# Patient Record
Sex: MALE | Race: WHITE | NOT HISPANIC OR LATINO | Employment: OTHER | URBAN - METROPOLITAN AREA
[De-identification: names, ages, dates, MRNs, and addresses within clinical notes are randomized per-mention and may not be internally consistent; named-entity substitution may affect disease eponyms.]

---

## 2020-07-29 ENCOUNTER — CONSULT (OUTPATIENT)
Dept: CARDIOLOGY | Facility: CLINIC | Age: 75
End: 2020-07-29

## 2020-07-29 VITALS
HEART RATE: 111 BPM | BODY MASS INDEX: 23.87 KG/M2 | WEIGHT: 192 LBS | HEIGHT: 75 IN | SYSTOLIC BLOOD PRESSURE: 118 MMHG | OXYGEN SATURATION: 98 % | DIASTOLIC BLOOD PRESSURE: 70 MMHG | TEMPERATURE: 97.3 F

## 2020-07-29 DIAGNOSIS — I48.21 PERMANENT ATRIAL FIBRILLATION (HCC): Primary | ICD-10-CM

## 2020-07-29 DIAGNOSIS — I49.5 SSS (SICK SINUS SYNDROME) (HCC): ICD-10-CM

## 2020-07-29 DIAGNOSIS — K25.4 GASTROINTESTINAL HEMORRHAGE ASSOCIATED WITH GASTRIC ULCER: ICD-10-CM

## 2020-07-29 PROCEDURE — 99204 OFFICE O/P NEW MOD 45 MIN: CPT | Performed by: INTERNAL MEDICINE

## 2020-07-29 PROCEDURE — 93280 PM DEVICE PROGR EVAL DUAL: CPT | Performed by: INTERNAL MEDICINE

## 2020-07-29 RX ORDER — RAMIPRIL 5 MG/1
5 CAPSULE ORAL DAILY
COMMUNITY
End: 2021-06-17 | Stop reason: HOSPADM

## 2020-07-29 RX ORDER — BISOPROLOL FUMARATE AND HYDROCHLOROTHIAZIDE 2.5; 6.25 MG/1; MG/1
1 TABLET ORAL DAILY
COMMUNITY
End: 2020-09-26 | Stop reason: HOSPADM

## 2020-08-06 ENCOUNTER — TELEPHONE (OUTPATIENT)
Dept: CARDIOLOGY | Facility: CLINIC | Age: 75
End: 2020-08-06

## 2020-08-06 NOTE — TELEPHONE ENCOUNTER
7/31 attempted to call pt to discuss Watchman, SDM etc no answer.  8/3 left message for pt to call.  8/6 attempted to call pt no answer.    Марина Dennis RN

## 2020-08-07 ENCOUNTER — TELEPHONE (OUTPATIENT)
Dept: CARDIOLOGY | Facility: CLINIC | Age: 75
End: 2020-08-07

## 2020-08-08 NOTE — TELEPHONE ENCOUNTER
Spoke with pt related to Watchman device. Pt states he will schedule SDM visit with PCP Dr Soliman.  Pt request possible sept date and understands he will need to start Eliquis 2 weeks prior.  Answered all patients questions.  Will continue to monitor and contact once Sept date known.    Марина Dennis RN

## 2020-08-11 ENCOUNTER — TELEPHONE (OUTPATIENT)
Dept: CARDIOLOGY | Facility: CLINIC | Age: 75
End: 2020-08-11

## 2020-08-12 NOTE — TELEPHONE ENCOUNTER
Spoke with pt-f/u on PRASHANTH closure.  Pt states he wishes to wait until Sept.  Has SDM visit with Dr Soliman 8/14.  SDM letter and tool faxed to his office.  Pt encouraged to call with any questions in the interim    Марина Dennis RN

## 2020-09-03 DIAGNOSIS — K25.4 GASTROINTESTINAL HEMORRHAGE ASSOCIATED WITH GASTRIC ULCER: ICD-10-CM

## 2020-09-03 DIAGNOSIS — I48.21 PERMANENT ATRIAL FIBRILLATION (HCC): Primary | ICD-10-CM

## 2020-09-18 ENCOUNTER — PREP FOR SURGERY (OUTPATIENT)
Dept: OTHER | Facility: HOSPITAL | Age: 75
End: 2020-09-18

## 2020-09-18 DIAGNOSIS — I48.21 PERMANENT ATRIAL FIBRILLATION (HCC): Primary | ICD-10-CM

## 2020-09-18 RX ORDER — NITROGLYCERIN 0.4 MG/1
0.4 TABLET SUBLINGUAL
Status: CANCELLED | OUTPATIENT
Start: 2020-09-18

## 2020-09-18 RX ORDER — SODIUM CHLORIDE 9 MG/ML
1 INJECTION, SOLUTION INTRAVENOUS CONTINUOUS
Status: CANCELLED | OUTPATIENT
Start: 2020-09-18 | End: 2020-09-18

## 2020-09-18 RX ORDER — ACETAMINOPHEN 325 MG/1
650 TABLET ORAL EVERY 4 HOURS PRN
Status: CANCELLED | OUTPATIENT
Start: 2020-09-18

## 2020-09-18 RX ORDER — CEFAZOLIN SODIUM 2 G/100ML
2 INJECTION, SOLUTION INTRAVENOUS ONCE
Status: CANCELLED | OUTPATIENT
Start: 2020-09-18 | End: 2020-09-18

## 2020-09-18 RX ORDER — SODIUM CHLORIDE 0.9 % (FLUSH) 0.9 %
3 SYRINGE (ML) INJECTION EVERY 12 HOURS SCHEDULED
Status: CANCELLED | OUTPATIENT
Start: 2020-09-18

## 2020-09-18 RX ORDER — SODIUM CHLORIDE 0.9 % (FLUSH) 0.9 %
10 SYRINGE (ML) INJECTION AS NEEDED
Status: CANCELLED | OUTPATIENT
Start: 2020-09-18

## 2020-09-22 ENCOUNTER — APPOINTMENT (OUTPATIENT)
Dept: PREADMISSION TESTING | Facility: HOSPITAL | Age: 75
End: 2020-09-22

## 2020-09-22 DIAGNOSIS — I48.21 PERMANENT ATRIAL FIBRILLATION (HCC): ICD-10-CM

## 2020-09-22 LAB
ANION GAP SERPL CALCULATED.3IONS-SCNC: 12 MMOL/L (ref 5–15)
BUN SERPL-MCNC: 22 MG/DL (ref 8–23)
BUN/CREAT SERPL: 18.3 (ref 7–25)
CALCIUM SPEC-SCNC: 9.7 MG/DL (ref 8.6–10.5)
CHLORIDE SERPL-SCNC: 103 MMOL/L (ref 98–107)
CO2 SERPL-SCNC: 21 MMOL/L (ref 22–29)
CREAT SERPL-MCNC: 1.2 MG/DL (ref 0.76–1.27)
DEPRECATED RDW RBC AUTO: 42.2 FL (ref 37–54)
ERYTHROCYTE [DISTWIDTH] IN BLOOD BY AUTOMATED COUNT: 12 % (ref 12.3–15.4)
GFR SERPL CREATININE-BSD FRML MDRD: 59 ML/MIN/1.73
GLUCOSE SERPL-MCNC: 100 MG/DL (ref 65–99)
HCT VFR BLD AUTO: 42.3 % (ref 37.5–51)
HGB BLD-MCNC: 14.3 G/DL (ref 13–17.7)
INR PPP: 1.24 (ref 0.85–1.16)
MCH RBC QN AUTO: 32.1 PG (ref 26.6–33)
MCHC RBC AUTO-ENTMCNC: 33.8 G/DL (ref 31.5–35.7)
MCV RBC AUTO: 95.1 FL (ref 79–97)
PLATELET # BLD AUTO: 240 10*3/MM3 (ref 140–450)
PMV BLD AUTO: 10.5 FL (ref 6–12)
POTASSIUM SERPL-SCNC: 4.6 MMOL/L (ref 3.5–5.2)
PROTHROMBIN TIME: 15.3 SECONDS (ref 11.5–14)
RBC # BLD AUTO: 4.45 10*6/MM3 (ref 4.14–5.8)
SODIUM SERPL-SCNC: 136 MMOL/L (ref 136–145)
WBC # BLD AUTO: 7.06 10*3/MM3 (ref 3.4–10.8)

## 2020-09-22 PROCEDURE — 80048 BASIC METABOLIC PNL TOTAL CA: CPT | Performed by: PHYSICIAN ASSISTANT

## 2020-09-22 PROCEDURE — 36415 COLL VENOUS BLD VENIPUNCTURE: CPT

## 2020-09-22 PROCEDURE — U0004 COV-19 TEST NON-CDC HGH THRU: HCPCS

## 2020-09-22 PROCEDURE — 85027 COMPLETE CBC AUTOMATED: CPT | Performed by: PHYSICIAN ASSISTANT

## 2020-09-22 PROCEDURE — 85610 PROTHROMBIN TIME: CPT | Performed by: PHYSICIAN ASSISTANT

## 2020-09-22 PROCEDURE — C9803 HOPD COVID-19 SPEC COLLECT: HCPCS

## 2020-09-23 LAB — SARS-COV-2 RNA NOSE QL NAA+PROBE: NOT DETECTED

## 2020-09-25 ENCOUNTER — ANESTHESIA EVENT (OUTPATIENT)
Dept: CARDIOLOGY | Facility: HOSPITAL | Age: 75
End: 2020-09-25

## 2020-09-25 ENCOUNTER — HOSPITAL ENCOUNTER (INPATIENT)
Facility: HOSPITAL | Age: 75
LOS: 1 days | Discharge: HOME | End: 2020-09-26
Attending: INTERNAL MEDICINE | Admitting: INTERNAL MEDICINE

## 2020-09-25 ENCOUNTER — ANESTHESIA (OUTPATIENT)
Dept: CARDIOLOGY | Facility: HOSPITAL | Age: 75
End: 2020-09-25

## 2020-09-25 ENCOUNTER — HOSPITAL ENCOUNTER (OUTPATIENT)
Dept: CARDIOLOGY | Facility: HOSPITAL | Age: 75
Discharge: HOME | End: 2020-09-25

## 2020-09-25 DIAGNOSIS — K25.4 GASTROINTESTINAL HEMORRHAGE ASSOCIATED WITH GASTRIC ULCER: ICD-10-CM

## 2020-09-25 DIAGNOSIS — I48.21 PERMANENT ATRIAL FIBRILLATION (HCC): ICD-10-CM

## 2020-09-25 LAB
ACT BLD: 137 SECONDS (ref 82–152)
ACT BLD: 351 SECONDS (ref 82–152)
ACT BLD: 401 SECONDS (ref 82–152)
HBA1C MFR BLD: 5.8 % (ref 4.8–5.6)
NT-PROBNP SERPL-MCNC: 3434 PG/ML (ref 0–900)

## 2020-09-25 PROCEDURE — 93799 UNLISTED CV SVC/PROCEDURE: CPT | Performed by: INTERNAL MEDICINE

## 2020-09-25 PROCEDURE — 25010000002 PROPOFOL 10 MG/ML EMULSION: Performed by: NURSE ANESTHETIST, CERTIFIED REGISTERED

## 2020-09-25 PROCEDURE — C1894 INTRO/SHEATH, NON-LASER: HCPCS | Performed by: INTERNAL MEDICINE

## 2020-09-25 PROCEDURE — C1769 GUIDE WIRE: HCPCS | Performed by: INTERNAL MEDICINE

## 2020-09-25 PROCEDURE — C1893 INTRO/SHEATH, FIXED,NON-PEEL: HCPCS | Performed by: INTERNAL MEDICINE

## 2020-09-25 PROCEDURE — 33340 PERQ CLSR TCAT L ATR APNDGE: CPT | Performed by: INTERNAL MEDICINE

## 2020-09-25 PROCEDURE — 25010000002 NEOSTIGMINE 10 MG/10ML SOLUTION: Performed by: NURSE ANESTHETIST, CERTIFIED REGISTERED

## 2020-09-25 PROCEDURE — 25010000002 PHENYLEPHRINE PER 1 ML: Performed by: NURSE ANESTHETIST, CERTIFIED REGISTERED

## 2020-09-25 PROCEDURE — 83880 ASSAY OF NATRIURETIC PEPTIDE: CPT | Performed by: PHYSICIAN ASSISTANT

## 2020-09-25 PROCEDURE — 93355 ECHO TRANSESOPHAGEAL (TEE): CPT

## 2020-09-25 PROCEDURE — 93355 ECHO TRANSESOPHAGEAL (TEE): CPT | Performed by: INTERNAL MEDICINE

## 2020-09-25 PROCEDURE — 85347 COAGULATION TIME ACTIVATED: CPT

## 2020-09-25 PROCEDURE — 25010000002 DEXAMETHASONE PER 1 MG: Performed by: NURSE ANESTHETIST, CERTIFIED REGISTERED

## 2020-09-25 PROCEDURE — C1759 CATH, INTRA ECHOCARDIOGRAPHY: HCPCS | Performed by: INTERNAL MEDICINE

## 2020-09-25 PROCEDURE — 02L73DK OCCLUSION OF LEFT ATRIAL APPENDAGE WITH INTRALUMINAL DEVICE, PERCUTANEOUS APPROACH: ICD-10-PCS | Performed by: INTERNAL MEDICINE

## 2020-09-25 PROCEDURE — 25010000002 ONDANSETRON PER 1 MG: Performed by: NURSE ANESTHETIST, CERTIFIED REGISTERED

## 2020-09-25 PROCEDURE — B24BZZ4 ULTRASONOGRAPHY OF HEART WITH AORTA, TRANSESOPHAGEAL: ICD-10-PCS | Performed by: INTERNAL MEDICINE

## 2020-09-25 PROCEDURE — 25010000003 LIDOCAINE 1 % SOLUTION: Performed by: INTERNAL MEDICINE

## 2020-09-25 PROCEDURE — 0 IOPAMIDOL PER 1 ML: Performed by: INTERNAL MEDICINE

## 2020-09-25 PROCEDURE — 25010000003 CEFAZOLIN IN DEXTROSE 2-4 GM/100ML-% SOLUTION: Performed by: PHYSICIAN ASSISTANT

## 2020-09-25 PROCEDURE — B246ZZ3 ULTRASONOGRAPHY OF RIGHT AND LEFT HEART, INTRAVASCULAR: ICD-10-PCS | Performed by: INTERNAL MEDICINE

## 2020-09-25 PROCEDURE — 25010000002 PROTAMINE SULFATE PER 10 MG: Performed by: INTERNAL MEDICINE

## 2020-09-25 PROCEDURE — 25010000002 FUROSEMIDE PER 20 MG: Performed by: INTERNAL MEDICINE

## 2020-09-25 PROCEDURE — 25010000002 HEPARIN (PORCINE) PER 1000 UNITS: Performed by: INTERNAL MEDICINE

## 2020-09-25 PROCEDURE — 83036 HEMOGLOBIN GLYCOSYLATED A1C: CPT | Performed by: PHYSICIAN ASSISTANT

## 2020-09-25 PROCEDURE — C1766 INTRO/SHEATH,STRBLE,NON-PEEL: HCPCS | Performed by: INTERNAL MEDICINE

## 2020-09-25 DEVICE — LEFT ATRIAL APPENDAGE CLOSURE DEVICE WITH DELIVERY SYSTEM
Type: IMPLANTABLE DEVICE | Status: FUNCTIONAL
Brand: WATCHMAN FLX™

## 2020-09-25 RX ORDER — SODIUM CHLORIDE 9 MG/ML
INJECTION, SOLUTION INTRAVENOUS CONTINUOUS PRN
Status: COMPLETED | OUTPATIENT
Start: 2020-09-25 | End: 2020-09-25

## 2020-09-25 RX ORDER — FENTANYL CITRATE 50 UG/ML
50 INJECTION, SOLUTION INTRAMUSCULAR; INTRAVENOUS
Status: DISCONTINUED | OUTPATIENT
Start: 2020-09-25 | End: 2020-09-25 | Stop reason: HOSPADM

## 2020-09-25 RX ORDER — FUROSEMIDE 10 MG/ML
40 INJECTION INTRAMUSCULAR; INTRAVENOUS ONCE
Status: COMPLETED | OUTPATIENT
Start: 2020-09-25 | End: 2020-09-25

## 2020-09-25 RX ORDER — CARVEDILOL 6.25 MG/1
6.25 TABLET ORAL 2 TIMES DAILY WITH MEALS
Status: DISCONTINUED | OUTPATIENT
Start: 2020-09-25 | End: 2020-09-26 | Stop reason: HOSPADM

## 2020-09-25 RX ORDER — IPRATROPIUM BROMIDE AND ALBUTEROL SULFATE 2.5; .5 MG/3ML; MG/3ML
3 SOLUTION RESPIRATORY (INHALATION) ONCE AS NEEDED
Status: DISCONTINUED | OUTPATIENT
Start: 2020-09-25 | End: 2020-09-25 | Stop reason: HOSPADM

## 2020-09-25 RX ORDER — SODIUM CHLORIDE 9 MG/ML
1 INJECTION, SOLUTION INTRAVENOUS CONTINUOUS
Status: ACTIVE | OUTPATIENT
Start: 2020-09-25 | End: 2020-09-25

## 2020-09-25 RX ORDER — ACETAMINOPHEN 325 MG/1
650 TABLET ORAL EVERY 4 HOURS PRN
Status: DISCONTINUED | OUTPATIENT
Start: 2020-09-25 | End: 2020-09-26 | Stop reason: HOSPADM

## 2020-09-25 RX ORDER — PROPOFOL 10 MG/ML
VIAL (ML) INTRAVENOUS AS NEEDED
Status: DISCONTINUED | OUTPATIENT
Start: 2020-09-25 | End: 2020-09-25 | Stop reason: SURG

## 2020-09-25 RX ORDER — SODIUM CHLORIDE 0.9 % (FLUSH) 0.9 %
3 SYRINGE (ML) INJECTION EVERY 12 HOURS SCHEDULED
Status: DISCONTINUED | OUTPATIENT
Start: 2020-09-25 | End: 2020-09-25 | Stop reason: HOSPADM

## 2020-09-25 RX ORDER — CEFAZOLIN SODIUM 2 G/100ML
2 INJECTION, SOLUTION INTRAVENOUS ONCE
Status: COMPLETED | OUTPATIENT
Start: 2020-09-25 | End: 2020-09-25

## 2020-09-25 RX ORDER — ONDANSETRON 2 MG/ML
4 INJECTION INTRAMUSCULAR; INTRAVENOUS EVERY 6 HOURS PRN
Status: DISCONTINUED | OUTPATIENT
Start: 2020-09-25 | End: 2020-09-26 | Stop reason: HOSPADM

## 2020-09-25 RX ORDER — GLYCOPYRROLATE 0.2 MG/ML
INJECTION INTRAMUSCULAR; INTRAVENOUS AS NEEDED
Status: DISCONTINUED | OUTPATIENT
Start: 2020-09-25 | End: 2020-09-25 | Stop reason: SURG

## 2020-09-25 RX ORDER — NALOXONE HCL 0.4 MG/ML
0.4 VIAL (ML) INJECTION AS NEEDED
Status: DISCONTINUED | OUTPATIENT
Start: 2020-09-25 | End: 2020-09-25 | Stop reason: HOSPADM

## 2020-09-25 RX ORDER — LABETALOL HYDROCHLORIDE 5 MG/ML
5 INJECTION, SOLUTION INTRAVENOUS
Status: DISCONTINUED | OUTPATIENT
Start: 2020-09-25 | End: 2020-09-25 | Stop reason: HOSPADM

## 2020-09-25 RX ORDER — FUROSEMIDE 10 MG/ML
20 INJECTION INTRAMUSCULAR; INTRAVENOUS ONCE
Status: DISCONTINUED | OUTPATIENT
Start: 2020-09-25 | End: 2020-09-25

## 2020-09-25 RX ORDER — SODIUM CHLORIDE 0.9 % (FLUSH) 0.9 %
10 SYRINGE (ML) INJECTION AS NEEDED
Status: DISCONTINUED | OUTPATIENT
Start: 2020-09-25 | End: 2020-09-26 | Stop reason: HOSPADM

## 2020-09-25 RX ORDER — ONDANSETRON 2 MG/ML
INJECTION INTRAMUSCULAR; INTRAVENOUS AS NEEDED
Status: DISCONTINUED | OUTPATIENT
Start: 2020-09-25 | End: 2020-09-25 | Stop reason: SURG

## 2020-09-25 RX ORDER — NEOSTIGMINE METHYLSULFATE 1 MG/ML
INJECTION, SOLUTION INTRAVENOUS AS NEEDED
Status: DISCONTINUED | OUTPATIENT
Start: 2020-09-25 | End: 2020-09-25 | Stop reason: SURG

## 2020-09-25 RX ORDER — HYDROMORPHONE HYDROCHLORIDE 1 MG/ML
0.5 INJECTION, SOLUTION INTRAMUSCULAR; INTRAVENOUS; SUBCUTANEOUS
Status: DISCONTINUED | OUTPATIENT
Start: 2020-09-25 | End: 2020-09-25 | Stop reason: HOSPADM

## 2020-09-25 RX ORDER — ONDANSETRON 2 MG/ML
4 INJECTION INTRAMUSCULAR; INTRAVENOUS ONCE AS NEEDED
Status: DISCONTINUED | OUTPATIENT
Start: 2020-09-25 | End: 2020-09-25 | Stop reason: HOSPADM

## 2020-09-25 RX ORDER — NITROGLYCERIN 0.4 MG/1
0.4 TABLET SUBLINGUAL
Status: DISCONTINUED | OUTPATIENT
Start: 2020-09-25 | End: 2020-09-26 | Stop reason: HOSPADM

## 2020-09-25 RX ORDER — DEXAMETHASONE SODIUM PHOSPHATE 4 MG/ML
INJECTION, SOLUTION INTRA-ARTICULAR; INTRALESIONAL; INTRAMUSCULAR; INTRAVENOUS; SOFT TISSUE AS NEEDED
Status: DISCONTINUED | OUTPATIENT
Start: 2020-09-25 | End: 2020-09-25 | Stop reason: SURG

## 2020-09-25 RX ORDER — HEPARIN SODIUM 1000 [USP'U]/ML
INJECTION, SOLUTION INTRAVENOUS; SUBCUTANEOUS AS NEEDED
Status: DISCONTINUED | OUTPATIENT
Start: 2020-09-25 | End: 2020-09-25 | Stop reason: HOSPADM

## 2020-09-25 RX ORDER — FUROSEMIDE 20 MG/1
20 TABLET ORAL DAILY
Status: DISCONTINUED | OUTPATIENT
Start: 2020-09-26 | End: 2020-09-26

## 2020-09-25 RX ORDER — HYDROCODONE BITARTRATE AND ACETAMINOPHEN 5; 325 MG/1; MG/1
1 TABLET ORAL ONCE AS NEEDED
Status: DISCONTINUED | OUTPATIENT
Start: 2020-09-25 | End: 2020-09-25 | Stop reason: HOSPADM

## 2020-09-25 RX ORDER — FUROSEMIDE 20 MG/1
20 TABLET ORAL DAILY
Qty: 30 TABLET | Refills: 6 | Status: CANCELLED | OUTPATIENT
Start: 2020-09-26

## 2020-09-25 RX ORDER — HYDROCODONE BITARTRATE AND ACETAMINOPHEN 5; 325 MG/1; MG/1
1 TABLET ORAL EVERY 4 HOURS PRN
Status: DISCONTINUED | OUTPATIENT
Start: 2020-09-25 | End: 2020-09-26 | Stop reason: HOSPADM

## 2020-09-25 RX ORDER — HYDRALAZINE HYDROCHLORIDE 20 MG/ML
5 INJECTION INTRAMUSCULAR; INTRAVENOUS
Status: DISCONTINUED | OUTPATIENT
Start: 2020-09-25 | End: 2020-09-25 | Stop reason: HOSPADM

## 2020-09-25 RX ORDER — PROTAMINE SULFATE 10 MG/ML
INJECTION, SOLUTION INTRAVENOUS AS NEEDED
Status: DISCONTINUED | OUTPATIENT
Start: 2020-09-25 | End: 2020-09-25 | Stop reason: HOSPADM

## 2020-09-25 RX ORDER — SODIUM CHLORIDE 0.9 % (FLUSH) 0.9 %
3-10 SYRINGE (ML) INJECTION AS NEEDED
Status: DISCONTINUED | OUTPATIENT
Start: 2020-09-25 | End: 2020-09-25 | Stop reason: HOSPADM

## 2020-09-25 RX ORDER — CARVEDILOL 6.25 MG/1
6.25 TABLET ORAL 2 TIMES DAILY WITH MEALS
Qty: 60 TABLET | Refills: 6 | Status: CANCELLED | OUTPATIENT
Start: 2020-09-25

## 2020-09-25 RX ORDER — LIDOCAINE HYDROCHLORIDE 10 MG/ML
INJECTION, SOLUTION INFILTRATION; PERINEURAL AS NEEDED
Status: DISCONTINUED | OUTPATIENT
Start: 2020-09-25 | End: 2020-09-25 | Stop reason: HOSPADM

## 2020-09-25 RX ORDER — SODIUM CHLORIDE 0.9 % (FLUSH) 0.9 %
3 SYRINGE (ML) INJECTION EVERY 12 HOURS SCHEDULED
Status: DISCONTINUED | OUTPATIENT
Start: 2020-09-25 | End: 2020-09-26 | Stop reason: HOSPADM

## 2020-09-25 RX ORDER — SODIUM CHLORIDE, SODIUM LACTATE, POTASSIUM CHLORIDE, CALCIUM CHLORIDE 600; 310; 30; 20 MG/100ML; MG/100ML; MG/100ML; MG/100ML
INJECTION, SOLUTION INTRAVENOUS CONTINUOUS PRN
Status: DISCONTINUED | OUTPATIENT
Start: 2020-09-25 | End: 2020-09-25 | Stop reason: SURG

## 2020-09-25 RX ORDER — ROCURONIUM BROMIDE 10 MG/ML
INJECTION, SOLUTION INTRAVENOUS AS NEEDED
Status: DISCONTINUED | OUTPATIENT
Start: 2020-09-25 | End: 2020-09-25 | Stop reason: SURG

## 2020-09-25 RX ORDER — LIDOCAINE HYDROCHLORIDE 10 MG/ML
INJECTION, SOLUTION EPIDURAL; INFILTRATION; INTRACAUDAL; PERINEURAL AS NEEDED
Status: DISCONTINUED | OUTPATIENT
Start: 2020-09-25 | End: 2020-09-25 | Stop reason: SURG

## 2020-09-25 RX ORDER — ACETAMINOPHEN 650 MG/1
650 SUPPOSITORY RECTAL EVERY 4 HOURS PRN
Status: DISCONTINUED | OUTPATIENT
Start: 2020-09-25 | End: 2020-09-26 | Stop reason: HOSPADM

## 2020-09-25 RX ADMIN — PHENYLEPHRINE HYDROCHLORIDE 80 MCG: 10 INJECTION, SOLUTION INTRAMUSCULAR; INTRAVENOUS; SUBCUTANEOUS at 12:57

## 2020-09-25 RX ADMIN — EPHEDRINE SULFATE 5 MG: 50 INJECTION INTRAMUSCULAR; INTRAVENOUS; SUBCUTANEOUS at 13:53

## 2020-09-25 RX ADMIN — CEFAZOLIN SODIUM 2 G: 2 INJECTION, SOLUTION INTRAVENOUS at 12:58

## 2020-09-25 RX ADMIN — PHENYLEPHRINE HYDROCHLORIDE 80 MCG: 10 INJECTION, SOLUTION INTRAMUSCULAR; INTRAVENOUS; SUBCUTANEOUS at 14:09

## 2020-09-25 RX ADMIN — ONDANSETRON 4 MG: 2 INJECTION INTRAMUSCULAR; INTRAVENOUS at 14:34

## 2020-09-25 RX ADMIN — PROPOFOL 200 MG: 10 INJECTION, EMULSION INTRAVENOUS at 12:52

## 2020-09-25 RX ADMIN — GLYCOPYRROLATE 0.4 MG: 0.2 INJECTION INTRAMUSCULAR; INTRAVENOUS at 14:34

## 2020-09-25 RX ADMIN — SODIUM CHLORIDE, POTASSIUM CHLORIDE, SODIUM LACTATE AND CALCIUM CHLORIDE: 600; 310; 30; 20 INJECTION, SOLUTION INTRAVENOUS at 12:36

## 2020-09-25 RX ADMIN — PHENYLEPHRINE HYDROCHLORIDE 120 MCG: 10 INJECTION, SOLUTION INTRAMUSCULAR; INTRAVENOUS; SUBCUTANEOUS at 12:59

## 2020-09-25 RX ADMIN — PHENYLEPHRINE HYDROCHLORIDE 80 MCG: 10 INJECTION, SOLUTION INTRAMUSCULAR; INTRAVENOUS; SUBCUTANEOUS at 13:53

## 2020-09-25 RX ADMIN — LIDOCAINE HYDROCHLORIDE 50 MG: 10 INJECTION, SOLUTION EPIDURAL; INFILTRATION; INTRACAUDAL; PERINEURAL at 12:52

## 2020-09-25 RX ADMIN — NEOSTIGMINE METHYLSULFATE 3 MG: 1 INJECTION, SOLUTION INTRAVENOUS at 14:34

## 2020-09-25 RX ADMIN — ROCURONIUM BROMIDE 40 MG: 10 SOLUTION INTRAVENOUS at 12:52

## 2020-09-25 RX ADMIN — CARVEDILOL 6.25 MG: 6.25 TABLET, FILM COATED ORAL at 18:00

## 2020-09-25 RX ADMIN — FUROSEMIDE 40 MG: 10 INJECTION, SOLUTION INTRAMUSCULAR; INTRAVENOUS at 18:00

## 2020-09-25 RX ADMIN — DEXAMETHASONE SODIUM PHOSPHATE 8 MG: 4 INJECTION, SOLUTION INTRAMUSCULAR; INTRAVENOUS at 12:56

## 2020-09-25 RX ADMIN — PHENYLEPHRINE HYDROCHLORIDE 80 MCG: 10 INJECTION, SOLUTION INTRAMUSCULAR; INTRAVENOUS; SUBCUTANEOUS at 13:11

## 2020-09-25 NOTE — ANESTHESIA POSTPROCEDURE EVALUATION
Patient: Colin Albrecht    Procedure Summary     Date: 09/25/20 Room / Location: [facility] / [facility]IVE [facility]    Anesthesia Start: 1236 Anesthesia Stop:     Procedure: Atrial Appendage Occlusion (Watchman), start Eliquis 2 weeks prior and hold 1 day, GA (N/A ) Diagnosis:       Permanent atrial fibrillation (CMS/HCC)      Gastrointestinal hemorrhage associated with gastric ulcer      (afib)    Provider: Yonny Prado MD Provider: Lico Cheung MD    Anesthesia Type: general ASA Status: 3          Anesthesia Type: general    Vitals  Vitals Value Taken Time   BP     Temp     Pulse 120 09/25/20 1452   Resp     SpO2 94 % 09/25/20 1452   Vitals shown include unvalidated device data.      /73  T 97  SPO2 96%    Post Anesthesia Care and Evaluation    Patient location during evaluation: PACU  Patient participation: complete - patient participated  Level of consciousness: awake and alert  Pain score: 0  Pain management: adequate  Airway patency: patent  Anesthetic complications: No anesthetic complications  PONV Status: none  Cardiovascular status: hemodynamically stable and acceptable  Respiratory status: nonlabored ventilation, acceptable and nasal cannula  Hydration status: acceptable

## 2020-09-25 NOTE — ANESTHESIA PROCEDURE NOTES
Airway  Urgency: elective    Date/Time: 9/25/2020 12:53 PM  Airway not difficult    General Information and Staff    Patient location during procedure: OR  CRNA: Miguel Ángel Shukla CRNA    Indications and Patient Condition  Indications for airway management: airway protection    Preoxygenated: yes  MILS not maintained throughout  Mask difficulty assessment: 1 - vent by mask    Final Airway Details  Final airway type: endotracheal airway      Successful airway: ETT  Cuffed: yes   Successful intubation technique: direct laryngoscopy  Endotracheal tube insertion site: oral  Blade: Elizalde  Blade size: 2  ETT size (mm): 7.5  Cormack-Lehane Classification: grade I - full view of glottis  Placement verified by: chest auscultation and capnometry   Measured from: lips  ETT/EBT  to lips (cm): 20  Number of attempts at approach: 1  Assessment: lips, teeth, and gum same as pre-op and atraumatic intubation    Additional Comments  Negative epigastric sounds, Breath sound equal bilaterally with symmetric chest rise and fall

## 2020-09-25 NOTE — ANESTHESIA PREPROCEDURE EVALUATION
Anesthesia Evaluation     Patient summary reviewed and Nursing notes reviewed   NPO Solid Status: > 8 hours  NPO Liquid Status: > 8 hours           Airway   Mallampati: I  TM distance: >3 FB  Neck ROM: full  No difficulty expected  Dental      Pulmonary    (+) a smoker (2010 ) Former,   (-) COPD, asthma, shortness of breath, recent URI, sleep apnea, no home oxygen  Cardiovascular     ECG reviewed  Patient on routine beta blocker    (+) hypertension, dysrhythmias Atrial Fib, CHF ,   (-) hyperlipidemia    ROS comment: ECG atrial fibrillation   unifocal PVCs        Neuro/Psych  (+) psychiatric history Depression,     (-) seizures, CVA  GI/Hepatic/Renal/Endo    (+)  PUD, GI bleeding ,   (-) liver disease, no renal disease, diabetes, no thyroid disorder    Musculoskeletal     Abdominal    Substance History      OB/GYN          Other        ROS/Med Hx Other: eliquis                 Anesthesia Plan    ASA 3     general     intravenous induction     Anesthetic plan, all risks, benefits, and alternatives have been provided, discussed and informed consent has been obtained with: patient.    Plan discussed with CRNA.

## 2020-09-26 VITALS
RESPIRATION RATE: 18 BRPM | SYSTOLIC BLOOD PRESSURE: 113 MMHG | HEIGHT: 75 IN | HEART RATE: 103 BPM | BODY MASS INDEX: 23.38 KG/M2 | DIASTOLIC BLOOD PRESSURE: 86 MMHG | TEMPERATURE: 98.2 F | OXYGEN SATURATION: 95 % | WEIGHT: 188 LBS

## 2020-09-26 LAB
ANION GAP SERPL CALCULATED.3IONS-SCNC: 8 MMOL/L (ref 5–15)
BUN SERPL-MCNC: 29 MG/DL (ref 8–23)
BUN/CREAT SERPL: 25.9 (ref 7–25)
CALCIUM SPEC-SCNC: 9.2 MG/DL (ref 8.6–10.5)
CHLORIDE SERPL-SCNC: 103 MMOL/L (ref 98–107)
CO2 SERPL-SCNC: 28 MMOL/L (ref 22–29)
CREAT SERPL-MCNC: 1.12 MG/DL (ref 0.76–1.27)
GFR SERPL CREATININE-BSD FRML MDRD: 64 ML/MIN/1.73
GLUCOSE SERPL-MCNC: 118 MG/DL (ref 65–99)
POTASSIUM SERPL-SCNC: 4.5 MMOL/L (ref 3.5–5.2)
SODIUM SERPL-SCNC: 139 MMOL/L (ref 136–145)

## 2020-09-26 PROCEDURE — 80048 BASIC METABOLIC PNL TOTAL CA: CPT | Performed by: PHYSICIAN ASSISTANT

## 2020-09-26 PROCEDURE — 99238 HOSP IP/OBS DSCHRG MGMT 30/<: CPT | Performed by: NURSE PRACTITIONER

## 2020-09-26 RX ORDER — FUROSEMIDE 20 MG/1
20 TABLET ORAL DAILY
Status: DISCONTINUED | OUTPATIENT
Start: 2020-09-26 | End: 2020-09-26 | Stop reason: HOSPADM

## 2020-09-26 RX ORDER — FUROSEMIDE 20 MG/1
20 TABLET ORAL DAILY
Qty: 30 TABLET | Refills: 3 | Status: ON HOLD | OUTPATIENT
Start: 2020-09-26 | End: 2021-03-26

## 2020-09-26 RX ORDER — CARVEDILOL 6.25 MG/1
6.25 TABLET ORAL 2 TIMES DAILY WITH MEALS
Qty: 60 TABLET | Refills: 11 | Status: SHIPPED | OUTPATIENT
Start: 2020-09-26 | End: 2021-03-05 | Stop reason: ALTCHOICE

## 2020-09-26 RX ADMIN — CARVEDILOL 6.25 MG: 6.25 TABLET, FILM COATED ORAL at 08:53

## 2020-09-26 RX ADMIN — FUROSEMIDE 20 MG: 20 TABLET ORAL at 10:40

## 2020-09-26 RX ADMIN — SODIUM CHLORIDE, PRESERVATIVE FREE 3 ML: 5 INJECTION INTRAVENOUS at 08:54

## 2020-09-28 LAB — LV EF 2D ECHO EST: 30 %

## 2020-10-08 ENCOUNTER — DOCUMENTATION (OUTPATIENT)
Dept: CARDIOLOGY | Facility: CLINIC | Age: 75
End: 2020-10-08

## 2020-10-08 NOTE — PROGRESS NOTES
Received BMP today from follow up after hospital with medication changes. Cr is 1.27 (scale 0.76-1.27). Will hold off on Entresto for now. Continue Ramipril for now, may consider Entresto at later date.     Electronically signed by ETIENNE Maxwell, 10/08/20, 12:13 PM EDT.

## 2020-11-10 ENCOUNTER — APPOINTMENT (OUTPATIENT)
Dept: PREADMISSION TESTING | Facility: HOSPITAL | Age: 75
End: 2020-11-10

## 2020-11-10 PROCEDURE — U0004 COV-19 TEST NON-CDC HGH THRU: HCPCS

## 2020-11-10 PROCEDURE — C9803 HOPD COVID-19 SPEC COLLECT: HCPCS

## 2020-11-11 LAB — SARS-COV-2 RNA RESP QL NAA+PROBE: NOT DETECTED

## 2020-11-13 ENCOUNTER — HOSPITAL ENCOUNTER (OUTPATIENT)
Dept: CARDIOLOGY | Facility: HOSPITAL | Age: 75
Discharge: HOME | End: 2020-11-13
Admitting: PHYSICIAN ASSISTANT

## 2020-11-13 VITALS — OXYGEN SATURATION: 96 % | DIASTOLIC BLOOD PRESSURE: 93 MMHG | HEART RATE: 89 BPM | SYSTOLIC BLOOD PRESSURE: 133 MMHG

## 2020-11-13 DIAGNOSIS — I48.21 PERMANENT ATRIAL FIBRILLATION (HCC): ICD-10-CM

## 2020-11-13 LAB
BH CV ECHO MEAS - BSA(HAYCOCK): 2.1 M^2
BH CV ECHO MEAS - BSA: 2.1 M^2
BH CV ECHO MEAS - BZI_BMI: 23.5 KILOGRAMS/M^2
BH CV ECHO MEAS - BZI_METRIC_HEIGHT: 190.5 CM
BH CV ECHO MEAS - BZI_METRIC_WEIGHT: 85.3 KG
BH CV VAS BP RIGHT ARM: NORMAL MMHG
LV EF 2D ECHO EST: 35 %

## 2020-11-13 PROCEDURE — 25010000002 FENTANYL CITRATE (PF) 100 MCG/2ML SOLUTION: Performed by: INTERNAL MEDICINE

## 2020-11-13 PROCEDURE — 93325 DOPPLER ECHO COLOR FLOW MAPG: CPT

## 2020-11-13 PROCEDURE — 25010000002 MIDAZOLAM PER 1 MG: Performed by: INTERNAL MEDICINE

## 2020-11-13 PROCEDURE — 93321 DOPPLER ECHO F-UP/LMTD STD: CPT | Performed by: INTERNAL MEDICINE

## 2020-11-13 PROCEDURE — 93321 DOPPLER ECHO F-UP/LMTD STD: CPT

## 2020-11-13 PROCEDURE — 93312 ECHO TRANSESOPHAGEAL: CPT

## 2020-11-13 PROCEDURE — 93325 DOPPLER ECHO COLOR FLOW MAPG: CPT | Performed by: INTERNAL MEDICINE

## 2020-11-13 PROCEDURE — 93312 ECHO TRANSESOPHAGEAL: CPT | Performed by: INTERNAL MEDICINE

## 2020-11-13 RX ORDER — CLOPIDOGREL BISULFATE 75 MG/1
75 TABLET ORAL DAILY
Qty: 30 TABLET | Refills: 6 | Status: SHIPPED | OUTPATIENT
Start: 2020-11-13 | End: 2021-03-05 | Stop reason: ALTCHOICE

## 2020-11-13 RX ORDER — MIDAZOLAM HYDROCHLORIDE 1 MG/ML
INJECTION INTRAMUSCULAR; INTRAVENOUS
Status: COMPLETED | OUTPATIENT
Start: 2020-11-13 | End: 2020-11-13

## 2020-11-13 RX ORDER — FENTANYL CITRATE 50 UG/ML
INJECTION, SOLUTION INTRAMUSCULAR; INTRAVENOUS
Status: COMPLETED | OUTPATIENT
Start: 2020-11-13 | End: 2020-11-13

## 2020-11-13 RX ORDER — ASPIRIN 81 MG/1
81 TABLET ORAL DAILY
Qty: 30 TABLET | Refills: 11 | Status: SHIPPED | OUTPATIENT
Start: 2020-11-13 | End: 2021-06-10 | Stop reason: HOSPADM

## 2020-11-13 RX ADMIN — METHOHEXITAL SODIUM 30 MG: 500 INJECTION, POWDER, LYOPHILIZED, FOR SOLUTION INTRAMUSCULAR; INTRAVENOUS; RECTAL at 14:08

## 2020-11-13 RX ADMIN — MIDAZOLAM HYDROCHLORIDE 1 MG: 1 INJECTION, SOLUTION INTRAMUSCULAR; INTRAVENOUS at 14:11

## 2020-11-13 RX ADMIN — MIDAZOLAM HYDROCHLORIDE 1 MG: 1 INJECTION, SOLUTION INTRAMUSCULAR; INTRAVENOUS at 14:07

## 2020-11-13 RX ADMIN — METHOHEXITAL SODIUM 10 MG: 500 INJECTION, POWDER, LYOPHILIZED, FOR SOLUTION INTRAMUSCULAR; INTRAVENOUS; RECTAL at 14:12

## 2020-11-13 RX ADMIN — FENTANYL CITRATE 50 MCG: 50 INJECTION, SOLUTION INTRAMUSCULAR; INTRAVENOUS at 14:10

## 2020-11-13 NOTE — H&P
[facility] Consult Note      Referring Provider: Macrina Ovalles PA  Primary Provider:  Arun Soliman MD  Reason for Consultation: Persistent atrial fibrillation    Problem list:    1. Permanent Atrial Fibrillation   a. CHADsvasc = 4   b. History of GIB  c. Echocardiogram 1/27/2016: EF 45 to 55%, unchanged from previous echo 2012  d. Echocardiogram 8/27/2019: EF 35 to 40%, mild to moderate MR, mild to moderate TR, RVSP 40 mmHg  e. Nuclear stress test 8/26/2019: Normal LV perfusion EF 33%  f. S/p insertion of a left atrial appendage occlusive device (24 mm Watchman flex), 9/25/2020.   2. Dilated cardiomyopathy  a. Patient reports normal LHC 10-12 years ago  b. Echocardiogram 1/27/2016: EF 45 to 55%, unchanged from previous echo 2012  c. Echocardiogram 8/27/2019: EF 35 to 40%, mild to moderate MR, mild to moderate TR, RVSP 40 mmHg  d. Nuclear stress test 8/26/2019: Normal LV perfusion EF 33%  3. Sick sinus syndrome  a. Dual-chamber permanent pacemaker -Medtronic device  4. Hypertension  5. Gastritis/GIB/Peptic Ulcer Disease  a. 2013: GIB requiring PRBCs, EGD showed bleeding ulcer in the antrum 9/2013, repeat EGD 12/2013 showed healed ulcer  b. Upper GI Bleeding 3/2019, EGD showed gastric antral ulcer with stigmata or recent bleeding - treated with IV/PO Protonix - Xarelto discontinued  6. Epistaxis - occurred on Xarelto  7. Surgical History:  a. none    Allergies:  Patient has no known allergies.    Home/Current Medications:      Current Outpatient Medications:   •  apixaban (ELIQUIS) 5 MG tablet tablet, Take 1 tablet by mouth 2 (Two) Times a Day., Disp: 60 tablet, Rfl: 1  •  carvedilol (COREG) 6.25 MG tablet, Take 1 tablet by mouth 2 (Two) Times a Day With Meals., Disp: 60 tablet, Rfl: 11  •  furosemide (LASIX) 20 MG tablet, Take 1 tablet by mouth Daily., Disp: 30 tablet, Rfl: 3  •  ramipril (ALTACE) 5 MG capsule, Take 5 mg by mouth Daily., Disp: , Rfl:       History of present illness:  Mr. Ambrocio singh   74 y/o male with the above noted pmhx who presents today for 45 day PORSHA, s/p watchman closure device. He has chronic atrial fibrillation. He has had GI bleeding in 2013 and recurrence due to gastric ulcer in 2019. He also had Epistaxis on Xarelto. He was therefore deemed a poor candidate for long term anticoagulation. He underwent insertion of a PRASHANTH occlusion device/24 mm Watchman Flex on 9/25/2020.       Social History:  Social History     Socioeconomic History   • Marital status:      Spouse name: Not on file   • Number of children: Not on file   • Years of education: Not on file   • Highest education level: Not on file   Tobacco Use   • Smoking status: Former Smoker     Packs/day: 1.00     Types: Cigarettes     Quit date: 7/29/2010     Years since quitting: 10.3   • Smokeless tobacco: Never Used   Substance and Sexual Activity   • Alcohol use: Never     Frequency: Never   • Drug use: Never   • Sexual activity: Defer       Family History:  Family History   Problem Relation Age of Onset   • Stroke Father    • Lung cancer Sister    • Alcohol abuse Brother        Review of Systems  Pertinent positives are listed in the HPI.  All other systems reviewed are negative.     Objective     Vital Sign Min/Max for last 24 hours  No data recorded   No data recorded   No data recorded   No data recorded   No data recorded   No data recorded   No data recorded         Physical Exam:  GENERAL: This is a well-developed, well-nourished, male who is in no acute distress. Alert and oriented x3. Normal mood and affect.   SKIN: Pink and warm without rash or abnormality noted.   HEENT: Head is normocephalic and atraumatic. Pupils are equal and reactive to light bilaterally. Mucous membranes are pink and moist.   NECK: Supple without lymphadenopathy or thyromegaly. There is no jugular venous distention at 30°.  LUNGS: Clear to auscultation bilaterally without wheezing, rhonchi, or rales noted.   CARDIOVASCULAR: The heart has a  regular rate with a normal S1 and S2. There is no murmur, gallop, rub, or click appreciated. The PMI is nondisplaced. Carotid upstrokes are 2+ and symmetrical without bruits.   ABDOMEN: Soft and nondistended with positive bowel sounds x4. The patient denies tenderness of palpitation.   MUSCULOSKELETAL: There are no obvious bony abnormalities. Normal range of tenderness to palpation.   NEUROLOGICAL: Nonfocal.   PERIPHERAL VASCULAR: Femoral pulses are 2+ and symmetrical without bruits. Posterior tibial and dorsalis pedis pulses are 2+ and symmetrical. There is no peripheral edema.      EKG:    Tele:     Labs:      Lab Results   Component Value Date    WBC 7.06 09/22/2020    HGB 14.3 09/22/2020    HCT 42.3 09/22/2020    MCV 95.1 09/22/2020     09/22/2020     Lab Results   Component Value Date    GLUCOSE 118 (H) 09/26/2020    BUN 29 (H) 09/26/2020    CREATININE 1.12 09/26/2020    EGFRIFNONA 64 09/26/2020    BCR 25.9 (H) 09/26/2020    K 4.5 09/26/2020    CO2 28.0 09/26/2020    CALCIUM 9.2 09/26/2020     No results found for: CKTOTAL, CKMB, CKMBINDEX, TROPONINI, TROPONINT  Lab Results   Component Value Date    INR 1.24 (H) 09/22/2020    PROTIME 15.3 (H) 09/22/2020     No results found for: CHOL, CHLPL, TRIG, HDL, LDL, LDLDIRECT     Ejection Fraction:      Results Review:  I reviewed the patients new clinical results.      Assessment:    1) Chronic atrial fibrillation  - history of GIB 2013, recurrent GIB due to gastric ulcer, 2019 and epistaxis on NOAC>   - s/p PRASHANTH closure device/ 24 mm Watchman Flex, 9/25/2020.   - patient presents today for 45 day post procedure PORSHA. The risks, benefits, and alternatives of the procedure have been reviewed and the patient wishes to proceed.     Plan:  - Proceed as planned.         Electronically signed by JOHN Asencio, 11/13/20, 1:17 PM EST.    PORSHA completed today without complication.  Will discontinue Eliquis.  The patient will continue on aspirin and Plavix for  the next 6 months.  We will contact him for follow-up.

## 2021-03-04 PROBLEM — I42.0 CARDIOMYOPATHY, DILATED: Status: ACTIVE | Noted: 2021-03-04

## 2021-03-04 PROBLEM — Z95.818 PRESENCE OF WATCHMAN LEFT ATRIAL APPENDAGE CLOSURE DEVICE: Status: ACTIVE | Noted: 2021-03-04

## 2021-03-04 NOTE — PROGRESS NOTES
Colin Albrecht  1945  [phone]    03/05/2021    [facility] CARDIOLOGY     Cardiologist: Arun Muniz  [address] [place] [address]    Chief Complaint   Patient presents with   • Atrial Fibrillation       Problem List:   1. Permanent Atrial Fibrillation   a. CHADsvasc = 4 off Xarelto x 1 year due to bleeding, never been on Eliquis  b. Echocardiogram 1/27/2016: EF 45 to 55%, unchanged from previous echo 2012  c. Echocardiogram 8/27/2019: EF 35 to 40%, mild to moderate MR, mild to moderate TR, RVSP 40 mmHg  d. Nuclear stress test 8/26/2019: Normal LV perfusion EF 33%  e. Implantation of a left atrial appendage occlusive device (Watchman device) 09/25/20 by Dr. Yonny Prado  f. PORSHA on 11/13/20 with well seated device, EF 35%, mod MR, mild to mod TR, post-procedural ASD with left-to-right flow  2. Dilated cardiomyopathy/CHF  a. Patient reports normal LHC 10-12 years ago  b. Echocardiogram 1/27/2016: EF 45 to 55%, unchanged from previous echo 2012  c. Echocardiogram 8/27/2019: EF 35%, mild to moderate MR, mild to moderate TR, RVSP 40 mmHg  d. Nuclear stress test 8/26/2019: Normal LV perfusion EF 33%  3. Sick sinus syndrome  a. Dual-chamber permanent pacemaker -Medtronic device  4. Hypertension  5. Gastritis/GIB/Peptic Ulcer Disease  a. 2013: GIB requiring PRBCs, EGD showed bleeding ulcer in the antrum 9/2013, repeat EGD 12/2013 showed healed ulcer  b. Upper GI Bleeding 3/2019, EGD showed gastric antral ulcer with stigmata or recent bleeding - treated with IV/PO Protonix - Xarelto discontinued  6. Epistaxis - occurred on Xarelto  7. Surgical History:  a. none  Allergies  No Known Allergies    Current Medications    Current Outpatient Medications:   •  aspirin 81 MG EC tablet, Take 1 tablet by mouth Daily., Disp: 30 tablet, Rfl: 11  •  bisoprolol-hydrochlorothiazide (ZIAC) 5-6.25 MG per tablet, Take 1 tablet by mouth Daily., Disp: , Rfl:   •  ramipril (ALTACE) 5 MG  "capsule, Take 5 mg by mouth Daily., Disp: , Rfl:   •  furosemide (LASIX) 20 MG tablet, Take 1 tablet by mouth Daily. (Patient taking differently: Take 20 mg by mouth As Needed.), Disp: 30 tablet, Rfl: 3    History of Present Illness     Pt presents for follow up of permanent atrial fibrillation s/p left atrial appendage occlusion on 9/25/20. Since we last saw the pt, pt denies any CP, LH, and dizziness.  He does continue to feel frequent palpitations associated with rapid sensations in his chest as well as dyspnea on exertion with minimal activity.  Denies any hospitalizations, ER visits, bleeding, or TIA/CVA symptoms.  When he checks his blood pressure at home it is usually stable.  Heart rates generally running 90 to 100 bpm.    ROS:  General: + fatigue, No weight gain or loss  Cardiovascular:  Denies CP, PND, syncope, near syncope, edema + palpitations.  Pulmonary:  + TREVIZO, No cough, or wheezing      Vitals:    03/05/21 1040   BP: 122/76   BP Location: Left arm   Patient Position: Sitting   Pulse: 85   Weight: 87 kg (191 lb 12.8 oz)   Height: 190.5 cm (75\")     Body mass index is 23.97 kg/m².  PE:  General: NAD  Neck: no JVD, no carotid bruits, no TM  Heart irregular irregular rate and rhythm NL S1, S2, S3  no rubs, murmurs, PMI diplaced  Lungs: CTA, no wheezes, rhonchi, or rales  Abd: soft, non-tender, NL BS  Ext: No musculoskeletal deformities, no edema, cyanosis, or clubbing  Psych: normal mood and affect    Diagnostic Data:      Procedures   MDT PM Manual Interrogation: VVIR 70 bpm. Normal function. Permanent atrial fibrillation. RV paced 37%.   Battery normal.  Patient with multiple episodes of RVR as fast as 100 5160 bpm.    1. Permanent atrial fibrillation (CMS/HCC)    2. Presence of Watchman left atrial appendage closure device    3. Chronic systolic congestive heart failure (CMS/HCC)    4. Cardiomyopathy, dilated (CMS/HCC)    5. Gastrointestinal hemorrhage associated with gastric ulcer          Plan:  1. " Permanent atrial fibrillation:  Frequent periods of atrial fibrillation with RVR noted on device interrogation.  Would plan AV node ablation and upgrade to biventricular pacemaker versus biventricular pacemaker ICD.    2. Dilated Cardiomyopathy/CHF class III on beta-blockers and ACE inhibitor.  History of renal insufficiency.  - Will plan to upgrade to Biventricular pacemaker/ICD and AVN ablation due to atrial fibrillation with RVR     3. GIB:  - history of GIB x 2 with Xarelto. S/p Watchman device, on  lifelong ASA.   - CHADSvasc 4 - now s/p Watchman placement on ASA      F/up in 6 months

## 2021-03-05 ENCOUNTER — OFFICE VISIT (OUTPATIENT)
Dept: CARDIOLOGY | Facility: CLINIC | Age: 76
End: 2021-03-05

## 2021-03-05 VITALS
HEART RATE: 85 BPM | WEIGHT: 191.8 LBS | BODY MASS INDEX: 23.85 KG/M2 | HEIGHT: 75 IN | DIASTOLIC BLOOD PRESSURE: 76 MMHG | SYSTOLIC BLOOD PRESSURE: 122 MMHG

## 2021-03-05 DIAGNOSIS — I50.22 CHRONIC SYSTOLIC CONGESTIVE HEART FAILURE (HCC): ICD-10-CM

## 2021-03-05 DIAGNOSIS — Z95.818 PRESENCE OF WATCHMAN LEFT ATRIAL APPENDAGE CLOSURE DEVICE: ICD-10-CM

## 2021-03-05 DIAGNOSIS — I42.0 CARDIOMYOPATHY, DILATED (HCC): ICD-10-CM

## 2021-03-05 DIAGNOSIS — K25.4 GASTROINTESTINAL HEMORRHAGE ASSOCIATED WITH GASTRIC ULCER: ICD-10-CM

## 2021-03-05 DIAGNOSIS — I48.21 PERMANENT ATRIAL FIBRILLATION (HCC): Primary | ICD-10-CM

## 2021-03-05 PROCEDURE — 93280 PM DEVICE PROGR EVAL DUAL: CPT | Performed by: INTERNAL MEDICINE

## 2021-03-05 PROCEDURE — 99214 OFFICE O/P EST MOD 30 MIN: CPT | Performed by: INTERNAL MEDICINE

## 2021-03-05 RX ORDER — BISOPROLOL FUMARATE 5 MG/1
5 TABLET, FILM COATED ORAL DAILY
COMMUNITY
End: 2021-03-05

## 2021-03-05 RX ORDER — BISOPROLOL FUMARATE AND HYDROCHLOROTHIAZIDE 5; 6.25 MG/1; MG/1
1 TABLET ORAL DAILY
COMMUNITY
End: 2021-06-10 | Stop reason: HOSPADM

## 2021-03-09 PROBLEM — I50.22 CHRONIC SYSTOLIC CONGESTIVE HEART FAILURE: Status: ACTIVE | Noted: 2021-03-09

## 2021-03-18 ENCOUNTER — PREP FOR SURGERY (OUTPATIENT)
Dept: OTHER | Facility: HOSPITAL | Age: 76
End: 2021-03-18

## 2021-03-18 DIAGNOSIS — I50.22 CHRONIC SYSTOLIC CONGESTIVE HEART FAILURE (HCC): ICD-10-CM

## 2021-03-18 DIAGNOSIS — I42.0 CARDIOMYOPATHY, DILATED (HCC): ICD-10-CM

## 2021-03-18 DIAGNOSIS — I48.21 PERMANENT ATRIAL FIBRILLATION (HCC): Primary | ICD-10-CM

## 2021-03-18 RX ORDER — SODIUM CHLORIDE 0.9 % (FLUSH) 0.9 %
3 SYRINGE (ML) INJECTION EVERY 12 HOURS SCHEDULED
Status: CANCELLED | OUTPATIENT
Start: 2021-03-18

## 2021-03-18 RX ORDER — CEFAZOLIN SODIUM 2 G/100ML
2 INJECTION, SOLUTION INTRAVENOUS ONCE
Status: CANCELLED | OUTPATIENT
Start: 2021-03-18 | End: 2021-03-18

## 2021-03-18 RX ORDER — NITROGLYCERIN 0.4 MG/1
0.4 TABLET SUBLINGUAL
Status: CANCELLED | OUTPATIENT
Start: 2021-03-18

## 2021-03-18 RX ORDER — ACETAMINOPHEN 325 MG/1
650 TABLET ORAL EVERY 4 HOURS PRN
Status: CANCELLED | OUTPATIENT
Start: 2021-03-18

## 2021-03-18 RX ORDER — SODIUM CHLORIDE 9 MG/ML
1 INJECTION, SOLUTION INTRAVENOUS CONTINUOUS
Status: CANCELLED | OUTPATIENT
Start: 2021-03-18 | End: 2021-03-18

## 2021-03-18 RX ORDER — SODIUM CHLORIDE 0.9 % (FLUSH) 0.9 %
10 SYRINGE (ML) INJECTION AS NEEDED
Status: CANCELLED | OUTPATIENT
Start: 2021-03-18

## 2021-03-23 ENCOUNTER — APPOINTMENT (OUTPATIENT)
Dept: PREADMISSION TESTING | Facility: HOSPITAL | Age: 76
End: 2021-03-23

## 2021-03-23 DIAGNOSIS — I42.0 CARDIOMYOPATHY, DILATED (HCC): ICD-10-CM

## 2021-03-23 DIAGNOSIS — I48.21 PERMANENT ATRIAL FIBRILLATION (HCC): ICD-10-CM

## 2021-03-23 DIAGNOSIS — I50.22 CHRONIC SYSTOLIC CONGESTIVE HEART FAILURE (HCC): ICD-10-CM

## 2021-03-23 LAB
ANION GAP SERPL CALCULATED.3IONS-SCNC: 11 MMOL/L (ref 5–15)
BUN SERPL-MCNC: 33 MG/DL (ref 8–23)
BUN/CREAT SERPL: 22.8 (ref 7–25)
CALCIUM SPEC-SCNC: 9.5 MG/DL (ref 8.6–10.5)
CHLORIDE SERPL-SCNC: 105 MMOL/L (ref 98–107)
CO2 SERPL-SCNC: 24 MMOL/L (ref 22–29)
CREAT SERPL-MCNC: 1.45 MG/DL (ref 0.76–1.27)
DEPRECATED RDW RBC AUTO: 44.1 FL (ref 37–54)
ERYTHROCYTE [DISTWIDTH] IN BLOOD BY AUTOMATED COUNT: 12.6 % (ref 12.3–15.4)
GFR SERPL CREATININE-BSD FRML MDRD: 48 ML/MIN/1.73
GLUCOSE SERPL-MCNC: 101 MG/DL (ref 65–99)
HBA1C MFR BLD: 5.9 % (ref 4.8–5.6)
HCT VFR BLD AUTO: 44.4 % (ref 37.5–51)
HGB BLD-MCNC: 14.9 G/DL (ref 13–17.7)
INR PPP: 1.06 (ref 0.85–1.16)
MCH RBC QN AUTO: 32 PG (ref 26.6–33)
MCHC RBC AUTO-ENTMCNC: 33.6 G/DL (ref 31.5–35.7)
MCV RBC AUTO: 95.5 FL (ref 79–97)
PLATELET # BLD AUTO: 188 10*3/MM3 (ref 140–450)
PMV BLD AUTO: 10.5 FL (ref 6–12)
POTASSIUM SERPL-SCNC: 4.5 MMOL/L (ref 3.5–5.2)
PROTHROMBIN TIME: 13.5 SECONDS (ref 11.5–14)
RBC # BLD AUTO: 4.65 10*6/MM3 (ref 4.14–5.8)
SODIUM SERPL-SCNC: 140 MMOL/L (ref 136–145)
WBC # BLD AUTO: 6.64 10*3/MM3 (ref 3.4–10.8)

## 2021-03-23 PROCEDURE — U0004 COV-19 TEST NON-CDC HGH THRU: HCPCS

## 2021-03-23 PROCEDURE — 85027 COMPLETE CBC AUTOMATED: CPT

## 2021-03-23 PROCEDURE — 83036 HEMOGLOBIN GLYCOSYLATED A1C: CPT | Performed by: NURSE PRACTITIONER

## 2021-03-23 PROCEDURE — 80048 BASIC METABOLIC PNL TOTAL CA: CPT

## 2021-03-23 PROCEDURE — 36415 COLL VENOUS BLD VENIPUNCTURE: CPT

## 2021-03-23 PROCEDURE — 85610 PROTHROMBIN TIME: CPT

## 2021-03-23 PROCEDURE — C9803 HOPD COVID-19 SPEC COLLECT: HCPCS

## 2021-03-23 RX ORDER — VITAMIN B COMPLEX
1 TABLET ORAL DAILY
COMMUNITY

## 2021-03-23 RX ORDER — MULTIPLE VITAMINS W/ MINERALS TAB 9MG-400MCG
1 TAB ORAL DAILY
COMMUNITY

## 2021-03-23 RX ORDER — CHLORAL HYDRATE 500 MG
1000 CAPSULE ORAL EVERY OTHER DAY
COMMUNITY
End: 2021-06-30

## 2021-03-23 NOTE — DISCHARGE INSTRUCTIONS

## 2021-03-24 LAB — SARS-COV-2 RNA NOSE QL NAA+PROBE: NOT DETECTED

## 2021-03-25 DIAGNOSIS — I48.21 PERMANENT ATRIAL FIBRILLATION (HCC): Primary | ICD-10-CM

## 2021-03-26 ENCOUNTER — HOSPITAL ENCOUNTER (OUTPATIENT)
Facility: HOSPITAL | Age: 76
Discharge: HOME | End: 2021-03-27
Attending: INTERNAL MEDICINE | Admitting: INTERNAL MEDICINE

## 2021-03-26 DIAGNOSIS — I42.0 CARDIOMYOPATHY, DILATED (HCC): ICD-10-CM

## 2021-03-26 DIAGNOSIS — I50.22 CHRONIC SYSTOLIC CONGESTIVE HEART FAILURE (HCC): ICD-10-CM

## 2021-03-26 DIAGNOSIS — I48.21 PERMANENT ATRIAL FIBRILLATION (HCC): ICD-10-CM

## 2021-03-26 PROCEDURE — 25010000003 CEFAZOLIN IN DEXTROSE 2-4 GM/100ML-% SOLUTION: Performed by: NURSE PRACTITIONER

## 2021-03-26 PROCEDURE — C1894 INTRO/SHEATH, NON-LASER: HCPCS | Performed by: INTERNAL MEDICINE

## 2021-03-26 PROCEDURE — 25010000003 LIDOCAINE 1 % SOLUTION: Performed by: INTERNAL MEDICINE

## 2021-03-26 PROCEDURE — A9270 NON-COVERED ITEM OR SERVICE: HCPCS | Performed by: PHYSICIAN ASSISTANT

## 2021-03-26 PROCEDURE — C1769 GUIDE WIRE: HCPCS | Performed by: INTERNAL MEDICINE

## 2021-03-26 PROCEDURE — 63710000001 RAMIPRIL 5 MG CAPSULE: Performed by: PHYSICIAN ASSISTANT

## 2021-03-26 PROCEDURE — 0 IOPAMIDOL PER 1 ML: Performed by: INTERNAL MEDICINE

## 2021-03-26 PROCEDURE — 63710000001 ACETAMINOPHEN 325 MG TABLET: Performed by: INTERNAL MEDICINE

## 2021-03-26 PROCEDURE — A9270 NON-COVERED ITEM OR SERVICE: HCPCS | Performed by: INTERNAL MEDICINE

## 2021-03-26 PROCEDURE — 99153 MOD SED SAME PHYS/QHP EA: CPT | Performed by: INTERNAL MEDICINE

## 2021-03-26 PROCEDURE — C1733 CATH, EP, OTHR THAN COOL-TIP: HCPCS | Performed by: INTERNAL MEDICINE

## 2021-03-26 PROCEDURE — 63710000001 ASPIRIN 81 MG TABLET DELAYED-RELEASE: Performed by: PHYSICIAN ASSISTANT

## 2021-03-26 PROCEDURE — 93650 ICAR CATH ABLTJ AV NODE FUNC: CPT | Performed by: INTERNAL MEDICINE

## 2021-03-26 PROCEDURE — 33233 REMOVAL OF PM GENERATOR: CPT | Performed by: INTERNAL MEDICINE

## 2021-03-26 PROCEDURE — 99152 MOD SED SAME PHYS/QHP 5/>YRS: CPT | Performed by: INTERNAL MEDICINE

## 2021-03-26 PROCEDURE — 33225 L VENTRIC PACING LEAD ADD-ON: CPT | Performed by: INTERNAL MEDICINE

## 2021-03-26 PROCEDURE — 25010000002 MIDAZOLAM PER 1 MG: Performed by: INTERNAL MEDICINE

## 2021-03-26 PROCEDURE — 63710000001 BISOPROLOL 5 MG TABLET 100 EACH BOTTLE: Performed by: PHYSICIAN ASSISTANT

## 2021-03-26 PROCEDURE — C1882 AICD, OTHER THAN SING/DUAL: HCPCS | Performed by: INTERNAL MEDICINE

## 2021-03-26 PROCEDURE — 33249 INSJ/RPLCMT DEFIB W/LEAD(S): CPT | Performed by: INTERNAL MEDICINE

## 2021-03-26 PROCEDURE — C1777 LEAD, AICD, ENDO SINGLE COIL: HCPCS | Performed by: INTERNAL MEDICINE

## 2021-03-26 PROCEDURE — 25010000002 FENTANYL CITRATE (PF) 100 MCG/2ML SOLUTION: Performed by: INTERNAL MEDICINE

## 2021-03-26 PROCEDURE — 63710000001 HYDROCHLOROTHIAZIDE 25 MG TABLET 100 EACH BOTTLE: Performed by: PHYSICIAN ASSISTANT

## 2021-03-26 PROCEDURE — C1730 CATH, EP, 19 OR FEW ELECT: HCPCS | Performed by: INTERNAL MEDICINE

## 2021-03-26 PROCEDURE — C1892 INTRO/SHEATH,FIXED,PEEL-AWAY: HCPCS | Performed by: INTERNAL MEDICINE

## 2021-03-26 PROCEDURE — 63710000001 AMIODARONE 200 MG TABLET: Performed by: INTERNAL MEDICINE

## 2021-03-26 PROCEDURE — C1900 LEAD, CORONARY VENOUS: HCPCS | Performed by: INTERNAL MEDICINE

## 2021-03-26 PROCEDURE — 25010000002 ONDANSETRON PER 1 MG: Performed by: INTERNAL MEDICINE

## 2021-03-26 DEVICE — LD DEFIB SPRINT QUATTRO SECUR S DF4 62: Type: IMPLANTABLE DEVICE | Status: FUNCTIONAL

## 2021-03-26 DEVICE — GEN DEFIB CLARIA MRI QUAD IS4/DF1 CRTD DTMA1QQ: Type: IMPLANTABLE DEVICE | Status: FUNCTIONAL

## 2021-03-26 DEVICE — LD ATTAIN PERFORMA STR LV 4398 88CM: Type: IMPLANTABLE DEVICE | Status: FUNCTIONAL

## 2021-03-26 RX ORDER — AMIODARONE HYDROCHLORIDE 200 MG/1
200 TABLET ORAL 3 TIMES DAILY
Status: DISCONTINUED | OUTPATIENT
Start: 2021-03-26 | End: 2021-03-27 | Stop reason: HOSPADM

## 2021-03-26 RX ORDER — SODIUM CHLORIDE 0.9 % (FLUSH) 0.9 %
3 SYRINGE (ML) INJECTION EVERY 12 HOURS SCHEDULED
Status: DISCONTINUED | OUTPATIENT
Start: 2021-03-26 | End: 2021-03-27 | Stop reason: HOSPADM

## 2021-03-26 RX ORDER — FENTANYL CITRATE 50 UG/ML
INJECTION, SOLUTION INTRAMUSCULAR; INTRAVENOUS AS NEEDED
Status: DISCONTINUED | OUTPATIENT
Start: 2021-03-26 | End: 2021-03-26 | Stop reason: HOSPADM

## 2021-03-26 RX ORDER — SODIUM CHLORIDE 9 MG/ML
INJECTION, SOLUTION INTRAVENOUS CONTINUOUS PRN
Status: COMPLETED | OUTPATIENT
Start: 2021-03-26 | End: 2021-03-26

## 2021-03-26 RX ORDER — MIDAZOLAM HYDROCHLORIDE 1 MG/ML
INJECTION INTRAMUSCULAR; INTRAVENOUS AS NEEDED
Status: DISCONTINUED | OUTPATIENT
Start: 2021-03-26 | End: 2021-03-26 | Stop reason: HOSPADM

## 2021-03-26 RX ORDER — BUPIVACAINE HYDROCHLORIDE 5 MG/ML
INJECTION, SOLUTION PERINEURAL AS NEEDED
Status: DISCONTINUED | OUTPATIENT
Start: 2021-03-26 | End: 2021-03-26 | Stop reason: HOSPADM

## 2021-03-26 RX ORDER — CEFAZOLIN SODIUM 2 G/100ML
2 INJECTION, SOLUTION INTRAVENOUS ONCE
Status: COMPLETED | OUTPATIENT
Start: 2021-03-27 | End: 2021-03-27

## 2021-03-26 RX ORDER — CEFAZOLIN SODIUM 2 G/100ML
2 INJECTION, SOLUTION INTRAVENOUS ONCE
Status: COMPLETED | OUTPATIENT
Start: 2021-03-26 | End: 2021-03-26

## 2021-03-26 RX ORDER — SODIUM CHLORIDE 0.9 % (FLUSH) 0.9 %
10 SYRINGE (ML) INJECTION AS NEEDED
Status: DISCONTINUED | OUTPATIENT
Start: 2021-03-26 | End: 2021-03-27 | Stop reason: HOSPADM

## 2021-03-26 RX ORDER — ONDANSETRON 2 MG/ML
4 INJECTION INTRAMUSCULAR; INTRAVENOUS EVERY 6 HOURS PRN
Status: DISCONTINUED | OUTPATIENT
Start: 2021-03-26 | End: 2021-03-27 | Stop reason: HOSPADM

## 2021-03-26 RX ORDER — SODIUM CHLORIDE 0.9 % (FLUSH) 0.9 %
3 SYRINGE (ML) INJECTION EVERY 12 HOURS SCHEDULED
Status: DISCONTINUED | OUTPATIENT
Start: 2021-03-26 | End: 2021-03-26 | Stop reason: HOSPADM

## 2021-03-26 RX ORDER — SODIUM CHLORIDE 9 MG/ML
1 INJECTION, SOLUTION INTRAVENOUS CONTINUOUS
Status: ACTIVE | OUTPATIENT
Start: 2021-03-26 | End: 2021-03-26

## 2021-03-26 RX ORDER — SODIUM CHLORIDE 0.9 % (FLUSH) 0.9 %
10 SYRINGE (ML) INJECTION AS NEEDED
Status: DISCONTINUED | OUTPATIENT
Start: 2021-03-26 | End: 2021-03-26 | Stop reason: HOSPADM

## 2021-03-26 RX ORDER — ACETAMINOPHEN 325 MG/1
650 TABLET ORAL EVERY 4 HOURS PRN
Status: DISCONTINUED | OUTPATIENT
Start: 2021-03-26 | End: 2021-03-27 | Stop reason: HOSPADM

## 2021-03-26 RX ORDER — LIDOCAINE HYDROCHLORIDE 10 MG/ML
INJECTION, SOLUTION INFILTRATION; PERINEURAL AS NEEDED
Status: DISCONTINUED | OUTPATIENT
Start: 2021-03-26 | End: 2021-03-26 | Stop reason: HOSPADM

## 2021-03-26 RX ORDER — RAMIPRIL 5 MG/1
5 CAPSULE ORAL DAILY
Status: DISCONTINUED | OUTPATIENT
Start: 2021-03-26 | End: 2021-03-27 | Stop reason: HOSPADM

## 2021-03-26 RX ORDER — ASPIRIN 81 MG/1
81 TABLET ORAL DAILY
Status: DISCONTINUED | OUTPATIENT
Start: 2021-03-26 | End: 2021-03-27 | Stop reason: HOSPADM

## 2021-03-26 RX ORDER — ACETAMINOPHEN 650 MG/1
650 SUPPOSITORY RECTAL EVERY 4 HOURS PRN
Status: DISCONTINUED | OUTPATIENT
Start: 2021-03-26 | End: 2021-03-27 | Stop reason: HOSPADM

## 2021-03-26 RX ORDER — ACETAMINOPHEN 325 MG/1
650 TABLET ORAL EVERY 4 HOURS PRN
Status: DISCONTINUED | OUTPATIENT
Start: 2021-03-26 | End: 2021-03-26 | Stop reason: HOSPADM

## 2021-03-26 RX ORDER — HYDROCODONE BITARTRATE AND ACETAMINOPHEN 5; 325 MG/1; MG/1
1 TABLET ORAL EVERY 4 HOURS PRN
Status: DISCONTINUED | OUTPATIENT
Start: 2021-03-26 | End: 2021-03-27 | Stop reason: HOSPADM

## 2021-03-26 RX ORDER — ONDANSETRON 2 MG/ML
INJECTION INTRAMUSCULAR; INTRAVENOUS AS NEEDED
Status: DISCONTINUED | OUTPATIENT
Start: 2021-03-26 | End: 2021-03-26 | Stop reason: HOSPADM

## 2021-03-26 RX ORDER — NITROGLYCERIN 0.4 MG/1
0.4 TABLET SUBLINGUAL
Status: DISCONTINUED | OUTPATIENT
Start: 2021-03-26 | End: 2021-03-26 | Stop reason: HOSPADM

## 2021-03-26 RX ORDER — PHENYLEPHRINE HCL IN 0.9% NACL 0.5 MG/5ML
SYRINGE (ML) INTRAVENOUS AS NEEDED
Status: DISCONTINUED | OUTPATIENT
Start: 2021-03-26 | End: 2021-03-26 | Stop reason: HOSPADM

## 2021-03-26 RX ADMIN — SODIUM CHLORIDE 1 ML/KG/HR: 9 INJECTION, SOLUTION INTRAVENOUS at 06:36

## 2021-03-26 RX ADMIN — SODIUM CHLORIDE, PRESERVATIVE FREE 3 ML: 5 INJECTION INTRAVENOUS at 20:36

## 2021-03-26 RX ADMIN — ACETAMINOPHEN 650 MG: 325 TABLET ORAL at 23:14

## 2021-03-26 RX ADMIN — BISOPROLOL FUMARATE: 5 TABLET, FILM COATED ORAL at 13:11

## 2021-03-26 RX ADMIN — AMIODARONE HYDROCHLORIDE 200 MG: 200 TABLET ORAL at 13:10

## 2021-03-26 RX ADMIN — CEFAZOLIN SODIUM 2 G: 2 INJECTION, SOLUTION INTRAVENOUS at 08:54

## 2021-03-26 RX ADMIN — RAMIPRIL 5 MG: 5 CAPSULE ORAL at 13:10

## 2021-03-26 RX ADMIN — ASPIRIN 81 MG: 81 TABLET, COATED ORAL at 13:11

## 2021-03-26 RX ADMIN — ACETAMINOPHEN 650 MG: 325 TABLET ORAL at 17:46

## 2021-03-26 RX ADMIN — SODIUM CHLORIDE, PRESERVATIVE FREE 3 ML: 5 INJECTION INTRAVENOUS at 06:36

## 2021-03-26 RX ADMIN — ACETAMINOPHEN 650 MG: 325 TABLET ORAL at 13:27

## 2021-03-26 RX ADMIN — AMIODARONE HYDROCHLORIDE 200 MG: 200 TABLET ORAL at 20:47

## 2021-03-27 ENCOUNTER — APPOINTMENT (OUTPATIENT)
Dept: GENERAL RADIOLOGY | Facility: HOSPITAL | Age: 76
End: 2021-03-27

## 2021-03-27 VITALS
TEMPERATURE: 97.9 F | OXYGEN SATURATION: 92 % | BODY MASS INDEX: 24.04 KG/M2 | WEIGHT: 193.34 LBS | DIASTOLIC BLOOD PRESSURE: 68 MMHG | HEIGHT: 75 IN | HEART RATE: 108 BPM | SYSTOLIC BLOOD PRESSURE: 99 MMHG | RESPIRATION RATE: 16 BRPM

## 2021-03-27 LAB
ANION GAP SERPL CALCULATED.3IONS-SCNC: 7 MMOL/L (ref 5–15)
BUN SERPL-MCNC: 24 MG/DL (ref 8–23)
BUN/CREAT SERPL: 20.3 (ref 7–25)
CALCIUM SPEC-SCNC: 8.2 MG/DL (ref 8.6–10.5)
CHLORIDE SERPL-SCNC: 105 MMOL/L (ref 98–107)
CO2 SERPL-SCNC: 26 MMOL/L (ref 22–29)
CREAT SERPL-MCNC: 1.18 MG/DL (ref 0.76–1.27)
GFR SERPL CREATININE-BSD FRML MDRD: 60 ML/MIN/1.73
GLUCOSE SERPL-MCNC: 99 MG/DL (ref 65–99)
POTASSIUM SERPL-SCNC: 4.1 MMOL/L (ref 3.5–5.2)
SODIUM SERPL-SCNC: 138 MMOL/L (ref 136–145)

## 2021-03-27 PROCEDURE — 63710000001 ASPIRIN 81 MG TABLET DELAYED-RELEASE: Performed by: PHYSICIAN ASSISTANT

## 2021-03-27 PROCEDURE — A9270 NON-COVERED ITEM OR SERVICE: HCPCS | Performed by: PHYSICIAN ASSISTANT

## 2021-03-27 PROCEDURE — 63710000001 HYDROCHLOROTHIAZIDE 25 MG TABLET 100 EACH BOTTLE: Performed by: PHYSICIAN ASSISTANT

## 2021-03-27 PROCEDURE — 63710000001 RAMIPRIL 5 MG CAPSULE: Performed by: PHYSICIAN ASSISTANT

## 2021-03-27 PROCEDURE — 93005 ELECTROCARDIOGRAM TRACING: CPT | Performed by: INTERNAL MEDICINE

## 2021-03-27 PROCEDURE — 71046 X-RAY EXAM CHEST 2 VIEWS: CPT

## 2021-03-27 PROCEDURE — 63710000001 ACETAMINOPHEN 325 MG TABLET: Performed by: INTERNAL MEDICINE

## 2021-03-27 PROCEDURE — 99024 POSTOP FOLLOW-UP VISIT: CPT | Performed by: INTERNAL MEDICINE

## 2021-03-27 PROCEDURE — 63710000001 AMIODARONE 200 MG TABLET: Performed by: INTERNAL MEDICINE

## 2021-03-27 PROCEDURE — 25010000003 CEFAZOLIN IN DEXTROSE 2-4 GM/100ML-% SOLUTION: Performed by: INTERNAL MEDICINE

## 2021-03-27 PROCEDURE — 63710000001 BISOPROLOL 5 MG TABLET 100 EACH BOTTLE: Performed by: PHYSICIAN ASSISTANT

## 2021-03-27 PROCEDURE — A9270 NON-COVERED ITEM OR SERVICE: HCPCS | Performed by: INTERNAL MEDICINE

## 2021-03-27 PROCEDURE — 93010 ELECTROCARDIOGRAM REPORT: CPT | Performed by: INTERNAL MEDICINE

## 2021-03-27 PROCEDURE — 80048 BASIC METABOLIC PNL TOTAL CA: CPT | Performed by: PHYSICIAN ASSISTANT

## 2021-03-27 RX ORDER — IPRATROPIUM BROMIDE AND ALBUTEROL SULFATE 2.5; .5 MG/3ML; MG/3ML
3 SOLUTION RESPIRATORY (INHALATION)
Status: DISCONTINUED | OUTPATIENT
Start: 2021-03-27 | End: 2021-03-27 | Stop reason: HOSPADM

## 2021-03-27 RX ORDER — AMIODARONE HYDROCHLORIDE 200 MG/1
200 TABLET ORAL 3 TIMES DAILY
Qty: 60 TABLET | Refills: 6 | Status: SHIPPED | OUTPATIENT
Start: 2021-03-27 | End: 2021-06-01

## 2021-03-27 RX ADMIN — BISOPROLOL FUMARATE: 5 TABLET, FILM COATED ORAL at 09:41

## 2021-03-27 RX ADMIN — ACETAMINOPHEN 650 MG: 325 TABLET ORAL at 09:41

## 2021-03-27 RX ADMIN — RAMIPRIL 5 MG: 5 CAPSULE ORAL at 09:42

## 2021-03-27 RX ADMIN — SODIUM CHLORIDE, PRESERVATIVE FREE 3 ML: 5 INJECTION INTRAVENOUS at 09:42

## 2021-03-27 RX ADMIN — ASPIRIN 81 MG: 81 TABLET, COATED ORAL at 09:42

## 2021-03-27 RX ADMIN — CEFAZOLIN SODIUM 2 G: 2 INJECTION, SOLUTION INTRAVENOUS at 09:40

## 2021-03-27 RX ADMIN — AMIODARONE HYDROCHLORIDE 200 MG: 200 TABLET ORAL at 05:21

## 2021-03-30 LAB
QT INTERVAL: 410 MS
QTC INTERVAL: 526 MS

## 2021-04-05 ENCOUNTER — OFFICE VISIT (OUTPATIENT)
Dept: CARDIOLOGY | Facility: CLINIC | Age: 76
End: 2021-04-05

## 2021-04-05 DIAGNOSIS — I48.21 PERMANENT ATRIAL FIBRILLATION (HCC): Primary | ICD-10-CM

## 2021-04-05 PROCEDURE — 99024 POSTOP FOLLOW-UP VISIT: CPT | Performed by: INTERNAL MEDICINE

## 2021-04-05 NOTE — PROGRESS NOTES
2021    Colin Alex Albrecht, : 1945    WOUND CHECK    B/P    Pulse:     Patient has fever: [] Temperature if indicated:     Wound Location: left chest    Dressing Removed [x]        Old Dressing Appearance:  Clean, dry [x]                 Old, bloody drainage []                            Moist, serous drainage []                Moist, thick yellow/green drainage []       Wound Appearance: Redness []                  Drainage []                  Culture obtained []        Color: Yellow     Consistency: N/A     Amount: none         Gloves used, wound cleansed with sterile 4x4 and peroxide [x]       MD notified [] MD orders:     Antibiotic started []  If checked, type   Other:    Appointment for follow-up scheduled for 3 months post procedure []    Future Appointments   Date Time Provider Department Center   2021 11:30 AM WOUND CHECK [facility] None   2021  1:00 PM Yonny Prado MD [facility] None   10/1/2021  2:00 PM Yonny Prado MD [facility]           Monalisa Galdamez MA, 21      MD Signature:______________________________ Completed By/Date:

## 2021-04-12 ENCOUNTER — TELEPHONE (OUTPATIENT)
Dept: CARDIOLOGY | Facility: CLINIC | Age: 76
End: 2021-04-12

## 2021-04-12 NOTE — TELEPHONE ENCOUNTER
Transmission received. BIV paced 61.3%.  Presenting EGM shows he is having PVCs.  Full report in [facility] for review.

## 2021-04-12 NOTE — TELEPHONE ENCOUNTER
Patient had an AV node ablation and an upgrade to a BIV ICD on 3/26/21. Since then he states that he has become more short of breath. He denies any weight gain. His BP is 120/80, but his HR has been getting as high as 120 bpm. I transferred him to the device clinic so they can help him set up his bedside monitor and send through a transmission to see if he is having any episodes.

## 2021-04-15 ENCOUNTER — DOCUMENTATION (OUTPATIENT)
Dept: CARDIOLOGY | Facility: CLINIC | Age: 76
End: 2021-04-15

## 2021-04-16 ENCOUNTER — TELEPHONE (OUTPATIENT)
Dept: CARDIOLOGY | Facility: CLINIC | Age: 76
End: 2021-04-16

## 2021-04-16 NOTE — TELEPHONE ENCOUNTER
----- Message from Yonny Prado MD sent at 4/15/2021  5:47 PM EDT -----  I called pt and stopped his amiodarone. He is to get his pulse ox checked at D tomorrow and call back with results

## 2021-04-16 NOTE — TELEPHONE ENCOUNTER
I called and spoke with the patient. He is going to have his pulse ox checked this afternoon. He is going to call me back with the readings.

## 2021-05-08 PROCEDURE — 93295 DEV INTERROG REMOTE 1/2/MLT: CPT | Performed by: INTERNAL MEDICINE

## 2021-05-08 PROCEDURE — 93296 REM INTERROG EVL PM/IDS: CPT | Performed by: INTERNAL MEDICINE

## 2021-06-01 ENCOUNTER — TELEPHONE (OUTPATIENT)
Dept: CARDIOLOGY | Facility: CLINIC | Age: 76
End: 2021-06-01

## 2021-06-01 NOTE — TELEPHONE ENCOUNTER
Pt sent a manual transmission due to SOA. His transmission shows 61.8 Bi V paced. Pt was SOA while on the phone. Reading in murj for your review

## 2021-06-01 NOTE — TELEPHONE ENCOUNTER
Patient called today stating he continues to have extreme SOA and fatigue since his procedure. His BP is running normal around 120/80 most of the time and pulse ox 93-94%. He states he is very active and this is not normal for him and has been the case since the procedure. He said he went to his PCP a few weeks ago and was told his blood work and chest x-ray were fine. He is getting very concerned. He really wanted his device checked again to see if anything could be wrong.

## 2021-06-02 ENCOUNTER — APPOINTMENT (OUTPATIENT)
Dept: CARDIOLOGY | Facility: HOSPITAL | Age: 76
End: 2021-06-02

## 2021-06-02 ENCOUNTER — HOSPITAL ENCOUNTER (INPATIENT)
Facility: HOSPITAL | Age: 76
LOS: 8 days | Discharge: HOME | End: 2021-06-10
Attending: INTERNAL MEDICINE | Admitting: INTERNAL MEDICINE

## 2021-06-02 ENCOUNTER — ANESTHESIA EVENT (OUTPATIENT)
Dept: PERIOP | Facility: HOSPITAL | Age: 76
End: 2021-06-02

## 2021-06-02 ENCOUNTER — APPOINTMENT (OUTPATIENT)
Dept: GENERAL RADIOLOGY | Facility: HOSPITAL | Age: 76
End: 2021-06-02

## 2021-06-02 ENCOUNTER — APPOINTMENT (OUTPATIENT)
Dept: PULMONOLOGY | Facility: HOSPITAL | Age: 76
End: 2021-06-02

## 2021-06-02 DIAGNOSIS — I25.118 CORONARY ARTERY DISEASE INVOLVING NATIVE CORONARY ARTERY OF NATIVE HEART WITH OTHER FORM OF ANGINA PECTORIS (HCC): Primary | ICD-10-CM

## 2021-06-02 DIAGNOSIS — I20.0 UNSTABLE ANGINA (HCC): ICD-10-CM

## 2021-06-02 DIAGNOSIS — I20.0 UNSTABLE ANGINA (HCC): Primary | ICD-10-CM

## 2021-06-02 PROBLEM — I25.10 CORONARY ARTERY DISEASE: Status: ACTIVE | Noted: 2021-06-02

## 2021-06-02 LAB
ABO GROUP BLD: NORMAL
ABO GROUP BLD: NORMAL
ACT BLD: 335 SECONDS (ref 82–152)
ALBUMIN SERPL-MCNC: 3.5 G/DL (ref 3.5–5.2)
ALBUMIN/GLOB SERPL: 1.3 G/DL
ALP SERPL-CCNC: 69 U/L (ref 39–117)
ALT SERPL W P-5'-P-CCNC: 8 U/L (ref 1–41)
AMPHET+METHAMPHET UR QL: NEGATIVE
AMPHETAMINES UR QL: NEGATIVE
ANION GAP SERPL CALCULATED.3IONS-SCNC: 10 MMOL/L (ref 5–15)
ANION GAP SERPL CALCULATED.3IONS-SCNC: 6 MMOL/L (ref 5–15)
APTT PPP: >200 SECONDS (ref 22–39)
AST SERPL-CCNC: 20 U/L (ref 1–40)
BACTERIA UR QL AUTO: ABNORMAL /HPF
BARBITURATES UR QL SCN: NEGATIVE
BENZODIAZ UR QL SCN: NEGATIVE
BH CV ECHO MEAS - AO MAX PG (FULL): 2.5 MMHG
BH CV ECHO MEAS - AO MAX PG: 3.2 MMHG
BH CV ECHO MEAS - AO MEAN PG (FULL): 1.5 MMHG
BH CV ECHO MEAS - AO MEAN PG: 1.8 MMHG
BH CV ECHO MEAS - AO ROOT AREA (BSA CORRECTED): 1.9
BH CV ECHO MEAS - AO ROOT AREA: 12.6 CM^2
BH CV ECHO MEAS - AO ROOT DIAM: 4 CM
BH CV ECHO MEAS - AO V2 MAX: 89.5 CM/SEC
BH CV ECHO MEAS - AO V2 MEAN: 63.6 CM/SEC
BH CV ECHO MEAS - AO V2 VTI: 18.6 CM
BH CV ECHO MEAS - AVA(I,A): 1.3 CM^2
BH CV ECHO MEAS - AVA(I,D): 1.3 CM^2
BH CV ECHO MEAS - AVA(V,A): 1.4 CM^2
BH CV ECHO MEAS - AVA(V,D): 1.4 CM^2
BH CV ECHO MEAS - BSA(HAYCOCK): 2.1 M^2
BH CV ECHO MEAS - BSA(HAYCOCK): 2.1 M^2
BH CV ECHO MEAS - BSA: 2.1 M^2
BH CV ECHO MEAS - BSA: 2.1 M^2
BH CV ECHO MEAS - BZI_BMI: 23.5 KILOGRAMS/M^2
BH CV ECHO MEAS - BZI_BMI: 23.5 KILOGRAMS/M^2
BH CV ECHO MEAS - BZI_METRIC_HEIGHT: 188 CM
BH CV ECHO MEAS - BZI_METRIC_HEIGHT: 188 CM
BH CV ECHO MEAS - BZI_METRIC_WEIGHT: 83 KG
BH CV ECHO MEAS - BZI_METRIC_WEIGHT: 83 KG
BH CV ECHO MEAS - EDV(CUBED): 136.6 ML
BH CV ECHO MEAS - EDV(MOD-SP2): 389 ML
BH CV ECHO MEAS - EDV(MOD-SP4): 186 ML
BH CV ECHO MEAS - EDV(TEICH): 126.7 ML
BH CV ECHO MEAS - EF(CUBED): 48.9 %
BH CV ECHO MEAS - EF(MOD-BP): 41 %
BH CV ECHO MEAS - EF(MOD-SP2): 46.3 %
BH CV ECHO MEAS - EF(MOD-SP4): 32.3 %
BH CV ECHO MEAS - EF(TEICH): 40.8 %
BH CV ECHO MEAS - ESV(CUBED): 69.8 ML
BH CV ECHO MEAS - ESV(MOD-SP2): 209 ML
BH CV ECHO MEAS - ESV(MOD-SP4): 126 ML
BH CV ECHO MEAS - ESV(TEICH): 74.9 ML
BH CV ECHO MEAS - FS: 20.1 %
BH CV ECHO MEAS - IVS/LVPW: 0.75
BH CV ECHO MEAS - IVSD: 0.94 CM
BH CV ECHO MEAS - LA DIMENSION: 4.8 CM
BH CV ECHO MEAS - LA/AO: 1.2
BH CV ECHO MEAS - LAD MAJOR: 6.3 CM
BH CV ECHO MEAS - LAT PEAK E' VEL: 8.9 CM/SEC
BH CV ECHO MEAS - LATERAL E/E' RATIO: 9.2
BH CV ECHO MEAS - LV DIASTOLIC VOL/BSA (35-75): 88.9 ML/M^2
BH CV ECHO MEAS - LV MASS(C)D: 214.9 GRAMS
BH CV ECHO MEAS - LV MASS(C)DI: 102.7 GRAMS/M^2
BH CV ECHO MEAS - LV MAX PG: 0.68 MMHG
BH CV ECHO MEAS - LV MEAN PG: 0.35 MMHG
BH CV ECHO MEAS - LV SYSTOLIC VOL/BSA (12-30): 60.2 ML/M^2
BH CV ECHO MEAS - LV V1 MAX: 41.3 CM/SEC
BH CV ECHO MEAS - LV V1 MEAN: 27.1 CM/SEC
BH CV ECHO MEAS - LV V1 VTI: 8.3 CM
BH CV ECHO MEAS - LVIDD: 5.2 CM
BH CV ECHO MEAS - LVIDS: 4.1 CM
BH CV ECHO MEAS - LVLD AP2: 10.1 CM
BH CV ECHO MEAS - LVLD AP4: 8.9 CM
BH CV ECHO MEAS - LVLS AP2: 9.3 CM
BH CV ECHO MEAS - LVLS AP4: 8.5 CM
BH CV ECHO MEAS - LVOT AREA (M): 3.1 CM^2
BH CV ECHO MEAS - LVOT AREA: 3 CM^2
BH CV ECHO MEAS - LVOT DIAM: 2 CM
BH CV ECHO MEAS - LVPWD: 1.2 CM
BH CV ECHO MEAS - MED PEAK E' VEL: 7.5 CM/SEC
BH CV ECHO MEAS - MEDIAL E/E' RATIO: 11
BH CV ECHO MEAS - MR MAX PG: 78 MMHG
BH CV ECHO MEAS - MR MAX VEL: 439.5 CM/SEC
BH CV ECHO MEAS - MR MEAN PG: 48.4 MMHG
BH CV ECHO MEAS - MR MEAN VEL: 326.9 CM/SEC
BH CV ECHO MEAS - MR VTI: 149.6 CM
BH CV ECHO MEAS - MV A MAX VEL: 83.4 CM/SEC
BH CV ECHO MEAS - MV DEC TIME: 0.13 SEC
BH CV ECHO MEAS - MV E MAX VEL: 83.2 CM/SEC
BH CV ECHO MEAS - MV E/A: 1
BH CV ECHO MEAS - MV MAX PG: 4.7 MMHG
BH CV ECHO MEAS - MV MEAN PG: 1.5 MMHG
BH CV ECHO MEAS - MV V2 MAX: 107.8 CM/SEC
BH CV ECHO MEAS - MV V2 MEAN: 53 CM/SEC
BH CV ECHO MEAS - MV V2 VTI: 24.4 CM
BH CV ECHO MEAS - MVA(VTI): 1 CM^2
BH CV ECHO MEAS - RAP SYSTOLE: 3 MMHG
BH CV ECHO MEAS - RVSP: 38 MMHG
BH CV ECHO MEAS - SI(AO): 112.1 ML/M^2
BH CV ECHO MEAS - SI(CUBED): 32 ML/M^2
BH CV ECHO MEAS - SI(LVOT): 11.9 ML/M^2
BH CV ECHO MEAS - SI(MOD-SP2): 86 ML/M^2
BH CV ECHO MEAS - SI(MOD-SP4): 28.7 ML/M^2
BH CV ECHO MEAS - SI(TEICH): 24.7 ML/M^2
BH CV ECHO MEAS - SV(AO): 234.6 ML
BH CV ECHO MEAS - SV(CUBED): 66.9 ML
BH CV ECHO MEAS - SV(LVOT): 24.8 ML
BH CV ECHO MEAS - SV(MOD-SP2): 180 ML
BH CV ECHO MEAS - SV(MOD-SP4): 60 ML
BH CV ECHO MEAS - SV(TEICH): 51.7 ML
BH CV ECHO MEAS - TAPSE (>1.6): 2.1 CM
BH CV ECHO MEAS - TR MAX PG: 35 MMHG
BH CV ECHO MEAS - TR MAX VEL: 216.7 CM/SEC
BH CV ECHO MEASUREMENTS AVERAGE E/E' RATIO: 10.15
BH CV XLRA - RV BASE: 4.4 CM
BH CV XLRA - RV LENGTH: 8.1 CM
BH CV XLRA - RV MID: 2.8 CM
BH CV XLRA - TDI S': 7.46 CM/SEC
BH CV XLRA MEAS LEFT CCA RATIO VEL: 82 CM/SEC
BH CV XLRA MEAS LEFT DIST CCA EDV: 21.6 CM/SEC
BH CV XLRA MEAS LEFT DIST CCA PSV: 65.3 CM/SEC
BH CV XLRA MEAS LEFT DIST ICA EDV: 18.2 CM/SEC
BH CV XLRA MEAS LEFT DIST ICA PSV: 42.9 CM/SEC
BH CV XLRA MEAS LEFT ICA RATIO VEL: 52.1 CM/SEC
BH CV XLRA MEAS LEFT ICA/CCA RATIO: 0.64
BH CV XLRA MEAS LEFT MID CCA EDV: 17.7 CM/SEC
BH CV XLRA MEAS LEFT MID CCA PSV: 82 CM/SEC
BH CV XLRA MEAS LEFT MID ICA EDV: 17 CM/SEC
BH CV XLRA MEAS LEFT MID ICA PSV: 45.4 CM/SEC
BH CV XLRA MEAS LEFT PROX CCA EDV: 20.6 CM/SEC
BH CV XLRA MEAS LEFT PROX CCA PSV: 71.7 CM/SEC
BH CV XLRA MEAS LEFT PROX ECA PSV: 81.1 CM/SEC
BH CV XLRA MEAS LEFT PROX ICA EDV: 18.2 CM/SEC
BH CV XLRA MEAS LEFT PROX ICA PSV: 52.1 CM/SEC
BH CV XLRA MEAS LEFT PROX SCLA PSV: 71.5 CM/SEC
BH CV XLRA MEAS LEFT VERTEBRAL A EDV: 14 CM/SEC
BH CV XLRA MEAS LEFT VERTEBRAL A PSV: 37.4 CM/SEC
BH CV XLRA MEAS RIGHT CCA RATIO VEL: 75.4 CM/SEC
BH CV XLRA MEAS RIGHT DIST CCA EDV: 10.8 CM/SEC
BH CV XLRA MEAS RIGHT DIST CCA PSV: 56 CM/SEC
BH CV XLRA MEAS RIGHT DIST ICA EDV: 17.3 CM/SEC
BH CV XLRA MEAS RIGHT DIST ICA PSV: 61.3 CM/SEC
BH CV XLRA MEAS RIGHT ICA RATIO VEL: 39.1 CM/SEC
BH CV XLRA MEAS RIGHT ICA/CCA RATIO: 0.52
BH CV XLRA MEAS RIGHT MID CCA EDV: 14.5 CM/SEC
BH CV XLRA MEAS RIGHT MID CCA PSV: 75.4 CM/SEC
BH CV XLRA MEAS RIGHT MID ICA EDV: 14.7 CM/SEC
BH CV XLRA MEAS RIGHT MID ICA PSV: 39.1 CM/SEC
BH CV XLRA MEAS RIGHT PROX CCA EDV: 18.3 CM/SEC
BH CV XLRA MEAS RIGHT PROX CCA PSV: 77.7 CM/SEC
BH CV XLRA MEAS RIGHT PROX ECA PSV: 103 CM/SEC
BH CV XLRA MEAS RIGHT PROX ICA EDV: 11.2 CM/SEC
BH CV XLRA MEAS RIGHT PROX ICA PSV: 36 CM/SEC
BH CV XLRA MEAS RIGHT PROX SCLA PSV: 116 CM/SEC
BH CV XLRA MEAS RIGHT VERTEBRAL A EDV: 10.2 CM/SEC
BH CV XLRA MEAS RIGHT VERTEBRAL A PSV: 31 CM/SEC
BILIRUB SERPL-MCNC: 0.6 MG/DL (ref 0–1.2)
BILIRUB UR QL STRIP: NEGATIVE
BLD GP AB SCN SERPL QL: NEGATIVE
BUN SERPL-MCNC: 22 MG/DL (ref 8–23)
BUN SERPL-MCNC: 24 MG/DL (ref 8–23)
BUN/CREAT SERPL: 16.4 (ref 7–25)
BUN/CREAT SERPL: 17.8 (ref 7–25)
BUPRENORPHINE SERPL-MCNC: NEGATIVE NG/ML
CALCIUM SPEC-SCNC: 8.9 MG/DL (ref 8.6–10.5)
CALCIUM SPEC-SCNC: 9.7 MG/DL (ref 8.6–10.5)
CANNABINOIDS SERPL QL: NEGATIVE
CHLORIDE SERPL-SCNC: 98 MMOL/L (ref 98–107)
CHLORIDE SERPL-SCNC: 99 MMOL/L (ref 98–107)
CHOLEST SERPL-MCNC: 182 MG/DL (ref 0–200)
CLARITY UR: CLEAR
CO2 SERPL-SCNC: 27 MMOL/L (ref 22–29)
CO2 SERPL-SCNC: 28 MMOL/L (ref 22–29)
COCAINE UR QL: NEGATIVE
COLOR UR: YELLOW
CREAT SERPL-MCNC: 1.34 MG/DL (ref 0.76–1.27)
CREAT SERPL-MCNC: 1.35 MG/DL (ref 0.76–1.27)
DEPRECATED RDW RBC AUTO: 43.5 FL (ref 37–54)
ERYTHROCYTE [DISTWIDTH] IN BLOOD BY AUTOMATED COUNT: 12 % (ref 12.3–15.4)
FLUAV SUBTYP SPEC NAA+PROBE: NOT DETECTED
FLUBV RNA ISLT QL NAA+PROBE: NOT DETECTED
GFR SERPL CREATININE-BSD FRML MDRD: 52 ML/MIN/1.73
GFR SERPL CREATININE-BSD FRML MDRD: 52 ML/MIN/1.73
GLOBULIN UR ELPH-MCNC: 2.6 GM/DL
GLUCOSE BLDC GLUCOMTR-MCNC: 106 MG/DL (ref 70–130)
GLUCOSE BLDC GLUCOMTR-MCNC: 198 MG/DL (ref 70–130)
GLUCOSE SERPL-MCNC: 113 MG/DL (ref 65–99)
GLUCOSE SERPL-MCNC: 133 MG/DL (ref 65–99)
GLUCOSE UR STRIP-MCNC: NEGATIVE MG/DL
HBA1C MFR BLD: 5.7 % (ref 4.8–5.6)
HCT VFR BLD AUTO: 42.6 % (ref 37.5–51)
HDLC SERPL-MCNC: 52 MG/DL (ref 40–60)
HGB BLD-MCNC: 14.2 G/DL (ref 13–17.7)
HGB UR QL STRIP.AUTO: ABNORMAL
HYALINE CASTS UR QL AUTO: ABNORMAL /LPF
INR PPP: 1.19 (ref 0.85–1.16)
KETONES UR QL STRIP: NEGATIVE
LDLC SERPL CALC-MCNC: 117 MG/DL (ref 0–100)
LDLC/HDLC SERPL: 2.23 {RATIO}
LEFT ARM BP: NORMAL MMHG
LEFT ATRIUM VOLUME INDEX: 45.9 ML/M^2
LEFT ATRIUM VOLUME: 96 ML
LEUKOCYTE ESTERASE UR QL STRIP.AUTO: NEGATIVE
MAGNESIUM SERPL-MCNC: 2.3 MG/DL (ref 1.6–2.4)
MAXIMAL PREDICTED HEART RATE: 145 BPM
MCH RBC QN AUTO: 32.6 PG (ref 26.6–33)
MCHC RBC AUTO-ENTMCNC: 33.3 G/DL (ref 31.5–35.7)
MCV RBC AUTO: 97.9 FL (ref 79–97)
METHADONE UR QL SCN: NEGATIVE
NITRITE UR QL STRIP: NEGATIVE
OPIATES UR QL: NEGATIVE
OXYCODONE UR QL SCN: NEGATIVE
PA ADP PRP-ACNC: 229 PRU
PCP UR QL SCN: NEGATIVE
PH UR STRIP.AUTO: 6 [PH] (ref 5–8)
PLATELET # BLD AUTO: 188 10*3/MM3 (ref 140–450)
PMV BLD AUTO: 10.7 FL (ref 6–12)
POTASSIUM SERPL-SCNC: 4.2 MMOL/L (ref 3.5–5.2)
POTASSIUM SERPL-SCNC: 4.4 MMOL/L (ref 3.5–5.2)
POTASSIUM SERPL-SCNC: 4.7 MMOL/L (ref 3.5–5.2)
PROPOXYPH UR QL: NEGATIVE
PROT SERPL-MCNC: 6.1 G/DL (ref 6–8.5)
PROT UR QL STRIP: NEGATIVE
PROTHROMBIN TIME: 14.7 SECONDS (ref 11.4–14.4)
RBC # BLD AUTO: 4.35 10*6/MM3 (ref 4.14–5.8)
RBC # UR: ABNORMAL /HPF
REF LAB TEST METHOD: ABNORMAL
RH BLD: POSITIVE
RH BLD: POSITIVE
SARS-COV-2 RNA PNL SPEC NAA+PROBE: NOT DETECTED
SODIUM SERPL-SCNC: 132 MMOL/L (ref 136–145)
SODIUM SERPL-SCNC: 136 MMOL/L (ref 136–145)
SP GR UR STRIP: 1.06 (ref 1–1.03)
SQUAMOUS #/AREA URNS HPF: ABNORMAL /HPF
STRESS TARGET HR: 123 BPM
T&S EXPIRATION DATE: NORMAL
TRICYCLICS UR QL SCN: NEGATIVE
TRIGL SERPL-MCNC: 71 MG/DL (ref 0–150)
UROBILINOGEN UR QL STRIP: ABNORMAL
VLDLC SERPL-MCNC: 13 MG/DL (ref 5–40)
WBC # BLD AUTO: 6.6 10*3/MM3 (ref 3.4–10.8)
WBC UR QL AUTO: ABNORMAL /HPF

## 2021-06-02 PROCEDURE — 84132 ASSAY OF SERUM POTASSIUM: CPT | Performed by: ANESTHESIOLOGY

## 2021-06-02 PROCEDURE — 25010000002 FENTANYL CITRATE (PF) 50 MCG/ML SOLUTION: Performed by: INTERNAL MEDICINE

## 2021-06-02 PROCEDURE — 86923 COMPATIBILITY TEST ELECTRIC: CPT

## 2021-06-02 PROCEDURE — 86900 BLOOD TYPING SEROLOGIC ABO: CPT

## 2021-06-02 PROCEDURE — 33533 CABG ARTERIAL SINGLE: CPT | Performed by: THORACIC SURGERY (CARDIOTHORACIC VASCULAR SURGERY)

## 2021-06-02 PROCEDURE — 81001 URINALYSIS AUTO W/SCOPE: CPT | Performed by: PHYSICIAN ASSISTANT

## 2021-06-02 PROCEDURE — 25010000002 MIDAZOLAM PER 1 MG: Performed by: INTERNAL MEDICINE

## 2021-06-02 PROCEDURE — 99223 1ST HOSP IP/OBS HIGH 75: CPT | Performed by: INTERNAL MEDICINE

## 2021-06-02 PROCEDURE — 25010000002 HEPARIN (PORCINE) PER 1000 UNITS: Performed by: INTERNAL MEDICINE

## 2021-06-02 PROCEDURE — 86901 BLOOD TYPING SEROLOGIC RH(D): CPT

## 2021-06-02 PROCEDURE — 93005 ELECTROCARDIOGRAM TRACING: CPT | Performed by: INTERNAL MEDICINE

## 2021-06-02 PROCEDURE — 85610 PROTHROMBIN TIME: CPT | Performed by: PHYSICIAN ASSISTANT

## 2021-06-02 PROCEDURE — 85027 COMPLETE CBC AUTOMATED: CPT

## 2021-06-02 PROCEDURE — B2111ZZ FLUOROSCOPY OF MULTIPLE CORONARY ARTERIES USING LOW OSMOLAR CONTRAST: ICD-10-PCS | Performed by: INTERNAL MEDICINE

## 2021-06-02 PROCEDURE — 85576 BLOOD PLATELET AGGREGATION: CPT | Performed by: PHYSICIAN ASSISTANT

## 2021-06-02 PROCEDURE — 93458 L HRT ARTERY/VENTRICLE ANGIO: CPT | Performed by: INTERNAL MEDICINE

## 2021-06-02 PROCEDURE — 25010000002 PHENYLEPHRINE 10 MG/ML SOLUTION: Performed by: INTERNAL MEDICINE

## 2021-06-02 PROCEDURE — 92978 ENDOLUMINL IVUS OCT C 1ST: CPT | Performed by: INTERNAL MEDICINE

## 2021-06-02 PROCEDURE — 83036 HEMOGLOBIN GLYCOSYLATED A1C: CPT | Performed by: PHYSICIAN ASSISTANT

## 2021-06-02 PROCEDURE — 86900 BLOOD TYPING SEROLOGIC ABO: CPT | Performed by: PHYSICIAN ASSISTANT

## 2021-06-02 PROCEDURE — C1769 GUIDE WIRE: HCPCS | Performed by: INTERNAL MEDICINE

## 2021-06-02 PROCEDURE — C1753 CATH, INTRAVAS ULTRASOUND: HCPCS | Performed by: INTERNAL MEDICINE

## 2021-06-02 PROCEDURE — B2151ZZ FLUOROSCOPY OF LEFT HEART USING LOW OSMOLAR CONTRAST: ICD-10-PCS | Performed by: INTERNAL MEDICINE

## 2021-06-02 PROCEDURE — 33508 ENDOSCOPIC VEIN HARVEST: CPT | Performed by: PHYSICIAN ASSISTANT

## 2021-06-02 PROCEDURE — 82565 ASSAY OF CREATININE: CPT

## 2021-06-02 PROCEDURE — 85347 COAGULATION TIME ACTIVATED: CPT

## 2021-06-02 PROCEDURE — 85730 THROMBOPLASTIN TIME PARTIAL: CPT | Performed by: PHYSICIAN ASSISTANT

## 2021-06-02 PROCEDURE — 80306 DRUG TEST PRSMV INSTRMNT: CPT | Performed by: PHYSICIAN ASSISTANT

## 2021-06-02 PROCEDURE — C1887 CATHETER, GUIDING: HCPCS | Performed by: INTERNAL MEDICINE

## 2021-06-02 PROCEDURE — 93306 TTE W/DOPPLER COMPLETE: CPT

## 2021-06-02 PROCEDURE — 71045 X-RAY EXAM CHEST 1 VIEW: CPT

## 2021-06-02 PROCEDURE — 4A023N7 MEASUREMENT OF CARDIAC SAMPLING AND PRESSURE, LEFT HEART, PERCUTANEOUS APPROACH: ICD-10-PCS | Performed by: INTERNAL MEDICINE

## 2021-06-02 PROCEDURE — 99223 1ST HOSP IP/OBS HIGH 75: CPT | Performed by: THORACIC SURGERY (CARDIOTHORACIC VASCULAR SURGERY)

## 2021-06-02 PROCEDURE — 94010 BREATHING CAPACITY TEST: CPT

## 2021-06-02 PROCEDURE — 87636 SARSCOV2 & INF A&B AMP PRB: CPT | Performed by: INTERNAL MEDICINE

## 2021-06-02 PROCEDURE — 93880 EXTRACRANIAL BILAT STUDY: CPT | Performed by: INTERNAL MEDICINE

## 2021-06-02 PROCEDURE — 93571 IV DOP VEL&/PRESS C FLO 1ST: CPT | Performed by: INTERNAL MEDICINE

## 2021-06-02 PROCEDURE — 86850 RBC ANTIBODY SCREEN: CPT | Performed by: PHYSICIAN ASSISTANT

## 2021-06-02 PROCEDURE — 93880 EXTRACRANIAL BILAT STUDY: CPT

## 2021-06-02 PROCEDURE — 80061 LIPID PANEL: CPT | Performed by: PHYSICIAN ASSISTANT

## 2021-06-02 PROCEDURE — 80053 COMPREHEN METABOLIC PANEL: CPT

## 2021-06-02 PROCEDURE — 33533 CABG ARTERIAL SINGLE: CPT | Performed by: PHYSICIAN ASSISTANT

## 2021-06-02 PROCEDURE — 33517 CABG ARTERY-VEIN SINGLE: CPT | Performed by: THORACIC SURGERY (CARDIOTHORACIC VASCULAR SURGERY)

## 2021-06-02 PROCEDURE — 86901 BLOOD TYPING SEROLOGIC RH(D): CPT | Performed by: PHYSICIAN ASSISTANT

## 2021-06-02 PROCEDURE — 33517 CABG ARTERY-VEIN SINGLE: CPT | Performed by: PHYSICIAN ASSISTANT

## 2021-06-02 PROCEDURE — 83735 ASSAY OF MAGNESIUM: CPT | Performed by: PHYSICIAN ASSISTANT

## 2021-06-02 PROCEDURE — C1894 INTRO/SHEATH, NON-LASER: HCPCS | Performed by: INTERNAL MEDICINE

## 2021-06-02 PROCEDURE — 0 IOPAMIDOL PER 1 ML: Performed by: INTERNAL MEDICINE

## 2021-06-02 PROCEDURE — 33508 ENDOSCOPIC VEIN HARVEST: CPT | Performed by: THORACIC SURGERY (CARDIOTHORACIC VASCULAR SURGERY)

## 2021-06-02 PROCEDURE — 94010 BREATHING CAPACITY TEST: CPT | Performed by: INTERNAL MEDICINE

## 2021-06-02 PROCEDURE — 93306 TTE W/DOPPLER COMPLETE: CPT | Performed by: INTERNAL MEDICINE

## 2021-06-02 PROCEDURE — 82962 GLUCOSE BLOOD TEST: CPT

## 2021-06-02 RX ORDER — ALUMINA, MAGNESIA, AND SIMETHICONE 2400; 2400; 240 MG/30ML; MG/30ML; MG/30ML
15 SUSPENSION ORAL EVERY 6 HOURS PRN
Status: DISCONTINUED | OUTPATIENT
Start: 2021-06-02 | End: 2021-06-10 | Stop reason: HOSPADM

## 2021-06-02 RX ORDER — SODIUM CHLORIDE 9 MG/ML
1 INJECTION, SOLUTION INTRAVENOUS CONTINUOUS
Status: ACTIVE | OUTPATIENT
Start: 2021-06-02 | End: 2021-06-02

## 2021-06-02 RX ORDER — ASPIRIN 81 MG/1
81 TABLET ORAL ONCE
Status: DISCONTINUED | OUTPATIENT
Start: 2021-06-02 | End: 2021-06-02 | Stop reason: SDUPTHER

## 2021-06-02 RX ORDER — ASPIRIN 81 MG/1
81 TABLET ORAL DAILY
Status: DISCONTINUED | OUTPATIENT
Start: 2021-06-02 | End: 2021-06-03 | Stop reason: DRUGHIGH

## 2021-06-02 RX ORDER — IPRATROPIUM BROMIDE AND ALBUTEROL SULFATE 2.5; .5 MG/3ML; MG/3ML
3 SOLUTION RESPIRATORY (INHALATION) EVERY 4 HOURS PRN
Status: DISCONTINUED | OUTPATIENT
Start: 2021-06-02 | End: 2021-06-10 | Stop reason: HOSPADM

## 2021-06-02 RX ORDER — ONDANSETRON 2 MG/ML
4 INJECTION INTRAMUSCULAR; INTRAVENOUS EVERY 6 HOURS PRN
Status: DISCONTINUED | OUTPATIENT
Start: 2021-06-02 | End: 2021-06-10 | Stop reason: HOSPADM

## 2021-06-02 RX ORDER — SODIUM CHLORIDE 0.9 % (FLUSH) 0.9 %
10 SYRINGE (ML) INJECTION AS NEEDED
Status: DISCONTINUED | OUTPATIENT
Start: 2021-06-02 | End: 2021-06-02 | Stop reason: HOSPADM

## 2021-06-02 RX ORDER — HYDROCODONE BITARTRATE AND ACETAMINOPHEN 5; 325 MG/1; MG/1
1 TABLET ORAL EVERY 4 HOURS PRN
Status: DISCONTINUED | OUTPATIENT
Start: 2021-06-02 | End: 2021-06-03 | Stop reason: DRUGHIGH

## 2021-06-02 RX ORDER — LIDOCAINE HYDROCHLORIDE 10 MG/ML
INJECTION, SOLUTION EPIDURAL; INFILTRATION; INTRACAUDAL; PERINEURAL AS NEEDED
Status: DISCONTINUED | OUTPATIENT
Start: 2021-06-02 | End: 2021-06-02 | Stop reason: HOSPADM

## 2021-06-02 RX ORDER — SODIUM CHLORIDE 0.9 % (FLUSH) 0.9 %
10 SYRINGE (ML) INJECTION EVERY 12 HOURS SCHEDULED
Status: DISCONTINUED | OUTPATIENT
Start: 2021-06-02 | End: 2021-06-10 | Stop reason: HOSPADM

## 2021-06-02 RX ORDER — RAMIPRIL 5 MG/1
5 CAPSULE ORAL DAILY
Status: DISCONTINUED | OUTPATIENT
Start: 2021-06-03 | End: 2021-06-03

## 2021-06-02 RX ORDER — FENTANYL CITRATE 50 UG/ML
INJECTION, SOLUTION INTRAMUSCULAR; INTRAVENOUS AS NEEDED
Status: DISCONTINUED | OUTPATIENT
Start: 2021-06-02 | End: 2021-06-02 | Stop reason: HOSPADM

## 2021-06-02 RX ORDER — LEVOTHYROXINE SODIUM 0.05 MG/1
50 TABLET ORAL DAILY
Status: DISCONTINUED | OUTPATIENT
Start: 2021-06-02 | End: 2021-06-10 | Stop reason: HOSPADM

## 2021-06-02 RX ORDER — SODIUM CHLORIDE 0.9 % (FLUSH) 0.9 %
3 SYRINGE (ML) INJECTION EVERY 12 HOURS SCHEDULED
Status: DISCONTINUED | OUTPATIENT
Start: 2021-06-02 | End: 2021-06-02 | Stop reason: HOSPADM

## 2021-06-02 RX ORDER — HEPARIN SODIUM 1000 [USP'U]/ML
INJECTION, SOLUTION INTRAVENOUS; SUBCUTANEOUS AS NEEDED
Status: DISCONTINUED | OUTPATIENT
Start: 2021-06-02 | End: 2021-06-02 | Stop reason: HOSPADM

## 2021-06-02 RX ORDER — ONDANSETRON 4 MG/1
4 TABLET, FILM COATED ORAL EVERY 6 HOURS PRN
Status: DISCONTINUED | OUTPATIENT
Start: 2021-06-02 | End: 2021-06-10 | Stop reason: HOSPADM

## 2021-06-02 RX ORDER — ACETAMINOPHEN 325 MG/1
650 TABLET ORAL EVERY 4 HOURS PRN
Status: DISCONTINUED | OUTPATIENT
Start: 2021-06-02 | End: 2021-06-10 | Stop reason: HOSPADM

## 2021-06-02 RX ORDER — LIDOCAINE HYDROCHLORIDE 10 MG/ML
0.5 INJECTION, SOLUTION EPIDURAL; INFILTRATION; INTRACAUDAL; PERINEURAL ONCE AS NEEDED
Status: CANCELLED | OUTPATIENT
Start: 2021-06-02

## 2021-06-02 RX ORDER — CHLORHEXIDINE GLUCONATE 0.12 MG/ML
15 RINSE ORAL EVERY 12 HOURS
Status: COMPLETED | OUTPATIENT
Start: 2021-06-02 | End: 2021-06-03

## 2021-06-02 RX ORDER — FAMOTIDINE 10 MG/ML
20 INJECTION, SOLUTION INTRAVENOUS ONCE
Status: CANCELLED | OUTPATIENT
Start: 2021-06-02 | End: 2021-06-02

## 2021-06-02 RX ORDER — SODIUM CHLORIDE 0.9 % (FLUSH) 0.9 %
10 SYRINGE (ML) INJECTION AS NEEDED
Status: DISCONTINUED | OUTPATIENT
Start: 2021-06-02 | End: 2021-06-04

## 2021-06-02 RX ORDER — ATORVASTATIN CALCIUM 40 MG/1
40 TABLET, FILM COATED ORAL NIGHTLY
Status: DISCONTINUED | OUTPATIENT
Start: 2021-06-02 | End: 2021-06-03 | Stop reason: SDUPTHER

## 2021-06-02 RX ORDER — CHLORHEXIDINE GLUCONATE 500 MG/1
1 CLOTH TOPICAL EVERY 12 HOURS PRN
Status: DISCONTINUED | OUTPATIENT
Start: 2021-06-02 | End: 2021-06-04

## 2021-06-02 RX ORDER — MIDAZOLAM HYDROCHLORIDE 1 MG/ML
INJECTION INTRAMUSCULAR; INTRAVENOUS AS NEEDED
Status: DISCONTINUED | OUTPATIENT
Start: 2021-06-02 | End: 2021-06-02 | Stop reason: HOSPADM

## 2021-06-02 RX ORDER — PHENYLEPHRINE HYDROCHLORIDE 10 MG/ML
INJECTION INTRAVENOUS AS NEEDED
Status: DISCONTINUED | OUTPATIENT
Start: 2021-06-02 | End: 2021-06-02 | Stop reason: HOSPADM

## 2021-06-02 RX ORDER — ALPRAZOLAM 0.25 MG/1
0.25 TABLET ORAL 3 TIMES DAILY PRN
Status: DISCONTINUED | OUTPATIENT
Start: 2021-06-02 | End: 2021-06-10 | Stop reason: HOSPADM

## 2021-06-02 RX ORDER — SODIUM CHLORIDE 9 MG/ML
INJECTION, SOLUTION INTRAVENOUS CONTINUOUS PRN
Status: COMPLETED | OUTPATIENT
Start: 2021-06-02 | End: 2021-06-02

## 2021-06-02 RX ORDER — LEVOTHYROXINE SODIUM 0.05 MG/1
50 TABLET ORAL DAILY
Status: ON HOLD | COMMUNITY
End: 2021-06-04

## 2021-06-02 RX ADMIN — ASPIRIN 81 MG: 81 TABLET, COATED ORAL at 13:17

## 2021-06-02 RX ADMIN — CHLORHEXIDINE GLUCONATE 15 ML: 1.2 SOLUTION ORAL at 20:48

## 2021-06-02 RX ADMIN — LEVOTHYROXINE SODIUM 50 MCG: 50 TABLET ORAL at 13:17

## 2021-06-02 RX ADMIN — MUPIROCIN 1 APPLICATION: 20 OINTMENT TOPICAL at 20:48

## 2021-06-02 RX ADMIN — ATORVASTATIN CALCIUM 40 MG: 40 TABLET, FILM COATED ORAL at 20:47

## 2021-06-02 RX ADMIN — SODIUM CHLORIDE, PRESERVATIVE FREE 10 ML: 5 INJECTION INTRAVENOUS at 20:48

## 2021-06-02 NOTE — CONSULTS
Consult Note  Colin Albrecht  [id number]  1945    Referring Provider:  Reason for Consultation: Coronary artery disease, left main    Patient Care Team:  Arun Soliman MD as PCP - General (Family Medicine)  Semaj Park MD (Cardiology)    Chief complaint: Shortness of breath, fatigue      History of present illness: Patient gives a several week history of not several months history of progressive shortness of breath and fatigue.  Patient has been followed closely by Dr. Prado and has had by bad placed as well as AV node ablation and a watchman device placed.  He is currently not on Eliquis.  He denies any chest pain.  His main symptoms are shortness of breath.  He underwent cardiac evaluation cardiac catheterization by Dr. Pietro Quintana.  This revealed a 80% left main.  I been asked see the patient guards coronary bypass surgery.    Review of Systems    The following systems were reviewed and negative;  constitution, eyes, ENT, gastrointestinal, genitourinary, integument, breast, hematologic / lymphatic, musculoskeletal, neurological, behavioral/psych, endocrine and allergies / immunologic    History  Past Medical History:   Diagnosis Date   • Abnormal ECG    • Arrhythmia    • Atrial fibrillation (CMS/HCC)    • CHF (congestive heart failure) (CMS/HCC)    • Depression    • History of transfusion     bleeding stomach ulcer ~2014 x2, no reaction    • Hypertension    • Stomach ulcer    • Wears reading eyeglasses    ,   Past Surgical History:   Procedure Laterality Date   • ATRIAL APPENDAGE EXCLUSION LEFT WITH TRANSESOPHAGEAL ECHOCARDIOGRAM N/A 9/25/2020    Procedure: Atrial Appendage Occlusion (Watchman), start Eliquis 2 weeks prior and hold 1 day, GA;  Surgeon: Yonny Prado MD;  Location: [facility];  Service: Cardiology;  Laterality: N/A;   • CARDIAC CATHETERIZATION     • CARDIAC ELECTROPHYSIOLOGY PROCEDURE N/A 3/26/2021    Procedure: DDD PPM upgrade to Biv ICD (MDT), no meds  to hold;  Surgeon: Yonny Prado MD;  Location:  [facility] INVASIVE LOCATION;  Service: Cardiology;  Laterality: N/A;   • CARDIAC ELECTROPHYSIOLOGY PROCEDURE N/A 3/26/2021    Procedure: AVN RFA;  Surgeon: Yonny Prado MD;  Location:  [facility] INVASIVE LOCATION;  Service: Cardiovascular;  Laterality: N/A;   • COLONOSCOPY     • INSERT / REPLACE / REMOVE PACEMAKER     ,   Family History   Problem Relation Age of Onset   • Stroke Father    • Lung cancer Sister    • Alcohol abuse Brother    ,   Social History     Tobacco Use   • Smoking status: Former Smoker     Packs/day: 1.00     Years: 50.00     Pack years: 50.00     Types: Cigarettes     Quit date: 7/29/2010     Years since quitting: 10.8   • Smokeless tobacco: Never Used   Vaping Use   • Vaping Use: Never used   Substance Use Topics   • Alcohol use: Never   • Drug use: Never   ,   Medications Prior to Admission   Medication Sig Dispense Refill Last Dose   • aspirin 81 MG EC tablet Take 1 tablet by mouth Daily. 30 tablet 11 6/1/2021 at 0800   • bisoprolol-hydrochlorothiazide (ZIAC) 5-6.25 MG per tablet Take 1 tablet by mouth Daily.   6/2/2021 at 0800   • Coenzyme Q10 (COQ10 PO) Take 1 capsule by mouth Daily.   6/1/2021 at 0800   • GARLIC PO Take 1 capsule by mouth Daily.   6/1/2021 at 0800   • multivitamin with minerals (MULTIVITAMIN ADULT PO) Take 1 tablet by mouth Daily.   6/1/2021 at 0800   • Omega-3 Fatty Acids (fish oil) 1000 MG capsule capsule Take 1,000 mg by mouth Every Other Day.   6/1/2021 at 0800   • ramipril (ALTACE) 5 MG capsule Take 5 mg by mouth Daily.   6/2/2021 at 0800   • VITAMIN D PO Take 1 capsule by mouth Daily.   6/1/2021 at 0800   • levothyroxine (SYNTHROID, LEVOTHROID) 50 MCG tablet Take 50 mcg by mouth Daily.         Scheduled Meds:  aspirin, 81 mg, Oral, Daily  atorvastatin, 40 mg, Oral, Nightly  [START ON 6/3/2021] bisoprolol-hydroCHLOROthiazide (ZIAC) 5-6.25 mg combo tab, , Oral, Q24H  cefuroxime, 1.5 g, Intravenous,  "Once  chlorhexidine, 15 mL, Mouth/Throat, Q12H  levothyroxine, 50 mcg, Oral, Daily  mupirocin, 1 application, Each Nare, Q12H  [START ON 6/3/2021] ramipril, 5 mg, Oral, Daily  sodium chloride, 10 mL, Intravenous, Q12H       Continuous Infusions:      PRN Meds:  •  acetaminophen  •  ALPRAZolam  •  aluminum-magnesium hydroxide-simethicone  •  Chlorhexidine Gluconate Cloth  •  HYDROcodone-acetaminophen  •  ipratropium-albuterol  •  ondansetron **OR** ondansetron  •  sodium chloride and Allergies:  Patient has no known allergies.    Objective     Vital Sign Min/Max for last 24 hours  Temp  Min: 97.2 °F (36.2 °C)  Max: 97.2 °F (36.2 °C)   BP  Min: 90/78  Max: 126/74   Pulse  Min: 60  Max: 114   Resp  Min: 18  Max: 18   SpO2  Min: 90 %  Max: 98 %   No data recorded   Weight  Min: 83 kg (183 lb)  Max: 83.3 kg (183 lb 9.6 oz)     Flowsheet Rows      First Filed Value   Admission Height  188 cm (74\") Documented at 06/02/2021 1015   Admission Weight  83.3 kg (183 lb 9.6 oz) Documented at 06/02/2021 1015          Physical Exam:     General Appearance:    Alert, cooperative, in no acute distress   Head:    Normocephalic, without obvious abnormality, atraumatic   Eyes:            Lids and lashes normal, conjunctivae and sclerae normal, no   icterus, no pallor, corneas clear, PERRLA   Ears:    Ears appear intact with no abnormalities noted   Throat:   No oral lesions, no thrush, oral mucosa moist   Neck:   No adenopathy, supple, trachea midline, no thyromegaly, no   carotid bruit, no JVD   Back:     No kyphosis present, no scoliosis present, no skin lesions,      erythema or scars, no tenderness to percussion or                   palpation,   range of motion normal   Lungs:     Clear to auscultation,respirations regular, even and                  unlabored    Heart:    Regular rhythm and normal rate, normal S1 and S2, no            murmur, no gallop, no rub, no click   Chest Wall:    No abnormalities observed   Abdomen:     " Normal bowel sounds, no masses, no organomegaly, soft        non-tender, non-distended, no guarding, no rebound                tenderness   Rectal:     Deferred   Extremities:   Moves all extremities well, no edema, no cyanosis, no             redness   Pulses:   Pulses palpable and equal bilaterally   Skin:   No bleeding, bruising or rash   Lymph nodes:   No palpable adenopathy   Neurologic:   Cranial nerves 2 - 12 grossly intact, sensation intact, DTR       present and equal bilaterally             Assessment/Plan       Unstable angina (CMS/HCC)    Coronary artery disease      The patient is a 76-year-old male who presents with symptoms of increasing shortness of breath and fatigue.  He underwent cardiac catheterization today by Dr. Quintana which reveals a 90% left main.  I have personally obtained and reviewed the films.  I concur with this interpretation.  I also discussed the case in detail with Dr. Quintana.  We both feel that coronary bypass surgery would be his best option.  Have examined the patient as well as his medical record.  I discussed with him in detail the operation, risk, and alternatives.  I discussed the risk which close was to his life, bleeding, infection, organ failure, the risk factor as well as alternatives.  He appears to be fully informed and desires to proceed.  Like thank you for this consultation.    The ROS, past medical history, surgical history, family history, social history and vitals were reviewed by myself and corrected as needed.      I discussed the patients findings and my recommendations with patient, nursing staff and consulting provider      Please note that portions of this note were completed with a voice recognition program. Efforts were made to edit the dictations, but words may be mistranscribed    Jaime Shields MD  06/02/21  17:37 EDT

## 2021-06-02 NOTE — STS RISK SCORE
RISK SCORES  Procedure: Isolated CAB  Risk of Mortality:  1.337%  Renal Failure:  1.439%  Permanent Stroke:  1.060%  Prolonged Ventilation:  5.240%  DSW Infection:  0.088%  Reoperation:  2.638%  Morbidity or Mortality:  8.634%  Short Length of Stay:  38.862%  Long Length of Stay:  3.766%

## 2021-06-02 NOTE — TELEPHONE ENCOUNTER
Per GFT: patient will go to [facility] for a LHC and echo today with Dr. Quintana. No need for in office appointment.

## 2021-06-02 NOTE — H&P
Primary Cardiologist: Dr. Park     Chief Complaint: chest pain       Subjective:     Patient is a 76 y.o. male who presents to [facility] today for unstable angina for LHC +/- CBI with Dr. Quintana. He had the below noted past medical history and recently had an upgrade to BIV ICD and AV node ablation with Dr. Prado in March 2021. At the time of upgrade he was noted to have frequent PVCs and was started on Amiodarone. Following his upgrade he began developing worsening SOB. Device interrogation revealed he was only BIV pacing 61% due to frequent PVCs. Amiodarone was discontinued and he had no improvement. He was arranged for [facility] today to rule out ischemia and he will also have echocardiogram on arrival.     Problem List:   1. Permanent Atrial Fibrillation   a. CHADsvasc = 4 off Xarelto x 1 year due to bleeding, never been on Eliquis  b. Echocardiogram 1/27/2016: EF 45 to 55%, unchanged from previous echo 2012  c. Echocardiogram 8/27/2019: EF 35 to 40%, mild to moderate MR, mild to moderate TR, RVSP 40 mmHg  d. Nuclear stress test 8/26/2019: Normal LV perfusion EF 33%  e. Implantation of a left atrial appendage occlusive device (Watchman device) 09/25/20 by Dr. Yonny Prado  f. PORSHA on 11/13/20 with well seated device, EF 35%, mod MR, mild to mod TR, post-procedural ASD with left-to-right flow  g. Upgrade to BIV ICD and AV Node ablation 3/2021, Dr. Prado  2. Dilated cardiomyopathy/CHF  a. Patient reports normal LHC 10-12 years ago  b. Echocardiogram 1/27/2016: EF 45 to 55%, unchanged from previous echo 2012  c. Echocardiogram 8/27/2019: EF 35%, mild to moderate MR, mild to moderate TR, RVSP 40 mmHg  d. Nuclear stress test 8/26/2019: Normal LV perfusion EF 33%  e. Upgrade to BIV ICD at time of AV node ablation 3/2021   3. Sick sinus syndrome  a. Dual-chamber permanent pacemaker -[employer]tr[employer] device  b. Upgrade to BIV ICD at time of AV node ablation 3/2021   4. Hypertension  5. Gastritis/GIB/Peptic Ulcer  Disease  a. 2013: GIB requiring PRBCs, EGD showed bleeding ulcer in the antrum 9/2013, repeat EGD 12/2013 showed healed ulcer  b. Upper GI Bleeding 3/2019, EGD showed gastric antral ulcer with stigmata or recent bleeding - treated with IV/PO Protonix - Xarelto discontinued  6. Epistaxis - occurred on Xarelto  7. Surgical History:  a. none    Past Medical History:   Diagnosis Date   • Abnormal ECG    • Arrhythmia    • Atrial fibrillation (CMS/HCC)    • CHF (congestive heart failure) (CMS/HCC)    • Depression    • History of transfusion     bleeding stomach ulcer ~2014 x2, no reaction    • Hypertension    • Stomach ulcer    • Wears reading eyeglasses       Past Surgical History:   Procedure Laterality Date   • ATRIAL APPENDAGE EXCLUSION LEFT WITH TRANSESOPHAGEAL ECHOCARDIOGRAM N/A 9/25/2020    Procedure: Atrial Appendage Occlusion (Watchman), start Eliquis 2 weeks prior and hold 1 day, GA;  Surgeon: Yonny Prado MD;  Location:  [facility] INVAS[facility] LOCATION;  Service: Cardiology;  Laterality: N/A;   • CARDIAC CATHETERIZATION     • CARDIAC ELECTROPHYSIOLOGY PROCEDURE N/A 3/26/2021    Procedure: DDD PPM upgrade to Biv ICD (MDT), no meds to hold;  Surgeon: Yonny Prado MD;  Location:  [facility] INV[facility];  Service: Cardiology;  Laterality: N/A;   • CARDIAC ELECTROPHYSIOLOGY PROCEDURE N/A 3/26/2021    Procedure: AVN RFA;  Surgeon: Yonny Prado MD;  Location:  [facility] INVASIVE LOCATION;  Service: Cardiovascular;  Laterality: N/A;   • COLONOSCOPY     • INSERT / REPLACE / REMOVE PACEMAKER        No Known Allergies  Social History     Tobacco Use   • Smoking status: Former Smoker     Packs/day: 1.00     Years: 50.00     Pack years: 50.00     Types: Cigarettes     Quit date: 7/29/2010     Years since quitting: 10.8   • Smokeless tobacco: Never Used   Substance Use Topics   • Alcohol use: Never      FH:   Family History   Problem Relation Age of Onset   • Stroke Father    • Lung cancer Sister    • Alcohol  abuse Brother           Current Facility-Administered Medications:   •  sodium chloride 0.9 % flush 10 mL, 10 mL, Intravenous, PRN, Case, ETIENNE Harden  •  sodium chloride 0.9 % flush 3 mL, 3 mL, Intravenous, Q12H, Cyndi Edgar PA    Review of Systems  Review of Systems:  General: no recent weight loss/gain, weakness or fatigue  Skin: no rashes, lumps, or other skin changes  HEENT: no dizziness, lightheadedness, or vision changes  Respiratory: no cough or hemoptysis  Cardiovascular: + Chest Pain with SOB/TREVIZO  Gastrointestinal: no black/tarry stools or diarrhea  Urinary: no change in frequency or urgency  Peripheral Vascular: no claudication or leg cramps  Musculoskeletal: no muscle or joint pain/stiffness  Psychiatric: no depression or excessive stress  Neurological: no sensory or motor loss, no syncope  Hematologic: no anemia, easy bruising or bleeding  Endocrine: no thyroid problems, nor heat or cold intolerance        Objective:       There were no vitals taken for this visit.    No intake/output data recorded.  No intake/output data recorded.    Physical Exam:  General-Well Nourished, Well developed  Eyes - PERRLA  Neck- supple, No mass  CV- regular rate and rhythm, no MRG  Lung- clear bilaterally  Abd- soft, +BS  Musc/skel - Norm strength and range of motion  Skin- warm and dry  Neuro - Alert & Oriented x 3, appropriate mood.      Data Review:     No results found for this or any previous visit (from the past 24 hour(s)).        Assessment:     Unstable angina (CMS/LTAC, located within [facility])         Plan:   1. Unstable Angina  - progressive TREVIZO/SOB in setting of frequent PVCs concerning for anginal equivalent   - most recent LVEF 33%   - will undergo LHC +/- CBI w/ Dr. Quintana today.     2. Chronic Systolic Heart Failure   - most recent EF 33%   - recent upgrade to BIV ICD at time of AV node ablation   - will recheck echocardiogram and have device interrogated   - currently on Ramipril and Bisoprolol/HCTZ. Consider changing  over to entresto and toprol     3. Permanent Atrial Fibrillation   - Upgrade to BIV ICD and AV node ablation 3/2021   - [id number]=3; s/p Watchman device     4. Frequent PVCs   - noted at time of recent upgrade and started on Amiodarone taper   - symptoms have worsened and BIV pacing only 61%   - will undergo LHC and echocardiogram today with Dr. Quintana.      Electronically signed by ETIENNE Moscoso, 06/02/21, 10:50 AM EDT.  Scribed for Dr. Quintana by Kimberli Mullins PA-C. 6/2/2021  10:50 EDT    The risks, benefits, and alternative options of cardiac catheterization were discussed with the patient.  The risks include death, MI, stroke, infection, vascular injury requiring surgical repair and/or blood transfusion, coronary dissection, renal dysfunction/failure, allergic reaction, emergent CABG.  If PCI is needed there is a 1-2% risk of emergent CABG.  The patient is agreeable for cardiac catheterization, possible PCI or CABG. Plan is to proceed with cardiac catheterization and possible PCI.       I,Pietro Quintana M.D., personally performed the services described in this documentation as scribed by the above named individual in my presence, and it is both accurate and complete.    Pietro Quintana MD, [facility] Cardiology  06/02/21  13:06 EDT

## 2021-06-02 NOTE — PLAN OF CARE
Goal Outcome Evaluation:      Admitted to floor  Problem: Adult Inpatient Plan of Care  Goal: Plan of Care Review  Outcome: Ongoing, Progressing  Goal: Patient-Specific Goal (Individualized)  Outcome: Ongoing, Progressing  Goal: Absence of Hospital-Acquired Illness or Injury  Outcome: Ongoing, Progressing  Goal: Optimal Comfort and Wellbeing  Outcome: Ongoing, Progressing  Goal: Readiness for Transition of Care  Outcome: Ongoing, Progressing     Problem: Chest Pain  Goal: Resolution of Chest Pain Symptoms  Outcome: Ongoing, Progressing

## 2021-06-03 ENCOUNTER — APPOINTMENT (OUTPATIENT)
Dept: GENERAL RADIOLOGY | Facility: HOSPITAL | Age: 76
End: 2021-06-03

## 2021-06-03 ENCOUNTER — ANESTHESIA (OUTPATIENT)
Dept: PERIOP | Facility: HOSPITAL | Age: 76
End: 2021-06-03

## 2021-06-03 PROBLEM — J44.9 COPD (CHRONIC OBSTRUCTIVE PULMONARY DISEASE): Chronic | Status: ACTIVE | Noted: 2021-06-03

## 2021-06-03 PROBLEM — Z87.891 FORMER SMOKER: Status: ACTIVE | Noted: 2021-06-03

## 2021-06-03 PROBLEM — J44.9 COPD (CHRONIC OBSTRUCTIVE PULMONARY DISEASE): Status: ACTIVE | Noted: 2021-06-03

## 2021-06-03 PROBLEM — I50.22 CHRONIC SYSTOLIC CONGESTIVE HEART FAILURE (HCC): Chronic | Status: ACTIVE | Noted: 2021-03-09

## 2021-06-03 PROBLEM — I48.21 PERMANENT ATRIAL FIBRILLATION (HCC): Chronic | Status: ACTIVE | Noted: 2020-07-29

## 2021-06-03 PROBLEM — Z95.1 S/P CABG X 2: Status: ACTIVE | Noted: 2021-06-03

## 2021-06-03 PROBLEM — I42.0 CARDIOMYOPATHY, DILATED: Chronic | Status: ACTIVE | Noted: 2021-03-04

## 2021-06-03 PROBLEM — I10 HYPERTENSION: Status: ACTIVE | Noted: 2021-06-03

## 2021-06-03 PROBLEM — I25.10 CORONARY ARTERY DISEASE: Chronic | Status: ACTIVE | Noted: 2021-06-02

## 2021-06-03 PROBLEM — N18.9 CHRONIC RENAL INSUFFICIENCY: Status: ACTIVE | Noted: 2021-06-03

## 2021-06-03 PROBLEM — I10 HYPERTENSION: Chronic | Status: ACTIVE | Noted: 2021-06-03

## 2021-06-03 PROBLEM — I49.5 SSS (SICK SINUS SYNDROME) (HCC): Chronic | Status: ACTIVE | Noted: 2020-07-29

## 2021-06-03 LAB
ALBUMIN SERPL-MCNC: 3.9 G/DL (ref 3.5–5.2)
ALBUMIN SERPL-MCNC: 4 G/DL (ref 3.5–5.2)
ALBUMIN SERPL-MCNC: 4 G/DL (ref 3.5–5.2)
ALBUMIN/GLOB SERPL: 2.7 G/DL
ALP SERPL-CCNC: 45 U/L (ref 39–117)
ALT SERPL W P-5'-P-CCNC: 10 U/L (ref 1–41)
ANION GAP SERPL CALCULATED.3IONS-SCNC: 12 MMOL/L (ref 5–15)
ANION GAP SERPL CALCULATED.3IONS-SCNC: 13 MMOL/L (ref 5–15)
ANION GAP SERPL CALCULATED.3IONS-SCNC: 13 MMOL/L (ref 5–15)
ANION GAP SERPL CALCULATED.3IONS-SCNC: 7 MMOL/L (ref 5–15)
APTT PPP: 34.1 SECONDS (ref 22–39)
ARTERIAL PATENCY WRIST A: ABNORMAL
AST SERPL-CCNC: 26 U/L (ref 1–40)
ATMOSPHERIC PRESS: ABNORMAL MM[HG]
BASE EXCESS BLDA CALC-SCNC: 0.8 MMOL/L (ref 0–2)
BDY SITE: ABNORMAL
BILIRUB SERPL-MCNC: 1.4 MG/DL (ref 0–1.2)
BODY TEMPERATURE: 37 C
BUN SERPL-MCNC: 20 MG/DL (ref 8–23)
BUN SERPL-MCNC: 21 MG/DL (ref 8–23)
BUN SERPL-MCNC: 21 MG/DL (ref 8–23)
BUN SERPL-MCNC: 26 MG/DL (ref 8–23)
BUN/CREAT SERPL: 16 (ref 7–25)
BUN/CREAT SERPL: 16 (ref 7–25)
BUN/CREAT SERPL: 17.2 (ref 7–25)
BUN/CREAT SERPL: 20.8 (ref 7–25)
CA-I SERPL ISE-MCNC: 1.37 MMOL/L (ref 1.12–1.32)
CALCIUM SPEC-SCNC: 10.1 MG/DL (ref 8.6–10.5)
CALCIUM SPEC-SCNC: 9.5 MG/DL (ref 8.6–10.5)
CHLORIDE SERPL-SCNC: 104 MMOL/L (ref 98–107)
CHLORIDE SERPL-SCNC: 105 MMOL/L (ref 98–107)
CO2 BLDA-SCNC: 25.7 MMOL/L (ref 22–33)
CO2 SERPL-SCNC: 23 MMOL/L (ref 22–29)
CO2 SERPL-SCNC: 23 MMOL/L (ref 22–29)
CO2 SERPL-SCNC: 24 MMOL/L (ref 22–29)
CO2 SERPL-SCNC: 26 MMOL/L (ref 22–29)
COHGB MFR BLD: 0.7 % (ref 0–2)
CREAT SERPL-MCNC: 1.16 MG/DL (ref 0.76–1.27)
CREAT SERPL-MCNC: 1.25 MG/DL (ref 0.76–1.27)
CREAT SERPL-MCNC: 1.31 MG/DL (ref 0.76–1.27)
CREAT SERPL-MCNC: 1.31 MG/DL (ref 0.76–1.27)
DEPRECATED RDW RBC AUTO: 43.3 FL (ref 37–54)
DEPRECATED RDW RBC AUTO: 43.8 FL (ref 37–54)
DEPRECATED RDW RBC AUTO: 44 FL (ref 37–54)
EPAP: 0
ERYTHROCYTE [DISTWIDTH] IN BLOOD BY AUTOMATED COUNT: 12.2 % (ref 12.3–15.4)
ERYTHROCYTE [DISTWIDTH] IN BLOOD BY AUTOMATED COUNT: 12.2 % (ref 12.3–15.4)
ERYTHROCYTE [DISTWIDTH] IN BLOOD BY AUTOMATED COUNT: 12.4 % (ref 12.3–15.4)
GFR SERPL CREATININE-BSD FRML MDRD: 53 ML/MIN/1.73
GFR SERPL CREATININE-BSD FRML MDRD: 53 ML/MIN/1.73
GFR SERPL CREATININE-BSD FRML MDRD: 56 ML/MIN/1.73
GFR SERPL CREATININE-BSD FRML MDRD: 61 ML/MIN/1.73
GLOBULIN UR ELPH-MCNC: 1.5 GM/DL
GLUCOSE BLDC GLUCOMTR-MCNC: 106 MG/DL (ref 70–130)
GLUCOSE BLDC GLUCOMTR-MCNC: 117 MG/DL (ref 70–130)
GLUCOSE BLDC GLUCOMTR-MCNC: 119 MG/DL (ref 70–130)
GLUCOSE BLDC GLUCOMTR-MCNC: 137 MG/DL (ref 70–130)
GLUCOSE BLDC GLUCOMTR-MCNC: 150 MG/DL (ref 70–130)
GLUCOSE BLDC GLUCOMTR-MCNC: 168 MG/DL (ref 70–130)
GLUCOSE SERPL-MCNC: 104 MG/DL (ref 65–99)
GLUCOSE SERPL-MCNC: 136 MG/DL (ref 65–99)
GLUCOSE SERPL-MCNC: 143 MG/DL (ref 65–99)
GLUCOSE SERPL-MCNC: 143 MG/DL (ref 65–99)
HCO3 BLDA-SCNC: 24.6 MMOL/L (ref 20–26)
HCT VFR BLD AUTO: 23.4 % (ref 37.5–51)
HCT VFR BLD AUTO: 26.3 % (ref 37.5–51)
HCT VFR BLD AUTO: 39 % (ref 37.5–51)
HCT VFR BLD CALC: 27.5 %
HGB BLD-MCNC: 12.8 G/DL (ref 13–17.7)
HGB BLD-MCNC: 7.7 G/DL (ref 13–17.7)
HGB BLD-MCNC: 8.6 G/DL (ref 13–17.7)
HGB BLDA-MCNC: 9 G/DL (ref 13.5–17.5)
INHALED O2 CONCENTRATION: 100 %
INR PPP: 1.67 (ref 0.85–1.16)
IPAP: 0
MAGNESIUM SERPL-MCNC: 2.2 MG/DL (ref 1.6–2.4)
MAGNESIUM SERPL-MCNC: 2.4 MG/DL (ref 1.6–2.4)
MCH RBC QN AUTO: 31.7 PG (ref 26.6–33)
MCH RBC QN AUTO: 31.8 PG (ref 26.6–33)
MCH RBC QN AUTO: 32 PG (ref 26.6–33)
MCHC RBC AUTO-ENTMCNC: 32.7 G/DL (ref 31.5–35.7)
MCHC RBC AUTO-ENTMCNC: 32.8 G/DL (ref 31.5–35.7)
MCHC RBC AUTO-ENTMCNC: 32.9 G/DL (ref 31.5–35.7)
MCV RBC AUTO: 97 FL (ref 79–97)
MCV RBC AUTO: 97 FL (ref 79–97)
MCV RBC AUTO: 97.1 FL (ref 79–97)
METHGB BLD QL: 0.6 % (ref 0–1.5)
MODALITY: ABNORMAL
NOTE: ABNORMAL
OXYHGB MFR BLDV: 99.2 % (ref 94–99)
PA ADP PRP-ACNC: 225 PRU
PAW @ PEAK INSP FLOW SETTING VENT: 0 CMH2O
PCO2 BLDA: 35.3 MM HG (ref 35–45)
PCO2 TEMP ADJ BLD: 35.3 MM HG (ref 35–48)
PH BLDA: 7.45 PH UNITS (ref 7.35–7.45)
PH, TEMP CORRECTED: 7.45 PH UNITS
PHOSPHATE SERPL-MCNC: 1.4 MG/DL (ref 2.5–4.5)
PHOSPHATE SERPL-MCNC: 2.6 MG/DL (ref 2.5–4.5)
PLATELET # BLD AUTO: 147 10*3/MM3 (ref 140–450)
PLATELET # BLD AUTO: 158 10*3/MM3 (ref 140–450)
PLATELET # BLD AUTO: 167 10*3/MM3 (ref 140–450)
PMV BLD AUTO: 10.3 FL (ref 6–12)
PMV BLD AUTO: 10.4 FL (ref 6–12)
PMV BLD AUTO: 9.4 FL (ref 6–12)
PO2 BLDA: 490 MM HG (ref 83–108)
PO2 TEMP ADJ BLD: 490 MM HG (ref 83–108)
POTASSIUM SERPL-SCNC: 3.7 MMOL/L (ref 3.5–5.2)
POTASSIUM SERPL-SCNC: 3.7 MMOL/L (ref 3.5–5.2)
POTASSIUM SERPL-SCNC: 4 MMOL/L (ref 3.5–5.2)
POTASSIUM SERPL-SCNC: 4.1 MMOL/L (ref 3.5–5.2)
PROT SERPL-MCNC: 5.5 G/DL (ref 6–8.5)
PROTHROMBIN TIME: 19 SECONDS (ref 11.4–14.4)
RBC # BLD AUTO: 2.41 10*6/MM3 (ref 4.14–5.8)
RBC # BLD AUTO: 2.71 10*6/MM3 (ref 4.14–5.8)
RBC # BLD AUTO: 4.02 10*6/MM3 (ref 4.14–5.8)
SODIUM SERPL-SCNC: 137 MMOL/L (ref 136–145)
SODIUM SERPL-SCNC: 141 MMOL/L (ref 136–145)
TOTAL RATE: 0 BREATHS/MINUTE
TSH SERPL DL<=0.05 MIU/L-ACNC: 3.83 UIU/ML (ref 0.27–4.2)
WBC # BLD AUTO: 4.95 10*3/MM3 (ref 3.4–10.8)
WBC # BLD AUTO: 5.85 10*3/MM3 (ref 3.4–10.8)
WBC # BLD AUTO: 7 10*3/MM3 (ref 3.4–10.8)

## 2021-06-03 PROCEDURE — 71045 X-RAY EXAM CHEST 1 VIEW: CPT

## 2021-06-03 PROCEDURE — 99232 SBSQ HOSP IP/OBS MODERATE 35: CPT | Performed by: INTERNAL MEDICINE

## 2021-06-03 PROCEDURE — 82805 BLOOD GASES W/O2 SATURATION: CPT

## 2021-06-03 PROCEDURE — P9035 PLATELET PHERES LEUKOREDUCED: HCPCS

## 2021-06-03 PROCEDURE — 85014 HEMATOCRIT: CPT

## 2021-06-03 PROCEDURE — 06BP4ZZ EXCISION OF RIGHT SAPHENOUS VEIN, PERCUTANEOUS ENDOSCOPIC APPROACH: ICD-10-PCS | Performed by: THORACIC SURGERY (CARDIOTHORACIC VASCULAR SURGERY)

## 2021-06-03 PROCEDURE — P9041 ALBUMIN (HUMAN),5%, 50ML: HCPCS | Performed by: PHYSICIAN ASSISTANT

## 2021-06-03 PROCEDURE — 85730 THROMBOPLASTIN TIME PARTIAL: CPT | Performed by: PHYSICIAN ASSISTANT

## 2021-06-03 PROCEDURE — 94799 UNLISTED PULMONARY SVC/PX: CPT

## 2021-06-03 PROCEDURE — 36430 TRANSFUSION BLD/BLD COMPNT: CPT

## 2021-06-03 PROCEDURE — 5A1221Z PERFORMANCE OF CARDIAC OUTPUT, CONTINUOUS: ICD-10-PCS | Performed by: THORACIC SURGERY (CARDIOTHORACIC VASCULAR SURGERY)

## 2021-06-03 PROCEDURE — 85027 COMPLETE CBC AUTOMATED: CPT | Performed by: INTERNAL MEDICINE

## 2021-06-03 PROCEDURE — 25810000003 DEXTROSE 5 % WITH KCL 20 MEQ 20-5 MEQ/L-% SOLUTION: Performed by: PHYSICIAN ASSISTANT

## 2021-06-03 PROCEDURE — 85347 COAGULATION TIME ACTIVATED: CPT

## 2021-06-03 PROCEDURE — 94640 AIRWAY INHALATION TREATMENT: CPT

## 2021-06-03 PROCEDURE — 99291 CRITICAL CARE FIRST HOUR: CPT | Performed by: INTERNAL MEDICINE

## 2021-06-03 PROCEDURE — 25010000003 MEPERIDINE PER 100 MG: Performed by: THORACIC SURGERY (CARDIOTHORACIC VASCULAR SURGERY)

## 2021-06-03 PROCEDURE — 25010000002 FENTANYL CITRATE (PF) 50 MCG/ML SOLUTION: Performed by: THORACIC SURGERY (CARDIOTHORACIC VASCULAR SURGERY)

## 2021-06-03 PROCEDURE — 82330 ASSAY OF CALCIUM: CPT

## 2021-06-03 PROCEDURE — C1751 CATH, INF, PER/CENT/MIDLINE: HCPCS | Performed by: THORACIC SURGERY (CARDIOTHORACIC VASCULAR SURGERY)

## 2021-06-03 PROCEDURE — 25010000002 MIDAZOLAM PER 1 MG: Performed by: ANESTHESIOLOGY

## 2021-06-03 PROCEDURE — P9041 ALBUMIN (HUMAN),5%, 50ML: HCPCS

## 2021-06-03 PROCEDURE — 25010000002 ALBUMIN HUMAN 5% PER 50 ML

## 2021-06-03 PROCEDURE — P9017 PLASMA 1 DONOR FRZ W/IN 8 HR: HCPCS

## 2021-06-03 PROCEDURE — 86900 BLOOD TYPING SEROLOGIC ABO: CPT

## 2021-06-03 PROCEDURE — 86927 PLASMA FRESH FROZEN: CPT

## 2021-06-03 PROCEDURE — C1894 INTRO/SHEATH, NON-LASER: HCPCS | Performed by: THORACIC SURGERY (CARDIOTHORACIC VASCULAR SURGERY)

## 2021-06-03 PROCEDURE — 25010000002 HEPARIN (PORCINE) PER 1000 UNITS: Performed by: THORACIC SURGERY (CARDIOTHORACIC VASCULAR SURGERY)

## 2021-06-03 PROCEDURE — 25010000002 PROTAMINE SULFATE PER 10 MG: Performed by: ANESTHESIOLOGY

## 2021-06-03 PROCEDURE — 82803 BLOOD GASES ANY COMBINATION: CPT

## 2021-06-03 PROCEDURE — 80053 COMPREHEN METABOLIC PANEL: CPT | Performed by: INTERNAL MEDICINE

## 2021-06-03 PROCEDURE — 94003 VENT MGMT INPAT SUBQ DAY: CPT

## 2021-06-03 PROCEDURE — 84295 ASSAY OF SERUM SODIUM: CPT

## 2021-06-03 PROCEDURE — 84132 ASSAY OF SERUM POTASSIUM: CPT

## 2021-06-03 PROCEDURE — 82330 ASSAY OF CALCIUM: CPT | Performed by: PHYSICIAN ASSISTANT

## 2021-06-03 PROCEDURE — 80069 RENAL FUNCTION PANEL: CPT | Performed by: PHYSICIAN ASSISTANT

## 2021-06-03 PROCEDURE — 02100Z9 BYPASS CORONARY ARTERY, ONE ARTERY FROM LEFT INTERNAL MAMMARY, OPEN APPROACH: ICD-10-PCS | Performed by: THORACIC SURGERY (CARDIOTHORACIC VASCULAR SURGERY)

## 2021-06-03 PROCEDURE — 85610 PROTHROMBIN TIME: CPT | Performed by: PHYSICIAN ASSISTANT

## 2021-06-03 PROCEDURE — 83050 HGB METHEMOGLOBIN QUAN: CPT

## 2021-06-03 PROCEDURE — 82375 ASSAY CARBOXYHB QUANT: CPT

## 2021-06-03 PROCEDURE — 25010000002 ALBUMIN HUMAN 5% PER 50 ML: Performed by: PHYSICIAN ASSISTANT

## 2021-06-03 PROCEDURE — 85576 BLOOD PLATELET AGGREGATION: CPT | Performed by: PHYSICIAN ASSISTANT

## 2021-06-03 PROCEDURE — 82962 GLUCOSE BLOOD TEST: CPT

## 2021-06-03 PROCEDURE — 85027 COMPLETE CBC AUTOMATED: CPT | Performed by: PHYSICIAN ASSISTANT

## 2021-06-03 PROCEDURE — P9016 RBC LEUKOCYTES REDUCED: HCPCS

## 2021-06-03 PROCEDURE — 84443 ASSAY THYROID STIM HORMONE: CPT | Performed by: INTERNAL MEDICINE

## 2021-06-03 PROCEDURE — 25010000002 PROTAMINE SULFATE PER 10 MG

## 2021-06-03 PROCEDURE — 25010000003 CEFUROXIME SODIUM 1.5 G RECONSTITUTED SOLUTION: Performed by: ANESTHESIOLOGY

## 2021-06-03 PROCEDURE — 93005 ELECTROCARDIOGRAM TRACING: CPT | Performed by: PHYSICIAN ASSISTANT

## 2021-06-03 PROCEDURE — 82947 ASSAY GLUCOSE BLOOD QUANT: CPT

## 2021-06-03 PROCEDURE — 0210093 BYPASS CORONARY ARTERY, ONE ARTERY FROM CORONARY ARTERY WITH AUTOLOGOUS VENOUS TISSUE, OPEN APPROACH: ICD-10-PCS | Performed by: THORACIC SURGERY (CARDIOTHORACIC VASCULAR SURGERY)

## 2021-06-03 PROCEDURE — A4648 IMPLANTABLE TISSUE MARKER: HCPCS | Performed by: THORACIC SURGERY (CARDIOTHORACIC VASCULAR SURGERY)

## 2021-06-03 PROCEDURE — 25010000002 HEPARIN (PORCINE) PER 1000 UNITS: Performed by: ANESTHESIOLOGY

## 2021-06-03 PROCEDURE — 83735 ASSAY OF MAGNESIUM: CPT | Performed by: PHYSICIAN ASSISTANT

## 2021-06-03 PROCEDURE — C1889 IMPLANT/INSERT DEVICE, NOC: HCPCS | Performed by: THORACIC SURGERY (CARDIOTHORACIC VASCULAR SURGERY)

## 2021-06-03 PROCEDURE — 25010000002 PAPAVERINE PER 60 MG: Performed by: THORACIC SURGERY (CARDIOTHORACIC VASCULAR SURGERY)

## 2021-06-03 PROCEDURE — 94002 VENT MGMT INPAT INIT DAY: CPT

## 2021-06-03 DEVICE — DISK-SHAPED STYLE, SILICONE (1 PER STERILE PKG)
Type: IMPLANTABLE DEVICE | Site: HEART | Status: FUNCTIONAL
Brand: SCANLAN® RADIOMARK® GRAFT MARKERS

## 2021-06-03 DEVICE — LIGACLIP MCA MULTIPLE CLIP APPLIERS, 20 SMALL CLIPS
Type: IMPLANTABLE DEVICE | Site: CHEST | Status: FUNCTIONAL
Brand: LIGACLIP

## 2021-06-03 RX ORDER — GLYCOPYRROLATE 0.2 MG/ML
INJECTION INTRAMUSCULAR; INTRAVENOUS AS NEEDED
Status: DISCONTINUED | OUTPATIENT
Start: 2021-06-03 | End: 2021-06-03 | Stop reason: SURG

## 2021-06-03 RX ORDER — SODIUM CHLORIDE 0.9 % (FLUSH) 0.9 %
30 SYRINGE (ML) INJECTION ONCE AS NEEDED
Status: DISCONTINUED | OUTPATIENT
Start: 2021-06-03 | End: 2021-06-04

## 2021-06-03 RX ORDER — DOPAMINE HYDROCHLORIDE 160 MG/100ML
2-20 INJECTION, SOLUTION INTRAVENOUS CONTINUOUS PRN
Status: DISCONTINUED | OUTPATIENT
Start: 2021-06-03 | End: 2021-06-04

## 2021-06-03 RX ORDER — SUFENTANIL CITRATE 50 UG/ML
INJECTION EPIDURAL; INTRAVENOUS AS NEEDED
Status: DISCONTINUED | OUTPATIENT
Start: 2021-06-03 | End: 2021-06-03 | Stop reason: SURG

## 2021-06-03 RX ORDER — ROCURONIUM BROMIDE 10 MG/ML
INJECTION, SOLUTION INTRAVENOUS AS NEEDED
Status: DISCONTINUED | OUTPATIENT
Start: 2021-06-03 | End: 2021-06-03 | Stop reason: SURG

## 2021-06-03 RX ORDER — NALOXONE HCL 0.4 MG/ML
0.4 VIAL (ML) INJECTION
Status: DISCONTINUED | OUTPATIENT
Start: 2021-06-03 | End: 2021-06-05 | Stop reason: ALTCHOICE

## 2021-06-03 RX ORDER — POTASSIUM CHLORIDE 750 MG/1
40 CAPSULE, EXTENDED RELEASE ORAL AS NEEDED
Status: DISCONTINUED | OUTPATIENT
Start: 2021-06-03 | End: 2021-06-10 | Stop reason: HOSPADM

## 2021-06-03 RX ORDER — FENTANYL CITRATE 50 UG/ML
25 INJECTION, SOLUTION INTRAMUSCULAR; INTRAVENOUS
Status: DISCONTINUED | OUTPATIENT
Start: 2021-06-03 | End: 2021-06-05 | Stop reason: ALTCHOICE

## 2021-06-03 RX ORDER — HEPARIN SODIUM 1000 [USP'U]/ML
INJECTION, SOLUTION INTRAVENOUS; SUBCUTANEOUS AS NEEDED
Status: DISCONTINUED | OUTPATIENT
Start: 2021-06-03 | End: 2021-06-03 | Stop reason: SURG

## 2021-06-03 RX ORDER — SODIUM CHLORIDE, SODIUM LACTATE, POTASSIUM CHLORIDE, CALCIUM CHLORIDE 600; 310; 30; 20 MG/100ML; MG/100ML; MG/100ML; MG/100ML
9 INJECTION, SOLUTION INTRAVENOUS CONTINUOUS
Status: DISCONTINUED | OUTPATIENT
Start: 2021-06-03 | End: 2021-06-04

## 2021-06-03 RX ORDER — ASPIRIN 325 MG
325 TABLET ORAL ONCE
Status: COMPLETED | OUTPATIENT
Start: 2021-06-03 | End: 2021-06-03

## 2021-06-03 RX ORDER — SODIUM CHLORIDE 9 MG/ML
INJECTION, SOLUTION INTRAVENOUS AS NEEDED
Status: DISCONTINUED | OUTPATIENT
Start: 2021-06-03 | End: 2021-06-03 | Stop reason: HOSPADM

## 2021-06-03 RX ORDER — DEXMEDETOMIDINE HYDROCHLORIDE 4 UG/ML
.2-1.5 INJECTION, SOLUTION INTRAVENOUS CONTINUOUS PRN
Status: DISCONTINUED | OUTPATIENT
Start: 2021-06-03 | End: 2021-06-04

## 2021-06-03 RX ORDER — POTASSIUM CHLORIDE 29.8 MG/ML
20 INJECTION INTRAVENOUS
Status: DISCONTINUED | OUTPATIENT
Start: 2021-06-03 | End: 2021-06-03

## 2021-06-03 RX ORDER — ALBUTEROL SULFATE 2.5 MG/3ML
2.5 SOLUTION RESPIRATORY (INHALATION) EVERY 4 HOURS PRN
Status: DISCONTINUED | OUTPATIENT
Start: 2021-06-03 | End: 2021-06-03 | Stop reason: SDUPTHER

## 2021-06-03 RX ORDER — POTASSIUM CHLORIDE 1.5 G/1.77G
40 POWDER, FOR SOLUTION ORAL AS NEEDED
Status: DISCONTINUED | OUTPATIENT
Start: 2021-06-03 | End: 2021-06-10 | Stop reason: HOSPADM

## 2021-06-03 RX ORDER — OXYCODONE HYDROCHLORIDE AND ACETAMINOPHEN 5; 325 MG/1; MG/1
2 TABLET ORAL EVERY 4 HOURS PRN
Status: DISCONTINUED | OUTPATIENT
Start: 2021-06-03 | End: 2021-06-10 | Stop reason: HOSPADM

## 2021-06-03 RX ORDER — CHLORHEXIDINE GLUCONATE 0.12 MG/ML
15 RINSE ORAL EVERY 12 HOURS SCHEDULED
Status: DISPENSED | OUTPATIENT
Start: 2021-06-03 | End: 2021-06-05

## 2021-06-03 RX ORDER — ALBUTEROL SULFATE 2.5 MG/3ML
2.5 SOLUTION RESPIRATORY (INHALATION) EVERY 4 HOURS PRN
Status: DISCONTINUED | OUTPATIENT
Start: 2021-06-03 | End: 2021-06-03

## 2021-06-03 RX ORDER — POTASSIUM CHLORIDE, DEXTROSE MONOHYDRATE 150; 5 MG/100ML; G/100ML
30 INJECTION, SOLUTION INTRAVENOUS CONTINUOUS
Status: DISCONTINUED | OUTPATIENT
Start: 2021-06-03 | End: 2021-06-04

## 2021-06-03 RX ORDER — PROTAMINE SULFATE 10 MG/ML
INJECTION, SOLUTION INTRAVENOUS
Status: COMPLETED
Start: 2021-06-03 | End: 2021-06-03

## 2021-06-03 RX ORDER — NOREPINEPHRINE BIT/0.9 % NACL 8 MG/250ML
.02-.3 INFUSION BOTTLE (ML) INTRAVENOUS CONTINUOUS PRN
Status: DISCONTINUED | OUTPATIENT
Start: 2021-06-03 | End: 2021-06-07

## 2021-06-03 RX ORDER — PROTAMINE SULFATE 10 MG/ML
50 INJECTION, SOLUTION INTRAVENOUS ONCE
Status: DISCONTINUED | OUTPATIENT
Start: 2021-06-03 | End: 2021-06-03

## 2021-06-03 RX ORDER — MORPHINE SULFATE 1 MG/ML
2 INJECTION, SOLUTION EPIDURAL; INTRATHECAL; INTRAVENOUS
Status: DISCONTINUED | OUTPATIENT
Start: 2021-06-03 | End: 2021-06-05

## 2021-06-03 RX ORDER — NITROGLYCERIN 20 MG/100ML
5-200 INJECTION INTRAVENOUS CONTINUOUS PRN
Status: DISCONTINUED | OUTPATIENT
Start: 2021-06-03 | End: 2021-06-04

## 2021-06-03 RX ORDER — HYDROCODONE BITARTRATE AND ACETAMINOPHEN 7.5; 325 MG/1; MG/1
1 TABLET ORAL EVERY 4 HOURS PRN
Status: DISCONTINUED | OUTPATIENT
Start: 2021-06-03 | End: 2021-06-10 | Stop reason: HOSPADM

## 2021-06-03 RX ORDER — POTASSIUM CHLORIDE 7.45 MG/ML
10 INJECTION INTRAVENOUS
Status: DISCONTINUED | OUTPATIENT
Start: 2021-06-03 | End: 2021-06-10 | Stop reason: HOSPADM

## 2021-06-03 RX ORDER — ALBUMIN, HUMAN INJ 5% 5 %
500 SOLUTION INTRAVENOUS AS NEEDED
Status: COMPLETED | OUTPATIENT
Start: 2021-06-03 | End: 2021-06-03

## 2021-06-03 RX ORDER — CHLORHEXIDINE GLUCONATE 0.12 MG/ML
15 RINSE ORAL EVERY 12 HOURS SCHEDULED
Status: DISCONTINUED | OUTPATIENT
Start: 2021-06-03 | End: 2021-06-03 | Stop reason: SDUPTHER

## 2021-06-03 RX ORDER — PHENYLEPHRINE HCL IN 0.9% NACL 0.5 MG/5ML
.5-3 SYRINGE (ML) INTRAVENOUS CONTINUOUS PRN
Status: DISCONTINUED | OUTPATIENT
Start: 2021-06-03 | End: 2021-06-04

## 2021-06-03 RX ORDER — AMINOCAPROIC ACID 250 MG/ML
INJECTION, SOLUTION INTRAVENOUS AS NEEDED
Status: DISCONTINUED | OUTPATIENT
Start: 2021-06-03 | End: 2021-06-03 | Stop reason: SURG

## 2021-06-03 RX ORDER — ATORVASTATIN CALCIUM 40 MG/1
40 TABLET, FILM COATED ORAL NIGHTLY
Status: DISCONTINUED | OUTPATIENT
Start: 2021-06-03 | End: 2021-06-10 | Stop reason: HOSPADM

## 2021-06-03 RX ORDER — SODIUM CHLORIDE 0.9 % (FLUSH) 0.9 %
10 SYRINGE (ML) INJECTION EVERY 12 HOURS SCHEDULED
Status: DISCONTINUED | OUTPATIENT
Start: 2021-06-03 | End: 2021-06-03 | Stop reason: HOSPADM

## 2021-06-03 RX ORDER — CALCIUM CHLORIDE 100 MG/ML
INJECTION INTRAVENOUS; INTRAVENTRICULAR AS NEEDED
Status: DISCONTINUED | OUTPATIENT
Start: 2021-06-03 | End: 2021-06-03 | Stop reason: SURG

## 2021-06-03 RX ORDER — CEFUROXIME SODIUM 1.5 G/16ML
INJECTION, POWDER, FOR SOLUTION INTRAVENOUS AS NEEDED
Status: DISCONTINUED | OUTPATIENT
Start: 2021-06-03 | End: 2021-06-03 | Stop reason: SURG

## 2021-06-03 RX ORDER — PAPAVERINE HYDROCHLORIDE 30 MG/ML
INJECTION INTRAMUSCULAR; INTRAVENOUS AS NEEDED
Status: DISCONTINUED | OUTPATIENT
Start: 2021-06-03 | End: 2021-06-03 | Stop reason: HOSPADM

## 2021-06-03 RX ORDER — SODIUM CHLORIDE 0.9 % (FLUSH) 0.9 %
10 SYRINGE (ML) INJECTION AS NEEDED
Status: DISCONTINUED | OUTPATIENT
Start: 2021-06-03 | End: 2021-06-03 | Stop reason: HOSPADM

## 2021-06-03 RX ORDER — DOBUTAMINE HYDROCHLORIDE 100 MG/100ML
2-20 INJECTION INTRAVENOUS CONTINUOUS PRN
Status: DISCONTINUED | OUTPATIENT
Start: 2021-06-03 | End: 2021-06-04

## 2021-06-03 RX ORDER — METOPROLOL TARTRATE 5 MG/5ML
2.5 INJECTION INTRAVENOUS EVERY 6 HOURS SCHEDULED
Status: DISCONTINUED | OUTPATIENT
Start: 2021-06-03 | End: 2021-06-04

## 2021-06-03 RX ORDER — ASPIRIN 325 MG
325 TABLET, DELAYED RELEASE (ENTERIC COATED) ORAL DAILY
Status: DISCONTINUED | OUTPATIENT
Start: 2021-06-04 | End: 2021-06-10 | Stop reason: HOSPADM

## 2021-06-03 RX ORDER — ONDANSETRON 2 MG/ML
4 INJECTION INTRAMUSCULAR; INTRAVENOUS EVERY 6 HOURS PRN
Status: DISCONTINUED | OUTPATIENT
Start: 2021-06-03 | End: 2021-06-03 | Stop reason: SDUPTHER

## 2021-06-03 RX ORDER — IPRATROPIUM BROMIDE AND ALBUTEROL SULFATE 2.5; .5 MG/3ML; MG/3ML
3 SOLUTION RESPIRATORY (INHALATION)
Status: DISCONTINUED | OUTPATIENT
Start: 2021-06-03 | End: 2021-06-05

## 2021-06-03 RX ORDER — MEPERIDINE HYDROCHLORIDE 50 MG/ML
25 INJECTION INTRAMUSCULAR; INTRAVENOUS; SUBCUTANEOUS EVERY 4 HOURS PRN
Status: DISCONTINUED | OUTPATIENT
Start: 2021-06-03 | End: 2021-06-04

## 2021-06-03 RX ORDER — PROTAMINE SULFATE 10 MG/ML
100 INJECTION, SOLUTION INTRAVENOUS ONCE
Status: COMPLETED | OUTPATIENT
Start: 2021-06-03 | End: 2021-06-03

## 2021-06-03 RX ORDER — ALBUMIN, HUMAN INJ 5% 5 %
SOLUTION INTRAVENOUS
Status: COMPLETED
Start: 2021-06-03 | End: 2021-06-03

## 2021-06-03 RX ORDER — SODIUM CHLORIDE 9 MG/ML
30 INJECTION, SOLUTION INTRAVENOUS CONTINUOUS PRN
Status: DISCONTINUED | OUTPATIENT
Start: 2021-06-03 | End: 2021-06-04

## 2021-06-03 RX ORDER — MAGNESIUM HYDROXIDE 1200 MG/15ML
LIQUID ORAL AS NEEDED
Status: DISCONTINUED | OUTPATIENT
Start: 2021-06-03 | End: 2021-06-03 | Stop reason: HOSPADM

## 2021-06-03 RX ORDER — FAMOTIDINE 20 MG/1
20 TABLET, FILM COATED ORAL ONCE
Status: COMPLETED | OUTPATIENT
Start: 2021-06-03 | End: 2021-06-03

## 2021-06-03 RX ORDER — MIDAZOLAM HYDROCHLORIDE 1 MG/ML
0.5 INJECTION INTRAMUSCULAR; INTRAVENOUS
Status: COMPLETED | OUTPATIENT
Start: 2021-06-03 | End: 2021-06-03

## 2021-06-03 RX ORDER — PROTAMINE SULFATE 10 MG/ML
INJECTION, SOLUTION INTRAVENOUS AS NEEDED
Status: DISCONTINUED | OUTPATIENT
Start: 2021-06-03 | End: 2021-06-03 | Stop reason: SURG

## 2021-06-03 RX ADMIN — PROTAMINE SULFATE 100 MG: 10 INJECTION, SOLUTION INTRAVENOUS at 18:24

## 2021-06-03 RX ADMIN — FAMOTIDINE 20 MG: 20 TABLET ORAL at 12:03

## 2021-06-03 RX ADMIN — SODIUM CHLORIDE, PRESERVATIVE FREE 10 ML: 5 INJECTION INTRAVENOUS at 11:59

## 2021-06-03 RX ADMIN — ASPIRIN 325 MG ORAL TABLET 325 MG: 325 PILL ORAL at 20:52

## 2021-06-03 RX ADMIN — POTASSIUM CHLORIDE AND DEXTROSE MONOHYDRATE 30 ML/HR: 150; 5 INJECTION, SOLUTION INTRAVENOUS at 17:41

## 2021-06-03 RX ADMIN — AMINOCAPROIC ACID 10 G: 250 INJECTION, SOLUTION INTRAVENOUS at 16:37

## 2021-06-03 RX ADMIN — LEVOTHYROXINE SODIUM 50 MCG: 50 TABLET ORAL at 08:06

## 2021-06-03 RX ADMIN — HEPARIN SODIUM 5000 UNITS: 1000 INJECTION, SOLUTION INTRAVENOUS; SUBCUTANEOUS at 14:39

## 2021-06-03 RX ADMIN — FENTANYL CITRATE 25 MCG: 50 INJECTION, SOLUTION INTRAMUSCULAR; INTRAVENOUS at 21:40

## 2021-06-03 RX ADMIN — AMINOCAPROIC ACID 10 G: 250 INJECTION, SOLUTION INTRAVENOUS at 13:30

## 2021-06-03 RX ADMIN — SODIUM CHLORIDE, POTASSIUM CHLORIDE, SODIUM LACTATE AND CALCIUM CHLORIDE 9 ML/HR: 600; 310; 30; 20 INJECTION, SOLUTION INTRAVENOUS at 11:56

## 2021-06-03 RX ADMIN — ATORVASTATIN CALCIUM 40 MG: 40 TABLET, FILM COATED ORAL at 20:51

## 2021-06-03 RX ADMIN — ROCURONIUM BROMIDE 50 MG: 10 INJECTION, SOLUTION INTRAVENOUS at 15:16

## 2021-06-03 RX ADMIN — ALBUMIN HUMAN 500 ML: 0.05 INJECTION, SOLUTION INTRAVENOUS at 18:00

## 2021-06-03 RX ADMIN — FENTANYL CITRATE 25 MCG: 50 INJECTION, SOLUTION INTRAMUSCULAR; INTRAVENOUS at 22:33

## 2021-06-03 RX ADMIN — HYDROCHLOROTHIAZIDE: 25 TABLET ORAL at 08:06

## 2021-06-03 RX ADMIN — CHLORHEXIDINE GLUCONATE 15 ML: 1.2 SOLUTION ORAL at 06:46

## 2021-06-03 RX ADMIN — MEPERIDINE HYDROCHLORIDE 25 MG: 50 INJECTION INTRAMUSCULAR; INTRAVENOUS; SUBCUTANEOUS at 23:10

## 2021-06-03 RX ADMIN — POTASSIUM CHLORIDE AND DEXTROSE MONOHYDRATE 30 ML/HR: 150; 5 INJECTION, SOLUTION INTRAVENOUS at 17:40

## 2021-06-03 RX ADMIN — ALPRAZOLAM 0.25 MG: 0.25 TABLET ORAL at 22:00

## 2021-06-03 RX ADMIN — RAMIPRIL 5 MG: 5 CAPSULE ORAL at 08:07

## 2021-06-03 RX ADMIN — SUFENTANIL CITRATE 150 MCG: 50 INJECTION, SOLUTION EPIDURAL; INTRAVENOUS at 13:30

## 2021-06-03 RX ADMIN — SUFENTANIL CITRATE 100 MCG: 50 INJECTION, SOLUTION EPIDURAL; INTRAVENOUS at 15:16

## 2021-06-03 RX ADMIN — INSULIN HUMAN 2.4 UNITS/HR: 1 INJECTION, SOLUTION INTRAVENOUS at 23:28

## 2021-06-03 RX ADMIN — GLYCOPYRROLATE 0.4 MG: 0.4 INJECTION INTRAMUSCULAR; INTRAVENOUS at 16:05

## 2021-06-03 RX ADMIN — CEFUROXIME SODIUM 1.5 EACH: 1.5 INJECTION, POWDER, FOR SOLUTION INTRAVENOUS at 16:37

## 2021-06-03 RX ADMIN — SODIUM CHLORIDE, PRESERVATIVE FREE 10 ML: 5 INJECTION INTRAVENOUS at 08:06

## 2021-06-03 RX ADMIN — IPRATROPIUM BROMIDE AND ALBUTEROL SULFATE 3 ML: 2.5; .5 SOLUTION RESPIRATORY (INHALATION) at 23:21

## 2021-06-03 RX ADMIN — CALCIUM CHLORIDE 1 G: 100 INJECTION INTRAVENOUS; INTRAVENTRICULAR at 16:37

## 2021-06-03 RX ADMIN — OXYCODONE HYDROCHLORIDE AND ACETAMINOPHEN 2 TABLET: 5; 325 TABLET ORAL at 20:51

## 2021-06-03 RX ADMIN — MIDAZOLAM 5 MG: 1 INJECTION INTRAMUSCULAR; INTRAVENOUS at 13:30

## 2021-06-03 RX ADMIN — ALBUMIN HUMAN 250 ML: 0.05 INJECTION, SOLUTION INTRAVENOUS at 18:48

## 2021-06-03 RX ADMIN — MIDAZOLAM 5 MG: 1 INJECTION INTRAMUSCULAR; INTRAVENOUS at 15:16

## 2021-06-03 RX ADMIN — CHLORHEXIDINE GLUCONATE 15 ML: 1.2 SOLUTION ORAL at 20:52

## 2021-06-03 RX ADMIN — SODIUM CHLORIDE, PRESERVATIVE FREE 10 ML: 5 INJECTION INTRAVENOUS at 11:56

## 2021-06-03 RX ADMIN — CEFUROXIME SODIUM 1.5 EACH: 1.5 INJECTION, POWDER, FOR SOLUTION INTRAVENOUS at 13:30

## 2021-06-03 RX ADMIN — HEPARIN SODIUM 25000 UNITS: 1000 INJECTION, SOLUTION INTRAVENOUS; SUBCUTANEOUS at 14:41

## 2021-06-03 RX ADMIN — PROTAMINE SULFATE 500 MG: 10 INJECTION, SOLUTION INTRAVENOUS at 16:37

## 2021-06-03 RX ADMIN — FENTANYL CITRATE 25 MCG: 50 INJECTION, SOLUTION INTRAMUSCULAR; INTRAVENOUS at 23:26

## 2021-06-03 RX ADMIN — MUPIROCIN 1 APPLICATION: 20 OINTMENT TOPICAL at 06:46

## 2021-06-03 NOTE — CASE MANAGEMENT/SOCIAL WORK
Discharge Planning Assessment  [facility]     Patient Name: Colin Albrecht  MRN: [id number]  Today's Date: 6/3/2021    Admit Date: 6/2/2021    Discharge Needs Assessment     Row Name 06/03/21 1021       Living Environment    Lives With  spouse    Current Living Arrangements  home/apartment/condo    Living Arrangement Comments  Lives in a one level home. He stays busy working on cars. His spouse will be with him at DC and assist as needed.       Discharge Needs Assessment    Equipment Currently Used at Home  none    Equipment Needed After Discharge  none    Discharge Coordination/Progress  Denies current use of DME or outpt services.        Discharge Plan     Row Name 06/03/21 1023       Plan    Plan  Home at DC    Patient/Family in Agreement with Plan  yes    Plan Comments  I spoke with the pt. He denies any DC needs at this time. Going to OR today. CM will f/u post op as appropriate.    Final Discharge Disposition Code  01 - home or self-care    Row Name 06/03/21 0857       Plan    Final Discharge Disposition Code  01 - home or self-care        Continued Care and Services - Admitted Since 6/2/2021    Coordination has not been started for this encounter.       Expected Discharge Date and Time     Expected Discharge Date Expected Discharge Time    Jun 7, 2021         Demographic Summary    No documentation.       Functional Status     Row Name 06/03/21 1021       Functional Status    Usual Activity Tolerance  good       Functional Status, IADL    Medications  independent    Meal Preparation  independent    Housekeeping  independent    Laundry  independent    Shopping  independent        Psychosocial    No documentation.       Abuse/Neglect    No documentation.       Legal    No documentation.       Substance Abuse    No documentation.       Patient Forms    No documentation.           Mattei Sam RN

## 2021-06-03 NOTE — OP NOTE
Operative Report  Colin Albrecht  [id number]  1945    Preop Diagnosis: Coronary artery disease, left main        Postop Diagnosis same        Procedure: Coronary artery bypass graft x2 with EVH saphenous vein graft to circumflex, left internal mammary artery to LAD        Surgeons: Jaime Shields        Assistant: Fermin Quick  Assistant: Mauro West PA-C; Alberta Reyes PA-C; Corey Vasquez PA; Catrina Pina PA-C was responsible for performing the following activities: Retraction, Suction, Irrigation, Suturing, Closing, Placing Dressing, Harvesting of Vessels and Bypass Grafting and their skilled assistance was necessary for the success of this case.       Operative Findings:         Description: Patient was brought to the operating room placed under general anesthesia.  He had placement of appropriate lines.  Patient was sterilely prepped and draped.  Vein was harvested from the right leg from below the knee the groin using EVH technique.  The subcutaneous tissues were closed with 2-0 Vicryl, 3-0 Vicryl, 3-0 Monocryl subcuticular stitch.  Simultaneously median sternotomy incision was then made.  Left internal mammary artery was taken down divided distally.  Patient was heparinized activating clotting times confirmed adequate.  Pericardium was opened stay sutures were placed.  2 0 Ethibond sutures then placed in a sending aorta and right atrial appendage.  Patient was cannulated arterial venous cannula.  Cardiopulmonary bypass was initiated aorta is crossclamped cardioplegia was given antegrade.  The heart was elevated.  The circumflex vessels open sharply extended proximally and distally.  End-to-side anastomosis running 7-0 Prolene suture was constructed vein graft.  LAD is open midportion.  Left internal mammary was brought to a pericardial tunnel.  It was anastomosed to this running 7-0 Prolene suture.  A single aortotomy was then made.  The proximal anastomoses was  carried out with running 6-0 Prolene suture.  Prior to tying this down hot-shot cardioplegia given.  The aortic cross-clamp was removed.  The patient was warmed and weaned from cardiopulmonary bypass in standard fashion.  Decannulation was carried out after protamine was administered.  2 mediastinal tubes were placed inferiorly and sutured in place.  The sternum is were approximated #7 wire.  Linea alba closed with number Ethibond sutures.  The subcutaneous tissues were closed with 0 Vicryl, 2-0 Vicryl, 3-0 Vicryl, 3-0 Monocryl subcuticular stitch.  Cardiopulmonary bypass was 56 minutes and cross-clamp was 46 minutes.        EBL: 500 cc      Please note that portions of this note were completed with a voice recognition program. Efforts were made to edit the dictations, but words may be mistranscribed      Jaime Shields MD  06/03/21 16:56 EDT

## 2021-06-03 NOTE — PLAN OF CARE
Goal Outcome Evaluation:  Patient scheduled for CABG at 2PM on 6/3. Patient with no c/o CP at this time, patient does endorse SOA with mild exertion.     NPO since midnight in preperation for procedure.    VSS on RA. Paced rhythm per telemetry. Will continue to monitor.

## 2021-06-03 NOTE — ANESTHESIA PROCEDURE NOTES
Arterial Line      Patient reassessed immediately prior to procedure    Patient location during procedure: pre-op   Line placed for hemodynamic monitoring.  Performed By   Anesthesiologist: Bal Hernandez MD  Preanesthetic Checklist  Completed: patient identified, IV checked, site marked, risks and benefits discussed, surgical consent, monitors and equipment checked, pre-op evaluation and timeout performed  Arterial Line Prep   Sterile Tech: cap, gloves and sterile barriers  Prep: ChloraPrep  Patient monitoring: blood pressure monitoring, continuous pulse oximetry and EKG  Arterial Line Procedure   Laterality:left  Location:  radial artery  Catheter size: 20 G   Guidance: palpation technique  Number of attempts: 1  Successful placement: yes  Post Assessment   Dressing Type: line sutured, occlusive dressing applied, secured with tape and wrist guard applied.   Complications no  Circ/Move/Sens Assessment: normal and unchanged.   Patient Tolerance: patient tolerated the procedure well with no apparent complications

## 2021-06-03 NOTE — ANESTHESIA PROCEDURE NOTES
Airway  Urgency: elective    Airway not difficult    General Information and Staff    Patient location during procedure: OR  Anesthesiologist: Bal Hernandez MD    Indications and Patient Condition  Indications for airway management: airway protection    Preoxygenated: yes  MILS not maintained throughout  Mask difficulty assessment: 1 - vent by mask    Final Airway Details  Final airway type: endotracheal airway      Successful airway: ETT  Cuffed: yes   Successful intubation technique: direct laryngoscopy  Endotracheal tube insertion site: oral  Blade: Simon  Blade size: 3  ETT size (mm): 7.5  Cormack-Lehane Classification: grade I - full view of glottis  Placement verified by: chest auscultation and capnometry   Measured from: lips  ETT/EBT  to lips (cm): 20  Number of attempts at approach: 1  Assessment: lips, teeth, and gum same as pre-op and atraumatic intubation    Additional Comments  Negative epigastric sounds, Breath sound equal bilaterally with symmetric chest rise and fall

## 2021-06-03 NOTE — PROGRESS NOTES
[facility] at [facility]  INPATIENT PROGRESS NOTE         [facility] 5F    6/3/2021      PATIENT IDENTIFICATION:   Name:  Colin Albrecht      MRN:  [id number]     76 y.o.  male             Reason for visit: CAD, CHF, A. fib      SUBJECTIVE:   Resting comfortably laying flat in bed, nervous about surgery today, all questions answered.  Currently denies chest pain or shortness of breath.     OBJECTIVE:  Vitals:    06/03/21 0434 06/03/21 0436 06/03/21 0800 06/03/21 0815   BP: 98/55 122/86 126/72 128/78   BP Location: Left arm Right arm Left arm Right arm   Patient Position: Lying Lying Sitting Lying   Pulse: 61  60 60   Resp: 17  16    Temp: 96.2 °F (35.7 °C)  97.7 °F (36.5 °C)    TempSrc: Oral  Oral    SpO2: 97% 96% 97% 97%   Weight:       Height:               Body mass index is 23.69 kg/m².    Intake/Output Summary (Last 24 hours) at 6/3/2021 0916  Last data filed at 6/3/2021 0800  Gross per 24 hour   Intake 20 ml   Output 300 ml   Net -280 ml       Telemetry: Personally reviewed, paced at 60 bpm    Exam:  General Appearance:   · well developed  · well nourished  Neck:  · thyroid not enlarged  · supple  Respiratory:  · no respiratory distress  · normal breath sounds  · no rales  Cardiovascular:  · no jugular venous distention  · regular rhythm  · apical impulse normal  · S1 normal, S2 normal  · no S3, no S4   · no murmur  · no rub, no thrill  · carotid pulses normal; no bruit  · pedal pulses normal  · lower extremity edema: none    Skin:   warm, dry      No Known Allergies  Scheduled meds:       aspirin, 81 mg, Oral, Daily  atorvastatin, 40 mg, Oral, Nightly  bisoprolol-hydroCHLOROthiazide (ZIAC) 5-6.25 mg combo tab, , Oral, Q24H  cefuroxime, 1.5 g, Intravenous, Once  levothyroxine, 50 mcg, Oral, Daily  pharmacy consult - [facility], , Does not apply, Daily  ramipril, 5 mg, Oral, Daily  sodium chloride, 10 mL, Intravenous, Q12H      IV meds:                         Data  Review:  Results from last 7 days   Lab Units 06/03/21  0451 06/02/21  1502 06/02/21  1032   SODIUM mmol/L 137 132* 136   BUN mg/dL 26* 22 24*   CREATININE mg/dL 1.25 1.34* 1.35*   GLUCOSE mg/dL 104* 133* 113*     Results from last 7 days   Lab Units 06/03/21  0451 06/02/21  1032   WBC 10*3/mm3 5.85 6.60   HEMOGLOBIN g/dL 12.8* 14.2     Results from last 7 days   Lab Units 06/02/21  1502   INR  1.19*     Results from last 7 days   Lab Units 06/02/21  1502   ALT (SGPT) U/L 8   AST (SGOT) U/L 20     No results found for: DIGOXIN   No results found for: TSH  Results from last 7 days   Lab Units 06/02/21  1032   CHOLESTEROL mg/dL 182   HDL CHOL mg/dL 52       Estimated Creatinine Clearance: 60.5 mL/min (by C-G formula based on SCr of 1.25 mg/dL).        Imaging (last 24 hr):   I personally reviewed the most recent chest x-ray and other pertinent imaging studies.  Results for orders placed during the hospital encounter of 06/02/21    XR Chest 1 View    Narrative  EXAMINATION: XR CHEST 1 VW-06/02/2021:    INDICATION: Dyspnea; I20.0-Unstable angina.    COMPARISON: 03/27/2021.    FINDINGS: Portable chest reveals cardiac and mediastinal silhouettes  within normal limits. Cardiac pacer leads are in satisfactory position.  There is blunting of the left costophrenic angle. Degenerative changes  seen within the spine. No pleural effusion or pneumothorax. Chronic  changes seen throughout the lung fields bilaterally.    Impression  Chronic changes seen throughout the lung fields with no  superimposed infectious process. Stable blunting of the left  costophrenic angle.    D:  06/02/2021  E:  06/02/2021        Last ECHO:  Results for orders placed during the hospital encounter of 06/02/21    Adult Transthoracic Echo Complete W/ Cont if Necessary Per Protocol    Interpretation Summary  · Left ventricular ejection fraction appears to be 36 - 40%. Left ventricular systolic function is moderately decreased.  · Moderate mitral valve  regurgitation is present.  · Estimated right ventricular systolic pressure from tricuspid regurgitation is mildly elevated (35-45 mmHg).  · Left atrial volume is moderately increased.  · Mildly reduced right ventricular systolic function noted.  · Left ventricular diastolic dysfunction is noted.        PROBLEM LIST:     Unstable angina     PAF s/p Watchman device / Bi-V ICD / AVN ablation     SSS     Dilated CM     Chronic HFrEF 35-40%     CAD    Hypertension    Former smoker    COPD     Suspected chronic renal insufficiency      Initial cardiac assessment: Pleasant 76-year-old man from [place] follows with Dr. Park and Dr. Prado, history of chronic systolic heart failure, BiV ICD and AV node ablation, permanent atrial fibrillation, watchman device, CKD stage III.  Presented with symptoms concerning for unstable angina, LHC on 6/2/2021 showed 80% ostial left main stenosis.    ASSESSMENT/PLAN:  1.  Unstable angina/CAD, 80% left main:  Dr. Shields consulted, planning CABG 6 left 3/21  EF 35-40%  Continue aspirin, bisoprolol, ramipril  Atorvastatin 40 mg daily started 6/2/2021    2.  Chronic systolic heart failure:  Echo 6/2/2021 showed EF 35-40% with moderate MR and moderate LAE.  Continue guideline directed medical therapy with beta-blocker and ACE inhibitor  Medtronic BiV ICD in place, interrogated and adjusted 6/2/2021  Euvolemic on exam today    3.  Frequent PVCs:  BiV ICD adjusted, now paced at 60 with minimal PVCs  Continue bisoprolol    4.  CKD stage III:  Creatinine at baseline currently, monitor daily    5.  Permanent atrial fibrillation:  Status post AV node ablation and BiV ICD placement with Dr. Prado   9/2020  Watchman in place due to history of GI bleed  No anticoagulation needed    Discussed with patient's nurse      Pietro Quintana MD  6/3/2021    09:16 EDT

## 2021-06-03 NOTE — ANESTHESIA PREPROCEDURE EVALUATION
Anesthesia Evaluation                  Airway   Mallampati: II  Dental      Pulmonary    (+) COPD,   Cardiovascular     (+) hypertension, CAD, angina, CHF ,       Neuro/Psych  GI/Hepatic/Renal/Endo    (+)  PUD,  renal disease,     Musculoskeletal     Abdominal    Substance History      OB/GYN          Other                        Anesthesia Plan    ASA 4     general

## 2021-06-03 NOTE — PROGRESS NOTES
INTENSIVIST / PULMONARY FOLLOW UP NOTE     Hospital:  LOS: 1 day   Mr. Colin Albrecht, 76 y.o. male is followed for:     Unstable angina     CAD    PAF s/p Watchman device / Bi-V ICD / AVN ablation     Dilated CM     Chronic HFrEF 35-40%     COPD     SSS     Suspected chronic renal insufficiency    Hypertension    Former smoker          MARIUM Albrecht is a 76 y.o. male admitted to [facility] on 6/2 for scheduled LHC by Dr. Quintana found to have MV CAD warranting CABG.      There is a history of SSS, permanent AF, and dilated CM followed by Dr. Prado. He is not anticoagulated as he developed a GIB/epistaxis while on Xarelto and underwent Watchman device placement in September 2020. There was a recent upgrade to a Bi-V ICD and AVN ablation in March and at that time he was placed on Amiodarone due to increased frequency of PVCs. Following his upgrade there were complaints of worsening dyspnea with device interrogation noting only 61% pacing function secondary to frequent ventricular etopy. Amiodarone was discontinued but his symptoms persisted, therefore Dr. Quintana was asked to perform a LHC +/- intervention to rule out ischemia. He was noted to have severe 80% ostial LM stenosis, RCA and left circumflex 10-20% luminal irregularities, and reduced EF of 35-40% (previously 30-35% in 2020). Echo also reiterated a reduced EF along with a mildly elevated RVSP of 38mmHg as well as diastolic dysfunction. He was evaluated by Dr. Shields who suggested proceeding with surgical coronary revascularization and underwent pre-operative work-up.      COVID-19 testing on 6/2 negative.      Pre-op PFTs as follows: FVC 1.79, 36%; FEV1 1.09, 30%; and ratio 0.61. He is a former smoker. There has been no prior documentation of COPD.      Labs do reveal some underlying chronic renal insufficiency as his sCr has been 1.18-1.45 since 9/2020.    The patient's relevant past medical, surgical, family, and social history were  reviewed    Allergies and medications were reviewed    ROS:  Per subjective, all other systems were reviewed and were negative        OBJECTIVE     Vital Sign Min/Max for last 24 hours:  Temp  Min: 96.2 °F (35.7 °C)  Max: 98.4 °F (36.9 °C)   BP  Min: 98/55  Max: 133/73   Pulse  Min: 60  Max: 63   Resp  Min: 16  Max: 18   SpO2  Min: 94 %  Max: 98 %   No data recorded     Physical Exam:  General Appearance:  Intubated, in no acute distress  Eyes:  No scleral icterus or pallor, pupils normal  Ears, Nose, Mouth, Throat:  Atraumatic, oral endotracheal tube  Neck:  Trachea midline, thyroid normal  Respiratory:  Clear to auscultation bilaterally, normal effort  Cardiovascular:  Regular rate and rhythm, no murmurs, no peripheral edema  Gastrointestinal:  Soft, nontender, nondistended, no hepatosplenomegaly  Skin:  Normal temperature, no rash  Psychiatric:  Intubated, sedated, no agitation  Neuro:  No new focal neurologic deficits observed      Telemetry:              Hemodynamics:   CVP:     PAP:     PAOP:     CO:     CI:     SVI:     SVR:       SpO2: 98 % SpO2  Min: 94 %  Max: 98 %   Device:      Flow Rate:   No data recorded     Mechanical Ventilator Settings:                                         Intake/Ouptut 24 hrs (7:00AM - 6:59 AM)  Intake & Output (last 3 days)       05/31 0701 - 06/01 0700 06/01 0701 - 06/02 0700 06/02 0701 - 06/03 0700 06/03 0701 - 06/04 0700    P.O.    0    I.V. (mL/kg)    20 (0.2)    Total Intake(mL/kg)    20 (0.2)    Urine (mL/kg/hr)   300     Total Output   300     Net   -300 +20            Urine Unmeasured Occurrence    2 x    Stool Unmeasured Occurrence    1 x          Lines, Drains & Airways    Active LDAs     Name:   Placement date:   Placement time:   Site:   Days:    Peripheral IV 06/02/21 1020 Right Antecubital   06/02/21    1020    Antecubital   1    Peripheral IV 06/02/21 2102 Left;Posterior Forearm   06/02/21 2102    Forearm   less than 1    Urethral Catheter Temperature  probe;Silicone 16 Fr.   06/03/21    --     less than 1    Y Chest Tube 1 and 2 Mediastinal 32 Fr. Mediastinal 32 Fr.   06/03/21    --     less than 1    ETT    06/03/21    1412 created via procedure documentation     less than 1    Arterial Line 06/03/21 Left Radial   06/03/21    1412 created via procedure documentation    Radial   less than 1                Hematology:  Results from last 7 days   Lab Units 06/03/21  0451 06/02/21  1032   WBC 10*3/mm3 5.85 6.60   HEMOGLOBIN g/dL 12.8* 14.2   HEMATOCRIT % 39.0 42.6   PLATELETS 10*3/mm3 167 188     Electrolytes, Magnesium and Phosphorus:  Results from last 7 days   Lab Units 06/03/21  0451 06/02/21  2102 06/02/21  1502 06/02/21  1032   SODIUM mmol/L 137  --  132* 136   CHLORIDE mmol/L 104  --  98 99   POTASSIUM mmol/L 4.0 4.4 4.2 4.7   CO2 mmol/L 26.0  --  28.0 27.0   MAGNESIUM mg/dL  --   --   --  2.3     Renal:  Results from last 7 days   Lab Units 06/03/21  0451 06/02/21  1502 06/02/21  1032   CREATININE mg/dL 1.25 1.34* 1.35*   BUN mg/dL 26* 22 24*     Estimated Creatinine Clearance: 60.5 mL/min (by C-G formula based on SCr of 1.25 mg/dL).  Hepatic:  Results from last 7 days   Lab Units 06/02/21  1502   ALK PHOS U/L 69   BILIRUBIN mg/dL 0.6   ALT (SGPT) U/L 8   AST (SGOT) U/L 20     Arterial Blood Gases:        Results from last 7 days   Lab Units 06/02/21  1032   HEMOGLOBIN A1C % 5.70*       No results found for: LACTATE    Relevant imaging studies and labs from 06/03/21 were reviewed and interpreted by me    Medications (drips):  lactated ringers, Last Rate: 9 mL/hr (06/03/21 1156)        aspirin, 81 mg, Oral, Daily  atorvastatin, 40 mg, Oral, Nightly  bisoprolol-hydroCHLOROthiazide (ZIAC) 5-6.25 mg combo tab, , Oral, Q24H  cefuroxime, 1.5 g, Intravenous, Once  levothyroxine, 50 mcg, Oral, Daily  [MAR Hold] pharmacy consult - [facility], , Does not apply, Daily  ramipril, 5 mg, Oral, Daily  [MAR Hold] sodium chloride, 10 mL, Intravenous, Q12H  sodium chloride, 10 mL,  Intravenous, Q12H        Assessment/Plan   IMPRESSION / PLAN     Inpatient Problem List:  76 y.o.male:  Active Hospital Problems    Diagnosis    • **Unstable angina       LCH 6/2/21: ostial LM 80% stenosis. MLA 4.9 mm2.      • CAD    • COPD     • Chronic HFrEF 35-40%       a. Patient reports normal LHC 10-12 years ago  b. Echocardiogram 1/27/2016: EF 45 to 55%, unchanged from previous echo 2012  c. Echocardiogram 8/27/2019: EF 35%, mild to moderate MR, mild to moderate TR, RVSP 40 mmHg  d. Nuclear stress test 8/26/2019: Normal LV perfusion EF 33%  e. Upgrade to BIV ICD at time of AV node ablation 3/2021     • Dilated CM     • PAF s/p Watchman device / Bi-V ICD / AVN ablation       a. CHADsvasc = 4 off Xarelto x 1 year due to bleeding, never been on Eliquis  b. Echocardiogram 1/27/2016: EF 45 to 55%, unchanged from previous echo 2012  c. Echocardiogram 8/27/2019: EF 35 to 40%, mild to moderate MR, mild to moderate TR, RVSP 40 mmHg  d. Nuclear stress test 8/26/2019: Normal LV perfusion EF 33%  e. Implantation of a left atrial appendage occlusive device (Watchman device) 09/25/20 by Dr. Yonny Prado  f. PORSHA on 11/13/20 with well seated device, EF 35%, mod MR, mild to mod TR, post-procedural ASD with left-to-right flow  g. Upgrade to BIV ICD and AV Node ablation 3/2021, Dr. Prado     • Suspected chronic renal insufficiency    • SSS       a. Dual-chamber permanent pacemaker -Medtronic device  b. Upgrade to BIV ICD at time of AV node ablation 3/2021      • Hypertension    • Former smoker         Impression:  76 y.o.male with relevant PMH of Tobacco Abuse 50 py quit 2010, COPD w/ pre-op PFT showing FEV1 30%, Chronic SHF EF 35%, Permanent Afib - unable to tolerate DOAC secondary to bleeding and s/p Watchman 9/2020, BiV-AICD, AVN ablation, HTN, who was admitted 6/2/2021 for cardiac cath which showed severe 80% ostial LM disease, LVEDP 11.  Echo 6/2 w/ EF 35-40%, mod MR, RVSP 35-45, Diastolic Dysfunction.  Now post op  2vCABG by Dr. Shields on 6/3/21.    CPB 56 min, CC 46 min, EBL 500cc.  No blood in OR.    Plan:  Sedation - propofol gtt    Pain - titrate narcotics & stool softeners    Mechanical Ventilation / COPD FEV1 30% - Wean per protocol.  Schedule nebs given low FEV1.  Low threshold to extubate to Bipap to reduce risk of re-intubation depending on how he does.    Stress Hyperglycemia A1c 5.7 - insulin gtt    SHF EF 35% / DHF / Afib / CAD - per Cardiology    Acute Blood Loss Anemia - transfuse prn    Post op care per surgery    Nutrition - NPO Diet NPO Except: Sips with meds    Plan of care and goals reviewed with mulitdisciplinary team at daily rounds    Critical Condition / High Risk secondary to IV anesthesia, narcotics, mechanical ventilation, severe COPD, insulin gtt, CHF, acute blood loss anemia, post op urgent major surgery with risk factors.    Critical Care time spent in direct patient care: 30 minutes (excluding procedure time, if applicable) including high complexity decision making to assess, manipulate, and support vital organ system failure in this individual who has impairment of one or more vital organ systems such that there is a high probability of imminent or life threatening deterioration in the patient’s condition.         Porter Medina MD  Intensive Care Medicine

## 2021-06-03 NOTE — PERIOPERATIVE NURSING NOTE
"Ran Stoner ( [employer] pacemaker rep.) at bedside to turn off Defibrillator (per Dr. Hernandez instructions).  Ran states the \"tachy therapies are off\".  States that some alarms may be heard, but that is normal.  Informed pt that he would be back after surgery to turn functions back on.   "

## 2021-06-03 NOTE — PLAN OF CARE
Goal Outcome Evaluation:     Progress: no change   Post op cabg. On vent, propofol, no vasopressors. No swan or deep line. Art line in place and 18 and 2 gauge iv sites. Varghese to bsd. Mt to atrium in place with total 40 ml output. Dr Shields called for update and 2 FFP and 1 platelet ordered. Report to oncoming RN. Continue to monitor

## 2021-06-03 NOTE — PLAN OF CARE
Goal Outcome Evaluation:         Pt A&Ox4, VSS, RA, V paced on monitor. Pt taken to preop via stretcher with RN escort for CABG with Dr. Shields this afternoon. Consent signed on front of chart for surgery and blood. Bedside report given to SASHA Maldonado in preop.    Problem: Adult Inpatient Plan of Care  Goal: Plan of Care Review  Outcome: Ongoing, Progressing  Goal: Patient-Specific Goal (Individualized)  Outcome: Ongoing, Progressing  Goal: Absence of Hospital-Acquired Illness or Injury  Outcome: Ongoing, Progressing  Intervention: Identify and Manage Fall Risk  Recent Flowsheet Documentation  Taken 6/3/2021 1125 by Jania Sesay RN  Safety Promotion/Fall Prevention: (Pt taken to preop for CABG) patient off unit  Taken 6/3/2021 1000 by Jania Sesay RN  Safety Promotion/Fall Prevention:   activity supervised   assistive device/personal items within reach   clutter free environment maintained   fall prevention program maintained   lighting adjusted   nonskid shoes/slippers when out of bed   room organization consistent   safety round/check completed   toileting scheduled  Taken 6/3/2021 0800 by Jania Sesay RN  Safety Promotion/Fall Prevention:   activity supervised   assistive device/personal items within reach   clutter free environment maintained   fall prevention program maintained   lighting adjusted   nonskid shoes/slippers when out of bed   room organization consistent   safety round/check completed   toileting scheduled  Intervention: Prevent Skin Injury  Recent Flowsheet Documentation  Taken 6/3/2021 1000 by Jania Sesay RN  Body Position:   position changed independently   side-lying, left  Taken 6/3/2021 0800 by Jania Sesay RN  Body Position:   position changed independently   side-lying, right  Skin Protection:   adhesive use limited   incontinence pads utilized   tubing/devices free from skin contact  Intervention: Prevent and Manage VTE (venous thromboembolism) Risk  Recent Flowsheet  Documentation  Taken 6/3/2021 0800 by Jania Sesay, RN  VTE Prevention/Management:   bilateral   dorsiflexion/plantar flexion performed  Intervention: Prevent Infection  Recent Flowsheet Documentation  Taken 6/3/2021 1000 by Jania Sesay, RN  Infection Prevention:   environmental surveillance performed   hand hygiene promoted   personal protective equipment utilized   rest/sleep promoted   single patient room provided   visitors restricted/screened  Taken 6/3/2021 0800 by Jania Sesay RN  Infection Prevention:   environmental surveillance performed   hand hygiene promoted   personal protective equipment utilized   rest/sleep promoted   single patient room provided   visitors restricted/screened  Goal: Optimal Comfort and Wellbeing  Outcome: Ongoing, Progressing  Intervention: Provide Person-Centered Care  Recent Flowsheet Documentation  Taken 6/3/2021 0800 by Jania Sesay, RN  Trust Relationship/Rapport:   care explained   questions answered   questions encouraged  Goal: Readiness for Transition of Care  Outcome: Ongoing, Progressing     Problem: Chest Pain  Goal: Resolution of Chest Pain Symptoms  Outcome: Ongoing, Progressing

## 2021-06-03 NOTE — ANESTHESIA POSTPROCEDURE EVALUATION
Patient: Colin Albrecht    Procedure Summary     Date: 06/03/21 Room / Location:  [facility]  /  [facility]    Anesthesia Start: 1326 Anesthesia Stop:     Procedure: MEDIAN STERNTOMY, CORONARY ARTERY BYPASS WITH INTERNAL MAMMARY ARTERY GRAFT X 2, EVH OF THE RIGHT GREATER SAPHENOUS ANA (N/A Chest) Diagnosis:       Coronary artery disease involving native coronary artery of native heart with other form of angina pectoris (CMS/HCC)      (Coronary artery disease involving native coronary artery of native heart with other form of angina pectoris (CMS/HCC) [I25.118])    Surgeons: Jaime Shields MD Provider: Bal Hernandez MD    Anesthesia Type: general ASA Status: 4          Anesthesia Type: general    Vitals  No vitals data found for the desired time range.          Post Anesthesia Care and Evaluation    Patient location during evaluation: ICU  Pain management: adequate  Airway patency: patent  Anesthetic complications: No anesthetic complications  PONV Status: none  Cardiovascular status: hemodynamically stable and acceptable  Respiratory status: acceptable, intubated and ETT  Hydration status: acceptable

## 2021-06-04 ENCOUNTER — APPOINTMENT (OUTPATIENT)
Dept: GENERAL RADIOLOGY | Facility: HOSPITAL | Age: 76
End: 2021-06-04

## 2021-06-04 PROBLEM — N18.9 ACUTE-ON-CHRONIC KIDNEY INJURY: Status: ACTIVE | Noted: 2021-06-04

## 2021-06-04 PROBLEM — N17.9 ACUTE-ON-CHRONIC KIDNEY INJURY: Status: ACTIVE | Noted: 2021-06-04

## 2021-06-04 LAB
ALBUMIN SERPL-MCNC: 4.3 G/DL (ref 3.5–5.2)
ALBUMIN/GLOB SERPL: 2.9 G/DL
ALP SERPL-CCNC: 44 U/L (ref 39–117)
ALT SERPL W P-5'-P-CCNC: 15 U/L (ref 1–41)
ANION GAP SERPL CALCULATED.3IONS-SCNC: 11 MMOL/L (ref 5–15)
ANION GAP SERPL CALCULATED.3IONS-SCNC: 9 MMOL/L (ref 5–15)
ARTERIAL PATENCY WRIST A: ABNORMAL
ARTERIAL PATENCY WRIST A: ABNORMAL
AST SERPL-CCNC: 28 U/L (ref 1–40)
ATMOSPHERIC PRESS: ABNORMAL MM[HG]
ATMOSPHERIC PRESS: ABNORMAL MM[HG]
BASE EXCESS BLDA CALC-SCNC: 2.2 MMOL/L (ref 0–2)
BASE EXCESS BLDA CALC-SCNC: 3.6 MMOL/L (ref 0–2)
BASOPHILS # BLD AUTO: 0.01 10*3/MM3 (ref 0–0.2)
BASOPHILS NFR BLD AUTO: 0.1 % (ref 0–1.5)
BDY SITE: ABNORMAL
BDY SITE: ABNORMAL
BH BB BLOOD EXPIRATION DATE: NORMAL
BH BB BLOOD TYPE BARCODE: 6200
BH BB DISPENSE STATUS: NORMAL
BH BB PRODUCT CODE: NORMAL
BH BB UNIT NUMBER: NORMAL
BILIRUB SERPL-MCNC: 1.1 MG/DL (ref 0–1.2)
BODY TEMPERATURE: 37 C
BODY TEMPERATURE: 37 C
BUN SERPL-MCNC: 23 MG/DL (ref 8–23)
BUN SERPL-MCNC: 26 MG/DL (ref 8–23)
BUN/CREAT SERPL: 15.4 (ref 7–25)
BUN/CREAT SERPL: 16.4 (ref 7–25)
CALCIUM SPEC-SCNC: 9.4 MG/DL (ref 8.6–10.5)
CALCIUM SPEC-SCNC: 9.5 MG/DL (ref 8.6–10.5)
CHLORIDE SERPL-SCNC: 101 MMOL/L (ref 98–107)
CHLORIDE SERPL-SCNC: 107 MMOL/L (ref 98–107)
CO2 BLDA-SCNC: 28.6 MMOL/L (ref 22–33)
CO2 BLDA-SCNC: 29.7 MMOL/L (ref 22–33)
CO2 SERPL-SCNC: 27 MMOL/L (ref 22–29)
CO2 SERPL-SCNC: 27 MMOL/L (ref 22–29)
COHGB MFR BLD: 0.8 % (ref 0–2)
COHGB MFR BLD: 1.1 % (ref 0–2)
CREAT BLDA-MCNC: 1.4 MG/DL (ref 0.6–1.3)
CREAT SERPL-MCNC: 1.49 MG/DL (ref 0.76–1.27)
CREAT SERPL-MCNC: 1.59 MG/DL (ref 0.76–1.27)
DEPRECATED RDW RBC AUTO: 45.2 FL (ref 37–54)
DEPRECATED RDW RBC AUTO: 48 FL (ref 37–54)
EOSINOPHIL # BLD AUTO: 0.01 10*3/MM3 (ref 0–0.4)
EOSINOPHIL NFR BLD AUTO: 0.1 % (ref 0.3–6.2)
EPAP: 0
EPAP: 0
ERYTHROCYTE [DISTWIDTH] IN BLOOD BY AUTOMATED COUNT: 12.9 % (ref 12.3–15.4)
ERYTHROCYTE [DISTWIDTH] IN BLOOD BY AUTOMATED COUNT: 13.1 % (ref 12.3–15.4)
GFR SERPL CREATININE-BSD FRML MDRD: 43 ML/MIN/1.73
GFR SERPL CREATININE-BSD FRML MDRD: 46 ML/MIN/1.73
GLOBULIN UR ELPH-MCNC: 1.5 GM/DL
GLUCOSE BLDC GLUCOMTR-MCNC: 103 MG/DL (ref 70–130)
GLUCOSE BLDC GLUCOMTR-MCNC: 108 MG/DL (ref 70–130)
GLUCOSE BLDC GLUCOMTR-MCNC: 117 MG/DL (ref 70–130)
GLUCOSE BLDC GLUCOMTR-MCNC: 131 MG/DL (ref 70–130)
GLUCOSE BLDC GLUCOMTR-MCNC: 136 MG/DL (ref 70–130)
GLUCOSE BLDC GLUCOMTR-MCNC: 141 MG/DL (ref 70–130)
GLUCOSE BLDC GLUCOMTR-MCNC: 141 MG/DL (ref 70–130)
GLUCOSE BLDC GLUCOMTR-MCNC: 152 MG/DL (ref 70–130)
GLUCOSE BLDC GLUCOMTR-MCNC: 156 MG/DL (ref 70–130)
GLUCOSE BLDC GLUCOMTR-MCNC: 165 MG/DL (ref 70–130)
GLUCOSE BLDC GLUCOMTR-MCNC: 173 MG/DL (ref 70–130)
GLUCOSE BLDC GLUCOMTR-MCNC: 175 MG/DL (ref 70–130)
GLUCOSE BLDC GLUCOMTR-MCNC: 361 MG/DL (ref 70–130)
GLUCOSE SERPL-MCNC: 139 MG/DL (ref 65–99)
GLUCOSE SERPL-MCNC: 169 MG/DL (ref 65–99)
HCO3 BLDA-SCNC: 27.2 MMOL/L (ref 20–26)
HCO3 BLDA-SCNC: 28.3 MMOL/L (ref 20–26)
HCT VFR BLD AUTO: 23.2 % (ref 37.5–51)
HCT VFR BLD AUTO: 24.8 % (ref 37.5–51)
HCT VFR BLD AUTO: 25.6 % (ref 37.5–51)
HCT VFR BLD CALC: 24.3 %
HCT VFR BLD CALC: 25.1 %
HGB BLD-MCNC: 7.6 G/DL (ref 13–17.7)
HGB BLD-MCNC: 8.1 G/DL (ref 13–17.7)
HGB BLD-MCNC: 8.2 G/DL (ref 13–17.7)
HGB BLDA-MCNC: 7.9 G/DL (ref 13.5–17.5)
HGB BLDA-MCNC: 8.2 G/DL (ref 13.5–17.5)
IMM GRANULOCYTES # BLD AUTO: 0.03 10*3/MM3 (ref 0–0.05)
IMM GRANULOCYTES NFR BLD AUTO: 0.4 % (ref 0–0.5)
INHALED O2 CONCENTRATION: 28 %
INHALED O2 CONCENTRATION: 40 %
INR PPP: 1.32 (ref 0.85–1.16)
IPAP: 0
IPAP: 0
LYMPHOCYTES # BLD AUTO: 0.26 10*3/MM3 (ref 0.7–3.1)
LYMPHOCYTES NFR BLD AUTO: 3.8 % (ref 19.6–45.3)
MAGNESIUM SERPL-MCNC: 2.2 MG/DL (ref 1.6–2.4)
MCH RBC QN AUTO: 31.5 PG (ref 26.6–33)
MCH RBC QN AUTO: 32.3 PG (ref 26.6–33)
MCHC RBC AUTO-ENTMCNC: 32 G/DL (ref 31.5–35.7)
MCHC RBC AUTO-ENTMCNC: 32.8 G/DL (ref 31.5–35.7)
MCV RBC AUTO: 100.8 FL (ref 79–97)
MCV RBC AUTO: 96.3 FL (ref 79–97)
METHGB BLD QL: 0.5 % (ref 0–1.5)
METHGB BLD QL: 0.6 % (ref 0–1.5)
MODALITY: ABNORMAL
MODALITY: ABNORMAL
MONOCYTES # BLD AUTO: 0.38 10*3/MM3 (ref 0.1–0.9)
MONOCYTES NFR BLD AUTO: 5.5 % (ref 5–12)
NEUTROPHILS NFR BLD AUTO: 6.21 10*3/MM3 (ref 1.7–7)
NEUTROPHILS NFR BLD AUTO: 90.1 % (ref 42.7–76)
NOTE: ABNORMAL
NOTE: ABNORMAL
NRBC BLD AUTO-RTO: 0 /100 WBC (ref 0–0.2)
OXYHGB MFR BLDV: 95.7 % (ref 94–99)
OXYHGB MFR BLDV: 98 % (ref 94–99)
PA ADP PRP-ACNC: 355 PRU
PAW @ PEAK INSP FLOW SETTING VENT: 0 CMH2O
PAW @ PEAK INSP FLOW SETTING VENT: 0 CMH2O
PCO2 BLDA: 43 MM HG (ref 35–45)
PCO2 BLDA: 44.1 MM HG (ref 35–45)
PCO2 TEMP ADJ BLD: 43 MM HG (ref 35–48)
PCO2 TEMP ADJ BLD: 44.1 MM HG (ref 35–48)
PEEP RESPIRATORY: 5 CM[H2O]
PH BLDA: 7.4 PH UNITS (ref 7.35–7.45)
PH BLDA: 7.43 PH UNITS (ref 7.35–7.45)
PH, TEMP CORRECTED: 7.4 PH UNITS
PH, TEMP CORRECTED: 7.43 PH UNITS
PHOSPHATE SERPL-MCNC: 2.8 MG/DL (ref 2.5–4.5)
PLATELET # BLD AUTO: 162 10*3/MM3 (ref 140–450)
PLATELET # BLD AUTO: 174 10*3/MM3 (ref 140–450)
PMV BLD AUTO: 10.3 FL (ref 6–12)
PMV BLD AUTO: 10.3 FL (ref 6–12)
PO2 BLDA: 118 MM HG (ref 83–108)
PO2 BLDA: 82.3 MM HG (ref 83–108)
PO2 TEMP ADJ BLD: 118 MM HG (ref 83–108)
PO2 TEMP ADJ BLD: 82.3 MM HG (ref 83–108)
POTASSIUM SERPL-SCNC: 3.6 MMOL/L (ref 3.5–5.2)
POTASSIUM SERPL-SCNC: 4.2 MMOL/L (ref 3.5–5.2)
PROT SERPL-MCNC: 5.8 G/DL (ref 6–8.5)
PROTHROMBIN TIME: 15.9 SECONDS (ref 11.4–14.4)
PSV: 10 CMH2O
QT INTERVAL: 522 MS
QT INTERVAL: 552 MS
QTC INTERVAL: 522 MS
QTC INTERVAL: 560 MS
RBC # BLD AUTO: 2.41 10*6/MM3 (ref 4.14–5.8)
RBC # BLD AUTO: 2.54 10*6/MM3 (ref 4.14–5.8)
SODIUM SERPL-SCNC: 139 MMOL/L (ref 136–145)
SODIUM SERPL-SCNC: 143 MMOL/L (ref 136–145)
TOTAL RATE: 0 BREATHS/MINUTE
TOTAL RATE: 0 BREATHS/MINUTE
UNIT  ABO: NORMAL
UNIT  RH: NORMAL
VENTILATOR MODE: ABNORMAL
WBC # BLD AUTO: 6.9 10*3/MM3 (ref 3.4–10.8)
WBC # BLD AUTO: 9.91 10*3/MM3 (ref 3.4–10.8)

## 2021-06-04 PROCEDURE — 82962 GLUCOSE BLOOD TEST: CPT

## 2021-06-04 PROCEDURE — 85027 COMPLETE CBC AUTOMATED: CPT | Performed by: INTERNAL MEDICINE

## 2021-06-04 PROCEDURE — 82375 ASSAY CARBOXYHB QUANT: CPT

## 2021-06-04 PROCEDURE — 25010000003 CEFUROXIME SODIUM 1.5 G RECONSTITUTED SOLUTION: Performed by: THORACIC SURGERY (CARDIOTHORACIC VASCULAR SURGERY)

## 2021-06-04 PROCEDURE — 25010000002 ALBUMIN HUMAN 5% PER 50 ML

## 2021-06-04 PROCEDURE — 63710000001 INSULIN LISPRO (HUMAN) PER 5 UNITS: Performed by: INTERNAL MEDICINE

## 2021-06-04 PROCEDURE — 94799 UNLISTED PULMONARY SVC/PX: CPT

## 2021-06-04 PROCEDURE — 85014 HEMATOCRIT: CPT | Performed by: INTERNAL MEDICINE

## 2021-06-04 PROCEDURE — 85018 HEMOGLOBIN: CPT | Performed by: INTERNAL MEDICINE

## 2021-06-04 PROCEDURE — 84100 ASSAY OF PHOSPHORUS: CPT | Performed by: INTERNAL MEDICINE

## 2021-06-04 PROCEDURE — 85576 BLOOD PLATELET AGGREGATION: CPT | Performed by: PHYSICIAN ASSISTANT

## 2021-06-04 PROCEDURE — 97530 THERAPEUTIC ACTIVITIES: CPT

## 2021-06-04 PROCEDURE — 80053 COMPREHEN METABOLIC PANEL: CPT | Performed by: INTERNAL MEDICINE

## 2021-06-04 PROCEDURE — 83735 ASSAY OF MAGNESIUM: CPT | Performed by: PHYSICIAN ASSISTANT

## 2021-06-04 PROCEDURE — 85025 COMPLETE CBC W/AUTO DIFF WBC: CPT | Performed by: PHYSICIAN ASSISTANT

## 2021-06-04 PROCEDURE — 99233 SBSQ HOSP IP/OBS HIGH 50: CPT | Performed by: INTERNAL MEDICINE

## 2021-06-04 PROCEDURE — P9041 ALBUMIN (HUMAN),5%, 50ML: HCPCS

## 2021-06-04 PROCEDURE — 83050 HGB METHEMOGLOBIN QUAN: CPT

## 2021-06-04 PROCEDURE — 71045 X-RAY EXAM CHEST 1 VIEW: CPT

## 2021-06-04 PROCEDURE — 97162 PT EVAL MOD COMPLEX 30 MIN: CPT

## 2021-06-04 PROCEDURE — 82805 BLOOD GASES W/O2 SATURATION: CPT

## 2021-06-04 PROCEDURE — 85610 PROTHROMBIN TIME: CPT | Performed by: PHYSICIAN ASSISTANT

## 2021-06-04 PROCEDURE — 99232 SBSQ HOSP IP/OBS MODERATE 35: CPT | Performed by: INTERNAL MEDICINE

## 2021-06-04 RX ORDER — AMOXICILLIN 250 MG
2 CAPSULE ORAL 2 TIMES DAILY
Status: DISCONTINUED | OUTPATIENT
Start: 2021-06-04 | End: 2021-06-10 | Stop reason: HOSPADM

## 2021-06-04 RX ORDER — FAMOTIDINE 20 MG/1
20 TABLET, FILM COATED ORAL 2 TIMES DAILY
Status: DISCONTINUED | OUTPATIENT
Start: 2021-06-04 | End: 2021-06-10 | Stop reason: HOSPADM

## 2021-06-04 RX ORDER — ALBUMIN, HUMAN INJ 5% 5 %
SOLUTION INTRAVENOUS
Status: COMPLETED
Start: 2021-06-04 | End: 2021-06-04

## 2021-06-04 RX ORDER — DEXTROSE MONOHYDRATE 25 G/50ML
25 INJECTION, SOLUTION INTRAVENOUS
Status: DISCONTINUED | OUTPATIENT
Start: 2021-06-04 | End: 2021-06-10 | Stop reason: HOSPADM

## 2021-06-04 RX ORDER — LEVOTHYROXINE SODIUM 0.07 MG/1
75 TABLET ORAL DAILY
Status: ON HOLD | COMMUNITY
End: 2021-06-14

## 2021-06-04 RX ORDER — SODIUM CHLORIDE 450 MG/100ML
30 INJECTION, SOLUTION INTRAVENOUS CONTINUOUS
Status: DISCONTINUED | OUTPATIENT
Start: 2021-06-04 | End: 2021-06-08

## 2021-06-04 RX ADMIN — INSULIN HUMAN 2.4 UNITS/HR: 1 INJECTION, SOLUTION INTRAVENOUS at 00:00

## 2021-06-04 RX ADMIN — CEFUROXIME SODIUM 1.5 G: 1.5 INJECTION, POWDER, FOR SOLUTION INTRAVENOUS at 09:20

## 2021-06-04 RX ADMIN — ALBUMIN HUMAN 25 G: 0.05 INJECTION, SOLUTION INTRAVENOUS at 02:02

## 2021-06-04 RX ADMIN — DOCUSATE SODIUM 50MG AND SENNOSIDES 8.6MG 2 TABLET: 8.6; 5 TABLET, FILM COATED ORAL at 21:18

## 2021-06-04 RX ADMIN — IPRATROPIUM BROMIDE AND ALBUTEROL SULFATE 3 ML: 2.5; .5 SOLUTION RESPIRATORY (INHALATION) at 12:06

## 2021-06-04 RX ADMIN — CEFUROXIME SODIUM 1.5 G: 1.5 INJECTION, POWDER, FOR SOLUTION INTRAVENOUS at 16:30

## 2021-06-04 RX ADMIN — OXYCODONE HYDROCHLORIDE AND ACETAMINOPHEN 2 TABLET: 5; 325 TABLET ORAL at 12:34

## 2021-06-04 RX ADMIN — CEFUROXIME SODIUM 1.5 G: 1.5 INJECTION, POWDER, FOR SOLUTION INTRAVENOUS at 01:06

## 2021-06-04 RX ADMIN — METOPROLOL TARTRATE 2.5 MG: 5 INJECTION INTRAVENOUS at 01:09

## 2021-06-04 RX ADMIN — IPRATROPIUM BROMIDE AND ALBUTEROL SULFATE 3 ML: 2.5; .5 SOLUTION RESPIRATORY (INHALATION) at 03:20

## 2021-06-04 RX ADMIN — METOPROLOL TARTRATE 12.5 MG: 25 TABLET, FILM COATED ORAL at 21:12

## 2021-06-04 RX ADMIN — ATORVASTATIN CALCIUM 40 MG: 40 TABLET, FILM COATED ORAL at 21:12

## 2021-06-04 RX ADMIN — INSULIN LISPRO 2 UNITS: 100 INJECTION, SOLUTION INTRAVENOUS; SUBCUTANEOUS at 18:11

## 2021-06-04 RX ADMIN — ASPIRIN 325 MG: 325 TABLET, COATED ORAL at 09:20

## 2021-06-04 RX ADMIN — OXYCODONE HYDROCHLORIDE AND ACETAMINOPHEN 2 TABLET: 5; 325 TABLET ORAL at 18:05

## 2021-06-04 RX ADMIN — IPRATROPIUM BROMIDE AND ALBUTEROL SULFATE 3 ML: 2.5; .5 SOLUTION RESPIRATORY (INHALATION) at 23:01

## 2021-06-04 RX ADMIN — FAMOTIDINE 20 MG: 20 TABLET, FILM COATED ORAL at 21:12

## 2021-06-04 RX ADMIN — CHLORHEXIDINE GLUCONATE 15 ML: 1.2 SOLUTION ORAL at 09:20

## 2021-06-04 RX ADMIN — SODIUM CHLORIDE 30 ML/HR: 4.5 INJECTION, SOLUTION INTRAVENOUS at 12:39

## 2021-06-04 RX ADMIN — POTASSIUM CHLORIDE 40 MEQ: 750 CAPSULE, EXTENDED RELEASE ORAL at 10:53

## 2021-06-04 RX ADMIN — IPRATROPIUM BROMIDE AND ALBUTEROL SULFATE 3 ML: 2.5; .5 SOLUTION RESPIRATORY (INHALATION) at 19:09

## 2021-06-04 RX ADMIN — OXYCODONE HYDROCHLORIDE AND ACETAMINOPHEN 2 TABLET: 5; 325 TABLET ORAL at 04:55

## 2021-06-04 RX ADMIN — OXYCODONE HYDROCHLORIDE AND ACETAMINOPHEN 2 TABLET: 5; 325 TABLET ORAL at 01:09

## 2021-06-04 RX ADMIN — POTASSIUM CHLORIDE 40 MEQ: 750 CAPSULE, EXTENDED RELEASE ORAL at 05:55

## 2021-06-04 RX ADMIN — IPRATROPIUM BROMIDE AND ALBUTEROL SULFATE 3 ML: 2.5; .5 SOLUTION RESPIRATORY (INHALATION) at 08:18

## 2021-06-04 RX ADMIN — ALPRAZOLAM 0.25 MG: 0.25 TABLET ORAL at 21:12

## 2021-06-04 RX ADMIN — IPRATROPIUM BROMIDE AND ALBUTEROL SULFATE 3 ML: 2.5; .5 SOLUTION RESPIRATORY (INHALATION) at 15:10

## 2021-06-04 RX ADMIN — LEVOTHYROXINE SODIUM 50 MCG: 50 TABLET ORAL at 09:21

## 2021-06-04 NOTE — PLAN OF CARE
Goal Outcome Evaluation:  Plan of Care Reviewed With: patient  Progress: improving  Outcome Summary: Pt improving.  A.line and FC removed with no issue.  Pt ambulated in the hallway twice with minimal assistance.  No blood products given during shift.  Pt has only IV fluids infusing.  Pt on RA-2L based on patient comfort.  Potassium replaced.  AICD enabled by [employer] rep.  VSS, will continue to monitor.

## 2021-06-04 NOTE — PROGRESS NOTES
CTS Progress Note      POD #1 s/p CABG x 2       LOS: 2 days   Patient Care Team:  Arun Soliman MD as PCP - General (Family Medicine)  Semaj Park MD (Cardiology)    Subjective  Drowsy    Objective    Vital Signs  Temp:  [96.8 °F (36 °C)-98.2 °F (36.8 °C)] 98.1 °F (36.7 °C)  Heart Rate:  [59-64] 61  Resp:  [8-18] 14  BP: ()/(45-78) 98/54  Arterial Line BP: ()/(38-58) 78/38  FiO2 (%):  [40 %-100 %] 40 %    Physical Exam:   General Appearance: alert, appears stated age and cooperative   Lungs: clear to auscultation, respirations regular, respirations even and respirations unlabored   Heart: regular rhythm & normal rate, normal S1, S2, no murmur, no gallop, no rub and no click   Skin: Sternum stable Incision c/d/i     Results   Results from last 7 days   Lab Units 06/04/21  0325   WBC 10*3/mm3 6.90   HEMOGLOBIN g/dL 7.6*   HEMATOCRIT % 23.2*   PLATELETS 10*3/mm3 162     Results from last 7 days   Lab Units 06/04/21  0320   SODIUM mmol/L 143   POTASSIUM mmol/L 3.6   CHLORIDE mmol/L 107   CO2 mmol/L 27.0   BUN mg/dL 23   CREATININE mg/dL 1.49*   GLUCOSE mg/dL 139*   CALCIUM mg/dL 9.5           Imaging Results (Last 24 Hours)     Procedure Component Value Units Date/Time    XR Chest 1 View [[id number]] Resulted: 06/04/21 0547     Updated: 06/04/21 0549    XR Chest 1 View [[id number]] Collected: 06/03/21 1816     Updated: 06/03/21 1821    Narrative:      EXAMINATION: XR CHEST 1 VW-      INDICATION: Post-Op Check Line & Tube Placement;  I25.118-Atherosclerotic heart disease of native coronary artery with  other forms of angina pectoris; I20.0-Unstable angina      COMPARISON: 6/2/2021     FINDINGS: Interval median sternotomy. Endotracheal tube in good  position. Nasogastric tube enters the stomach with its tip not imaged.  Mediastinal drains noted in place. No enlarging pleural effusion or  distinct pneumothorax. No new focal airspace consolidation. Unchanged  heart and mediastinal contours otherwise.        Impression:      Interval median sternotomy. Endotracheal tube in good  position. Nasogastric tube enters the stomach with its tip not imaged.  Mediastinal drains noted in place. No enlarging pleural effusion or  distinct pneumothorax. No new focal airspace consolidation. Unchanged  heart and mediastinal contours otherwise.     This report was finalized on 6/3/2021 6:18 PM by Anson Chen.       XR Chest 1 View [[id number]] Collected: 06/02/21 1100     Updated: 06/03/21 1705    Narrative:      EXAMINATION: XR CHEST 1 VW-06/02/2021:      INDICATION: Dyspnea; I20.0-Unstable angina.      COMPARISON: 03/27/2021.     FINDINGS: Portable chest reveals cardiac and mediastinal silhouettes  within normal limits. Cardiac pacer leads are in satisfactory position.  There is blunting of the left costophrenic angle. Degenerative changes  seen within the spine. No pleural effusion or pneumothorax. Chronic  changes seen throughout the lung fields bilaterally.       Impression:      Chronic changes seen throughout the lung fields with no  superimposed infectious process. Stable blunting of the left  costophrenic angle.     D:  06/02/2021  E:  06/02/2021     This report was finalized on 6/3/2021 5:02 PM by Dr. Karine Limon MD.             Assessment      CAD with unstable angina    PAF s/p Watchman device 9/25/2020/ Bi-V ICD and AVN ablation 3/2021    SSS     Dilated CM     Chronic HFrEF 35-40% s/p upgrade BiV ICD and AV node ablation 3/2021    Hypertension    Former smoker    COPD     Suspected chronic renal insufficiency    S/P CABG x 2 on 6/3/2021      Plan   2 FFP and 1 platelet ordered  Continue chest tubes and wires  D/C A-line and lowe  Pulmonary toilet  Ambulate      Catrina Pina PA-C  06/04/21  07:19 EDT

## 2021-06-04 NOTE — CASE MANAGEMENT/SOCIAL WORK
Continued Stay Note  [facility]     Patient Name: Colin Albrecht  MRN: [id number]  Today's Date: 6/4/2021    Admit Date: 6/2/2021    Discharge Plan     Row Name 06/04/21 1111       Plan    Plan  Home    Patient/Family in Agreement with Plan  yes    Plan Comments  Patient's plan is still to return home at discharge.  Per chart, patient ambulated 170 ft.  Therapy recommending home with assist.  CM will continue to follow.  Tana Jackson RN [phone]    Final Discharge Disposition Code  01 - home or self-care        Discharge Codes    No documentation.       Expected Discharge Date and Time     Expected Discharge Date Expected Discharge Time    Jun 9, 2021             Tana Jackson RN

## 2021-06-04 NOTE — THERAPY EVALUATION
Patient Name: Colin Albrecht  : 1945    MRN: [id number]                              Today's Date: 2021       Admit Date: 2021    Visit Dx:     ICD-10-CM ICD-9-CM   1. Coronary artery disease involving native coronary artery of native heart with other form of angina pectoris (CMS/HCC)  I25.118 414.01     413.9   2. Unstable angina (CMS/[facility])  I20.0 411.1     Patient Active Problem List   Diagnosis   • PAF s/p Watchman device 2020/ Bi-V ICD and AVN ablation 3/2021   • Gastrointestinal hemorrhage associated with gastric ulcer   • SSS    • Dilated CM    • S/P Watchman device   • Chronic HFrEF 35-40% s/p upgrade BiV ICD and AV node ablation 3/2021   • Unstable angina    • CAD with unstable angina   • Hypertension   • Former smoker   • COPD    • Suspected chronic renal insufficiency   • S/P CABG x 2 on 6/3/2021     Past Medical History:   Diagnosis Date   • Abnormal ECG    • Arrhythmia    • Atrial fibrillation (CMS/[facility])    • CHF (congestive heart failure) (CMS/[facility])    • Depression    • History of transfusion     bleeding stomach ulcer ~2014 x2, no reaction    • Hypertension    • Stomach ulcer    • Wears reading eyeglasses      Past Surgical History:   Procedure Laterality Date   • ATRIAL APPENDAGE EXCLUSION LEFT WITH TRANSESOPHAGEAL ECHOCARDIOGRAM N/A 2020    Procedure: Atrial Appendage Occlusion (Watchman), start Eliquis 2 weeks prior and hold 1 day, GA;  Surgeon: Yonny Prado MD;  Location:  [facility];  Service: Cardiology;  Laterality: N/A;   • CARDIAC CATHETERIZATION     • CARDIAC CATHETERIZATION N/A 2021    Procedure: Left Heart Cath- ADD ON/ WALK IN FOR RDS PER KT;  Surgeon: Pietro Quintana MD;  Location:  [facility];  Service: Cardiovascular;  Laterality: N/A;   • CARDIAC ELECTROPHYSIOLOGY PROCEDURE N/A 3/26/2021    Procedure: DDD PPM upgrade to Biv ICD (MDFRANKLIN), no meds to hold;  Surgeon: Yonny Prado MD;  Location:  [facility]  LOCATION;  Service: Cardiology;  Laterality: N/A;   • CARDIAC ELECTROPHYSIOLOGY PROCEDURE N/A 3/26/2021    Procedure: AVN RFA;  Surgeon: Yonny Prado MD;  Location: [facility] INVASIVE LOC[facility];  Service: Cardiovascular;  Laterality: N/A;   • COLONOSCOPY     • INSERT / REPLACE / REMOVE PACEMAKER       General Information     Row Name 06/04/21 1009          Physical Therapy Time and Intention    Document Type  evaluation  -TRAMAINE     Mode of Treatment  physical therapy  -TRAMAINE     Row Name 06/04/21 1009          General Information    Patient Profile Reviewed  yes  -TRAMAINE     Prior Level of Function  independent:;gait;transfer;bed mobility;ADL's  -TRAMAINE     Existing Precautions/Restrictions  cardiac;oxygen therapy device and L/min;sternal;pacemaker patient has previous ICD  -TRAMAINE     Barriers to Rehab  none identified  -TRAMAINE     Row Name 06/04/21 1009          Living Environment    Lives With  spouse  -TRAMAINE     Row Name 06/04/21 1009          Home Main Entrance    Number of Stairs, Main Entrance  none  -TRAMAINE     Row Name 06/04/21 1009          Cognition    Orientation Status (Cognition)  oriented x 4  -TRAMAINE     Row Name 06/04/21 1009          Safety Issues, Functional Mobility    Safety Issues Affecting Function (Mobility)  safety precautions follow-through/compliance;insight into deficits/self-awareness;awareness of need for assistance;safety precaution awareness  -TRAMAINE     Impairments Affecting Function (Mobility)  balance;endurance/activity tolerance;shortness of breath;strength  -TRAMAINE       User Key  (r) = Recorded By, (t) = Taken By, (c) = Cosigned By    Initials Name Provider Type    Cherelle Martinez PT Physical Therapist        Mobility     Row Name 06/04/21 1010          Bed Mobility    Comment (Bed Mobility)  patient is OOB and returns to the chair  -TRAMAINE     Row Name 06/04/21 1010          Transfers    Comment (Transfers)  cues for sternal precautions  -TRAMAINE     Row Name 06/04/21 1010          Bed-Chair Transfer    Bed-Chair  [place] (Transfers)  minimum assist (75% patient effort)  -[facility] Name 06/04/21 1010          Sit-Stand Transfer    Sit-Stand [place] (Transfers)  minimum assist (75% patient effort)  -[facility] Name 06/04/21 1010          Gait/Stairs (Locomotion)    [place] Level (Gait)  minimum assist (75% patient effort)  -TRAMAINE     Distance in Feet (Gait)  170  -TRAMAINE     Deviations/Abnormal Patterns (Gait)  stride length decreased  -TRAMAINE     Bilateral Gait Deviations  forward flexed posture  -TRAMAINE     Comment (Gait/Stairs)  patient needs cues for increased stride length  -TRAMAINE       User Key  (r) = Recorded By, (t) = Taken By, (c) = Cosigned By    Initials Name Provider Type    TRAMAINE Cherelle Garcia PT Physical Therapist        Obj/Interventions     [facility] Name 06/04/21 1011          Range of Motion Comprehensive    General Range of Motion  no range of motion deficits identified  -TRAMAINE     Row Name 06/04/21 1011          Strength Comprehensive (MMT)    General Manual Muscle Testing (MMT) Assessment  no strength deficits identified  -[facility] Name 06/04/21 1011          Motor Skills    Therapeutic Exercise  hip;knee;ankle  -[facility] Name 06/04/21 1011          Hip (Therapeutic Exercise)    Hip (Therapeutic Exercise)  AROM (active range of motion)  -     Hip AROM (Therapeutic Exercise)  bilateral;flexion;extension;aBduction;aDduction;sitting;10 repetitions  -[facility] Name 06/04/21 1011          Knee (Therapeutic Exercise)    Knee (Therapeutic Exercise)  AROM (active range of motion)  -     Knee AROM (Therapeutic Exercise)  bilateral;flexion;extension;sitting;10 repetitions  -[facility] Name 06/04/21 1011          Ankle (Therapeutic Exercise)    Ankle (Therapeutic Exercise)  AROM (active range of motion)  -     Ankle AROM (Therapeutic Exercise)  bilateral;dorsiflexion;plantarflexion;sitting;10 repetitions  -[facility] Name 06/04/21 1011          Balance    Balance Assessment  sitting static balance;sitting dynamic  balance;standing static balance;standing dynamic balance  -TRAMAINE     Static Sitting Balance  WFL  -TRAMAINE     Dynamic Sitting Balance  WFL  -TRAMAINE     Static Standing Balance  mild impairment  -TRAMAINE     Dynamic Standing Balance  mild impairment  -TRAMAINE     Balance Interventions  standing;dynamic;weight shifting activity  -TRAMAINE       User Key  (r) = Recorded By, (t) = Taken By, (c) = Cosigned By    Initials Name Provider Type    TRAMAINE Cherelle Garcia A, PT Physical Therapist        Goals/Plan     Row Name 06/04/21 1018          Bed Mobility Goal 1 (PT)    Activity/Assistive Device (Bed Mobility Goal 1, PT)  bed mobility activities, all  -TRAMAINE     Gallia Level/Cues Needed (Bed Mobility Goal 1, PT)  independent  -TRAMAINE     Time Frame (Bed Mobility Goal 1, PT)  long term goal (LTG);10 days  -TRAMAINE     Progress/Outcomes (Bed Mobility Goal 1, PT)  goal ongoing  -TRAMAINE     Row Name 06/04/21 1018          Transfer Goal 1 (PT)    Activity/Assistive Device (Transfer Goal 1, PT)  sit-to-stand/stand-to-sit  -TRAMAINE     Gallia Level/Cues Needed (Transfer Goal 1, PT)  independent  -TRAMAINE     Time Frame (Transfer Goal 1, PT)  long term goal (LTG);10 days  -TRAMAINE     Progress/Outcome (Transfer Goal 1, PT)  goal ongoing  -TRAMAINE     Row Name 06/04/21 1018          Gait Training Goal 1 (PT)    Activity/Assistive Device (Gait Training Goal 1, PT)  gait (walking locomotion)  -TRAMAINE     Gallia Level (Gait Training Goal 1, PT)  independent  -TRAMAINE     Distance (Gait Training Goal 1, PT)  400  -TRAMAINE     Time Frame (Gait Training Goal 1, PT)  long term goal (LTG);10 days  -TRAMAINE     Progress/Outcome (Gait Training Goal 1, PT)  goal ongoing  -TRAMAINE     Row Name 06/04/21 1018          Patient Education Goal (PT)    Activity (Patient Education Goal, PT)  HEP  -TRAMAINE     Gallia/Cues/Accuracy (Memory Goal 2, PT)  verbalizes understanding  -TRAMAINE     Time Frame (Patient Education Goal, PT)  long term goal (LTG);10 days  -TRAMAINE     Progress/Outcome (Patient Education Goal, PT)  goal  ongoing  -TRAMAINE       User Key  (r) = Recorded By, (t) = Taken By, (c) = Cosigned By    Initials Name Provider Type    TRAMAINE Cherelle Garcia, PT Physical Therapist        Clinical Impression     Row Name 06/04/21 1012          Pain    Additional Documentation  Pain Scale: Numbers Pre/Post-Treatment (Group)  -[facility] Name 06/04/21 1012          Pain Scale: Numbers Pre/Post-Treatment    Pretreatment Pain Rating  3/10  -TRAMAINE     Posttreatment Pain Rating  5/10  -TRAMAINE     Pain Location - Orientation  incisional  -     Pain Location  chest  -TRAMAINE     Pain Intervention(s)  Repositioned;Ambulation/increased activity;Splinting  -     Row Name 06/04/21 1012          Plan of Care Review    Plan of Care Reviewed With  patient  -TRAMAINE     Progress  improving  -TRAMAINE     Outcome Summary  PT eval is completed, patient presents S/P CABG x2. he demonstrates impaired bed mobility transfers and gait as well as decreased standing balance. patient was able to ambulate 170 ft with min assist he was limited by SOA. anticipate patient to be able to go home with family assist at D/C  -[facility] Name 06/04/21 1012          Therapy Assessment/Plan (PT)    Patient/Family Therapy Goals Statement (PT)  return to PLOF and go home  -TRAMAINE     Rehab Potential (PT)  good, to achieve stated therapy goals  -TRAMAINE     Criteria for Skilled Interventions Met (PT)  yes;skilled treatment is necessary  -[facility] Name 06/04/21 1012          Vital Signs    Pre Systolic BP Rehab  91  -TRAMAINE     Pre Treatment Diastolic BP  48  -TRAMAINE     Post Systolic BP Rehab  115  -TRAMAINE     Post Treatment Diastolic BP  48  -TRAMAINE     Pretreatment Heart Rate (beats/min)  60  -TRAMAINE     Intratreatment Heart Rate (beats/min)  93  -TRAMAINE     Posttreatment Heart Rate (beats/min)  78  -TRAMAINE     Pre SpO2 (%)  91  -TRAMAINE     O2 Delivery Pre Treatment  nasal cannula  -TRAMAINE     Post SpO2 (%)  97  -TRAMAINE     O2 Delivery Post Treatment  nasal cannula  -TRAMAINE     Pre Patient Position  Sitting  -TRAMAINE     Intra Patient Position   Standing  -TRAMAINE     Post Patient Position  Sitting  -TRAMAINE     Row Name 06/04/21 1012          Positioning and Restraints    Pre-Treatment Position  sitting in chair/recliner  -TRAMAINE     Post Treatment Position  chair  -TRAMAINE     In Chair  notified nsg;reclined;sitting;call light within reach;with nsg;encouraged to call for assist  -TRAMAINE       User Key  (r) = Recorded By, (t) = Taken By, (c) = Cosigned By    Initials Name Provider Type    Cherelle Martinez PT Physical Therapist        Outcome Measures     Row Name 06/04/21 1018          How much help from another person do you currently need...    Turning from your back to your side while in flat bed without using bedrails?  3  -TRAMAINE     Moving from lying on back to sitting on the side of a flat bed without bedrails?  3  -TRAMAINE     Moving to and from a bed to a chair (including a wheelchair)?  3  -TRAMAINE     Standing up from a chair using your arms (e.g., wheelchair, bedside chair)?  3  -TRAMAINE     Climbing 3-5 steps with a railing?  3  -TRAMAINE     To walk in hospital room?  3  -TRAMAINE     AM-PAC 6 Clicks Score (PT)  18  -TRAMAINE       User Key  (r) = Recorded By, (t) = Taken By, (c) = Cosigned By    Initials Name Provider Type    Cherelle Martinez PT Physical Therapist        Physical Therapy Education                 Title: PT OT SLP Therapies (In Progress)     Topic: Physical Therapy (In Progress)     Point: Mobility training (In Progress)     Learning Progress Summary           Patient Acceptance, E, NR by TRAMAINE at 6/4/2021 0838                   Point: Home exercise program (In Progress)     Learning Progress Summary           Patient Acceptance, E, NR by TRAMAINE at 6/4/2021 0838                   Point: Body mechanics (In Progress)     Learning Progress Summary           Patient Acceptance, E, NR by TRAMAINE at 6/4/2021 0838                   Point: Precautions (In Progress)     Learning Progress Summary           Patient Acceptance, E, NR by TRAMAINE at 6/4/2021 0838                               User Key      Initials Effective Dates Name Provider Type Discipline    TRAMAINE 06/19/15 -  Cherelle Garcia PT Physical Therapist PT              PT Recommendation and Plan  Planned Therapy Interventions (PT): balance training, bed mobility training, gait training, home exercise program, transfer training, strengthening  Plan of Care Reviewed With: patient  Progress: improving  Outcome Summary: PT eval is completed, patient presents S/P CABG x2. he demonstrates impaired bed mobility transfers and gait as well as decreased standing balance. patient was able to ambulate 170 ft with min assist he was limited by SOA. anticipate patient to be able to go home with family assist at D/C     Time Calculation:   PT Charges     Row Name 06/04/21 1020             Time Calculation    Start Time  0838  -TRAMAINE      PT Received On  06/04/21  -TRAMAINE      PT Goal Re-Cert Due Date  06/14/21  -TRAMAINE         Time Calculation- PT    Total Timed Code Minutes- PT  24 minute(s)  -TRAMAINE         Timed Charges    04556 - PT Therapeutic Activity Minutes  24  -TRAMAINE         Untimed Charges    PT Eval/Re-eval Minutes  24  -TRAMAINE         Total Minutes    Timed Charges Total Minutes  24  -TRAMAINE      Untimed Charges Total Minutes  24  -TRAMAINE       Total Minutes  48  -TRAMAINE        User Key  (r) = Recorded By, (t) = Taken By, (c) = Cosigned By    Initials Name Provider Type    Cherelle Martinez PT Physical Therapist        Therapy Charges for Today     Code Description Service Date Service Provider Modifiers Qty    87798375937 HC PT THERAPEUTIC ACT EA 15 MIN 6/4/2021 Cherelle Garcia, PT GP 2    62761133838 HC PT EVAL MOD COMPLEXITY 2 6/4/2021 Cherelle Garcia, PT GP 1          PT G-Codes  AM-PAC 6 Clicks Score (PT): 18    Cherelle Garcia PT  6/4/2021

## 2021-06-04 NOTE — PROGRESS NOTES
Intensivist Note     6/4/2021  Hospital Day: 2  1 Day Post-Op  ICU Stays Timeline            Hospital Admission: 06/02/21 1003 - Current  ICU stays: 1      In Date/Time Event Department ICU Stay Duration     06/02/21 1003 Admission  [facility]      06/02/21 1059 Transfer In  [facility]      06/02/21 1148 Transfer In  [facility]      06/02/21 1748 Transfer In  [facility] 5F      06/03/21 1129 Transfer In  [facility] OR      06/03/21 1739 Transfer In  [facility] 13 hours 45 minutes             Mr. Colin Albrecht, 76 y.o. male is followed for:    CAD with unstable angina    Dilated CM     Chronic HFrEF 35-40% s/p upgrade BiV ICD and AV node ablation 3/2021    S/P CABG x 2 on 6/3/2021    Acute-on-chronic kidney injury (CMS/HCC)    PAF s/p Watchman device 9/25/2020/ Bi-V ICD and AVN ablation 3/2021    SSS     Hypertension    Suspected chronic renal insufficiency    Former smoker    COPD        SUBJECTIVE     76-year-old white male former smoker recently diagnosed with multivessel CAD warranting CABG.  Has an extensive past medical history including dilated cardiomyopathy, SSS with PPM, permanent atrial fibrillation, and CKD..  Is not however on anticoagulation because of previous GI bleed/epistaxis while on Xarelto.  Instead a watchman device was placed 9/2020.  In March 2021 pacemaker was upgraded to a BiV ICD with AV yasemin ablation.  Was also begun on amiodarone because of increased frequency of PVCs.  He then noted increasing dyspnea for which his amiodarone was empirically discontinued.  There was no improvement thus he underwent LHC to rule out an anginal equivalent.  Was found to have an 80% ostial left main stenosis but otherwise only minimal stenoses/luminal irregularities.  LVEF was noted to be 35 to 40% which was about the same as in 2020.  Also noted was RVSP of 38 mmHg and diastolic dysfunction.  It was decided at this time to proceed with coronary revascularization.  Note the patient's preop PFTs were  "suboptimal revealing some obstructive lung disease. On 6/3/2021 patient underwent uneventful CABG x 2 and was returned to the ICU at 1730 on ventilatory support.      Interval history: Patient did well hemodynamically postop and was extubated in a timely fashion at approximately midnight.  He has a usual amount of postop incisional pain today but it is reasonably controlled and he is up in a chair.  Denies dyspnea and O2 sats are 96% on 2 L nasal oxygen.  Doing well hemodynamically without the need for any pressors.  UOP is adequate but fluid balance is positive by 1.8 L and creatinine has increased from 1.31, to 1.59.  Has drained approximately 840 cc out MT/CT tubes (440 cc in the last 12 hours).  Hematocrit dropped to 23.2 early this a.m., but repeat hematocrit at noon 25.6 without the need for any transfusions.      ROS: Per subjective, all other systems reviewed and were negative.    The patient's relevant PMH, PSH, FH, and SH were reviewed and updated in Epic as appropriate. Allergies and Medications reviewed.    OBJECTIVE     /59 (BP Location: Right arm, Patient Position: Sitting)   Pulse 78   Temp 96.9 °F (36.1 °C) (Axillary)   Resp 16   Ht 188 cm (74.02\")   Wt 83.7 kg (184 lb 8 oz)   SpO2 93%   BMI 23.68 kg/m²   Flow (L/min): 2    Flowsheet Rows      First Filed Value   Admission Height  188 cm (74\") Documented at 06/02/2021 1015   Admission Weight  83.3 kg (183 lb 9.6 oz) Documented at 06/02/2021 1015        Intake & Output (last day)       06/03 0701 - 06/04 0700 06/04 0701 - 06/05 0700    P.O. 360     I.V. (mL/kg) 1973 (23.6)     Other 80     IV Piggyback 1816     Total Intake(mL/kg) 4229 (50.5)     Urine (mL/kg/hr) 1575 (0.8)     Stool 0     Chest Tube 840     Total Output 2415     Net +1814           Urine Unmeasured Occurrence 2 x     Stool Unmeasured Occurrence 1 x           Exam:  General Exam:  Well-developed well-nourished white male appearing younger than stated age  HEENT: Pupils " equal and reactive. Nose and throat clear.  Neck:                          Supple, no JVD, thyromegaly, or adenopathy  Lungs: Clear anteriorly, laterally, and in the bases.  No wheezes, rales, rhonchi  Chest:                         NT/CT tubes in place x 2.  No air leak.  Cardiovascular: RRR without murmurs or gallops.  HR 78 bpm  Abdomen: Soft nontender without organomegaly or masses.   and rectal: Varghese catheter in place  Extremities: No cyanosis clubbing edema.  Right leg wrapped in dressings.  Left radial arterial line in place.  Neurologic:                 Symmetric strength. No focal deficits.  Oriented x3    CXR 6/4/2021: Mild cardiomegaly with some redistribution of flow suggesting mild volume overload.  Sternal wires intact.  BiV ICD in place with the generator over the left chest.  Lungs are clear except for increased markings in the left costophrenic angle.  NT/CT tubes in position    INFUSIONS  insulin, 0-50 Units/hr, Last Rate: 3.6 Units/hr (06/04/21 0720)  lactated ringers, 9 mL/hr, Last Rate: 9 mL/hr (06/03/21 1156)  sodium chloride, 30 mL/hr        Results from last 7 days   Lab Units 06/04/21  1243 06/04/21 0325 06/03/21 2030   WBC 10*3/mm3 9.91 6.90 7.00   HEMOGLOBIN g/dL 8.2* 7.6* 7.7*   HEMATOCRIT % 25.6* 23.2* 23.4*   PLATELETS 10*3/mm3 174 162 147     Results from last 7 days   Lab Units 06/04/21  1749 06/04/21  0320   SODIUM mmol/L 139 143   POTASSIUM mmol/L 4.2 3.6   CHLORIDE mmol/L 101 107   CO2 mmol/L 27.0 27.0   BUN mg/dL 26* 23   CREATININE mg/dL 1.59* 1.49*   GLUCOSE mg/dL 169* 139*   CALCIUM mg/dL 9.4 9.5     Results from last 7 days   Lab Units 06/04/21  0320 06/03/21 2030 06/03/21  1811   MAGNESIUM mg/dL 2.2 2.2 2.4   PHOSPHORUS mg/dL 2.8 1.4* 2.6     Results from last 7 days   Lab Units 06/04/21  0320 06/03/21 2030 06/02/21  1502   ALK PHOS U/L 44 45 69   BILIRUBIN mg/dL 1.1 1.4* 0.6   ALT (SGPT) U/L 15 10 8   AST (SGOT) U/L 28 26 20       No results found for: SEDRATE  No  results found for: BNP  No results found for: CKTOTAL, CKMB, CKMBINDEX, TROPONINI, TROPONINT  Lab Results   Component Value Date    TSH 3.830 06/03/2021     No results found for: LACTATE  No results found for: CORTISOL    Results from last 7 days   Lab Units 06/04/21  0403 06/04/21  0004 06/03/21  1741   PH, ARTERIAL pH units 7.427 7.400 7.452*   PCO2, ARTERIAL mm Hg 43.0 44.1 35.3   PO2 ART mm Hg 82.3* 118.0* 490.0*   HCO3 ART mmol/L 28.3* 27.2* 24.6   FIO2 % 28 40 100         I reviewed the patient's results, images and medication.    Assessment/Plan   ASSESSMENT        CAD with unstable angina    Dilated CM     Chronic HFrEF 35-40% s/p upgrade BiV ICD and AV node ablation 3/2021    S/P CABG x 2 on 6/3/2021    Acute-on-chronic kidney injury (CMS/HCC)    PAF s/p Watchman device 9/25/2020/ Bi-V ICD and AVN ablation 3/2021    SSS     Hypertension    Suspected chronic renal insufficiency    Former smoker    COPD       DISCUSSION: Appears to be doing quite well.  Hemodynamically stable.  Gas exchange adequate since extubation at midnight.  Glycemic control good on insulin drip as it should be (does not have a history of diabetes mellitus).  My only concern at this point is his acute kidney injury    PLAN     1.  Mobilization  2.  Avoid all nephrotoxic medications including Toradol  3.  Avoid volume depletion  4.  Transition off insulin drip  5.  If blood sugars are good by tomorrow morning can discontinue SSI  6.  Ulcer (Pepcid) and DVT prophylaxis (mechanical until MT/CT tubes out)  7.  Aspirin, statin, beta-blocker    Plan of care and goals reviewed with multidisciplinary team at daily rounds.    I discussed the patient's findings and my recommendations with patient and nursing staff    Time spent Critical care 25 min (It does not include procedure time).    Electronically signed by Dao Dueñas MD, 06/04/21, 7:24 AM EDT.   Pulmonary / Critical care medicine     Electronically signed by Dao Dueñas MD,  06/04/21, 7:24 AM EDT.

## 2021-06-04 NOTE — PROGRESS NOTES
[facility]  INPATIENT PROGRESS NOTE         [facility]    6/4/2021      PATIENT IDENTIFICATION:   Name:  Colin Albrecht      MRN:  [id number]     76 y.o.  male             Reason for visit: CAD, CHF, A. fib      SUBJECTIVE:   Up in chair, off pressors, extubated overnight, blood pressure stable, reports incisional soreness but otherwise no shortness of breath.     OBJECTIVE:  Vitals:    06/04/21 0545 06/04/21 0550 06/04/21 0600 06/04/21 0818   BP: 96/56 94/50 98/54    BP Location:   Right arm    Patient Position:   Lying    Pulse: 60  61 60   Resp:   14 16   Temp:   98.1 °F (36.7 °C)    TempSrc:   Bladder    SpO2: 99%  100% 95%   Weight:       Height:         FiO2 (%): 40 %     Body mass index is 23.68 kg/m².    Intake/Output Summary (Last 24 hours) at 6/4/2021 0843  Last data filed at 6/4/2021 0600  Gross per 24 hour   Intake 4209 ml   Output 2415 ml   Net 1794 ml       Telemetry: Personally reviewed, paced at 60 bpm    Exam:  General Appearance:   · well developed  · well nourished  Neck:  · thyroid not enlarged  · supple  Respiratory:  · no respiratory distress  · normal breath sounds  · no rales  Cardiovascular:  · no jugular venous distention  · regular rhythm  · apical impulse normal  · S1 normal, S2 normal  · no S3, no S4   · no murmur  · no rub, no thrill  · carotid pulses normal; no bruit  · pedal pulses normal  · lower extremity edema: none    Skin:   warm, dry      No Known Allergies  Scheduled meds:       aspirin, 325 mg, Oral, Daily  atorvastatin, 40 mg, Oral, Nightly  cefuroxime, 1.5 g, Intravenous, Q8H  chlorhexidine, 15 mL, Mouth/Throat, Q12H  ipratropium-albuterol, 3 mL, Nebulization, Q4H - RT  levothyroxine, 50 mcg, Oral, Daily  metoprolol tartrate, 12.5 mg, Oral, Q12H  metoprolol tartrate, 2.5 mg, Intravenous, Q6H  pharmacy consult - MTM, , Does not apply, Daily  sodium chloride, 10 mL, Intravenous, Q12H      IV meds:                       dexmedetomidine, 0.2-1.5 mcg/kg/hr  dextrose 5 % with KCl 20 mEq, 30 mL/hr, Last Rate: 30 mL/hr (06/03/21 1741)  DOBUTamine, 2-20 mcg/kg/min  DOPamine, 2-20 mcg/kg/min  EPINEPHrine, 0.02-0.3 mcg/kg/min  insulin, 0-50 Units/hr, Last Rate: 3.6 Units/hr (06/04/21 0720)  lactated ringers, 9 mL/hr, Last Rate: 9 mL/hr (06/03/21 1156)  niCARdipine, 5-15 mg/hr  nitroglycerin, 5-200 mcg/min  norepinephrine, 0.02-0.3 mcg/kg/min  phenylephrine, 0.5-3 mcg/kg/min  propofol, 5-50 mcg/kg/min, Last Rate: 30 mcg/kg/min (06/03/21 1740)  sodium chloride, 30 mL/hr      Data Review:  Results from last 7 days   Lab Units 06/04/21 0320 06/03/21 2030 06/03/21 1811 06/03/21  0451   SODIUM mmol/L 143 141  141 141 137   BUN mg/dL 23 21  21 20 26*   CREATININE mg/dL 1.49* 1.31*  1.31* 1.16 1.25   GLUCOSE mg/dL 139* 143*  143* 136* 104*     Results from last 7 days   Lab Units 06/04/21 0325 06/03/21 2030 06/03/21 1811 06/03/21 0451 06/02/21  1032   WBC 10*3/mm3 6.90 7.00 4.95 5.85 6.60   HEMOGLOBIN g/dL 7.6* 7.7* 8.6* 12.8* 14.2     Results from last 7 days   Lab Units 06/04/21 0325 06/03/21 1811 06/02/21  1502   INR  1.32* 1.67* 1.19*     Results from last 7 days   Lab Units 06/04/21 0320 06/03/21 2030 06/02/21  1502   ALT (SGPT) U/L 15 10 8   AST (SGOT) U/L 28 26 20     No results found for: DIGOXIN   Lab Results   Component Value Date    TSH 3.830 06/03/2021     Results from last 7 days   Lab Units 06/02/21  1032   CHOLESTEROL mg/dL 182   HDL CHOL mg/dL 52       Estimated Creatinine Clearance: 50.7 mL/min (A) (by C-G formula based on SCr of 1.49 mg/dL (H)).        Imaging (last 24 hr):   I personally reviewed the most recent chest x-ray and other pertinent imaging studies.  Results for orders placed during the hospital encounter of 06/02/21    XR Chest 1 View    Narrative  EXAMINATION: XR CHEST 1 VW- 06/04/2021    INDICATION: Post-Op Heart Surgery; I25.118-Atherosclerotic heart disease  of native coronary artery with other  forms of angina pectoris;  I20.0-Unstable angina    COMPARISON: 06/03/2021    FINDINGS: ET tube and NG tube have been removed. Small focus of left  lateral pleural thickening or loculated pleural effusion appears stable.  Diffuse pulmonary interstitial disease remains relatively mild, whether  chronic or mild interstitial edema. There does appear to be some  cephalization of pulmonary vasculature. There is minimal if any  effusion. No significant focal lung disease or evidence of pneumothorax  is seen.    Impression  Persistent mild pulmonary vascular congestion, and small  area of pleural thickening or effusion in the lateral left chest. No  evidence of pneumothorax.    D:  06/04/2021  E:  06/04/2021        Last ECHO:  Results for orders placed during the hospital encounter of 06/02/21    Adult Transthoracic Echo Complete W/ Cont if Necessary Per Protocol    Interpretation Summary  · Left ventricular ejection fraction appears to be 36 - 40%. Left ventricular systolic function is moderately decreased.  · Moderate mitral valve regurgitation is present.  · Estimated right ventricular systolic pressure from tricuspid regurgitation is mildly elevated (35-45 mmHg).  · Left atrial volume is moderately increased.  · Mildly reduced right ventricular systolic function noted.  · Left ventricular diastolic dysfunction is noted.        PROBLEM LIST:     CAD with unstable angina    PAF s/p Watchman device 9/25/2020/ Bi-V ICD and AVN ablation 3/2021    SSS     Dilated CM     Chronic HFrEF 35-40% s/p upgrade BiV ICD and AV node ablation 3/2021    Hypertension    Former smoker    COPD     Suspected chronic renal insufficiency    S/P CABG x 2 on 6/3/2021      Initial cardiac assessment: Pleasant 76-year-old man from [place] follows with Dr. Park and Dr. Prado, history of chronic systolic heart failure, BiV ICD and AV node ablation, permanent atrial fibrillation, watchman device, CKD stage III.  Presented with symptoms  concerning for unstable angina, LHC on 6/2/2021 showed 80% ostial left main stenosis.    ASSESSMENT/PLAN:  1.  Unstable angina/CAD, 80% left main:  Dr. Shields performed 2V CABG 6/3/2020 (LIMA-LAD, SVG-circumflex)  EF 35-40%  Continue aspirin  Resume beta-blocker when systolic pressure consistently greater than 100  Atorvastatin 40 mg daily started 6/2/2021    2.  Chronic systolic heart failure:  Echo 6/2/2021 showed EF 35-40% with moderate MR and moderate LAE.  Continue guideline directed medical therapy with beta-blocker and ACE inhibitor  Medtronic BiV ICD in place, interrogated and adjusted 6/2/2021  Euvolemic on exam today    We will ask Medtr[employer] to turn back on ICD therapy functions.    3.  Frequent PVCs:  BiV ICD adjusted, now paced at 60 with minimal PVCs  Continue beta-blocker    4.  CKD stage III:  Creatinine at baseline currently, monitor daily    5.  Permanent atrial fibrillation:  Status post AV node ablation and BiV ICD placement with Dr. Prado   9/2020  Watchman in place due to history of GI bleed  No anticoagulation needed    Discussed with patient's nurse at the bedside.      Pietro Quintana MD  6/4/2021    08:43 EDT

## 2021-06-04 NOTE — PLAN OF CARE
Goal Outcome Evaluation:  Plan of Care Reviewed With: patient  Progress: improving  Outcome Summary: PT eval is completed, patient presents S/P CABG x2. he demonstrates impaired bed mobility transfers and gait as well as decreased standing balance. patient was able to ambulate 170 ft with min assist he was limited by SOA. anticipate patient to be able to go home with family assist at D/C

## 2021-06-05 ENCOUNTER — APPOINTMENT (OUTPATIENT)
Dept: GENERAL RADIOLOGY | Facility: HOSPITAL | Age: 76
End: 2021-06-05

## 2021-06-05 LAB
ANION GAP SERPL CALCULATED.3IONS-SCNC: 9 MMOL/L (ref 5–15)
BUN SERPL-MCNC: 28 MG/DL (ref 8–23)
BUN/CREAT SERPL: 18.8 (ref 7–25)
CALCIUM SPEC-SCNC: 9.3 MG/DL (ref 8.6–10.5)
CHLORIDE SERPL-SCNC: 102 MMOL/L (ref 98–107)
CO2 SERPL-SCNC: 27 MMOL/L (ref 22–29)
CREAT SERPL-MCNC: 1.49 MG/DL (ref 0.76–1.27)
DEPRECATED RDW RBC AUTO: 47.8 FL (ref 37–54)
ERYTHROCYTE [DISTWIDTH] IN BLOOD BY AUTOMATED COUNT: 13.3 % (ref 12.3–15.4)
GFR SERPL CREATININE-BSD FRML MDRD: 46 ML/MIN/1.73
GLUCOSE BLDC GLUCOMTR-MCNC: 111 MG/DL (ref 70–130)
GLUCOSE BLDC GLUCOMTR-MCNC: 122 MG/DL (ref 70–130)
GLUCOSE BLDC GLUCOMTR-MCNC: 131 MG/DL (ref 70–130)
GLUCOSE BLDC GLUCOMTR-MCNC: 142 MG/DL (ref 70–130)
GLUCOSE BLDC GLUCOMTR-MCNC: 153 MG/DL (ref 70–130)
GLUCOSE SERPL-MCNC: 137 MG/DL (ref 65–99)
HCT VFR BLD AUTO: 24.3 % (ref 37.5–51)
HGB BLD-MCNC: 7.7 G/DL (ref 13–17.7)
MAGNESIUM SERPL-MCNC: 2.1 MG/DL (ref 1.6–2.4)
MCH RBC QN AUTO: 31.3 PG (ref 26.6–33)
MCHC RBC AUTO-ENTMCNC: 31.7 G/DL (ref 31.5–35.7)
MCV RBC AUTO: 98.8 FL (ref 79–97)
PHOSPHATE SERPL-MCNC: 4.3 MG/DL (ref 2.5–4.5)
PLATELET # BLD AUTO: 169 10*3/MM3 (ref 140–450)
PMV BLD AUTO: 10.7 FL (ref 6–12)
POTASSIUM SERPL-SCNC: 4.6 MMOL/L (ref 3.5–5.2)
QT INTERVAL: 460 MS
QTC INTERVAL: 506 MS
RBC # BLD AUTO: 2.46 10*6/MM3 (ref 4.14–5.8)
SODIUM SERPL-SCNC: 138 MMOL/L (ref 136–145)
WBC # BLD AUTO: 7.99 10*3/MM3 (ref 3.4–10.8)

## 2021-06-05 PROCEDURE — 83735 ASSAY OF MAGNESIUM: CPT | Performed by: INTERNAL MEDICINE

## 2021-06-05 PROCEDURE — 82962 GLUCOSE BLOOD TEST: CPT

## 2021-06-05 PROCEDURE — 93005 ELECTROCARDIOGRAM TRACING: CPT | Performed by: PHYSICIAN ASSISTANT

## 2021-06-05 PROCEDURE — 94799 UNLISTED PULMONARY SVC/PX: CPT

## 2021-06-05 PROCEDURE — 84100 ASSAY OF PHOSPHORUS: CPT | Performed by: INTERNAL MEDICINE

## 2021-06-05 PROCEDURE — 71045 X-RAY EXAM CHEST 1 VIEW: CPT

## 2021-06-05 PROCEDURE — 99233 SBSQ HOSP IP/OBS HIGH 50: CPT | Performed by: INTERNAL MEDICINE

## 2021-06-05 PROCEDURE — 80048 BASIC METABOLIC PNL TOTAL CA: CPT | Performed by: PHYSICIAN ASSISTANT

## 2021-06-05 PROCEDURE — 85027 COMPLETE CBC AUTOMATED: CPT | Performed by: PHYSICIAN ASSISTANT

## 2021-06-05 PROCEDURE — 93005 ELECTROCARDIOGRAM TRACING: CPT | Performed by: INTERNAL MEDICINE

## 2021-06-05 PROCEDURE — 25010000003 CEFUROXIME SODIUM 1.5 G RECONSTITUTED SOLUTION: Performed by: THORACIC SURGERY (CARDIOTHORACIC VASCULAR SURGERY)

## 2021-06-05 RX ORDER — MORPHINE SULFATE 2 MG/ML
2 INJECTION, SOLUTION INTRAMUSCULAR; INTRAVENOUS
Status: DISCONTINUED | OUTPATIENT
Start: 2021-06-05 | End: 2021-06-10 | Stop reason: HOSPADM

## 2021-06-05 RX ORDER — IPRATROPIUM BROMIDE AND ALBUTEROL SULFATE 2.5; .5 MG/3ML; MG/3ML
3 SOLUTION RESPIRATORY (INHALATION) EVERY 4 HOURS PRN
Status: DISCONTINUED | OUTPATIENT
Start: 2021-06-05 | End: 2021-06-05 | Stop reason: SDUPTHER

## 2021-06-05 RX ORDER — TAMSULOSIN HYDROCHLORIDE 0.4 MG/1
0.4 CAPSULE ORAL DAILY
Status: DISCONTINUED | OUTPATIENT
Start: 2021-06-05 | End: 2021-06-10 | Stop reason: HOSPADM

## 2021-06-05 RX ORDER — MORPHINE SULFATE 1 MG/ML
2 INJECTION, SOLUTION EPIDURAL; INTRATHECAL; INTRAVENOUS
Status: DISCONTINUED | OUTPATIENT
Start: 2021-06-05 | End: 2021-06-05 | Stop reason: SDUPTHER

## 2021-06-05 RX ADMIN — DOCUSATE SODIUM 50MG AND SENNOSIDES 8.6MG 2 TABLET: 8.6; 5 TABLET, FILM COATED ORAL at 20:49

## 2021-06-05 RX ADMIN — METOPROLOL TARTRATE 12.5 MG: 25 TABLET, FILM COATED ORAL at 09:17

## 2021-06-05 RX ADMIN — IPRATROPIUM BROMIDE AND ALBUTEROL SULFATE 3 ML: 2.5; .5 SOLUTION RESPIRATORY (INHALATION) at 12:24

## 2021-06-05 RX ADMIN — OXYCODONE HYDROCHLORIDE AND ACETAMINOPHEN 2 TABLET: 5; 325 TABLET ORAL at 00:40

## 2021-06-05 RX ADMIN — SODIUM CHLORIDE, PRESERVATIVE FREE 10 ML: 5 INJECTION INTRAVENOUS at 20:49

## 2021-06-05 RX ADMIN — IPRATROPIUM BROMIDE AND ALBUTEROL SULFATE 3 ML: 2.5; .5 SOLUTION RESPIRATORY (INHALATION) at 16:14

## 2021-06-05 RX ADMIN — CEFUROXIME SODIUM 1.5 G: 1.5 INJECTION, POWDER, FOR SOLUTION INTRAVENOUS at 00:34

## 2021-06-05 RX ADMIN — FAMOTIDINE 20 MG: 20 TABLET, FILM COATED ORAL at 09:18

## 2021-06-05 RX ADMIN — DOCUSATE SODIUM 50MG AND SENNOSIDES 8.6MG 2 TABLET: 8.6; 5 TABLET, FILM COATED ORAL at 09:17

## 2021-06-05 RX ADMIN — OXYCODONE HYDROCHLORIDE AND ACETAMINOPHEN 2 TABLET: 5; 325 TABLET ORAL at 20:49

## 2021-06-05 RX ADMIN — TAMSULOSIN HYDROCHLORIDE 0.4 MG: 0.4 CAPSULE ORAL at 11:34

## 2021-06-05 RX ADMIN — ALPRAZOLAM 0.25 MG: 0.25 TABLET ORAL at 20:18

## 2021-06-05 RX ADMIN — FAMOTIDINE 20 MG: 20 TABLET, FILM COATED ORAL at 20:19

## 2021-06-05 RX ADMIN — LEVOTHYROXINE SODIUM 50 MCG: 50 TABLET ORAL at 09:18

## 2021-06-05 RX ADMIN — ATORVASTATIN CALCIUM 40 MG: 40 TABLET, FILM COATED ORAL at 20:19

## 2021-06-05 RX ADMIN — ASPIRIN 325 MG: 325 TABLET, COATED ORAL at 09:18

## 2021-06-05 RX ADMIN — IPRATROPIUM BROMIDE AND ALBUTEROL SULFATE 3 ML: 2.5; .5 SOLUTION RESPIRATORY (INHALATION) at 07:51

## 2021-06-05 NOTE — NURSING NOTE
Pacemaker rep here to check ICD as pt having runs of V Tach. Dr. Dueñas and Richard aware. See strips. Pt ceased having runs at this time

## 2021-06-05 NOTE — PLAN OF CARE
Goal Outcome Evaluation:     Pt ambulated in the hallway 220 feet with minimal assistance.  No blood products given during shift.  Pt has only IV fluids infusing.  Pt on RA-2L based on patient comfort. MT output 140 as of 0500. Patient diligent with IS use. Patient unable to urinate after lowe was DC'd. At his request he was given ample time to void. Unable to cath with standard straight cath d/t resistance, coude was ordered and placed without difficulty.   VSS, will continue to monitor.

## 2021-06-05 NOTE — PROGRESS NOTES
"                                 [facility] Progress Note      LOS: 3 days   Patient Care Team:  Arun Soliman MD as PCP - General (Family Medicine)  Semaj Park MD (Cardiology)    Chief Complaint: Chest pain and dyspnea    Subjective     Interval History: Quiet night    Patient Complaints: No chest pain or dyspnea this morning    Review of Systems:   A 14 point review of systems was negative except as was stated in the HPI      Objective     Vital Sign Min/Max for last 24 hours  Temp  Min: 96.7 °F (35.9 °C)  Max: 98.9 °F (37.2 °C)   BP  Min: 84/57  Max: 134/71   Pulse  Min: 60  Max: 101   Resp  Min: 16  Max: 22   SpO2  Min: 91 %  Max: 100 %   Flow (L/min)  Min: 2  Max: 2   No data recorded     Flowsheet Rows      First Filed Value   Admission Height  188 cm (74\") Documented at 06/02/2021 1015   Admission Weight  83.3 kg (183 lb 9.6 oz) Documented at 06/02/2021 1015          Physical Exam:  General Appearance: Alert, appears stated age and cooperative  Lungs: Clear to auscultation  Heart:: RRR   No Murmurs, Rubs or Gallops  Abdomen: Soft and nontender with adequate bowel sounds.  No organomegaly  Extremities: No cyanosis, clubbing or edema  Pulses: Pulses palpable and equal bilaterally  Skin: Warm and dry with no rash  Psych: Normal     Results Review:     I reviewed the patient's new clinical results.  Results from last 7 days   Lab Units 06/05/21 0317 06/04/21  1749 06/04/21  0320   SODIUM mmol/L 138 139 143   POTASSIUM mmol/L 4.6 4.2 3.6   CHLORIDE mmol/L 102 101 107   CO2 mmol/L 27.0 27.0 27.0   BUN mg/dL 28* 26* 23   CREATININE mg/dL 1.49* 1.59* 1.49*   GLUCOSE mg/dL 137* 169* 139*   CALCIUM mg/dL 9.3 9.4 9.5     Results from last 7 days   Lab Units 06/05/21 0317 06/04/21  2039 06/04/21  1243 06/04/21  0325   WBC 10*3/mm3 7.99  --  9.91 6.90   HEMOGLOBIN g/dL 7.7* 8.1* 8.2* 7.6*   HEMATOCRIT % 24.3* 24.8* 25.6* 23.2*   PLATELETS 10*3/mm3 169  --  174 162     No results found for: " TROPONINT  Results from last 7 days   Lab Units 06/02/21  1032   CHOLESTEROL mg/dL 182   TRIGLYCERIDES mg/dL 71   HDL CHOL mg/dL 52   LDL CHOL mg/dL 117*     Results from last 7 days   Lab Units 06/04/21  0325 06/03/21  1811 06/02/21  1502   INR  1.32* 1.67* 1.19*     Results from last 7 days   Lab Units 06/04/21  0403   PH, ARTERIAL pH units 7.427   PO2 ART mm Hg 82.3*   PCO2, ARTERIAL mm Hg 43.0   HCO3 ART mmol/L 28.3*           Medication Review: yes  Current Facility-Administered Medications   Medication Dose Route Frequency Provider Last Rate Last Admin   • acetaminophen (TYLENOL) tablet 650 mg  650 mg Oral Q4H PRN Corey Vasquez PA       • ALPRAZolam (XANAX) tablet 0.25 mg  0.25 mg Oral TID PRN LockCorey garcia PA   0.25 mg at 06/04/21 2112   • aluminum-magnesium hydroxide-simethicone (MAALOX MAX) 400-400-40 MG/5ML suspension 15 mL  15 mL Oral Q6H PRN Corey Vasquez PA       • aspirin EC tablet 325 mg  325 mg Oral Daily Corey Vasquez PA   325 mg at 06/04/21 0920   • atorvastatin (LIPITOR) tablet 40 mg  40 mg Oral Nightly Corey Vasquez PA   40 mg at 06/04/21 2112   • calcium gluconate 1 g in sodium chloride 0.9 % 100 mL IVPB  1 g Intravenous Once PRN Corey Vasquez PA        And   • calcium gluconate 6 g in sodium chloride 0.9 % 500 mL IVPB  6 g Intravenous Once PRN Corey Vasquez PA       • calcium gluconate 2 g in sodium chloride 0.9 % 100 mL IVPB  2 g Intravenous Once PRN Corey Vasquez PA       • chlorhexidine (PERIDEX) 0.12 % solution 15 mL  15 mL Mouth/Throat Q12H Leidy House PharmD   15 mL at 06/04/21 0920   • dextrose (D50W) 25 g/ 50mL Intravenous Solution 25 g  25 g Intravenous Q15 Min PRN Dao Dueñas MD       • famotidine (PEPCID) tablet 20 mg  20 mg Oral BID Dao Dueñas MD   20 mg at 06/04/21 2112   • fentaNYL citrate (PF) (SUBLIMAZE) injection 25 mcg  25 mcg Intravenous Q30 Min PRN Jaime Shields MD   25 mcg at 06/03/21 232    And   • naloxone (NARCAN) injection 0.4  mg  0.4 mg Intravenous Q5 Min PRN Jaime Shields MD       • HYDROcodone-acetaminophen (NORCO) 7.5-325 MG per tablet 1 tablet  1 tablet Oral Q4H PRN Jaime Shields MD       • insulin lispro (humaLOG) injection 0-7 Units  0-7 Units Subcutaneous TID AC Dao Dueñas MD   2 Units at 06/04/21 1811   • ipratropium-albuterol (DUO-NEB) nebulizer solution 3 mL  3 mL Nebulization Q4H PRN Corey Vasquez PA       • ipratropium-albuterol (DUO-NEB) nebulizer solution 3 mL  3 mL Nebulization Q4H - RT Porter Medina MD   3 mL at 06/04/21 2301   • levothyroxine (SYNTHROID, LEVOTHROID) tablet 50 mcg  50 mcg Oral Daily LockCorey garcia PA   50 mcg at 06/04/21 0921   • metoprolol tartrate (LOPRESSOR) half tablet 12.5 mg  12.5 mg Oral Q12H LockCorey garcia PA   12.5 mg at 06/04/21 2112   • Morphine PF injection 2 mg  2 mg Intravenous Q30 Min PRN Jaime Shields MD       • norepinephrine (LEVOPHED) 8 mg in 250 mL NS infusion (premix)  0.02-0.3 mcg/kg/min Intravenous Continuous PRN Corey Vasquez PA       • ondansetron (ZOFRAN) tablet 4 mg  4 mg Oral Q6H PRN Corey Vasquez PA        Or   • ondansetron (ZOFRAN) injection 4 mg  4 mg Intravenous Q6H PRN Corey Vasquez PA       • oxyCODONE-acetaminophen (PERCOCET) 5-325 MG per tablet 2 tablet  2 tablet Oral Q4H PRN Jaime Shields MD   2 tablet at 06/05/21 0040   • Pharmacy Consult - MTM   Does not apply Daily Corey Vasquez PA       • potassium chloride (MICRO-K) CR capsule 40 mEq  40 mEq Oral PRN Corey Vasquze PA   40 mEq at 06/04/21 1053    Or   • potassium chloride (KLOR-CON) packet 40 mEq  40 mEq Oral PRN Corey Vasquez PA       • potassium chloride 10 mEq in 100 mL IVPB  10 mEq Intravenous Q1H PRN Jaime Shields MD       • potassium phosphate 45 mmol in sodium chloride 0.9 % 500 mL infusion  45 mmol Intravenous PRN Jaime Shields MD        Or   • potassium phosphate 30 mmol in sodium chloride 0.9 % 250 mL infusion  30 mmol  Intravenous PRN Jaime Shields MD        Or   • potassium phosphate 15 mmol in sodium chloride 0.9 % 100 mL infusion  15 mmol Intravenous PRN Jaime Shields MD        Or   • sodium phosphates 45 mmol in sodium chloride 0.9 % 250 mL IVPB  45 mmol Intravenous PRJaime Thornton MD        Or   • sodium phosphates 30 mmol in sodium chloride 0.9 % 250 mL IVPB  30 mmol Intravenous PRJaime Thornton MD        Or   • sodium phosphates 15 mmol in sodium chloride 0.9 % 250 mL IVPB  15 mmol Intravenous PRJaime Thornton MD       • sennosides-docusate (PERICOLACE) 8.6-50 MG per tablet 2 tablet  2 tablet Oral BID Jaime Shields MD   2 tablet at 06/04/21 2118   • sodium chloride 0.45 % infusion  30 mL/hr Intravenous Continuous Dao Dueñas MD 30 mL/hr at 06/04/21 1239 30 mL/hr at 06/04/21 1239   • sodium chloride 0.9 % flush 10 mL  10 mL Intravenous Q12H Corey Vasquez PA   10 mL at 06/03/21 0806         CAD with unstable angina    PAF s/p Watchman device 9/25/2020/ Bi-V ICD and AVN ablation 3/2021    SSS     Dilated CM     Chronic HFrEF 35-40% s/p upgrade BiV ICD and AV node ablation 3/2021    Hypertension    Former smoker    COPD     Suspected chronic renal insufficiency    S/P CABG x 2 on 6/3/2021    Acute-on-chronic kidney injury (CMS/HCC)        Impression      CABG 6/3/2021  Preop EF 35 to 40%  Permanent atrial fibrillation status post AV node ablation and BiV ICD  CKD--creatinine appears at baseline today  Hypothyroidism      Plan     Continue aspirin, high intensity statin and beta-blocker  Continue ICU observation today  Add Entresto when hemodynamics stabilize postoperatively      Iam Ramos MD   06/05/21  07:19 EDT    Short runs of nonsustained ventricular tachycardia verified by interrogation of Medtronic device this morning.  We will continue to observe.  Episodes have not met criteria for initial tachy therapy

## 2021-06-05 NOTE — PROGRESS NOTES
CTS Progress Note      POD #2 s/p CABG x 2       LOS: 3 days   Patient Care Team:  Arun Soliman MD as PCP - General (Family Medicine)  Semaj Park MD (Cardiology)    Subjective  Overall doing well required placement of a coudé catheter    Objective    Vital Signs  Temp:  [96.9 °F (36.1 °C)-98.9 °F (37.2 °C)] 98.2 °F (36.8 °C)  Heart Rate:  [] 68  Resp:  [16-22] 18  BP: ()/(43-99) 102/57    Physical Exam:   General Appearance: alert, appears stated age and cooperative   Lungs: clear to auscultation, respirations regular, respirations even and respirations unlabored   Heart: regular rhythm & normal rate, normal S1, S2, no murmur, no gallop, no rub and no click   Skin: Sternum stable Incision c/d/i     Results     Results from last 7 days   Lab Units 06/05/21  0317   WBC 10*3/mm3 7.99   HEMOGLOBIN g/dL 7.7*   HEMATOCRIT % 24.3*   PLATELETS 10*3/mm3 169     Results from last 7 days   Lab Units 06/05/21  0317   SODIUM mmol/L 138   POTASSIUM mmol/L 4.6   CHLORIDE mmol/L 102   CO2 mmol/L 27.0   BUN mg/dL 28*   CREATININE mg/dL 1.49*   GLUCOSE mg/dL 137*   CALCIUM mg/dL 9.3           Imaging Results (Last 24 Hours)     Procedure Component Value Units Date/Time    XR Chest 1 View [[id number]] Collected: 06/05/21 0938     Updated: 06/05/21 0944    Narrative:         EXAMINATION: XR CHEST 1 VW-      INDICATION: Post-Op Heart Surgery; I25.118-Atherosclerotic heart disease  of native coronary artery with other forms of angina pectoris;  I20.0-Unstable angina      COMPARISON: 06/04/2021     FINDINGS: Portable chest reveals patient is status post mediastinal  sternotomy. Cardiac pacer leads in satisfactory position. Mild increased  markings identified left lung base. Small bilateral pleural effusions.  Degenerative changes seen within the spine. The lung fields are grossly  clear.           Impression:      Minimal increased markings at the left lung base. The  pulmonary vascularity remains pronounced. Heart is  borderline enlarged.  No new focal right opacification present.          XR Chest 1 View [[id number]] Collected: 06/04/21 0823     Updated: 06/04/21 2244    Narrative:      EXAMINATION: XR CHEST 1 VW- 06/04/2021     INDICATION: Post-Op Heart Surgery; I25.118-Atherosclerotic heart disease  of native coronary artery with other forms of angina pectoris;  I20.0-Unstable angina     COMPARISON: 06/03/2021     FINDINGS: ET tube and NG tube have been removed. Small focus of left  lateral pleural thickening or loculated pleural effusion appears stable.  Diffuse pulmonary interstitial disease remains relatively mild, whether  chronic or mild interstitial edema. There does appear to be some  cephalization of pulmonary vasculature. There is minimal if any  effusion. No significant focal lung disease or evidence of pneumothorax  is seen.       Impression:      Persistent mild pulmonary vascular congestion, and small  area of pleural thickening or effusion in the lateral left chest. No  evidence of pneumothorax.     D:  06/04/2021  E:  06/04/2021         This report was finalized on 6/4/2021 10:41 PM by Dr. Albaro Salguero MD.             Assessment      CAD with unstable angina    PAF s/p Watchman device 9/25/2020/ Bi-V ICD and AVN ablation 3/2021    SSS     Dilated CM     Chronic HFrEF 35-40% s/p upgrade BiV ICD and AV node ablation 3/2021    Hypertension    Former smoker    COPD     Suspected chronic renal insufficiency    S/P CABG x 2 on 6/3/2021    Acute-on-chronic kidney injury (CMS/HCC)      Plan   Overall doing well, creatinine 1.51.6 chest tube drainage minimal.  We will remove tubes and transfer and given Flomax.  Keep Abimael Estrada MD  06/05/21  09:53 EDT

## 2021-06-05 NOTE — PROGRESS NOTES
Intensivist Note     6/5/2021  Hospital Day: 3  2 Days Post-Op  ICU Stays Timeline            Hospital Admission: 06/02/21 1003 - Current  ICU stays: 1      In Date/Time Event Department ICU Stay Duration     06/02/21 1003 Admission  [facility]      06/02/21 1059 Transfer In  [facility]      06/02/21 1148 Transfer In  [facility]      06/02/21 1748 Transfer In  [facility] 5F      06/03/21 1129 Transfer In  [facility]      06/03/21 1739 Transfer In  [facility] 1 day 19 hours 40 minutes             Mr. Colin Albrecht, 76 y.o. male is followed for:    CAD with unstable angina    Dilated CM     Chronic HFrEF 35-40% s/p upgrade BiV ICD and AV node ablation 3/2021    S/P CABG x 2 on 6/3/2021    Acute-on-chronic kidney injury (CMS/HCC)    PAF s/p Watchman device 9/25/2020/ Bi-V ICD and AVN ablation 3/2021    SSS     Hypertension    Suspected chronic renal insufficiency    Former smoker    COPD        SUBJECTIVE     76-year-old white male former smoker recently diagnosed with multivessel CAD warranting CABG.  Has an extensive past medical history including dilated cardiomyopathy, SSS with PPM, permanent atrial fibrillation, and CKD..  Is not however on anticoagulation because of previous GI bleed/epistaxis while on Xarelto.  Instead a watchman device was placed 9/2020.  In March 2021 pacemaker was upgraded to a BiV ICD with AV yasemin ablation.  Was also begun on amiodarone because of increased frequency of PVCs.  He then noted increasing dyspnea for which his amiodarone was empirically discontinued.  There was no improvement thus he underwent LHC to rule out an anginal equivalent.  Was found to have an 80% ostial left main stenosis but otherwise only minimal stenoses/luminal irregularities.  LVEF was noted to be 35 to 40% which was about the same as in 2020.  Also noted was RVSP of 38 mmHg and diastolic dysfunction.  It was decided at this time to proceed with coronary revascularization.  Note the patient's preop PFTs were  "suboptimal revealing some obstructive lung disease. On 6/3/2021 patient underwent uneventful CABG x 2 and was returned to the ICU at 1730 on ventilatory support, but extubated by midnight.      Interval history: POD #2 and doing quite well hemodynamically without the need for any pressors. Upon my arrival in the room today patient was in a rapid wide-complex tachycardia at a rate of 140 which appeared to be irregular. Blood pressure was being maintained and he did not note the tachycardia. In addition ICD was not firing. These episodes would come and go and resolve spontaneously without any symptoms. The [employer] rep came in and verified that these were episodes of nonsustained VT which did not meet criteria for his ICD to fire. Patient denies chest pain or dyspnea. Denies cough, purulent sputum production, hemoptysis, or pleuritic pain (just the usual amount of postop incisional pain). UOP on the low side (525 cc out over the last 12 hours), but creatinine has dropped from 1.59, to 1.49 since yesterday. There is been no documented fever and WBC is normal. Hematocrit remains unchanged at 24.3. Approximately 410 cc out MT/CT tubes and only 160 cc over the last 12 hours so these are being removed today.      ROS: Per subjective, all other systems reviewed and were negative.    The patient's relevant PMH, PSH, FH, and SH were reviewed and updated in Epic as appropriate. Allergies and Medications reviewed.    OBJECTIVE     /58   Pulse 60   Temp 98.2 °F (36.8 °C)   Resp 18   Ht 188 cm (74.02\")   Wt 83.7 kg (184 lb 8 oz)   SpO2 98%   BMI 23.68 kg/m²     At the present time O2 sats are 98% on room air.    Flowsheet Rows      First Filed Value   Admission Height  188 cm (74\") Documented at 06/02/2021 1015   Admission Weight  83.3 kg (183 lb 9.6 oz) Documented at 06/02/2021 1015        Intake & Output (last day)       06/04 0701 - 06/05 0700 06/05 0701 - 06/06 0700    P.O. 1160 400    I.V. (mL/kg) 520.5 (6.2)  "    Other      IV Piggyback 300     Total Intake(mL/kg) 1980.5 (23.7) 400 (4.8)    Urine (mL/kg/hr) 875 (0.4) 275 (0.3)    Stool      Chest Tube 410 40    Total Output 1285 315    Net +695.5 +85                Exam:  General Exam:  Well-developed well-nourished white male sitting up in a chair in NAD  HEENT: Pupils equal and reactive. Nose and throat clear.  Neck:                          Supple, no JVD, thyromegaly, or adenopathy  Lungs: Clear anteriorly and laterally  Chest:                         MT/CT tubes x2 in position with no air leak  Cardiovascular: Irregular rhythm with a rate of approximately 140. Wide-complex on monitor.  Abdomen: Soft nontender without organomegaly or masses.   and rectal: Varghese catheter in place  Extremities: No cyanosis clubbing edema.  Neurologic:                 Symmetric strength. No focal deficits.    Chest X-Ray: Cardiomegaly with some redistribution of flow suggesting some degree of volume overload. Sternal wires intact. Biventricular ICD in place. Minimal increased markings in the costophrenic angle.    INFUSIONS  norepinephrine, 0.02-0.3 mcg/kg/min      Results from last 7 days   Lab Units 06/05/21 0317 06/04/21 2039 06/04/21  1243 06/04/21  0325   WBC 10*3/mm3 7.99  --  9.91 6.90   HEMOGLOBIN g/dL 7.7* 8.1* 8.2* 7.6*   HEMATOCRIT % 24.3* 24.8* 25.6* 23.2*   PLATELETS 10*3/mm3 169  --  174 162     Results from last 7 days   Lab Units 06/05/21 0317 06/04/21  1749   SODIUM mmol/L 138 139   POTASSIUM mmol/L 4.6 4.2   CHLORIDE mmol/L 102 101   CO2 mmol/L 27.0 27.0   BUN mg/dL 28* 26*   CREATININE mg/dL 1.49* 1.59*   GLUCOSE mg/dL 137* 169*   CALCIUM mg/dL 9.3 9.4     Results from last 7 days   Lab Units 06/05/21 0317 06/04/21  0320 06/03/21  2030   MAGNESIUM mg/dL 2.1 2.2 2.2   PHOSPHORUS mg/dL 4.3 2.8 1.4*     Results from last 7 days   Lab Units 06/04/21  0320 06/03/21  2030 06/02/21  1502   ALK PHOS U/L 44 45 69   BILIRUBIN mg/dL 1.1 1.4* 0.6   ALT (SGPT) U/L 15 10 8    AST (SGOT) U/L 28 26 20       No results found for: SEDRATE  No results found for: BNP  No results found for: CKTOTAL, CKMB, CKMBINDEX, TROPONINI, TROPONINT  Lab Results   Component Value Date    TSH 3.830 06/03/2021     No results found for: LACTATE  No results found for: CORTISOL    Results from last 7 days   Lab Units 06/04/21  0403 06/04/21  0004 06/03/21  1741   PH, ARTERIAL pH units 7.427 7.400 7.452*   PCO2, ARTERIAL mm Hg 43.0 44.1 35.3   PO2 ART mm Hg 82.3* 118.0* 490.0*   HCO3 ART mmol/L 28.3* 27.2* 24.6   FIO2 % 28 40 100         I reviewed the patient's results, images and medication.    Assessment/Plan   ASSESSMENT        CAD with unstable angina    Dilated CM     Chronic HFrEF 35-40% s/p upgrade BiV ICD and AV node ablation 3/2021    S/P CABG x 2 on 6/3/2021    Acute-on-chronic kidney injury (CMS/HCC)    PAF s/p Watchman device 9/25/2020/ Bi-V ICD and AVN ablation 3/2021    SSS     Hypertension    Suspected chronic renal insufficiency    Former smoker    COPD       DISCUSSION: Concerning that he is having episodes of nonsustained VT but episodes don't meet criteria for cardioversion. I presume if they continue will need the addition of amiodarone (suspect other agents such as sotalol would be appropriate with his chronic renal insufficiency). The episodes looked irregular to me and I thought they were actually atrial fibrillation with RVR but the device has been interrogated and that's not the case. Chest tubes have been removed today and once his arrhythmias are under control I presume he could move to the floor.    PLAN     1. Per cardiology and CT surgery  2. Varghese catheter out tomorrow (CT surgery began Flomax)    Plan of care and goals reviewed with multidisciplinary team at daily rounds.    I discussed the patient's findings and my recommendations with patient, nursing staff and primary care team (Dr. Ramos)    Patient is critically ill due to recent surgery, LV dysfunction with BiV ICD in  place, and nonsustained V. tach. He has a high risk of life-threatening decline in condition and requires continuous monitoring and frequent reassessment of condition for adjustment of management in order to lessen risk.  I visited the patient's bedside and interacted with nurse numerous times today.     Time spent Critical care 25 min (It does not include procedure time).    Electronically signed by Dao Dueñas MD, 06/05/21, 1:19 PM EDT.   Pulmonary / Critical care medicine

## 2021-06-05 NOTE — PLAN OF CARE
Goal Outcome Evaluation:  Plan of Care Reviewed With: friend  Progress: no change   Alert oriented. Remains on 2L to RA. Getting nebs. Pt experienced wheezing this afternoon with walking. Neb received. Periods of V Tach this am. Pacer rep came in. Stayed in bed until this afternoon. Walked 220 feet and sitting in chair. Pacer functioning well with no V Tach or A FIB. Varghese to BSD with carmen urine noted. VSS. Incisions D/I. Chest tubes pulled today. Continue to monitor.

## 2021-06-06 PROBLEM — I47.29 NONSUSTAINED VENTRICULAR TACHYCARDIA (HCC): Status: ACTIVE | Noted: 2021-06-06

## 2021-06-06 LAB
ANION GAP SERPL CALCULATED.3IONS-SCNC: 7 MMOL/L (ref 5–15)
BH BB BLOOD EXPIRATION DATE: NORMAL
BH BB BLOOD TYPE BARCODE: 5100
BH BB DISPENSE STATUS: NORMAL
BH BB PRODUCT CODE: NORMAL
BH BB UNIT NUMBER: NORMAL
BUN SERPL-MCNC: 29 MG/DL (ref 8–23)
BUN/CREAT SERPL: 25 (ref 7–25)
CALCIUM SPEC-SCNC: 9.3 MG/DL (ref 8.6–10.5)
CHLORIDE SERPL-SCNC: 104 MMOL/L (ref 98–107)
CO2 SERPL-SCNC: 27 MMOL/L (ref 22–29)
CREAT SERPL-MCNC: 1.16 MG/DL (ref 0.76–1.27)
CROSSMATCH INTERPRETATION: NORMAL
DEPRECATED RDW RBC AUTO: 46.8 FL (ref 37–54)
ERYTHROCYTE [DISTWIDTH] IN BLOOD BY AUTOMATED COUNT: 13.1 % (ref 12.3–15.4)
GFR SERPL CREATININE-BSD FRML MDRD: 61 ML/MIN/1.73
GLUCOSE BLDC GLUCOMTR-MCNC: 108 MG/DL (ref 70–130)
GLUCOSE BLDC GLUCOMTR-MCNC: 114 MG/DL (ref 70–130)
GLUCOSE BLDC GLUCOMTR-MCNC: 125 MG/DL (ref 70–130)
GLUCOSE BLDC GLUCOMTR-MCNC: 159 MG/DL (ref 70–130)
GLUCOSE SERPL-MCNC: 118 MG/DL (ref 65–99)
HCT VFR BLD AUTO: 25.4 % (ref 37.5–51)
HGB BLD-MCNC: 8.2 G/DL (ref 13–17.7)
MCH RBC QN AUTO: 32.2 PG (ref 26.6–33)
MCHC RBC AUTO-ENTMCNC: 32.3 G/DL (ref 31.5–35.7)
MCV RBC AUTO: 99.6 FL (ref 79–97)
PLATELET # BLD AUTO: 166 10*3/MM3 (ref 140–450)
PMV BLD AUTO: 11.2 FL (ref 6–12)
POTASSIUM SERPL-SCNC: 4.5 MMOL/L (ref 3.5–5.2)
RBC # BLD AUTO: 2.55 10*6/MM3 (ref 4.14–5.8)
SODIUM SERPL-SCNC: 138 MMOL/L (ref 136–145)
UNIT  ABO: NORMAL
UNIT  RH: NORMAL
WBC # BLD AUTO: 7.72 10*3/MM3 (ref 3.4–10.8)

## 2021-06-06 PROCEDURE — 99232 SBSQ HOSP IP/OBS MODERATE 35: CPT | Performed by: INTERNAL MEDICINE

## 2021-06-06 PROCEDURE — 80048 BASIC METABOLIC PNL TOTAL CA: CPT | Performed by: PHYSICIAN ASSISTANT

## 2021-06-06 PROCEDURE — 85027 COMPLETE CBC AUTOMATED: CPT | Performed by: PHYSICIAN ASSISTANT

## 2021-06-06 PROCEDURE — 82962 GLUCOSE BLOOD TEST: CPT

## 2021-06-06 PROCEDURE — 25010000002 FUROSEMIDE PER 20 MG: Performed by: STUDENT IN AN ORGANIZED HEALTH CARE EDUCATION/TRAINING PROGRAM

## 2021-06-06 RX ORDER — METOPROLOL TARTRATE 50 MG/1
50 TABLET, FILM COATED ORAL EVERY 12 HOURS SCHEDULED
Status: DISCONTINUED | OUTPATIENT
Start: 2021-06-06 | End: 2021-06-10 | Stop reason: HOSPADM

## 2021-06-06 RX ORDER — FUROSEMIDE 10 MG/ML
40 INJECTION INTRAMUSCULAR; INTRAVENOUS ONCE
Status: COMPLETED | OUTPATIENT
Start: 2021-06-06 | End: 2021-06-06

## 2021-06-06 RX ORDER — BISACODYL 5 MG/1
10 TABLET, DELAYED RELEASE ORAL DAILY PRN
Status: DISCONTINUED | OUTPATIENT
Start: 2021-06-06 | End: 2021-06-10 | Stop reason: HOSPADM

## 2021-06-06 RX ORDER — FERROUS SULFATE 325(65) MG
325 TABLET ORAL
Status: DISCONTINUED | OUTPATIENT
Start: 2021-06-07 | End: 2021-06-10 | Stop reason: HOSPADM

## 2021-06-06 RX ADMIN — DOCUSATE SODIUM 50MG AND SENNOSIDES 8.6MG 2 TABLET: 8.6; 5 TABLET, FILM COATED ORAL at 08:32

## 2021-06-06 RX ADMIN — FUROSEMIDE 40 MG: 10 INJECTION, SOLUTION INTRAMUSCULAR; INTRAVENOUS at 13:28

## 2021-06-06 RX ADMIN — METOPROLOL TARTRATE 50 MG: 50 TABLET, FILM COATED ORAL at 08:32

## 2021-06-06 RX ADMIN — ALPRAZOLAM 0.25 MG: 0.25 TABLET ORAL at 22:07

## 2021-06-06 RX ADMIN — TAMSULOSIN HYDROCHLORIDE 0.4 MG: 0.4 CAPSULE ORAL at 08:32

## 2021-06-06 RX ADMIN — ATORVASTATIN CALCIUM 40 MG: 40 TABLET, FILM COATED ORAL at 20:31

## 2021-06-06 RX ADMIN — BISACODYL 10 MG: 5 TABLET, COATED ORAL at 08:32

## 2021-06-06 RX ADMIN — SODIUM CHLORIDE, PRESERVATIVE FREE 10 ML: 5 INJECTION INTRAVENOUS at 20:31

## 2021-06-06 RX ADMIN — FAMOTIDINE 20 MG: 20 TABLET, FILM COATED ORAL at 08:32

## 2021-06-06 RX ADMIN — ASPIRIN 325 MG: 325 TABLET, COATED ORAL at 08:32

## 2021-06-06 RX ADMIN — FAMOTIDINE 20 MG: 20 TABLET, FILM COATED ORAL at 20:31

## 2021-06-06 RX ADMIN — ALPRAZOLAM 0.25 MG: 0.25 TABLET ORAL at 05:49

## 2021-06-06 RX ADMIN — LEVOTHYROXINE SODIUM 50 MCG: 50 TABLET ORAL at 08:32

## 2021-06-06 RX ADMIN — METOPROLOL TARTRATE 50 MG: 50 TABLET, FILM COATED ORAL at 20:31

## 2021-06-06 NOTE — PROGRESS NOTES
Intensivist Note     6/6/2021  Hospital Day: 4  3 Days Post-Op  ICU Stays Timeline            Hospital Admission: 06/02/21 1003 - Current  ICU stays: 1      In Date/Time Event Department ICU Stay Duration     06/02/21 1003 Admission  [facility]      06/02/21 1059 Transfer In  [facility]      06/02/21 1148 Transfer In  [facility]      06/02/21 1748 Transfer In  [facility] 5F      06/03/21 1129 Transfer In  [facility]      06/03/21 1739 Transfer In  [facility] 2 days 13 hours 1 minute             Mr. Colin Albrecht, 76 y.o. male is followed for:    CAD with unstable angina    Dilated CM     Chronic HFrEF 35-40% s/p upgrade BiV ICD and AV node ablation 3/2021    S/P CABG x 2 on 6/3/2021    Acute-on-chronic kidney injury (CMS/HCC)    Nonsustained ventricular tachycardia 6/5/2021 (CMS/HCC)    PAF s/p Watchman device 9/25/2020/ Bi-V ICD and AVN ablation 3/2021    SSS     Hypertension    Suspected chronic renal insufficiency    Former smoker    COPD        SUBJECTIVE     76-year-old white male former smoker recently diagnosed with multivessel CAD warranting CABG.  Has an extensive past medical history including dilated cardiomyopathy, SSS with PPM, permanent atrial fibrillation, and CKD..  Is not however on anticoagulation because of previous GI bleed/epistaxis while on Xarelto.  Instead a watchman device was placed 9/2020.  In March 2021 pacemaker was upgraded to a BiV ICD with AV yasemin ablation.  Was also begun on amiodarone because of increased frequency of PVCs.  He then noted increasing dyspnea for which his amiodarone was empirically discontinued.  There was no improvement thus he underwent LHC to rule out an anginal equivalent.  Was found to have an 80% ostial left main stenosis but otherwise only minimal stenoses/luminal irregularities.  LVEF was noted to be 35 to 40% which was about the same as in 2020.  Also noted was RVSP of 38 mmHg and diastolic dysfunction.  It was decided at this time to proceed with  "coronary revascularization.  Note the patient's preop PFTs were suboptimal revealing some obstructive lung disease. On 6/3/2021 patient underwent uneventful CABG x 2 and was returned to the ICU at 1730 on ventilatory support, but extubated by midnight.    Interval history: POD #3.   Hemodynamically doing well without the need for pressors. No further episodes of nonsustained V. tach since 6/5/2021 and beta-blocker dose being increased today (apparently the dose was skipped last evening because of marginal blood pressure).  No major complaints today and just the usual amount of residual incisional pain.  No cough, purulent sputum production, hemoptysis, or pleuritic pain.  No nausea, vomiting, diarrhea, melena, hematochezia, or hematemesis.  Still has Varghese catheter in place as is being diuresed.  Creatinine has dropped from 1.49, to 1.16 despite diuresis.  No fever and WBC normal.         ROS: Per subjective, all other systems reviewed and were negative.    The patient's relevant PMH, PSH, FH, and SH were reviewed and updated in Epic as appropriate. Allergies and Medications reviewed.    OBJECTIVE     /70   Pulse 60   Temp 98.2 °F (36.8 °C) (Oral)   Resp 22   Ht 188 cm (74.02\")   Wt 83.7 kg (184 lb 8 oz)   SpO2 100%   BMI 23.68 kg/m²   Flow (L/min): 2    Flowsheet Rows      First Filed Value   Admission Height  188 cm (74\") Documented at 06/02/2021 1015   Admission Weight  83.3 kg (183 lb 9.6 oz) Documented at 06/02/2021 1015        I&O:             Intake: 956 cc                      output: 1215 cc    Exam:  General Exam:  Well-developed well-nourished white male who appears younger than stated age.  HEENT: Pupils equal and reactive. Nose and throat clear.  Neck:                          Supple, no JVD, thyromegaly, or adenopathy  Lungs: Clear anteriorly, laterally, posteriorly  Cardiovascular: RRR without murmurs or gallops.  Ventricular paced rhythm at 60  Abdomen: Soft nontender without " organomegaly or masses.   and rectal: Varghese catheter in place  Extremities: No cyanosis clubbing edema.  Neurologic:                 Symmetric strength. No focal deficits.    Chest X-Ray: No film today    INFUSIONS  norepinephrine, 0.02-0.3 mcg/kg/min  sodium chloride, 30 mL/hr, Last Rate: 30 mL/hr (06/04/21 1239)        Results from last 7 days   Lab Units 06/06/21  0342 06/05/21 0317 06/04/21 2039 06/04/21  1243   WBC 10*3/mm3 7.72 7.99  --  9.91   HEMOGLOBIN g/dL 8.2* 7.7* 8.1* 8.2*   HEMATOCRIT % 25.4* 24.3* 24.8* 25.6*   PLATELETS 10*3/mm3 166 169  --  174     Results from last 7 days   Lab Units 06/06/21  0342 06/05/21  0317   SODIUM mmol/L 138 138   POTASSIUM mmol/L 4.5 4.6   CHLORIDE mmol/L 104 102   CO2 mmol/L 27.0 27.0   BUN mg/dL 29* 28*   CREATININE mg/dL 1.16 1.49*   GLUCOSE mg/dL 118* 137*   CALCIUM mg/dL 9.3 9.3     Results from last 7 days   Lab Units 06/05/21  0317 06/04/21 0320 06/03/21 2030   MAGNESIUM mg/dL 2.1 2.2 2.2   PHOSPHORUS mg/dL 4.3 2.8 1.4*     Results from last 7 days   Lab Units 06/04/21  0320 06/03/21 2030 06/02/21  1502   ALK PHOS U/L 44 45 69   BILIRUBIN mg/dL 1.1 1.4* 0.6   ALT (SGPT) U/L 15 10 8   AST (SGOT) U/L 28 26 20       No results found for: SEDRATE  No results found for: BNP  No results found for: CKTOTAL, CKMB, CKMBINDEX, TROPONINI, TROPONINT  Lab Results   Component Value Date    TSH 3.830 06/03/2021     No results found for: LACTATE  No results found for: CORTISOL    Results from last 7 days   Lab Units 06/04/21  0403 06/04/21  0004 06/03/21  1741   PH, ARTERIAL pH units 7.427 7.400 7.452*   PCO2, ARTERIAL mm Hg 43.0 44.1 35.3   PO2 ART mm Hg 82.3* 118.0* 490.0*   HCO3 ART mmol/L 28.3* 27.2* 24.6   FIO2 % 28 40 100       I reviewed the patient's results, images and medication.    Assessment/Plan   ASSESSMENT        CAD with unstable angina    Dilated CM     Chronic HFrEF 35-40% s/p upgrade BiV ICD and AV node ablation 3/2021    S/P CABG x 2 on 6/3/2021     Acute-on-chronic kidney injury (CMS/HCC)    Nonsustained ventricular tachycardia 6/5/2021 (CMS/[facility])    PAF s/p Watchman device 9/25/2020/ Bi-V ICD and AVN ablation 3/2021    SSS     Hypertension    Suspected chronic renal insufficiency    Former smoker    COPD       DISCUSSION: Doing well without further episodes of VT (episodes 6/5/2021 did not trigger a shock).  Beta-blocker dose has been adjusted and cardiology recommends sotalol should VT recur.  Renal function appears to have completely recovered    PLAN     1.  CT surgery diuresing today  2.  Should be able to pull Varghese anytime  3.  Beta-blocker dose has been increased  4.  Cardiology recommends sotalol should he develop recurrent VT  5.  To telemetry anytime from pulmonary standpoint    Plan of care and goals reviewed with multidisciplinary team at daily rounds.    I discussed the patient's findings and my recommendations with patient and nursing staff    Time spent Critical care 20 min (It does not include procedure time).    Electronically signed by Dao Dueñas MD, 06/06/21, 6:40 AM EDT.   Pulmonary / Critical care medicine     Electronically signed by Dao Dueñas MD, 06/06/21, 6:40 AM EDT.

## 2021-06-06 NOTE — PLAN OF CARE
Goal Outcome Evaluation:  Plan of Care Reviewed With: patient  Progress: improving   Remains alert and oriented. Nasal O2 2 liters nasal in use. Less wheezing today especially with walking. Continues to take rest breaks with walking. Remains 100% paced with no ectopy noted this shift. Varghese remains in place. Lasix given today with good results. See I/O. Laxative given today with 3 BM. All incisions remain dry and intact. IVF's continue to infuse. Continue to monitor.

## 2021-06-06 NOTE — PLAN OF CARE
Goal Outcome Evaluation:         Alert and oriented, anxious. Sats >90% on RA to 4L via NC, productive cough. Paced rate, VSS, good heart tones. No BM, normoactive BS. UOP adequate for shift, lowe maintained. No complaints of pain throughout shift.

## 2021-06-06 NOTE — PROGRESS NOTES
"                                 [facility] Progress Note      LOS: 4 days   Patient Care Team:  Arun Soliman MD as PCP - General (Family Medicine)  Semaj Park MD (Cardiology)    Chief Complaint: Chest pain and dyspnea    Subjective     Interval History: Quiet night    Patient Complaints: No chest pain or dyspnea this morning    Review of Systems:   A 14 point review of systems was negative except as was stated in the HPI      Objective     Vital Sign Min/Max for last 24 hours  Temp  Min: 98 °F (36.7 °C)  Max: 98.8 °F (37.1 °C)   BP  Min: 88/51  Max: 129/77   Pulse  Min: 59  Max: 152   Resp  Min: 18  Max: 22   SpO2  Min: 88 %  Max: 100 %   Flow (L/min)  Min: 2  Max: 4   No data recorded     Flowsheet Rows      First Filed Value   Admission Height  188 cm (74\") Documented at 06/02/2021 1015   Admission Weight  83.3 kg (183 lb 9.6 oz) Documented at 06/02/2021 1015          Physical Exam:  General Appearance: Alert, appears stated age and cooperative  Lungs: Clear to auscultation  Heart:: RRR   No Murmurs, Rubs or Gallops  Abdomen: Soft and nontender with adequate bowel sounds.  No organomegaly  Extremities: No cyanosis, clubbing or edema  Pulses: Pulses palpable and equal bilaterally  Skin: Warm and dry with no rash  Psych: Normal     Results Review:     I reviewed the patient's new clinical results.  Results from last 7 days   Lab Units 06/06/21 0342 06/05/21 0317 06/04/21  1749   SODIUM mmol/L 138 138 139   POTASSIUM mmol/L 4.5 4.6 4.2   CHLORIDE mmol/L 104 102 101   CO2 mmol/L 27.0 27.0 27.0   BUN mg/dL 29* 28* 26*   CREATININE mg/dL 1.16 1.49* 1.59*   GLUCOSE mg/dL 118* 137* 169*   CALCIUM mg/dL 9.3 9.3 9.4     Results from last 7 days   Lab Units 06/06/21  0342 06/05/21 0317 06/04/21 2039 06/04/21  1243   WBC 10*3/mm3 7.72 7.99  --  9.91   HEMOGLOBIN g/dL 8.2* 7.7* 8.1* 8.2*   HEMATOCRIT % 25.4* 24.3* 24.8* 25.6*   PLATELETS 10*3/mm3 166 169  --  174     No results found for: " TROPONINT  Results from last 7 days   Lab Units 06/02/21  1032   CHOLESTEROL mg/dL 182   TRIGLYCERIDES mg/dL 71   HDL CHOL mg/dL 52   LDL CHOL mg/dL 117*     Results from last 7 days   Lab Units 06/04/21  0325 06/03/21  1811 06/02/21  1502   INR  1.32* 1.67* 1.19*     Results from last 7 days   Lab Units 06/04/21  0403   PH, ARTERIAL pH units 7.427   PO2 ART mm Hg 82.3*   PCO2, ARTERIAL mm Hg 43.0   HCO3 ART mmol/L 28.3*           Medication Review: yes  Current Facility-Administered Medications   Medication Dose Route Frequency Provider Last Rate Last Admin   • acetaminophen (TYLENOL) tablet 650 mg  650 mg Oral Q4H PRN Corey Vasquez PA       • ALPRAZolam (XANAX) tablet 0.25 mg  0.25 mg Oral TID PRN Corey Vasquez PA   0.25 mg at 06/06/21 0549   • aluminum-magnesium hydroxide-simethicone (MAALOX MAX) 400-400-40 MG/5ML suspension 15 mL  15 mL Oral Q6H PRN Corey Vasquez PA       • aspirin EC tablet 325 mg  325 mg Oral Daily EmilylaCorey gomes PA   325 mg at 06/05/21 0918   • atorvastatin (LIPITOR) tablet 40 mg  40 mg Oral Nightly Corey Vasquez PA   40 mg at 06/05/21 2019   • calcium gluconate 1 g in sodium chloride 0.9 % 100 mL IVPB  1 g Intravenous Once PRN Corey Vasquez PA        And   • calcium gluconate 6 g in sodium chloride 0.9 % 500 mL IVPB  6 g Intravenous Once PRN Corey Vasquez PA       • calcium gluconate 2 g in sodium chloride 0.9 % 100 mL IVPB  2 g Intravenous Once PRN Corey Vasquez PA       • dextrose (D50W) 25 g/ 50mL Intravenous Solution 25 g  25 g Intravenous Q15 Min PRN Dao Dueñas MD       • famotidine (PEPCID) tablet 20 mg  20 mg Oral BID Dao Dueñas MD   20 mg at 06/05/21 2019   • HYDROcodone-acetaminophen (NORCO) 7.5-325 MG per tablet 1 tablet  1 tablet Oral Q4H PRN Jaime Shields MD       • insulin lispro (humaLOG) injection 0-7 Units  0-7 Units Subcutaneous TID AC Dao Dueñas MD   2 Units at 06/04/21 1811   • ipratropium-albuterol (DUO-NEB) nebulizer  solution 3 mL  3 mL Nebulization Q4H PRN Corey Vasquez PA   3 mL at 06/05/21 1614   • levothyroxine (SYNTHROID, LEVOTHROID) tablet 50 mcg  50 mcg Oral Daily Corey Vasquez PA   50 mcg at 06/05/21 0918   • metoprolol tartrate (LOPRESSOR) tablet 25 mg  25 mg Oral Q12H Iam Ramos MD       • morphine injection 2 mg  2 mg Intravenous Q2H PRN Dao Dueñas MD       • norepinephrine (LEVOPHED) 8 mg in 250 mL NS infusion (premix)  0.02-0.3 mcg/kg/min Intravenous Continuous PRN Corey Vasquez PA       • ondansetron (ZOFRAN) tablet 4 mg  4 mg Oral Q6H PRN Corey Vasquez PA        Or   • ondansetron (ZOFRAN) injection 4 mg  4 mg Intravenous Q6H PRN Corey Vasquez PA       • oxyCODONE-acetaminophen (PERCOCET) 5-325 MG per tablet 2 tablet  2 tablet Oral Q4H PRN Jaime Shields MD   2 tablet at 06/05/21 2049   • Pharmacy Consult - MTM   Does not apply Daily Corey Vasquez PA       • potassium chloride (MICRO-K) CR capsule 40 mEq  40 mEq Oral PRN Corey Vasquez PA   40 mEq at 06/04/21 1053    Or   • potassium chloride (KLOR-CON) packet 40 mEq  40 mEq Oral PRN Corey Vasquez PA       • potassium chloride 10 mEq in 100 mL IVPB  10 mEq Intravenous Q1H PRN Jaime Shields MD       • potassium phosphate 45 mmol in sodium chloride 0.9 % 500 mL infusion  45 mmol Intravenous PRN Jaime Shields MD        Or   • potassium phosphate 30 mmol in sodium chloride 0.9 % 250 mL infusion  30 mmol Intravenous PRN Jaime Shields MD        Or   • potassium phosphate 15 mmol in sodium chloride 0.9 % 100 mL infusion  15 mmol Intravenous PRN Jaime Shields MD        Or   • sodium phosphates 45 mmol in sodium chloride 0.9 % 250 mL IVPB  45 mmol Intravenous PRN Jaime Shields MD        Or   • sodium phosphates 30 mmol in sodium chloride 0.9 % 250 mL IVPB  30 mmol Intravenous PRN Jaime Shields MD        Or   • sodium phosphates 15 mmol in sodium chloride 0.9 % 250 mL IVPB  15 mmol Intravenous PRN  Jaime Shields MD       • sennosides-docusate (PERICOLACE) 8.6-50 MG per tablet 2 tablet  2 tablet Oral BID Jaime Shields MD   2 tablet at 06/05/21 2049   • sodium chloride 0.45 % infusion  30 mL/hr Intravenous Continuous Dao Dueañs MD 30 mL/hr at 06/04/21 1239 30 mL/hr at 06/04/21 1239   • sodium chloride 0.9 % flush 10 mL  10 mL Intravenous Q12H Corey Vasquez PA   10 mL at 06/05/21 2049   • tamsulosin (FLOMAX) 24 hr capsule 0.4 mg  0.4 mg Oral Daily Iam Estrada MD   0.4 mg at 06/05/21 1134         CAD with unstable angina    PAF s/p Watchman device 9/25/2020/ Bi-V ICD and AVN ablation 3/2021    SSS     Dilated CM     Chronic HFrEF 35-40% s/p upgrade BiV ICD and AV node ablation 3/2021    Hypertension    Former smoker    COPD     Suspected chronic renal insufficiency    S/P CABG x 2 on 6/3/2021    Acute-on-chronic kidney injury (CMS/HCC)        Impression      CABG 6/3/2021  Preop EF 35 to 40%  Permanent atrial fibrillation status post AV node ablation and BiV ICD  CKD--creatinine appears at baseline today  Hypothyroidism  Nonsustained ventricular tachycardia on monitor      Plan       Continue ICU observation today  Increase beta-blocker dose; if he has further episodes of prolonged nonsustained VT would consider changing to sotalol  Add Entresto when hemodynamics stabilize postoperatively      Iam Ramos MD   06/06/21  07:31 EDT    Short runs of nonsustained ventricular tachycardia verified by interrogation of [employer] device yesterday morning.  We will continue to observe.  Episodes have not met criteria for initial tachy therapy

## 2021-06-06 NOTE — PROGRESS NOTES
CTS Progress Note      POD #3  s/p CABG x 2       LOS: 4 days   Patient Care Team:  Arun Soliman MD as PCP - General (Family Medicine)  Semaj Park MD (Cardiology)    Subjective  Short runs of V. tach yesterday none overnight  Objective    Vital Signs  Temp:  [98 °F (36.7 °C)-98.8 °F (37.1 °C)] 98.2 °F (36.8 °C)  Heart Rate:  [] 60  Resp:  [18-22] 20  BP: ()/(51-88) 126/70    Physical Exam:   General Appearance: alert, appears stated age and cooperative   Lungs: clear to auscultation, respirations regular, respirations even and respirations unlabored   Heart: regular rhythm & normal rate, normal S1, S2, no murmur, no gallop, no rub and no click   Skin: Sternum stable Incision c/d/i     Results     Results from last 7 days   Lab Units 06/06/21  0342   WBC 10*3/mm3 7.72   HEMOGLOBIN g/dL 8.2*   HEMATOCRIT % 25.4*   PLATELETS 10*3/mm3 166     Results from last 7 days   Lab Units 06/06/21  0342   SODIUM mmol/L 138   POTASSIUM mmol/L 4.5   CHLORIDE mmol/L 104   CO2 mmol/L 27.0   BUN mg/dL 29*   CREATININE mg/dL 1.16   GLUCOSE mg/dL 118*   CALCIUM mg/dL 9.3           Imaging Results (Last 24 Hours)     Procedure Component Value Units Date/Time    XR Chest 1 View [[id number]] Collected: 06/05/21 0938     Updated: 06/05/21 1554    Narrative:         EXAMINATION: XR CHEST 1 VW - 06/05/2021     INDICATION: ; I25.118-Atherosclerotic heart disease of native coronary  artery with other forms of angina pectoris; I20.0-Unstable angina.  Post-op heart surgery.     COMPARISON: 06/04/2021     FINDINGS: Portable chest reveals patient is status post median  sternotomy. Cardiac pacer leads in satisfactory position. Mild increased  markings identified at the left lung base. Small bilateral pleural  effusions. Degenerative changes seen within the spine. The lung fields  are grossly clear.           Impression:      Minimal increased markings at the left lung base. The  pulmonary vascularity remains pronounced. Heart is  borderline enlarged.  No new focal right opacification present.     DICTATED:   06/05/2021  EDITED/ls :   06/05/2021     This report was finalized on 6/5/2021 3:51 PM by Dr. Karine Limon MD.             Assessment      CAD with unstable angina    PAF s/p Watchman device 9/25/2020/ Bi-V ICD and AVN ablation 3/2021    SSS     Dilated CM     Chronic HFrEF 35-40% s/p upgrade BiV ICD and AV node ablation 3/2021    Hypertension    Former smoker    COPD     Suspected chronic renal insufficiency    S/P CABG x 2 on 6/3/2021    Acute-on-chronic kidney injury (CMS/HCC)      Plan   Cr to baseline  Short runs of V. tach yesterday, he has an ICD  Increase beta-blocker  Continue to monitor in ICU  40 Lasix      Catrina Pina PA-C  06/06/21  07:17 EDT

## 2021-06-07 ENCOUNTER — APPOINTMENT (OUTPATIENT)
Dept: GENERAL RADIOLOGY | Facility: HOSPITAL | Age: 76
End: 2021-06-07

## 2021-06-07 LAB
ACT BLD: 125 SECONDS (ref 82–152)
ACT BLD: 731 SECONDS (ref 82–152)
ALBUMIN SERPL-MCNC: 3.3 G/DL (ref 3.5–5.2)
ALBUMIN SERPL-MCNC: 3.8 G/DL (ref 3.5–5.2)
ALP SERPL-CCNC: 53 U/L (ref 39–117)
ALT SERPL W P-5'-P-CCNC: 12 U/L (ref 1–41)
ANION GAP SERPL CALCULATED.3IONS-SCNC: 7 MMOL/L (ref 5–15)
ANION GAP SERPL CALCULATED.3IONS-SCNC: 8 MMOL/L (ref 5–15)
AST SERPL-CCNC: 23 U/L (ref 1–40)
BASE EXCESS BLDA CALC-SCNC: -2 MMOL/L (ref -5–5)
BASE EXCESS BLDA CALC-SCNC: 0 MMOL/L (ref -5–5)
BASE EXCESS BLDA CALC-SCNC: 0 MMOL/L (ref -5–5)
BASE EXCESS BLDA CALC-SCNC: 1 MMOL/L (ref -5–5)
BASE EXCESS BLDA CALC-SCNC: 2 MMOL/L (ref -5–5)
BASE EXCESS BLDA CALC-SCNC: 2 MMOL/L (ref -5–5)
BASOPHILS # BLD AUTO: 0.02 10*3/MM3 (ref 0–0.2)
BASOPHILS NFR BLD AUTO: 0.2 % (ref 0–1.5)
BILIRUB SERPL-MCNC: 0.9 MG/DL (ref 0–1.2)
BUN SERPL-MCNC: 34 MG/DL (ref 8–23)
BUN SERPL-MCNC: 35 MG/DL (ref 8–23)
BUN/CREAT SERPL: 35.7 (ref 7–25)
CA-I BLDA-SCNC: 1.02 MMOL/L (ref 1.2–1.32)
CA-I BLDA-SCNC: 1.02 MMOL/L (ref 1.2–1.32)
CA-I BLDA-SCNC: 1.04 MMOL/L (ref 1.2–1.32)
CA-I BLDA-SCNC: 1.19 MMOL/L (ref 1.2–1.32)
CA-I BLDA-SCNC: 1.19 MMOL/L (ref 1.2–1.32)
CA-I BLDA-SCNC: 1.24 MMOL/L (ref 1.2–1.32)
CALCIUM SPEC-SCNC: 8.8 MG/DL (ref 8.6–10.5)
CALCIUM SPEC-SCNC: 9.4 MG/DL (ref 8.6–10.5)
CHLORIDE SERPL-SCNC: 102 MMOL/L (ref 98–107)
CHLORIDE SERPL-SCNC: 104 MMOL/L (ref 98–107)
CHOLEST SERPL-MCNC: 101 MG/DL (ref 0–200)
CO2 BLDA-SCNC: 23 MMOL/L (ref 24–29)
CO2 BLDA-SCNC: 26 MMOL/L (ref 24–29)
CO2 BLDA-SCNC: 27 MMOL/L (ref 24–29)
CO2 BLDA-SCNC: 28 MMOL/L (ref 24–29)
CO2 SERPL-SCNC: 26 MMOL/L (ref 22–29)
CO2 SERPL-SCNC: 28 MMOL/L (ref 22–29)
CREAT SERPL-MCNC: 0.98 MG/DL (ref 0.76–1.27)
CREAT SERPL-MCNC: 1.22 MG/DL (ref 0.76–1.27)
DEPRECATED RDW RBC AUTO: 45.5 FL (ref 37–54)
EOSINOPHIL # BLD AUTO: 0.13 10*3/MM3 (ref 0–0.4)
EOSINOPHIL NFR BLD AUTO: 1.6 % (ref 0.3–6.2)
ERYTHROCYTE [DISTWIDTH] IN BLOOD BY AUTOMATED COUNT: 12.6 % (ref 12.3–15.4)
GFR SERPL CREATININE-BSD FRML MDRD: 75 ML/MIN/1.73
GLUCOSE BLDC GLUCOMTR-MCNC: 103 MG/DL (ref 70–130)
GLUCOSE BLDC GLUCOMTR-MCNC: 115 MG/DL (ref 70–130)
GLUCOSE BLDC GLUCOMTR-MCNC: 117 MG/DL (ref 70–130)
GLUCOSE BLDC GLUCOMTR-MCNC: 118 MG/DL (ref 70–130)
GLUCOSE BLDC GLUCOMTR-MCNC: 119 MG/DL (ref 70–130)
GLUCOSE BLDC GLUCOMTR-MCNC: 121 MG/DL (ref 70–130)
GLUCOSE BLDC GLUCOMTR-MCNC: 122 MG/DL (ref 70–130)
GLUCOSE BLDC GLUCOMTR-MCNC: 125 MG/DL (ref 70–130)
GLUCOSE BLDC GLUCOMTR-MCNC: 149 MG/DL (ref 70–130)
GLUCOSE BLDC GLUCOMTR-MCNC: 93 MG/DL (ref 70–130)
GLUCOSE SERPL-MCNC: 109 MG/DL (ref 65–99)
GLUCOSE SERPL-MCNC: 118 MG/DL (ref 65–99)
HCO3 BLDA-SCNC: 22.1 MMOL/L (ref 22–26)
HCO3 BLDA-SCNC: 25.1 MMOL/L (ref 22–26)
HCO3 BLDA-SCNC: 25.4 MMOL/L (ref 22–26)
HCO3 BLDA-SCNC: 26 MMOL/L (ref 22–26)
HCO3 BLDA-SCNC: 26.2 MMOL/L (ref 22–26)
HCO3 BLDA-SCNC: 26.5 MMOL/L (ref 22–26)
HCT VFR BLD AUTO: 27.7 % (ref 37.5–51)
HCT VFR BLDA CALC: 25 % (ref 38–51)
HCT VFR BLDA CALC: 26 % (ref 38–51)
HCT VFR BLDA CALC: 28 % (ref 38–51)
HCT VFR BLDA CALC: 28 % (ref 38–51)
HCT VFR BLDA CALC: 31 % (ref 38–51)
HCT VFR BLDA CALC: 36 % (ref 38–51)
HGB BLD-MCNC: 9 G/DL (ref 13–17.7)
HGB BLDA-MCNC: 10.5 G/DL (ref 12–17)
HGB BLDA-MCNC: 12.2 G/DL (ref 12–17)
HGB BLDA-MCNC: 8.5 G/DL (ref 12–17)
HGB BLDA-MCNC: 8.8 G/DL (ref 12–17)
HGB BLDA-MCNC: 9.5 G/DL (ref 12–17)
HGB BLDA-MCNC: 9.5 G/DL (ref 12–17)
IMM GRANULOCYTES # BLD AUTO: 0.05 10*3/MM3 (ref 0–0.05)
IMM GRANULOCYTES NFR BLD AUTO: 0.6 % (ref 0–0.5)
LYMPHOCYTES # BLD AUTO: 1.05 10*3/MM3 (ref 0.7–3.1)
LYMPHOCYTES NFR BLD AUTO: 12.5 % (ref 19.6–45.3)
MAGNESIUM SERPL-MCNC: 1.8 MG/DL (ref 1.6–2.4)
MAGNESIUM SERPL-MCNC: 2 MG/DL (ref 1.6–2.4)
MCH RBC QN AUTO: 32 PG (ref 26.6–33)
MCHC RBC AUTO-ENTMCNC: 32.5 G/DL (ref 31.5–35.7)
MCV RBC AUTO: 98.6 FL (ref 79–97)
MONOCYTES # BLD AUTO: 1.04 10*3/MM3 (ref 0.1–0.9)
MONOCYTES NFR BLD AUTO: 12.4 % (ref 5–12)
NEUTROPHILS NFR BLD AUTO: 6.09 10*3/MM3 (ref 1.7–7)
NEUTROPHILS NFR BLD AUTO: 72.7 % (ref 42.7–76)
NRBC BLD AUTO-RTO: 0 /100 WBC (ref 0–0.2)
PCO2 BLDA: 33.4 MM HG (ref 35–45)
PCO2 BLDA: 40.1 MM HG (ref 35–45)
PCO2 BLDA: 40.9 MM HG (ref 35–45)
PCO2 BLDA: 42.2 MM HG (ref 35–45)
PCO2 BLDA: 45.1 MM HG (ref 35–45)
PCO2 BLDA: 45.3 MM HG (ref 35–45)
PH BLDA: 7.35 PH UNITS (ref 7.35–7.6)
PH BLDA: 7.36 PH UNITS (ref 7.35–7.6)
PH BLDA: 7.41 PH UNITS (ref 7.35–7.6)
PH BLDA: 7.41 PH UNITS (ref 7.35–7.6)
PH BLDA: 7.42 PH UNITS (ref 7.35–7.6)
PH BLDA: 7.43 PH UNITS (ref 7.35–7.6)
PHOSPHATE SERPL-MCNC: 2.4 MG/DL (ref 2.5–4.5)
PHOSPHATE SERPL-MCNC: 2.7 MG/DL (ref 2.5–4.5)
PLATELET # BLD AUTO: 183 10*3/MM3 (ref 140–450)
PMV BLD AUTO: 10.5 FL (ref 6–12)
PO2 BLDA: 426 MMHG (ref 80–105)
PO2 BLDA: 43 MMHG (ref 80–105)
PO2 BLDA: 464 MMHG (ref 80–105)
PO2 BLDA: 477 MMHG (ref 80–105)
PO2 BLDA: 533 MMHG (ref 80–105)
PO2 BLDA: 85 MMHG (ref 80–105)
POTASSIUM BLDA-SCNC: 4.1 MMOL/L (ref 3.5–4.9)
POTASSIUM BLDA-SCNC: 4.2 MMOL/L (ref 3.5–4.9)
POTASSIUM BLDA-SCNC: 4.8 MMOL/L (ref 3.5–4.9)
POTASSIUM BLDA-SCNC: 5 MMOL/L (ref 3.5–4.9)
POTASSIUM BLDA-SCNC: 5.2 MMOL/L (ref 3.5–4.9)
POTASSIUM BLDA-SCNC: 5.7 MMOL/L (ref 3.5–4.9)
POTASSIUM SERPL-SCNC: 4 MMOL/L (ref 3.5–5.2)
POTASSIUM SERPL-SCNC: 4.1 MMOL/L (ref 3.5–5.2)
PROT SERPL-MCNC: 6 G/DL (ref 6–8.5)
QT INTERVAL: 382 MS
QTC INTERVAL: 497 MS
RBC # BLD AUTO: 2.81 10*6/MM3 (ref 4.14–5.8)
SAO2 % BLDA: 100 % (ref 95–98)
SAO2 % BLDA: 76 % (ref 95–98)
SAO2 % BLDA: 97 % (ref 95–98)
SODIUM BLD-SCNC: 136 MMOL/L (ref 138–146)
SODIUM BLD-SCNC: 136 MMOL/L (ref 138–146)
SODIUM BLD-SCNC: 137 MMOL/L (ref 138–146)
SODIUM BLD-SCNC: 138 MMOL/L (ref 138–146)
SODIUM BLD-SCNC: 140 MMOL/L (ref 138–146)
SODIUM BLD-SCNC: 140 MMOL/L (ref 138–146)
SODIUM SERPL-SCNC: 136 MMOL/L (ref 136–145)
SODIUM SERPL-SCNC: 139 MMOL/L (ref 136–145)
TRIGL SERPL-MCNC: 70 MG/DL (ref 0–150)
WBC # BLD AUTO: 8.38 10*3/MM3 (ref 3.4–10.8)

## 2021-06-07 PROCEDURE — 99232 SBSQ HOSP IP/OBS MODERATE 35: CPT | Performed by: INTERNAL MEDICINE

## 2021-06-07 PROCEDURE — 94799 UNLISTED PULMONARY SVC/PX: CPT

## 2021-06-07 PROCEDURE — 83735 ASSAY OF MAGNESIUM: CPT | Performed by: INTERNAL MEDICINE

## 2021-06-07 PROCEDURE — 84478 ASSAY OF TRIGLYCERIDES: CPT | Performed by: INTERNAL MEDICINE

## 2021-06-07 PROCEDURE — 25010000002 HEPARIN (PORCINE) PER 1000 UNITS: Performed by: INTERNAL MEDICINE

## 2021-06-07 PROCEDURE — 85025 COMPLETE CBC W/AUTO DIFF WBC: CPT | Performed by: INTERNAL MEDICINE

## 2021-06-07 PROCEDURE — 82465 ASSAY BLD/SERUM CHOLESTEROL: CPT | Performed by: INTERNAL MEDICINE

## 2021-06-07 PROCEDURE — 82962 GLUCOSE BLOOD TEST: CPT

## 2021-06-07 PROCEDURE — 97530 THERAPEUTIC ACTIVITIES: CPT

## 2021-06-07 PROCEDURE — 80069 RENAL FUNCTION PANEL: CPT | Performed by: THORACIC SURGERY (CARDIOTHORACIC VASCULAR SURGERY)

## 2021-06-07 PROCEDURE — 80053 COMPREHEN METABOLIC PANEL: CPT | Performed by: INTERNAL MEDICINE

## 2021-06-07 PROCEDURE — 83735 ASSAY OF MAGNESIUM: CPT | Performed by: THORACIC SURGERY (CARDIOTHORACIC VASCULAR SURGERY)

## 2021-06-07 PROCEDURE — 25010000003 MAGNESIUM SULFATE 4 GM/100ML SOLUTION: Performed by: THORACIC SURGERY (CARDIOTHORACIC VASCULAR SURGERY)

## 2021-06-07 PROCEDURE — 84100 ASSAY OF PHOSPHORUS: CPT | Performed by: INTERNAL MEDICINE

## 2021-06-07 PROCEDURE — 71045 X-RAY EXAM CHEST 1 VIEW: CPT

## 2021-06-07 RX ORDER — MAGNESIUM SULFATE HEPTAHYDRATE 40 MG/ML
2 INJECTION, SOLUTION INTRAVENOUS AS NEEDED
Status: DISCONTINUED | OUTPATIENT
Start: 2021-06-07 | End: 2021-06-10 | Stop reason: HOSPADM

## 2021-06-07 RX ORDER — HEPARIN SODIUM 5000 [USP'U]/ML
5000 INJECTION, SOLUTION INTRAVENOUS; SUBCUTANEOUS EVERY 8 HOURS SCHEDULED
Status: DISCONTINUED | OUTPATIENT
Start: 2021-06-07 | End: 2021-06-10 | Stop reason: HOSPADM

## 2021-06-07 RX ORDER — MAGNESIUM SULFATE HEPTAHYDRATE 40 MG/ML
4 INJECTION, SOLUTION INTRAVENOUS AS NEEDED
Status: DISCONTINUED | OUTPATIENT
Start: 2021-06-07 | End: 2021-06-10 | Stop reason: HOSPADM

## 2021-06-07 RX ADMIN — TAMSULOSIN HYDROCHLORIDE 0.4 MG: 0.4 CAPSULE ORAL at 08:00

## 2021-06-07 RX ADMIN — METOPROLOL TARTRATE 50 MG: 50 TABLET, FILM COATED ORAL at 22:17

## 2021-06-07 RX ADMIN — LEVOTHYROXINE SODIUM 50 MCG: 50 TABLET ORAL at 06:25

## 2021-06-07 RX ADMIN — MAGNESIUM SULFATE HEPTAHYDRATE 4 G: 40 INJECTION, SOLUTION INTRAVENOUS at 13:18

## 2021-06-07 RX ADMIN — HEPARIN SODIUM 5000 UNITS: 5000 INJECTION INTRAVENOUS; SUBCUTANEOUS at 13:17

## 2021-06-07 RX ADMIN — ASPIRIN 325 MG: 325 TABLET, COATED ORAL at 08:00

## 2021-06-07 RX ADMIN — METOPROLOL TARTRATE 50 MG: 50 TABLET, FILM COATED ORAL at 08:00

## 2021-06-07 RX ADMIN — HEPARIN SODIUM 5000 UNITS: 5000 INJECTION INTRAVENOUS; SUBCUTANEOUS at 22:18

## 2021-06-07 RX ADMIN — HYDROCODONE BITARTRATE AND ACETAMINOPHEN 1 TABLET: 7.5; 325 TABLET ORAL at 10:02

## 2021-06-07 RX ADMIN — FERROUS SULFATE TAB 325 MG (65 MG ELEMENTAL FE) 325 MG: 325 (65 FE) TAB at 08:00

## 2021-06-07 RX ADMIN — SODIUM CHLORIDE, PRESERVATIVE FREE 10 ML: 5 INJECTION INTRAVENOUS at 22:18

## 2021-06-07 RX ADMIN — OXYCODONE HYDROCHLORIDE AND ACETAMINOPHEN 2 TABLET: 5; 325 TABLET ORAL at 22:18

## 2021-06-07 RX ADMIN — FAMOTIDINE 20 MG: 20 TABLET, FILM COATED ORAL at 08:00

## 2021-06-07 RX ADMIN — ATORVASTATIN CALCIUM 40 MG: 40 TABLET, FILM COATED ORAL at 22:17

## 2021-06-07 RX ADMIN — IPRATROPIUM BROMIDE AND ALBUTEROL SULFATE 3 ML: 2.5; .5 SOLUTION RESPIRATORY (INHALATION) at 15:05

## 2021-06-07 RX ADMIN — FAMOTIDINE 20 MG: 20 TABLET, FILM COATED ORAL at 22:17

## 2021-06-07 NOTE — PLAN OF CARE
Goal Outcome Evaluation:  Plan of Care Reviewed With: patient        Progress: improving  Outcome Summary: VSS, Pt AOx4, anxious at times. Ambulated x2 with standby assist- SOA and expiratory wheezes noted; PRN duoneb given. Waiting tele bed. Mag replaced this afternoon-- will continue to monitor.

## 2021-06-07 NOTE — THERAPY TREATMENT NOTE
Patient Name: Colin Albrecht  : 1945    MRN: [id number]                              Today's Date: 2021       Admit Date: 2021    Visit Dx:     ICD-10-CM ICD-9-CM   1. Coronary artery disease involving native coronary artery of native heart with other form of angina pectoris (CMS/[facility])  I25.118 414.01     413.9   2. Unstable angina (CMS/[facility])  I20.0 411.1     Patient Active Problem List   Diagnosis   • PAF s/p Watchman device 2020/ Bi-V ICD and AVN ablation 3/2021   • Gastrointestinal hemorrhage associated with gastric ulcer   • SSS    • Dilated CM    • S/P Watchman device   • Chronic HFrEF 35-40% s/p upgrade BiV ICD and AV node ablation 3/2021   • Unstable angina    • CAD with unstable angina   • Hypertension   • Former smoker   • COPD    • Suspected chronic renal insufficiency   • S/P CABG x 2 on 6/3/2021   • Acute-on-chronic kidney injury (CMS/[facility])   • Nonsustained ventricular tachycardia 2021 (CMS/[facility])     Past Medical History:   Diagnosis Date   • Abnormal ECG    • Arrhythmia    • Atrial fibrillation (CMS/[facility])    • CHF (congestive heart failure) (CMS/[facility])    • Depression    • History of transfusion     bleeding stomach ulcer ~2014 x2, no reaction    • Hypertension    • Stomach ulcer    • Wears reading eyeglasses      Past Surgical History:   Procedure Laterality Date   • ATRIAL APPENDAGE EXCLUSION LEFT WITH TRANSESOPHAGEAL ECHOCARDIOGRAM N/A 2020    Procedure: Atrial Appendage Occlusion (Watchman), start Eliquis 2 weeks prior and hold 1 day, GA;  Surgeon: Yonny Prado MD;  Location:  [facility];  Service: Cardiology;  Laterality: N/A;   • CARDIAC CATHETERIZATION     • CARDIAC CATHETERIZATION N/A 2021    Procedure: Left Heart Cath- ADD ON/ WALK IN FOR RDS PER KT;  Surgeon: Pietro Quintana MD;  Location:  [facility];  Service: Cardiovascular;  Laterality: N/A;   • CARDIAC ELECTROPHYSIOLOGY PROCEDURE N/A 3/26/2021    Procedure: DDD PPM  upgrade to Biv ICD (MDT), no meds to hold;  Surgeon: Yonny Prado MD;  Location:  [facility];  Service: Cardiology;  Laterality: N/A;   • CARDIAC ELECTROPHYSIOLOGY PROCEDURE N/A 3/26/2021    Procedure: AVN RFA;  Surgeon: Yonny Prado MD;  Location:  [facility];  Service: Cardiovascular;  Laterality: N/A;   • COLONOSCOPY     • CORONARY ARTERY BYPASS GRAFT N/A 6/3/2021    Procedure: MEDIAN STERNOTOMY, CORONARY ARTERY BYPASS WITH INTERNAL MAMMARY ARTERY GRAFT X 2, EVH OF THE RIGHT GREATER SAPHENOUS ANA;  Surgeon: Jaime Shields MD;  Location:  [facility];  Service: Cardiothoracic;  Laterality: N/A;   • INSERT / REPLACE / REMOVE PACEMAKER       General Information     Row Name 06/07/21 1126          Physical Therapy Time and Intention    Document Type  therapy note (daily note)  -KG     Mode of Treatment  physical therapy  -KG     Row Name 06/07/21 1126          General Information    Existing Precautions/Restrictions  cardiac;fall;oxygen therapy device and L/min;sternal;pacemaker  -KG     Row Name 06/07/21 1126          Cognition    Orientation Status (Cognition)  oriented x 3  -KG     Row Name 06/07/21 1126          Safety Issues, Functional Mobility    Safety Issues Affecting Function (Mobility)  awareness of need for assistance;insight into deficits/self-awareness;safety precaution awareness;safety precautions follow-through/compliance  -KG     Impairments Affecting Function (Mobility)  balance;endurance/activity tolerance;postural/trunk control;pain;shortness of breath;strength  -KG       User Key  (r) = Recorded By, (t) = Taken By, (c) = Cosigned By    Initials Name Provider Type    KG Amy Cadena, PT Physical Therapist        Mobility     Row Name 06/07/21 1126          Bed Mobility    Bed Mobility  supine-sit;sit-supine  -KG     Supine-Sit [place] (Bed Mobility)  minimum assist (75% patient effort);verbal cues  -KG     Sit-Supine [place] (Bed  Mobility)  minimum assist (75% patient effort);2 person assist;verbal cues  -KG     Assistive Device (Bed Mobility)  draw sheet;head of bed elevated  -KG     Comment (Bed Mobility)  VC's for sequencing. Pt required assistance at trunk and BLEs.  -KG     Row Name 06/07/21 1126          Transfers    Comment (Transfers)  VC's for sequencing and safe hand placement to maintain sternal precautions. Pt impulsive, attempting to stand prior to instruction.  -KG     Row Name 06/07/21 1126          Sit-Stand Transfer    Sit-Stand [place] (Transfers)  minimum assist (75% patient effort);verbal cues  -KG     Row Name 06/07/21 1126          Gait/Stairs (Locomotion)    [place] Level (Gait)  minimum assist (75% patient effort);verbal cues  -KG     Assistive Device (Gait)  other (see comments) B UE support on tele monitor  -KG     Distance in Feet (Gait)  220  -KG     Deviations/Abnormal Patterns (Gait)  base of support, narrow;stride length decreased  -KG     Bilateral Gait Deviations  forward flexed posture;heel strike decreased  -KG     Comment (Gait/Stairs)  Pt demonstrated step through gait pattern with quick steps. Cues for slower, more controlled gait speed. Pt required two standing rest breaks due to increased fatigue and SOA.  -KG       User Key  (r) = Recorded By, (t) = Taken By, (c) = Cosigned By    Initials Name Provider Type    Amy Olsen PT Physical Therapist        Obj/Interventions     Row Name 06/07/21 1129          Balance    Balance Assessment  sitting static balance;standing static balance;standing dynamic balance  -KG     Static Sitting Balance  WFL;sitting, edge of bed  -KG     Static Standing Balance  mild impairment;supported;standing  -KG     Dynamic Standing Balance  mild impairment;supported;standing  -KG       User Key  (r) = Recorded By, (t) = Taken By, (c) = Cosigned By    Initials Name Provider Type    Amy Olsen PT Physical Therapist        Goals/Plan    No  documentation.       Clinical Impression     Row Name 06/07/21 1130          Pain    Additional Documentation  Pain Scale: Numbers Pre/Post-Treatment (Group)  -KG     Row Name 06/07/21 1130          Pain Scale: Numbers Pre/Post-Treatment    Pretreatment Pain Rating  5/10  -KG     Posttreatment Pain Rating  5/10  -KG     Pain Location - Orientation  incisional  -KG     Pain Location  chest  -KG     Pain Intervention(s)  Repositioned;Ambulation/increased activity  -KG     Row Name 06/07/21 1130          Plan of Care Review    Plan of Care Reviewed With  patient  -KG     Progress  improving  -KG     Outcome Summary  Pt increased ambulation distance to 220ft with Rosa and B UE support on tele monitor. Pt required cues for slower, more controlled gait speed. Required two standing rest breaks due to fatigue and SOA. Continue to progress as appropriate.  -KG     [facility] Name 06/07/21 1130          Vital Signs    Pre Systolic BP Rehab  140  -KG     Pre Treatment Diastolic BP  78  -KG     Post Systolic BP Rehab  156  -KG     Post Treatment Diastolic BP  78  -KG     Pretreatment Heart Rate (beats/min)  60  -KG     Posttreatment Heart Rate (beats/min)  62  -KG     Pre SpO2 (%)  95  -KG     O2 Delivery Pre Treatment  supplemental O2  -KG     Post SpO2 (%)  100  -KG     O2 Delivery Post Treatment  supplemental O2  -KG     Pre Patient Position  Supine  -KG     Intra Patient Position  Standing  -KG     Post Patient Position  Supine  -KG     Row Name 06/07/21 1130          Positioning and Restraints    Pre-Treatment Position  in bed  -KG     Post Treatment Position  bed  -KG     In Bed  notified nsg;supine;call light within reach;encouraged to call for assist;side rails up x3;RUE elevated;LUE elevated  -KG       User Key  (r) = Recorded By, (t) = Taken By, (c) = Cosigned By    Initials Name Provider Type    KG Amy Cadena, PT Physical Therapist        Outcome Measures     Row Name 06/07/21 1138 06/07/21 0800       How much  help from another person do you currently need...    Turning from your back to your side while in flat bed without using bedrails?  3  -KG  3  -ER    Moving from lying on back to sitting on the side of a flat bed without bedrails?  3  -KG  3  -ER    Moving to and from a bed to a chair (including a wheelchair)?  3  -KG  3  -ER    Standing up from a chair using your arms (e.g., wheelchair, bedside chair)?  3  -KG  3  -ER    Climbing 3-5 steps with a railing?  2  -KG  2  -ER    To walk in hospital room?  3  -KG  3  -ER    AM-PAC 6 Clicks Score (PT)  17  -KG  17  -ER    Row Name 06/07/21 1138          Functional Assessment    Outcome Measure Options  AM-PAC 6 Clicks Basic Mobility (PT)  -KG       User Key  (r) = Recorded By, (t) = Taken By, (c) = Cosigned By    Initials Name Provider Type    ER Macrina Silva, RN Registered Nurse    Amy Olsen, PT Physical Therapist        Physical Therapy Education                 Title: PT OT SLP Therapies (In Progress)     Topic: Physical Therapy (In Progress)     Point: Mobility training (In Progress)     Learning Progress Summary           Patient Acceptance, E, NR by KG at 6/7/2021 0940    Acceptance, E, NR by TRAMAINE at 6/4/2021 0838                   Point: Home exercise program (In Progress)     Learning Progress Summary           Patient Acceptance, E, NR by KG at 6/7/2021 0940    Acceptance, E, NR by TRAMANIE at 6/4/2021 0838                   Point: Body mechanics (In Progress)     Learning Progress Summary           Patient Acceptance, E, NR by KG at 6/7/2021 0940    Acceptance, E, NR by TRAMAINE at 6/4/2021 0838                   Point: Precautions (In Progress)     Learning Progress Summary           Patient Acceptance, E, NR by KG at 6/7/2021 0940    Acceptance, E, NR by TRAMAINE at 6/4/2021 0838                               User Key     Initials Effective Dates Name Provider Type Discipline    TRAMAINE 06/19/15 -  Cherelle Garcia, PT Physical Therapist PT    KG 05/22/20 -   Amy Cadena, PT Physical Therapist PT              PT Recommendation and Plan     Plan of Care Reviewed With: patient  Progress: improving  Outcome Summary: Pt increased ambulation distance to 220ft with Rosa and B UE support on tele monitor. Pt required cues for slower, more controlled gait speed. Required two standing rest breaks due to fatigue and SOA. Continue to progress as appropriate.     Time Calculation:   PT Charges     Row Name 06/07/21 0940             Time Calculation    Start Time  0940  -KG      PT Received On  06/07/21  -KG      PT Goal Re-Cert Due Date  06/14/21  -KG         Time Calculation- PT    Total Timed Code Minutes- PT  16 minute(s)  -KG         Timed Charges    78613 - PT Therapeutic Activity Minutes  16  -KG         Total Minutes    Timed Charges Total Minutes  16  -KG       Total Minutes  16  -KG        User Key  (r) = Recorded By, (t) = Taken By, (c) = Cosigned By    Initials Name Provider Type    KG Amy Cadena, PT Physical Therapist        Therapy Charges for Today     Code Description Service Date Service Provider Modifiers Qty    [id number]  PT THERAPEUTIC ACT EA 15 MIN 6/7/2021 Amy Cadena, PT GP 1          PT G-Codes  Outcome Measure Options: AM-PAC 6 Clicks Basic Mobility (PT)  AM-PAC 6 Clicks Score (PT): 17    Nina Cadena PT  6/7/2021

## 2021-06-07 NOTE — PROGRESS NOTES
Clinical Nutrition   Reason For Visit: ZANA MITA    Patient Name: Colin Albrecht  YOB: 1945  MRN: [id number]  Date of Encounter: 06/07/21 10:39 EDT  Admission date: 6/2/2021        Nutrition Assessment     Admission Problem List:    CAD with unstable angina    PAF s/p Watchman device 9/25/2020/ Bi-V ICD and AVN ablation 3/2021    SSS     Dilated CM     Chronic HFrEF 35-40% s/p upgrade BiV ICD and AV node ablation 3/2021    Hypertension    Former smoker    COPD     Suspected chronic renal insufficiency    S/P CABG x 2 on 6/3/2021    Acute-on-chronic kidney injury (CMS/HCC)    Nonsustained ventricular tachycardia 6/5/2021 (CMS/HCC)       PMH: He  has a past medical history of Abnormal ECG, Arrhythmia, Atrial fibrillation (CMS/HCC), CHF (congestive heart failure) (CMS/HCC), Depression, History of transfusion, Hypertension, Stomach ulcer, and Wears reading eyeglasses.   PSxH: He  has a past surgical history that includes Insert / replace / remove pacemaker; Atrial Appendage Exclusion Left (N/A, 9/25/2020); Cardiac catheterization; Colonoscopy; Cardiac electrophysiology procedure (N/A, 3/26/2021); Cardiac electrophysiology procedure (N/A, 3/26/2021); Cardiac catheterization (N/A, 6/2/2021); and Coronary artery bypass graft (N/A, 6/3/2021).        Reported/Observed/Food/Nutrition Related History     Pt resting in bed at time of RD visit. Pt reports poor appetite since surgery. States that he does not have any food allergies, no issues chewing/swallowing. Reports that he has used oral nutrition supplements in the past, likes all flavors. States that he does not have any food preferences at this time.      Anthropometrics   Height: 74 in  Weight: 183 lb per standing scale (6/2)  BMI: 23.6  BMI classification: Overweight: 25.0-29.9kg/m2      Labs reviewed   Labs reviewed: Yes    Results from last 7 days   Lab Units 06/07/21  0257 06/06/21  0342 06/05/21  0317 06/04/21  0320 06/02/21  1048 06/02/21  1032    SODIUM mmol/L 139 138 138 143   < > 136   POTASSIUM mmol/L 4.1 4.5 4.6 3.6   < > 4.7   CHLORIDE mmol/L 104 104 102 107   < > 99   CO2 mmol/L 28.0 27.0 27.0 27.0   < > 27.0   BUN mg/dL 34* 29* 28* 23   < > 24*   CREATININE mg/dL 1.22 1.16 1.49* 1.49*   < > 1.35*   GLUCOSE mg/dL 118* 118* 137* 139*   < > 113*   CALCIUM mg/dL 9.4 9.3 9.3 9.5   < > 9.7   PHOSPHORUS mg/dL 2.7  --  4.3 2.8   < >  --    MAGNESIUM mg/dL 2.0  --  2.1 2.2   < > 2.3   CHOLESTEROL mg/dL 101  --   --   --   --  182   TRIGLYCERIDES mg/dL 70  --   --   --   --  71    < > = values in this interval not displayed.     Results from last 7 days   Lab Units 06/07/21  0257 06/06/21  0342 06/05/21  0317 06/04/21  0320 06/03/21  2030 06/02/21  1502 06/02/21  1032   WBC 10*3/mm3 8.38 7.72 7.99  --  7.00   < > 6.60   ALBUMIN g/dL 3.80  --   --  4.30 4.00  4.00  --   --    CHOLESTEROL mg/dL 101  --   --   --   --   --  182   TRIGLYCERIDES mg/dL 70  --   --   --   --   --  71    < > = values in this interval not displayed.     Results from last 7 days   Lab Units 06/07/21  0718 06/06/21 2025 06/06/21  1554 06/06/21  1121 06/06/21  0719 06/05/21 2002   GLUCOSE mg/dL 93 125 159* 114 108 153*     Lab Results   Lab Value Date/Time    HGBA1C 5.70 (H) 06/02/2021 1032    HGBA1C 5.90 (H) 03/23/2021 1545     Medications reviewed   Medications reviewed: Yes  Pertinent: pepcid, ferrous sulfate, pericolace  PRN: xanax      Current Nutrition Prescription   PO: Diet Regular; Cardiac  Oral Nutrition Supplement: No active supplement orders       Average PO intake:  37%/7 meals      Nutrition Diagnosis   6/7  Problem Inadequate oral intake   Etiology Poor appetite   Signs/Symptoms Per pt report of decreased appetite since surgery, 37%/7 meals       Intervention   Intervention: Follow treatment progress, Care plan reviewed, Interview for preferences, Encourage intake, Supplement provided   -Will send Boost Plus 3x/day      Goal:   General: Nutrition support treatment  PO:  Increase intake       Monitoring/Evaluation:   Monitoring/Evaluation: Per protocol, PO intake, Supplement intake, Pertinent labs, Symptoms    Alyssa Villareal, MS RD/LD CNSC  Time Spent: 20 minutes

## 2021-06-07 NOTE — CASE MANAGEMENT/SOCIAL WORK
Continued Stay Note  [facility]     Patient Name: Colin Albrecht  MRN: [id number]  Today's Date: 6/7/2021    Admit Date: 6/2/2021    Discharge Plan     Row Name 06/07/21 1213       Plan    Plan  Met with the patient and his plan is to be discharged home with his spouse assisting him when he is medically ready for discharge.        Discharge Codes    No documentation.       Expected Discharge Date and Time     Expected Discharge Date Expected Discharge Time    Jun 9, 2021             FAITH Michael

## 2021-06-07 NOTE — PLAN OF CARE
Goal Outcome Evaluation:  Plan of Care Reviewed With: patient        Progress: improving  Outcome Summary: Pt increased ambulation distance to 220ft with Rosa and B UE support on tele monitor. Pt required cues for slower, more controlled gait speed. Required two standing rest breaks due to fatigue and SOA. Continue to progress as appropriate.

## 2021-06-07 NOTE — PLAN OF CARE
Goal Outcome Evaluation:         Neuro- intact, FLOREZ equally. Respiratory- sats >95% on RA, productive cough. Cardiac- 100% paced,  VSS, tolerated lopressor, palpable pulses. GI/- multiple BM throughout shift, normoactive BS, UOP adequate for shift. Afebrile. Ambulated 220 ft in hallway with no symptoms noted afterwards.

## 2021-06-07 NOTE — PROGRESS NOTES
[facility]  INPATIENT PROGRESS NOTE         [facility]    6/7/2021      PATIENT IDENTIFICATION:   Name:  Colin Albrecht      MRN:  [id number]     76 y.o.  male             Reason for visit: CAD, CHF, A. fib      SUBJECTIVE:   Rhythm stable overnight, no new complaints this morning.     OBJECTIVE:  Vitals:    06/07/21 0300 06/07/21 0400 06/07/21 0500 06/07/21 0600   BP: 117/60 116/63 137/71 97/67   BP Location:  Right arm  Right arm   Patient Position:  Lying  Lying   Pulse: 58 59 61 60   Resp:  18  18   Temp:       TempSrc:       SpO2: 97% 98% 96% 94%   Weight:       Height:         FiO2 (%): 40 %     Body mass index is 23.68 kg/m².    Intake/Output Summary (Last 24 hours) at 6/7/2021 0834  Last data filed at 6/7/2021 0600  Gross per 24 hour   Intake 740 ml   Output 3070 ml   Net -2330 ml       Telemetry: Personally reviewed, paced at 60 bpm    Exam:  General Appearance:   · well developed  · well nourished  Neck:  · thyroid not enlarged  · supple  Respiratory:  · no respiratory distress  · normal breath sounds  · no rales  Cardiovascular:  · no jugular venous distention  · regular rhythm  · apical impulse normal  · S1 normal, S2 normal  · no S3, no S4   · no murmur  · no rub, no thrill  · carotid pulses normal; no bruit  · pedal pulses normal  · lower extremity edema: none    Skin:   warm, dry      No Known Allergies  Scheduled meds:       aspirin, 325 mg, Oral, Daily  atorvastatin, 40 mg, Oral, Nightly  famotidine, 20 mg, Oral, BID  ferrous sulfate, 325 mg, Oral, Daily With Breakfast  levothyroxine, 50 mcg, Oral, Daily  metoprolol tartrate, 50 mg, Oral, Q12H  pharmacy consult - [place], , Does not apply, Daily  senna-docusate sodium, 2 tablet, Oral, BID  sodium chloride, 10 mL, Intravenous, Q12H  tamsulosin, 0.4 mg, Oral, Daily      IV meds:                      norepinephrine, 0.02-0.3 mcg/kg/min  sodium chloride, 30 mL/hr, Last Rate: 30 mL/hr (06/04/21  6846)      Data Review:  Results from last 7 days   Lab Units 06/07/21  0257 06/06/21  0342 06/05/21  0317 06/04/21  1749   SODIUM mmol/L 139 138 138 139   BUN mg/dL 34* 29* 28* 26*   CREATININE mg/dL 1.22 1.16 1.49* 1.59*   GLUCOSE mg/dL 118* 118* 137* 169*     Results from last 7 days   Lab Units 06/07/21  0257 06/06/21  0342 06/05/21  0317 06/04/21  2039 06/04/21  1243 06/04/21  0325   WBC 10*3/mm3 8.38 7.72 7.99  --  9.91 6.90   HEMOGLOBIN g/dL 9.0* 8.2* 7.7* 8.1* 8.2* 7.6*     Results from last 7 days   Lab Units 06/04/21  0325 06/03/21  1811 06/02/21  1502   INR  1.32* 1.67* 1.19*     Results from last 7 days   Lab Units 06/07/21  0257 06/04/21  0320 06/03/21 2030 06/02/21  1502   ALT (SGPT) U/L 12 15 10 8   AST (SGOT) U/L 23 28 26 20     No results found for: DIGOXIN   Lab Results   Component Value Date    TSH 3.830 06/03/2021     Results from last 7 days   Lab Units 06/07/21  0257 06/02/21  1032   CHOLESTEROL mg/dL 101 182   HDL CHOL mg/dL  --  52       Estimated Creatinine Clearance: 61.9 mL/min (by C-G formula based on SCr of 1.22 mg/dL).        Imaging (last 24 hr):   I personally reviewed the most recent chest x-ray and other pertinent imaging studies.  Results for orders placed during the hospital encounter of 06/02/21    XR Chest 1 View    Narrative  EXAMINATION: XR CHEST 1 VW - 06/05/2021    INDICATION: ; I25.118-Atherosclerotic heart disease of native coronary  artery with other forms of angina pectoris; I20.0-Unstable angina.  Post-op heart surgery.    COMPARISON: 06/04/2021    FINDINGS: Portable chest reveals patient is status post median  sternotomy. Cardiac pacer leads in satisfactory position. Mild increased  markings identified at the left lung base. Small bilateral pleural  effusions. Degenerative changes seen within the spine. The lung fields  are grossly clear.    Impression  Minimal increased markings at the left lung base. The  pulmonary vascularity remains pronounced. Heart is borderline  enlarged.  No new focal right opacification present.    DICTATED:   06/05/2021  EDITED/ls :   06/05/2021    This report was finalized on 6/5/2021 3:51 PM by Dr. Karine Limon MD.        Last ECHO:  Results for orders placed during the hospital encounter of 06/02/21    Adult Transthoracic Echo Complete W/ Cont if Necessary Per Protocol    Interpretation Summary  · Left ventricular ejection fraction appears to be 36 - 40%. Left ventricular systolic function is moderately decreased.  · Moderate mitral valve regurgitation is present.  · Estimated right ventricular systolic pressure from tricuspid regurgitation is mildly elevated (35-45 mmHg).  · Left atrial volume is moderately increased.  · Mildly reduced right ventricular systolic function noted.  · Left ventricular diastolic dysfunction is noted.        PROBLEM LIST:     CAD with unstable angina    PAF s/p Watchman device 9/25/2020/ Bi-V ICD and AVN ablation 3/2021    SSS     Dilated CM     Chronic HFrEF 35-40% s/p upgrade BiV ICD and AV node ablation 3/2021    Hypertension    Former smoker    COPD     Suspected chronic renal insufficiency    S/P CABG x 2 on 6/3/2021    Acute-on-chronic kidney injury (CMS/HCC)    Nonsustained ventricular tachycardia 6/5/2021 (CMS/HCC)      Initial cardiac assessment: Pleasant 76-year-old man from [place] follows with Dr. Park and Dr. Prado, history of chronic systolic heart failure, BiV ICD and AV node ablation, permanent atrial fibrillation, watchman device, CKD stage III.  Presented with symptoms concerning for unstable angina, LHC on 6/2/2021 showed 80% ostial left main stenosis.    ASSESSMENT/PLAN:  1.  Unstable angina/CAD, 80% left main:  Dr. Shields performed 2V CABG 6/3/2020 (LIMA-LAD, SVG-circumflex)  EF 35-40%  Continue aspirin  Continue metoprolol  Atorvastatin 40 mg daily started 6/2/2021    2.  Chronic systolic heart failure:  Echo 6/2/2021 showed EF 35-40% with moderate MR and moderate LAE.  Continue guideline  directed medical therapy with beta-blocker   Resume ACE prior to discharge  [employer] BiV ICD in place, interrogated and adjusted 6/2/2021  Euvolemic on exam today, monitor daily.      3.  Frequent PVCs/NSVT:  BiV ICD adjusted on day of admission, now paced at 60 with minimal PVCs  Slightly increased frequency of NSVT over the weekend, continue current dose of metoprolol for now.  If becomes worse, will discuss with Dr. Prado (patient's primary EP) regarding antiarrhythmic therapy.    4.  CKD stage III:  Renal function at baseline    5.  Permanent atrial fibrillation:  Status post AV node ablation and BiV ICD placement with Dr. Prado   9/2020  Watchman in place due to history of GI bleed  No anticoagulation needed    Stable for telemetry transfer      Pietro Quintana MD  6/7/2021    08:34 EDT

## 2021-06-07 NOTE — PROGRESS NOTES
"Colin Albrecht  [id number]  1945     LOS: 5 days   Patient Care Team:  Arun Soliman MD as PCP - General (Family Medicine)  Semaj Park MD (Cardiology)    Chief Complaint: Coronary artery disease      Subjective: No complaints    Objective:     Vital Sign Min/Max for last 24 hours  Temp  Min: 98 °F (36.7 °C)  Max: 98.2 °F (36.8 °C)   BP  Min: 99/46  Max: 151/44   Pulse  Min: 59  Max: 65   Resp  Min: 20  Max: 22   SpO2  Min: 93 %  Max: 100 %   No data recorded   No data recorded     Flowsheet Rows      First Filed Value   Admission Height  188 cm (74\") Documented at 06/02/2021 1015   Admission Weight  83.3 kg (183 lb 9.6 oz) Documented at 06/02/2021 1015          Physical Exam:    Wound: Satisfactory    Pulses:     Mediastinal and Chest Tube Drainage:       Results Review:   Results from last 7 days   Lab Units 06/07/21  0257   WBC 10*3/mm3 8.38   HEMOGLOBIN g/dL 9.0*   HEMATOCRIT % 27.7*   PLATELETS 10*3/mm3 183     Results from last 7 days   Lab Units 06/07/21  0257   SODIUM mmol/L 139   POTASSIUM mmol/L 4.1   CHLORIDE mmol/L 104   CO2 mmol/L 28.0   BUN mg/dL 34*   CREATININE mg/dL 1.22   GLUCOSE mg/dL 118*   CALCIUM mg/dL 9.4     Results from last 7 days   Lab Units 06/04/21  0403   PH, ARTERIAL pH units 7.427   PO2 ART mm Hg 82.3*   PCO2, ARTERIAL mm Hg 43.0   HCO3 ART mmol/L 28.3*         Assessment      CAD with unstable angina    PAF s/p Watchman device 9/25/2020/ Bi-V ICD and AVN ablation 3/2021    SSS     Dilated CM     Chronic HFrEF 35-40% s/p upgrade BiV ICD and AV node ablation 3/2021    Hypertension    Former smoker    COPD     Suspected chronic renal insufficiency    S/P CABG x 2 on 6/3/2021    Acute-on-chronic kidney injury (CMS/HCC)    Nonsustained ventricular tachycardia 6/5/2021 (CMS/HCC)      Doing satisfactory.  Transfer to telemetry 4 S.        Jaime Shields MD  06/07/21  06:24 EDT      Please note that portions of this note were completed with a voice recognition program. " Efforts were made to edit the dictations, but words may be mistranscribed

## 2021-06-08 LAB
ANION GAP SERPL CALCULATED.3IONS-SCNC: 6 MMOL/L (ref 5–15)
BASOPHILS # BLD AUTO: 0.02 10*3/MM3 (ref 0–0.2)
BASOPHILS NFR BLD AUTO: 0.3 % (ref 0–1.5)
BH BB BLOOD EXPIRATION DATE: NORMAL
BH BB BLOOD TYPE BARCODE: 5100
BH BB DISPENSE STATUS: NORMAL
BH BB PRODUCT CODE: NORMAL
BH BB UNIT NUMBER: NORMAL
BUN SERPL-MCNC: 35 MG/DL (ref 8–23)
BUN/CREAT SERPL: 30.2 (ref 7–25)
CALCIUM SPEC-SCNC: 9 MG/DL (ref 8.6–10.5)
CHLORIDE SERPL-SCNC: 105 MMOL/L (ref 98–107)
CO2 SERPL-SCNC: 28 MMOL/L (ref 22–29)
CREAT SERPL-MCNC: 1.16 MG/DL (ref 0.76–1.27)
DEPRECATED RDW RBC AUTO: 44.2 FL (ref 37–54)
EOSINOPHIL # BLD AUTO: 0.29 10*3/MM3 (ref 0–0.4)
EOSINOPHIL NFR BLD AUTO: 4.5 % (ref 0.3–6.2)
ERYTHROCYTE [DISTWIDTH] IN BLOOD BY AUTOMATED COUNT: 12.4 % (ref 12.3–15.4)
GFR SERPL CREATININE-BSD FRML MDRD: 61 ML/MIN/1.73
GLUCOSE BLDC GLUCOMTR-MCNC: 100 MG/DL (ref 70–130)
GLUCOSE BLDC GLUCOMTR-MCNC: 118 MG/DL (ref 70–130)
GLUCOSE BLDC GLUCOMTR-MCNC: 120 MG/DL (ref 70–130)
GLUCOSE BLDC GLUCOMTR-MCNC: 162 MG/DL (ref 70–130)
GLUCOSE SERPL-MCNC: 109 MG/DL (ref 65–99)
HCT VFR BLD AUTO: 26.7 % (ref 37.5–51)
HGB BLD-MCNC: 8.7 G/DL (ref 13–17.7)
IMM GRANULOCYTES # BLD AUTO: 0.02 10*3/MM3 (ref 0–0.05)
IMM GRANULOCYTES NFR BLD AUTO: 0.3 % (ref 0–0.5)
LYMPHOCYTES # BLD AUTO: 1.11 10*3/MM3 (ref 0.7–3.1)
LYMPHOCYTES NFR BLD AUTO: 17.3 % (ref 19.6–45.3)
MAGNESIUM SERPL-MCNC: 2.6 MG/DL (ref 1.6–2.4)
MCH RBC QN AUTO: 32 PG (ref 26.6–33)
MCHC RBC AUTO-ENTMCNC: 32.6 G/DL (ref 31.5–35.7)
MCV RBC AUTO: 98.2 FL (ref 79–97)
MONOCYTES # BLD AUTO: 0.93 10*3/MM3 (ref 0.1–0.9)
MONOCYTES NFR BLD AUTO: 14.5 % (ref 5–12)
NEUTROPHILS NFR BLD AUTO: 4.03 10*3/MM3 (ref 1.7–7)
NEUTROPHILS NFR BLD AUTO: 63.1 % (ref 42.7–76)
NRBC BLD AUTO-RTO: 0 /100 WBC (ref 0–0.2)
PLATELET # BLD AUTO: 188 10*3/MM3 (ref 140–450)
PMV BLD AUTO: 10.1 FL (ref 6–12)
POTASSIUM SERPL-SCNC: 4.3 MMOL/L (ref 3.5–5.2)
RBC # BLD AUTO: 2.72 10*6/MM3 (ref 4.14–5.8)
SODIUM SERPL-SCNC: 139 MMOL/L (ref 136–145)
UNIT  ABO: NORMAL
UNIT  RH: NORMAL
WBC # BLD AUTO: 6.4 10*3/MM3 (ref 3.4–10.8)

## 2021-06-08 PROCEDURE — 85025 COMPLETE CBC W/AUTO DIFF WBC: CPT | Performed by: INTERNAL MEDICINE

## 2021-06-08 PROCEDURE — 97530 THERAPEUTIC ACTIVITIES: CPT

## 2021-06-08 PROCEDURE — 82962 GLUCOSE BLOOD TEST: CPT

## 2021-06-08 PROCEDURE — 80048 BASIC METABOLIC PNL TOTAL CA: CPT | Performed by: INTERNAL MEDICINE

## 2021-06-08 PROCEDURE — 25010000002 HEPARIN (PORCINE) PER 1000 UNITS: Performed by: INTERNAL MEDICINE

## 2021-06-08 PROCEDURE — 99232 SBSQ HOSP IP/OBS MODERATE 35: CPT | Performed by: INTERNAL MEDICINE

## 2021-06-08 PROCEDURE — 99233 SBSQ HOSP IP/OBS HIGH 50: CPT | Performed by: INTERNAL MEDICINE

## 2021-06-08 PROCEDURE — 83735 ASSAY OF MAGNESIUM: CPT | Performed by: INTERNAL MEDICINE

## 2021-06-08 RX ORDER — BISACODYL 10 MG
10 SUPPOSITORY, RECTAL RECTAL DAILY PRN
Status: DISCONTINUED | OUTPATIENT
Start: 2021-06-08 | End: 2021-06-10 | Stop reason: HOSPADM

## 2021-06-08 RX ORDER — SODIUM CHLORIDE 0.9 % (FLUSH) 0.9 %
10 SYRINGE (ML) INJECTION EVERY 8 HOURS SCHEDULED
Status: DISCONTINUED | OUTPATIENT
Start: 2021-06-08 | End: 2021-06-09

## 2021-06-08 RX ORDER — SODIUM CHLORIDE 0.9 % (FLUSH) 0.9 %
10 SYRINGE (ML) INJECTION EVERY 12 HOURS SCHEDULED
Status: DISCONTINUED | OUTPATIENT
Start: 2021-06-08 | End: 2021-06-09

## 2021-06-08 RX ORDER — DOCUSATE SODIUM 100 MG/1
100 CAPSULE, LIQUID FILLED ORAL 2 TIMES DAILY PRN
Status: DISCONTINUED | OUTPATIENT
Start: 2021-06-08 | End: 2021-06-10 | Stop reason: HOSPADM

## 2021-06-08 RX ORDER — BUMETANIDE 0.25 MG/ML
2 INJECTION INTRAMUSCULAR; INTRAVENOUS ONCE
Status: COMPLETED | OUTPATIENT
Start: 2021-06-08 | End: 2021-06-08

## 2021-06-08 RX ADMIN — HYDROCODONE BITARTRATE AND ACETAMINOPHEN 1 TABLET: 7.5; 325 TABLET ORAL at 20:04

## 2021-06-08 RX ADMIN — FERROUS SULFATE TAB 325 MG (65 MG ELEMENTAL FE) 325 MG: 325 (65 FE) TAB at 08:19

## 2021-06-08 RX ADMIN — HEPARIN SODIUM 5000 UNITS: 5000 INJECTION INTRAVENOUS; SUBCUTANEOUS at 13:24

## 2021-06-08 RX ADMIN — HEPARIN SODIUM 5000 UNITS: 5000 INJECTION INTRAVENOUS; SUBCUTANEOUS at 05:27

## 2021-06-08 RX ADMIN — METOPROLOL TARTRATE 50 MG: 50 TABLET, FILM COATED ORAL at 08:19

## 2021-06-08 RX ADMIN — DOCUSATE SODIUM 50MG AND SENNOSIDES 8.6MG 2 TABLET: 8.6; 5 TABLET, FILM COATED ORAL at 20:03

## 2021-06-08 RX ADMIN — FAMOTIDINE 20 MG: 20 TABLET, FILM COATED ORAL at 08:19

## 2021-06-08 RX ADMIN — HEPARIN SODIUM 5000 UNITS: 5000 INJECTION INTRAVENOUS; SUBCUTANEOUS at 20:04

## 2021-06-08 RX ADMIN — FAMOTIDINE 20 MG: 20 TABLET, FILM COATED ORAL at 20:04

## 2021-06-08 RX ADMIN — ASPIRIN 325 MG: 325 TABLET, COATED ORAL at 08:19

## 2021-06-08 RX ADMIN — SODIUM CHLORIDE, PRESERVATIVE FREE 10 ML: 5 INJECTION INTRAVENOUS at 20:03

## 2021-06-08 RX ADMIN — ATORVASTATIN CALCIUM 40 MG: 40 TABLET, FILM COATED ORAL at 20:04

## 2021-06-08 RX ADMIN — LEVOTHYROXINE SODIUM 50 MCG: 50 TABLET ORAL at 05:27

## 2021-06-08 RX ADMIN — BUMETANIDE 2 MG: 0.25 INJECTION INTRAMUSCULAR; INTRAVENOUS at 13:23

## 2021-06-08 RX ADMIN — SODIUM CHLORIDE, PRESERVATIVE FREE 10 ML: 5 INJECTION INTRAVENOUS at 08:18

## 2021-06-08 RX ADMIN — TAMSULOSIN HYDROCHLORIDE 0.4 MG: 0.4 CAPSULE ORAL at 08:19

## 2021-06-08 RX ADMIN — SODIUM CHLORIDE, PRESERVATIVE FREE 10 ML: 5 INJECTION INTRAVENOUS at 13:24

## 2021-06-08 RX ADMIN — ALPRAZOLAM 0.25 MG: 0.25 TABLET ORAL at 21:59

## 2021-06-08 RX ADMIN — ALPRAZOLAM 0.25 MG: 0.25 TABLET ORAL at 13:23

## 2021-06-08 NOTE — PROGRESS NOTES
Intensive Care Follow-up     Hospital:  LOS: 6 days   Mr. Colin Albrecht, 76 y.o. male is followed for:   Coronary artery disease            History of present illness:   76-year-old white male former smoker recently diagnosed with multivessel CAD warranting CABG.  Has an extensive past medical history including dilated cardiomyopathy, SSS with PPM, permanent atrial fibrillation, and CKD..  Is not however on anticoagulation because of previous GI bleed/epistaxis while on Xarelto.  Instead a watchman device was placed 9/2020.  In March 2021 pacemaker was upgraded to a BiV ICD with AV yasemin ablation.  Was also begun on amiodarone because of increased frequency of PVCs.  He then noted increasing dyspnea for which his amiodarone was empirically discontinued.  There was no improvement thus he underwent LHC to rule out an anginal equivalent.  Was found to have an 80% ostial left main stenosis but otherwise only minimal stenoses/luminal irregularities.  LVEF was noted to be 35 to 40% which was about the same as in 2020.  Also noted was RVSP of 38 mmHg and diastolic dysfunction.  It was decided at this time to proceed with coronary revascularization.  Note the patient's preop PFTs were suboptimal revealing some obstructive lung disease. On 6/3/2021 patient underwent uneventful CABG x 2 and was returned to the ICU at 1730 on ventilatory support, but extubated by midnight. (end of copied text)    Patient developed ventricular tachycardia for about 5 minutes and has biventricular ICD in place.  That was adjusted per cardiology.  Started on metoprolol.    Subjective   Interval History:  No acute events overnight.  No further arrythmia noted. Overnight did well with intermittent paced rhythm.  Patient states that he still has that intermittent feeling that he is smothering but it lasts for a few seconds and then goes away.  Denies any chest discomfort or pressure.  No referred or radiated pain.  Denies any headaches or  "dizziness episodes either.         The patient's past medical, surgical and social history were reviewed and updated in Epic as appropriate.       Objective     Infusions:     Medications:  aspirin, 325 mg, Oral, Daily  atorvastatin, 40 mg, Oral, Nightly  famotidine, 20 mg, Oral, BID  ferrous sulfate, 325 mg, Oral, Daily With Breakfast  heparin (porcine), 5,000 Units, Subcutaneous, Q8H  levothyroxine, 50 mcg, Oral, Daily  metoprolol tartrate, 50 mg, Oral, Q12H  pharmacy consult - [facility], , Does not apply, Daily  senna-docusate sodium, 2 tablet, Oral, BID  sodium chloride, 10 mL, Intravenous, Q12H  tamsulosin, 0.4 mg, Oral, Daily        Vital Sign Min/Max for last 24 hours  Temp  Min: 98 °F (36.7 °C)  Max: 98.4 °F (36.9 °C)   BP  Min: 97/42  Max: 138/67   Pulse  Min: 59  Max: 62   Resp  Min: 18  Max: 20   SpO2  Min: 90 %  Max: 100 %   Flow (L/min)  Min: 2  Max: 2       Input/Output for last 24 hour shift  06/07 0701 - 06/08 0700  In: 1032.1 [P.O.:240; I.V.:792.1]  Out: 800 [Urine:800]      Objective:  Vital signs: (most recent): Blood pressure 123/62, pulse 60, temperature 98.4 °F (36.9 °C), temperature source Oral, resp. rate 20, height 188 cm (74.02\"), weight 83.7 kg (184 lb 8 oz), SpO2 98 %.            General Appearance: Awake, alert, in no acute distress  Head:    Pupils reactive & symmetrical B/L.  Lungs:   B/L Breath sounds present with decreased breath sounds on bases, no wheezing heard, no crackles.   Heart: S1 and S2 present, no murmur  Abdomen: Soft, nontender, no guarding or rigidity, bowel sounds positive.  Extremities:  no edema, warm to touch.  Pulses: Positive and symmetric.  Neurologic:  Moving all four extremities. Good strength bilaterally.      Results from last 7 days   Lab Units 06/08/21  0400 06/07/21  0257 06/06/21  0342   WBC 10*3/mm3 6.40 8.38 7.72   HEMOGLOBIN g/dL 8.7* 9.0* 8.2*   PLATELETS 10*3/mm3 188 183 166     Results from last 7 days   Lab Units 06/08/21  0400 06/07/21  1219 " 06/07/21  0257 06/05/21  0317   SODIUM mmol/L 139 136 139 138   POTASSIUM mmol/L 4.3 4.0 4.1 4.6   CO2 mmol/L 28.0 26.0 28.0 27.0   BUN mg/dL 35* 35* 34* 28*   CREATININE mg/dL 1.16 0.98 1.22 1.49*   MAGNESIUM mg/dL 2.6* 1.8 2.0 2.1   PHOSPHORUS mg/dL  --  2.4* 2.7 4.3   GLUCOSE mg/dL 109* 109* 118* 137*     Estimated Creatinine Clearance: 65.1 mL/min (by C-G formula based on SCr of 1.16 mg/dL).    Results from last 7 days   Lab Units 06/04/21  0403   PH, ARTERIAL pH units 7.427   PCO2, ARTERIAL mm Hg 43.0   PO2 ART mm Hg 82.3*       Images:   Chest x-ray reviewed personally and showed cardiomegaly.  Pacemaker leads are in good position.  Bibasilar atelectasis and small pleural effusion.  No significant interval change.  Left midlung pleural-based density is stable, unclear if it is fluid in the fissure.    I reviewed the patient's results and images.     Assessment/Plan   Impression        CAD with unstable angina    PAF s/p Watchman device 9/25/2020/ Bi-V ICD and AVN ablation 3/2021    SSS     Dilated CM     Chronic HFrEF 35-40% s/p upgrade BiV ICD and AV node ablation 3/2021    Hypertension    Former smoker    COPD     Suspected chronic renal insufficiency    S/P CABG x 2 on 6/3/2021    Acute-on-chronic kidney injury (CMS/HCC)    Nonsustained ventricular tachycardia 6/5/2021 (CMS/[facility])       Plan        1.  Patient status post coronary bypass graft surgery x2, doing well postop.  Intermittent nonsustained VT but medication has been adjusted per cardiology.  Continue beta-blocker.  Continue aspirin, statin.  No further arrhythmias noted at this time.  Patient nonspecific complaints of shortness of breath but currently on room air.  Working with physical therapy and walking.  Continue to monitor.  2.  Urine output is acceptable.  Electrolytes stable.  3.  GI prophylaxis.  Heparin subcu for DVT prophylaxis.  4.  Bowel regimen as needed.  5.  Continue home dose of Synthroid.    Okay to transfer to telemetry  floor.    Plan of care and goals reviewed with multidisciplinary/antibiotic stewardship team during rounds.   I discussed the patient's findings and my recommendations with patient and nursing staff       Time spent 25 min (exclusive of procedure time)  including high complexity decision making to assess, manipulate, and support vital organ system failure in this individual who has impairment of one or more vital organ systems such that there is a high probability of imminent or life threatening deterioration in the patient’s condition.      Isaac Marquez MD, FCCP  Pulmonary, Critical care and Sleep Medicine

## 2021-06-08 NOTE — CASE MANAGEMENT/SOCIAL WORK
Continued Stay Note  [facility]     Patient Name: Colin Albrecht  MRN: [id number]  Today's Date: 6/8/2021    Admit Date: 6/2/2021    Discharge Plan     Row Name 06/08/21 1534       Plan    Plan  update    Patient/Family in Agreement with Plan  yes    Plan Comments  Patient walked 180' with PT and O2 sats remaining in the 90's per documentation.  Plan remains to return home at discharge.    Final Discharge Disposition Code  01 - home or self-care        Discharge Codes    No documentation.       Expected Discharge Date and Time     Expected Discharge Date Expected Discharge Time    Jun 9, 2021             Seema Cho, RN

## 2021-06-08 NOTE — PLAN OF CARE
Goal Outcome Evaluation:            Neuro- intact, FLOREZ equally, anxious at times. Resp- sats >95% on RA, is 2000. Cardiac- 100% paced, VSS, tolerated lopressor, palpable pulses. GI/- normoactive BS, no BM, UOP adequate for shift. Incisions CDI. Ambulated 220 ft with standby. PO pain meds given PRN.

## 2021-06-08 NOTE — PROGRESS NOTES
Pt. Referred for Phase II Cardiac Rehab. Staff discussed benefits of exercise, program protocol, and educational material provided. Teach back verified.  Permission granted from patient for staff to fax referral information to outlying program at this time.  Staff faxed referral info to [facility].

## 2021-06-08 NOTE — PROGRESS NOTES
"Colin Albrecht  [id number]  1945     LOS: 6 days   Patient Care Team:  Arun Soliman MD as PCP - General (Family Medicine)  Semaj Park MD (Cardiology)    Chief Complaint: Coronary artery disease      Subjective: No complaints, wants to go home    Objective:     Vital Sign Min/Max for last 24 hours  Temp  Min: 98 °F (36.7 °C)  Max: 98.2 °F (36.8 °C)   BP  Min: 97/42  Max: 145/76   Pulse  Min: 52  Max: 62   Resp  Min: 18  Max: 20   SpO2  Min: 90 %  Max: 100 %   No data recorded   No data recorded     Flowsheet Rows      First Filed Value   Admission Height  188 cm (74\") Documented at 06/02/2021 1015   Admission Weight  83.3 kg (183 lb 9.6 oz) Documented at 06/02/2021 1015          Physical Exam:    Wound: Satisfactory    Pulses:     Mediastinal and Chest Tube Drainage:       Results Review:   Results from last 7 days   Lab Units 06/08/21  0400   WBC 10*3/mm3 6.40   HEMOGLOBIN g/dL 8.7*   HEMATOCRIT % 26.7*   PLATELETS 10*3/mm3 188     Results from last 7 days   Lab Units 06/08/21  0400   SODIUM mmol/L 139   POTASSIUM mmol/L 4.3   CHLORIDE mmol/L 105   CO2 mmol/L 28.0   BUN mg/dL 35*   CREATININE mg/dL 1.16   GLUCOSE mg/dL 109*   CALCIUM mg/dL 9.0     Results from last 7 days   Lab Units 06/04/21  0403   PH, ARTERIAL pH units 7.427   PO2 ART mm Hg 82.3*   PCO2, ARTERIAL mm Hg 43.0   HCO3 ART mmol/L 28.3*         Assessment      CAD with unstable angina    PAF s/p Watchman device 9/25/2020/ Bi-V ICD and AVN ablation 3/2021    SSS     Dilated CM     Chronic HFrEF 35-40% s/p upgrade BiV ICD and AV node ablation 3/2021    Hypertension    Former smoker    COPD     Suspected chronic renal insufficiency    S/P CABG x 2 on 6/3/2021    Acute-on-chronic kidney injury (CMS/HCC)    Nonsustained ventricular tachycardia 6/5/2021 (CMS/HCC)      Transfer to telemetry        Jaime Shields MD  06/08/21  06:27 EDT      Please note that portions of this note were completed with a voice recognition program. Efforts " were made to edit the dictations, but words may be mistranscribed

## 2021-06-08 NOTE — THERAPY TREATMENT NOTE
Patient Name: Colin Albrecht  : 1945    MRN: [id number]                              Today's Date: 2021       Admit Date: 2021    Visit Dx:     ICD-10-CM ICD-9-CM   1. Coronary artery disease involving native coronary artery of native heart with other form of angina pectoris (CMS/[facility])  I25.118 414.01     413.9   2. Unstable angina (CMS/[facility])  I20.0 411.1     Patient Active Problem List   Diagnosis   • PAF s/p Watchman device 2020/ Bi-V ICD and AVN ablation 3/2021   • Gastrointestinal hemorrhage associated with gastric ulcer   • SSS    • Dilated CM    • S/P Watchman device   • Chronic HFrEF 35-40% s/p upgrade BiV ICD and AV node ablation 3/2021   • Unstable angina    • CAD with unstable angina   • Hypertension   • Former smoker   • COPD    • Suspected chronic renal insufficiency   • S/P CABG x 2 on 6/3/2021   • Acute-on-chronic kidney injury (CMS/[facility])   • Nonsustained ventricular tachycardia 2021 (CMS/[facility])     Past Medical History:   Diagnosis Date   • Abnormal ECG    • Arrhythmia    • Atrial fibrillation (CMS/[facility])    • CHF (congestive heart failure) (CMS/[facility])    • Depression    • History of transfusion     bleeding stomach ulcer ~2014 x2, no reaction    • Hypertension    • Stomach ulcer    • Wears reading eyeglasses      Past Surgical History:   Procedure Laterality Date   • ATRIAL APPENDAGE EXCLUSION LEFT WITH TRANSESOPHAGEAL ECHOCARDIOGRAM N/A 2020    Procedure: Atrial Appendage Occlusion (Watchman), start Eliquis 2 weeks prior and hold 1 day, GA;  Surgeon: Yonny Prado MD;  Location:  [facility];  Service: Cardiology;  Laterality: N/A;   • CARDIAC CATHETERIZATION     • CARDIAC CATHETERIZATION N/A 2021    Procedure: Left Heart Cath- ADD ON/ WALK IN FOR RDS PER KT;  Surgeon: Pietro Quintana MD;  Location:  [facility];  Service: Cardiovascular;  Laterality: N/A;   • CARDIAC ELECTROPHYSIOLOGY PROCEDURE N/A 3/26/2021    Procedure: DDD PPM  upgrade to Biv ICD (MDT), no meds to hold;  Surgeon: Yonny Prado MD;  Location:  [facility];  Service: Cardiology;  Laterality: N/A;   • CARDIAC ELECTROPHYSIOLOGY PROCEDURE N/A 3/26/2021    Procedure: AVN RFA;  Surgeon: Yonny Prado MD;  Location:  [facility];  Service: Cardiovascular;  Laterality: N/A;   • COLONOSCOPY     • CORONARY ARTERY BYPASS GRAFT N/A 6/3/2021    Procedure: MEDIAN STERNOTOMY, CORONARY ARTERY BYPASS WITH INTERNAL MAMMARY ARTERY GRAFT X 2, EVH OF THE RIGHT GREATER SAPHENOUS ANA;  Surgeon: Jaime Shields MD;  Location:  [facility];  Service: Cardiothoracic;  Laterality: N/A;   • INSERT / REPLACE / REMOVE PACEMAKER       General Information     Row Name 06/08/21 1326          Physical Therapy Time and Intention    Document Type  therapy note (daily note)  -TRAMAINE     Mode of Treatment  physical therapy  -     Row Name 06/08/21 1326          General Information    Patient Profile Reviewed  yes  -TRAMAINE     Prior Level of Function  independent:;gait;transfer;ADL's;bed mobility  -TRAMAINE     Existing Precautions/Restrictions  cardiac;fall;oxygen therapy device and L/min;sternal  -TRAMAINE     Barriers to Rehab  none identified  -TRAMAINE     Row Name 06/08/21 1326          Living Environment    Lives With  spouse  -TRAMAINE     Row Name 06/08/21 1326          Home Main Entrance    Number of Stairs, Main Entrance  none  -TRAMAINE     Row Name 06/08/21 1326          Cognition    Orientation Status (Cognition)  oriented x 4  -TRAMAINE     Row Name 06/08/21 1326          Safety Issues, Functional Mobility    Safety Issues Affecting Function (Mobility)  safety precautions follow-through/compliance;safety precaution awareness  -TRAMAINE     Impairments Affecting Function (Mobility)  balance;endurance/activity tolerance;shortness of breath;strength  -TRAMAINE       User Key  (r) = Recorded By, (t) = Taken By, (c) = Cosigned By    Initials Name Provider Type    Cherelle Martinez PT Physical Therapist         Mobility     [facility] Name 06/08/21 1327          Bed Mobility    Comment (Bed Mobility)  patient is OOB and returns to the chair  -[facility] Name 06/08/21 1327          Transfers    Comment (Transfers)  cues for sternal precautions  -[facility] Name 06/08/21 1327          Bed-Chair Transfer    Bed-Chair Taliaferro (Transfers)  contact guard  -TRAMAINE     Row Name 06/08/21 1327          Sit-Stand Transfer    Sit-Stand Taliaferro (Transfers)  contact guard  -[facility] Name 06/08/21 1327          Gait/Stairs (Locomotion)    Taliaferro Level (Gait)  contact guard  -TRAMAINE     Distance in Feet (Gait)  180  -TRAMAINE     Deviations/Abnormal Patterns (Gait)  stride length decreased  -TRAMAINE     Bilateral Gait Deviations  forward flexed posture  -TRAMAINE     Comment (Gait/Stairs)  patient limited by SOA ever though sats remained over 95% during ambulation patient required 3 standing rest breaks due to felling SOA  -TRAMAINE       User Key  (r) = Recorded By, (t) = Taken By, (c) = Cosigned By    Initials Name Provider Type    Cherelle Martinez, PT Physical Therapist        Obj/Interventions     [facility] Name 06/08/21 1329          Motor Skills    Therapeutic Exercise  hip;knee;ankle  -[facility] Name 06/08/21 1329          Hip (Therapeutic Exercise)    Hip (Therapeutic Exercise)  AROM (active range of motion)  -     Hip AROM (Therapeutic Exercise)  bilateral;flexion;extension;sitting;10 repetitions  -[facility] Name 06/08/21 1329          Knee (Therapeutic Exercise)    Knee (Therapeutic Exercise)  AROM (active range of motion)  -     Knee AROM (Therapeutic Exercise)  bilateral;flexion;extension;sitting;10 repetitions  -[facility] Name 06/08/21 1329          Ankle (Therapeutic Exercise)    Ankle (Therapeutic Exercise)  AROM (active range of motion)  -     Ankle AROM (Therapeutic Exercise)  bilateral;dorsiflexion;plantarflexion;sitting;10 repetitions  -[facility] Name 06/08/21 1329          Balance    Balance Assessment  sitting static  balance;sitting dynamic balance;standing static balance;standing dynamic balance  -TRAMAINE     Static Sitting Balance  WFL  -TRAMAINE     Dynamic Sitting Balance  WFL  -TRAMAINE     Static Standing Balance  WFL  -TRAMAINE     Dynamic Standing Balance  mild impairment  -TRAMAINE     Balance Interventions  standing;dynamic;weight shifting activity  -       User Key  (r) = Recorded By, (t) = Taken By, (c) = Cosigned By    Initials Name Provider Type    Cherelle Martinez, PT Physical Therapist        Goals/Plan    No documentation.       Clinical Impression     Row Name 06/08/21 8387          Pain Scale: Numbers Pre/Post-Treatment    Pretreatment Pain Rating  3/10  -TRAMAINE     Posttreatment Pain Rating  5/10  -TRAMAINE     Pain Location - Orientation  incisional  -TRAMAINE     Pain Location  chest  -TRAMAINE     Pain Intervention(s)  Repositioned;Ambulation/increased activity;Splinting  -     Row Name 06/08/21 4105          Plan of Care Review    Plan of Care Reviewed With  patient;spouse  -     Progress  no change  -     Outcome Summary  patient was only able to ambulate 180 ft today due to feeling SOA his O2 sats remained over 95% throughout tx patient required 2 min of sitting rest before feeling like he had recovered. patient is progressing well with mobility he is limited by SOA and anxiety today  -     Row Name 06/08/21 6541          Therapy Assessment/Plan (PT)    Patient/Family Therapy Goals Statement (PT)  return to OF  -     Rehab Potential (PT)  good, to achieve stated therapy goals  -     Criteria for Skilled Interventions Met (PT)  yes;skilled treatment is necessary  -     Row Name 06/08/21 132          Vital Signs    Pre Systolic BP Rehab  118  -TRAMAINE     Pre Treatment Diastolic BP  72  -TRAMAINE     Post Systolic BP Rehab  136  -TRAMAINE     Post Treatment Diastolic BP  78  -TRAMAINE     Pretreatment Heart Rate (beats/min)  60  -TRAMAINE     Posttreatment Heart Rate (beats/min)  64  -TRAMAINE     Pre SpO2 (%)  96  -TRAMAINE     O2 Delivery Pre Treatment  room air  -TRAMAINE      Intra SpO2 (%)  95  -TRAMAINE     O2 Delivery Intra Treatment  room air  -TRAMAINE     Post SpO2 (%)  98  -TRAMAINE     O2 Delivery Post Treatment  room air  -TRAMAINE     Pre Patient Position  Sitting  -TRAMAINE     Intra Patient Position  Standing  -TRAMAINE     Post Patient Position  Sitting  -TRAMAINE     Row Name 06/08/21 1329          Positioning and Restraints    Pre-Treatment Position  sitting in chair/recliner  -TRAMAINE     Post Treatment Position  chair  -TRAMAINE     In Chair  notified nsg;reclined;sitting;call light within reach;encouraged to call for assist;with family/caregiver  -TRAMAINE       User Key  (r) = Recorded By, (t) = Taken By, (c) = Cosigned By    Initials Name Provider Type    Cherelle Martinez, PT Physical Therapist        Outcome Measures     Row Name 06/08/21 1333 06/08/21 1200       How much help from another person do you currently need...    Turning from your back to your side while in flat bed without using bedrails?  3  -TRAMAINE  3  -AP    Moving from lying on back to sitting on the side of a flat bed without bedrails?  3  -TRAMAINE  3  -AP    Moving to and from a bed to a chair (including a wheelchair)?  3  -TRAMAINE  3  -AP    Standing up from a chair using your arms (e.g., wheelchair, bedside chair)?  3  -TRAMAINE  3  -AP    Climbing 3-5 steps with a railing?  3  -TRAMAINE  2  -AP    To walk in hospital room?  3  -TRAMAINE  3  -AP    AM-PAC 6 Clicks Score (PT)  18  -TRAMAINE  17  -AP    Row Name 06/08/21 0800          How much help from another person do you currently need...    Turning from your back to your side while in flat bed without using bedrails?  3  -AW     Moving from lying on back to sitting on the side of a flat bed without bedrails?  3  -AW     Moving to and from a bed to a chair (including a wheelchair)?  3  -AW     Standing up from a chair using your arms (e.g., wheelchair, bedside chair)?  3  -AW     Climbing 3-5 steps with a railing?  2  -AW     To walk in hospital room?  3  -AW     AM-PAC 6 Clicks Score (PT)  17  -AW       User Key  (r) = Recorded By,  (t) = Taken By, (c) = Cosigned By    Initials Name Provider Type    TRAMAINE Cherelle Garcia, PT Physical Therapist    Gerald Velez, RN Registered Nurse    Leidy Sauceda RN Registered Nurse        Physical Therapy Education                 Title: PT OT SLP Therapies (In Progress)     Topic: Physical Therapy (In Progress)     Point: Mobility training (In Progress)     Learning Progress Summary           Patient Acceptance, E, NR by TRAMAINE at 6/8/2021 1015    Acceptance, E, NR by KG at 6/7/2021 0940    Acceptance, E, NR by TRAMAINE at 6/4/2021 0838                   Point: Home exercise program (In Progress)     Learning Progress Summary           Patient Acceptance, E, NR by TRAMAINE at 6/8/2021 1015    Acceptance, E, NR by KG at 6/7/2021 0940    Acceptance, E, NR by TRAMAINE at 6/4/2021 0838                   Point: Body mechanics (In Progress)     Learning Progress Summary           Patient Acceptance, E, NR by TRAMAINE at 6/8/2021 1015    Acceptance, E, NR by KG at 6/7/2021 0940    Acceptance, E, NR by TRAMAINE at 6/4/2021 0838                   Point: Precautions (In Progress)     Learning Progress Summary           Patient Acceptance, E, NR by TRAMAINE at 6/8/2021 1015    Acceptance, E, NR by KG at 6/7/2021 0940    Acceptance, E, NR by TRAMAINE at 6/4/2021 0838                               User Key     Initials Effective Dates Name Provider Type Discipline    TRAMAINE 06/19/15 -  Cherelle Garcia, PT Physical Therapist PT    KG 05/22/20 -  Amy Cadena, PT Physical Therapist PT              PT Recommendation and Plan  Planned Therapy Interventions (PT): balance training, bed mobility training, gait training, home exercise program, transfer training, strengthening  Plan of Care Reviewed With: patient, spouse  Progress: no change  Outcome Summary: patient was only able to ambulate 180 ft today due to feeling SOA his O2 sats remained over 95% throughout tx patient required 2 min of sitting rest before feeling like he had recovered. patient is  progressing well with mobility he is limited by SOA and anxiety today     Time Calculation:   PT Charges     Row Name 06/08/21 1334             Time Calculation    Start Time  1015  -TRAMAINE      PT Received On  06/08/21  -TRAMAINE      PT Goal Re-Cert Due Date  06/14/21  -TRAMAINE         Time Calculation- PT    Total Timed Code Minutes- PT  23 minute(s)  -TRAMAINE         Timed Charges    74228 - PT Therapeutic Activity Minutes  23  -TRAMAINE         Total Minutes    Timed Charges Total Minutes  23  -TRAMAINE       Total Minutes  23  -TRAMAINE        User Key  (r) = Recorded By, (t) = Taken By, (c) = Cosigned By    Initials Name Provider Type    Cherelle Martinez, PT Physical Therapist        Therapy Charges for Today     Code Description Service Date Service Provider Modifiers Qty    30267441086 HC PT THERAPEUTIC ACT EA 15 MIN 6/8/2021 Cherelle Garcia PT GP 2          PT G-Codes  Outcome Measure Options: AM-PAC 6 Clicks Basic Mobility (PT)  AM-PAC 6 Clicks Score (PT): 18    Cherelle Garcia PT  6/8/2021

## 2021-06-08 NOTE — PROGRESS NOTES
[facility] at [facility]  INPATIENT PROGRESS NOTE         [facility] 4H    6/8/2021      PATIENT IDENTIFICATION:   Name:  Colin Albrecht      MRN:  [id number]     76 y.o.  male             Reason for visit: CAD, CHF, A. fib      SUBJECTIVE:   Not doing as well today, dyspnea on exertion when ambulating     OBJECTIVE:  Vitals:    06/08/21 0600 06/08/21 0700 06/08/21 0800 06/08/21 1115   BP: 108/66 124/71 123/62 128/67   BP Location: Right arm  Right arm Right arm   Patient Position: Lying  Lying Lying   Pulse: 59 59 60 60   Resp: 20  20 22   Temp:   98.4 °F (36.9 °C) 97.5 °F (36.4 °C)   TempSrc:   Oral Oral   SpO2: 98% 97% 98% 98%   Weight:       Height:         FiO2 (%): 40 %     Body mass index is 23.68 kg/m².    Intake/Output Summary (Last 24 hours) at 6/8/2021 1211  Last data filed at 6/8/2021 1000  Gross per 24 hour   Intake 739 ml   Output 1050 ml   Net -311 ml       Telemetry: Personally reviewed, paced at 60 bpm    Exam:  General Appearance:   · well developed  · well nourished  Neck:  · thyroid not enlarged  · supple  Respiratory:  · no respiratory distress  · normal breath sounds  · no rales  Cardiovascular:  · no jugular venous distention  · regular rhythm  · apical impulse normal  · S1 normal, S2 normal  · no S3, no S4   · no murmur  · no rub, no thrill  · carotid pulses normal; no bruit  · pedal pulses normal  · lower extremity edema: Trace  Skin:   warm, dry      No Known Allergies  Scheduled meds:       aspirin, 325 mg, Oral, Daily  atorvastatin, 40 mg, Oral, Nightly  bumetanide, 2 mg, Intravenous, Once  famotidine, 20 mg, Oral, BID  ferrous sulfate, 325 mg, Oral, Daily With Breakfast  heparin (porcine), 5,000 Units, Subcutaneous, Q8H  levothyroxine, 50 mcg, Oral, Daily  metoprolol tartrate, 50 mg, Oral, Q12H  pharmacy consult - MTM, , Does not apply, Daily  senna-docusate sodium, 2 tablet, Oral, BID  sodium chloride, 10 mL, Intravenous, Q12H  sodium chloride,  10 mL, Intravenous, Q12H  sodium chloride, 10 mL, Intravenous, Q8H  tamsulosin, 0.4 mg, Oral, Daily      IV meds:                         Data Review:  Results from last 7 days   Lab Units 06/08/21  0400 06/07/21  1219 06/07/21  0257 06/06/21  0342   SODIUM mmol/L 139 136 139 138   BUN mg/dL 35* 35* 34* 29*   CREATININE mg/dL 1.16 0.98 1.22 1.16   GLUCOSE mg/dL 109* 109* 118* 118*     Results from last 7 days   Lab Units 06/08/21  0400 06/07/21  0257 06/06/21  0342 06/05/21  0317 06/04/21 2039 06/04/21  1243   WBC 10*3/mm3 6.40 8.38 7.72 7.99  --  9.91   HEMOGLOBIN g/dL 8.7* 9.0* 8.2* 7.7* 8.1* 8.2*     Results from last 7 days   Lab Units 06/04/21  0325 06/03/21  1811 06/02/21  1502   INR  1.32* 1.67* 1.19*     Results from last 7 days   Lab Units 06/07/21  0257 06/04/21  0320 06/03/21 2030 06/02/21  1502   ALT (SGPT) U/L 12 15 10 8   AST (SGOT) U/L 23 28 26 20     No results found for: DIGOXIN   Lab Results   Component Value Date    TSH 3.830 06/03/2021     Results from last 7 days   Lab Units 06/07/21  0257 06/02/21  1032   CHOLESTEROL mg/dL 101 182   HDL CHOL mg/dL  --  52       Estimated Creatinine Clearance: 65.1 mL/min (by C-G formula based on SCr of 1.16 mg/dL).        Imaging (last 24 hr):   I personally reviewed the most recent chest x-ray and other pertinent imaging studies.  Results for orders placed during the hospital encounter of 06/02/21    XR Chest 1 View    Narrative  EXAMINATION: XR CHEST 1 VW- 06/07/2021    INDICATION: Postop CABG; I25.118-Atherosclerotic heart disease of native  coronary artery with other forms of angina pectoris; I20.0-Unstable  angina    COMPARISON: 06/05/2021    FINDINGS: Portable chest reveals heart to be enlarged. Pacer leads in  satisfactory position. Prominence of the pulmonary vascularity with  pleural-based opacity stable within the left lung. Mild increased  markings seen in the lung bases bilaterally with blunting of the  costophrenic angles. Findings are all  stable.    Impression  Stable chest as above.    D:  06/07/2021  E:  06/07/2021    This report was finalized on 6/7/2021 3:39 PM by Dr. Karine iLmon MD.        Last ECHO:  Results for orders placed during the hospital encounter of 06/02/21    Adult Transthoracic Echo Complete W/ Cont if Necessary Per Protocol    Interpretation Summary  · Left ventricular ejection fraction appears to be 36 - 40%. Left ventricular systolic function is moderately decreased.  · Moderate mitral valve regurgitation is present.  · Estimated right ventricular systolic pressure from tricuspid regurgitation is mildly elevated (35-45 mmHg).  · Left atrial volume is moderately increased.  · Mildly reduced right ventricular systolic function noted.  · Left ventricular diastolic dysfunction is noted.        PROBLEM LIST:     CAD with unstable angina    PAF s/p Watchman device 9/25/2020/ Bi-V ICD and AVN ablation 3/2021    SSS     Dilated CM     Chronic HFrEF 35-40% s/p upgrade BiV ICD and AV node ablation 3/2021    Hypertension    Former smoker    COPD     Suspected chronic renal insufficiency    S/P CABG x 2 on 6/3/2021    Acute-on-chronic kidney injury (CMS/HCC)    Nonsustained ventricular tachycardia 6/5/2021 (CMS/HCC)      Initial cardiac assessment: Pleasant 76-year-old man from [place] follows with Dr. Park and Dr. Prado, history of chronic systolic heart failure, BiV ICD and AV node ablation, permanent atrial fibrillation, watchman device, CKD stage III.  Presented with symptoms concerning for unstable angina, LHC on 6/2/2021 showed 80% ostial left main stenosis.    ASSESSMENT/PLAN:  1.  Unstable angina/CAD, 80% left main:  Dr. Shields performed 2V CABG 6/3/2020 (LIMA-LAD, SVG-circumflex)  EF 35-40%  Continue aspirin  Continue metoprolol  Atorvastatin 40 mg daily started 6/2/2021    2.  Chronic systolic heart failure:  Echo 6/2/2021 showed EF 35-40% with moderate MR and moderate LAE.  Continue guideline directed medical therapy  with beta-blocker   Resume ACE prior to discharge  [employer] BiV ICD in place, interrogated and adjusted 6/2/2021    Worsening dyspnea on exertion, volume up today, 2 mg Bumex x1 ordered    3.  Frequent PVCs/NSVT:  BiV ICD adjusted on day of admission, now paced at 60 with minimal PVCs  Slightly increased frequency of NSVT over the weekend, continue current dose of metoprolol for now.  If becomes worse, will discuss with Dr. Prado (patient's primary EP) regarding antiarrhythmic therapy.    4.  CKD stage III:  Renal function at baseline  Monitor daily    5.  Permanent atrial fibrillation:  Status post AV node ablation and BiV ICD placement with Dr. Prado   9/2020  Watchman in place due to history of GI bleed  No anticoagulation needed        Pietro Quintana MD  6/8/2021    12:11 EDT

## 2021-06-08 NOTE — PLAN OF CARE
Goal Outcome Evaluation:  Plan of Care Reviewed With: patient, spouse        Progress: no change  Outcome Summary: patient was only able to ambulate 180 ft today due to feeling SOA his O2 sats remained over 95% throughout tx patient required 2 min of sitting rest before feeling like he had recovered. patient is progressing well with mobility he is limited by SOA and anxiety today

## 2021-06-09 ENCOUNTER — APPOINTMENT (OUTPATIENT)
Dept: GENERAL RADIOLOGY | Facility: HOSPITAL | Age: 76
End: 2021-06-09

## 2021-06-09 ENCOUNTER — APPOINTMENT (OUTPATIENT)
Dept: CARDIOLOGY | Facility: HOSPITAL | Age: 76
End: 2021-06-09

## 2021-06-09 ENCOUNTER — APPOINTMENT (OUTPATIENT)
Dept: CT IMAGING | Facility: HOSPITAL | Age: 76
End: 2021-06-09

## 2021-06-09 LAB
ACT BLD: 125 SECONDS (ref 82–152)
ACT BLD: 125 SECONDS (ref 82–152)
ACT BLD: 131 SECONDS (ref 82–152)
ACT BLD: 136 SECONDS (ref 82–152)
ACT BLD: 626 SECONDS (ref 82–152)
ACT BLD: 879 SECONDS (ref 82–152)
ACT BLD: 989 SECONDS (ref 82–152)
ANION GAP SERPL CALCULATED.3IONS-SCNC: 9 MMOL/L (ref 5–15)
BASOPHILS # BLD AUTO: 0.02 10*3/MM3 (ref 0–0.2)
BASOPHILS NFR BLD AUTO: 0.3 % (ref 0–1.5)
BH CV ECHO MEAS - AO MAX PG (FULL): 5.5 MMHG
BH CV ECHO MEAS - AO MAX PG: 10.6 MMHG
BH CV ECHO MEAS - AO MEAN PG (FULL): 2.8 MMHG
BH CV ECHO MEAS - AO MEAN PG: 5.4 MMHG
BH CV ECHO MEAS - AO V2 MAX: 162.9 CM/SEC
BH CV ECHO MEAS - AO V2 MEAN: 105.8 CM/SEC
BH CV ECHO MEAS - AO V2 VTI: 29.8 CM
BH CV ECHO MEAS - AVA(I,A): 3.7 CM^2
BH CV ECHO MEAS - AVA(I,D): 3.7 CM^2
BH CV ECHO MEAS - AVA(V,A): 4.4 CM^2
BH CV ECHO MEAS - AVA(V,D): 4.4 CM^2
BH CV ECHO MEAS - BSA(HAYCOCK): 2.2 M^2
BH CV ECHO MEAS - BSA: 2.2 M^2
BH CV ECHO MEAS - BZI_BMI: 25.7 KILOGRAMS/M^2
BH CV ECHO MEAS - BZI_METRIC_HEIGHT: 188 CM
BH CV ECHO MEAS - BZI_METRIC_WEIGHT: 90.7 KG
BH CV ECHO MEAS - EDV(CUBED): 137.8 ML
BH CV ECHO MEAS - EDV(MOD-SP2): 262 ML
BH CV ECHO MEAS - EDV(MOD-SP4): 133 ML
BH CV ECHO MEAS - EDV(TEICH): 127.5 ML
BH CV ECHO MEAS - EF(CUBED): 51 %
BH CV ECHO MEAS - EF(MOD-BP): 45 %
BH CV ECHO MEAS - EF(MOD-SP2): 44.3 %
BH CV ECHO MEAS - EF(MOD-SP4): 39.8 %
BH CV ECHO MEAS - EF(TEICH): 42.8 %
BH CV ECHO MEAS - ESV(CUBED): 67.5 ML
BH CV ECHO MEAS - ESV(MOD-SP2): 146 ML
BH CV ECHO MEAS - ESV(MOD-SP4): 80 ML
BH CV ECHO MEAS - ESV(TEICH): 73 ML
BH CV ECHO MEAS - FS: 21.2 %
BH CV ECHO MEAS - IVS/LVPW: 0.53
BH CV ECHO MEAS - IVSD: 0.89 CM
BH CV ECHO MEAS - LA DIMENSION: 4.4 CM
BH CV ECHO MEAS - LAD MAJOR: 6.6 CM
BH CV ECHO MEAS - LV DIASTOLIC VOL/BSA (35-75): 61.2 ML/M^2
BH CV ECHO MEAS - LV MASS(C)D: 268.9 GRAMS
BH CV ECHO MEAS - LV MASS(C)DI: 123.7 GRAMS/M^2
BH CV ECHO MEAS - LV MAX PG: 5.2 MMHG
BH CV ECHO MEAS - LV MEAN PG: 2.6 MMHG
BH CV ECHO MEAS - LV SYSTOLIC VOL/BSA (12-30): 36.8 ML/M^2
BH CV ECHO MEAS - LV V1 MAX: 113.5 CM/SEC
BH CV ECHO MEAS - LV V1 MEAN: 73.6 CM/SEC
BH CV ECHO MEAS - LV V1 VTI: 17.1 CM
BH CV ECHO MEAS - LVIDD: 5.2 CM
BH CV ECHO MEAS - LVIDS: 4.1 CM
BH CV ECHO MEAS - LVLD AP2: 9.6 CM
BH CV ECHO MEAS - LVLD AP4: 8.1 CM
BH CV ECHO MEAS - LVLS AP2: 8.9 CM
BH CV ECHO MEAS - LVLS AP4: 6.9 CM
BH CV ECHO MEAS - LVOT AREA (M): 6.6 CM^2
BH CV ECHO MEAS - LVOT AREA: 6.4 CM^2
BH CV ECHO MEAS - LVOT DIAM: 2.8 CM
BH CV ECHO MEAS - LVPWD: 1.7 CM
BH CV ECHO MEAS - MV A MAX VEL: 32.1 CM/SEC
BH CV ECHO MEAS - MV DEC TIME: 0.18 SEC
BH CV ECHO MEAS - MV E MAX VEL: 101.2 CM/SEC
BH CV ECHO MEAS - MV E/A: 3.2
BH CV ECHO MEAS - MV MAX PG: 4.6 MMHG
BH CV ECHO MEAS - MV MEAN PG: 1.1 MMHG
BH CV ECHO MEAS - MV V2 MAX: 106.9 CM/SEC
BH CV ECHO MEAS - MV V2 MEAN: 45.8 CM/SEC
BH CV ECHO MEAS - MV V2 VTI: 38.4 CM
BH CV ECHO MEAS - MVA(VTI): 2.8 CM^2
BH CV ECHO MEAS - PA ACC SLOPE: 696.7 CM/SEC^2
BH CV ECHO MEAS - PA ACC TIME: 0.12 SEC
BH CV ECHO MEAS - PA MAX PG: 7.7 MMHG
BH CV ECHO MEAS - PA PR(ACCEL): 26.7 MMHG
BH CV ECHO MEAS - PA V2 MAX: 138.5 CM/SEC
BH CV ECHO MEAS - SI(CUBED): 32.4 ML/M^2
BH CV ECHO MEAS - SI(LVOT): 50.1 ML/M^2
BH CV ECHO MEAS - SI(MOD-SP2): 53.4 ML/M^2
BH CV ECHO MEAS - SI(MOD-SP4): 24.4 ML/M^2
BH CV ECHO MEAS - SI(TEICH): 25.1 ML/M^2
BH CV ECHO MEAS - SV(CUBED): 70.3 ML
BH CV ECHO MEAS - SV(LVOT): 108.8 ML
BH CV ECHO MEAS - SV(MOD-SP2): 116 ML
BH CV ECHO MEAS - SV(MOD-SP4): 53 ML
BH CV ECHO MEAS - SV(TEICH): 54.5 ML
BH CV XLRA - RV BASE: 3.8 CM
BH CV XLRA - RV LENGTH: 7.8 CM
BH CV XLRA - RV MID: 3.3 CM
BUN SERPL-MCNC: 29 MG/DL (ref 8–23)
BUN/CREAT SERPL: 26.4 (ref 7–25)
CALCIUM SPEC-SCNC: 9.7 MG/DL (ref 8.6–10.5)
CHLORIDE SERPL-SCNC: 103 MMOL/L (ref 98–107)
CO2 SERPL-SCNC: 28 MMOL/L (ref 22–29)
CREAT SERPL-MCNC: 1.1 MG/DL (ref 0.76–1.27)
DEPRECATED RDW RBC AUTO: 43.9 FL (ref 37–54)
EOSINOPHIL # BLD AUTO: 0.26 10*3/MM3 (ref 0–0.4)
EOSINOPHIL NFR BLD AUTO: 4 % (ref 0.3–6.2)
ERYTHROCYTE [DISTWIDTH] IN BLOOD BY AUTOMATED COUNT: 12.5 % (ref 12.3–15.4)
GFR SERPL CREATININE-BSD FRML MDRD: 65 ML/MIN/1.73
GLUCOSE BLDC GLUCOMTR-MCNC: 111 MG/DL (ref 70–130)
GLUCOSE SERPL-MCNC: 106 MG/DL (ref 65–99)
HCT VFR BLD AUTO: 28.1 % (ref 37.5–51)
HGB BLD-MCNC: 9.2 G/DL (ref 13–17.7)
IMM GRANULOCYTES # BLD AUTO: 0.03 10*3/MM3 (ref 0–0.05)
IMM GRANULOCYTES NFR BLD AUTO: 0.5 % (ref 0–0.5)
LEFT ATRIUM VOLUME INDEX: 37.7 ML/M^2
LEFT ATRIUM VOLUME: 82 ML
LYMPHOCYTES # BLD AUTO: 1.46 10*3/MM3 (ref 0.7–3.1)
LYMPHOCYTES NFR BLD AUTO: 22.4 % (ref 19.6–45.3)
MAGNESIUM SERPL-MCNC: 2.1 MG/DL (ref 1.6–2.4)
MAXIMAL PREDICTED HEART RATE: 145 BPM
MCH RBC QN AUTO: 31.8 PG (ref 26.6–33)
MCHC RBC AUTO-ENTMCNC: 32.7 G/DL (ref 31.5–35.7)
MCV RBC AUTO: 97.2 FL (ref 79–97)
MONOCYTES # BLD AUTO: 1.05 10*3/MM3 (ref 0.1–0.9)
MONOCYTES NFR BLD AUTO: 16.1 % (ref 5–12)
NEUTROPHILS NFR BLD AUTO: 3.7 10*3/MM3 (ref 1.7–7)
NEUTROPHILS NFR BLD AUTO: 56.7 % (ref 42.7–76)
NRBC BLD AUTO-RTO: 0 /100 WBC (ref 0–0.2)
PLATELET # BLD AUTO: 212 10*3/MM3 (ref 140–450)
PMV BLD AUTO: 10 FL (ref 6–12)
POTASSIUM SERPL-SCNC: 3.6 MMOL/L (ref 3.5–5.2)
POTASSIUM SERPL-SCNC: 4.6 MMOL/L (ref 3.5–5.2)
RBC # BLD AUTO: 2.89 10*6/MM3 (ref 4.14–5.8)
SODIUM SERPL-SCNC: 140 MMOL/L (ref 136–145)
STRESS TARGET HR: 123 BPM
WBC # BLD AUTO: 6.52 10*3/MM3 (ref 3.4–10.8)

## 2021-06-09 PROCEDURE — 71045 X-RAY EXAM CHEST 1 VIEW: CPT

## 2021-06-09 PROCEDURE — 85025 COMPLETE CBC W/AUTO DIFF WBC: CPT | Performed by: INTERNAL MEDICINE

## 2021-06-09 PROCEDURE — 25010000002 METHYLPREDNISOLONE PER 125 MG: Performed by: INTERNAL MEDICINE

## 2021-06-09 PROCEDURE — 93306 TTE W/DOPPLER COMPLETE: CPT | Performed by: INTERNAL MEDICINE

## 2021-06-09 PROCEDURE — 94799 UNLISTED PULMONARY SVC/PX: CPT

## 2021-06-09 PROCEDURE — 93306 TTE W/DOPPLER COMPLETE: CPT

## 2021-06-09 PROCEDURE — 84132 ASSAY OF SERUM POTASSIUM: CPT | Performed by: INTERNAL MEDICINE

## 2021-06-09 PROCEDURE — 99233 SBSQ HOSP IP/OBS HIGH 50: CPT | Performed by: INTERNAL MEDICINE

## 2021-06-09 PROCEDURE — 82962 GLUCOSE BLOOD TEST: CPT

## 2021-06-09 PROCEDURE — 80048 BASIC METABOLIC PNL TOTAL CA: CPT | Performed by: PHYSICIAN ASSISTANT

## 2021-06-09 PROCEDURE — 25010000002 HEPARIN (PORCINE) PER 1000 UNITS: Performed by: INTERNAL MEDICINE

## 2021-06-09 PROCEDURE — 0 IOPAMIDOL PER 1 ML: Performed by: INTERNAL MEDICINE

## 2021-06-09 PROCEDURE — 71275 CT ANGIOGRAPHY CHEST: CPT

## 2021-06-09 PROCEDURE — 83735 ASSAY OF MAGNESIUM: CPT | Performed by: INTERNAL MEDICINE

## 2021-06-09 RX ORDER — METHYLPREDNISOLONE SODIUM SUCCINATE 125 MG/2ML
60 INJECTION, POWDER, LYOPHILIZED, FOR SOLUTION INTRAMUSCULAR; INTRAVENOUS EVERY 8 HOURS
Status: DISCONTINUED | OUTPATIENT
Start: 2021-06-09 | End: 2021-06-10 | Stop reason: HOSPADM

## 2021-06-09 RX ORDER — BUMETANIDE 0.25 MG/ML
2 INJECTION INTRAMUSCULAR; INTRAVENOUS ONCE
Status: COMPLETED | OUTPATIENT
Start: 2021-06-09 | End: 2021-06-09

## 2021-06-09 RX ORDER — IPRATROPIUM BROMIDE AND ALBUTEROL SULFATE 2.5; .5 MG/3ML; MG/3ML
3 SOLUTION RESPIRATORY (INHALATION)
Status: DISCONTINUED | OUTPATIENT
Start: 2021-06-09 | End: 2021-06-10 | Stop reason: HOSPADM

## 2021-06-09 RX ADMIN — ATORVASTATIN CALCIUM 40 MG: 40 TABLET, FILM COATED ORAL at 20:40

## 2021-06-09 RX ADMIN — HEPARIN SODIUM 5000 UNITS: 5000 INJECTION INTRAVENOUS; SUBCUTANEOUS at 16:16

## 2021-06-09 RX ADMIN — IOPAMIDOL 85 ML: 755 INJECTION, SOLUTION INTRAVENOUS at 10:45

## 2021-06-09 RX ADMIN — METHYLPREDNISOLONE SODIUM SUCCINATE 60 MG: 125 INJECTION, POWDER, FOR SOLUTION INTRAMUSCULAR; INTRAVENOUS at 16:21

## 2021-06-09 RX ADMIN — LEVOTHYROXINE SODIUM 50 MCG: 50 TABLET ORAL at 06:09

## 2021-06-09 RX ADMIN — DOCUSATE SODIUM 50MG AND SENNOSIDES 8.6MG 2 TABLET: 8.6; 5 TABLET, FILM COATED ORAL at 11:34

## 2021-06-09 RX ADMIN — ASPIRIN 325 MG: 325 TABLET, COATED ORAL at 11:34

## 2021-06-09 RX ADMIN — ALPRAZOLAM 0.25 MG: 0.25 TABLET ORAL at 20:40

## 2021-06-09 RX ADMIN — IPRATROPIUM BROMIDE AND ALBUTEROL SULFATE 3 ML: 2.5; .5 SOLUTION RESPIRATORY (INHALATION) at 19:24

## 2021-06-09 RX ADMIN — IPRATROPIUM BROMIDE AND ALBUTEROL SULFATE 3 ML: 2.5; .5 SOLUTION RESPIRATORY (INHALATION) at 16:07

## 2021-06-09 RX ADMIN — METOPROLOL TARTRATE 50 MG: 50 TABLET, FILM COATED ORAL at 20:40

## 2021-06-09 RX ADMIN — SODIUM CHLORIDE, PRESERVATIVE FREE 10 ML: 5 INJECTION INTRAVENOUS at 11:36

## 2021-06-09 RX ADMIN — POTASSIUM CHLORIDE 40 MEQ: 750 CAPSULE, EXTENDED RELEASE ORAL at 16:26

## 2021-06-09 RX ADMIN — HYDROCODONE BITARTRATE AND ACETAMINOPHEN 1 TABLET: 7.5; 325 TABLET ORAL at 02:09

## 2021-06-09 RX ADMIN — HEPARIN SODIUM 5000 UNITS: 5000 INJECTION INTRAVENOUS; SUBCUTANEOUS at 06:09

## 2021-06-09 RX ADMIN — METOPROLOL TARTRATE 50 MG: 50 TABLET, FILM COATED ORAL at 11:34

## 2021-06-09 RX ADMIN — FAMOTIDINE 20 MG: 20 TABLET, FILM COATED ORAL at 20:40

## 2021-06-09 RX ADMIN — FERROUS SULFATE TAB 325 MG (65 MG ELEMENTAL FE) 325 MG: 325 (65 FE) TAB at 11:35

## 2021-06-09 RX ADMIN — BUMETANIDE 2 MG: 0.25 INJECTION, SOLUTION INTRAMUSCULAR; INTRAVENOUS at 11:35

## 2021-06-09 RX ADMIN — BISACODYL 10 MG: 5 TABLET, COATED ORAL at 12:20

## 2021-06-09 RX ADMIN — POTASSIUM CHLORIDE 40 MEQ: 750 CAPSULE, EXTENDED RELEASE ORAL at 11:35

## 2021-06-09 RX ADMIN — ACETAMINOPHEN 650 MG: 325 TABLET ORAL at 16:20

## 2021-06-09 RX ADMIN — TAMSULOSIN HYDROCHLORIDE 0.4 MG: 0.4 CAPSULE ORAL at 11:35

## 2021-06-09 RX ADMIN — SODIUM CHLORIDE, PRESERVATIVE FREE 10 ML: 5 INJECTION INTRAVENOUS at 20:40

## 2021-06-09 RX ADMIN — FAMOTIDINE 20 MG: 20 TABLET, FILM COATED ORAL at 11:34

## 2021-06-09 RX ADMIN — HEPARIN SODIUM 5000 UNITS: 5000 INJECTION INTRAVENOUS; SUBCUTANEOUS at 20:40

## 2021-06-09 NOTE — PROGRESS NOTES
[facility] Medicine Services  PROGRESS NOTE    Patient Name: Colin Albrecht  : 1945  MRN: [id number]    Date of Admission: 2021  Primary Care Physician: Arun Soliman MD    Subjective   Subjective     CC:  Med management s/p CABG    HPI:  C/o dyspnea on minimal exertion and dyspneic just talking.  States that he had SOA before his CABG and he hoped this would fix it.c/o mild white productive cough.  Has dyspnea with any movement or talking.      ROS:  Gen- No fevers, chills  CV- No chest pain, palpitations  Resp- No cough, dyspnea  GI- No N/V/D, abd pain        Objective   Objective     Vital Signs:   Temp:  [97.5 °F (36.4 °C)-98.2 °F (36.8 °C)] 97.6 °F (36.4 °C)  Heart Rate:  [59-63] 59  Resp:  [18-22] 18  BP: (112-129)/(61-67) 129/67        Physical Exam:  Constitutional: mild distress d/t breathing, awake, alert  HENT: NCAT, mucous membranes moist  Respiratory: Clear to auscultation bilaterally, respiratory effort labored with talking  Cardiovascular: RRR, no murmurs, rubs, or gallops, sternal incision c/d/i, healing well  Gastrointestinal: Positive bowel sounds, soft, nontender, nondistended  Musculoskeletal: No bilateral ankle edema  Psychiatric: Appropriate affect, cooperative  Neurologic: Oriented x 3, strength symmetric in all extremities, Cranial Nerves grossly intact to confrontation, speech clear  Skin: No rashes      Results Reviewed:  Results from last 7 days   Lab Units 21  0546 21  0400 21  0257 21  0325 21  1811 21  0451 21  1502   WBC 10*3/mm3 6.52 6.40 8.38 6.90 4.95  --   --    HEMOGLOBIN g/dL 9.2* 8.7* 9.0* 7.6* 8.6*  --   --    HEMOGLOBIN, POC   --   --   --   --   --    < >  --    HEMATOCRIT % 28.1* 26.7* 27.7* 23.2* 26.3*  --   --    HEMATOCRIT POC   --   --   --   --   --    < >  --    PLATELETS 10*3/mm3 212 188 183 162 158  --   --    INR   --   --   --  1.32* 1.67*  --  1.19*    < > = values in this  interval not displayed.     Results from last 7 days   Lab Units 06/09/21  0546 06/08/21  0400 06/07/21  1219 06/07/21  0257 06/04/21  0320 06/03/21  2030   SODIUM mmol/L 140 139 136 139 143 141  141   POTASSIUM mmol/L 3.6 4.3 4.0 4.1 3.6 3.7  3.7   CHLORIDE mmol/L 103 105 102 104 107 105  105   CO2 mmol/L 28.0 28.0 26.0 28.0 27.0 23.0  23.0   BUN mg/dL 29* 35* 35* 34* 23 21  21   CREATININE mg/dL 1.10 1.16 0.98 1.22 1.49* 1.31*  1.31*   GLUCOSE mg/dL 106* 109* 109* 118* 139* 143*  143*   CALCIUM mg/dL 9.7 9.0 8.8 9.4 9.5 9.5  9.5   ALT (SGPT) U/L  --   --   --  12 15 10   AST (SGOT) U/L  --   --   --  23 28 26     Estimated Creatinine Clearance: 74.7 mL/min (by C-G formula based on SCr of 1.1 mg/dL).    Microbiology Results Abnormal     Procedure Component Value - Date/Time    COVID PRE-OP / PRE-PROCEDURE SCREENING ORDER (NO ISOLATION) - Swab, Nasopharynx [[id number]]  (Normal) Collected: 06/02/21 1248    Lab Status: Final result Specimen: Swab from Nasopharynx Updated: 06/02/21 1331    Narrative:      The following orders were created for panel order COVID PRE-OP / PRE-PROCEDURE SCREENING ORDER (NO ISOLATION) - Swab, Nasopharynx.  Procedure                               Abnormality         Status                     ---------                               -----------         ------                     COVID-19 and FLU A/B PCR...[[id number]]  Normal              Final result                 Please view results for these tests on the individual orders.    COVID-19 and FLU A/B PCR - Swab, Nasopharynx [[id number]]  (Normal) Collected: 06/02/21 1248    Lab Status: Final result Specimen: Swab from Nasopharynx Updated: 06/02/21 1331     COVID19 Not Detected     Influenza A PCR Not Detected     Influenza B PCR Not Detected    Narrative:      Fact sheet for providers: https://www.fda.gov/media/597040/download    Fact sheet for patients: https://www.fda.gov/media/736507/download    Test performed by PCR.           Imaging Results (Last 24 Hours)     Procedure Component Value Units Date/Time    XR Chest 1 View [[id number]] Resulted: 06/09/21 0701     Updated: 06/09/21 0717          Results for orders placed during the hospital encounter of 06/02/21    Adult Transthoracic Echo Complete W/ Cont if Necessary Per Protocol    Interpretation Summary  · Left ventricular ejection fraction appears to be 36 - 40%. Left ventricular systolic function is moderately decreased.  · Moderate mitral valve regurgitation is present.  · Estimated right ventricular systolic pressure from tricuspid regurgitation is mildly elevated (35-45 mmHg).  · Left atrial volume is moderately increased.  · Mildly reduced right ventricular systolic function noted.  · Left ventricular diastolic dysfunction is noted.      I have reviewed the medications:  Scheduled Meds:aspirin, 325 mg, Oral, Daily  atorvastatin, 40 mg, Oral, Nightly  famotidine, 20 mg, Oral, BID  ferrous sulfate, 325 mg, Oral, Daily With Breakfast  heparin (porcine), 5,000 Units, Subcutaneous, Q8H  levothyroxine, 50 mcg, Oral, Daily  metoprolol tartrate, 50 mg, Oral, Q12H  pharmacy consult - [place], , Does not apply, Daily  senna-docusate sodium, 2 tablet, Oral, BID  sodium chloride, 10 mL, Intravenous, Q12H  sodium chloride, 10 mL, Intravenous, Q12H  sodium chloride, 10 mL, Intravenous, Q8H  tamsulosin, 0.4 mg, Oral, Daily      Continuous Infusions:   PRN Meds:.•  acetaminophen  •  ALPRAZolam  •  aluminum-magnesium hydroxide-simethicone  •  bisacodyl  •  bisacodyl  •  calcium gluconate IVPB  •  dextrose  •  docusate sodium  •  HYDROcodone-acetaminophen  •  ipratropium-albuterol  •  magnesium hydroxide  •  magnesium sulfate **OR** magnesium sulfate **OR** magnesium sulfate  •  Morphine  •  ondansetron **OR** ondansetron  •  oxyCODONE-acetaminophen  •  potassium chloride **OR** potassium chloride  •  potassium chloride  •  potassium phosphate infusion greater than 15 mMoles **OR** potassium  phosphate infusion greater than 15 mMoles **OR** potassium phosphate **OR** sodium phosphate IVPB **OR** sodium phosphate IVPB **OR** sodium phosphate IVPB    Assessment/Plan   Assessment & Plan     Active Hospital Problems    Diagnosis  POA   • **CAD with unstable angina [I25.10]  Yes   • Nonsustained ventricular tachycardia 6/5/2021 (CMS/HCC) [I47.2]  No   • Acute-on-chronic kidney injury (CMS/HCC) [N17.9, N18.9]  No   • Hypertension [I10]  Yes   • Former smoker [Z87.891]  Not Applicable   • COPD  [J44.9]  Yes   • Suspected chronic renal insufficiency [N18.9]  Yes   • S/P CABG x 2 on 6/3/2021 [Z95.1]  Not Applicable   • Chronic HFrEF 35-40% s/p upgrade BiV ICD and AV node ablation 3/2021 [I50.22]  Yes   • Dilated CM  [I42.0]  Yes   • SSS  [I49.5]  Yes   • PAF s/p Watchman device 9/25/2020/ Bi-V ICD and AVN ablation 3/2021 [I48.21]  Yes      Resolved Hospital Problems   No resolved problems to display.        Brief Hospital Course to date:  Colin Albrecht is a 76 y.o. male with pmhx of dilated CM, SSS with PPM upgraded to BiV ICD with AV node ablation 3/2021, afib (not on anticoagulation d/t previous GI bleed/epistaxis while on xarelto, has a watchman device placed 9/2020) found to have 80% ostial left main stenosis with LVEF of 35-40% and RVSP 38mmHg and diastolic dysfunction was decided to proceed with uneventful CABG x 2 on 6/3/2021.  He did develop a 5 minute run of ventricular tachycardia and cardiology adjusted BiV ICD and started metoprolol.  He was transferred from the ICU to telemetry on 6/9/21, hospitalist service following along to help with medical management    CAD with Unstable Angina  --s/p CABG x2 on 6/3/2021  --cont ASA, statin, metoprolol    PAF s/p Watchman device  SSS  --s/p upgrade BiV ICD and AV node ablation 3/2021  --not on anticoagulation d/t h/o GI bleed/epistaxis while on xarelto    Dyspnea  --CTA showed no PE, moderate right and small left pleural effusions with some associated  basilar volume loss  --Echo shows EF 35%, LV moderately to severely dilated, moderate MR, mild to moderate MS, mild to moderate TR, RVSP 45-55mmHg  --add scheduled nebs, IV solumedrol.  ?if dyspnea could be d/t pHTN.  Bumex today per cards    Dilated DM  Chronic Systolic CHF, EF 35-40%  --s/p upgrade BiV ICD 3/2021, interrogated and adjusted 6/2/2021  --Bumex 2mg x 1 today per cards    Acute/CKD  --improved    Nonsustained VT  --cont metoprolol per cards    Hypothyroid  --cont home dose synthroid    COPD    DVT Prophylaxis:  Sub q heparin      Disposition: I expect the patient to be discharged TBD    CODE STATUS:   Code Status and Medical Interventions:   Ordered at: 06/06/21 2110     Code Status:    CPR     Medical Interventions (Level of Support Prior to Arrest):    Full       Ariel Moralez MD  06/09/21

## 2021-06-09 NOTE — PROGRESS NOTES
"Colin Albrecht  [id number]  1945     LOS: 7 days   Patient Care Team:  Arun Soliman MD as PCP - General (Family Medicine)  Semaj Park MD (Cardiology)    Chief Complaint: Coronary artery disease      Subjective: Complains of being very short of breath    Objective:     Vital Sign Min/Max for last 24 hours  Temp  Min: 97.5 °F (36.4 °C)  Max: 98.4 °F (36.9 °C)   BP  Min: 112/61  Max: 128/67   Pulse  Min: 59  Max: 63   Resp  Min: 18  Max: 22   SpO2  Min: 94 %  Max: 100 %   No data recorded   Weight  Min: 91 kg (200 lb 9.9 oz)  Max: 91 kg (200 lb 9.9 oz)     Flowsheet Rows      First Filed Value   Admission Height  188 cm (74\") Documented at 06/02/2021 1015   Admission Weight  83.3 kg (183 lb 9.6 oz) Documented at 06/02/2021 1015          Physical Exam:    Wound: Satisfactory  Pulses:     Mediastinal and Chest Tube Drainage:       Results Review:   Results from last 7 days   Lab Units 06/09/21  0546   WBC 10*3/mm3 6.52   HEMOGLOBIN g/dL 9.2*   HEMATOCRIT % 28.1*   PLATELETS 10*3/mm3 212     Results from last 7 days   Lab Units 06/08/21  0400   SODIUM mmol/L 139   POTASSIUM mmol/L 4.3   CHLORIDE mmol/L 105   CO2 mmol/L 28.0   BUN mg/dL 35*   CREATININE mg/dL 1.16   GLUCOSE mg/dL 109*   CALCIUM mg/dL 9.0     Results from last 7 days   Lab Units 06/04/21  0403   PH, ARTERIAL pH units 7.427   PO2 ART mm Hg 82.3*   PCO2, ARTERIAL mm Hg 43.0   HCO3 ART mmol/L 28.3*         Assessment      CAD with unstable angina    PAF s/p Watchman device 9/25/2020/ Bi-V ICD and AVN ablation 3/2021    SSS     Dilated CM     Chronic HFrEF 35-40% s/p upgrade BiV ICD and AV node ablation 3/2021    Hypertension    Former smoker    COPD     Suspected chronic renal insufficiency    S/P CABG x 2 on 6/3/2021    Acute-on-chronic kidney injury (CMS/HCC)    Nonsustained ventricular tachycardia 6/5/2021 (CMS/HCC)      Patient is very short of breath.  We will get a chest x-ray, echocardiogram, and a CT scan for PE of his chest to rule " out pericardial effusion or other problems such as PE        Jaime Shields MD  06/09/21  07:00 EDT      Please note that portions of this note were completed with a voice recognition program. Efforts were made to edit the dictations, but words may be mistranscribed

## 2021-06-09 NOTE — CASE MANAGEMENT/SOCIAL WORK
"Continued Stay Note  [facility]     Patient Name: Colin Albrecht  MRN: [id number]  Today's Date: 6/9/2021    Admit Date: 6/2/2021    Discharge Plan     Row Name 06/09/21 1510       Plan    Plan  Home    Patient/Family in Agreement with Plan  yes    Plan Comments  Met & spoke with Mr Albrecht at the bedside. Reports being consistently SOA and was visibly during conversation. Staying frustrated over SOA and \"doesn't understand why this is happening.\" CT of the chest completed. O2 2LNC in place. Will monitor for O2 needs for home. Plan is home and spouse will transport.    Final Discharge Disposition Code  01 - home or self-care        Discharge Codes    No documentation.       Expected Discharge Date and Time     Expected Discharge Date Expected Discharge Time    Jun 9, 2021             Lesly Arce RN    "

## 2021-06-09 NOTE — PROGRESS NOTES
[facility] at [facility]  INPATIENT PROGRESS NOTE         [facility] 4H    6/9/2021      PATIENT IDENTIFICATION:   Name:  Colin Albrecht      MRN:  [id number]     76 y.o.  male             Reason for visit: CAD, CHF, A. fib      SUBJECTIVE:   Still short of breath with any activity, excellent diuresis with Bumex yesterday -2500 cc     OBJECTIVE:  Vitals:    06/09/21 0400 06/09/21 0413 06/09/21 0600 06/09/21 0752   BP:    129/67   BP Location:    Right arm   Patient Position:    Lying   Pulse: 60  59 59   Resp:  18  18   Temp:  97.5 °F (36.4 °C)  97.6 °F (36.4 °C)   TempSrc:  Oral  Oral   SpO2: 94%  99% 95%   Weight:  91 kg (200 lb 9.9 oz)     Height:         FiO2 (%): 40 %     Body mass index is 25.75 kg/m².    Intake/Output Summary (Last 24 hours) at 6/9/2021 0833  Last data filed at 6/9/2021 0210  Gross per 24 hour   Intake 120 ml   Output 2650 ml   Net -2530 ml       Telemetry: Personally reviewed, paced at 60 bpm    Exam:  General Appearance:   · well developed  · well nourished  Neck:  · thyroid not enlarged  · supple  Respiratory:  · no respiratory distress  · normal breath sounds  · no rales  Cardiovascular:  · no jugular venous distention  · regular rhythm  · apical impulse normal  · S1 normal, S2 normal  · no S3, no S4   · no murmur  · no rub, no thrill  · carotid pulses normal; no bruit  · pedal pulses normal  · lower extremity edema: Trace  Skin:   warm, dry      No Known Allergies  Scheduled meds:       aspirin, 325 mg, Oral, Daily  atorvastatin, 40 mg, Oral, Nightly  famotidine, 20 mg, Oral, BID  ferrous sulfate, 325 mg, Oral, Daily With Breakfast  heparin (porcine), 5,000 Units, Subcutaneous, Q8H  levothyroxine, 50 mcg, Oral, Daily  metoprolol tartrate, 50 mg, Oral, Q12H  pharmacy consult - [facility], , Does not apply, Daily  senna-docusate sodium, 2 tablet, Oral, BID  sodium chloride, 10 mL, Intravenous, Q12H  tamsulosin, 0.4 mg, Oral, Daily      IV meds:                          Data Review:  Results from last 7 days   Lab Units 06/09/21  0546 06/08/21  0400 06/07/21  1219 06/07/21  0257   SODIUM mmol/L 140 139 136 139   BUN mg/dL 29* 35* 35* 34*   CREATININE mg/dL 1.10 1.16 0.98 1.22   GLUCOSE mg/dL 106* 109* 109* 118*     Results from last 7 days   Lab Units 06/09/21  0546 06/08/21  0400 06/07/21  0257 06/06/21  0342 06/05/21  0317   WBC 10*3/mm3 6.52 6.40 8.38 7.72 7.99   HEMOGLOBIN g/dL 9.2* 8.7* 9.0* 8.2* 7.7*     Results from last 7 days   Lab Units 06/04/21  0325 06/03/21  1811 06/02/21  1502   INR  1.32* 1.67* 1.19*     Results from last 7 days   Lab Units 06/07/21  0257 06/04/21  0320 06/03/21  2030 06/02/21  1502   ALT (SGPT) U/L 12 15 10 8   AST (SGOT) U/L 23 28 26 20     No results found for: DIGOXIN   Lab Results   Component Value Date    TSH 3.830 06/03/2021     Results from last 7 days   Lab Units 06/07/21  0257 06/02/21  1032   CHOLESTEROL mg/dL 101 182   HDL CHOL mg/dL  --  52       Estimated Creatinine Clearance: 74.7 mL/min (by C-G formula based on SCr of 1.1 mg/dL).        Imaging (last 24 hr):   I personally reviewed the most recent chest x-ray and other pertinent imaging studies.  Results for orders placed during the hospital encounter of 06/02/21    XR Chest 1 View    Narrative  EXAMINATION: XR CHEST 1 VW- 06/07/2021    INDICATION: Postop CABG; I25.118-Atherosclerotic heart disease of native  coronary artery with other forms of angina pectoris; I20.0-Unstable  angina    COMPARISON: 06/05/2021    FINDINGS: Portable chest reveals heart to be enlarged. Pacer leads in  satisfactory position. Prominence of the pulmonary vascularity with  pleural-based opacity stable within the left lung. Mild increased  markings seen in the lung bases bilaterally with blunting of the  costophrenic angles. Findings are all stable.    Impression  Stable chest as above.    D:  06/07/2021  E:  06/07/2021    This report was finalized on 6/7/2021 3:39 PM by Dr. Milotn  MD Braxton.        Last ECHO:  Results for orders placed during the hospital encounter of 06/02/21    Adult Transthoracic Echo Complete W/ Cont if Necessary Per Protocol    Interpretation Summary  · Left ventricular ejection fraction appears to be 36 - 40%. Left ventricular systolic function is moderately decreased.  · Moderate mitral valve regurgitation is present.  · Estimated right ventricular systolic pressure from tricuspid regurgitation is mildly elevated (35-45 mmHg).  · Left atrial volume is moderately increased.  · Mildly reduced right ventricular systolic function noted.  · Left ventricular diastolic dysfunction is noted.        PROBLEM LIST:     CAD with unstable angina    PAF s/p Watchman device 9/25/2020/ Bi-V ICD and AVN ablation 3/2021    SSS     Dilated CM     Chronic HFrEF 35-40% s/p upgrade BiV ICD and AV node ablation 3/2021    Hypertension    Former smoker    COPD     Suspected chronic renal insufficiency    S/P CABG x 2 on 6/3/2021    Acute-on-chronic kidney injury (CMS/HCC)    Nonsustained ventricular tachycardia 6/5/2021 (CMS/HCC)      Initial cardiac assessment: Pleasant 76-year-old man from [place] follows with Dr. Park and Dr. Prado, history of chronic systolic heart failure, BiV ICD and AV node ablation, permanent atrial fibrillation, watchman device, CKD stage III.  Presented with symptoms concerning for unstable angina, LHC on 6/2/2021 showed 80% ostial left main stenosis.    ASSESSMENT/PLAN:  1.  Unstable angina/CAD, 80% left main:  Dr. Shields performed 2V CABG 6/3/2020 (LIMA-LAD, SVG-circumflex)  EF 35-40%  Continue aspirin  Continue metoprolol  Atorvastatin 40 mg daily started 6/2/2021    2.  Chronic systolic heart failure:  Echo 6/2/2021 showed EF 35-40% with moderate MR and moderate LAE.  Continue guideline directed medical therapy with beta-blocker   Resume ACE prior to discharge  [employer] BiV ICD in place, interrogated and adjusted 6/2/2021      3.  Frequent  PVCs/NSVT:  BiV ICD adjusted on day of admission, now paced at 60 with minimal PVCs  Slightly increased frequency of NSVT over the weekend, continue current dose of metoprolol for now.  If becomes worse, will discuss with Dr. Prado (patient's primary EP) regarding antiarrhythmic therapy.    4.  CKD stage III:  Renal function at baseline  Monitor daily    5.  Permanent atrial fibrillation:  Status post AV node ablation and BiV ICD placement with Dr. Prado   9/2020  Watchman in place due to history of GI bleed  No anticoagulation needed    6.  Persistent/worsening dyspnea:  Agree with CT PE protocol, echocardiogram  Chest x-ray shows persistent right pleural effusion less on the left.  We will give another 2 mg IV Bumex today.        Pietro Quintana MD  6/9/2021    08:33 EDT

## 2021-06-10 ENCOUNTER — APPOINTMENT (OUTPATIENT)
Dept: GENERAL RADIOLOGY | Facility: HOSPITAL | Age: 76
End: 2021-06-10

## 2021-06-10 VITALS
RESPIRATION RATE: 18 BRPM | WEIGHT: 200.62 LBS | SYSTOLIC BLOOD PRESSURE: 130 MMHG | HEART RATE: 59 BPM | DIASTOLIC BLOOD PRESSURE: 72 MMHG | TEMPERATURE: 97.4 F | BODY MASS INDEX: 25.75 KG/M2 | HEIGHT: 74 IN | OXYGEN SATURATION: 96 %

## 2021-06-10 LAB
ANION GAP SERPL CALCULATED.3IONS-SCNC: 10 MMOL/L (ref 5–15)
BUN SERPL-MCNC: 34 MG/DL (ref 8–23)
BUN/CREAT SERPL: 27.2 (ref 7–25)
CALCIUM SPEC-SCNC: 10.5 MG/DL (ref 8.6–10.5)
CHLORIDE SERPL-SCNC: 103 MMOL/L (ref 98–107)
CO2 SERPL-SCNC: 28 MMOL/L (ref 22–29)
CREAT SERPL-MCNC: 1.25 MG/DL (ref 0.76–1.27)
DEPRECATED RDW RBC AUTO: 44.9 FL (ref 37–54)
ERYTHROCYTE [DISTWIDTH] IN BLOOD BY AUTOMATED COUNT: 12.5 % (ref 12.3–15.4)
GFR SERPL CREATININE-BSD FRML MDRD: 56 ML/MIN/1.73
GLUCOSE SERPL-MCNC: 223 MG/DL (ref 65–99)
HCT VFR BLD AUTO: 29.9 % (ref 37.5–51)
HGB BLD-MCNC: 9.8 G/DL (ref 13–17.7)
MCH RBC QN AUTO: 32 PG (ref 26.6–33)
MCHC RBC AUTO-ENTMCNC: 32.8 G/DL (ref 31.5–35.7)
MCV RBC AUTO: 97.7 FL (ref 79–97)
PLATELET # BLD AUTO: 230 10*3/MM3 (ref 140–450)
PMV BLD AUTO: 10.1 FL (ref 6–12)
POTASSIUM SERPL-SCNC: 4.6 MMOL/L (ref 3.5–5.2)
RBC # BLD AUTO: 3.06 10*6/MM3 (ref 4.14–5.8)
SODIUM SERPL-SCNC: 141 MMOL/L (ref 136–145)
WBC # BLD AUTO: 7.68 10*3/MM3 (ref 3.4–10.8)

## 2021-06-10 PROCEDURE — 25010000002 HEPARIN (PORCINE) PER 1000 UNITS: Performed by: INTERNAL MEDICINE

## 2021-06-10 PROCEDURE — 99232 SBSQ HOSP IP/OBS MODERATE 35: CPT | Performed by: INTERNAL MEDICINE

## 2021-06-10 PROCEDURE — 94799 UNLISTED PULMONARY SVC/PX: CPT

## 2021-06-10 PROCEDURE — 25010000002 METHYLPREDNISOLONE PER 125 MG: Performed by: INTERNAL MEDICINE

## 2021-06-10 PROCEDURE — 71045 X-RAY EXAM CHEST 1 VIEW: CPT

## 2021-06-10 PROCEDURE — 80048 BASIC METABOLIC PNL TOTAL CA: CPT | Performed by: PHYSICIAN ASSISTANT

## 2021-06-10 PROCEDURE — 85027 COMPLETE CBC AUTOMATED: CPT | Performed by: INTERNAL MEDICINE

## 2021-06-10 RX ORDER — PREDNISONE 20 MG/1
TABLET ORAL
Qty: 18 TABLET | Refills: 0 | Status: SHIPPED | OUTPATIENT
Start: 2021-06-10 | End: 2021-06-17 | Stop reason: HOSPADM

## 2021-06-10 RX ORDER — ATORVASTATIN CALCIUM 40 MG/1
40 TABLET, FILM COATED ORAL NIGHTLY
Qty: 90 TABLET | Refills: 3 | Status: SHIPPED | OUTPATIENT
Start: 2021-06-10 | End: 2021-06-11

## 2021-06-10 RX ORDER — BUDESONIDE AND FORMOTEROL FUMARATE DIHYDRATE 80; 4.5 UG/1; UG/1
2 AEROSOL RESPIRATORY (INHALATION)
Qty: 10.2 G | Refills: 0 | Status: SHIPPED | OUTPATIENT
Start: 2021-06-10 | End: 2021-06-17 | Stop reason: HOSPADM

## 2021-06-10 RX ORDER — TAMSULOSIN HYDROCHLORIDE 0.4 MG/1
0.4 CAPSULE ORAL DAILY
Qty: 30 CAPSULE | Refills: 6 | Status: SHIPPED | OUTPATIENT
Start: 2021-06-11 | End: 2021-06-11

## 2021-06-10 RX ORDER — FERROUS SULFATE 325(65) MG
325 TABLET ORAL
Qty: 30 TABLET | Refills: 6 | Status: SHIPPED | OUTPATIENT
Start: 2021-06-11 | End: 2021-06-11

## 2021-06-10 RX ORDER — BUMETANIDE 0.5 MG/1
0.5 TABLET ORAL DAILY
Qty: 30 TABLET | Refills: 3 | Status: SHIPPED | OUTPATIENT
Start: 2021-06-10 | End: 2021-06-11 | Stop reason: SDUPTHER

## 2021-06-10 RX ORDER — ALBUTEROL SULFATE 90 UG/1
2 AEROSOL, METERED RESPIRATORY (INHALATION) EVERY 4 HOURS PRN
Qty: 18 G | Refills: 0 | Status: ON HOLD | OUTPATIENT
Start: 2021-06-10 | End: 2021-06-17 | Stop reason: SDUPTHER

## 2021-06-10 RX ORDER — HYDROCODONE BITARTRATE AND ACETAMINOPHEN 7.5; 325 MG/1; MG/1
1 TABLET ORAL EVERY 6 HOURS PRN
Qty: 30 TABLET | Refills: 0 | Status: SHIPPED | OUTPATIENT
Start: 2021-06-10 | End: 2021-06-17

## 2021-06-10 RX ORDER — ASPIRIN 325 MG
325 TABLET, DELAYED RELEASE (ENTERIC COATED) ORAL DAILY
Qty: 30 TABLET | Refills: 3 | Status: SHIPPED | OUTPATIENT
Start: 2021-06-11 | End: 2021-06-11

## 2021-06-10 RX ORDER — METOPROLOL TARTRATE 50 MG/1
50 TABLET, FILM COATED ORAL EVERY 12 HOURS SCHEDULED
Qty: 60 TABLET | Refills: 3 | Status: SHIPPED | OUTPATIENT
Start: 2021-06-10 | End: 2021-06-11

## 2021-06-10 RX ORDER — LEVOTHYROXINE SODIUM 0.05 MG/1
50 TABLET ORAL DAILY
Qty: 30 TABLET | Refills: 0 | Status: SHIPPED | OUTPATIENT
Start: 2021-06-11 | End: 2021-06-30

## 2021-06-10 RX ADMIN — METHYLPREDNISOLONE SODIUM SUCCINATE 60 MG: 125 INJECTION, POWDER, FOR SOLUTION INTRAMUSCULAR; INTRAVENOUS at 09:33

## 2021-06-10 RX ADMIN — FERROUS SULFATE TAB 325 MG (65 MG ELEMENTAL FE) 325 MG: 325 (65 FE) TAB at 09:33

## 2021-06-10 RX ADMIN — TAMSULOSIN HYDROCHLORIDE 0.4 MG: 0.4 CAPSULE ORAL at 09:32

## 2021-06-10 RX ADMIN — METOPROLOL TARTRATE 50 MG: 50 TABLET, FILM COATED ORAL at 09:33

## 2021-06-10 RX ADMIN — FAMOTIDINE 20 MG: 20 TABLET, FILM COATED ORAL at 09:33

## 2021-06-10 RX ADMIN — LEVOTHYROXINE SODIUM 50 MCG: 50 TABLET ORAL at 05:25

## 2021-06-10 RX ADMIN — METHYLPREDNISOLONE SODIUM SUCCINATE 60 MG: 125 INJECTION, POWDER, FOR SOLUTION INTRAMUSCULAR; INTRAVENOUS at 00:14

## 2021-06-10 RX ADMIN — IPRATROPIUM BROMIDE AND ALBUTEROL SULFATE 3 ML: 2.5; .5 SOLUTION RESPIRATORY (INHALATION) at 07:39

## 2021-06-10 RX ADMIN — HEPARIN SODIUM 5000 UNITS: 5000 INJECTION INTRAVENOUS; SUBCUTANEOUS at 05:25

## 2021-06-10 RX ADMIN — HYDROCODONE BITARTRATE AND ACETAMINOPHEN 1 TABLET: 7.5; 325 TABLET ORAL at 09:33

## 2021-06-10 RX ADMIN — ASPIRIN 325 MG: 325 TABLET, COATED ORAL at 09:33

## 2021-06-10 RX ADMIN — DOCUSATE SODIUM 50MG AND SENNOSIDES 8.6MG 2 TABLET: 8.6; 5 TABLET, FILM COATED ORAL at 09:33

## 2021-06-10 RX ADMIN — SODIUM CHLORIDE, PRESERVATIVE FREE 10 ML: 5 INJECTION INTRAVENOUS at 09:34

## 2021-06-10 NOTE — PROGRESS NOTES
[facility] Medicine Services  PROGRESS NOTE    Patient Name: Colin Albrecht  : 1945  MRN: [id number]    Date of Admission: 2021  Primary Care Physician: Arun Soliman MD    Subjective   Subjective     CC:  Med management s/p CABG    HPI:  Feels much better.  Breathing much better.  Ready to go home.       ROS:  Gen- No fevers, chills  CV- No chest pain, palpitations  Resp- No cough, dyspnea  GI- No N/V/D, abd pain        Objective   Objective     Vital Signs:   Temp:  [97.4 °F (36.3 °C)-97.9 °F (36.6 °C)] 97.4 °F (36.3 °C)  Heart Rate:  [53-64] 60  Resp:  [16-18] 18  BP: (129-138)/(66-72) 130/72        Physical Exam:  Constitutional: NAD, awake, alert, sitting up on side of bed  HENT: NCAT, mucous membranes moist  Respiratory: Clear to auscultation bilaterally, nonlabored respirations, looks better today  Cardiovascular: RRR, no murmurs, rubs, or gallops, sternal incision c/d/i, healing well  Gastrointestinal: Positive bowel sounds, soft, nontender, nondistended  Musculoskeletal: No bilateral ankle edema  Psychiatric: Appropriate affect, cooperative  Neurologic: Oriented x 3, strength symmetric in all extremities, Cranial Nerves grossly intact to confrontation, speech clear  Skin: No rashes      Results Reviewed:  Results from last 7 days   Lab Units 06/10/21  0546 21  0546 21  0400 21  0325 21  1811   WBC 10*3/mm3 7.68 6.52 6.40 6.90 4.95   HEMOGLOBIN g/dL 9.8* 9.2* 8.7* 7.6* 8.6*   HEMATOCRIT % 29.9* 28.1* 26.7* 23.2* 26.3*   PLATELETS 10*3/mm3 230 212 188 162 158   INR   --   --   --  1.32* 1.67*     Results from last 7 days   Lab Units 21  0546 21  0400 21  1219 21  0257 21  0320 21   SODIUM mmol/L  --  140 139 136 139 143 141  141   POTASSIUM mmol/L 4.6 3.6 4.3 4.0 4.1 3.6 3.7  3.7   CHLORIDE mmol/L  --  103 105 102 104 107 105  105   CO2 mmol/L  --  28.0 28.0 26.0 28.0 27.0 23.0   23.0   BUN mg/dL  --  29* 35* 35* 34* 23 21  21   CREATININE mg/dL  --  1.10 1.16 0.98 1.22 1.49* 1.31*  1.31*   GLUCOSE mg/dL  --  106* 109* 109* 118* 139* 143*  143*   CALCIUM mg/dL  --  9.7 9.0 8.8 9.4 9.5 9.5  9.5   ALT (SGPT) U/L  --   --   --   --  12 15 10   AST (SGOT) U/L  --   --   --   --  23 28 26     Estimated Creatinine Clearance: 74.7 mL/min (by C-G formula based on SCr of 1.1 mg/dL).    Microbiology Results Abnormal     Procedure Component Value - Date/Time    COVID PRE-OP / PRE-PROCEDURE SCREENING ORDER (NO ISOLATION) - Swab, Nasopharynx [[id number]]  (Normal) Collected: 06/02/21 1248    Lab Status: Final result Specimen: Swab from Nasopharynx Updated: 06/02/21 1331    Narrative:      The following orders were created for panel order COVID PRE-OP / PRE-PROCEDURE SCREENING ORDER (NO ISOLATION) - Swab, Nasopharynx.  Procedure                               Abnormality         Status                     ---------                               -----------         ------                     COVID-19 and FLU A/B PCR...[[id number]]  Normal              Final result                 Please view results for these tests on the individual orders.    COVID-19 and FLU A/B PCR - Swab, Nasopharynx [[id number]]  (Normal) Collected: 06/02/21 1248    Lab Status: Final result Specimen: Swab from Nasopharynx Updated: 06/02/21 1331     COVID19 Not Detected     Influenza A PCR Not Detected     Influenza B PCR Not Detected    Narrative:      Fact sheet for providers: https://www.fda.gov/media/490391/download    Fact sheet for patients: https://www.fda.gov/media/153593/download    Test performed by PCR.          Imaging Results (Last 24 Hours)     ** No results found for the last 24 hours. **          Results for orders placed during the hospital encounter of 06/02/21    Adult Transthoracic Echo Complete W/ Cont if Necessary Per Protocol    Interpretation Summary  · Left ventricular ejection fraction appears to be 41 - 45%.  Left ventricular systolic function is mildly decreased.  · Left ventricular diastolic function is consistent with (grade II w/high LAP) pseudonormalization.  · No evidence of significant pericardial effusion.      I have reviewed the medications:  Scheduled Meds:aspirin, 325 mg, Oral, Daily  atorvastatin, 40 mg, Oral, Nightly  famotidine, 20 mg, Oral, BID  ferrous sulfate, 325 mg, Oral, Daily With Breakfast  heparin (porcine), 5,000 Units, Subcutaneous, Q8H  ipratropium-albuterol, 3 mL, Nebulization, 4x Daily - RT  levothyroxine, 50 mcg, Oral, Daily  methylPREDNISolone sodium succinate, 60 mg, Intravenous, Q8H  metoprolol tartrate, 50 mg, Oral, Q12H  pharmacy consult - MTM, , Does not apply, Daily  senna-docusate sodium, 2 tablet, Oral, BID  sodium chloride, 10 mL, Intravenous, Q12H  tamsulosin, 0.4 mg, Oral, Daily      Continuous Infusions:   PRN Meds:.•  acetaminophen  •  ALPRAZolam  •  aluminum-magnesium hydroxide-simethicone  •  bisacodyl  •  bisacodyl  •  calcium gluconate IVPB  •  dextrose  •  docusate sodium  •  HYDROcodone-acetaminophen  •  ipratropium-albuterol  •  magnesium hydroxide  •  magnesium sulfate **OR** magnesium sulfate **OR** magnesium sulfate  •  Morphine  •  ondansetron **OR** ondansetron  •  oxyCODONE-acetaminophen  •  potassium chloride **OR** potassium chloride  •  potassium chloride  •  potassium phosphate infusion greater than 15 mMoles **OR** potassium phosphate infusion greater than 15 mMoles **OR** potassium phosphate **OR** sodium phosphate IVPB **OR** sodium phosphate IVPB **OR** sodium phosphate IVPB    Assessment/Plan   Assessment & Plan     Active Hospital Problems    Diagnosis  POA   • **CAD with unstable angina [I25.10]  Yes   • Nonsustained ventricular tachycardia 6/5/2021 (CMS/[facility]) [I47.2]  No   • Acute-on-chronic kidney injury (CMS/[facility]) [N17.9, N18.9]  No   • Hypertension [I10]  Yes   • Former smoker [Z87.891]  Not Applicable   • COPD  [J44.9]  Yes   • Suspected chronic renal  insufficiency [N18.9]  Yes   • S/P CABG x 2 on 6/3/2021 [Z95.1]  Not Applicable   • Chronic HFrEF 35-40% s/p upgrade BiV ICD and AV node ablation 3/2021 [I50.22]  Yes   • Dilated CM  [I42.0]  Yes   • SSS  [I49.5]  Yes   • PAF s/p Watchman device 9/25/2020/ Bi-V ICD and AVN ablation 3/2021 [I48.21]  Yes      Resolved Hospital Problems   No resolved problems to display.        Brief Hospital Course to date:  Colin Albrecht is a 76 y.o. male with pmhx of dilated CM, SSS with PPM upgraded to BiV ICD with AV node ablation 3/2021, afib (not on anticoagulation d/t previous GI bleed/epistaxis while on xarelto, has a watchman device placed 9/2020) found to have 80% ostial left main stenosis with LVEF of 35-40% and RVSP 38mmHg and diastolic dysfunction was decided to proceed with uneventful CABG x 2 on 6/3/2021.  He did develop a 5 minute run of ventricular tachycardia and cardiology adjusted BiV ICD and started metoprolol.  He was transferred from the ICU to telemetry on 6/9/21, hospitalist service following along to help with medical management    CAD with Unstable Angina  --s/p CABG x2 on 6/3/2021  --cont ASA, statin, metoprolol    PAF s/p Watchman device  SSS  --s/p upgrade BiV ICD and AV node ablation 3/2021  --not on anticoagulation d/t h/o GI bleed/epistaxis while on xarelto    Dyspnea  --CTA showed no PE, moderate right and small left pleural effusions with some associated basilar volume loss  --Echo shows EF 35%, LV moderately to severely dilated, moderate MR, mild to moderate MS, mild to moderate TR, RVSP 45-55mmHg  --add scheduled nebs, IV solumedrol.  ?if dyspnea could be d/t pHTN.  Bumex today per cards.  Better today.  Will continue quick steroid taper at home, discussed with patient.  Will also start symbicort and albuterol inhalers.     Dilated DM  Chronic Systolic CHF, EF 35-40%  --s/p upgrade BiV ICD 3/2021, interrogated and adjusted 6/2/2021  --Bumex 2mg x 1 yesterday per  cards    Acute/CKD  --improved    Nonsustained VT  --cont metoprolol per cards    Hypothyroid  --cont home dose synthroid    COPD    DVT Prophylaxis:  Sub q heparin      Disposition: I expect the patient to be discharged today  CODE STATUS:   Code Status and Medical Interventions:   Ordered at: 06/06/21 2110     Code Status:    CPR     Medical Interventions (Level of Support Prior to Arrest):    Full       Ariel Moralez MD  06/10/21

## 2021-06-10 NOTE — PROGRESS NOTES
[facility]  INPATIENT PROGRESS NOTE         [facility] 4H    6/10/2021      PATIENT IDENTIFICATION:   Name:  Colin Albrecht      MRN:  [id number]     76 y.o.  male             Reason for visit: CAD, CHF, A. fib      SUBJECTIVE:   soa improving, ambulated without difficulty or hypoxia.     OBJECTIVE:  Vitals:    06/10/21 0830 06/10/21 1030 06/10/21 1200 06/10/21 1400   BP:       BP Location:       Patient Position:       Pulse: 60 65 63 59   Resp:       Temp:       TempSrc:       SpO2: 98% 96% 96%    Weight:       Height:         FiO2 (%): 40 %     Body mass index is 25.75 kg/m².    Intake/Output Summary (Last 24 hours) at 6/10/2021 1647  Last data filed at 6/10/2021 1400  Gross per 24 hour   Intake --   Output 1075 ml   Net -1075 ml       Telemetry: Personally reviewed, paced at 60 bpm    Exam:  General Appearance:   · well developed  · well nourished  Neck:  · thyroid not enlarged  · supple  Respiratory:  · no respiratory distress  · normal breath sounds  · no rales  Cardiovascular:  · no jugular venous distention  · regular rhythm  · apical impulse normal  · S1 normal, S2 normal  · no S3, no S4   · no murmur  · no rub, no thrill  · carotid pulses normal; no bruit  · pedal pulses normal  · lower extremity edema: Trace  Skin:   warm, dry      No Known Allergies  Scheduled meds:       aspirin, 325 mg, Oral, Daily  atorvastatin, 40 mg, Oral, Nightly  famotidine, 20 mg, Oral, BID  ferrous sulfate, 325 mg, Oral, Daily With Breakfast  heparin (porcine), 5,000 Units, Subcutaneous, Q8H  ipratropium-albuterol, 3 mL, Nebulization, 4x Daily - RT  levothyroxine, 50 mcg, Oral, Daily  methylPREDNISolone sodium succinate, 60 mg, Intravenous, Q8H  metoprolol tartrate, 50 mg, Oral, Q12H  pharmacy consult - MTM, , Does not apply, Daily  senna-docusate sodium, 2 tablet, Oral, BID  sodium chloride, 10 mL, Intravenous, Q12H  tamsulosin, 0.4 mg, Oral, Daily      IV meds:                          Data Review:  Results from last 7 days   Lab Units 06/10/21  0905 06/09/21  0546 06/08/21  0400 06/07/21  1219   SODIUM mmol/L 141 140 139 136   BUN mg/dL 34* 29* 35* 35*   CREATININE mg/dL 1.25 1.10 1.16 0.98   GLUCOSE mg/dL 223* 106* 109* 109*     Results from last 7 days   Lab Units 06/10/21  0546 06/09/21  0546 06/08/21  0400 06/07/21  0257 06/06/21  0342   WBC 10*3/mm3 7.68 6.52 6.40 8.38 7.72   HEMOGLOBIN g/dL 9.8* 9.2* 8.7* 9.0* 8.2*     Results from last 7 days   Lab Units 06/04/21  0325 06/03/21  1811   INR  1.32* 1.67*     Results from last 7 days   Lab Units 06/07/21  0257 06/04/21  0320 06/03/21  2030   ALT (SGPT) U/L 12 15 10   AST (SGOT) U/L 23 28 26     No results found for: DIGOXIN   Lab Results   Component Value Date    TSH 3.830 06/03/2021     Results from last 7 days   Lab Units 06/07/21  0257   CHOLESTEROL mg/dL 101       Estimated Creatinine Clearance: 65.7 mL/min (by C-G formula based on SCr of 1.25 mg/dL).        Imaging (last 24 hr):   I personally reviewed the most recent chest x-ray and other pertinent imaging studies.  Results for orders placed during the hospital encounter of 06/02/21    XR Chest 1 View    Narrative  EXAMINATION: XR CHEST 1 VW-    INDICATION: post op    COMPARISON: One day prior    FINDINGS: Unchanged small bilateral pleural effusions. No distinct  pneumothorax. Scattered areas of subsegmental atelectasis again noted.  Unchanged heart and mediastinal contours.    Impression  No significant interval change from recent comparison.    This report was finalized on 6/10/2021 10:43 AM by Anson Chen.        Last ECHO:  Results for orders placed during the hospital encounter of 06/02/21    Adult Transthoracic Echo Complete W/ Cont if Necessary Per Protocol    Interpretation Summary  · Left ventricular ejection fraction appears to be 41 - 45%. Left ventricular systolic function is mildly decreased.  · Left ventricular diastolic function is consistent with (grade  II w/high LAP) pseudonormalization.  · No evidence of significant pericardial effusion.        PROBLEM LIST:     CAD with unstable angina    PAF s/p Watchman device 9/25/2020/ Bi-V ICD and AVN ablation 3/2021    SSS     Dilated CM     Chronic HFrEF 35-40% s/p upgrade BiV ICD and AV node ablation 3/2021    Hypertension    Former smoker    COPD     Suspected chronic renal insufficiency    S/P CABG x 2 on 6/3/2021    Acute-on-chronic kidney injury (CMS/HCC)    Nonsustained ventricular tachycardia 6/5/2021 (CMS/HCC)      Initial cardiac assessment: Pleasant 76-year-old man from [place] follows with Dr. Park and Dr. Prado, history of chronic systolic heart failure, BiV ICD and AV node ablation, permanent atrial fibrillation, watchman device, CKD stage III.  Presented with symptoms concerning for unstable angina, LHC on 6/2/2021 showed 80% ostial left main stenosis.    ASSESSMENT/PLAN:  1.  Unstable angina/CAD, 80% left main:  Dr. Shields performed 2V CABG 6/3/2020 (LIMA-LAD, SVG-circumflex)  EF 35-40%  Continue aspirin  Continue metoprolol  Atorvastatin 40 mg daily started 6/2/2021    2.  Chronic systolic heart failure:  Echo 6/2/2021 showed EF 35-40% with moderate MR and moderate LAE.  Continue guideline directed medical therapy with beta-blocker   Resume ACE prior to discharge  [employer] BiV ICD in place, interrogated and adjusted 6/2/2021      3.  Frequent PVCs/NSVT:  BiV ICD adjusted on day of admission, now paced at 60 with minimal PVCs  Slightly increased frequency of NSVT over the weekend, continue current dose of metoprolol for now.      Discussed with Dr. Prado he would like to increase his base pacemaker rate to    4.  CKD stage III:  Renal function at baseline  Monitor daily    5.  Permanent atrial fibrillation:  Status post AV node ablation and BiV ICD placement with Dr. Prado   9/2020  Watchman in place due to history of GI bleed  No anticoagulation needed    6.  Persistent/worsening  dyspnea:  CT pe negative, echocardiogram benign and unchanged  Chest x-ray shows persistent right pleural effusion less on the left.  Start bumex 0.5 mg daily    Likely d/c home 6/11/21      Pietro Quintana MD  6/10/2021    16:47 EDT

## 2021-06-10 NOTE — PLAN OF CARE
Goal Outcome Evaluation:  Plan of Care Reviewed With: patient           Outcome Summary: A/O, VSS, V-Paced, RA most of shift, pt c/o SOA, states he wants to talk to the Dr before he ambulates due to his worry of passing out. Refused walks yesterday and last night. No other complaints.

## 2021-06-10 NOTE — PROGRESS NOTES
"Colin Albrecht  [id number]  1945     LOS: 8 days   Patient Care Team:  Arun Soliman MD as PCP - General (Family Medicine)  Semaj Park MD (Cardiology)    Chief Complaint: Coronary artery disease      Subjective: No complaints    Objective:     Vital Sign Min/Max for last 24 hours  Temp  Min: 97.4 °F (36.3 °C)  Max: 97.9 °F (36.6 °C)   BP  Min: 129/67  Max: 138/66   Pulse  Min: 53  Max: 64   Resp  Min: 16  Max: 18   SpO2  Min: 92 %  Max: 100 %   No data recorded   No data recorded     Flowsheet Rows      First Filed Value   Admission Height  188 cm (74\") Documented at 06/02/2021 1015   Admission Weight  83.3 kg (183 lb 9.6 oz) Documented at 06/02/2021 1015          Physical Exam:    Wound: Satisfactory    Pulses:     Mediastinal and Chest Tube Drainage:       Results Review:   Results from last 7 days   Lab Units 06/10/21  0546   WBC 10*3/mm3 7.68   HEMOGLOBIN g/dL 9.8*   HEMATOCRIT % 29.9*   PLATELETS 10*3/mm3 230     Results from last 7 days   Lab Units 06/09/21 2009 06/09/21  0546   SODIUM mmol/L  --  140   POTASSIUM mmol/L 4.6 3.6   CHLORIDE mmol/L  --  103   CO2 mmol/L  --  28.0   BUN mg/dL  --  29*   CREATININE mg/dL  --  1.10   GLUCOSE mg/dL  --  106*   CALCIUM mg/dL  --  9.7     Results from last 7 days   Lab Units 06/04/21  0403   PH, ARTERIAL pH units 7.427   PO2 ART mm Hg 82.3*   PCO2, ARTERIAL mm Hg 43.0   HCO3 ART mmol/L 28.3*         Assessment      CAD with unstable angina    PAF s/p Watchman device 9/25/2020/ Bi-V ICD and AVN ablation 3/2021    SSS     Dilated CM     Chronic HFrEF 35-40% s/p upgrade BiV ICD and AV node ablation 3/2021    Hypertension    Former smoker    COPD     Suspected chronic renal insufficiency    S/P CABG x 2 on 6/3/2021    Acute-on-chronic kidney injury (CMS/HCC)    Nonsustained ventricular tachycardia 6/5/2021 (CMS/HCC)      Charge when all agree        Jaime Shields MD  06/10/21  07:22 EDT      Please note that portions of this note were completed with a " voice recognition program. Efforts were made to edit the dictations, but words may be mistranscribed

## 2021-06-10 NOTE — CASE MANAGEMENT/SOCIAL WORK
Continued Stay Note  [facility]     Patient Name: Colin Albrecht  MRN: [id number]  Today's Date: 6/10/2021    Admit Date: 6/2/2021    Discharge Plan     Row Name 06/10/21 120       Plan    Plan  Home    Patient/Family in Agreement with Plan  yes    Plan Comments  Mr Albrecht is being DC home today. Reviewed RA oxygenation and sats are in upper 90's. No DC needs voiced. Spouse will transport.    Final Discharge Disposition Code  01 - home or self-care        Discharge Codes    No documentation.       Expected Discharge Date and Time     Expected Discharge Date Expected Discharge Time    Todd 10, 2021             Lesly Arce RN

## 2021-06-11 ENCOUNTER — READMISSION MANAGEMENT (OUTPATIENT)
Dept: CALL CENTER | Facility: HOSPITAL | Age: 76
End: 2021-06-11

## 2021-06-11 RX ORDER — BUMETANIDE 0.5 MG/1
0.5 TABLET ORAL DAILY
Qty: 30 TABLET | Refills: 3 | Status: SHIPPED | OUTPATIENT
Start: 2021-06-11 | End: 2021-06-30

## 2021-06-11 RX ORDER — TAMSULOSIN HYDROCHLORIDE 0.4 MG/1
0.4 CAPSULE ORAL DAILY
Qty: 30 CAPSULE | Refills: 5 | Status: SHIPPED | OUTPATIENT
Start: 2021-06-11 | End: 2022-06-07

## 2021-06-11 RX ORDER — FERROUS SULFATE 325(65) MG
325 TABLET ORAL
Qty: 30 TABLET | Refills: 5 | Status: SHIPPED | OUTPATIENT
Start: 2021-06-11 | End: 2022-06-07

## 2021-06-11 RX ORDER — ASPIRIN 325 MG
325 TABLET, DELAYED RELEASE (ENTERIC COATED) ORAL DAILY
Qty: 30 TABLET | Refills: 2 | Status: SHIPPED | OUTPATIENT
Start: 2021-06-11 | End: 2021-06-30

## 2021-06-11 RX ORDER — METOPROLOL TARTRATE 50 MG/1
50 TABLET, FILM COATED ORAL EVERY 12 HOURS SCHEDULED
Qty: 60 TABLET | Refills: 3 | Status: SHIPPED | OUTPATIENT
Start: 2021-06-11 | End: 2021-06-17 | Stop reason: HOSPADM

## 2021-06-11 RX ORDER — ATORVASTATIN CALCIUM 40 MG/1
40 TABLET, FILM COATED ORAL NIGHTLY
Qty: 90 TABLET | Refills: 3 | Status: SHIPPED | OUTPATIENT
Start: 2021-06-11 | End: 2021-06-30 | Stop reason: SDUPTHER

## 2021-06-11 NOTE — OUTREACH NOTE
Prep Survey      Responses   [facility] facility patient discharged from?  [place]   Is LACE score < 7 ?  No   Emergency Room discharge w/ pulse ox?  No   Eligibility  Readm Mgmt   Discharge diagnosis  Permanent Atrial Fibrillation Dilated cardiomyopathy/CHF   Does the patient have one of the following disease processes/diagnoses(primary or secondary)?  CHF   Does the patient have Home health ordered?  No   Is there a DME ordered?  No   Prep survey completed?  Yes          Mara Wood RN

## 2021-06-14 ENCOUNTER — APPOINTMENT (OUTPATIENT)
Dept: GENERAL RADIOLOGY | Facility: HOSPITAL | Age: 76
End: 2021-06-14

## 2021-06-14 ENCOUNTER — APPOINTMENT (OUTPATIENT)
Dept: CT IMAGING | Facility: HOSPITAL | Age: 76
End: 2021-06-14

## 2021-06-14 ENCOUNTER — HOSPITAL ENCOUNTER (OUTPATIENT)
Facility: HOSPITAL | Age: 76
Discharge: HOME | End: 2021-06-17
Attending: EMERGENCY MEDICINE | Admitting: INTERNAL MEDICINE

## 2021-06-14 ENCOUNTER — APPOINTMENT (OUTPATIENT)
Dept: CARDIOLOGY | Facility: HOSPITAL | Age: 76
End: 2021-06-14

## 2021-06-14 DIAGNOSIS — Z95.1 HX OF CABG: ICD-10-CM

## 2021-06-14 DIAGNOSIS — I95.9 HYPOTENSION, UNSPECIFIED HYPOTENSION TYPE: ICD-10-CM

## 2021-06-14 DIAGNOSIS — D64.9 SYMPTOMATIC ANEMIA: ICD-10-CM

## 2021-06-14 DIAGNOSIS — K92.2 GASTROINTESTINAL HEMORRHAGE, UNSPECIFIED GASTROINTESTINAL HEMORRHAGE TYPE: Primary | ICD-10-CM

## 2021-06-14 LAB
ABO GROUP BLD: NORMAL
ALBUMIN SERPL-MCNC: 3.8 G/DL (ref 3.5–5.2)
ALBUMIN/GLOB SERPL: 1.8 G/DL
ALP SERPL-CCNC: 61 U/L (ref 39–117)
ALT SERPL W P-5'-P-CCNC: 17 U/L (ref 1–41)
ANION GAP SERPL CALCULATED.3IONS-SCNC: 10 MMOL/L (ref 5–15)
AST SERPL-CCNC: 19 U/L (ref 1–40)
BASOPHILS # BLD MANUAL: 0 10*3/MM3 (ref 0–0.2)
BASOPHILS NFR BLD AUTO: 0 % (ref 0–1.5)
BILIRUB SERPL-MCNC: 0.7 MG/DL (ref 0–1.2)
BILIRUB UR QL STRIP: NEGATIVE
BLD GP AB SCN SERPL QL: NEGATIVE
BUN SERPL-MCNC: 79 MG/DL (ref 8–23)
BUN/CREAT SERPL: 59 (ref 7–25)
CALCIUM SPEC-SCNC: 9.4 MG/DL (ref 8.6–10.5)
CHLORIDE SERPL-SCNC: 108 MMOL/L (ref 98–107)
CLARITY UR: CLEAR
CO2 SERPL-SCNC: 24 MMOL/L (ref 22–29)
COLOR UR: YELLOW
CREAT SERPL-MCNC: 1.34 MG/DL (ref 0.76–1.27)
D-LACTATE SERPL-SCNC: 2.8 MMOL/L (ref 0.5–2)
D-LACTATE SERPL-SCNC: 2.9 MMOL/L (ref 0.5–2)
DEPRECATED RDW RBC AUTO: 49.8 FL (ref 37–54)
DEVELOPER EXPIRATION DATE: ABNORMAL
DEVELOPER LOT NUMBER: ABNORMAL
EOSINOPHIL # BLD MANUAL: 0 10*3/MM3 (ref 0–0.4)
EOSINOPHIL NFR BLD MANUAL: 0 % (ref 0.3–6.2)
ERYTHROCYTE [DISTWIDTH] IN BLOOD BY AUTOMATED COUNT: 14.7 % (ref 12.3–15.4)
EXPIRATION DATE: ABNORMAL
FECAL OCCULT BLOOD SCREEN, POC: POSITIVE
GFR SERPL CREATININE-BSD FRML MDRD: 52 ML/MIN/1.73
GLOBULIN UR ELPH-MCNC: 2.1 GM/DL
GLUCOSE SERPL-MCNC: 145 MG/DL (ref 65–99)
GLUCOSE UR STRIP-MCNC: NEGATIVE MG/DL
HCT VFR BLD AUTO: 19.4 % (ref 37.5–51)
HGB BLD-MCNC: 6.2 G/DL (ref 13–17.7)
HGB UR QL STRIP.AUTO: NEGATIVE
HOLD SPECIMEN: NORMAL
KETONES UR QL STRIP: NEGATIVE
LEUKOCYTE ESTERASE UR QL STRIP.AUTO: NEGATIVE
LYMPHOCYTES # BLD MANUAL: 2.54 10*3/MM3 (ref 0.7–3.1)
LYMPHOCYTES NFR BLD MANUAL: 4 % (ref 5–12)
LYMPHOCYTES NFR BLD MANUAL: 8 % (ref 19.6–45.3)
Lab: ABNORMAL
MACROCYTES BLD QL SMEAR: ABNORMAL
MAGNESIUM SERPL-MCNC: 2.4 MG/DL (ref 1.6–2.4)
MCH RBC QN AUTO: 33.2 PG (ref 26.6–33)
MCHC RBC AUTO-ENTMCNC: 32 G/DL (ref 31.5–35.7)
MCV RBC AUTO: 103.7 FL (ref 79–97)
METAMYELOCYTES NFR BLD MANUAL: 2 % (ref 0–0)
MONOCYTES # BLD AUTO: 1.27 10*3/MM3 (ref 0.1–0.9)
NEGATIVE CONTROL: NEGATIVE
NEUTROPHILS # BLD AUTO: 27.35 10*3/MM3 (ref 1.7–7)
NEUTROPHILS NFR BLD MANUAL: 84 % (ref 42.7–76)
NEUTS BAND NFR BLD MANUAL: 2 % (ref 0–5)
NITRITE UR QL STRIP: NEGATIVE
NRBC SPEC MANUAL: 4 /100 WBC (ref 0–0.2)
NT-PROBNP SERPL-MCNC: 1684 PG/ML (ref 0–1800)
PH UR STRIP.AUTO: <=5 [PH] (ref 5–8)
PLAT MORPH BLD: NORMAL
PLATELET # BLD AUTO: 308 10*3/MM3 (ref 140–450)
PMV BLD AUTO: 10.3 FL (ref 6–12)
POLYCHROMASIA BLD QL SMEAR: ABNORMAL
POSITIVE CONTROL: POSITIVE
POTASSIUM SERPL-SCNC: 4.4 MMOL/L (ref 3.5–5.2)
PROCALCITONIN SERPL-MCNC: 0.18 NG/ML (ref 0–0.25)
PROT SERPL-MCNC: 5.9 G/DL (ref 6–8.5)
PROT UR QL STRIP: NEGATIVE
QT INTERVAL: 504 MS
QTC INTERVAL: 507 MS
RBC # BLD AUTO: 1.87 10*6/MM3 (ref 4.14–5.8)
RH BLD: POSITIVE
SODIUM SERPL-SCNC: 142 MMOL/L (ref 136–145)
SP GR UR STRIP: 1.04 (ref 1–1.03)
T&S EXPIRATION DATE: NORMAL
TROPONIN T SERPL-MCNC: 0.02 NG/ML (ref 0–0.03)
UROBILINOGEN UR QL STRIP: ABNORMAL
WBC # BLD AUTO: 31.8 10*3/MM3 (ref 3.4–10.8)
WBC MORPH BLD: NORMAL
WHOLE BLOOD HOLD SPECIMEN: NORMAL

## 2021-06-14 PROCEDURE — G0378 HOSPITAL OBSERVATION PER HR: HCPCS

## 2021-06-14 PROCEDURE — 93306 TTE W/DOPPLER COMPLETE: CPT | Performed by: INTERNAL MEDICINE

## 2021-06-14 PROCEDURE — 93005 ELECTROCARDIOGRAM TRACING: CPT | Performed by: EMERGENCY MEDICINE

## 2021-06-14 PROCEDURE — 80053 COMPREHEN METABOLIC PANEL: CPT | Performed by: EMERGENCY MEDICINE

## 2021-06-14 PROCEDURE — 93306 TTE W/DOPPLER COMPLETE: CPT

## 2021-06-14 PROCEDURE — 96375 TX/PRO/DX INJ NEW DRUG ADDON: CPT

## 2021-06-14 PROCEDURE — 86900 BLOOD TYPING SEROLOGIC ABO: CPT

## 2021-06-14 PROCEDURE — 96368 THER/DIAG CONCURRENT INF: CPT

## 2021-06-14 PROCEDURE — 74177 CT ABD & PELVIS W/CONTRAST: CPT

## 2021-06-14 PROCEDURE — 86923 COMPATIBILITY TEST ELECTRIC: CPT

## 2021-06-14 PROCEDURE — 71045 X-RAY EXAM CHEST 1 VIEW: CPT

## 2021-06-14 PROCEDURE — 83880 ASSAY OF NATRIURETIC PEPTIDE: CPT | Performed by: EMERGENCY MEDICINE

## 2021-06-14 PROCEDURE — 0 IOPAMIDOL PER 1 ML: Performed by: EMERGENCY MEDICINE

## 2021-06-14 PROCEDURE — 83735 ASSAY OF MAGNESIUM: CPT

## 2021-06-14 PROCEDURE — 85007 BL SMEAR W/DIFF WBC COUNT: CPT | Performed by: EMERGENCY MEDICINE

## 2021-06-14 PROCEDURE — 99220 PR INITIAL OBSERVATION CARE/DAY 70 MINUTES: CPT | Performed by: HOSPITALIST

## 2021-06-14 PROCEDURE — 99203 OFFICE O/P NEW LOW 30 MIN: CPT | Performed by: INTERNAL MEDICINE

## 2021-06-14 PROCEDURE — 81003 URINALYSIS AUTO W/O SCOPE: CPT | Performed by: HOSPITALIST

## 2021-06-14 PROCEDURE — 71275 CT ANGIOGRAPHY CHEST: CPT

## 2021-06-14 PROCEDURE — 94799 UNLISTED PULMONARY SVC/PX: CPT

## 2021-06-14 PROCEDURE — 87040 BLOOD CULTURE FOR BACTERIA: CPT | Performed by: EMERGENCY MEDICINE

## 2021-06-14 PROCEDURE — 25010000002 PIPERACILLIN SOD-TAZOBACTAM PER 1 G: Performed by: EMERGENCY MEDICINE

## 2021-06-14 PROCEDURE — 84145 PROCALCITONIN (PCT): CPT | Performed by: EMERGENCY MEDICINE

## 2021-06-14 PROCEDURE — 86850 RBC ANTIBODY SCREEN: CPT | Performed by: EMERGENCY MEDICINE

## 2021-06-14 PROCEDURE — 85025 COMPLETE CBC W/AUTO DIFF WBC: CPT | Performed by: EMERGENCY MEDICINE

## 2021-06-14 PROCEDURE — 83605 ASSAY OF LACTIC ACID: CPT | Performed by: HOSPITALIST

## 2021-06-14 PROCEDURE — 84484 ASSAY OF TROPONIN QUANT: CPT | Performed by: EMERGENCY MEDICINE

## 2021-06-14 PROCEDURE — 86901 BLOOD TYPING SEROLOGIC RH(D): CPT | Performed by: EMERGENCY MEDICINE

## 2021-06-14 PROCEDURE — 94640 AIRWAY INHALATION TREATMENT: CPT

## 2021-06-14 PROCEDURE — 96366 THER/PROPH/DIAG IV INF ADDON: CPT

## 2021-06-14 PROCEDURE — P9016 RBC LEUKOCYTES REDUCED: HCPCS

## 2021-06-14 PROCEDURE — 36430 TRANSFUSION BLD/BLD COMPNT: CPT

## 2021-06-14 PROCEDURE — 83605 ASSAY OF LACTIC ACID: CPT | Performed by: EMERGENCY MEDICINE

## 2021-06-14 PROCEDURE — 82270 OCCULT BLOOD FECES: CPT | Performed by: EMERGENCY MEDICINE

## 2021-06-14 PROCEDURE — 85018 HEMOGLOBIN: CPT | Performed by: HOSPITALIST

## 2021-06-14 PROCEDURE — 96365 THER/PROPH/DIAG IV INF INIT: CPT

## 2021-06-14 PROCEDURE — 86900 BLOOD TYPING SEROLOGIC ABO: CPT | Performed by: EMERGENCY MEDICINE

## 2021-06-14 PROCEDURE — 99284 EMERGENCY DEPT VISIT MOD MDM: CPT

## 2021-06-14 PROCEDURE — 96376 TX/PRO/DX INJ SAME DRUG ADON: CPT

## 2021-06-14 PROCEDURE — 25010000002 VANCOMYCIN 10 G RECONSTITUTED SOLUTION: Performed by: EMERGENCY MEDICINE

## 2021-06-14 PROCEDURE — 85014 HEMATOCRIT: CPT | Performed by: HOSPITALIST

## 2021-06-14 RX ORDER — SODIUM CHLORIDE 0.9 % (FLUSH) 0.9 %
10 SYRINGE (ML) INJECTION AS NEEDED
Status: DISCONTINUED | OUTPATIENT
Start: 2021-06-14 | End: 2021-06-17 | Stop reason: HOSPADM

## 2021-06-14 RX ORDER — BUMETANIDE 1 MG/1
0.5 TABLET ORAL DAILY
Status: DISCONTINUED | OUTPATIENT
Start: 2021-06-15 | End: 2021-06-17 | Stop reason: HOSPADM

## 2021-06-14 RX ORDER — BUMETANIDE 0.25 MG/ML
0.5 INJECTION INTRAMUSCULAR; INTRAVENOUS ONCE
Status: COMPLETED | OUTPATIENT
Start: 2021-06-14 | End: 2021-06-14

## 2021-06-14 RX ORDER — IPRATROPIUM BROMIDE AND ALBUTEROL SULFATE 2.5; .5 MG/3ML; MG/3ML
3 SOLUTION RESPIRATORY (INHALATION)
Status: DISCONTINUED | OUTPATIENT
Start: 2021-06-14 | End: 2021-06-16

## 2021-06-14 RX ORDER — ONDANSETRON 2 MG/ML
4 INJECTION INTRAMUSCULAR; INTRAVENOUS EVERY 6 HOURS PRN
Status: DISCONTINUED | OUTPATIENT
Start: 2021-06-14 | End: 2021-06-17 | Stop reason: HOSPADM

## 2021-06-14 RX ORDER — ALBUTEROL SULFATE 2.5 MG/3ML
2.5 SOLUTION RESPIRATORY (INHALATION) EVERY 4 HOURS PRN
Status: DISCONTINUED | OUTPATIENT
Start: 2021-06-14 | End: 2021-06-17 | Stop reason: HOSPADM

## 2021-06-14 RX ORDER — MULTIPLE VITAMINS W/ MINERALS TAB 9MG-400MCG
1 TAB ORAL DAILY
Status: DISCONTINUED | OUTPATIENT
Start: 2021-06-15 | End: 2021-06-17 | Stop reason: HOSPADM

## 2021-06-14 RX ORDER — IPRATROPIUM BROMIDE AND ALBUTEROL SULFATE 2.5; .5 MG/3ML; MG/3ML
3 SOLUTION RESPIRATORY (INHALATION) EVERY 4 HOURS PRN
Status: DISCONTINUED | OUTPATIENT
Start: 2021-06-14 | End: 2021-06-16

## 2021-06-14 RX ORDER — TAMSULOSIN HYDROCHLORIDE 0.4 MG/1
0.4 CAPSULE ORAL DAILY
Status: DISCONTINUED | OUTPATIENT
Start: 2021-06-15 | End: 2021-06-17 | Stop reason: HOSPADM

## 2021-06-14 RX ORDER — LEVOTHYROXINE SODIUM 0.07 MG/1
75 TABLET ORAL
Status: DISCONTINUED | OUTPATIENT
Start: 2021-06-15 | End: 2021-06-17 | Stop reason: HOSPADM

## 2021-06-14 RX ORDER — ONDANSETRON 4 MG/1
4 TABLET, FILM COATED ORAL EVERY 6 HOURS PRN
Status: DISCONTINUED | OUTPATIENT
Start: 2021-06-14 | End: 2021-06-17 | Stop reason: HOSPADM

## 2021-06-14 RX ORDER — PANTOPRAZOLE SODIUM 40 MG/10ML
80 INJECTION, POWDER, LYOPHILIZED, FOR SOLUTION INTRAVENOUS ONCE
Status: COMPLETED | OUTPATIENT
Start: 2021-06-14 | End: 2021-06-14

## 2021-06-14 RX ORDER — PROCHLORPERAZINE 25 MG
25 SUPPOSITORY, RECTAL RECTAL EVERY 12 HOURS PRN
Status: DISCONTINUED | OUTPATIENT
Start: 2021-06-14 | End: 2021-06-17 | Stop reason: HOSPADM

## 2021-06-14 RX ORDER — BUDESONIDE AND FORMOTEROL FUMARATE DIHYDRATE 80; 4.5 UG/1; UG/1
2 AEROSOL RESPIRATORY (INHALATION)
Status: DISCONTINUED | OUTPATIENT
Start: 2021-06-14 | End: 2021-06-17 | Stop reason: HOSPADM

## 2021-06-14 RX ORDER — PROCHLORPERAZINE MALEATE 5 MG/1
5 TABLET ORAL EVERY 6 HOURS PRN
Status: DISCONTINUED | OUTPATIENT
Start: 2021-06-14 | End: 2021-06-17 | Stop reason: HOSPADM

## 2021-06-14 RX ORDER — HYDROCODONE BITARTRATE AND ACETAMINOPHEN 7.5; 325 MG/1; MG/1
1 TABLET ORAL EVERY 6 HOURS PRN
Status: ACTIVE | OUTPATIENT
Start: 2021-06-14 | End: 2021-06-17

## 2021-06-14 RX ORDER — SODIUM CHLORIDE 0.9 % (FLUSH) 0.9 %
10 SYRINGE (ML) INJECTION EVERY 12 HOURS SCHEDULED
Status: DISCONTINUED | OUTPATIENT
Start: 2021-06-14 | End: 2021-06-17 | Stop reason: HOSPADM

## 2021-06-14 RX ORDER — ATORVASTATIN CALCIUM 40 MG/1
40 TABLET, FILM COATED ORAL NIGHTLY
Status: DISCONTINUED | OUTPATIENT
Start: 2021-06-14 | End: 2021-06-17 | Stop reason: HOSPADM

## 2021-06-14 RX ORDER — PANTOPRAZOLE SODIUM 40 MG/10ML
40 INJECTION, POWDER, LYOPHILIZED, FOR SOLUTION INTRAVENOUS EVERY 12 HOURS SCHEDULED
Status: DISCONTINUED | OUTPATIENT
Start: 2021-06-14 | End: 2021-06-16

## 2021-06-14 RX ORDER — PROCHLORPERAZINE EDISYLATE 5 MG/ML
5 INJECTION INTRAMUSCULAR; INTRAVENOUS EVERY 6 HOURS PRN
Status: DISCONTINUED | OUTPATIENT
Start: 2021-06-14 | End: 2021-06-17 | Stop reason: HOSPADM

## 2021-06-14 RX ADMIN — BUMETANIDE 0.5 MG: 0.25 INJECTION, SOLUTION INTRAMUSCULAR; INTRAVENOUS at 16:22

## 2021-06-14 RX ADMIN — SODIUM CHLORIDE, PRESERVATIVE FREE 10 ML: 5 INJECTION INTRAVENOUS at 13:43

## 2021-06-14 RX ADMIN — PANTOPRAZOLE SODIUM 40 MG: 40 INJECTION, POWDER, FOR SOLUTION INTRAVENOUS at 22:53

## 2021-06-14 RX ADMIN — PANTOPRAZOLE SODIUM 80 MG: 40 INJECTION, POWDER, FOR SOLUTION INTRAVENOUS at 12:43

## 2021-06-14 RX ADMIN — TAZOBACTAM SODIUM AND PIPERACILLIN SODIUM 3.38 G: 375; 3 INJECTION, SOLUTION INTRAVENOUS at 10:08

## 2021-06-14 RX ADMIN — IPRATROPIUM BROMIDE AND ALBUTEROL SULFATE 3 ML: 2.5; .5 SOLUTION RESPIRATORY (INHALATION) at 21:45

## 2021-06-14 RX ADMIN — BUDESONIDE AND FORMOTEROL FUMARATE DIHYDRATE 2 PUFF: 80; 4.5 AEROSOL RESPIRATORY (INHALATION) at 21:45

## 2021-06-14 RX ADMIN — SODIUM CHLORIDE, PRESERVATIVE FREE 10 ML: 5 INJECTION INTRAVENOUS at 20:28

## 2021-06-14 RX ADMIN — IPRATROPIUM BROMIDE AND ALBUTEROL SULFATE 3 ML: 2.5; .5 SOLUTION RESPIRATORY (INHALATION) at 16:47

## 2021-06-14 RX ADMIN — SODIUM CHLORIDE 2721 ML: 9 INJECTION, SOLUTION INTRAVENOUS at 09:53

## 2021-06-14 RX ADMIN — ATORVASTATIN CALCIUM 40 MG: 40 TABLET, FILM COATED ORAL at 20:30

## 2021-06-14 RX ADMIN — VANCOMYCIN HYDROCHLORIDE 1750 MG: 10 INJECTION, POWDER, LYOPHILIZED, FOR SOLUTION INTRAVENOUS at 10:23

## 2021-06-14 RX ADMIN — IOPAMIDOL 95 ML: 755 INJECTION, SOLUTION INTRAVENOUS at 10:34

## 2021-06-14 NOTE — CONSULTS
Cardiology note    Colin Albrecht  1945  [phone]    06/14/21    DATE OF ADMISSION: 6/14/2021  [address]    Arun Soliman MD  [address] [place] / [place] [address]    Chief Complaint   Patient presents with   • Shortness of Breath     Problem List:   1. Permanent Atrial Fibrillation   a. CHADsvasc = 4 off Xarelto x 1 year due to bleeding, never been on Eliquis  b. Echocardiogram 1/27/2016: EF 45 to 55%, unchanged from previous echo 2012  c. Echocardiogram 8/27/2019: EF 35 to 40%, mild to moderate MR, mild to moderate TR, RVSP 40 mmHg  d. Nuclear stress test 8/26/2019: Normal LV perfusion EF 33%  e. Implantation of a left atrial appendage occlusive device (Watchman device) 09/25/20 by Dr. Yonny Prado  f. PORSHA on 11/13/20 with well seated device, EF 35%, mod MR, mild to mod TR, post-procedural ASD with left-to-right flow  g. Upgrade to BIV ICD and AV Node ablation 3/2021, Dr. Prado  2. Dilated cardiomyopathy/CHF  a. Patient reports normal LHC 10-12 years ago  b. Echocardiogram 1/27/2016: EF 45 to 55%, unchanged from previous echo 2012  c. Echocardiogram 8/27/2019: EF 35%, mild to moderate MR, mild to moderate TR, RVSP 40 mmHg  d. Nuclear stress test 8/26/2019: Normal LV perfusion EF 33%  e. Upgrade to BIV ICD at time of AV node ablation 3/2021   3. Sick sinus syndrome  a. Dual-chamber permanent pacemaker -Medtronic device  b. Upgrade to BIV ICD at time of AV node ablation 3/2021   4. Hypertension  5. Coronary artery Disease:   1. 6/3/21  CABG x 2 per Dr Cornelius HARRIS-LAD, SVG-circumflex.   6. Gastritis/GIB/Peptic Ulcer Disease  a. 2013: GIB requiring PRBCs, EGD showed bleeding ulcer in the antrum 9/2013, repeat EGD 12/2013 showed healed ulcer  b. Upper GI Bleeding 3/2019, EGD showed gastric antral ulcer with stigmata or recent bleeding - treated with IV/PO Protonix - Xarelto discontinued  7. Epistaxis - occurred on Xarelto  8. Stage III CKD   9. Surgical History:  a. none      History  of Present Illness: Mr. Colin Albrecht is a pleasant 76-year-old gentleman well-known to our service.  He carries a past medical history significant for coronary disease status post bypass 3/21 by Dr. Shields, permanent atrial fibrillation status post AV node ablation and upgrade to biventricular ICD March 2021, history of dilated cardiomyopathy/congestive heart failure, hypertension, gastritis/GI bleeding with multiple hospitalizations requiring transfusions and subsequent watchman left atrial appendage occlusive device placement September 2020.     Following his bypass surgery 6/3/2021 Mr. Albrecht was discharged to home in stable condition.  He reports over the last several days he has had increased shortness of breath and weakness.  In the emergency department he is found to be profoundly anemic with a hemoglobin of 6.2.  He does have a history of bleeding gastric ulcers.  He was discharged home on aspirin and steroids for a COPD exacerbation.  He states he has not paid attention to note any dark or bloody stools.  He is not aware of any other acute bleeding.  He has felt occasional palpitations no syncope.    No Known Allergies    Prior to Admission Medications     Prescriptions Last Dose Informant Patient Reported? Taking?    albuterol sulfate  (90 Base) MCG/ACT inhaler 6/14/2021  No Yes    Inhale 2 puffs Every 4 (Four) Hours As Needed for Wheezing.    aspirin  MG tablet 6/14/2021  No Yes    Take 1 tablet by mouth Daily.    atorvastatin (LIPITOR) 40 MG tablet 6/13/2021  No Yes    Take 1 tablet by mouth Every Night.    budesonide-formoterol (Symbicort) 80-4.5 MCG/ACT inhaler 6/14/2021  No Yes    Inhale 2 puffs 2 (Two) Times a Day.    bumetanide (BUMEX) 0.5 MG tablet 6/14/2021  No Yes    Take 1 tablet by mouth Daily.    Coenzyme Q10 (CoQ10) 100 MG capsule 6/14/2021 Self Yes Yes    Take 1 capsule by mouth Daily.    ferrous sulfate 325 (65 FE) MG tablet 6/14/2021  No Yes    Take 1 tablet by mouth Daily With  Breakfast.    Garlic 450 MG capsule 6/13/2021 Self Yes Yes    Take 1 capsule by mouth Daily.    HYDROcodone-acetaminophen (NORCO) 7.5-325 MG per tablet 6/13/2021  No Yes    Take 1 tablet by mouth Every 6 (Six) Hours As Needed for Moderate Pain  for up to 3 days.    levothyroxine (SYNTHROID, LEVOTHROID) 50 MCG tablet 6/13/2021  No Yes    Take 1 tablet by mouth Daily. Please have PCP manage this medication.    Patient taking differently:  Take 50 mcg by mouth Daily.    metoprolol tartrate (LOPRESSOR) 50 MG tablet 6/14/2021  No Yes    Take 1 tablet by mouth Every 12 (Twelve) Hours.    multivitamin with minerals (MULTIVITAMIN ADULT PO) 6/14/2021 Self Yes Yes    Take 1 tablet by mouth Daily.    Omega-3 Fatty Acids (fish oil) 1000 MG capsule capsule 6/14/2021 Self Yes Yes    Take 1,000 mg by mouth Every Other Day.    predniSONE (DELTASONE) 20 MG tablet 6/14/2021  No Yes    Take 3 tablets by mouth Daily for 3 days, THEN 2 tablets Daily for 3 days, THEN 1 tablet Daily for 3 days.    ramipril (ALTACE) 5 MG capsule 6/14/2021 Self Yes Yes    Take 5 mg by mouth Daily.    tamsulosin (FLOMAX) 0.4 MG capsule 24 hr capsule 6/14/2021  No Yes    Take 1 capsule by mouth Daily.            Current Facility-Administered Medications:   •  albuterol (PROVENTIL) nebulizer solution 0.083% 2.5 mg/3mL, 2.5 mg, Nebulization, Q4H PRN, Albaro Noguera MD  •  atorvastatin (LIPITOR) tablet 40 mg, 40 mg, Oral, Nightly, Albaro Noguera MD  •  budesonide-formoterol (SYMBICORT) 80-4.5 MCG/ACT inhaler 2 puff, 2 puff, Inhalation, BID - RT, Albaro Noguera MD  •  [START ON 6/15/2021] bumetanide (BUMEX) tablet 0.5 mg, 0.5 mg, Oral, Daily, Albaro Noguera MD  •  HYDROcodone-acetaminophen (NORCO) 7.5-325 MG per tablet 1 tablet, 1 tablet, Oral, Q6H PRN, Albaro Noguera MD  •  ipratropium-albuterol (DUO-NEB) nebulizer solution 3 mL, 3 mL, Nebulization, 4x Daily - RT, Albaro Noguera MD  •  ipratropium-albuterol (DUO-NEB) nebulizer solution 3 mL, 3 mL,  Nebulization, Q4H PRN, Albaro Noguera MD  •  [START ON 6/15/2021] levothyroxine (SYNTHROID, LEVOTHROID) tablet 75 mcg, 75 mcg, Oral, Q AM, Albaro Noguera MD  •  [START ON 6/15/2021] multivitamin with minerals 1 tablet, 1 tablet, Oral, Daily, Albaro Noguera MD  •  ondansetron (ZOFRAN) tablet 4 mg, 4 mg, Oral, Q6H PRN **OR** ondansetron (ZOFRAN) injection 4 mg, 4 mg, Intravenous, Q6H PRN, Albaro Noguera MD  •  pantoprazole (PROTONIX) injection 40 mg, 40 mg, Intravenous, Q12H, Albaro Noguera MD  •  prochlorperazine (COMPAZINE) injection 5 mg, 5 mg, Intravenous, Q6H PRN **OR** prochlorperazine (COMPAZINE) tablet 5 mg, 5 mg, Oral, Q6H PRN **OR** prochlorperazine (COMPAZINE) suppository 25 mg, 25 mg, Rectal, Q12H PRN, Albaro Noguera MD  •  sodium chloride 0.9 % flush 10 mL, 10 mL, Intravenous, PRN, Albaro Noguera MD  •  sodium chloride 0.9 % flush 10 mL, 10 mL, Intravenous, Q12H, Albaro Noguera MD, 10 mL at 06/14/21 1343  •  sodium chloride 0.9 % flush 10 mL, 10 mL, Intravenous, PRN, Albaro Noguera MD  •  [START ON 6/15/2021] tamsulosin (FLOMAX) 24 hr capsule 0.4 mg, 0.4 mg, Oral, Daily, Albaro Noguera MD      REVIEW OF SYSTEMS:   CONST:  No weight loss, fever, chills, +weakness or + fatigue.   HEENT:  No visual loss, blurred vision, double vision, yellow sclerae.                   No hearing loss, congestion, sore throat.   SKIN:      No rashes, urticaria, ulcers, sores.     RESP:     + shortness of breath, No hemoptysis, +cough   GI:           No anorexia, nausea, vomiting, diarrhea. No abdominal pain, melena.   :         No burning on urination, hematuria or increased frequency.  ENDO:    No diaphoresis, cold or heat intolerance. No polyuria or polydipsia.   NEURO:  No headache, dizziness, syncope, paralysis, ataxia, or parasthesias.                  No change in bowel or bladder control. No history of CVA/TIA  MUSC:    No muscle, back pain, joint pain or stiffness.   HEME:    No anemia, bleeding,  bruising. No history of DVT/PE.  PSYCH:  No history of depression, anxiety    Vitals:    06/14/21 1349 06/14/21 1354 06/14/21 1412 06/14/21 1430   BP: 106/46 96/43 112/47 109/47   BP Location:       Patient Position:       Pulse: 59 59     Resp: 22 22     Temp: 97.4 °F (36.3 °C) 96.8 °F (36 °C)     TempSrc: Axillary Axillary     SpO2: 98% 98%     Weight:       Height:             Vital Sign Min/Max for last 24 hours  Temp  Min: 96.8 °F (36 °C)  Max: 98 °F (36.7 °C)   BP  Min: 77/67  Max: 112/47   Pulse  Min: 59  Max: 64   Resp  Min: 18  Max: 24   SpO2  Min: 93 %  Max: 98 %   No data recorded    No intake or output data in the 24 hours ending 06/14/21 1450          Physical Exam:  GEN: Well nourished, Well- developed  No acute distress  HEENT: Normocephalic, Atraumatic, PERRLA, moist mucous membranes, pale conjunctiva   NECK: supple, NO JVD, no thyromegaly, no lymphadenopathy  CARD: irreg.  no murmur, gallop, rub  LUNGS: Clear to auscultataion, normal respiratory effort  ABDOMEN: Soft, nontender, normal bowel sounds  EXTREMITIES:No gross deformities,  No clubbing, cyanosis, or edema  SKIN: Warm, dry- sternotomy incision well approximated no redness, drainage, swelling. Left chest ICD site noted.   NEURO: No focal deficits      I personally viewed and interpreted the patient's EKG/Telemetry/lab data    Data:   Results from last 7 days   Lab Units 06/14/21  0906 06/10/21  0546 06/09/21  0546   WBC 10*3/mm3 31.80* 7.68 6.52   HEMOGLOBIN g/dL 6.2* 9.8* 9.2*   HEMATOCRIT % 19.4* 29.9* 28.1*   PLATELETS 10*3/mm3 308 230 212     Results from last 7 days   Lab Units 06/14/21  0906 06/10/21  0905 06/09/21 2009 06/09/21  0546   SODIUM mmol/L 142 141  --  140   POTASSIUM mmol/L 4.4 4.6 4.6 3.6   CHLORIDE mmol/L 108* 103  --  103   CO2 mmol/L 24.0 28.0  --  28.0   BUN mg/dL 79* 34*  --  29*   CREATININE mg/dL 1.34* 1.25  --  1.10   GLUCOSE mg/dL 145* 223*  --  106*              Results from last 7 days   Lab Units  06/14/21  0906   PROBNP pg/mL 1,684.0         Results from last 7 days   Lab Units 06/14/21  0906   TROPONIN T ng/mL 0.018         Results from last 7 days   Lab Units 06/14/21  0906   PROBNP pg/mL 1,684.0     No intake or output data in the 24 hours ending 06/14/21 1450    Chest X-Ray:  Imaging Results (Last 24 Hours)     Procedure Component Value Units Date/Time    CT Chest Pulmonary Embolism [[id number]] Collected: 06/14/21 1049     Updated: 06/14/21 1100    Narrative:      EXAMINATION: CT CHEST PULMONARY EMBOLISM-, CT ABDOMEN AND PELVIS W  CONTRAST-      INDICATION: Shortness of breath, recent surgery.      TECHNIQUE: CT angiogram chest following PE protocol with intravenous  contrast and 2-D reconstructions performed, CT abdomen and pelvis with  intravenous contrast administration.     The radiation dose reduction device was turned on for each scan per the  ALARA (As Low as Reasonably Achievable) protocol.     COMPARISON: CT angiogram chest 06/09/2021.     FINDINGS: CTA CHEST: Thyroid is homogeneous in attenuation. No bulky  mediastinal adenopathy. Central airways are patent. Esophagus seen in  normal course and caliber. Atherosclerotic nonaneurysmal thoracic aorta.  Cardiac size borderline enlarged status post median sternotomy and CABG  as well as atrial appendage occlusion device in place. Satisfactory  opacification of the pulmonary arterial tree without filling defect to  suggest pulmonary embolus. Atherosclerotic nonaneurysmal thoracic aorta.  Extended lung windows reveal small to moderate volume right and trace  left pleural effusions stable to slightly increased from prior  comparison 06/09/2021 without new consolidation. Degenerative changes of  the thoracic spine without acute osseous finding of the thoracic spine  or chest. No chest wall soft tissue findings.     ABDOMEN AND PELVIS: Liver demonstrates hepatic cysts measuring up to a  3.5 cm left hepatic cyst without abnormal enhancement  features.  Gallbladder contracted and unremarkable. No biliary dilatation. Pancreas  and spleen unremarkable with splenule adjacent to the lateral margin of  the spleen. Adrenals demonstrate a subcentimeter right adrenal adenoma.  Kidneys without hydronephrosis or hydroureter. No bulky retroperitoneal  adenopathy. Atherosclerotic nonaneurysmal abdominal aorta with patent  celiac and SMA origins. Portal veins and IVC are patent. GI tract  evaluation without focal thickening or disproportionate dilatation of  bowel. Moderately distended urinary bladder without wall thickening.  Moderate distention of the rectum with intermixed gas and stool.  Degenerative changes of the spine and pelvis without aggressive osseous  lesion. No soft tissue body wall findings of acuity.       Impression:      1. No PE.  2. Stable to slightly increasing small to moderate volume right and  trace to small left pleural effusions with adjacent atelectasis.  3. No acute findings within the abdomen or pelvis, however, noted  moderate distention of the rectal vault with intermixed gas and stool.     D:  06/14/2021  E:  06/14/2021          CT Abdomen Pelvis With Contrast [[id number]] Collected: 06/14/21 1049     Updated: 06/14/21 1100    Narrative:      EXAMINATION: CT CHEST PULMONARY EMBOLISM-, CT ABDOMEN AND PELVIS W  CONTRAST-      INDICATION: Shortness of breath, recent surgery.      TECHNIQUE: CT angiogram chest following PE protocol with intravenous  contrast and 2-D reconstructions performed, CT abdomen and pelvis with  intravenous contrast administration.     The radiation dose reduction device was turned on for each scan per the  ALARA (As Low as Reasonably Achievable) protocol.     COMPARISON: CT angiogram chest 06/09/2021.     FINDINGS: CTA CHEST: Thyroid is homogeneous in attenuation. No bulky  mediastinal adenopathy. Central airways are patent. Esophagus seen in  normal course and caliber. Atherosclerotic nonaneurysmal thoracic  aorta.  Cardiac size borderline enlarged status post median sternotomy and CABG  as well as atrial appendage occlusion device in place. Satisfactory  opacification of the pulmonary arterial tree without filling defect to  suggest pulmonary embolus. Atherosclerotic nonaneurysmal thoracic aorta.  Extended lung windows reveal small to moderate volume right and trace  left pleural effusions stable to slightly increased from prior  comparison 06/09/2021 without new consolidation. Degenerative changes of  the thoracic spine without acute osseous finding of the thoracic spine  or chest. No chest wall soft tissue findings.     ABDOMEN AND PELVIS: Liver demonstrates hepatic cysts measuring up to a  3.5 cm left hepatic cyst without abnormal enhancement features.  Gallbladder contracted and unremarkable. No biliary dilatation. Pancreas  and spleen unremarkable with splenule adjacent to the lateral margin of  the spleen. Adrenals demonstrate a subcentimeter right adrenal adenoma.  Kidneys without hydronephrosis or hydroureter. No bulky retroperitoneal  adenopathy. Atherosclerotic nonaneurysmal abdominal aorta with patent  celiac and SMA origins. Portal veins and IVC are patent. GI tract  evaluation without focal thickening or disproportionate dilatation of  bowel. Moderately distended urinary bladder without wall thickening.  Moderate distention of the rectum with intermixed gas and stool.  Degenerative changes of the spine and pelvis without aggressive osseous  lesion. No soft tissue body wall findings of acuity.       Impression:      1. No PE.  2. Stable to slightly increasing small to moderate volume right and  trace to small left pleural effusions with adjacent atelectasis.  3. No acute findings within the abdomen or pelvis, however, noted  moderate distention of the rectal vault with intermixed gas and stool.     D:  06/14/2021  E:  06/14/2021          XR Chest 1 View [[id number]] Collected: 06/14/21 0945     Updated:  06/14/21 0958    Narrative:      EXAMINATION: XR CHEST 1 VW-06/14/2021:      INDICATION: SOA, triage protocol.      COMPARISON: Chest x-ray 06/10/2021.     FINDINGS: Cardiac size enlarged status post median sternotomy and CABG  with bibasilar opacifications, right slightly greater than left, and  probable trace to small pleural effusions similar to prior.       Impression:      Similar central vascular congestion appearance and trace to  small bilateral pleural effusions from prior.     D:  06/14/2021  E:  06/14/2021                   Telemetry: Normal sinus rhythm heart rate in the 60s.  Reported episode of ventricular tachycardia appears to be artifact.   EKG: EKG 6/14/2021 at 10:31 AM ventricularly paced rhythm 61 bpm     ICD interrogation: Medtronic BIV-ICD 7.3 years battery life. acceptable pacing and sensing thresholds,  DDDR programming permanent AFib, No VT/VF  >98% BiV Pacing     Assessment and Plan:   1) Concern for VT on Telemetry   -Telemetry strip with reported ventricular tachycardia  and ICD interrogation reviewed with Dr Prado.  This does appear to be artifact.  Confirmed upon device interrogation where no ventricular tachycardia, ventricular fibrillation episodes noted.    2) Anemia / GIB   -hx of bleeding ulcers (thus watchman placement)  -agree with PPI, transfusions, GI consult per primary team  -STAT echocardiogram     3) Coronary artery disease status post two-vessel CABG 6/3/2021 per Dr. Shields  -continue Statin (Lipitor 40 mg daily)  - aspirin, Lopressor on hold given anemia/relative hypotension    4)Permanent atrial fibrillation status post AV node ablation and BiV ICD placement    5)Anticoagulation: On aspirin alone status post watchman 9/2020  with subsequent PORSHA showing good seal    6) Chronic systolic heart failure  -6/9/21 echocardiogram EF 41-45%.  LV function consistent with grade pseudonormalization, No evidence of significant pericardial effusion   -Recommend resuming Guideline  directed medical therapy ramipril 5 mg, Lopressor 50 mg twice daily, when he is able to tolerate.     7) Chronic Kidney Disease  -Cr on Admission 1.34      Electronically signed by JOHN Berry, 06/14/21, 4:14 PM EDT.

## 2021-06-14 NOTE — ED PROVIDER NOTES
[place]    EMERGENCY DEPARTMENT ENCOUNTER      Pt Name: Colin Albrecht  MRN: [id number]  YOB: 1945  Date of evaluation: 6/14/2021  Provider: Florian Paniagua DO    CHIEF COMPLAINT       Chief Complaint   Patient presents with   • Shortness of Breath         HISTORY OF PRESENT ILLNESS  (Location/Symptom, Timing/Onset, Context/Setting, Quality, Duration, Modifying Factors, Severity.)   Colin Albrecht is a 76 y.o. male who presents to the emergency department for evaluation generalized weakness, shortness of breath, but is overall not feeling well.  He states he has had these issues over the last few months and has undergone extensive work-up and treatments most recently undergoing a two-vessel CABG with Dr. Shields on Laura 3.  The patient was been home for the last few days taking medications as previously prescribed.  He notes continues to feel very weak and fatigued.  Denies any gross bleeding per rectum or melena.  No vomiting.  He does state that with any type of exertion he gets very short of breath.  Has been using his incentive spirometer, states his wounds and incisions appear to be healing well.  No active bleeding or drainage, no concerns for infection per the patient, no fevers, no cough or congestion or sputum production.  Otherwise besides the weakness and shortness of breath he does not have any other acute systemic complaints at this time.      Nursing notes were reviewed.    REVIEW OF SYSTEMS    (2-9 systems for level 4, 10 or more for level 5)   ROS:  General:  No fevers, no chills, + generalized weakness  Cardiovascular:  No chest pain, no palpitations  Respiratory:  + Exertional shortness of breath, + mild cough, no wheezing  Gastrointestinal:  No pain, no nausea, no vomiting, no diarrhea  Musculoskeletal:  No muscle pain, no joint pain  Skin:  No rash  Neurologic:  No headache  Psychiatric:  No anxiety  Genitourinary:  No dysuria, no hematuria    Except as noted above the  remainder of the review of systems was reviewed and negative.       PAST MEDICAL HISTORY     Past Medical History:   Diagnosis Date   • Abnormal ECG    • Arrhythmia    • Atrial fibrillation (CMS/HCC)    • CHF (congestive heart failure) (CMS/HCC)    • Depression    • History of transfusion     bleeding stomach ulcer ~2014 x2, no reaction    • Hypertension    • Stomach ulcer    • Wears reading eyeglasses          SURGICAL HISTORY       Past Surgical History:   Procedure Laterality Date   • ATRIAL APPENDAGE EXCLUSION LEFT WITH TRANSESOPHAGEAL ECHOCARDIOGRAM N/A 9/25/2020    Procedure: Atrial Appendage Occlusion (Watchman), start Eliquis 2 weeks prior and hold 1 day, GA;  Surgeon: Yonny Prado MD;  Location:  [facility];  Service: Cardiology;  Laterality: N/A;   • CARDIAC CATHETERIZATION     • CARDIAC CATHETERIZATION N/A 6/2/2021    Procedure: Left Heart Cath- ADD ON/ WALK IN FOR RDS PER KT;  Surgeon: Pietro Quintana MD;  Location:  [facility];  Service: Cardiovascular;  Laterality: N/A;   • CARDIAC ELECTROPHYSIOLOGY PROCEDURE N/A 3/26/2021    Procedure: DDD PPM upgrade to Biv ICD (MDT), no meds to hold;  Surgeon: Yonny Prado MD;  Location:  [facility];  Service: Cardiology;  Laterality: N/A;   • CARDIAC ELECTROPHYSIOLOGY PROCEDURE N/A 3/26/2021    Procedure: AVN RFA;  Surgeon: Yonny Prado MD;  Location:  [facility];  Service: Cardiovascular;  Laterality: N/A;   • COLONOSCOPY     • CORONARY ARTERY BYPASS GRAFT N/A 6/3/2021    Procedure: MEDIAN STERNOTOMY, CORONARY ARTERY BYPASS WITH INTERNAL MAMMARY ARTERY GRAFT X 2, EVH OF THE RIGHT GREATER SAPHENOUS ANA;  Surgeon: Jaime Shields MD;  Location:  [facility];  Service: Cardiothoracic;  Laterality: N/A;   • INSERT / REPLACE / REMOVE PACEMAKER           CURRENT MEDICATIONS       Current Facility-Administered Medications:   •  albuterol (PROVENTIL) nebulizer solution 0.083% 2.5 mg/3mL,  2.5 mg, Nebulization, Q4H PRN, Albaro Noguera MD  •  atorvastatin (LIPITOR) tablet 40 mg, 40 mg, Oral, Nightly, Albaro Noguera MD  •  budesonide-formoterol (SYMBICORT) 80-4.5 MCG/ACT inhaler 2 puff, 2 puff, Inhalation, BID - RT, Albaro Noguera MD  •  bumetanide (BUMEX) injection 0.5 mg, 0.5 mg, Intravenous, Once, Albaro Noguera MD  •  [START ON 6/15/2021] bumetanide (BUMEX) tablet 0.5 mg, 0.5 mg, Oral, Daily, Albaro Noguera MD  •  HYDROcodone-acetaminophen (NORCO) 7.5-325 MG per tablet 1 tablet, 1 tablet, Oral, Q6H PRN, Albaro Noguera MD  •  ipratropium-albuterol (DUO-NEB) nebulizer solution 3 mL, 3 mL, Nebulization, 4x Daily - RT, Albaro Noguera MD  •  ipratropium-albuterol (DUO-NEB) nebulizer solution 3 mL, 3 mL, Nebulization, Q4H PRN, Albaro Noguera MD  •  [START ON 6/15/2021] levothyroxine (SYNTHROID, LEVOTHROID) tablet 75 mcg, 75 mcg, Oral, Q AM, Albaro Noguera MD  •  [START ON 6/15/2021] multivitamin with minerals 1 tablet, 1 tablet, Oral, Daily, Albaro Noguera MD  •  ondansetron (ZOFRAN) tablet 4 mg, 4 mg, Oral, Q6H PRN **OR** ondansetron (ZOFRAN) injection 4 mg, 4 mg, Intravenous, Q6H PRN, lAbaro Noguera MD  •  pantoprazole (PROTONIX) injection 40 mg, 40 mg, Intravenous, Q12H, Albaro Noguera MD  •  prochlorperazine (COMPAZINE) injection 5 mg, 5 mg, Intravenous, Q6H PRN **OR** prochlorperazine (COMPAZINE) tablet 5 mg, 5 mg, Oral, Q6H PRN **OR** prochlorperazine (COMPAZINE) suppository 25 mg, 25 mg, Rectal, Q12H PRN, Albaro Noguera MD  •  sodium chloride 0.9 % flush 10 mL, 10 mL, Intravenous, PRN, Albaro Noguera MD  •  sodium chloride 0.9 % flush 10 mL, 10 mL, Intravenous, Q12H, Albaro Noguera MD, 10 mL at 06/14/21 1343  •  sodium chloride 0.9 % flush 10 mL, 10 mL, Intravenous, PRN, Albaro Noguera MD  •  [START ON 6/15/2021] tamsulosin (FLOMAX) 24 hr capsule 0.4 mg, 0.4 mg, Oral, Daily, Albaro Noguera MD    ALLERGIES     Patient has no known allergies.    FAMILY HISTORY            Family History   Problem Relation Age of Onset   • Stroke Father    • Lung cancer Sister    • Alcohol abuse Brother           SOCIAL HISTORY       Social History     Socioeconomic History   • Marital status:      Spouse name: Not on file   • Number of children: Not on file   • Years of education: Not on file   • Highest education level: Not on file   Tobacco Use   • Smoking status: Former Smoker     Packs/day: 1.00     Years: 50.00     Pack years: 50.00     Types: Cigarettes     Quit date: 7/29/2010     Years since quitting: 10.8   • Smokeless tobacco: Never Used   Vaping Use   • Vaping Use: Never used   Substance and Sexual Activity   • Alcohol use: Never   • Drug use: Never   • Sexual activity: Defer         PHYSICAL EXAM    (up to 7 for level 4, 8 or more for level 5)     Vitals:    06/14/21 1354 06/14/21 1412 06/14/21 1430 06/14/21 1500   BP: 96/43 112/47 109/47 122/43   BP Location:       Patient Position:       Pulse: 59   59   Resp: 22   21   Temp: 96.8 °F (36 °C)      TempSrc: Axillary      SpO2: 98%      Weight:       Height:           Physical Exam  General : Patient is awake, alert, oriented, in no acute distress, nontoxic appearing  HEENT: Pupils are equally round and reactive to light, EOMI, conjunctivae clear  Neck: Neck is supple, full range of motion, trachea midline  Cardiac: Heart regular rate, rhythm, no murmurs, rubs, or gallops  Lungs: Lungs with decreased breath sounds bilaterally, no acute respiratory distress, no accessory muscle usage.  Chest wall: There is no tenderness to palpation over the chest wall or over ribs.  Poststernotomy scars are clean dry and intact.  No active bleeding or drainage.  Watchman device to left anterior chest wall.  Abdomen: Abdomen is soft, nontender, nondistended. There are no firm or pulsatile masses, no rebound rigidity or guarding.   Musculoskeletal: 5 out of 5 strength in all 4 extremities.  No focal muscle deficits are appreciated  Neuro: Motor  intact, sensory intact, level of consciousness is normal, GCS 15  Dermatology: Multiple areas of ecchymosis in the periumbilical and left lower flank.  Lower extremity scars from graft sites are intact.  Skin is warm and dry  Psych: Mentation is grossly normal, cognition is grossly normal. Affect is appropriate.      DIAGNOSTIC RESULTS     EKG: All EKG's are interpreted by the Emergency Department Physician who either signs or Co-signs this chart in the absence of a cardiologist.    ECG 12 Lead   Final Result   Test Reason : SOA Protocol   Blood Pressure :   */*   mmHG   Vent. Rate :  61 BPM     Atrial Rate :  60 BPM      P-R Int :   * ms          QRS Dur : 130 ms       QT Int : 504 ms       P-R-T Axes :   *  -8  80 degrees      QTc Int : 507 ms      Ventricular-paced rhythm   Abnormal ECG   When compared with ECG of 05-JUN-2021 09:05,   Electronic ventricular pacemaker has replaced Sinus rhythm   Vent. rate has decreased BY  41 BPM   Confirmed by JODIE CHAVES MD ([id number]) on 6/14/2021 9:09:26 AM      Referred By: EDMD           Confirmed By: JODIE CHAVES MD          RADIOLOGY:   Non-plain film images such as CT, Ultrasound and MRI are read by the radiologist. Plain radiographic images are visualized and preliminarily interpreted by the emergency physician with the below findings:      [] Radiologist's Report Reviewed:  CT Chest Pulmonary Embolism   Preliminary Result   1. No PE.   2. Stable to slightly increasing small to moderate volume right and   trace to small left pleural effusions with adjacent atelectasis.   3. No acute findings within the abdomen or pelvis, however, noted   moderate distention of the rectal vault with intermixed gas and stool.       D:  06/14/2021   E:  06/14/2021              CT Abdomen Pelvis With Contrast   Preliminary Result   1. No PE.   2. Stable to slightly increasing small to moderate volume right and   trace to small left pleural effusions with adjacent atelectasis.   3. No acute  findings within the abdomen or pelvis, however, noted   moderate distention of the rectal vault with intermixed gas and stool.       D:  06/14/2021   E:  06/14/2021              XR Chest 1 View   Preliminary Result   Similar central vascular congestion appearance and trace to   small bilateral pleural effusions from prior.       D:  06/14/2021   E:  06/14/2021                        ED BEDSIDE ULTRASOUND:   Performed by ED Physician - none    LABS:    I have reviewed and interpreted all of the currently available lab results from this visit (if applicable):  Results for orders placed or performed during the hospital encounter of 06/14/21   Comprehensive Metabolic Panel    Specimen: Blood   Result Value Ref Range    Glucose 145 (H) 65 - 99 mg/dL    BUN 79 (H) 8 - 23 mg/dL    Creatinine 1.34 (H) 0.76 - 1.27 mg/dL    Sodium 142 136 - 145 mmol/L    Potassium 4.4 3.5 - 5.2 mmol/L    Chloride 108 (H) 98 - 107 mmol/L    CO2 24.0 22.0 - 29.0 mmol/L    Calcium 9.4 8.6 - 10.5 mg/dL    Total Protein 5.9 (L) 6.0 - 8.5 g/dL    Albumin 3.80 3.50 - 5.20 g/dL    ALT (SGPT) 17 1 - 41 U/L    AST (SGOT) 19 1 - 40 U/L    Alkaline Phosphatase 61 39 - 117 U/L    Total Bilirubin 0.7 0.0 - 1.2 mg/dL    eGFR Non African Amer 52 (L) >60 mL/min/1.73    Globulin 2.1 gm/dL    A/G Ratio 1.8 g/dL    BUN/Creatinine Ratio 59.0 (H) 7.0 - 25.0    Anion Gap 10.0 5.0 - 15.0 mmol/L   BNP    Specimen: Blood   Result Value Ref Range    proBNP 1,684.0 0.0-1,800.0 pg/mL   Troponin    Specimen: Blood   Result Value Ref Range    Troponin T 0.018 0.000 - 0.030 ng/mL   CBC Auto Differential    Specimen: Blood   Result Value Ref Range    WBC 31.80 (C) 3.40 - 10.80 10*3/mm3    RBC 1.87 (L) 4.14 - 5.80 10*6/mm3    Hemoglobin 6.2 (C) 13.0 - 17.7 g/dL    Hematocrit 19.4 (C) 37.5 - 51.0 %    .7 (H) 79.0 - 97.0 fL    MCH 33.2 (H) 26.6 - 33.0 pg    MCHC 32.0 31.5 - 35.7 g/dL    RDW 14.7 12.3 - 15.4 %    RDW-SD 49.8 37.0 - 54.0 fl    MPV 10.3 6.0 - 12.0 fL     Platelets 308 140 - 450 10*3/mm3   Lactic Acid, Plasma    Specimen: Blood   Result Value Ref Range    Lactate 2.9 (C) 0.5 - 2.0 mmol/L   Procalcitonin    Specimen: Blood   Result Value Ref Range    Procalcitonin 0.18 0.00 - 0.25 ng/mL   Timed Lactic Acid, Reflex    Specimen: Blood   Result Value Ref Range    Lactate 2.8 (C) 0.5 - 2.0 mmol/L   Manual Differential    Specimen: Blood   Result Value Ref Range    Neutrophil % 84.0 (H) 42.7 - 76.0 %    Lymphocyte % 8.0 (L) 19.6 - 45.3 %    Monocyte % 4.0 (L) 5.0 - 12.0 %    Eosinophil % 0.0 (L) 0.3 - 6.2 %    Basophil % 0.0 0.0 - 1.5 %    Bands %  2.0 0.0 - 5.0 %    Metamyelocyte % 2.0 (H) 0.0 - 0.0 %    Neutrophils Absolute 27.35 (H) 1.70 - 7.00 10*3/mm3    Lymphocytes Absolute 2.54 0.70 - 3.10 10*3/mm3    Monocytes Absolute 1.27 (H) 0.10 - 0.90 10*3/mm3    Eosinophils Absolute 0.00 0.00 - 0.40 10*3/mm3    Basophils Absolute 0.00 0.00 - 0.20 10*3/mm3    nRBC 4.0 (H) 0.0 - 0.2 /100 WBC    Macrocytes Slight/1+ None Seen    Polychromasia Mod/2+ None Seen    WBC Morphology Normal Normal    Platelet Morphology Normal Normal   Urinalysis With Culture If Indicated - Urine, Catheter In/Out    Specimen: Urine, Catheter In/Out   Result Value Ref Range    Color, UA Yellow Yellow, Straw    Appearance, UA Clear Clear    pH, UA <=5.0 5.0 - 8.0    Specific Gravity, UA 1.039 (H) 1.001 - 1.030    Glucose, UA Negative Negative    Ketones, UA Negative Negative    Bilirubin, UA Negative Negative    Blood, UA Negative Negative    Protein, UA Negative Negative    Leuk Esterase, UA Negative Negative    Nitrite, UA Negative Negative    Urobilinogen, UA 0.2 E.U./dL 0.2 - 1.0 E.U./dL   Magnesium    Specimen: Blood   Result Value Ref Range    Magnesium 2.4 1.6 - 2.4 mg/dL   POC Occult Blood Stool    Specimen: Per Rectum; Stool   Result Value Ref Range    Fecal Occult Blood Positive (A) Negative    Lot Number [id number]     Expiration Date 9-23     DEVELOPER LOT NUMBER [id number]     DEVELOPER EXPIRATION  DATE 9-23     Positive Control Positive Positive    Negative Control Negative Negative   ECG 12 Lead   Result Value Ref Range    QT Interval 504 ms    QTC Interval 507 ms   Prepare RBC, 2 Units   Result Value Ref Range    Product Code U3621N43     Unit Number [id number]     UNIT  ABO O     UNIT  RH POS     Crossmatch Interpretation Compatible     Dispense Status XM     Blood Expiration Date 202107142359     Blood Type Barcode 5100     Product Code Q6219N88     Unit Number [id number]     UNIT  ABO O     UNIT  RH POS     Crossmatch Interpretation Compatible     Dispense Status IS     Blood Expiration Date 202107142359     Blood Type Barcode 5100    Type & Screen    Specimen: Blood   Result Value Ref Range    ABO Type O     RH type Positive     Antibody Screen Negative     T&S Expiration Date 6/17/2021 11:59:59 PM    Prepare RBC, 1 Units   Result Value Ref Range    Product Code Z8880C09     Unit Number [id number]     UNIT  ABO O     UNIT  RH POS     Crossmatch Interpretation Compatible     Dispense Status XM     Blood Expiration Date 202107152359     Blood Type Barcode 5100    Green Top (Gel)   Result Value Ref Range    Extra Tube Hold for add-ons.    Lavender Top   Result Value Ref Range    Extra Tube hold for add-on    Gold Top - SST   Result Value Ref Range    Extra Tube Hold for add-ons.    Gray Top   Result Value Ref Range    Extra Tube Hold for add-ons.         All other labs were within normal range or not returned as of this dictation.      EMERGENCY DEPARTMENT COURSE and DIFFERENTIAL DIAGNOSIS/MDM:   Vitals:    Vitals:    06/14/21 1354 06/14/21 1412 06/14/21 1430 06/14/21 1500   BP: 96/43 112/47 109/47 122/43   BP Location:       Patient Position:       Pulse: 59   59   Resp: 22 21   Temp: 96.8 °F (36 °C)      TempSrc: Axillary      SpO2: 98%      Weight:       Height:                Patient with shortness of breath and dyspnea on exertion which is been a recurrent issue for the patient over  the last few months presents with continued weakness, shortness of breath with exertion over the last few days.  Patient is post CABG on Laura 3 with Dr. Shields.  Appears to be healing well, surgical sites are clean dry and intact.  On arrival patient is awake and alert, just appears generally unwell and fatigued.  Blood pressure 90/53 on arrival, vital signs are stable, is afebrile.  We did pain IV, labs, imaging for further evaluation.  The patient does have a significant leukocytosis, also anemia hemoglobin is 6.2 which is decreased from 9.8 when he was just recently in the hospital.  We did type and screen, cross the patient for 2 units of blood.  CT the chest, abdomen and pelvis with no PE, no acute or significant normalities, increase stool burden in the lower rectum.  Patient's white count is persistently elevated, he has been on steroid burst recently, and he is also still guaiac positive, with recent stressors, steroid usage, this could be an inciting factor for his acute GI bleed and symptomatic anemia.  Blood pressures remain labile, systolic remain around 100 200 and.  I did update the patient on these results, the plan for continued treatment therapies with admission to the hospital.  Case discussed with our hospitalist team for admission.        PROCEDURES:  Procedures    CRITICAL CARE TIME    Total Critical Care time was 0 minutes, excluding separately reportable procedures.   There was a high probability of clinically significant/life threatening deterioration in the patient's condition which required my urgent intervention.      FINAL IMPRESSION      1. Symptomatic anemia    2. Gastrointestinal hemorrhage, unspecified gastrointestinal hemorrhage type    3. Hx of CABG    4. Hypotension, unspecified hypotension type          DISPOSITION/PLAN     ED Disposition     ED Disposition Condition Comment    Decision to Admit  Level of Care: Telemetry [5]   Diagnosis: Symptomatic anemia [[id number]]                           Comment: Please note this report has been produced using speech recognition software.      Florian Paniagua DO  Attending Emergency Physician               Florian Paniagua,   06/14/21 2438

## 2021-06-14 NOTE — H&P
[facility] Medicine Services  HISTORY AND PHYSICAL    Patient Name: Colin Albrecht  : 1945  MRN: [id number]  Primary Care Physician: Arun Soliman MD  Date of admission: 2021      Subjective   Subjective     Chief Complaint:  Weakness    HPI:  Colin Albrecht is a 76 y.o. male just discharged here last week after CABG, with progressive weakness. He continues to feel SOA, but much more so with increased TREVIZO. Notes cough with occasional sputum. No f/c. Notes palpitations and dizziness/near syncope. He has had a poor appetite with poor PO intake since leaving. He only notes 2 bowel movements since leaving and denies dark or bloody stools. He feels extremely fatigued. He was discharged on aspirin and steroids (for COPD exacerbation) and has a history of bleeding gastric ulcers. In the ED he was found to have a hemoglobin of 6.2        COVID Details:    Symptoms:    [] NONE [] Fever []  Cough [] Shortness of breath [] Change in taste/smell      The patient qualifies to receive the vaccine, but they have not yet received it.      Review of Systems   Constitutional: Positive for activity change, appetite change and fatigue.   HENT: Positive for congestion.    Respiratory: Positive for cough and shortness of breath. Negative for wheezing.    Cardiovascular: Positive for palpitations. Negative for chest pain and leg swelling.   Endocrine: Positive for cold intolerance.   Genitourinary: Negative.    Musculoskeletal: Negative.    Skin: Negative.    Neurological: Positive for dizziness, weakness and light-headedness.   Psychiatric/Behavioral: Negative.         All other systems reviewed and are negative.     Personal History     Past Medical History:   Diagnosis Date   • Abnormal ECG    • Arrhythmia    • Atrial fibrillation (CMS/HCC)    • CHF (congestive heart failure) (CMS/HCC)    • Depression    • History of transfusion     bleeding stomach ulcer ~2014 x2, no reaction    •  Hypertension    • Stomach ulcer    • Wears reading eyeglasses        Past Surgical History:   Procedure Laterality Date   • ATRIAL APPENDAGE EXCLUSION LEFT WITH TRANSESOPHAGEAL ECHOCARDIOGRAM N/A 9/25/2020    Procedure: Atrial Appendage Occlusion (Watchman), start Eliquis 2 weeks prior and hold 1 day, GA;  Surgeon: Yonny Prado MD;  Location:  [facility];  Service: Cardiology;  Laterality: N/A;   • CARDIAC CATHETERIZATION     • CARDIAC CATHETERIZATION N/A 6/2/2021    Procedure: Left Heart Cath- ADD ON/ WALK IN FOR RDS PER KT;  Surgeon: Pietro Quintana MD;  Location:  [facility];  Service: Cardiovascular;  Laterality: N/A;   • CARDIAC ELECTROPHYSIOLOGY PROCEDURE N/A 3/26/2021    Procedure: DDD PPM upgrade to Biv ICD (MDT), no meds to hold;  Surgeon: Yonny Prado MD;  Location:  [facility];  Service: Cardiology;  Laterality: N/A;   • CARDIAC ELECTROPHYSIOLOGY PROCEDURE N/A 3/26/2021    Procedure: AVN RFA;  Surgeon: Yonny Prado MD;  Location:  [facility];  Service: Cardiovascular;  Laterality: N/A;   • COLONOSCOPY     • CORONARY ARTERY BYPASS GRAFT N/A 6/3/2021    Procedure: MEDIAN STERNOTOMY, CORONARY ARTERY BYPASS WITH INTERNAL MAMMARY ARTERY GRAFT X 2, EVH OF THE RIGHT GREATER SAPHENOUS ANA;  Surgeon: Jaime Shields MD;  Location:  [facility];  Service: Cardiothoracic;  Laterality: N/A;   • INSERT / REPLACE / REMOVE PACEMAKER         Family History: family history includes Alcohol abuse in his brother; Lung cancer in his sister; Stroke in his father. Otherwise pertinent FHx was reviewed and unremarkable.     Social History:  reports that he quit smoking about 10 years ago. His smoking use included cigarettes. He has a 50.00 pack-year smoking history. He has never used smokeless tobacco. He reports that he does not drink alcohol and does not use drugs.  Social History     Social History Narrative   • Not on file              Medications:  Available home medication information reviewed.  (Not in a hospital admission)      No Known Allergies    Objective   Objective     Vital Signs:   Temp:  [97.9 °F (36.6 °C)] 97.9 °F (36.6 °C)  Heart Rate:  [60-64] 60  Resp:  [18-20] 20  BP: ()/(40-67) 100/56       Physical Exam   NAD, alert and oriented x 3  OP clear, dry MM  Neck supple  No LAD  RRR  Decreased at bases, worse on R  +BS, ND, NT, soft LLQ ecchymoses  No c/c/e  No rashes  FLOREZ  Chronically ill appearing  Flat affect    Result Review:  I have personally reviewed the results from the time of this admission to 6/14/2021 12:38 EDT and agree with these findings:  [x]  Laboratory  []  Microbiology  []  Radiology  []  EKG/Telemetry   []  Cardiology/Vascular   []  Pathology  []  Old records  []  Other:  Most notable findings include: CT reviewed, B effusions, R greater than L      LAB RESULTS:      Lab 06/14/21  0906 06/10/21  0546 06/09/21  0546 06/08/21  0400   WBC 31.80* 7.68 6.52 6.40   HEMOGLOBIN 6.2* 9.8* 9.2* 8.7*   HEMATOCRIT 19.4* 29.9* 28.1* 26.7*   PLATELETS 308 230 212 188   NEUTROS ABS 27.35*  --  3.70 4.03   IMMATURE GRANS (ABS)  --   --  0.03 0.02   LYMPHS ABS  --   --  1.46 1.11   MONOS ABS  --   --  1.05* 0.93*   EOS ABS 0.00  --  0.26 0.29   .7* 97.7* 97.2* 98.2*   PROCALCITONIN 0.18  --   --   --    LACTATE 2.9*  --   --   --          Lab 06/14/21  0906 06/10/21  0905 06/09/21 2009 06/09/21  0546 06/08/21  0400   SODIUM 142 141  --  140 139   POTASSIUM 4.4 4.6 4.6 3.6 4.3   CHLORIDE 108* 103  --  103 105   CO2 24.0 28.0  --  28.0 28.0   ANION GAP 10.0 10.0  --  9.0 6.0   BUN 79* 34*  --  29* 35*   CREATININE 1.34* 1.25  --  1.10 1.16   GLUCOSE 145* 223*  --  106* 109*   CALCIUM 9.4 10.5  --  9.7 9.0   MAGNESIUM  --   --   --  2.1 2.6*         Lab 06/14/21  0906   TOTAL PROTEIN 5.9*   ALBUMIN 3.80   GLOBULIN 2.1   ALT (SGPT) 17   AST (SGOT) 19   BILIRUBIN 0.7   ALK PHOS 61         Lab 06/14/21  0906   PROBNP  1,684.0   TROPONIN T 0.018                 UA    Urinalysis 6/2/21 6/2/21    1705 1705   Squamous Epithelial Cells, UA  None Seen   Specific Gravity, UA 1.058 (A)    Ketones, UA Negative    Blood, UA Trace (A)    Leukocytes, UA Negative    Nitrite, UA Negative    RBC, UA  3-6 (A)   WBC, UA  None Seen   Bacteria, UA  None Seen   (A) Abnormal value              Microbiology Results (last 10 days)     ** No results found for the last 240 hours. **          CT Abdomen Pelvis With Contrast    Result Date: 6/14/2021  EXAMINATION: CT CHEST PULMONARY EMBOLISM-, CT ABDOMEN AND PELVIS W CONTRAST-  INDICATION: Shortness of breath, recent surgery.  TECHNIQUE: CT angiogram chest following PE protocol with intravenous contrast and 2-D reconstructions performed, CT abdomen and pelvis with intravenous contrast administration.  The radiation dose reduction device was turned on for each scan per the ALARA (As Low as Reasonably Achievable) protocol.  COMPARISON: CT angiogram chest 06/09/2021.  FINDINGS: CTA CHEST: Thyroid is homogeneous in attenuation. No bulky mediastinal adenopathy. Central airways are patent. Esophagus seen in normal course and caliber. Atherosclerotic nonaneurysmal thoracic aorta. Cardiac size borderline enlarged status post median sternotomy and CABG as well as atrial appendage occlusion device in place. Satisfactory opacification of the pulmonary arterial tree without filling defect to suggest pulmonary embolus. Atherosclerotic nonaneurysmal thoracic aorta. Extended lung windows reveal small to moderate volume right and trace left pleural effusions stable to slightly increased from prior comparison 06/09/2021 without new consolidation. Degenerative changes of the thoracic spine without acute osseous finding of the thoracic spine or chest. No chest wall soft tissue findings.  ABDOMEN AND PELVIS: Liver demonstrates hepatic cysts measuring up to a 3.5 cm left hepatic cyst without abnormal enhancement features.  Gallbladder contracted and unremarkable. No biliary dilatation. Pancreas and spleen unremarkable with splenule adjacent to the lateral margin of the spleen. Adrenals demonstrate a subcentimeter right adrenal adenoma. Kidneys without hydronephrosis or hydroureter. No bulky retroperitoneal adenopathy. Atherosclerotic nonaneurysmal abdominal aorta with patent celiac and SMA origins. Portal veins and IVC are patent. GI tract evaluation without focal thickening or disproportionate dilatation of bowel. Moderately distended urinary bladder without wall thickening. Moderate distention of the rectum with intermixed gas and stool. Degenerative changes of the spine and pelvis without aggressive osseous lesion. No soft tissue body wall findings of acuity.      Impression: 1. No PE. 2. Stable to slightly increasing small to moderate volume right and trace to small left pleural effusions with adjacent atelectasis. 3. No acute findings within the abdomen or pelvis, however, noted moderate distention of the rectal vault with intermixed gas and stool.  D:  06/14/2021 E:  06/14/2021       XR Chest 1 View    Result Date: 6/14/2021  EXAMINATION: XR CHEST 1 VW-06/14/2021:  INDICATION: SOA, triage protocol.  COMPARISON: Chest x-ray 06/10/2021.  FINDINGS: Cardiac size enlarged status post median sternotomy and CABG with bibasilar opacifications, right slightly greater than left, and probable trace to small pleural effusions similar to prior.      Impression: Similar central vascular congestion appearance and trace to small bilateral pleural effusions from prior.  D:  06/14/2021 E:  06/14/2021        CT Chest Pulmonary Embolism    Result Date: 6/14/2021  EXAMINATION: CT CHEST PULMONARY EMBOLISM-, CT ABDOMEN AND PELVIS W CONTRAST-  INDICATION: Shortness of breath, recent surgery.  TECHNIQUE: CT angiogram chest following PE protocol with intravenous contrast and 2-D reconstructions performed, CT abdomen and pelvis with intravenous contrast  administration.  The radiation dose reduction device was turned on for each scan per the ALARA (As Low as Reasonably Achievable) protocol.  COMPARISON: CT angiogram chest 06/09/2021.  FINDINGS: CTA CHEST: Thyroid is homogeneous in attenuation. No bulky mediastinal adenopathy. Central airways are patent. Esophagus seen in normal course and caliber. Atherosclerotic nonaneurysmal thoracic aorta. Cardiac size borderline enlarged status post median sternotomy and CABG as well as atrial appendage occlusion device in place. Satisfactory opacification of the pulmonary arterial tree without filling defect to suggest pulmonary embolus. Atherosclerotic nonaneurysmal thoracic aorta. Extended lung windows reveal small to moderate volume right and trace left pleural effusions stable to slightly increased from prior comparison 06/09/2021 without new consolidation. Degenerative changes of the thoracic spine without acute osseous finding of the thoracic spine or chest. No chest wall soft tissue findings.  ABDOMEN AND PELVIS: Liver demonstrates hepatic cysts measuring up to a 3.5 cm left hepatic cyst without abnormal enhancement features. Gallbladder contracted and unremarkable. No biliary dilatation. Pancreas and spleen unremarkable with splenule adjacent to the lateral margin of the spleen. Adrenals demonstrate a subcentimeter right adrenal adenoma. Kidneys without hydronephrosis or hydroureter. No bulky retroperitoneal adenopathy. Atherosclerotic nonaneurysmal abdominal aorta with patent celiac and SMA origins. Portal veins and IVC are patent. GI tract evaluation without focal thickening or disproportionate dilatation of bowel. Moderately distended urinary bladder without wall thickening. Moderate distention of the rectum with intermixed gas and stool. Degenerative changes of the spine and pelvis without aggressive osseous lesion. No soft tissue body wall findings of acuity.      Impression: 1. No PE. 2. Stable to slightly  increasing small to moderate volume right and trace to small left pleural effusions with adjacent atelectasis. 3. No acute findings within the abdomen or pelvis, however, noted moderate distention of the rectal vault with intermixed gas and stool.  D:  06/14/2021 E:  06/14/2021         Results for orders placed during the hospital encounter of 06/02/21    Adult Transthoracic Echo Complete W/ Cont if Necessary Per Protocol    Interpretation Summary  · Left ventricular ejection fraction appears to be 41 - 45%. Left ventricular systolic function is mildly decreased.  · Left ventricular diastolic function is consistent with (grade II w/high LAP) pseudonormalization.  · No evidence of significant pericardial effusion.      Assessment/Plan   Assessment & Plan     Active Hospital Problems    Diagnosis  POA   • **Symptomatic anemia [D64.9]  Yes   • Nonsustained ventricular tachycardia 6/5/2021 (CMS/HCC) [I47.2]  Yes   • COPD  [J44.9]  Yes   • Former smoker [Z87.891]  Not Applicable   • Hypertension [I10]  Yes   • S/P CABG x 2 on 6/3/2021 [Z95.1]  Not Applicable   • Suspected chronic renal insufficiency [N18.9]  Yes   • Chronic HFrEF 35-40% s/p upgrade BiV ICD and AV node ablation 3/2021 [I50.22]  Yes     a. Patient reports normal LHC 10-12 years ago  b. Echocardiogram 1/27/2016: EF 45 to 55%, unchanged from previous echo 2012  c. Echocardiogram 8/27/2019: EF 35%, mild to moderate MR, mild to moderate TR, RVSP 40 mmHg  d. Nuclear stress test 8/26/2019: Normal LV perfusion EF 33%  e. Upgrade to BIV ICD at time of AV node ablation 3/2021     • GI bleed [K92.2]  Unknown   • PAF s/p Watchman device 9/25/2020/ Bi-V ICD and AVN ablation 3/2021 [I48.21]  Yes     a. CHADsvasc = 4 off Xarelto x 1 year due to bleeding, never been on Eliquis  b. Echocardiogram 1/27/2016: EF 45 to 55%, unchanged from previous echo 2012  c. Echocardiogram 8/27/2019: EF 35 to 40%, mild to moderate MR, mild to moderate TR, RVSP 40 mmHg  d. Nuclear stress  test 8/26/2019: Normal LV perfusion EF 33%  e. Implantation of a left atrial appendage occlusive device (Watchman device) 09/25/20 by Dr. Yonny Prado  f. PORSHA on 11/13/20 with well seated device, EF 35%, mod MR, mild to mod TR, post-procedural ASD with left-to-right flow  g. Upgrade to BIV ICD and AV Node ablation 3/2021, Dr. Prado       This is a 76 year old with CM, SSS with PPM, upgraded to ICD, with AV node ablation, history atrial fibrillation not on AC due to GIB from gastric ulcers and epistaxis, s/p Watchman device, with recent admission/discharge 4 days ago for CABG x 2, with VT during that stay, here with progressive weakness and suspected anemia due to GIB.    Anemia/GIB  -hx of gastric ulcers  -PPI IV  -serial hemoglobin  -transfuse 3 Units PRBC  -consult GI for EGD  -hold aspirin, stop steroids for COPD exacerbation    CAD  -s/p CABG  -on asa/statin/metoprolol    PAF s/p Watchman device  SSS  -s/p upgrade to ICD/AV node ablation 3/21    Recent dyspnea  -recent CTA with no PE, B effusions noted  -treated for COPD exacerbation with steroid taper at discharge  -stop steroids  -nebs    B effusions/CM  -diurese as tolerated, per cardiology    CKD  -monitor renal function    VT/VF, reportedly here on admission to floor  -interrogate device, consult cardiology    Hypothyroidism  -synthroid    DVT prophylaxis:  SCDS      CODE STATUS:  Full/CPR  Code Status and Medical Interventions:   Ordered at: 06/14/21 1211     Level Of Support Discussed With:    Patient     Code Status:    CPR     Medical Interventions (Level of Support Prior to Arrest):    Full       Admission Status:  I believe this patient meets inpatient criteria.    Albaro Noguera MD  06/14/21

## 2021-06-14 NOTE — CONSULTS
"[facility]    Referring Provider: No ref. provider found    PCP: Arun Soliman MD    Reason for Consultation: anemia    Chief complaint: I feel sob    History of present illness:    Colin Albrecht is a 76 y.o. male s/p CABG 6/3/21 and hx of watchman who is admitted with shortness of breath.  Patient reports that he has not any abdominal pain.  He denies any melena or hematochezia.  He admits that he has a history of gastric ulcer which he reports he was not very symptomatic when he was diagnosed.  Patient reports very active and has been disappointed that he has been \"able to feel like he can go\"  Patient only reports dyspnea on exertion.  He reports baseline very active.  No nausea or vomiting.      Patient has had hx of two bleeding ulcers on in 2013 and in 2019.      Patient had been discharged home last week and had been on aspirin and steroids for COPD.  He denies any chest pain or syncope      Allergies:  Patient has no known allergies.    Scheduled Meds:  atorvastatin, 40 mg, Oral, Nightly  budesonide-formoterol, 2 puff, Inhalation, BID - RT  bumetanide, 0.5 mg, Intravenous, Once  [START ON 6/15/2021] bumetanide, 0.5 mg, Oral, Daily  ipratropium-albuterol, 3 mL, Nebulization, 4x Daily - RT  [START ON 6/15/2021] levothyroxine, 75 mcg, Oral, Q AM  [START ON 6/15/2021] multivitamin with minerals, 1 tablet, Oral, Daily  pantoprazole, 40 mg, Intravenous, Q12H  sodium chloride, 10 mL, Intravenous, Q12H  [START ON 6/15/2021] tamsulosin, 0.4 mg, Oral, Daily         Infusions:       PRN Meds:  •  albuterol  •  HYDROcodone-acetaminophen  •  ipratropium-albuterol  •  ondansetron **OR** ondansetron  •  prochlorperazine **OR** prochlorperazine **OR** prochlorperazine  •  sodium chloride  •  sodium chloride    Home Meds:  Medications Prior to Admission   Medication Sig Dispense Refill Last Dose   • albuterol sulfate  (90 Base) MCG/ACT inhaler Inhale 2 puffs Every 4 (Four) Hours As Needed for " Wheezing. 18 g 0 6/14/2021 at Unknown time   • aspirin  MG tablet Take 1 tablet by mouth Daily. 30 tablet 2 6/14/2021 at Unknown time   • atorvastatin (LIPITOR) 40 MG tablet Take 1 tablet by mouth Every Night. 90 tablet 3 6/13/2021 at Unknown time   • budesonide-formoterol (Symbicort) 80-4.5 MCG/ACT inhaler Inhale 2 puffs 2 (Two) Times a Day. 10.2 g 0 6/14/2021 at Unknown time   • bumetanide (BUMEX) 0.5 MG tablet Take 1 tablet by mouth Daily. 30 tablet 3 6/14/2021 at Unknown time   • Coenzyme Q10 (CoQ10) 100 MG capsule Take 1 capsule by mouth Daily.   6/14/2021 at Unknown time   • ferrous sulfate 325 (65 FE) MG tablet Take 1 tablet by mouth Daily With Breakfast. 30 tablet 5 6/14/2021 at Unknown time   • Garlic 450 MG capsule Take 1 capsule by mouth Daily.   6/13/2021 at Unknown time   • HYDROcodone-acetaminophen (NORCO) 7.5-325 MG per tablet Take 1 tablet by mouth Every 6 (Six) Hours As Needed for Moderate Pain  for up to 3 days. 30 tablet 0 6/13/2021 at Unknown time   • levothyroxine (SYNTHROID, LEVOTHROID) 50 MCG tablet Take 1 tablet by mouth Daily. Please have PCP manage this medication. (Patient taking differently: Take 50 mcg by mouth Daily.) 30 tablet 0 6/13/2021 at Unknown time   • metoprolol tartrate (LOPRESSOR) 50 MG tablet Take 1 tablet by mouth Every 12 (Twelve) Hours. 60 tablet 3 6/14/2021 at Unknown time   • multivitamin with minerals (MULTIVITAMIN ADULT PO) Take 1 tablet by mouth Daily.   6/14/2021 at Unknown time   • Omega-3 Fatty Acids (fish oil) 1000 MG capsule capsule Take 1,000 mg by mouth Every Other Day.   6/14/2021 at Unknown time   • predniSONE (DELTASONE) 20 MG tablet Take 3 tablets by mouth Daily for 3 days, THEN 2 tablets Daily for 3 days, THEN 1 tablet Daily for 3 days. 18 tablet 0 6/14/2021 at Unknown time   • ramipril (ALTACE) 5 MG capsule Take 5 mg by mouth Daily.   6/14/2021 at Unknown time   • tamsulosin (FLOMAX) 0.4 MG capsule 24 hr capsule Take 1 capsule by mouth Daily. 30  capsule 5 6/14/2021 at Unknown time       ROS: Review of Systems   Constitutional: Positive for fatigue.   HENT: Negative.    Eyes: Negative.    Respiratory: Positive for shortness of breath.    Cardiovascular: Negative for chest pain and leg swelling.   Gastrointestinal: Negative.    Endocrine: Negative.    Genitourinary: Negative.    Musculoskeletal: Negative.    Skin: Negative.    Allergic/Immunologic: Negative.    Neurological: Negative.    Hematological: Negative.    Psychiatric/Behavioral: Negative.        PAST MED HX:  Past Medical History:   Diagnosis Date   • Abnormal ECG    • Arrhythmia    • Atrial fibrillation (CMS/HCC)    • CHF (congestive heart failure) (CMS/HCC)    • Depression    • History of transfusion     bleeding stomach ulcer ~2014 x2, no reaction    • Hypertension    • Stomach ulcer    • Wears reading eyeglasses        PAST SURG HX:  Past Surgical History:   Procedure Laterality Date   • ATRIAL APPENDAGE EXCLUSION LEFT WITH TRANSESOPHAGEAL ECHOCARDIOGRAM N/A 9/25/2020    Procedure: Atrial Appendage Occlusion (Watchman), start Eliquis 2 weeks prior and hold 1 day, GA;  Surgeon: Yonny Prado MD;  Location:  [facility];  Service: Cardiology;  Laterality: N/A;   • CARDIAC CATHETERIZATION     • CARDIAC CATHETERIZATION N/A 6/2/2021    Procedure: Left Heart Cath- ADD ON/ WALK IN FOR RDS PER KT;  Surgeon: Pietro Quintana MD;  Location:  [facility];  Service: Cardiovascular;  Laterality: N/A;   • CARDIAC ELECTROPHYSIOLOGY PROCEDURE N/A 3/26/2021    Procedure: DDD PPM upgrade to Biv ICD (MDT), no meds to hold;  Surgeon: Yonny Prado MD;  Location:  [facility];  Service: Cardiology;  Laterality: N/A;   • CARDIAC ELECTROPHYSIOLOGY PROCEDURE N/A 3/26/2021    Procedure: AVN RFA;  Surgeon: Yonny Prado MD;  Location:  [facility];  Service: Cardiovascular;  Laterality: N/A;   • COLONOSCOPY     • CORONARY ARTERY BYPASS GRAFT  "N/A 6/3/2021    Procedure: MEDIAN STERNOTOMY, CORONARY ARTERY BYPASS WITH INTERNAL MAMMARY ARTERY GRAFT X 2, EVH OF THE RIGHT GREATER SAPHENOUS ANA;  Surgeon: Jaime Shields MD;  Location: Replaced by [facility];  Service: Cardiothoracic;  Laterality: N/A;   • INSERT / REPLACE / REMOVE PACEMAKER         FAM HX:  Family History   Problem Relation Age of Onset   • Stroke Father    • Lung cancer Sister    • Alcohol abuse Brother        SOC HX:  Social History     Socioeconomic History   • Marital status:      Spouse name: Not on file   • Number of children: Not on file   • Years of education: Not on file   • Highest education level: Not on file   Tobacco Use   • Smoking status: Former Smoker     Packs/day: 1.00     Years: 50.00     Pack years: 50.00     Types: Cigarettes     Quit date: 7/29/2010     Years since quitting: 10.8   • Smokeless tobacco: Never Used   Vaping Use   • Vaping Use: Never used   Substance and Sexual Activity   • Alcohol use: Never   • Drug use: Never   • Sexual activity: Defer       PHYSICAL EXAM  /43   Pulse 59   Temp 96.8 °F (36 °C) (Axillary)   Resp 21   Ht 188 cm (74\")   Wt 90.7 kg (200 lb)   SpO2 98%   BMI 25.68 kg/m²   Wt Readings from Last 3 Encounters:   06/14/21 90.7 kg (200 lb)   06/09/21 91 kg (200 lb 9.9 oz)   03/26/21 87.7 kg (193 lb 5.5 oz)   ,body mass index is 25.68 kg/m².  Physical Exam  Constitutional:       Appearance: Normal appearance.   HENT:      Head: Normocephalic and atraumatic.      Nose: Nose normal.      Mouth/Throat:      Mouth: Mucous membranes are dry.   Eyes:      Pupils: Pupils are equal, round, and reactive to light.   Cardiovascular:      Rate and Rhythm: Normal rate.      Comments: murmur  Pulmonary:      Comments: Long expiratory phase  Chest with midline wound  Defibrillator  Abdominal:      General: Abdomen is flat. Bowel sounds are normal.      Palpations: Abdomen is soft.   Musculoskeletal:         General: No swelling.      Cervical back: Neck " supple.   Skin:     General: Skin is warm and dry.   Neurological:      General: No focal deficit present.      Mental Status: He is alert and oriented to person, place, and time.   Psychiatric:         Mood and Affect: Mood normal.         Behavior: Behavior normal.         Thought Content: Thought content normal.         Results Review:   I reviewed the patient's new clinical results.    Lab Results   Component Value Date    WBC 31.80 (C) 06/14/2021    HGB 6.2 (C) 06/14/2021    HGB 9.8 (L) 06/10/2021    HGB 9.2 (L) 06/09/2021    HCT 19.4 (C) 06/14/2021    .7 (H) 06/14/2021     06/14/2021       Lab Results   Component Value Date    INR 1.32 (H) 06/04/2021    INR 1.67 (H) 06/03/2021    INR 1.19 (H) 06/02/2021       Lab Results   Component Value Date    GLUCOSE 145 (H) 06/14/2021    BUN 79 (H) 06/14/2021    CREATININE 1.34 (H) 06/14/2021    EGFRIFNONA 52 (L) 06/14/2021    BCR 59.0 (H) 06/14/2021    CO2 24.0 06/14/2021    CALCIUM 9.4 06/14/2021    ALBUMIN 3.80 06/14/2021    ALKPHOS 61 06/14/2021    BILITOT 0.7 06/14/2021    ALT 17 06/14/2021    AST 19 06/14/2021     Adult Transthoracic Echo Complete W/ Cont if Necessary Per Protocol    Result Date: 6/9/2021  · Left ventricular ejection fraction appears to be 41 - 45%. Left ventricular systolic function is mildly decreased. · Left ventricular diastolic function is consistent with (grade II w/high LAP) pseudonormalization. · No evidence of significant pericardial effusion.      Adult Transthoracic Echo Complete W/ Cont if Necessary Per Protocol    Result Date: 6/2/2021  · Left ventricular ejection fraction appears to be 36 - 40%. Left ventricular systolic function is moderately decreased. · Moderate mitral valve regurgitation is present. · Estimated right ventricular systolic pressure from tricuspid regurgitation is mildly elevated (35-45 mmHg). · Left atrial volume is moderately increased. · Mildly reduced right ventricular systolic function noted. · Left  ventricular diastolic dysfunction is noted.      Pulmonary Function Test Spirometry    Result Date: 6/3/2021  Pulmonary Function Test Interpretation Colin Albrecht [id number] 06/03/21 08:52 EDT Spirometry Spirometry demonstrates severe airway obstruction. Post Bronchodilator N/A Study Comparison There are no prior studies available for comparison. Study date: 06/02/2021    CT Angiogram Chest With & Without Contrast    Result Date: 6/9/2021  EXAMINATION: CT ANGIOGRAM CHEST W WO CONTRAST-  INDICATION: Shortness of breath (Ped 0-18y)  TECHNIQUE: Axial pulmonary arterial phase IV contrast enhanced CT angiogram of the chest per PE protocol. Two-dimensional reconstructions were postprocessed.  The radiation dose reduction device was turned on for each scan per the ALARA (As Low as Reasonably Achievable) protocol.  COMPARISON: NONE  FINDINGS: No pathologic axillary adenopathy or other worrisome body wall soft tissue finding in the chest. There are circumscribed low-density findings in the liver, incompletely characterized but favored to represent benign cysts. No additional acute findings in the partially imaged upper abdomen. There are moderate right and small left pleural effusions. Operative changes are noted from recent CABG with minimal ventral pneumomediastinum and small area of air and fluid along the left pleural/pericardial border extending along the left diaphragm. Left atrial appendage clip noted. Trace pericardial effusion. The pulmonary arteries are well-opacified and without evidence of filling defect concerning for acute pulmonary embolus. Evaluation of the lung fields demonstrates some mild centrilobular emphysema, otherwise without evidence of acute infectious process. There is some mild atelectasis along the bilateral pleural effusions.      No pulmonary embolus.  Moderate right and small left pleural effusions with some associated basilar volume loss. Otherwise no acute infectious process such as  pneumonia. Moderate background emphysema changes are present.  Operative changes are noted along the anterior chest wall from recent CABG with some trace pneumomediastinum and small amount of fluid noted tracking along the left hemidiaphragm.   This report was finalized on 6/9/2021 11:23 AM by Anson Chen.      CT Abdomen Pelvis With Contrast    Result Date: 6/14/2021  EXAMINATION: CT CHEST PULMONARY EMBOLISM-, CT ABDOMEN AND PELVIS W CONTRAST-  INDICATION: Shortness of breath, recent surgery.  TECHNIQUE: CT angiogram chest following PE protocol with intravenous contrast and 2-D reconstructions performed, CT abdomen and pelvis with intravenous contrast administration.  The radiation dose reduction device was turned on for each scan per the ALARA (As Low as Reasonably Achievable) protocol.  COMPARISON: CT angiogram chest 06/09/2021.  FINDINGS: CTA CHEST: Thyroid is homogeneous in attenuation. No bulky mediastinal adenopathy. Central airways are patent. Esophagus seen in normal course and caliber. Atherosclerotic nonaneurysmal thoracic aorta. Cardiac size borderline enlarged status post median sternotomy and CABG as well as atrial appendage occlusion device in place. Satisfactory opacification of the pulmonary arterial tree without filling defect to suggest pulmonary embolus. Atherosclerotic nonaneurysmal thoracic aorta. Extended lung windows reveal small to moderate volume right and trace left pleural effusions stable to slightly increased from prior comparison 06/09/2021 without new consolidation. Degenerative changes of the thoracic spine without acute osseous finding of the thoracic spine or chest. No chest wall soft tissue findings.  ABDOMEN AND PELVIS: Liver demonstrates hepatic cysts measuring up to a 3.5 cm left hepatic cyst without abnormal enhancement features. Gallbladder contracted and unremarkable. No biliary dilatation. Pancreas and spleen unremarkable with splenule adjacent to the lateral margin of  the spleen. Adrenals demonstrate a subcentimeter right adrenal adenoma. Kidneys without hydronephrosis or hydroureter. No bulky retroperitoneal adenopathy. Atherosclerotic nonaneurysmal abdominal aorta with patent celiac and SMA origins. Portal veins and IVC are patent. GI tract evaluation without focal thickening or disproportionate dilatation of bowel. Moderately distended urinary bladder without wall thickening. Moderate distention of the rectum with intermixed gas and stool. Degenerative changes of the spine and pelvis without aggressive osseous lesion. No soft tissue body wall findings of acuity.      1. No PE. 2. Stable to slightly increasing small to moderate volume right and trace to small left pleural effusions with adjacent atelectasis. 3. No acute findings within the abdomen or pelvis, however, noted moderate distention of the rectal vault with intermixed gas and stool.  D:  06/14/2021 E:  06/14/2021       Cardiac Catheterization/Vascular Study    Result Date: 6/2/2021   [facility] [address], [address] [place], [place], [address] [phone]  WWW.[facility] CenterAutoGnomicsTrinity Health [facility] West CampusCoresonicSaint [facility]   CARDIAC CATHETERIZATION PROCEDURE NOTE     · Severe 80% ostial left main stenosis, MLA 4.9 mm². · RCA and left circumflex mild 10 to 20% luminal irregularities, LAD and diagonal branches normal. · LAD and OM appear good targets for bypass. · LVEDP 11 mmHg, LVEF 35-40%  RECOMMENDATIONS: · Dr. Shields is consulted for CABG. · Echo and carotid duplex today. · Continue aspirin and beta-blocker · Start atorvastatin 40 mg daily --------------------------------------------------------------------------- ------------------------------------------- Indication(s) for this Procedure:  Symptoms concerning for unstable angina. Procedure(s) Performed: 1.  Right radial artery access . 2. Selective coronary angiography 3. Left heart catheterization.   4. Left ventriculography. 5.  Intravascular ultrasound of the left  main 6.  IFR of the left main/LAD Description of the Procedure:   Informed consent was obtained with the goals, rationale, alternatives, risks and benefits of the procedure explained to the patient.  A 6Fr Terumo slender sheath was placed in the right radial artery. Selective angiography of the right coronary artery was performed with a 5Fr JR4 diagnostic catheter.  Selective angiography of the left coronary arteries was performed with a 5Fr JL3.5 diagnostic catheter.  Left heart catheterization with left ventriculography was performed with a 5Fr pigtail catheter placed in the left ventricle.  The procedure was completed and the sheath was removed. A radial patent hemostasis band was placed for hemostasis.  After initial angiography performed decision is made to carry out IVUS.  Therapeutic heparin was given ACT was therapeutic throughout the case.  A CLS 3.5 with sideholes guide was used to engage the left main.  A L[place]on blue interventional wire was placed in the distal LAD.  The Node1 IVUS catheter was then advanced into the proximal LAD and manual pullback was performed.  After this the Omni IFR wire was placed in the distal LAD and measurement was carried out it was mildly abnormal at 0.91.  All wires and guide were removed, patient tolerated procedure well.  Findings discussed with Dr. Prado and Dr. Shields by phone both in agreement for CABG.  Findings discussed with the patient in recovery area, no family was present at that time. IVUS findings: Significant 83% ostial left main stenosis, with an MLA of 4.9 mm².  Moderate calcification noted less than 270 degrees in the ostial left main.  Reference lumen diameter 7.3 mm. Angiographic Findings: Right coronary dominant circulation · Left main artery:   Large-caliber vessel, bifurcates into an LAD and left circumflex, ostial 80% stenosis with an MLA of 4.9 mm². · Left anterior descending artery: Large caliber vessel gives rise to 2 small to moderate  diagonal branches, angiographically normal. · Left circumflex artery: Large-caliber nondominant vessel gives rise to 2 OM branches, mild 10 to 20% luminal irregularities mid vessel otherwise normal. · Right coronary artery: Large-caliber dominant vessel gives rise to PDA and a PLV, mild 10 to 20% luminal irregularities mid vessel. Left Ventricle: LVEF 35-40% with moderate global hypokinesis. Hemodynamic Findings: · Ao pressure: 80/47 mmHg · LVEDP: 11 mmHg · LV to Ao pullback peak to peak gradient: 0 mmHg Estimated Blood Loss: Minimal Specimen(s): None obtained Sheath: Removed, radial patent hemostasis band was placed for hemostasis. Complications: There were no apparent early complications. Pietro Quintana MD, [facility]     XR Chest 1 View    Result Date: 6/14/2021  EXAMINATION: XR CHEST 1 VW-06/14/2021:  INDICATION: SOA, triage protocol.  COMPARISON: Chest x-ray 06/10/2021.  FINDINGS: Cardiac size enlarged status post median sternotomy and CABG with bibasilar opacifications, right slightly greater than left, and probable trace to small pleural effusions similar to prior.      Similar central vascular congestion appearance and trace to small bilateral pleural effusions from prior.  D:  06/14/2021 E:  06/14/2021        XR Chest 1 View    Result Date: 6/10/2021  EXAMINATION: XR CHEST 1 VW-  INDICATION: post op  COMPARISON: One day prior  FINDINGS: Unchanged small bilateral pleural effusions. No distinct pneumothorax. Scattered areas of subsegmental atelectasis again noted. Unchanged heart and mediastinal contours.      No significant interval change from recent comparison.  This report was finalized on 6/10/2021 10:43 AM by Anson Chen.      XR Chest 1 View    Result Date: 6/9/2021  CR Chest 1 Vw INDICATION: Change in status. Patient has atherosclerotic heart disease and unstable angina. COMPARISON:  Chest x-ray 6/7/2021 FINDINGS: 2 portable AP  view(s) of the chest. There are postoperative changes to the heart with moderate cardiac silhouette enlargement. This is not changed. There are small bilateral pleural effusions and there is mild vascular congestion and interstitial edema, increased from previous. There is a focal opacity at the left lateral hemithorax which is also present previously and is probably loculated pleural fluid. There is no pneumothorax. Patchy bibasilar airspace disease could be atelectasis and/or patchy pulmonary edema but follow-up is recommended to exclude underlying aspiration or pneumonia.     Interval worsening in the appearance the chest with findings most consistent with worsening volume status and the development of mild congestive heart failure. Clinical correlation and follow-up is recommended to ensure resolution of findings. Signer Name: Shreya Carroll MD  Signed: 6/9/2021 9:14 AM  Workstation Name: [id number]  [facility]    XR Chest 1 View    Result Date: 6/7/2021  EXAMINATION: XR CHEST 1 VW- 06/07/2021  INDICATION: Postop CABG; I25.118-Atherosclerotic heart disease of native coronary artery with other forms of angina pectoris; I20.0-Unstable angina  COMPARISON: 06/05/2021  FINDINGS: Portable chest reveals heart to be enlarged. Pacer leads in satisfactory position. Prominence of the pulmonary vascularity with pleural-based opacity stable within the left lung. Mild increased markings seen in the lung bases bilaterally with blunting of the costophrenic angles. Findings are all stable.      Stable chest as above.  D:  06/07/2021 E:  06/07/2021  This report was finalized on 6/7/2021 3:39 PM by Dr. Karine Limon MD.      XR Chest 1 View    Result Date: 6/5/2021   EXAMINATION: XR CHEST 1 VW - 06/05/2021  INDICATION: ; I25.118-Atherosclerotic heart disease of native coronary artery with other forms of angina pectoris; I20.0-Unstable angina. Post-op heart surgery.  COMPARISON: 06/04/2021  FINDINGS:  Portable chest reveals patient is status post median sternotomy. Cardiac pacer leads in satisfactory position. Mild increased markings identified at the left lung base. Small bilateral pleural effusions. Degenerative changes seen within the spine. The lung fields are grossly clear.         Minimal increased markings at the left lung base. The pulmonary vascularity remains pronounced. Heart is borderline enlarged. No new focal right opacification present.  DICTATED:   06/05/2021 EDITED/ls :   06/05/2021  This report was finalized on 6/5/2021 3:51 PM by Dr. Karine Limon MD.      XR Chest 1 View    Result Date: 6/4/2021  EXAMINATION: XR CHEST 1 VW- 06/04/2021  INDICATION: Post-Op Heart Surgery; I25.118-Atherosclerotic heart disease of native coronary artery with other forms of angina pectoris; I20.0-Unstable angina  COMPARISON: 06/03/2021  FINDINGS: ET tube and NG tube have been removed. Small focus of left lateral pleural thickening or loculated pleural effusion appears stable. Diffuse pulmonary interstitial disease remains relatively mild, whether chronic or mild interstitial edema. There does appear to be some cephalization of pulmonary vasculature. There is minimal if any effusion. No significant focal lung disease or evidence of pneumothorax is seen.      Persistent mild pulmonary vascular congestion, and small area of pleural thickening or effusion in the lateral left chest. No evidence of pneumothorax.  D:  06/04/2021 E:  06/04/2021    This report was finalized on 6/4/2021 10:41 PM by Dr. Albaro Salguero MD.      XR Chest 1 View    Result Date: 6/3/2021  EXAMINATION: XR CHEST 1 VW-  INDICATION: Post-Op Check Line & Tube Placement; I25.118-Atherosclerotic heart disease of native coronary artery with other forms of angina pectoris; I20.0-Unstable angina  COMPARISON: 6/2/2021  FINDINGS: Interval median sternotomy. Endotracheal tube in good position. Nasogastric tube enters the stomach with its tip not imaged.  Mediastinal drains noted in place. No enlarging pleural effusion or distinct pneumothorax. No new focal airspace consolidation. Unchanged heart and mediastinal contours otherwise.      Interval median sternotomy. Endotracheal tube in good position. Nasogastric tube enters the stomach with its tip not imaged. Mediastinal drains noted in place. No enlarging pleural effusion or distinct pneumothorax. No new focal airspace consolidation. Unchanged heart and mediastinal contours otherwise.  This report was finalized on 6/3/2021 6:18 PM by Anson Chen.      XR Chest 1 View    Result Date: 6/3/2021  EXAMINATION: XR CHEST 1 VW-06/02/2021:  INDICATION: Dyspnea; I20.0-Unstable angina.  COMPARISON: 03/27/2021.  FINDINGS: Portable chest reveals cardiac and mediastinal silhouettes within normal limits. Cardiac pacer leads are in satisfactory position. There is blunting of the left costophrenic angle. Degenerative changes seen within the spine. No pleural effusion or pneumothorax. Chronic changes seen throughout the lung fields bilaterally.      Chronic changes seen throughout the lung fields with no superimposed infectious process. Stable blunting of the left costophrenic angle.  D:  06/02/2021 E:  06/02/2021  This report was finalized on 6/3/2021 5:02 PM by Dr. Karine Limon MD.      CT Chest Pulmonary Embolism    Result Date: 6/14/2021  EXAMINATION: CT CHEST PULMONARY EMBOLISM-, CT ABDOMEN AND PELVIS W CONTRAST-  INDICATION: Shortness of breath, recent surgery.  TECHNIQUE: CT angiogram chest following PE protocol with intravenous contrast and 2-D reconstructions performed, CT abdomen and pelvis with intravenous contrast administration.  The radiation dose reduction device was turned on for each scan per the ALARA (As Low as Reasonably Achievable) protocol.  COMPARISON: CT angiogram chest 06/09/2021.  FINDINGS: CTA CHEST: Thyroid is homogeneous in attenuation. No bulky mediastinal adenopathy. Central airways are  patent. Esophagus seen in normal course and caliber. Atherosclerotic nonaneurysmal thoracic aorta. Cardiac size borderline enlarged status post median sternotomy and CABG as well as atrial appendage occlusion device in place. Satisfactory opacification of the pulmonary arterial tree without filling defect to suggest pulmonary embolus. Atherosclerotic nonaneurysmal thoracic aorta. Extended lung windows reveal small to moderate volume right and trace left pleural effusions stable to slightly increased from prior comparison 06/09/2021 without new consolidation. Degenerative changes of the thoracic spine without acute osseous finding of the thoracic spine or chest. No chest wall soft tissue findings.  ABDOMEN AND PELVIS: Liver demonstrates hepatic cysts measuring up to a 3.5 cm left hepatic cyst without abnormal enhancement features. Gallbladder contracted and unremarkable. No biliary dilatation. Pancreas and spleen unremarkable with splenule adjacent to the lateral margin of the spleen. Adrenals demonstrate a subcentimeter right adrenal adenoma. Kidneys without hydronephrosis or hydroureter. No bulky retroperitoneal adenopathy. Atherosclerotic nonaneurysmal abdominal aorta with patent celiac and SMA origins. Portal veins and IVC are patent. GI tract evaluation without focal thickening or disproportionate dilatation of bowel. Moderately distended urinary bladder without wall thickening. Moderate distention of the rectum with intermixed gas and stool. Degenerative changes of the spine and pelvis without aggressive osseous lesion. No soft tissue body wall findings of acuity.      1. No PE. 2. Stable to slightly increasing small to moderate volume right and trace to small left pleural effusions with adjacent atelectasis. 3. No acute findings within the abdomen or pelvis, however, noted moderate distention of the rectal vault with intermixed gas and stool.  D:  06/14/2021 E:  06/14/2021       Duplex Carotid Ultrasound  CAR    Result Date: 6/2/2021  · Proximal right internal carotid artery plaque without significant stenosis. · Proximal left internal carotid artery plaque without significant stenosis. · Bilateral antegrade vertebral flow.    ASSESSMENTS/PLANS    1.  Acute blood loss anemia  2.  Hx of gastric ulcer with antral ulcer 2013 with documented healing dec 2013; 2019 antral ulcer treated with PPI;  Noted he had bleeding of this ulcer when was on xarelto  3.  Shortness of breath secondary to anemia    Plan     1.  EGD in am  2.  Continue PPI  3.  Transfuse as needed  4.  NPO after midnight      I discussed the patient's findings and my recommendations with patient and consulting provider    Leidy Cobb MD  06/14/21  16:07 EDT

## 2021-06-14 NOTE — CASE MANAGEMENT/SOCIAL WORK
Discharge Planning Assessment  [facility]     Patient Name: Colin Albrecht  MRN: [id number]  Today's Date: 6/14/2021    Admit Date: 6/14/2021    Discharge Needs Assessment     Row Name 06/14/21 1159       Living Environment    Lives With  spouse    Name(s) of Who Lives With Patient  Lenore Albrecht - Relation: Spouse - Home: [phone]    Current Living Arrangements  home/apartment/condo    Primary Care Provided by  self    Provides Primary Care For  no one    Family Caregiver if Needed  spouse    Family Caregiver Names  Lenore Albrecht - Relation: Spouse - Home: [phone]    Quality of Family Relationships  helpful;involved;supportive    Able to Return to Prior Arrangements  yes       Resource/Environmental Concerns    Resource/Environmental Concerns  none    Transportation Concerns  car, none       Transition Planning    Patient/Family Anticipates Transition to  home with family    Patient/Family Anticipated Services at Transition      Transportation Anticipated  family or friend will provide       Discharge Needs Assessment    Readmission Within the Last 30 Days  no previous admission in last 30 days    Equipment Currently Used at Home  none    Concerns to be Addressed  denies needs/concerns at this time    Anticipated Changes Related to Illness  none        Discharge Plan     Row Name 06/14/21 6155       Plan    Plan  Initial    Plan Comments  CM spoke with patient at bedside. Patient resides in [facility] with his spouse. Patient resides in a one level home. Patient states his is very independent with ADL's, uses no DME. Patient states he keeps busy working on cars and riding his motorcycle. Patient denies any current home health or OP services. Patient states he is able to afford/obtain his medications without difficulty. Patient's spouse will be able to assist him at home if needed. DC goal is home. CM following.    Final Discharge Disposition Code  30 - still a patient        Continued Care and  Services - Admitted Since 6/14/2021    Coordination has not been started for this encounter.         Demographic Summary     Row Name 06/14/21 1152       General Information    Arrived From  home    Referral Source  emergency department    Reason for Consult  discharge planning    Preferred Language  English     Used During This Interaction  no       Contact Information    Contact Information Comments  Lenore Albrecht - Relation: Spouse - Home: [phone]        Functional Status     Row Name 06/14/21 1153       Functional Status    Usual Activity Tolerance  good    Current Activity Tolerance  fair       Functional Status, IADL    Medications  independent    Meal Preparation  independent    Housekeeping  independent    Laundry  independent    Shopping  independent       Mental Status    General Appearance WDL  WDL       Mental Status Summary    Recent Changes in Mental Status/Cognitive Functioning  no changes       Employment/    Employment Status  retired                Latisha Archuleta RN

## 2021-06-15 ENCOUNTER — ANESTHESIA EVENT (OUTPATIENT)
Dept: GASTROENTEROLOGY | Facility: HOSPITAL | Age: 76
End: 2021-06-15

## 2021-06-15 ENCOUNTER — READMISSION MANAGEMENT (OUTPATIENT)
Dept: CALL CENTER | Facility: HOSPITAL | Age: 76
End: 2021-06-15

## 2021-06-15 ENCOUNTER — ANESTHESIA (OUTPATIENT)
Dept: GASTROENTEROLOGY | Facility: HOSPITAL | Age: 76
End: 2021-06-15

## 2021-06-15 LAB
ANION GAP SERPL CALCULATED.3IONS-SCNC: 7 MMOL/L (ref 5–15)
BH BB BLOOD EXPIRATION DATE: NORMAL
BH BB BLOOD TYPE BARCODE: 5100
BH BB DISPENSE STATUS: NORMAL
BH BB PRODUCT CODE: NORMAL
BH BB UNIT NUMBER: NORMAL
BH CV ECHO MEAS - AO MAX PG (FULL): 5.5 MMHG
BH CV ECHO MEAS - AO MAX PG: 11 MMHG
BH CV ECHO MEAS - AO MEAN PG (FULL): 2.5 MMHG
BH CV ECHO MEAS - AO MEAN PG: 4.9 MMHG
BH CV ECHO MEAS - AO ROOT AREA (BSA CORRECTED): 1.6
BH CV ECHO MEAS - AO ROOT AREA: 9.9 CM^2
BH CV ECHO MEAS - AO ROOT DIAM: 3.5 CM
BH CV ECHO MEAS - AO V2 MAX: 163.3 CM/SEC
BH CV ECHO MEAS - AO V2 MEAN: 98.7 CM/SEC
BH CV ECHO MEAS - AO V2 VTI: 28.3 CM
BH CV ECHO MEAS - ASC AORTA: 3 CM
BH CV ECHO MEAS - AVA(I,A): 2.8 CM^2
BH CV ECHO MEAS - AVA(I,D): 2.8 CM^2
BH CV ECHO MEAS - AVA(V,A): 2.7 CM^2
BH CV ECHO MEAS - AVA(V,D): 2.7 CM^2
BH CV ECHO MEAS - BSA(HAYCOCK): 2.2 M^2
BH CV ECHO MEAS - BSA: 2.2 M^2
BH CV ECHO MEAS - BZI_BMI: 25.7 KILOGRAMS/M^2
BH CV ECHO MEAS - BZI_METRIC_HEIGHT: 188 CM
BH CV ECHO MEAS - BZI_METRIC_WEIGHT: 90.7 KG
BH CV ECHO MEAS - EDV(CUBED): 168.3 ML
BH CV ECHO MEAS - EDV(MOD-SP2): 137 ML
BH CV ECHO MEAS - EDV(MOD-SP4): 115 ML
BH CV ECHO MEAS - EDV(TEICH): 148.7 ML
BH CV ECHO MEAS - EF(CUBED): 40.9 %
BH CV ECHO MEAS - EF(MOD-SP2): 34.8 %
BH CV ECHO MEAS - EF(MOD-SP4): 31.3 %
BH CV ECHO MEAS - EF(TEICH): 33.4 %
BH CV ECHO MEAS - ESV(CUBED): 99.5 ML
BH CV ECHO MEAS - ESV(MOD-SP2): 89.3 ML
BH CV ECHO MEAS - ESV(MOD-SP4): 79 ML
BH CV ECHO MEAS - ESV(TEICH): 99 ML
BH CV ECHO MEAS - FS: 16.1 %
BH CV ECHO MEAS - IVS/LVPW: 0.95
BH CV ECHO MEAS - IVSD: 1.1 CM
BH CV ECHO MEAS - LA DIMENSION: 3.9 CM
BH CV ECHO MEAS - LA/AO: 1.1
BH CV ECHO MEAS - LAD MAJOR: 6.1 CM
BH CV ECHO MEAS - LAT PEAK E' VEL: 11.1 CM/SEC
BH CV ECHO MEAS - LATERAL E/E' RATIO: 7.7
BH CV ECHO MEAS - LV DIASTOLIC VOL/BSA (35-75): 52.9 ML/M^2
BH CV ECHO MEAS - LV MASS(C)D: 257.2 GRAMS
BH CV ECHO MEAS - LV MASS(C)DI: 118.3 GRAMS/M^2
BH CV ECHO MEAS - LV MAX PG: 5.5 MMHG
BH CV ECHO MEAS - LV MEAN PG: 2.4 MMHG
BH CV ECHO MEAS - LV SYSTOLIC VOL/BSA (12-30): 36.4 ML/M^2
BH CV ECHO MEAS - LV V1 MAX: 116.5 CM/SEC
BH CV ECHO MEAS - LV V1 MEAN: 69.5 CM/SEC
BH CV ECHO MEAS - LV V1 VTI: 20.9 CM
BH CV ECHO MEAS - LVIDD: 5.5 CM
BH CV ECHO MEAS - LVIDS: 4.6 CM
BH CV ECHO MEAS - LVLD AP2: 9 CM
BH CV ECHO MEAS - LVLD AP4: 7.6 CM
BH CV ECHO MEAS - LVLS AP2: 7.5 CM
BH CV ECHO MEAS - LVLS AP4: 7 CM
BH CV ECHO MEAS - LVOT AREA (M): 3.8 CM^2
BH CV ECHO MEAS - LVOT AREA: 3.8 CM^2
BH CV ECHO MEAS - LVOT DIAM: 2.2 CM
BH CV ECHO MEAS - LVPWD: 1.2 CM
BH CV ECHO MEAS - MED PEAK E' VEL: 4.1 CM/SEC
BH CV ECHO MEAS - MEDIAL E/E' RATIO: 20.7
BH CV ECHO MEAS - MV A MAX VEL: 33 CM/SEC
BH CV ECHO MEAS - MV DEC SLOPE: 349.9 CM/SEC^2
BH CV ECHO MEAS - MV DEC TIME: 0.21 SEC
BH CV ECHO MEAS - MV E MAX VEL: 85.4 CM/SEC
BH CV ECHO MEAS - MV E/A: 2.6
BH CV ECHO MEAS - MV P1/2T MAX VEL: 86.9 CM/SEC
BH CV ECHO MEAS - MV P1/2T: 72.7 MSEC
BH CV ECHO MEAS - MVA P1/2T LCG: 2.5 CM^2
BH CV ECHO MEAS - MVA(P1/2T): 3 CM^2
BH CV ECHO MEAS - PA ACC TIME: 0.13 SEC
BH CV ECHO MEAS - PA MAX PG: 9.6 MMHG
BH CV ECHO MEAS - PA PR(ACCEL): 19 MMHG
BH CV ECHO MEAS - PA V2 MAX: 154.7 CM/SEC
BH CV ECHO MEAS - PI END-D VEL: 125.6 CM/SEC
BH CV ECHO MEAS - RAP SYSTOLE: 8 MMHG
BH CV ECHO MEAS - RVSP: 35 MMHG
BH CV ECHO MEAS - SI(AO): 128.3 ML/M^2
BH CV ECHO MEAS - SI(CUBED): 31.6 ML/M^2
BH CV ECHO MEAS - SI(LVOT): 36.9 ML/M^2
BH CV ECHO MEAS - SI(MOD-SP2): 21.9 ML/M^2
BH CV ECHO MEAS - SI(MOD-SP4): 16.6 ML/M^2
BH CV ECHO MEAS - SI(TEICH): 22.9 ML/M^2
BH CV ECHO MEAS - SV(AO): 278.8 ML
BH CV ECHO MEAS - SV(CUBED): 68.8 ML
BH CV ECHO MEAS - SV(LVOT): 80.3 ML
BH CV ECHO MEAS - SV(MOD-SP2): 47.7 ML
BH CV ECHO MEAS - SV(MOD-SP4): 36 ML
BH CV ECHO MEAS - SV(TEICH): 49.7 ML
BH CV ECHO MEAS - TAPSE (>1.6): 1.4 CM
BH CV ECHO MEAS - TR MAX PG: 27 MMHG
BH CV ECHO MEAS - TR MAX VEL: 239 CM/SEC
BH CV ECHO MEASUREMENTS AVERAGE E/E' RATIO: 11.24
BH CV XLRA - RV BASE: 4.5 CM
BUN SERPL-MCNC: 59 MG/DL (ref 8–23)
BUN/CREAT SERPL: 50 (ref 7–25)
CALCIUM SPEC-SCNC: 8.4 MG/DL (ref 8.6–10.5)
CHLORIDE SERPL-SCNC: 109 MMOL/L (ref 98–107)
CO2 SERPL-SCNC: 25 MMOL/L (ref 22–29)
CREAT SERPL-MCNC: 1.18 MG/DL (ref 0.76–1.27)
CROSSMATCH INTERPRETATION: NORMAL
GFR SERPL CREATININE-BSD FRML MDRD: 60 ML/MIN/1.73
GLUCOSE SERPL-MCNC: 122 MG/DL (ref 65–99)
HCT VFR BLD AUTO: 27.1 % (ref 37.5–51)
HCT VFR BLD AUTO: 27.4 % (ref 37.5–51)
HCT VFR BLD AUTO: 29.2 % (ref 37.5–51)
HGB BLD-MCNC: 8.7 G/DL (ref 13–17.7)
HGB BLD-MCNC: 8.7 G/DL (ref 13–17.7)
HGB BLD-MCNC: 8.9 G/DL (ref 13–17.7)
LEFT ATRIUM VOLUME INDEX: 41.2 ML/M^2
LEFT ATRIUM VOLUME: 89.6 ML
MAXIMAL PREDICTED HEART RATE: 145 BPM
POTASSIUM SERPL-SCNC: 4.1 MMOL/L (ref 3.5–5.2)
SARS-COV-2 RDRP RESP QL NAA+PROBE: NORMAL
SODIUM SERPL-SCNC: 141 MMOL/L (ref 136–145)
STRESS TARGET HR: 123 BPM
UNIT  ABO: NORMAL
UNIT  RH: NORMAL

## 2021-06-15 PROCEDURE — A9270 NON-COVERED ITEM OR SERVICE: HCPCS | Performed by: INTERNAL MEDICINE

## 2021-06-15 PROCEDURE — 88305 TISSUE EXAM BY PATHOLOGIST: CPT | Performed by: INTERNAL MEDICINE

## 2021-06-15 PROCEDURE — 25010000003 LIDOCAINE 1 % SOLUTION: Performed by: NURSE ANESTHETIST, CERTIFIED REGISTERED

## 2021-06-15 PROCEDURE — G0378 HOSPITAL OBSERVATION PER HR: HCPCS

## 2021-06-15 PROCEDURE — 97165 OT EVAL LOW COMPLEX 30 MIN: CPT

## 2021-06-15 PROCEDURE — 99215 OFFICE O/P EST HI 40 MIN: CPT | Performed by: INTERNAL MEDICINE

## 2021-06-15 PROCEDURE — 85018 HEMOGLOBIN: CPT | Performed by: INTERNAL MEDICINE

## 2021-06-15 PROCEDURE — 80048 BASIC METABOLIC PNL TOTAL CA: CPT | Performed by: HOSPITALIST

## 2021-06-15 PROCEDURE — 99233 SBSQ HOSP IP/OBS HIGH 50: CPT | Performed by: INTERNAL MEDICINE

## 2021-06-15 PROCEDURE — 87635 SARS-COV-2 COVID-19 AMP PRB: CPT | Performed by: INTERNAL MEDICINE

## 2021-06-15 PROCEDURE — C9803 HOPD COVID-19 SPEC COLLECT: HCPCS

## 2021-06-15 PROCEDURE — 85014 HEMATOCRIT: CPT | Performed by: INTERNAL MEDICINE

## 2021-06-15 PROCEDURE — 94760 N-INVAS EAR/PLS OXIMETRY 1: CPT

## 2021-06-15 PROCEDURE — 97161 PT EVAL LOW COMPLEX 20 MIN: CPT

## 2021-06-15 PROCEDURE — 97116 GAIT TRAINING THERAPY: CPT

## 2021-06-15 PROCEDURE — 63710000001 ATORVASTATIN 40 MG TABLET: Performed by: INTERNAL MEDICINE

## 2021-06-15 PROCEDURE — 25010000002 PROPOFOL 10 MG/ML EMULSION: Performed by: NURSE ANESTHETIST, CERTIFIED REGISTERED

## 2021-06-15 PROCEDURE — 96376 TX/PRO/DX INJ SAME DRUG ADON: CPT

## 2021-06-15 PROCEDURE — 85018 HEMOGLOBIN: CPT | Performed by: HOSPITALIST

## 2021-06-15 PROCEDURE — 99226 PR SBSQ OBSERVATION CARE/DAY 35 MINUTES: CPT | Performed by: INTERNAL MEDICINE

## 2021-06-15 PROCEDURE — 94799 UNLISTED PULMONARY SVC/PX: CPT

## 2021-06-15 PROCEDURE — 43239 EGD BIOPSY SINGLE/MULTIPLE: CPT | Performed by: INTERNAL MEDICINE

## 2021-06-15 PROCEDURE — 85014 HEMATOCRIT: CPT | Performed by: HOSPITALIST

## 2021-06-15 RX ORDER — LABETALOL HYDROCHLORIDE 5 MG/ML
5 INJECTION, SOLUTION INTRAVENOUS
Status: DISCONTINUED | OUTPATIENT
Start: 2021-06-15 | End: 2021-06-15 | Stop reason: HOSPADM

## 2021-06-15 RX ORDER — LIDOCAINE HYDROCHLORIDE 10 MG/ML
INJECTION, SOLUTION INFILTRATION; PERINEURAL AS NEEDED
Status: DISCONTINUED | OUTPATIENT
Start: 2021-06-15 | End: 2021-06-15 | Stop reason: SURG

## 2021-06-15 RX ORDER — LIDOCAINE HYDROCHLORIDE 10 MG/ML
0.5 INJECTION, SOLUTION EPIDURAL; INFILTRATION; INTRACAUDAL; PERINEURAL ONCE AS NEEDED
Status: DISCONTINUED | OUTPATIENT
Start: 2021-06-15 | End: 2021-06-15 | Stop reason: HOSPADM

## 2021-06-15 RX ORDER — SODIUM CHLORIDE, SODIUM LACTATE, POTASSIUM CHLORIDE, CALCIUM CHLORIDE 600; 310; 30; 20 MG/100ML; MG/100ML; MG/100ML; MG/100ML
9 INJECTION, SOLUTION INTRAVENOUS CONTINUOUS
Status: DISCONTINUED | OUTPATIENT
Start: 2021-06-15 | End: 2021-06-16

## 2021-06-15 RX ORDER — SODIUM CHLORIDE 0.9 % (FLUSH) 0.9 %
10 SYRINGE (ML) INJECTION EVERY 12 HOURS SCHEDULED
Status: DISCONTINUED | OUTPATIENT
Start: 2021-06-15 | End: 2021-06-15 | Stop reason: HOSPADM

## 2021-06-15 RX ORDER — SODIUM CHLORIDE 9 MG/ML
50 INJECTION, SOLUTION INTRAVENOUS CONTINUOUS
Status: DISCONTINUED | OUTPATIENT
Start: 2021-06-15 | End: 2021-06-17 | Stop reason: HOSPADM

## 2021-06-15 RX ORDER — METOPROLOL SUCCINATE 50 MG/1
50 TABLET, EXTENDED RELEASE ORAL
Status: DISCONTINUED | OUTPATIENT
Start: 2021-06-15 | End: 2021-06-17 | Stop reason: HOSPADM

## 2021-06-15 RX ORDER — BISACODYL 5 MG/1
10 TABLET, DELAYED RELEASE ORAL DAILY PRN
Status: DISCONTINUED | OUTPATIENT
Start: 2021-06-15 | End: 2021-06-17 | Stop reason: HOSPADM

## 2021-06-15 RX ORDER — MIDAZOLAM HYDROCHLORIDE 1 MG/ML
0.5 INJECTION INTRAMUSCULAR; INTRAVENOUS
Status: DISCONTINUED | OUTPATIENT
Start: 2021-06-15 | End: 2021-06-15 | Stop reason: HOSPADM

## 2021-06-15 RX ORDER — PROPOFOL 10 MG/ML
VIAL (ML) INTRAVENOUS AS NEEDED
Status: DISCONTINUED | OUTPATIENT
Start: 2021-06-15 | End: 2021-06-15 | Stop reason: SURG

## 2021-06-15 RX ORDER — SODIUM CHLORIDE 0.9 % (FLUSH) 0.9 %
10 SYRINGE (ML) INJECTION AS NEEDED
Status: DISCONTINUED | OUTPATIENT
Start: 2021-06-15 | End: 2021-06-15 | Stop reason: HOSPADM

## 2021-06-15 RX ADMIN — IPRATROPIUM BROMIDE AND ALBUTEROL SULFATE 3 ML: 2.5; .5 SOLUTION RESPIRATORY (INHALATION) at 16:27

## 2021-06-15 RX ADMIN — PROPOFOL 50 MG: 10 INJECTION, EMULSION INTRAVENOUS at 15:01

## 2021-06-15 RX ADMIN — LIDOCAINE HYDROCHLORIDE 100 MG: 10 INJECTION, SOLUTION INFILTRATION; PERINEURAL at 15:00

## 2021-06-15 RX ADMIN — ATORVASTATIN CALCIUM 40 MG: 40 TABLET, FILM COATED ORAL at 22:00

## 2021-06-15 RX ADMIN — IPRATROPIUM BROMIDE AND ALBUTEROL SULFATE 3 ML: 2.5; .5 SOLUTION RESPIRATORY (INHALATION) at 21:12

## 2021-06-15 RX ADMIN — SODIUM CHLORIDE, PRESERVATIVE FREE 10 ML: 5 INJECTION INTRAVENOUS at 22:00

## 2021-06-15 RX ADMIN — PROPOFOL 50 MG: 10 INJECTION, EMULSION INTRAVENOUS at 15:00

## 2021-06-15 RX ADMIN — PROPOFOL 50 MG: 10 INJECTION, EMULSION INTRAVENOUS at 15:03

## 2021-06-15 RX ADMIN — BUDESONIDE AND FORMOTEROL FUMARATE DIHYDRATE 2 PUFF: 80; 4.5 AEROSOL RESPIRATORY (INHALATION) at 07:32

## 2021-06-15 RX ADMIN — BUDESONIDE AND FORMOTEROL FUMARATE DIHYDRATE 2 PUFF: 80; 4.5 AEROSOL RESPIRATORY (INHALATION) at 21:11

## 2021-06-15 RX ADMIN — PANTOPRAZOLE SODIUM 40 MG: 40 INJECTION, POWDER, FOR SOLUTION INTRAVENOUS at 22:00

## 2021-06-15 RX ADMIN — PANTOPRAZOLE SODIUM 40 MG: 40 INJECTION, POWDER, FOR SOLUTION INTRAVENOUS at 09:20

## 2021-06-15 RX ADMIN — SODIUM CHLORIDE 50 ML/HR: 9 INJECTION, SOLUTION INTRAVENOUS at 13:58

## 2021-06-15 RX ADMIN — IPRATROPIUM BROMIDE AND ALBUTEROL SULFATE 3 ML: 2.5; .5 SOLUTION RESPIRATORY (INHALATION) at 07:32

## 2021-06-15 NOTE — PLAN OF CARE
Goal Outcome Evaluation:              Outcome Summary: PT eval completed. Patient alert and oriented x4. S/P CABG 06/03/2021 with sternal precautions. Presents with deficits in functional endurance limiting functional mobility. Transfers and performs bed mobility with supervision, but requires vc for maintaining sternal precautions. Ambulates x 200' without AD with shortened step length, height, and speed. Ambulation distance limited by SOA. O2 maintains > 90% t/o. Patient will benefit from skilled IP PT services in order to address impairments in order to return to PLOF. Recommend home with spouse at [place].

## 2021-06-15 NOTE — OUTREACH NOTE
CHF Week 1 Survey      Responses   [facility] patient discharged from?  [facility]   Does the patient have one of the following disease processes/diagnoses(primary or secondary)?  CHF   CHF Week 1 attempt successful?  No   Revoke  Readmitted          Madina Tam RN

## 2021-06-15 NOTE — ANESTHESIA PREPROCEDURE EVALUATION
Anesthesia Evaluation     Patient summary reviewed and Nursing notes reviewed   NPO Solid Status: > 8 hours  NPO Liquid Status: > 8 hours           Airway   Mallampati: I  TM distance: >3 FB  Neck ROM: limited  No difficulty expected  Dental    (+) edentulous and upper dentures    Pulmonary    (+) COPD moderate,   (-) shortness of breath, recent URI, not a smoker  Cardiovascular     ECG reviewed    (+) hypertension, CAD, CABG, dysrhythmias, angina, CHF ,     ROS comment: ECG Ventricular-paced rhythm  ECHO EF > 36 %.  moderately decreased.  LVDD (grade II w/high LAP) pseudonormalization.  RV cavity is mildly dilated with Mildly reduced systolic function   LA volmoderately increased.  RVSP (TR)mildly elevated (35-45 mmHg).     CATH Severe 80% ostial left main stenosis, MLA 4.9 mm².  RCA and left circumflex mild 10 to 20% luminal irregularities, LAD and diagonal branches normal.  LAD and OM appear good targets for bypass.  LVEDP 11 mmHg, LVEF 35-40%      RECOMMENDATIONS:CABG.  Echo and carotid duplex today.  Continue aspirin and beta-blocker  Start atorvastatin 40 mg daily       Neuro/Psych  (-) seizures, CVA  GI/Hepatic/Renal/Endo    (+)  PUD, GI bleeding ,   (-) liver disease, no renal disease (crerat 1.17 ), diabetes    Musculoskeletal     Abdominal    Substance History      OB/GYN          Other        ROS/Med Hx Other: Post op CABG GI BLEED   HCT 27 after 3 units   Creat 1.18      Phys Exam Other: Mac 3  - grade 1 view                 Anesthesia Plan    ASA 3     general and MAC   (PFL  Recent CABG  ICD before   Keep MAP >65 c sky )  intravenous induction

## 2021-06-15 NOTE — THERAPY EVALUATION
Patient Name: Colin Albrecht  : 1945    MRN: [id number]                              Today's Date: 6/15/2021       Admit Date: 2021    Visit Dx:     ICD-10-CM ICD-9-CM   1. Gastrointestinal hemorrhage, unspecified gastrointestinal hemorrhage type  K92.2 578.9   2. Symptomatic anemia  D64.9 285.9   3. Hx of CABG  Z95.1 V45.81   4. Hypotension, unspecified hypotension type  I95.9 458.9     Patient Active Problem List   Diagnosis   • PAF s/p Watchman device 2020/ Bi-V ICD and AVN ablation 3/2021   • GI bleed   • SSS    • Dilated CM    • S/P Watchman device   • Chronic HFrEF 35-40% s/p upgrade BiV ICD and AV node ablation 3/2021   • Unstable angina    • CAD with unstable angina   • Hypertension   • Former smoker   • COPD    • Suspected chronic renal insufficiency   • S/P CABG x 2 on 6/3/2021   • Acute-on-chronic kidney injury (CMS/HCC)   • Nonsustained ventricular tachycardia 2021 (CMS/HCC)   • Symptomatic anemia   • Gastrointestinal hemorrhage     Past Medical History:   Diagnosis Date   • Abnormal ECG    • Arrhythmia    • Atrial fibrillation (CMS/HCC)    • CHF (congestive heart failure) (CMS/[facility])    • Depression    • History of transfusion     bleeding stomach ulcer ~2014 x2, no reaction    • Hypertension    • Stomach ulcer    • Wears reading eyeglasses      Past Surgical History:   Procedure Laterality Date   • ATRIAL APPENDAGE EXCLUSION LEFT WITH TRANSESOPHAGEAL ECHOCARDIOGRAM N/A 2020    Procedure: Atrial Appendage Occlusion (Watchman), start Eliquis 2 weeks prior and hold 1 day, GA;  Surgeon: Yonny Prado MD;  Location:  [facility];  Service: Cardiology;  Laterality: N/A;   • CARDIAC CATHETERIZATION     • CARDIAC CATHETERIZATION N/A 2021    Procedure: Left Heart Cath- ADD ON/ WALK IN FOR RDS PER KT;  Surgeon: Pietro Quintana MD;  Location:  [facility] LOCATION;  Service: Cardiovascular;  Laterality: N/A;   • CARDIAC ELECTROPHYSIOLOGY PROCEDURE N/A  3/26/2021    Procedure: DDD PPM upgrade to Biv ICD (MDT), no meds to hold;  Surgeon: Yonny Prado MD;  Location:  [facility];  Service: Cardiology;  Laterality: N/A;   • CARDIAC ELECTROPHYSIOLOGY PROCEDURE N/A 3/26/2021    Procedure: AVN RFA;  Surgeon: Yonny Prado MD;  Location:  [facility];  Service: Cardiovascular;  Laterality: N/A;   • COLONOSCOPY     • CORONARY ARTERY BYPASS GRAFT N/A 6/3/2021    Procedure: MEDIAN STERNOTOMY, CORONARY ARTERY BYPASS WITH INTERNAL MAMMARY ARTERY GRAFT X 2, EVH OF THE RIGHT GREATER SAPHENOUS ANA;  Surgeon: Jaime Shields MD;  Location:  [facility];  Service: Cardiothoracic;  Laterality: N/A;   • INSERT / REPLACE / REMOVE PACEMAKER       General Information     Row Name 06/15/21 1045          Physical Therapy Time and Intention    Document Type  evaluation  -LO     Mode of Treatment  individual therapy;physical therapy  -LO     Row Name 06/15/21 1045          General Information    Patient Profile Reviewed  yes  -LO     Prior Level of Function  independent:;gait;all household mobility;transfer;bed mobility  -LO     Existing Precautions/Restrictions  sternal  -LO     Barriers to Rehab  none identified  -LO     Row Name 06/15/21 1045          Living Environment    Lives With  spouse  -LO     Row Name 06/15/21 1045          Home Main Entrance    Number of Stairs, Main Entrance  none  -LO     Row Name 06/15/21 1045          Stairs Within Home, Primary    Number of Stairs, Within Home, Primary  none  -LO     Row Name 06/15/21 1045          Cognition    Orientation Status (Cognition)  oriented x 4  -LO     Row Name 06/15/21 1045          Safety Issues, Functional Mobility    Safety Issues Affecting Function (Mobility)  awareness of need for assistance  -LO     Impairments Affecting Function (Mobility)  endurance/activity tolerance;balance  -LO     Comment, Safety Issues/Impairments (Mobility)  Requires vc for following sternal precautions   -LO       User Key  (r) = Recorded By, (t) = Taken By, (c) = Cosigned By    Initials Name Provider Type    Keisha Metzger, RAJINDER Physical Therapist        Mobility     Row Name 06/15/21 1046          Bed Mobility    Bed Mobility  sit-supine  -LO     Sit-Supine [place] (Bed Mobility)  modified independence  -LO     Assistive Device (Bed Mobility)  head of bed elevated  -LO     Comment (Bed Mobility)  requires vc for following sternal precautions  -LO     Row Name 06/15/21 1046          Transfers    Comment (Transfers)  vc for sternal precautions  -LO     Row Name 06/15/21 1046          Sit-Stand Transfer    Sit-Stand [place] (Transfers)  supervision  -LO     Assistive Device (Sit-Stand Transfers)  other (see comments) no AD  -LO     Row Name 06/15/21 1046          Gait/Stairs (Locomotion)    [place] Level (Gait)  standby assist  -LO     Assistive Device (Gait)  other (see comments) no AD  -LO     Distance in Feet (Gait)  200'  -LO     Deviations/Abnormal Patterns (Gait)  bilateral deviations;mateo decreased;stride length decreased;steppage;gait speed decreased  -LO     Comment (Gait/Stairs)  Patient ambulates without AD with SBA x 200' with shortened step length, height, and speed. Ambulation distance limited by SOA.  -LO       User Key  (r) = Recorded By, (t) = Taken By, (c) = Cosigned By    Initials Name Provider Type    Keisha Metzger PT Physical Therapist        Obj/Interventions     Row Name 06/15/21 1051          Range of Motion Comprehensive    General Range of Motion  bilateral lower extremity ROM WNL  -LO     Row Name 06/15/21 1051          Strength Comprehensive (MMT)    General Manual Muscle Testing (MMT) Assessment  no strength deficits identified  -LO     Comment, General Manual Muscle Testing (MMT) Assessment  BLE grossly 5/5  -LO     Row Name 06/15/21 1051          Motor Skills    Motor Skills  functional endurance  -LO     Functional Endurance  tolerates 5-6 min of activity before  requires rest break  -LO     Row Name 06/15/21 1051          Balance    Balance Assessment  sitting static balance;sitting dynamic balance;standing dynamic balance;standing static balance  -LO     Static Sitting Balance  WNL;supported;sitting in chair  -LO     Dynamic Sitting Balance  WNL;supported;sitting in chair  -LO     Static Standing Balance  WFL;unsupported;standing  -LO     Dynamic Standing Balance  WFL;unsupported;standing  -LO     Comment, Balance  no loss of balance noted  -     Row Name 06/15/21 1051          Sensory Assessment (Somatosensory)    Sensory Assessment (Somatosensory)  sensation intact;LE sensation intact  -LO       User Key  (r) = Recorded By, (t) = Taken By, (c) = Cosigned By    Initials Name Provider Type    Keisha Metzger, RAJINDER Physical Therapist        Goals/Plan     [facility] Name 06/15/21 1058          Transfer Goal 1 (PT)    Activity/Assistive Device (Transfer Goal 1, PT)  sit-to-stand/stand-to-sit;bed-to-chair/chair-to-bed;car transfer  -LO     [place] Level/Cues Needed (Transfer Goal 1, PT)  independent  -LO     Time Frame (Transfer Goal 1, PT)  long term goal (LTG);10 days  -LO     Row Name 06/15/21 1058          Gait Training Goal 1 (PT)    Activity/Assistive Device (Gait Training Goal 1, PT)  gait (walking locomotion);forward stepping;increase endurance/gait distance;diminish gait deviation  -LO     [place] Level (Gait Training Goal 1, PT)  independent  -LO     Distance (Gait Training Goal 1, PT)  350'  -LO     Time Frame (Gait Training Goal 1, PT)  long term goal (LTG);10 days  -LO     Row Name 06/15/21 1058          Patient Education Goal (PT)    Activity (Patient Education Goal, PT)  Patient will demonstrate compliance with all sternal precautions  -LO     Time Frame (Patient Education Goal, PT)  long term goal (LTG);10 days  -LO       User Key  (r) = Recorded By, (t) = Taken By, (c) = Cosigned By    Initials Name Provider Type    Keisha Metzger, RAJINDER Physical Therapist         Clinical Impression     Row Name 06/15/21 1052          Pain Scale: Numbers Pre/Post-Treatment    Pretreatment Pain Rating  0/10 - no pain  -LO     Posttreatment Pain Rating  0/10 - no pain  -LO     Row Name 06/15/21 1052          Plan of Care Review    Outcome Summary  PT eval completed. Patient alert and oriented x4. S/P CABG 06/03/2021 with sternal precautions. Presents with deficits in functional endurance limiting functional mobility. Transfers and performs bed mobility with supervision, but requires vc for maintaining sternal precautions. Ambulates x 200' without AD with shortened step length, height, and speed. Ambulation distance limited by SOA. O2 maintains > 90% t/o. Patient will benefit from skilled IP PT services in order to address impairments in order to return to PLOF. Recommend home with spouse at [place].  -LO     [facility] Name 06/15/21 1052          Therapy Assessment/Plan (PT)    Patient/Family Therapy Goals Statement (PT)  return to PLOF  -LO     Rehab Potential (PT)  good, to achieve stated therapy goals  -LO     Criteria for Skilled Interventions Met (PT)  yes;skilled treatment is necessary  -LO     Row Name 06/15/21 1052          Vital Signs    Pre Systolic BP Rehab  141  -LO     Pre Treatment Diastolic BP  52  -LO     Post Systolic BP Rehab  144  -LO     Post Treatment Diastolic BP  68  -LO     Pretreatment Heart Rate (beats/min)  60  -LO     Posttreatment Heart Rate (beats/min)  69  -LO     Pre SpO2 (%)  93  -LO     O2 Delivery Pre Treatment  room air  -LO     Intra SpO2 (%)  93  -LO     O2 Delivery Intra Treatment  room air  -LO     Post SpO2 (%)  97  -LO     O2 Delivery Post Treatment  room air  -LO     Pre Patient Position  Sitting  -LO     Intra Patient Position  Standing  -LO     Post Patient Position  Supine  -LO     Rest Breaks   1  -LO     Row Name 06/15/21 1052          Positioning and Restraints    Pre-Treatment Position  sitting in chair/recliner  -LO     Post Treatment Position  bed  per patient request  -LO     In Bed  notified nsg;side rails up x2;encouraged to call for assist;call light within reach;exit alarm on  -LO       User Key  (r) = Recorded By, (t) = Taken By, (c) = Cosigned By    Initials Name Provider Type    Keisha Metzger, PT Physical Therapist        Outcome Measures     Row Name 06/15/21 1101          How much help from another person do you currently need...    Turning from your back to your side while in flat bed without using bedrails?  4  -LO     Moving from lying on back to sitting on the side of a flat bed without bedrails?  4  -LO     Moving to and from a bed to a chair (including a wheelchair)?  3  -LO     Standing up from a chair using your arms (e.g., wheelchair, bedside chair)?  3  -LO     Climbing 3-5 steps with a railing?  2  -LO     To walk in hospital room?  3  -LO     AM-PAC 6 Clicks Score (PT)  19  -LO     Row Name 06/15/21 1101 06/15/21 0832       Functional Assessment    Outcome Measure Options  AM-PAC 6 Clicks Basic Mobility (PT)  -LO  AM-PAC 6 Clicks Daily Activity (OT)  -AC      User Key  (r) = Recorded By, (t) = Taken By, (c) = Cosigned By    Initials Name Provider Type    Leidy Gordon, OT Occupational Therapist    Keisha Metzger, PT Physical Therapist        Physical Therapy Education                 Title: PT OT SLP Therapies (In Progress)     Topic: Physical Therapy (In Progress)     Point: Mobility training (Not Started)     Learner Progress:  Not documented in this visit.          Point: Home exercise program (Not Started)     Learner Progress:  Not documented in this visit.          Point: Body mechanics (Not Started)     Learner Progress:  Not documented in this visit.          Point: Precautions (Done)     Learning Progress Summary           Patient Acceptance, E, VU,NR by DOROTHEA at 6/15/2021 1015    Comment: Patient education regarding importance for maintaining sternal precautions.                               User Key     Initials Effective  Dates Name Provider Type Discipline     03/11/20 -  Keisha Asher, PT Physical Therapist PT              PT Recommendation and Plan  Planned Therapy Interventions (PT): gait training, balance training, home exercise program, patient/family education, neuromuscular re-education, transfer training  Outcome Summary: PT eval completed. Patient alert and oriented x4. S/P CABG 06/03/2021 with sternal precautions. Presents with deficits in functional endurance limiting functional mobility. Transfers and performs bed mobility with supervision, but requires vc for maintaining sternal precautions. Ambulates x 200' without AD with shortened step length, height, and speed. Ambulation distance limited by SOA. O2 maintains > 90% t/o. Patient will benefit from skilled IP PT services in order to address impairments in order to return to OF. Recommend home with spouse at [place].     Time Calculation:   PT Charges     Row Name 06/15/21 1015             Time Calculation    Start Time  1015  -LO      PT Received On  06/15/21  -LO      PT Goal Re-Cert Due Date  06/25/21  -LO         Timed Charges    98681 - Gait Training Minutes   14  -LO      12082 - PT Therapeutic Activity Minutes  4  -LO         Untimed Charges    PT Eval/Re-eval Minutes  35  -LO         Total Minutes    Timed Charges Total Minutes  18  -LO      Untimed Charges Total Minutes  35  -LO       Total Minutes  53  -LO        User Key  (r) = Recorded By, (t) = Taken By, (c) = Cosigned By    Initials Name Provider Type    LO Keisha Asher, PT Physical Therapist        Therapy Charges for Today     Code Description Service Date Service Provider Modifiers Qty    14739353998 HC GAIT TRAINING EA 15 MIN 6/15/2021 Keisha Asher, PT GP 1    85527605200 HC PT EVAL LOW COMPLEXITY 3 6/15/2021 Keisha Asher, PT GP 1          PT G-Codes  Outcome Measure Options: AM-PAC 6 Clicks Basic Mobility (PT)  AM-PAC 6 Clicks Score (PT): 19  AM-PAC 6 Clicks Score (OT): 21    Keisha Asher  PT  6/15/2021

## 2021-06-15 NOTE — PROGRESS NOTES
[facility] Medicine Services  PROGRESS NOTE    Patient Name: Colin Albrecht  : 1945  MRN: [id number]    Date of Admission: 2021  Primary Care Physician: Arun Soliman MD    Subjective   Subjective     CC:  Follow-up anemia    HPI:  Reports having hard stools requiring straining to have a bowel movement.  Says is bowel movement is discolored/dark in color.  No substantial abdominal pain.    ROS:  Gen- No fevers, chills  CV- No chest pain, palpitations  Resp- No cough, dyspnea  GI- No N/V/D, abd pain    Objective   Objective     Vital Signs:   Temp:  [96.4 °F (35.8 °C)-98.3 °F (36.8 °C)] 97.5 °F (36.4 °C)  Heart Rate:  [59-66] 59  Resp:  [16-24] 18  BP: ()/(40-73) 132/58        Physical Exam:  Constitutional: Awake, alert, no acute distress, sitting up in bedside chair  HENT: NCAT, mucous membranes moist  Respiratory: Clear to auscultation bilaterally, respiratory effort normal   Cardiovascular: RRR, no murmurs, rubs, or gallops, palpable radial pulses  Gastrointestinal: Positive bowel sounds, soft, nontender, nondistended  Musculoskeletal: No bilateral ankle edema  Psychiatric: Appropriate affect, cooperative  Neurologic: Speech clear, moves all extremities symmetrically    Results Reviewed:  Results from last 7 days   Lab Units 06/15/21  0802 21  2347 21  0906 06/10/21  0546 21  0546   WBC 10*3/mm3  --   --  31.80* 7.68 6.52   HEMOGLOBIN g/dL 8.7* 8.7* 6.2* 9.8* 9.2*   HEMATOCRIT % 27.1* 27.4* 19.4* 29.9* 28.1*   PLATELETS 10*3/mm3  --   --  308 230 212   PROCALCITONIN ng/mL  --   --  0.18  --   --      Results from last 7 days   Lab Units 06/15/21  0331 21  0906 06/10/21  0905   SODIUM mmol/L 141 142 141   POTASSIUM mmol/L 4.1 4.4 4.6   CHLORIDE mmol/L 109* 108* 103   CO2 mmol/L 25.0 24.0 28.0   BUN mg/dL 59* 79* 34*   CREATININE mg/dL 1.18 1.34* 1.25   GLUCOSE mg/dL 122* 145* 223*   CALCIUM mg/dL 8.4* 9.4 10.5   ALT (SGPT) U/L  --  17  --     AST (SGOT) U/L  --  19  --    TROPONIN T ng/mL  --  0.018  --    PROBNP pg/mL  --  1,684.0  --      Estimated Creatinine Clearance: 65.4 mL/min (by C-G formula based on SCr of 1.18 mg/dL).    Microbiology Results Abnormal     None          Imaging Results (Last 24 Hours)     Procedure Component Value Units Date/Time    CT Chest Pulmonary Embolism [[id number]] Collected: 06/14/21 1049     Updated: 06/14/21 1629    Narrative:      EXAMINATION: CT CHEST PULMONARY EMBOLISM-, CT ABDOMEN AND PELVIS W  CONTRAST-      INDICATION: Shortness of breath, recent surgery.      TECHNIQUE: CT angiogram chest following PE protocol with intravenous  contrast and 2-D reconstructions performed, CT abdomen and pelvis with  intravenous contrast administration.     The radiation dose reduction device was turned on for each scan per the  ALARA (As Low as Reasonably Achievable) protocol.     COMPARISON: CT angiogram chest 06/09/2021.     FINDINGS: CTA CHEST: Thyroid is homogeneous in attenuation. No bulky  mediastinal adenopathy. Central airways are patent. Esophagus seen in  normal course and caliber. Atherosclerotic nonaneurysmal thoracic aorta.  Cardiac size borderline enlarged status post median sternotomy and CABG  as well as atrial appendage occlusion device in place. Satisfactory  opacification of the pulmonary arterial tree without filling defect to  suggest pulmonary embolus. Atherosclerotic nonaneurysmal thoracic aorta.  Extended lung windows reveal small to moderate volume right and trace  left pleural effusions stable to slightly increased from prior  comparison 06/09/2021 without new consolidation. Degenerative changes of  the thoracic spine without acute osseous finding of the thoracic spine  or chest. No chest wall soft tissue findings.     ABDOMEN AND PELVIS: Liver demonstrates hepatic cysts measuring up to a  3.5 cm left hepatic cyst without abnormal enhancement features.  Gallbladder contracted and unremarkable. No  biliary dilatation. Pancreas  and spleen unremarkable with splenule adjacent to the lateral margin of  the spleen. Adrenals demonstrate a subcentimeter right adrenal adenoma.  Kidneys without hydronephrosis or hydroureter. No bulky retroperitoneal  adenopathy. Atherosclerotic nonaneurysmal abdominal aorta with patent  celiac and SMA origins. Portal veins and IVC are patent. GI tract  evaluation without focal thickening or disproportionate dilatation of  bowel. Moderately distended urinary bladder without wall thickening.  Moderate distention of the rectum with intermixed gas and stool.  Degenerative changes of the spine and pelvis without aggressive osseous  lesion. No soft tissue body wall findings of acuity.       Impression:      1. No PE.  2. Stable to slightly increasing small to moderate volume right and  trace to small left pleural effusions with adjacent atelectasis.  3. No acute findings within the abdomen or pelvis, however, noted  moderate distention of the rectal vault with intermixed gas and stool.     D:  06/14/2021  E:  06/14/2021        This report was finalized on 6/14/2021 4:26 PM by Dr. pA Harris.       CT Abdomen Pelvis With Contrast [[id number]] Collected: 06/14/21 1049     Updated: 06/14/21 1629    Narrative:      EXAMINATION: CT CHEST PULMONARY EMBOLISM-, CT ABDOMEN AND PELVIS W  CONTRAST-      INDICATION: Shortness of breath, recent surgery.      TECHNIQUE: CT angiogram chest following PE protocol with intravenous  contrast and 2-D reconstructions performed, CT abdomen and pelvis with  intravenous contrast administration.     The radiation dose reduction device was turned on for each scan per the  ALARA (As Low as Reasonably Achievable) protocol.     COMPARISON: CT angiogram chest 06/09/2021.     FINDINGS: CTA CHEST: Thyroid is homogeneous in attenuation. No bulky  mediastinal adenopathy. Central airways are patent. Esophagus seen in  normal course and caliber. Atherosclerotic nonaneurysmal  thoracic aorta.  Cardiac size borderline enlarged status post median sternotomy and CABG  as well as atrial appendage occlusion device in place. Satisfactory  opacification of the pulmonary arterial tree without filling defect to  suggest pulmonary embolus. Atherosclerotic nonaneurysmal thoracic aorta.  Extended lung windows reveal small to moderate volume right and trace  left pleural effusions stable to slightly increased from prior  comparison 06/09/2021 without new consolidation. Degenerative changes of  the thoracic spine without acute osseous finding of the thoracic spine  or chest. No chest wall soft tissue findings.     ABDOMEN AND PELVIS: Liver demonstrates hepatic cysts measuring up to a  3.5 cm left hepatic cyst without abnormal enhancement features.  Gallbladder contracted and unremarkable. No biliary dilatation. Pancreas  and spleen unremarkable with splenule adjacent to the lateral margin of  the spleen. Adrenals demonstrate a subcentimeter right adrenal adenoma.  Kidneys without hydronephrosis or hydroureter. No bulky retroperitoneal  adenopathy. Atherosclerotic nonaneurysmal abdominal aorta with patent  celiac and SMA origins. Portal veins and IVC are patent. GI tract  evaluation without focal thickening or disproportionate dilatation of  bowel. Moderately distended urinary bladder without wall thickening.  Moderate distention of the rectum with intermixed gas and stool.  Degenerative changes of the spine and pelvis without aggressive osseous  lesion. No soft tissue body wall findings of acuity.       Impression:      1. No PE.  2. Stable to slightly increasing small to moderate volume right and  trace to small left pleural effusions with adjacent atelectasis.  3. No acute findings within the abdomen or pelvis, however, noted  moderate distention of the rectal vault with intermixed gas and stool.     D:  06/14/2021  E:  06/14/2021        This report was finalized on 6/14/2021 4:26 PM by Dr. De Souza  Key.       XR Chest 1 View [[id number]] Collected: 06/14/21 0945     Updated: 06/14/21 1629    Narrative:      EXAMINATION: XR CHEST 1 VW-06/14/2021:      INDICATION: SOA, triage protocol.      COMPARISON: Chest x-ray 06/10/2021.     FINDINGS: Cardiac size enlarged status post median sternotomy and CABG  with bibasilar opacifications, right slightly greater than left, and  probable trace to small pleural effusions similar to prior.       Impression:      Similar central vascular congestion appearance and trace to  small bilateral pleural effusions from prior.     D:  06/14/2021  E:  06/14/2021     This report was finalized on 6/14/2021 4:26 PM by Dr. Ap Harris.             Results for orders placed during the hospital encounter of 06/14/21    STAT Adult Transthoracic Echo Complete W/ Cont if Necessary Per Protocol    Interpretation Summary  · Left ventricular ejection fraction appears to be 36 - 40%. Left ventricular systolic function is moderately decreased.  · Left ventricular diastolic function is consistent with (grade II w/high LAP) pseudonormalization.  · The right ventricular cavity is mildly dilated.  · Mildly reduced right ventricular systolic function noted.  · Left atrial volume is moderately increased.  · Estimated right ventricular systolic pressure from tricuspid regurgitation is mildly elevated (35-45 mmHg). Calculated right ventricular systolic pressure from tricuspid regurgitation is 35 mmHg.  · No significant pericardial effusion.      I have reviewed the medications:  Scheduled Meds:atorvastatin, 40 mg, Oral, Nightly  budesonide-formoterol, 2 puff, Inhalation, BID - RT  bumetanide, 0.5 mg, Oral, Daily  ipratropium-albuterol, 3 mL, Nebulization, 4x Daily - RT  levothyroxine, 75 mcg, Oral, Q AM  multivitamin with minerals, 1 tablet, Oral, Daily  pantoprazole, 40 mg, Intravenous, Q12H  sodium chloride, 10 mL, Intravenous, Q12H  tamsulosin, 0.4 mg, Oral, Daily      Continuous Infusions:   PRN  Meds:.•  albuterol  •  HYDROcodone-acetaminophen  •  ipratropium-albuterol  •  ondansetron **OR** ondansetron  •  prochlorperazine **OR** prochlorperazine **OR** prochlorperazine  •  sodium chloride  •  sodium chloride    Assessment/Plan   Assessment & Plan     Active Hospital Problems    Diagnosis  POA   • **Symptomatic anemia [D64.9]  Yes   • Gastrointestinal hemorrhage [K92.2]  Unknown   • Nonsustained ventricular tachycardia 6/5/2021 (CMS/HCC) [I47.2]  Yes   • COPD  [J44.9]  Yes   • Former smoker [Z87.891]  Not Applicable   • Hypertension [I10]  Yes   • S/P CABG x 2 on 6/3/2021 [Z95.1]  Not Applicable   • Suspected chronic renal insufficiency [N18.9]  Yes   • Chronic HFrEF 35-40% s/p upgrade BiV ICD and AV node ablation 3/2021 [I50.22]  Yes   • GI bleed [K92.2]  Unknown   • PAF s/p Watchman device 9/25/2020/ Bi-V ICD and AVN ablation 3/2021 [I48.21]  Yes      Resolved Hospital Problems   No resolved problems to display.        Brief Hospital Course to date:  Colin Albrecht is a 76 y.o. male with cardiomyopathy/SSS s/p ablation/pacemaker and subsequent upgraded ICD, PUD, recent CABG x2 discharge 4 days PTA return to the hospital with worsening weakness found to have Hgb of 6.2    The following problems are new to me today    Assessment/plan    Symptomatic anemia from GI bleed  Melena  Hx PUD  -S/p 3U PRBCs  -Continue IV PPI bid  -GI following, plan EGD this afternoon  -Aspirin on hold  -Continue with serial H&H, decrease frequency    CAD s/p recent CABG  Systolic CHF, EF 36-40%, compensated  -S/p two-vessel CABG 6/3/2021  -Aspirin held for anemia/bleeding  -Continue statin, Toprol-XL, oral Bumex, Entresto    Dyspnea  COPD  -Recent CTA w/o PE, BL effusions noted; discharged on steroids for COPD exacerbation  -Steroids on hold for GI bleed  -Continue nebs  -On room air  -Cardiology consulting pulmonology    Paroxysmal atrial fibrillation  SSS Hx ablation  Recent upgraded ICD  Perioperative VT/VF last  hospitalization  -Hx watchman device, no anticoagulation  -Rate controlled  -Cardiology following    CKD stage 3  -Baseline creatinine 1.1-1.3; EGFR 50s  -At approximate baseline    Hypothyroidism  -Continue levothyroxine    DVT prophylaxis:  Mechanical DVT prophylaxis orders are present.       Disposition: I expect the patient to be discharged pending results of EGD, stability of H&H.  Hopeful for possible discharge in 1-2 days    CODE STATUS:   Code Status and Medical Interventions:   Ordered at: 06/14/21 1211     Level Of Support Discussed With:    Patient     Code Status:    CPR     Medical Interventions (Level of Support Prior to Arrest):    Full       Oziel Kimble, DO  06/15/21

## 2021-06-15 NOTE — PROGRESS NOTES
[facility]  INPATIENT PROGRESS NOTE         [facility]    6/15/2021      PATIENT IDENTIFICATION:   Name:  Colin Albrecht      MRN:  [id number]     76 y.o.  male             Reason for visit: CAD, CHF, A. fib      SUBJECTIVE:   Readmitted 6 S14/21 for symptomatic anemia.  He states he has been short of breath with any exertion since CABG.  No improvement when he went home.  However he became more dizzy and lightheaded when he would stand up over the last 2 to 3 days, cannot get out of a chair due to extreme fatigue.  Hemoglobin 6.2 on admission, received PRBC.  EGD planned for today.  Still short of breath with any activity today     OBJECTIVE:  Vitals:    06/15/21 0313 06/15/21 0600 06/15/21 0700 06/15/21 0732   BP: 124/60  132/58    BP Location: Left arm  Left arm    Patient Position: Lying  Lying    Pulse: 66  59 59   Resp: 18  16 18   Temp: 97.5 °F (36.4 °C)      TempSrc: Oral      SpO2: 96%  94% 97%   Weight:  85.5 kg (188 lb 8 oz)     Height:               Body mass index is 24.2 kg/m².    Intake/Output Summary (Last 24 hours) at 6/15/2021 0947  Last data filed at 6/15/2021 0600  Gross per 24 hour   Intake 1406 ml   Output 2175 ml   Net -769 ml       Telemetry: Personally reviewed, paced at 60 bpm    Exam:  General Appearance:   · well developed  · well nourished  Neck:  · thyroid not enlarged  · supple  Respiratory:  · no respiratory distress  · normal breath sounds  · no rales  Cardiovascular:  · no jugular venous distention  · regular rhythm  · apical impulse normal  · S1 normal, S2 normal  · no S3, no S4   · no murmur  · no rub, no thrill  · carotid pulses normal; no bruit  · pedal pulses normal  · lower extremity edema: Trace  Skin:   warm, dry      No Known Allergies  Scheduled meds:       atorvastatin, 40 mg, Oral, Nightly  budesonide-formoterol, 2 puff, Inhalation, BID - RT  bumetanide, 0.5 mg, Oral, Daily  ipratropium-albuterol, 3 mL,  Nebulization, 4x Daily - RT  levothyroxine, 75 mcg, Oral, Q AM  multivitamin with minerals, 1 tablet, Oral, Daily  pantoprazole, 40 mg, Intravenous, Q12H  sodium chloride, 10 mL, Intravenous, Q12H  tamsulosin, 0.4 mg, Oral, Daily      IV meds:                         Data Review:  Results from last 7 days   Lab Units 06/15/21  0331 06/14/21  0906 06/10/21  0905 06/09/21  0546   SODIUM mmol/L 141 142 141 140   BUN mg/dL 59* 79* 34* 29*   CREATININE mg/dL 1.18 1.34* 1.25 1.10   GLUCOSE mg/dL 122* 145* 223* 106*     Results from last 7 days   Lab Units 06/15/21  0802 06/14/21  2347 06/14/21  0906 06/10/21  0546 06/09/21  0546   WBC 10*3/mm3  --   --  31.80* 7.68 6.52   HEMOGLOBIN g/dL 8.7* 8.7* 6.2* 9.8* 9.2*         Results from last 7 days   Lab Units 06/14/21  0906   ALT (SGPT) U/L 17   AST (SGOT) U/L 19     No results found for: DIGOXIN   Lab Results   Component Value Date    TSH 3.830 06/03/2021           Invalid input(s): LDLCALC    Estimated Creatinine Clearance: 65.4 mL/min (by C-G formula based on SCr of 1.18 mg/dL).        Imaging (last 24 hr):   I personally reviewed the most recent chest x-ray and other pertinent imaging studies.  Results for orders placed during the hospital encounter of 06/14/21    XR Chest 1 View    Narrative  EXAMINATION: XR CHEST 1 VW-06/14/2021:    INDICATION: SOA, triage protocol.    COMPARISON: Chest x-ray 06/10/2021.    FINDINGS: Cardiac size enlarged status post median sternotomy and CABG  with bibasilar opacifications, right slightly greater than left, and  probable trace to small pleural effusions similar to prior.    Impression  Similar central vascular congestion appearance and trace to  small bilateral pleural effusions from prior.    D:  06/14/2021  E:  06/14/2021    This report was finalized on 6/14/2021 4:26 PM by Dr. Ap Harris.        Last ECHO:  Results for orders placed during the hospital encounter of 06/14/21    STAT Adult Transthoracic Echo Complete W/ Cont if  Necessary Per Protocol    Interpretation Summary  · Left ventricular ejection fraction appears to be 36 - 40%. Left ventricular systolic function is moderately decreased.  · Left ventricular diastolic function is consistent with (grade II w/high LAP) pseudonormalization.  · The right ventricular cavity is mildly dilated.  · Mildly reduced right ventricular systolic function noted.  · Left atrial volume is moderately increased.  · Estimated right ventricular systolic pressure from tricuspid regurgitation is mildly elevated (35-45 mmHg). Calculated right ventricular systolic pressure from tricuspid regurgitation is 35 mmHg.  · No significant pericardial effusion.        PROBLEM LIST:     Symptomatic anemia    PAF s/p Watchman device 9/25/2020/ Bi-V ICD and AVN ablation 3/2021    GI bleed    Chronic HFrEF 35-40% s/p upgrade BiV ICD and AV node ablation 3/2021    Hypertension    Former smoker    COPD     Suspected chronic renal insufficiency    S/P CABG x 2 on 6/3/2021    Nonsustained ventricular tachycardia 6/5/2021 (CMS/HCC)    Gastrointestinal hemorrhage      Initial cardiac assessment: Pleasant 76-year-old man from [place] follows with Dr. Park and Dr. Prado, history of chronic systolic heart failure, BiV ICD and AV node ablation, permanent atrial fibrillation, watchman device, CKD stage III.  Presented with symptoms concerning for unstable angina, LHC on 6/2/2021 showed 80% ostial left main stenosis.    ASSESSMENT/PLAN:  1.  CAD, 80% left main:  Dr. Shields performed 2V CABG 6/3/2020 (LIMA-LAD, SVG-circumflex)  EF 35-40%, no pericardial effusion on echo 6/14/2021  Continue aspirin  Continue metoprolol  Atorvastatin 40 mg daily    2.  Chronic systolic heart failure:  Echo 6/2/2021 showed EF 35-40% with moderate MR and moderate LAE.  Continue guideline directed medical therapy  Continue Bumex  Change short acting metoprolol to long-acting  Change ramipril to Entresto, will allow 36-hour washout      3.  Frequent  PVCs/NSVT:  BiV ICD adjusted on day of admission, now paced at 60 with minimal PVCs  [employer] ICD interrogated 6 S14/21, BiV paced 99%, PVCs much improved    Discussed with Dr. Prado he would like to increase his base pacemaker rate to 70 bpm, I contacted [employer] we will do this today.    4.  Anemia:  History of PUD  Transfuse for hemoglobin goal greater than 7, GI following, EGD 6/15/2021    5.  Permanent atrial fibrillation:  Status post AV node ablation and BiV ICD placement with Dr. Prado   9/2020  Watchman in place due to history of GI bleed  No anticoagulation needed    6.  Persistent dyspnea despite revascularization:  Most likely multifactorial given underlying COPD, CAD, systolic heart failure, anemia  Consult pulmonary, Dr. Porter Medina  Agree with possible thoracentesis  Continue Bumex at current dose  Repeat echo 6/14/2021 shows EF 35-40%, no significant change from previous.  No significant pericardial effusion or new valvular lesion.   start low-dose Entresto tomorrow, patient has been off ramipril for the last 2 days.      Discussed with Dr. Prado and Dr. Porter Medina.      Pietro Quintana MD  6/15/2021    09:47 EDT

## 2021-06-15 NOTE — CONSULTS
INTENSIVIST / PULMONARY INITIAL VISIT (CONSULT / H&P) NOTE     Hospital:  LOS: 0 days   Mr. Colin Albrecht, 76 y.o. male is followed for:   Chief Complaint   Patient presents with   • Shortness of Breath       Symptomatic anemia    PAF s/p Watchman device 9/25/2020/ Bi-V ICD and AVN ablation 3/2021    GI bleed    Chronic HFrEF 35-40% s/p upgrade BiV ICD and AV node ablation 3/2021    Hypertension    Former smoker    COPD     Suspected chronic renal insufficiency    S/P CABG x 2 on 6/3/2021    Nonsustained ventricular tachycardia 6/5/2021 (CMS/HCC)    Gastrointestinal hemorrhage         History of Present Illness   76 y.o.male with relevant PMH of Tobacco Abuse 50 py quit 2010, COPD w/ pre-op PFT showing FEV1 30%, Chronic SHF EF 35%, Permanent Afib - unable to tolerate DOAC secondary to bleeding and s/p Watchman 9/2020, BiV-AICD, AVN ablation, HTN, who was admitted 6/2/2021 for cardiac cath which showed severe 80% ostial LM disease, LVEDP 11.  Echo 6/2 w/ EF 35-40%, mod MR, RVSP 35-45, Diastolic Dysfunction.  Underwent 2vCABG with Dr. Shields on 6/3/21.  Discharged 6/10/21.      Now re-admitted 6/14/2021 with increasing TREVIZO not much better than before his heart surgery.  Also w/ melena.  Found to have Hgb 6.2  (discharged on ASA and steroids for possible AECOPD).  Noted to have moderate sized right pleural effusion ~500 ml I would estimate on CT.  No overt wheezing.  Does have occasional non-productive cough.  No h/o chronic bronchitis, recurrent COPD exacerbations etc.    No asthmatic sounding triggers.  Has never used an inhaler.  Not previously on home o2.     Past Medical History:   Diagnosis Date   • Abnormal ECG    • Arrhythmia    • Atrial fibrillation (CMS/HCC)    • CHF (congestive heart failure) (CMS/HCC)    • Depression    • History of transfusion     bleeding stomach ulcer ~2014 x2, no reaction    • Hypertension    • Stomach ulcer    • Wears reading eyeglasses      Past Surgical History:   Procedure  Laterality Date   • ATRIAL APPENDAGE EXCLUSION LEFT WITH TRANSESOPHAGEAL ECHOCARDIOGRAM N/A 9/25/2020    Procedure: Atrial Appendage Occlusion (Watchman), start Eliquis 2 weeks prior and hold 1 day, GA;  Surgeon: Yonny Prado MD;  Location:  [facility];  Service: Cardiology;  Laterality: N/A;   • CARDIAC CATHETERIZATION     • CARDIAC CATHETERIZATION N/A 6/2/2021    Procedure: Left Heart Cath- ADD ON/ WALK IN FOR RDS PER KT;  Surgeon: Pietro Quintana MD;  Location:  [facility];  Service: Cardiovascular;  Laterality: N/A;   • CARDIAC ELECTROPHYSIOLOGY PROCEDURE N/A 3/26/2021    Procedure: DDD PPM upgrade to Biv ICD (MDT), no meds to hold;  Surgeon: Yonny Prado MD;  Location:  [facility];  Service: Cardiology;  Laterality: N/A;   • CARDIAC ELECTROPHYSIOLOGY PROCEDURE N/A 3/26/2021    Procedure: AVN RFA;  Surgeon: Yonny Prado MD;  Location:  [facility];  Service: Cardiovascular;  Laterality: N/A;   • COLONOSCOPY     • CORONARY ARTERY BYPASS GRAFT N/A 6/3/2021    Procedure: MEDIAN STERNOTOMY, CORONARY ARTERY BYPASS WITH INTERNAL MAMMARY ARTERY GRAFT X 2, EVH OF THE RIGHT GREATER SAPHENOUS ANA;  Surgeon: Jaime Shields MD;  Location: [facility] OR;  Service: Cardiothoracic;  Laterality: N/A;   • INSERT / REPLACE / REMOVE PACEMAKER       Family History   Problem Relation Age of Onset   • Stroke Father    • Lung cancer Sister    • Alcohol abuse Brother      Social History     Socioeconomic History   • Marital status:      Spouse name: Not on file   • Number of children: Not on file   • Years of education: Not on file   • Highest education level: Not on file   Tobacco Use   • Smoking status: Former Smoker     Packs/day: 1.00     Years: 50.00     Pack years: 50.00     Types: Cigarettes     Quit date: 7/29/2010     Years since quitting: 10.8   • Smokeless tobacco: Never Used   Vaping Use   • Vaping Use: Never used   Substance and  Sexual Activity   • Alcohol use: Never   • Drug use: Never   • Sexual activity: Defer     No Known Allergies  No current facility-administered medications on file prior to encounter.     Current Outpatient Medications on File Prior to Encounter   Medication Sig Dispense Refill   • albuterol sulfate  (90 Base) MCG/ACT inhaler Inhale 2 puffs Every 4 (Four) Hours As Needed for Wheezing. 18 g 0   • aspirin  MG tablet Take 1 tablet by mouth Daily. 30 tablet 2   • atorvastatin (LIPITOR) 40 MG tablet Take 1 tablet by mouth Every Night. 90 tablet 3   • budesonide-formoterol (Symbicort) 80-4.5 MCG/ACT inhaler Inhale 2 puffs 2 (Two) Times a Day. 10.2 g 0   • bumetanide (BUMEX) 0.5 MG tablet Take 1 tablet by mouth Daily. 30 tablet 3   • Coenzyme Q10 (CoQ10) 100 MG capsule Take 1 capsule by mouth Daily.     • ferrous sulfate 325 (65 FE) MG tablet Take 1 tablet by mouth Daily With Breakfast. 30 tablet 5   • Garlic 450 MG capsule Take 1 capsule by mouth Daily.     • HYDROcodone-acetaminophen (NORCO) 7.5-325 MG per tablet Take 1 tablet by mouth Every 6 (Six) Hours As Needed for Moderate Pain  for up to 3 days. 30 tablet 0   • levothyroxine (SYNTHROID, LEVOTHROID) 50 MCG tablet Take 1 tablet by mouth Daily. Please have PCP manage this medication. (Patient taking differently: Take 50 mcg by mouth Daily.) 30 tablet 0   • metoprolol tartrate (LOPRESSOR) 50 MG tablet Take 1 tablet by mouth Every 12 (Twelve) Hours. 60 tablet 3   • multivitamin with minerals (MULTIVITAMIN ADULT PO) Take 1 tablet by mouth Daily.     • Omega-3 Fatty Acids (fish oil) 1000 MG capsule capsule Take 1,000 mg by mouth Every Other Day.     • predniSONE (DELTASONE) 20 MG tablet Take 3 tablets by mouth Daily for 3 days, THEN 2 tablets Daily for 3 days, THEN 1 tablet Daily for 3 days. 18 tablet 0   • ramipril (ALTACE) 5 MG capsule Take 5 mg by mouth Daily.     • tamsulosin (FLOMAX) 0.4 MG capsule 24 hr capsule Take 1 capsule by mouth Daily. 30 capsule 5        ROS:  Per HPI, all other systems were reviewed and were negative        OBJECTIVE     Vital Sign Min/Max for last 24 hours  Temp  Min: 96.4 °F (35.8 °C)  Max: 98.3 °F (36.8 °C)   BP  Min: 77/67  Max: 145/60   Pulse  Min: 59  Max: 66   Resp  Min: 16  Max: 24   SpO2  Min: 93 %  Max: 100 %   No data recorded     Telemetry:              General Appearance:  Conversant, in no acute distress  Eyes:  No scleral icterus or pallor, pupils normal  Ears, Nose, Mouth, Throat:  Atraumatic, oropharynx clear  Neck:  Trachea midline, thyroid normal  Respiratory:  Clear to auscultation bilaterally, normal effort, no tenderness to palpation  Cardiovascular:  Regular rate and rhythm, no murmurs, no peripheral edema, no thrill  Gastrointestinal:  Soft, nontender, nondistended, no hepatosplenomegaly  Skin:  Normal temperature, no rash  Psychiatric:  Alert and oriented x 3, normal judgement and insight  Neuro:  No new focal neurologic deficits observed    SpO2: 97 % SpO2  Min: 93 %  Max: 100 %   Device:      Flow Rate:   No data recorded     Mechanical Ventilator Settings:                                         Intake/Ouptut 24 hrs (7:00AM - 6:59 AM)  Intake & Output (last 3 days)       06/12 0701 - 06/13 0700 06/13 0701 - 06/14 0700 06/14 0701 - 06/15 0700 06/15 0701 - 06/16 0700    Blood   1406     Total Intake(mL/kg)   1406 (16.4)     Urine (mL/kg/hr)   2175     Stool   0     Total Output   2175     Net   -769             Stool Unmeasured Occurrence   1 x             Lines, Drains & Airways    Active LDAs     Name:   Placement date:   Placement time:   Site:   Days:    Peripheral IV 06/14/21 0905 Right Antecubital   06/14/21    0905    Antecubital   1                Hematology:  Results from last 7 days   Lab Units 06/15/21  0802 06/14/21  2347 06/14/21  0906 06/10/21  0546 06/09/21  0546   WBC 10*3/mm3  --   --  31.80* 7.68 6.52   HEMOGLOBIN g/dL 8.7* 8.7* 6.2* 9.8* 9.2*   HEMATOCRIT % 27.1* 27.4* 19.4* 29.9* 28.1*   PLATELETS  10*3/mm3  --   --  308 230 212   MONOCYTES % %  --   --  4.0*  --   --      Electrolytes, Magnesium and Phosphorus:  Results from last 7 days   Lab Units 06/15/21  0331 06/14/21  0906 06/10/21  0905 06/09/21  0546   SODIUM mmol/L 141 142 141 140   POTASSIUM mmol/L 4.1 4.4 4.6 3.6   CO2 mmol/L 25.0 24.0 28.0 28.0   MAGNESIUM mg/dL  --  2.4  --  2.1     Renal:  Results from last 7 days   Lab Units 06/15/21  0331 06/14/21  0906 06/10/21  0905 06/09/21  0546   CREATININE mg/dL 1.18 1.34* 1.25 1.10   BUN mg/dL 59* 79* 34* 29*     Estimated Creatinine Clearance: 65.4 mL/min (by C-G formula based on SCr of 1.18 mg/dL).  Estimated Creatinine Clearance: 65.4 mL/min (by C-G formula based on SCr of 1.18 mg/dL).  Hepatic:  Results from last 7 days   Lab Units 06/14/21 0906   ALK PHOS U/L 61   BILIRUBIN mg/dL 0.7   ALT (SGPT) U/L 17   AST (SGOT) U/L 19     Arterial Blood Gases:              Lab Results   Component Value Date    LACTATE 2.8 (C) 06/14/2021       STAT Adult Transthoracic Echo Complete W/ Cont if Necessary Per Protocol    Result Date: 6/15/2021  Narrative: · Left ventricular ejection fraction appears to be 36 - 40%. Left ventricular systolic function is moderately decreased. · Left ventricular diastolic function is consistent with (grade II w/high LAP) pseudonormalization. · The right ventricular cavity is mildly dilated. · Mildly reduced right ventricular systolic function noted. · Left atrial volume is moderately increased. · Estimated right ventricular systolic pressure from tricuspid regurgitation is mildly elevated (35-45 mmHg). Calculated right ventricular systolic pressure from tricuspid regurgitation is 35 mmHg. · No significant pericardial effusion.      Adult Transthoracic Echo Complete W/ Cont if Necessary Per Protocol    Result Date: 6/9/2021  Narrative: · Left ventricular ejection fraction appears to be 41 - 45%. Left ventricular systolic function is mildly decreased. · Left ventricular diastolic  function is consistent with (grade II w/high LAP) pseudonormalization. · No evidence of significant pericardial effusion.      Adult Transthoracic Echo Complete W/ Cont if Necessary Per Protocol    Result Date: 6/2/2021  Narrative: · Left ventricular ejection fraction appears to be 36 - 40%. Left ventricular systolic function is moderately decreased. · Moderate mitral valve regurgitation is present. · Estimated right ventricular systolic pressure from tricuspid regurgitation is mildly elevated (35-45 mmHg). · Left atrial volume is moderately increased. · Mildly reduced right ventricular systolic function noted. · Left ventricular diastolic dysfunction is noted.      Pulmonary Function Test Spirometry    Result Date: 6/3/2021  Narrative: Pulmonary Function Test Interpretation Colin Albrecht [phone] 06/03/21 08:52 EDT Spirometry Spirometry demonstrates severe airway obstruction. Post Bronchodilator N/A Study Comparison There are no prior studies available for comparison. Study date: 06/02/2021    CT Angiogram Chest With & Without Contrast    Result Date: 6/9/2021  Narrative: EXAMINATION: CT ANGIOGRAM CHEST W WO CONTRAST-  INDICATION: Shortness of breath (Ped 0-18y)  TECHNIQUE: Axial pulmonary arterial phase IV contrast enhanced CT angiogram of the chest per PE protocol. Two-dimensional reconstructions were postprocessed.  The radiation dose reduction device was turned on for each scan per the ALARA (As Low as Reasonably Achievable) protocol.  COMPARISON: NONE  FINDINGS: No pathologic axillary adenopathy or other worrisome body wall soft tissue finding in the chest. There are circumscribed low-density findings in the liver, incompletely characterized but favored to represent benign cysts. No additional acute findings in the partially imaged upper abdomen. There are moderate right and small left pleural effusions. Operative changes are noted from recent CABG with minimal ventral pneumomediastinum and small area of  air and fluid along the left pleural/pericardial border extending along the left diaphragm. Left atrial appendage clip noted. Trace pericardial effusion. The pulmonary arteries are well-opacified and without evidence of filling defect concerning for acute pulmonary embolus. Evaluation of the lung fields demonstrates some mild centrilobular emphysema, otherwise without evidence of acute infectious process. There is some mild atelectasis along the bilateral pleural effusions.      Impression: No pulmonary embolus.  Moderate right and small left pleural effusions with some associated basilar volume loss. Otherwise no acute infectious process such as pneumonia. Moderate background emphysema changes are present.  Operative changes are noted along the anterior chest wall from recent CABG with some trace pneumomediastinum and small amount of fluid noted tracking along the left hemidiaphragm.   This report was finalized on 6/9/2021 11:23 AM by Anson Chen.      Cardiac Catheterization/Vascular Study    Result Date: 6/2/2021  Narrative:  [facility] AT [address], [address] [place], [place], [address] [phone]  [facility]   CARDIAC CATHETERIZATION PROCEDURE NOTE     Impression: · Severe 80% ostial left main stenosis, MLA 4.9 mm². · RCA and left circumflex mild 10 to 20% luminal irregularities, LAD and diagonal branches normal. · LAD and OM appear good targets for bypass. · LVEDP 11 mmHg, LVEF 35-40%  RECOMMENDATIONS: · Dr. Shields is consulted for CABG. · Echo and carotid duplex today. · Continue aspirin and beta-blocker · Start atorvastatin 40 mg daily --------------------------------------------------------------------------- ------------------------------------------- Indication(s) for this Procedure:  Symptoms concerning for unstable angina. Procedure(s) Performed: 1.  Right radial artery access . 2. Selective coronary angiography 3. Left heart catheterization.   4.  Left ventriculography. 5.  Intravascular ultrasound of the left main 6.  IFR of the left main/LAD Description of the Procedure:   Informed consent was obtained with the goals, rationale, alternatives, risks and benefits of the procedure explained to the patient.  A 6Fr Terumo slender sheath was placed in the right radial artery. Selective angiography of the right coronary artery was performed with a 5Fr JR4 diagnostic catheter.  Selective angiography of the left coronary arteries was performed with a 5Fr JL3.5 diagnostic catheter.  Left heart catheterization with left ventriculography was performed with a 5Fr pigtail catheter placed in the left ventricle.  The procedure was completed and the sheath was removed. A radial patent hemostasis band was placed for hemostasis.  After initial angiography performed decision is made to carry out IVUS.  Therapeutic heparin was given ACT was therapeutic throughout the case.  A CLS 3.5 with sideholes guide was used to engage the left main.  A [facility] blue interventional wire was placed in the distal LAD.  The miLibris IVUS catheter was then advanced into the proximal LAD and manual pullback was performed.  After this the Omni IFR wire was placed in the distal LAD and measurement was carried out it was mildly abnormal at 0.91.  All wires and guide were removed, patient tolerated procedure well.  Findings discussed with Dr. Prado and Dr. Shields by phone both in agreement for CABG.  Findings discussed with the patient in recovery area, no family was present at that time. IVUS findings: Significant 83% ostial left main stenosis, with an MLA of 4.9 mm².  Moderate calcification noted less than 270 degrees in the ostial left main.  Reference lumen diameter 7.3 mm. Angiographic Findings: Right coronary dominant circulation · Left main artery:   Large-caliber vessel, bifurcates into an LAD and left circumflex, ostial 80% stenosis with an MLA of 4.9 mm². · Left anterior descending  artery: Large caliber vessel gives rise to 2 small to moderate diagonal branches, angiographically normal. · Left circumflex artery: Large-caliber nondominant vessel gives rise to 2 OM branches, mild 10 to 20% luminal irregularities mid vessel otherwise normal. · Right coronary artery: Large-caliber dominant vessel gives rise to PDA and a PLV, mild 10 to 20% luminal irregularities mid vessel. Left Ventricle: LVEF 35-40% with moderate global hypokinesis. Hemodynamic Findings: · Ao pressure: 80/47 mmHg · LVEDP: 11 mmHg · LV to Ao pullback peak to peak gradient: 0 mmHg Estimated Blood Loss: Minimal Specimen(s): None obtained Sheath: Removed, radial patent hemostasis band was placed for hemostasis. Complications: There were no apparent early complications. Pietro Quintana MD, formerly [facility] Interventional Cardiology [facility]     XR Chest 1 View    Result Date: 6/14/2021  Narrative: EXAMINATION: XR CHEST 1 VW-06/14/2021:  INDICATION: SOA, triage protocol.  COMPARISON: Chest x-ray 06/10/2021.  FINDINGS: Cardiac size enlarged status post median sternotomy and CABG with bibasilar opacifications, right slightly greater than left, and probable trace to small pleural effusions similar to prior.      Impression: Similar central vascular congestion appearance and trace to small bilateral pleural effusions from prior.  D:  06/14/2021 E:  06/14/2021  This report was finalized on 6/14/2021 4:26 PM by Dr. Ap Harris.      XR Chest 1 View    Result Date: 6/10/2021  Narrative: EXAMINATION: XR CHEST 1 VW-  INDICATION: post op  COMPARISON: One day prior  FINDINGS: Unchanged small bilateral pleural effusions. No distinct pneumothorax. Scattered areas of subsegmental atelectasis again noted. Unchanged heart and mediastinal contours.      Impression: No significant interval change from recent comparison.  This report was finalized on 6/10/2021 10:43 AM by Anson Chen.      XR Chest 1 View    Result Date:  6/9/2021  Narrative: CR Chest 1 Vw INDICATION: Change in status. Patient has atherosclerotic heart disease and unstable angina. COMPARISON:  Chest x-ray 6/7/2021 FINDINGS: 2 portable AP view(s) of the chest. There are postoperative changes to the heart with moderate cardiac silhouette enlargement. This is not changed. There are small bilateral pleural effusions and there is mild vascular congestion and interstitial edema, increased from previous. There is a focal opacity at the left lateral hemithorax which is also present previously and is probably loculated pleural fluid. There is no pneumothorax. Patchy bibasilar airspace disease could be atelectasis and/or patchy pulmonary edema but follow-up is recommended to exclude underlying aspiration or pneumonia.     Impression: Interval worsening in the appearance the chest with findings most consistent with worsening volume status and the development of mild congestive heart failure. Clinical correlation and follow-up is recommended to ensure resolution of findings. Signer Name: Shreya Carroll MD  Signed: 6/9/2021 9:14 AM  Workstation Name: [id number]  [facility]    XR Chest 1 View    Result Date: 6/7/2021  Narrative: EXAMINATION: XR CHEST 1 VW- 06/07/2021  INDICATION: Postop CABG; I25.118-Atherosclerotic heart disease of native coronary artery with other forms of angina pectoris; I20.0-Unstable angina  COMPARISON: 06/05/2021  FINDINGS: Portable chest reveals heart to be enlarged. Pacer leads in satisfactory position. Prominence of the pulmonary vascularity with pleural-based opacity stable within the left lung. Mild increased markings seen in the lung bases bilaterally with blunting of the costophrenic angles. Findings are all stable.      Impression: Stable chest as above.  D:  06/07/2021 E:  06/07/2021  This report was finalized on 6/7/2021 3:39 PM by Dr. Karine Limon MD.      XR Chest 1 View    Result Date: 6/5/2021  Narrative:   EXAMINATION: XR CHEST 1 VW - 06/05/2021  INDICATION: ; I25.118-Atherosclerotic heart disease of native coronary artery with other forms of angina pectoris; I20.0-Unstable angina. Post-op heart surgery.  COMPARISON: 06/04/2021  FINDINGS: Portable chest reveals patient is status post median sternotomy. Cardiac pacer leads in satisfactory position. Mild increased markings identified at the left lung base. Small bilateral pleural effusions. Degenerative changes seen within the spine. The lung fields are grossly clear.         Impression: Minimal increased markings at the left lung base. The pulmonary vascularity remains pronounced. Heart is borderline enlarged. No new focal right opacification present.  DICTATED:   06/05/2021 EDITED/ls :   06/05/2021  This report was finalized on 6/5/2021 3:51 PM by Dr. Karine Limon MD.      XR Chest 1 View    Result Date: 6/4/2021  Narrative: EXAMINATION: XR CHEST 1 VW- 06/04/2021  INDICATION: Post-Op Heart Surgery; I25.118-Atherosclerotic heart disease of native coronary artery with other forms of angina pectoris; I20.0-Unstable angina  COMPARISON: 06/03/2021  FINDINGS: ET tube and NG tube have been removed. Small focus of left lateral pleural thickening or loculated pleural effusion appears stable. Diffuse pulmonary interstitial disease remains relatively mild, whether chronic or mild interstitial edema. There does appear to be some cephalization of pulmonary vasculature. There is minimal if any effusion. No significant focal lung disease or evidence of pneumothorax is seen.      Impression: Persistent mild pulmonary vascular congestion, and small area of pleural thickening or effusion in the lateral left chest. No evidence of pneumothorax.  D:  06/04/2021 E:  06/04/2021    This report was finalized on 6/4/2021 10:41 PM by Dr. Albaro Salguero MD.      XR Chest 1 View    Result Date: 6/3/2021  Narrative: EXAMINATION: XR CHEST 1 VW-  INDICATION: Post-Op Check Line & Tube Placement;  I25.118-Atherosclerotic heart disease of native coronary artery with other forms of angina pectoris; I20.0-Unstable angina  COMPARISON: 6/2/2021  FINDINGS: Interval median sternotomy. Endotracheal tube in good position. Nasogastric tube enters the stomach with its tip not imaged. Mediastinal drains noted in place. No enlarging pleural effusion or distinct pneumothorax. No new focal airspace consolidation. Unchanged heart and mediastinal contours otherwise.      Impression: Interval median sternotomy. Endotracheal tube in good position. Nasogastric tube enters the stomach with its tip not imaged. Mediastinal drains noted in place. No enlarging pleural effusion or distinct pneumothorax. No new focal airspace consolidation. Unchanged heart and mediastinal contours otherwise.  This report was finalized on 6/3/2021 6:18 PM by Anson Chen.      XR Chest 1 View    Result Date: 6/3/2021  Narrative: EXAMINATION: XR CHEST 1 VW-06/02/2021:  INDICATION: Dyspnea; I20.0-Unstable angina.  COMPARISON: 03/27/2021.  FINDINGS: Portable chest reveals cardiac and mediastinal silhouettes within normal limits. Cardiac pacer leads are in satisfactory position. There is blunting of the left costophrenic angle. Degenerative changes seen within the spine. No pleural effusion or pneumothorax. Chronic changes seen throughout the lung fields bilaterally.      Impression: Chronic changes seen throughout the lung fields with no superimposed infectious process. Stable blunting of the left costophrenic angle.  D:  06/02/2021 E:  06/02/2021  This report was finalized on 6/3/2021 5:02 PM by Dr. Karine Limon MD.      CT Chest Pulmonary Embolism, CT Abdomen Pelvis With Contrast    Result Date: 6/14/2021  Narrative: EXAMINATION: CT CHEST PULMONARY EMBOLISM-, CT ABDOMEN AND PELVIS W CONTRAST-  INDICATION: Shortness of breath, recent surgery.  TECHNIQUE: CT angiogram chest following PE protocol with intravenous contrast and 2-D reconstructions  performed, CT abdomen and pelvis with intravenous contrast administration.  The radiation dose reduction device was turned on for each scan per the ALARA (As Low as Reasonably Achievable) protocol.  COMPARISON: CT angiogram chest 06/09/2021.  FINDINGS: CTA CHEST: Thyroid is homogeneous in attenuation. No bulky mediastinal adenopathy. Central airways are patent. Esophagus seen in normal course and caliber. Atherosclerotic nonaneurysmal thoracic aorta. Cardiac size borderline enlarged status post median sternotomy and CABG as well as atrial appendage occlusion device in place. Satisfactory opacification of the pulmonary arterial tree without filling defect to suggest pulmonary embolus. Atherosclerotic nonaneurysmal thoracic aorta. Extended lung windows reveal small to moderate volume right and trace left pleural effusions stable to slightly increased from prior comparison 06/09/2021 without new consolidation. Degenerative changes of the thoracic spine without acute osseous finding of the thoracic spine or chest. No chest wall soft tissue findings.  ABDOMEN AND PELVIS: Liver demonstrates hepatic cysts measuring up to a 3.5 cm left hepatic cyst without abnormal enhancement features. Gallbladder contracted and unremarkable. No biliary dilatation. Pancreas and spleen unremarkable with splenule adjacent to the lateral margin of the spleen. Adrenals demonstrate a subcentimeter right adrenal adenoma. Kidneys without hydronephrosis or hydroureter. No bulky retroperitoneal adenopathy. Atherosclerotic nonaneurysmal abdominal aorta with patent celiac and SMA origins. Portal veins and IVC are patent. GI tract evaluation without focal thickening or disproportionate dilatation of bowel. Moderately distended urinary bladder without wall thickening. Moderate distention of the rectum with intermixed gas and stool. Degenerative changes of the spine and pelvis without aggressive osseous lesion. No soft tissue body wall findings of  acuity.      Impression: 1. No PE. 2. Stable to slightly increasing small to moderate volume right and trace to small left pleural effusions with adjacent atelectasis. 3. No acute findings within the abdomen or pelvis, however, noted moderate distention of the rectal vault with intermixed gas and stool.  D:  06/14/2021 E:  06/14/2021   This report was finalized on 6/14/2021 4:26 PM by Dr. Ap Harris.      Duplex Carotid Ultrasound CAR    Result Date: 6/2/2021  Narrative: · Proximal right internal carotid artery plaque without significant stenosis. · Proximal left internal carotid artery plaque without significant stenosis. · Bilateral antegrade vertebral flow.        Relevant imaging studies and labs from 06/15/21 were reviewed and interpreted by me    Medications (deshawn):  hold        atorvastatin, 40 mg, Oral, Nightly  budesonide-formoterol, 2 puff, Inhalation, BID - RT  bumetanide, 0.5 mg, Oral, Daily  ipratropium-albuterol, 3 mL, Nebulization, 4x Daily - RT  levothyroxine, 75 mcg, Oral, Q AM  metoprolol succinate XL, 50 mg, Oral, Q24H  multivitamin with minerals, 1 tablet, Oral, Daily  pantoprazole, 40 mg, Intravenous, Q12H  [START ON 6/16/2021] sacubitril-valsartan, 1 tablet, Oral, Q12H  sodium chloride, 10 mL, Intravenous, Q12H  tamsulosin, 0.4 mg, Oral, Daily        Assessment/Plan   IMPRESSION / PLAN     Inpatient Problem List:  76 y.o.male:  Active Hospital Problems    Diagnosis    • **Symptomatic anemia    • Gastrointestinal hemorrhage      Added automatically from request for surgery [id number]     • Nonsustained ventricular tachycardia 6/5/2021 (CMS/[facility])    • COPD     • Former smoker    • Hypertension    • S/P CABG x 2 on 6/3/2021    • Suspected chronic renal insufficiency    • Chronic HFrEF 35-40% s/p upgrade BiV ICD and AV node ablation 3/2021      a. Patient reports normal LHC 10-12 years ago  b. Echocardiogram 1/27/2016: EF 45 to 55%, unchanged from previous echo 2012  c. Echocardiogram 8/27/2019: EF  35%, mild to moderate MR, mild to moderate TR, RVSP 40 mmHg  d. Nuclear stress test 8/26/2019: Normal LV perfusion EF 33%  e. Upgrade to BIV ICD at time of AV node ablation 3/2021     • GI bleed    • PAF s/p Watchman device 9/25/2020/ Bi-V ICD and AVN ablation 3/2021      a. CHADsvasc = 4 off Xarelto x 1 year due to bleeding, never been on Eliquis  b. Echocardiogram 1/27/2016: EF 45 to 55%, unchanged from previous echo 2012  c. Echocardiogram 8/27/2019: EF 35 to 40%, mild to moderate MR, mild to moderate TR, RVSP 40 mmHg  d. Nuclear stress test 8/26/2019: Normal LV perfusion EF 33%  e. Implantation of a left atrial appendage occlusive device (Watchman device) 09/25/20 by Dr. Yonny Prado  f. PORSHA on 11/13/20 with well seated device, EF 35%, mod MR, mild to mod TR, post-procedural ASD with left-to-right flow  g. Upgrade to BIV ICD and AV Node ablation 3/2021, Dr. Prado          Impression:  76 y.o.male with relevant PMH of Tobacco Abuse 50 py quit 2010, COPD w/ pre-op PFT showing FEV1 30%, Chronic SHF EF 35%, Permanent Afib - unable to tolerate DOAC secondary to bleeding and s/p Watchman 9/2020, BiV-AICD, AVN ablation, HTN, who was admitted 6/2/2021 for cardiac cath which showed severe 80% ostial LM disease, LVEDP 11.  Echo 6/2 w/ EF 35-40%, mod MR, RVSP 35-45, Diastolic Dysfunction.  Underwent 2vCABG with Dr. Shields on 6/3/21.  Discharged 6/10/21.      Now re-admitted 6/14/2021 with increasing TREVIZO not much better than before his heart surgery.  Also w/ melena.  Found to have Hgb 6.2  (discharged on ASA and steroids for possible AECOPD).  Noted to have moderate sized right pleural effusion ~500 ml I would estimate on CT.  No overt wheezing.    Suspect dyspnea combination of known CHF, COPD, Right Pleural Effusion, Symptomatic Anemia.  Consulted to optimize Pulmonary aspect.    Plan:  Pleural Effusion - will get US guided thoracentesis.  Most certainly a post cabg effusion.  Could potentially lead to trapped lung  if not addressed.      COPD FEV1 30% on pre-op Suraj - no overt wheezing.  Would like to get a formal PFT w/ post BD suraj, lung volumes, and DLCO AFTER he has thoracentesis.  If FEV1 truly that low he may benefit from a combined LABA / LAMA inhaler.  No overt wheezing or h/o frequent COPD exacerbations.  Doubt he would need ICS at this time.  Already quit smoking over 10 years ago. He would qualify for ongoing lung cancer screening which can be addressed outpatient.    Anemia - plans in place for EGD    CHF - per Cardiology    Will continue to follow         Porter Medina MD  Intensive Care Medicine  06/15/21 11:31 EDT

## 2021-06-15 NOTE — PLAN OF CARE
Goal Outcome Evaluation:  Plan of Care Reviewed With: patient           Outcome Summary: OT angie complete.  S/P CABG 06/03/2021.  Pt presents with decreased balance and activity tolerance limiting independence with self care. Pt modified independent supine to sit,  CGA to don socks, supervision STS,  CGA to ambulate 40 ft without AD. Pt's O2 sat dropped from 93% to  88% and recovered to 98% on room air.   OT will follow to advance pt toward increased independence with self care.  Recommend home with assist upon d/c.

## 2021-06-15 NOTE — THERAPY EVALUATION
Patient Name: Colin Albrecht  : 1945    MRN: [id number]                              Today's Date: 6/15/2021       Admit Date: 2021    Visit Dx:     ICD-10-CM ICD-9-CM   1. Gastrointestinal hemorrhage, unspecified gastrointestinal hemorrhage type  K92.2 578.9   2. Symptomatic anemia  D64.9 285.9   3. Hx of CABG  Z95.1 V45.81   4. Hypotension, unspecified hypotension type  I95.9 458.9     Patient Active Problem List   Diagnosis   • PAF s/p Watchman device 2020/ Bi-V ICD and AVN ablation 3/2021   • GI bleed   • SSS    • Dilated CM    • S/P Watchman device   • Chronic HFrEF 35-40% s/p upgrade BiV ICD and AV node ablation 3/2021   • Unstable angina    • CAD with unstable angina   • Hypertension   • Former smoker   • COPD    • Suspected chronic renal insufficiency   • S/P CABG x 2 on 6/3/2021   • Acute-on-chronic kidney injury (CMS/HCC)   • Nonsustained ventricular tachycardia 2021 (CMS/HCC)   • Symptomatic anemia   • Gastrointestinal hemorrhage     Past Medical History:   Diagnosis Date   • Abnormal ECG    • Arrhythmia    • Atrial fibrillation (CMS/HCC)    • CHF (congestive heart failure) (CMS/[facility])    • Depression    • History of transfusion     bleeding stomach ulcer ~2014 x2, no reaction    • Hypertension    • Stomach ulcer    • Wears reading eyeglasses      Past Surgical History:   Procedure Laterality Date   • ATRIAL APPENDAGE EXCLUSION LEFT WITH TRANSESOPHAGEAL ECHOCARDIOGRAM N/A 2020    Procedure: Atrial Appendage Occlusion (Watchman), start Eliquis 2 weeks prior and hold 1 day, GA;  Surgeon: Yonny Prado MD;  Location:  [facility];  Service: Cardiology;  Laterality: N/A;   • CARDIAC CATHETERIZATION     • CARDIAC CATHETERIZATION N/A 2021    Procedure: Left Heart Cath- ADD ON/ WALK IN FOR RDS PER KT;  Surgeon: Pietro Quintana MD;  Location:  [facility];  Service: Cardiovascular;  Laterality: N/A;   • CARDIAC ELECTROPHYSIOLOGY PROCEDURE N/A  3/26/2021    Procedure: DDD PPM upgrade to Biv ICD (MDT), no meds to hold;  Surgeon: Yonny Prado MD;  Location:  [facility];  Service: Cardiology;  Laterality: N/A;   • CARDIAC ELECTROPHYSIOLOGY PROCEDURE N/A 3/26/2021    Procedure: AVN RFA;  Surgeon: Yonny Prado MD;  Location:  [facility];  Service: Cardiovascular;  Laterality: N/A;   • COLONOSCOPY     • CORONARY ARTERY BYPASS GRAFT N/A 6/3/2021    Procedure: MEDIAN STERNOTOMY, CORONARY ARTERY BYPASS WITH INTERNAL MAMMARY ARTERY GRAFT X 2, EVH OF THE RIGHT GREATER SAPHENOUS ANA;  Surgeon: Jaime Sihelds MD;  Location:  [facility];  Service: Cardiothoracic;  Laterality: N/A;   • INSERT / REPLACE / REMOVE PACEMAKER       General Information     Row Name 06/15/21 0832          OT Time and Intention    Document Type  evaluation  -AC     Mode of Treatment  occupational therapy  -     Row Name 06/15/21 0832          General Information    Patient Profile Reviewed  yes  -AC     Prior Level of Function  independent:;all household mobility;ADL's;driving was working on cars in his garage prior to May  -     Existing Precautions/Restrictions  sternal S/P CABG 06/03/21  -AC     Row Name 06/15/21 0832          Living Environment    Lives With  spouse  -     Row Name 06/15/21 0832          Home Main Entrance    Number of Stairs, Main Entrance  none  -AC     Stair Railings, Main Entrance  none  -AC     Row Name 06/15/21 0832          Stairs Within Home, Primary    Stairs, Within Home, Primary  1 story, tub shower  -AC     Row Name 06/15/21 0832          Cognition    Orientation Status (Cognition)  oriented x 3  -AC     Row Name 06/15/21 0832          Safety Issues, Functional Mobility    Safety Issues Affecting Function (Mobility)  awareness of need for assistance  -AC     Impairments Affecting Function (Mobility)  endurance/activity tolerance;balance  -AC       User Key  (r) = Recorded By, (t) = Taken By, (c) = Cosigned By     Initials Name Provider Type    Leidy Gordon, OT Occupational Therapist          Mobility/ADL's     Row Name 06/15/21 0832          Bed Mobility    Bed Mobility  supine-sit  -AC     Supine-Sit Eckert (Bed Mobility)  modified independence  -     Assistive Device (Bed Mobility)  head of bed elevated  -     Row Name 06/15/21 0832          Transfers    Transfers  sit-stand transfer  -     Sit-Stand Eckert (Transfers)  supervision  -     Row Name 06/15/21 0832          Sit-Stand Transfer    Assistive Device (Sit-Stand Transfers)  other (see comments) no AD  -AC     Row Name 06/15/21 0832          Functional Mobility    Functional Mobility- Ind. Level  contact guard assist  -     Functional Mobility- Device  other (see comments) No AD  -AC     Functional Mobility-Distance (Feet)  40  -AC     Functional Mobility- Comment  pt gaurderd with ambulation, stating he has been too weak to walk and gets SOA  -     Row Name 06/15/21 0832          Activities of Daily Living    BADL Assessment/Intervention  lower body dressing  -     Row Name 06/15/21 0832          Lower Body Dressing Assessment/Training    Eckert Level (Lower Body Dressing)  doff;don;socks;contact guard assist  -AC     Position (Lower Body Dressing)  unsupported sitting  -       User Key  (r) = Recorded By, (t) = Taken By, (c) = Cosigned By    Initials Name Provider Type    Leidy Gordon, OT Occupational Therapist        Obj/Interventions     Row Name 06/15/21 0832          Sensory Assessment (Somatosensory)    Sensory Assessment (Somatosensory)  UE sensation intact;bilateral UE  -     Row Name 06/15/21 0832          Vision Assessment/Intervention    Visual Impairment/Limitations  corrective lenses full-time  -     Row Name 06/15/21 0832          Range of Motion Comprehensive    General Range of Motion  bilateral upper extremity ROM WNL  -     Row Name 06/15/21 0832          Strength Comprehensive (MMT)    Comment,  General Manual Muscle Testing (MMT) Assessment  BUE grossly 4+/5  -AC       User Key  (r) = Recorded By, (t) = Taken By, (c) = Cosigned By    Initials Name Provider Type    Leidy Gordon, OT Occupational Therapist        Goals/Plan     Row Name 06/15/21 0832          Transfer Goal 1 (OT)    Activity/Assistive Device (Transfer Goal 1, OT)  bed-to-chair/chair-to-bed;toilet  -AC     Iron Level/Cues Needed (Transfer Goal 1, OT)  standby assist  -AC     Time Frame (Transfer Goal 1, OT)  by discharge  -AC     Progress/Outcome (Transfer Goal 1, OT)  goal ongoing  -AC     Row Name 06/15/21 0832          Dressing Goal 1 (OT)    Activity/Device (Dressing Goal 1, OT)  lower body dressing  -AC     Iron/Cues Needed (Dressing Goal 1, OT)  set-up required  -AC     Time Frame (Dressing Goal 1, OT)  by discharge  -AC     Strategies/Barriers (Dressing Goal 1, OT)  Pt will complete dressing tasks with O2 sat 90% or greater incorporating deep breathing and rest breaks as needed  -AC     Progress/Outcome (Dressing Goal 1, OT)  goal ongoing  -AC     Row Name 06/15/21 0832          Toileting Goal 1 (OT)    Activity/Device (Toileting Goal 1, OT)  adjust/manage clothing;perform perineal hygiene  -AC     Iron Level/Cues Needed (Toileting Goal 1, OT)  standby assist  -AC     Time Frame (Toileting Goal 1, OT)  by discharge  -AC     Progress/Outcome (Toileting Goal 1, OT)  goal ongoing  -AC       User Key  (r) = Recorded By, (t) = Taken By, (c) = Cosigned By    Initials Name Provider Type    Leidy Gordon OT Occupational Therapist        Clinical Impression     Row Name 06/15/21 0832          Pain Assessment    Additional Documentation  Pain Scale: Numbers Pre/Post-Treatment (Group)  -     Row Name 06/15/21 0832          Pain Scale: Numbers Pre/Post-Treatment    Pretreatment Pain Rating  0/10 - no pain  -     Posttreatment Pain Rating  0/10 - no pain  -     Row Name 06/15/21 0832          Plan of Care  Review    Plan of Care Reviewed With  patient  -AC     Outcome Summary  OT eval complete.  S/P CABG 06/03/2021.  Pt presents with decreased balance and activity tolerance limiting independence with self care. Pt modified independent supine to sit,  CGA to don socks, supervision STS,  CGA to ambulate 40 ft without AD. Pt's O2 sat dropped from 93% to  88% and recovered to 98% on room air.   OT will follow to advance pt toward increased independence with self care.  Recommend home with assist upon d/c.  -AC     Row Name 06/15/21 0832          Therapy Assessment/Plan (OT)    Rehab Potential (OT)  good, to achieve stated therapy goals  -     Criteria for Skilled Therapeutic Interventions Met (OT)  yes;skilled treatment is necessary  -AC     Therapy Frequency (OT)  daily  -     Row Name 06/15/21 0832          Therapy Plan Review/Discharge Plan (OT)    Anticipated Discharge Disposition (OT)  home with assist  -     Row Name 06/15/21 0832          Vital Signs    Pre Systolic BP Rehab  132  -AC     Pre Treatment Diastolic BP  58  -AC     Post Systolic BP Rehab  167  -AC     Post Treatment Diastolic BP  70  -AC     Pretreatment Heart Rate (beats/min)  62  -AC     Posttreatment Heart Rate (beats/min)  66  -AC     Pre SpO2 (%)  93  -AC     O2 Delivery Pre Treatment  room air  -AC     Intra SpO2 (%)  88  -AC     O2 Delivery Intra Treatment  room air  -AC     Post SpO2 (%)  98  -AC     O2 Delivery Post Treatment  room air  -AC     Pre Patient Position  Supine  -AC     Intra Patient Position  Standing  -AC     Post Patient Position  Sitting  -AC     Row Name 06/15/21 0832          Positioning and Restraints    Pre-Treatment Position  in bed  -AC     Post Treatment Position  chair  -AC     In Chair  reclined;call light within reach;encouraged to call for assist;exit alarm on;waffle cushion  -AC       User Key  (r) = Recorded By, (t) = Taken By, (c) = Cosigned By    Initials Name Provider Type    Leidy Gordon, OT  Occupational Therapist        Outcome Measures     Row Name 06/15/21 0832          How much help from another is currently needed...    Putting on and taking off regular lower body clothing?  3  -AC     Bathing (including washing, rinsing, and drying)  3  -AC     Toileting (which includes using toilet bed pan or urinal)  3  -AC     Putting on and taking off regular upper body clothing  4  -AC     Taking care of personal grooming (such as brushing teeth)  4  -AC     Eating meals  4  -AC     AM-PAC 6 Clicks Score (OT)  21  -     Row Name 06/15/21 0832          Functional Assessment    Outcome Measure Options  AM-PAC 6 Clicks Daily Activity (OT)  -       User Key  (r) = Recorded By, (t) = Taken By, (c) = Cosigned By    Initials Name Provider Type    Leidy Gordon OT Occupational Therapist        Occupational Therapy Education                 Title: PT OT SLP Therapies (In Progress)     Topic: Occupational Therapy (In Progress)     Point: ADL training (Done)     Description:   Instruct learner(s) on proper safety adaptation and remediation techniques during self care or transfers.   Instruct in proper use of assistive devices.              Learning Progress Summary           Patient Acceptance, E, VU by  at 6/15/2021 0832    Comment: adaptive breathing, Role of OT                   Point: Home exercise program (Not Started)     Description:   Instruct learner(s) on appropriate technique for monitoring, assisting and/or progressing therapeutic exercises/activities.              Learner Progress:  Not documented in this visit.          Point: Precautions (Not Started)     Description:   Instruct learner(s) on prescribed precautions during self-care and functional transfers.              Learner Progress:  Not documented in this visit.          Point: Body mechanics (Not Started)     Description:   Instruct learner(s) on proper positioning and spine alignment during self-care, functional mobility activities  and/or exercises.              Learner Progress:  Not documented in this visit.                      User Key     Initials Effective Dates Name Provider Type Discipline    AC 06/23/15 -  Leidy Ding, OT Occupational Therapist OT              OT Recommendation and Plan  Therapy Frequency (OT): daily  Plan of Care Review  Plan of Care Reviewed With: patient  Outcome Summary: OT eval complete.  S/P CABG 06/03/2021.  Pt presents with decreased balance and activity tolerance limiting independence with self care. Pt modified independent supine to sit,  CGA to don socks, supervision STS,  CGA to ambulate 40 ft without AD. Pt's O2 sat dropped from 93% to  88% and recovered to 98% on room air.   OT will follow to advance pt toward increased independence with self care.  Recommend home with assist upon d/c.     Time Calculation:   Time Calculation- OT     Row Name 06/15/21 0832             Time Calculation- OT    OT Start Time  0832  -AC      OT Received On  06/15/21  -AC      OT Goal Re-Cert Due Date  06/25/21  -AC         Untimed Charges    OT Eval/Re-eval Minutes  50  -AC         Total Minutes    Untimed Charges Total Minutes  50  -AC       Total Minutes  50  -AC        User Key  (r) = Recorded By, (t) = Taken By, (c) = Cosigned By    Initials Name Provider Type    AC Leidy Ding, OT Occupational Therapist        Therapy Charges for Today     Code Description Service Date Service Provider Modifiers Qty    [id number]  OT EVAL LOW COMPLEXITY 4 6/15/2021 Leidy Ding OT GO 1               Leidy Ding OT  6/15/2021

## 2021-06-15 NOTE — ANESTHESIA POSTPROCEDURE EVALUATION
Patient: Colin Albrecht    Procedure Summary     Date: 06/15/21 Room / Location:  [facility] /  [facility]    Anesthesia Start: 1456 Anesthesia Stop:     Procedure: ESOPHAGOGASTRODUODENOSCOPY (N/A ) Diagnosis:       Gastrointestinal hemorrhage, unspecified gastrointestinal hemorrhage type      (Gastrointestinal hemorrhage, unspecified gastrointestinal hemorrhage type [K92.2])    Surgeons: Fransico Warren MD Provider: Jenelle Lara DO    Anesthesia Type: general, MAC ASA Status: 3          Anesthesia Type: general, MAC    Vitals  Vitals Value Taken Time   /56 06/15/21 1517   Temp     Pulse 70 06/15/21 1517   Resp 20 06/15/21 1517   SpO2 96 % 06/15/21 1517           Post Anesthesia Care and Evaluation    Patient location during evaluation: PACU  Patient participation: complete - patient participated  Level of consciousness: awake and alert  Pain score: 0  Pain management: adequate  Airway patency: patent  Anesthetic complications: No anesthetic complications  PONV Status: none  Cardiovascular status: hemodynamically stable and acceptable  Respiratory status: nonlabored ventilation, acceptable and nasal cannula  Hydration status: acceptable    Comments: Pt transferred to PACU with O2. Vital signs stable. Report to PACU RN and care accepted.

## 2021-06-16 ENCOUNTER — APPOINTMENT (OUTPATIENT)
Dept: CARDIOLOGY | Facility: HOSPITAL | Age: 76
End: 2021-06-16

## 2021-06-16 ENCOUNTER — APPOINTMENT (OUTPATIENT)
Dept: CT IMAGING | Facility: HOSPITAL | Age: 76
End: 2021-06-16

## 2021-06-16 LAB
ANION GAP SERPL CALCULATED.3IONS-SCNC: 6 MMOL/L (ref 5–15)
APPEARANCE FLD: ABNORMAL
BH CV UPPER VENOUS LEFT AXILLARY AUGMENT: NORMAL
BH CV UPPER VENOUS LEFT AXILLARY COLOR: 1
BH CV UPPER VENOUS LEFT AXILLARY COMPRESS: NORMAL
BH CV UPPER VENOUS LEFT AXILLARY PHASIC: NORMAL
BH CV UPPER VENOUS LEFT AXILLARY SPONT: NORMAL
BH CV UPPER VENOUS LEFT AXILLARY THROMBUS: NORMAL
BH CV UPPER VENOUS LEFT BASILIC FOREARM COMPRESS: NORMAL
BH CV UPPER VENOUS LEFT BASILIC UPPER AUGMENT: NORMAL
BH CV UPPER VENOUS LEFT BASILIC UPPER COMPRESS: NORMAL
BH CV UPPER VENOUS LEFT BRACHIAL COMPRESS: NORMAL
BH CV UPPER VENOUS LEFT CEPHALIC FOREARM COMPRESS: NORMAL
BH CV UPPER VENOUS LEFT CEPHALIC UPPER AUGMENT: NORMAL
BH CV UPPER VENOUS LEFT CEPHALIC UPPER COLOR: 1
BH CV UPPER VENOUS LEFT CEPHALIC UPPER COMPRESS: NORMAL
BH CV UPPER VENOUS LEFT CEPHALIC UPPER THROMBUS: NORMAL
BH CV UPPER VENOUS LEFT INTERNAL JUGULAR AUGMENT: NORMAL
BH CV UPPER VENOUS LEFT INTERNAL JUGULAR COMPRESS: NORMAL
BH CV UPPER VENOUS LEFT INTERNAL JUGULAR PHASIC: NORMAL
BH CV UPPER VENOUS LEFT INTERNAL JUGULAR SPONT: NORMAL
BH CV UPPER VENOUS LEFT RADIAL AUGMENT: NORMAL
BH CV UPPER VENOUS LEFT RADIAL COMPRESS: NORMAL
BH CV UPPER VENOUS LEFT SUBCLAVIAN AUGMENT: NORMAL
BH CV UPPER VENOUS LEFT SUBCLAVIAN COLOR: 1
BH CV UPPER VENOUS LEFT SUBCLAVIAN COMPRESS: NORMAL
BH CV UPPER VENOUS LEFT SUBCLAVIAN PHASIC: NORMAL
BH CV UPPER VENOUS LEFT SUBCLAVIAN SPONT: NORMAL
BH CV UPPER VENOUS LEFT SUBCLAVIAN THROMBUS: NORMAL
BH CV UPPER VENOUS LEFT ULNAR AUGMENT: NORMAL
BH CV UPPER VENOUS LEFT ULNAR COMPRESS: NORMAL
BH CV UPPER VENOUS RIGHT INTERNAL JUGULAR AUGMENT: NORMAL
BH CV UPPER VENOUS RIGHT INTERNAL JUGULAR COMPRESS: NORMAL
BH CV UPPER VENOUS RIGHT INTERNAL JUGULAR PHASIC: NORMAL
BH CV UPPER VENOUS RIGHT INTERNAL JUGULAR SPONT: NORMAL
BH CV UPPER VENOUS RIGHT SUBCLAVIAN AUGMENT: NORMAL
BH CV UPPER VENOUS RIGHT SUBCLAVIAN COMPRESS: NORMAL
BH CV UPPER VENOUS RIGHT SUBCLAVIAN PHASIC: NORMAL
BH CV UPPER VENOUS RIGHT SUBCLAVIAN SPONT: NORMAL
BUN SERPL-MCNC: 39 MG/DL (ref 8–23)
BUN/CREAT SERPL: 46.4 (ref 7–25)
CALCIUM SPEC-SCNC: 8.4 MG/DL (ref 8.6–10.5)
CHLORIDE SERPL-SCNC: 111 MMOL/L (ref 98–107)
CO2 SERPL-SCNC: 23 MMOL/L (ref 22–29)
COLOR FLD: ABNORMAL
CREAT SERPL-MCNC: 0.84 MG/DL (ref 0.76–1.27)
DEPRECATED RDW RBC AUTO: 58.1 FL (ref 37–54)
EOSINOPHIL NFR FLD MANUAL: 3 %
ERYTHROCYTE [DISTWIDTH] IN BLOOD BY AUTOMATED COUNT: 19.1 % (ref 12.3–15.4)
GFR SERPL CREATININE-BSD FRML MDRD: 89 ML/MIN/1.73
GLUCOSE FLD-MCNC: 113 MG/DL
GLUCOSE SERPL-MCNC: 107 MG/DL (ref 65–99)
HCT VFR BLD AUTO: 25.4 % (ref 37.5–51)
HCT VFR BLD AUTO: 26 % (ref 37.5–51)
HCT VFR BLD AUTO: 30 % (ref 37.5–51)
HGB BLD-MCNC: 8.1 G/DL (ref 13–17.7)
HGB BLD-MCNC: 8.4 G/DL (ref 13–17.7)
HGB BLD-MCNC: 9.5 G/DL (ref 13–17.7)
LDH FLD-CCNC: 256 U/L
LYMPHOCYTES NFR FLD MANUAL: 22 %
MACROPHAGE FLUID: 26 %
MAXIMAL PREDICTED HEART RATE: 145 BPM
MCH RBC QN AUTO: 31.1 PG (ref 26.6–33)
MCHC RBC AUTO-ENTMCNC: 32.3 G/DL (ref 31.5–35.7)
MCV RBC AUTO: 96.3 FL (ref 79–97)
MESOTHL CELL NFR FLD MANUAL: 4 %
MONOCYTES NFR FLD: 4 %
NEUTROPHILS NFR FLD MANUAL: 41 %
PH FLD: 7.8 [PH]
PLATELET # BLD AUTO: 195 10*3/MM3 (ref 140–450)
PMV BLD AUTO: 10.3 FL (ref 6–12)
POTASSIUM SERPL-SCNC: 3.5 MMOL/L (ref 3.5–5.2)
PROT FLD-MCNC: 2.3 G/DL
RBC # BLD AUTO: 2.7 10*6/MM3 (ref 4.14–5.8)
RBC # FLD AUTO: ABNORMAL /MM3
SODIUM SERPL-SCNC: 140 MMOL/L (ref 136–145)
STRESS TARGET HR: 123 BPM
WBC # BLD AUTO: 10.01 10*3/MM3 (ref 3.4–10.8)
WBC # FLD AUTO: 525 /MM3

## 2021-06-16 PROCEDURE — A9270 NON-COVERED ITEM OR SERVICE: HCPCS | Performed by: INTERNAL MEDICINE

## 2021-06-16 PROCEDURE — 25010000003 LIDOCAINE 1 % SOLUTION: Performed by: RADIOLOGY

## 2021-06-16 PROCEDURE — 99233 SBSQ HOSP IP/OBS HIGH 50: CPT | Performed by: INTERNAL MEDICINE

## 2021-06-16 PROCEDURE — 88305 TISSUE EXAM BY PATHOLOGIST: CPT | Performed by: INTERNAL MEDICINE

## 2021-06-16 PROCEDURE — 63710000001 BISACODYL 5 MG TABLET DELAYED-RELEASE: Performed by: INTERNAL MEDICINE

## 2021-06-16 PROCEDURE — 84157 ASSAY OF PROTEIN OTHER: CPT | Performed by: INTERNAL MEDICINE

## 2021-06-16 PROCEDURE — 87070 CULTURE OTHR SPECIMN AEROBIC: CPT | Performed by: INTERNAL MEDICINE

## 2021-06-16 PROCEDURE — 63710000001 BUMETANIDE 1 MG TABLET: Performed by: INTERNAL MEDICINE

## 2021-06-16 PROCEDURE — 63710000001 TEMAZEPAM 15 MG CAPSULE: Performed by: INTERNAL MEDICINE

## 2021-06-16 PROCEDURE — 99212 OFFICE O/P EST SF 10 MIN: CPT | Performed by: NURSE PRACTITIONER

## 2021-06-16 PROCEDURE — 63710000001 SACUBITRIL-VALSARTAN 24-26 MG TABLET: Performed by: INTERNAL MEDICINE

## 2021-06-16 PROCEDURE — 83615 LACTATE (LD) (LDH) ENZYME: CPT | Performed by: INTERNAL MEDICINE

## 2021-06-16 PROCEDURE — A9270 NON-COVERED ITEM OR SERVICE: HCPCS | Performed by: NURSE PRACTITIONER

## 2021-06-16 PROCEDURE — 85014 HEMATOCRIT: CPT | Performed by: INTERNAL MEDICINE

## 2021-06-16 PROCEDURE — 82945 GLUCOSE OTHER FLUID: CPT | Performed by: INTERNAL MEDICINE

## 2021-06-16 PROCEDURE — A9270 NON-COVERED ITEM OR SERVICE: HCPCS

## 2021-06-16 PROCEDURE — 75989 ABSCESS DRAINAGE UNDER X-RAY: CPT

## 2021-06-16 PROCEDURE — 83986 ASSAY PH BODY FLUID NOS: CPT | Performed by: INTERNAL MEDICINE

## 2021-06-16 PROCEDURE — 63710000001 TAMSULOSIN 0.4 MG CAPSULE: Performed by: INTERNAL MEDICINE

## 2021-06-16 PROCEDURE — C1729 CATH, DRAINAGE: HCPCS

## 2021-06-16 PROCEDURE — 80048 BASIC METABOLIC PNL TOTAL CA: CPT | Performed by: INTERNAL MEDICINE

## 2021-06-16 PROCEDURE — 93971 EXTREMITY STUDY: CPT | Performed by: INTERNAL MEDICINE

## 2021-06-16 PROCEDURE — 99225 PR SBSQ OBSERVATION CARE/DAY 25 MINUTES: CPT | Performed by: INTERNAL MEDICINE

## 2021-06-16 PROCEDURE — 63710000001 MULTIVITAMIN WITH MINERALS TABLET: Performed by: INTERNAL MEDICINE

## 2021-06-16 PROCEDURE — 63710000001 LEVOTHYROXINE 75 MCG TABLET: Performed by: INTERNAL MEDICINE

## 2021-06-16 PROCEDURE — 63710000001 HYDROXYZINE 10 MG TABLET: Performed by: NURSE PRACTITIONER

## 2021-06-16 PROCEDURE — 63710000001 METOPROLOL SUCCINATE XL 50 MG TABLET SUSTAINED-RELEASE 24 HOUR: Performed by: INTERNAL MEDICINE

## 2021-06-16 PROCEDURE — 63710000001 PANTOPRAZOLE 40 MG TABLET DELAYED-RELEASE

## 2021-06-16 PROCEDURE — 88112 CYTOPATH CELL ENHANCE TECH: CPT | Performed by: INTERNAL MEDICINE

## 2021-06-16 PROCEDURE — 87205 SMEAR GRAM STAIN: CPT | Performed by: INTERNAL MEDICINE

## 2021-06-16 PROCEDURE — 85027 COMPLETE CBC AUTOMATED: CPT | Performed by: INTERNAL MEDICINE

## 2021-06-16 PROCEDURE — 89051 BODY FLUID CELL COUNT: CPT | Performed by: INTERNAL MEDICINE

## 2021-06-16 PROCEDURE — 99215 OFFICE O/P EST HI 40 MIN: CPT | Performed by: INTERNAL MEDICINE

## 2021-06-16 PROCEDURE — 85018 HEMOGLOBIN: CPT | Performed by: INTERNAL MEDICINE

## 2021-06-16 PROCEDURE — G0378 HOSPITAL OBSERVATION PER HR: HCPCS

## 2021-06-16 PROCEDURE — 63710000001 MELATONIN 5 MG TABLET: Performed by: INTERNAL MEDICINE

## 2021-06-16 PROCEDURE — 94799 UNLISTED PULMONARY SVC/PX: CPT

## 2021-06-16 PROCEDURE — 93971 EXTREMITY STUDY: CPT

## 2021-06-16 PROCEDURE — 63710000001 ATORVASTATIN 40 MG TABLET: Performed by: INTERNAL MEDICINE

## 2021-06-16 RX ORDER — LIDOCAINE HYDROCHLORIDE 10 MG/ML
20 INJECTION, SOLUTION INFILTRATION; PERINEURAL ONCE
Status: COMPLETED | OUTPATIENT
Start: 2021-06-16 | End: 2021-06-16

## 2021-06-16 RX ORDER — TEMAZEPAM 15 MG/1
15 CAPSULE ORAL NIGHTLY PRN
Status: DISCONTINUED | OUTPATIENT
Start: 2021-06-16 | End: 2021-06-17 | Stop reason: HOSPADM

## 2021-06-16 RX ORDER — CHOLECALCIFEROL (VITAMIN D3) 125 MCG
5 CAPSULE ORAL NIGHTLY PRN
Status: DISCONTINUED | OUTPATIENT
Start: 2021-06-16 | End: 2021-06-16

## 2021-06-16 RX ORDER — CHOLECALCIFEROL (VITAMIN D3) 125 MCG
10 CAPSULE ORAL NIGHTLY
Status: DISCONTINUED | OUTPATIENT
Start: 2021-06-16 | End: 2021-06-17 | Stop reason: HOSPADM

## 2021-06-16 RX ORDER — PANTOPRAZOLE SODIUM 40 MG/1
40 TABLET, DELAYED RELEASE ORAL
Status: DISCONTINUED | OUTPATIENT
Start: 2021-06-16 | End: 2021-06-17 | Stop reason: HOSPADM

## 2021-06-16 RX ORDER — TEMAZEPAM 15 MG/1
15 CAPSULE ORAL NIGHTLY
Status: DISCONTINUED | OUTPATIENT
Start: 2021-06-16 | End: 2021-06-17 | Stop reason: HOSPADM

## 2021-06-16 RX ORDER — HYDROXYZINE HYDROCHLORIDE 10 MG/1
10 TABLET, FILM COATED ORAL ONCE
Status: COMPLETED | OUTPATIENT
Start: 2021-06-16 | End: 2021-06-16

## 2021-06-16 RX ORDER — IPRATROPIUM BROMIDE AND ALBUTEROL SULFATE 2.5; .5 MG/3ML; MG/3ML
3 SOLUTION RESPIRATORY (INHALATION) EVERY 4 HOURS PRN
Status: DISCONTINUED | OUTPATIENT
Start: 2021-06-16 | End: 2021-06-17 | Stop reason: HOSPADM

## 2021-06-16 RX ADMIN — SACUBITRIL AND VALSARTAN 1 TABLET: 24; 26 TABLET, FILM COATED ORAL at 21:16

## 2021-06-16 RX ADMIN — Medication 1 TABLET: at 08:32

## 2021-06-16 RX ADMIN — LEVOTHYROXINE SODIUM 75 MCG: 0.07 TABLET ORAL at 06:31

## 2021-06-16 RX ADMIN — PANTOPRAZOLE SODIUM 40 MG: 40 TABLET, DELAYED RELEASE ORAL at 17:33

## 2021-06-16 RX ADMIN — BUDESONIDE AND FORMOTEROL FUMARATE DIHYDRATE 2 PUFF: 80; 4.5 AEROSOL RESPIRATORY (INHALATION) at 11:57

## 2021-06-16 RX ADMIN — Medication 10 MG: at 21:16

## 2021-06-16 RX ADMIN — PANTOPRAZOLE SODIUM 40 MG: 40 INJECTION, POWDER, FOR SOLUTION INTRAVENOUS at 08:32

## 2021-06-16 RX ADMIN — TAMSULOSIN HYDROCHLORIDE 0.4 MG: 0.4 CAPSULE ORAL at 08:32

## 2021-06-16 RX ADMIN — IPRATROPIUM BROMIDE AND ALBUTEROL SULFATE 3 ML: 2.5; .5 SOLUTION RESPIRATORY (INHALATION) at 11:57

## 2021-06-16 RX ADMIN — ATORVASTATIN CALCIUM 40 MG: 40 TABLET, FILM COATED ORAL at 21:16

## 2021-06-16 RX ADMIN — SODIUM CHLORIDE, PRESERVATIVE FREE 10 ML: 5 INJECTION INTRAVENOUS at 21:17

## 2021-06-16 RX ADMIN — LIDOCAINE HYDROCHLORIDE 20 ML: 10 INJECTION, SOLUTION INFILTRATION; PERINEURAL at 14:19

## 2021-06-16 RX ADMIN — BISACODYL 10 MG: 5 TABLET, COATED ORAL at 08:31

## 2021-06-16 RX ADMIN — METOPROLOL SUCCINATE 50 MG: 50 TABLET, EXTENDED RELEASE ORAL at 08:31

## 2021-06-16 RX ADMIN — BUMETANIDE 0.5 MG: 1 TABLET ORAL at 08:32

## 2021-06-16 RX ADMIN — HYDROXYZINE HYDROCHLORIDE 10 MG: 10 TABLET, FILM COATED ORAL at 04:01

## 2021-06-16 RX ADMIN — TEMAZEPAM 15 MG: 15 CAPSULE ORAL at 21:16

## 2021-06-16 NOTE — NURSING NOTE
CT guided right thoracentesis performed by Dr Limon.  1.2 liters sanguineous fluid removed with samples sent to lab.  Pt tolerated well.

## 2021-06-16 NOTE — PROGRESS NOTES
"GI Daily Progress Note  Subjective:    Chief Complaint: Follow-up in anemia    Patient resting up to chair no acute distress.  Notes he is feeling well following EGD yesterday.  No new or worsening GI symptoms reported.  Has not had a bowel movement but believes he is feeling overall better.  Does not endorse any pain.    Objective:    /69   Pulse 75   Temp 96.8 °F (36 °C) (Axillary)   Resp 18   Ht 188 cm (74\")   Wt 85.3 kg (188 lb)   SpO2 99%   BMI 24.14 kg/m²     Physical Exam  Vitals and nursing note reviewed.   Constitutional:       General: He is not in acute distress.     Appearance: Normal appearance. He is normal weight. He is not ill-appearing or toxic-appearing.   HENT:      Head: Normocephalic and atraumatic.   Eyes:      General: No scleral icterus.     Extraocular Movements: Extraocular movements intact.      Conjunctiva/sclera: Conjunctivae normal.      Pupils: Pupils are equal, round, and reactive to light.   Cardiovascular:      Rate and Rhythm: Normal rate and regular rhythm.      Pulses: Normal pulses.      Heart sounds: Normal heart sounds.   Pulmonary:      Effort: Pulmonary effort is normal. No respiratory distress.      Breath sounds: Normal breath sounds.   Abdominal:      General: Abdomen is flat. Bowel sounds are normal. There is no distension.      Palpations: Abdomen is soft. There is no mass.      Tenderness: There is no abdominal tenderness. There is no guarding or rebound.      Hernia: No hernia is present.   Musculoskeletal:      Comments: Left upper extremity is edematous from wrist to shoulder   Skin:     General: Skin is warm and dry.      Capillary Refill: Capillary refill takes less than 2 seconds.      Coloration: Skin is pale. Skin is not jaundiced.   Neurological:      General: No focal deficit present.      Mental Status: He is alert and oriented to person, place, and time.   Psychiatric:         Mood and Affect: Mood normal.         Behavior: Behavior normal.    "      Thought Content: Thought content normal.         Judgment: Judgment normal.         Lab  I have personally reviewed most recent cardiac tracings, lab results and radiology images and interpretations and agree with findings.    Lab Results   Component Value Date    WBC 10.01 06/16/2021    HGB 8.4 (L) 06/16/2021    HGB 8.1 (L) 06/15/2021    HGB 8.9 (L) 06/15/2021    MCV 96.3 06/16/2021     06/16/2021    INR 1.32 (H) 06/04/2021    INR 1.67 (H) 06/03/2021    INR 1.19 (H) 06/02/2021    INR 1.06 03/23/2021    INR 1.24 (H) 09/22/2020       Lab Results   Component Value Date    GLUCOSE 107 (H) 06/16/2021    BUN 39 (H) 06/16/2021    CREATININE 0.84 06/16/2021    EGFRIFNONA 89 06/16/2021    BCR 46.4 (H) 06/16/2021     06/16/2021    K 3.5 06/16/2021    CO2 23.0 06/16/2021    CALCIUM 8.4 (L) 06/16/2021    ALBUMIN 3.80 06/14/2021    ALKPHOS 61 06/14/2021    BILITOT 0.7 06/14/2021    ALT 17 06/14/2021    AST 19 06/14/2021     Results for MONICA CABRALES (MRN [id number]) as of 6/16/2021 12:11   Ref. Range 6/16/2021 10:40   DUPLEX VENOUS UPPER EXTREMITY LEFT CAR Unknown · Acute left upper extremity deep vein thrombosis noted in the subclavian and axillary.  · Acute left upper extremity superficial thrombophlebitis noted in the cephalic (upper arm).  · All other left sided vessels appear normal.     Assessment:      Symptomatic anemia    PAF s/p Watchman device 9/25/2020/ Bi-V ICD and AVN ablation 3/2021    GI bleed    Chronic HFrEF 35-40% s/p upgrade BiV ICD and AV node ablation 3/2021    Hypertension    Former smoker    COPD     Suspected chronic renal insufficiency    S/P CABG x 2 on 6/3/2021    Nonsustained ventricular tachycardia 6/5/2021 (CMS/HCC)    Gastrointestinal hemorrhage    Acute blood loss anemia  History of gastric ulcer with antral ulcer  >>> EGD yesterday with prepyloric ulcer  Shortness of breath secondary to anemia  Acute DVT of left upper extremity    Plan:  Hemoglobin stable.  No further  reports of GI bleeding.  >>> Follow-up path from EGD.  >>> Twice daily PPI for 1 month; daily thereafter.  >>> Patient instructed to avoid NSAIDs; okay for Tylenol for mild to moderate pain.  >>> Noted ultrasound findings of DVT; management per primary.    As hemoglobin is stable no further reports of GI bleeding, will sign off.  We will follow-up with path results.  Please call with questions.    JOHN Campos  06/16/21  11:07 EDT

## 2021-06-16 NOTE — PROGRESS NOTES
INTENSIVIST / PULMONARY FOLLOW UP NOTE     Hospital:  LOS: 0 days   Mr. Colin Albrecht, 76 y.o. male is followed for:     Symptomatic anemia    PAF s/p Watchman device 9/25/2020/ Bi-V ICD and AVN ablation 3/2021    GI bleed    Chronic HFrEF 35-40% s/p upgrade BiV ICD and AV node ablation 3/2021    Hypertension    Former smoker    COPD     Suspected chronic renal insufficiency    S/P CABG x 2 on 6/3/2021    Nonsustained ventricular tachycardia 6/5/2021 (CMS/HCC)    Gastrointestinal hemorrhage          SUBJECTIVE   Complains of insomnia and left arm swelling    The patient's relevant past medical, surgical, family, and social history were reviewed    Allergies and medications were reviewed    ROS:  Per subjective, all other systems were reviewed and were negative        OBJECTIVE     Vital Sign Min/Max for last 24 hours:  Temp  Min: 96.6 °F (35.9 °C)  Max: 98.8 °F (37.1 °C)   BP  Min: 115/56  Max: 160/71   Pulse  Min: 69  Max: 77   Resp  Min: 15  Max: 20   SpO2  Min: 91 %  Max: 99 %   No data recorded     Physical Exam:  General Appearance:  Conversant, in no acute distress  Eyes:  No scleral icterus or pallor, pupils normal  Ears, Nose, Mouth, Throat:  Atraumatic, oropharynx clear  Neck:  Trachea midline, thyroid normal  Respiratory:  Clear to auscultation bilaterally, normal effort, no tenderness to palpation  Cardiovascular:  Regular rate and rhythm, no murmurs, no peripheral edema, no thrill  Gastrointestinal:  Soft, nontender, nondistended, no hepatosplenomegaly  Skin:  Normal temperature, no rash  Psychiatric:  Alert and oriented x 3, normal judgement and insight  Neuro:  No new focal neurologic deficits observed    Telemetry:              Hemodynamics:   CVP:     PAP:     PAOP:     CO:     CI:     SVI:     SVR:       SpO2: 95 % SpO2  Min: 91 %  Max: 99 %   Device:      Flow Rate:   No data recorded     Mechanical Ventilator Settings:                                         Intake/Ouptut 24 hrs (7:00AM - 6:59  AM)  Intake & Output (last 3 days)       06/13 0701 - 06/14 0700 06/14 0701 - 06/15 0700 06/15 0701 - 06/16 0700 06/16 0701 - 06/17 0700    P.O.    690    I.V. (mL/kg)   200 (2.3)     Blood  1406      Total Intake(mL/kg)  1406 (16.4) 200 (2.3) 690 (8.1)    Urine (mL/kg/hr)  2175 950 (0.5) 200 (0.3)    Stool  0      Total Output  2175 950 200    Net  -769 -750 +490            Stool Unmeasured Occurrence  1 x            Lines, Drains & Airways    Active LDAs     Name:   Placement date:   Placement time:   Site:   Days:    Peripheral IV 06/14/21 0905 Right Antecubital   06/14/21 0905    Antecubital   2                Hematology:  Results from last 7 days   Lab Units 06/16/21  0711 06/15/21  2348 06/15/21  1706 06/15/21  0802 06/14/21  2347 06/14/21  0906 06/10/21  0546   WBC 10*3/mm3 10.01  --   --   --   --  31.80* 7.68   HEMOGLOBIN g/dL 8.4* 8.1* 8.9* 8.7* 8.7* 6.2* 9.8*   HEMATOCRIT % 26.0* 25.4* 29.2* 27.1* 27.4* 19.4* 29.9*   PLATELETS 10*3/mm3 195  --   --   --   --  308 230   MONOCYTES % %  --   --   --   --   --  4.0*  --      Electrolytes, Magnesium and Phosphorus:  Results from last 7 days   Lab Units 06/16/21  0711 06/15/21  0331 06/14/21  0906 06/10/21  0905 06/09/21 2009   SODIUM mmol/L 140 141 142 141  --    CHLORIDE mmol/L 111* 109* 108* 103  --    POTASSIUM mmol/L 3.5 4.1 4.4 4.6 4.6   CO2 mmol/L 23.0 25.0 24.0 28.0  --    MAGNESIUM mg/dL  --   --  2.4  --   --      Renal:  Results from last 7 days   Lab Units 06/16/21 0711 06/15/21  0331 06/14/21  0906 06/10/21  0905   CREATININE mg/dL 0.84 1.18 1.34* 1.25   BUN mg/dL 39* 59* 79* 34*     Estimated Creatinine Clearance: 91.7 mL/min (by C-G formula based on SCr of 0.84 mg/dL).  Hepatic:  Results from last 7 days   Lab Units 06/14/21  0906   ALK PHOS U/L 61   BILIRUBIN mg/dL 0.7   ALT (SGPT) U/L 17   AST (SGOT) U/L 19     Arterial Blood Gases:              Lab Results   Component Value Date    LACTATE 2.8 (C) 06/14/2021       Relevant imaging studies  and labs from 06/16/21 were reviewed and interpreted by me    Medications (drips):  hold  sodium chloride, Last Rate: Stopped (06/15/21 1630)        apixaban, 5 mg, Oral, Q12H  atorvastatin, 40 mg, Oral, Nightly  budesonide-formoterol, 2 puff, Inhalation, BID - RT  bumetanide, 0.5 mg, Oral, Daily  levothyroxine, 75 mcg, Oral, Q AM  metoprolol succinate XL, 50 mg, Oral, Q24H  multivitamin with minerals, 1 tablet, Oral, Daily  pantoprazole, 40 mg, Oral, BID AC  sacubitril-valsartan, 1 tablet, Oral, Q12H  sodium chloride, 10 mL, Intravenous, Q12H  tamsulosin, 0.4 mg, Oral, Daily  temazepam, 15 mg, Oral, Nightly        Assessment/Plan   IMPRESSION / PLAN     Inpatient Problem List:  76 y.o.male:  Active Hospital Problems    Diagnosis    • **Symptomatic anemia    • Gastrointestinal hemorrhage      Added automatically from request for surgery [id number]     • Nonsustained ventricular tachycardia 6/5/2021 (CMS/[facility])    • COPD     • Former smoker    • Hypertension    • S/P CABG x 2 on 6/3/2021    • Suspected chronic renal insufficiency    • Chronic HFrEF 35-40% s/p upgrade BiV ICD and AV node ablation 3/2021      a. Patient reports normal LHC 10-12 years ago  b. Echocardiogram 1/27/2016: EF 45 to 55%, unchanged from previous echo 2012  c. Echocardiogram 8/27/2019: EF 35%, mild to moderate MR, mild to moderate TR, RVSP 40 mmHg  d. Nuclear stress test 8/26/2019: Normal LV perfusion EF 33%  e. Upgrade to BIV ICD at time of AV node ablation 3/2021     • GI bleed    • PAF s/p Watchman device 9/25/2020/ Bi-V ICD and AVN ablation 3/2021      a. CHADsvasc = 4 off Xarelto x 1 year due to bleeding, never been on Eliquis  b. Echocardiogram 1/27/2016: EF 45 to 55%, unchanged from previous echo 2012  c. Echocardiogram 8/27/2019: EF 35 to 40%, mild to moderate MR, mild to moderate TR, RVSP 40 mmHg  d. Nuclear stress test 8/26/2019: Normal LV perfusion EF 33%  e. Implantation of a left atrial appendage occlusive device (Watchman device)  09/25/20 by Dr. Yonny brewer PORSHA on 11/13/20 with well seated device, EF 35%, mod MR, mild to mod TR, post-procedural ASD with left-to-right flow  g. Upgrade to BIV ICD and AV Node ablation 3/2021, Dr. Prado          Impression:  76 y.o.male with relevant PMH of Tobacco Abuse 50 py quit 2010, COPD w/ pre-op PFT showing FEV1 30%, Chronic SHF EF 35%, Permanent Afib - unable to tolerate DOAC secondary to bleeding and s/p Watchman 9/2020, BiV-AICD, AVN ablation, HTN, who was admitted 6/2/2021 for cardiac cath which showed severe 80% ostial LM disease, LVEDP 11.  Echo 6/2 w/ EF 35-40%, mod MR, RVSP 35-45, Diastolic Dysfunction.  Underwent 2vCABG with Dr. Shields on 6/3/21.  Discharged 6/10/21.       Now re-admitted 6/14/2021 with increasing TREVIZO not much better than before his heart surgery.  Also w/ melena.  Found to have Hgb 6.2  (discharged on ASA and steroids for possible AECOPD).  Noted to have moderate sized right pleural effusion ~500 ml I would estimate on CT.  No overt wheezing.     Suspect dyspnea combination of known CHF, COPD, Right Pleural Effusion, Symptomatic Anemia.  Consulted to optimize Pulmonary aspect.     Plan:  Pleural Effusion - Thoracentesis today, appreciate radiology help.  Most certainly a post cabg effusion.  Could potentially lead to trapped lung if not addressed.       COPD FEV1 30% on pre-op [place] - no overt wheezing.  Would like to get a formal PFT w/ post BD suraj, lung volumes, and DLCO AFTER he has thoracentesis.  If FEV1 truly that low he may benefit from a combined LABA / LAMA inhaler.  No overt wheezing or h/o frequent COPD exacerbations.  Doubt he would need ICS at this time.  Already quit smoking over 10 years ago. He would qualify for ongoing lung cancer screening which can be addressed outpatient.    LUE DVT - noted, plans in place for AC with close monitoring for bleeding given recent UGIB / PUD.    Insomnia - start melatonin per patient request and prn restoril        Porter Medina MD  Intensive Care Medicine  06/16/21 13:47 EDT

## 2021-06-16 NOTE — CASE MANAGEMENT/SOCIAL WORK
Continued Stay Note  [facility]     Patient Name: Colin Albrecht  MRN: [id number]  Today's Date: 6/16/2021    Admit Date: 6/14/2021    Discharge Plan     Row Name 06/16/21 1014       Plan    Plan  Home with family transporting    Plan Comments  SW met with patient at bedside to discuss discharge planning. Denied any discharge needs at this time. Plan is home with family transporting.        Discharge Codes    No documentation.       Expected Discharge Date and Time     Expected Discharge Date Expected Discharge Time    Jun 18, 2021             FAITH Uribe (Kay)

## 2021-06-16 NOTE — PROGRESS NOTES
[facility] Medicine Services  PROGRESS NOTE    Patient Name: Colin Albrecht  : 1945  MRN: [id number]    Date of Admission: 2021  Primary Care Physician: Arun Soliman MD    Subjective   Subjective     CC:  Arm swelling    HPI:  Patient reports he had a good breakfast and feels the best this morning he has had in a while.  Does report difficulty with sleep and has not slept well in multiple days.  Has had left arm swelling much worse than the right arm swelling.  No other acute complaints and hopeful to leave the hospital soon    ROS:  Gen- No fevers, chills  CV- No chest pain, palpitations  Resp- No cough, dyspnea  GI- No N/V/D, abd pain    Objective   Objective     Vital Signs:   Temp:  [96.6 °F (35.9 °C)-97.7 °F (36.5 °C)] 97.7 °F (36.5 °C)  Heart Rate:  [69-77] 69  Resp:  [17-20] 20  BP: (118-160)/(59-75) 118/59        Physical Exam:  Constitutional: Awake, alert, no acute distress, sitting up in bed  HENT: NCAT, mucous membranes moist  Respiratory: Clear to auscultation bilaterally, respiratory effort normal   Cardiovascular: RRR, no murmurs, rubs, or gallops, palpable radial pulses  Gastrointestinal: Positive bowel sounds, soft, nontender, nondistended  Musculoskeletal: LT UE edema > RT UE edema  Psychiatric: Appropriate affect, cooperative  Neurologic: Speech clear, moves all extremities symmetrically    Results Reviewed:  Results from last 7 days   Lab Units 21  1601 21  0711 06/15/21  2348 21  0906 06/10/21  0546   WBC 10*3/mm3  --  10.01  --  31.80* 7.68   HEMOGLOBIN g/dL 9.5* 8.4* 8.1* 6.2* 9.8*   HEMATOCRIT % 30.0* 26.0* 25.4* 19.4* 29.9*   PLATELETS 10*3/mm3  --  195  --  308 230   PROCALCITONIN ng/mL  --   --   --  0.18  --      Results from last 7 days   Lab Units 21  0711 06/15/21  0331 21  0906   SODIUM mmol/L 140 141 142   POTASSIUM mmol/L 3.5 4.1 4.4   CHLORIDE mmol/L 111* 109* 108*   CO2 mmol/L 23.0 25.0 24.0   BUN mg/dL  39* 59* 79*   CREATININE mg/dL 0.84 1.18 1.34*   GLUCOSE mg/dL 107* 122* 145*   CALCIUM mg/dL 8.4* 8.4* 9.4   ALT (SGPT) U/L  --   --  17   AST (SGOT) U/L  --   --  19   TROPONIN T ng/mL  --   --  0.018   PROBNP pg/mL  --   --  1,684.0     Estimated Creatinine Clearance: 91.7 mL/min (by C-G formula based on SCr of 0.84 mg/dL).    Microbiology Results Abnormal     Procedure Component Value - Date/Time    Body Fluid Culture - Body Fluid, Pleural Cavity [[id number]] Collected: 06/16/21 1422    Lab Status: Preliminary result Specimen: Body Fluid from Pleural Cavity Updated: 06/16/21 1627     Gram Stain Few (2+) WBCs seen      No organisms seen    Blood Culture - Blood, Arm, Right [[id number]] Collected: 06/14/21 1000    Lab Status: Preliminary result Specimen: Blood from Arm, Right Updated: 06/16/21 1031     Blood Culture No growth at 2 days    Blood Culture - Blood, Hand, Right [[id number]] Collected: 06/14/21 1005    Lab Status: Preliminary result Specimen: Blood from Hand, Right Updated: 06/16/21 1031     Blood Culture No growth at 2 days    COVID PRE-OP / PRE-PROCEDURE SCREENING ORDER (NO ISOLATION) - Swab, Nasopharynx [[id number]]  (Normal) Collected: 06/15/21 1202    Lab Status: Final result Specimen: Swab from Nasopharynx Updated: 06/15/21 1221    Narrative:      The following orders were created for panel order COVID PRE-OP / PRE-PROCEDURE SCREENING ORDER (NO ISOLATION) - Swab, Nasopharynx.  Procedure                               Abnormality         Status                     ---------                               -----------         ------                     COVID-19, ABBOTT IN-HOUS...[[id number]]  Normal              Final result                 Please view results for these tests on the individual orders.    COVID-19, ABBOTT IN-HOUSE,NASAL Swab (NO TRANSPORT MEDIA) 2 HR TAT - Swab, Nasopharynx [[id number]]  (Normal) Collected: 06/15/21 1202    Lab Status: Final result Specimen: Swab from Nasopharynx Updated:  06/15/21 1221     COVID19 Presumptive Negative    Narrative:      Fact sheet for providers: https://www.fda.gov/media/150088/download     Fact sheet for patients: https://www.fda.gov/media/924093/download    Test performed by PCR.  If inconsistent with clinical signs and symptoms patient should be tested with different authorized molecular test.          Imaging Results (Last 24 Hours)     Procedure Component Value Units Date/Time    CT Guided Thoracentesis [[id number]] Collected: 06/16/21 1505     Updated: 06/16/21 1517    Narrative:      EXAMINATION: CT-GUIDED THORACENTESIS - 06/16/2021     INDICATION: K92.2-Gastrointestinal hemorrhage, unspecified;  D64.9-Anemia, unspecified; Z95.1-Presence of aortocoronary bypass graft;  I95.9-Hypotension, unspecified. Right pleural effusion.     TECHNIQUE: CT-guided right thoracentesis for a right pleural effusion.     The radiation dose reduction device was turned on for each scan per the  ALARA (As Low as Reasonably Achievable) protocol.     COMPARISON: None.     FINDINGS: The procedure and risks were explained to the patient.  Informed consent was obtained and signed. Patient was placed in the left  posterior oblique position upon the table. The right posterior chest  wall was prepped and draped in a sterile fashion. 1% lidocaine was used  as a local anesthetic. Under CT guidance, an 8 Croatian curve catheter was  advanced into the pleural fluid collection. Approximately 1.2 L of  red-tinged fluid was removed and sent to cytology for further analysis.  No immediate complication occurred.       Impression:      Significant right thoracentesis with 1.2 L of a red-tinged  fluid removed and sent to cytology for further analysis. No immediate  complication occurred.     DICTATED:   06/16/2021  EDITED/ls :   06/16/2021                Results for orders placed during the hospital encounter of 06/14/21    STAT Adult Transthoracic Echo Complete W/ Cont if Necessary Per  Protocol    Interpretation Summary  · Left ventricular ejection fraction appears to be 36 - 40%. Left ventricular systolic function is moderately decreased.  · Left ventricular diastolic function is consistent with (grade II w/high LAP) pseudonormalization.  · The right ventricular cavity is mildly dilated.  · Mildly reduced right ventricular systolic function noted.  · Left atrial volume is moderately increased.  · Estimated right ventricular systolic pressure from tricuspid regurgitation is mildly elevated (35-45 mmHg). Calculated right ventricular systolic pressure from tricuspid regurgitation is 35 mmHg.  · No significant pericardial effusion.      I have reviewed the medications:  Scheduled Meds:apixaban, 5 mg, Oral, Q12H  atorvastatin, 40 mg, Oral, Nightly  budesonide-formoterol, 2 puff, Inhalation, BID - RT  bumetanide, 0.5 mg, Oral, Daily  levothyroxine, 75 mcg, Oral, Q AM  melatonin, 10 mg, Oral, Nightly  metoprolol succinate XL, 50 mg, Oral, Q24H  multivitamin with minerals, 1 tablet, Oral, Daily  pantoprazole, 40 mg, Oral, BID AC  sacubitril-valsartan, 1 tablet, Oral, Q12H  sodium chloride, 10 mL, Intravenous, Q12H  tamsulosin, 0.4 mg, Oral, Daily  temazepam, 15 mg, Oral, Nightly      Continuous Infusions:hold, 1 each  sodium chloride, 50 mL/hr, Last Rate: Stopped (06/15/21 1630)      PRN Meds:.•  hold  •  albuterol  •  bisacodyl  •  HYDROcodone-acetaminophen  •  ipratropium-albuterol  •  ondansetron **OR** ondansetron  •  prochlorperazine **OR** prochlorperazine **OR** prochlorperazine  •  sodium chloride  •  sodium chloride  •  temazepam    Assessment/Plan   Assessment & Plan     Active Hospital Problems    Diagnosis  POA   • **Symptomatic anemia [D64.9]  Yes   • Gastrointestinal hemorrhage [K92.2]  Unknown   • Nonsustained ventricular tachycardia 6/5/2021 (CMS/HCC) [I47.2]  Yes   • COPD  [J44.9]  Yes   • Former smoker [Z87.891]  Not Applicable   • Hypertension [I10]  Yes   • S/P CABG x 2 on 6/3/2021  [Z95.1]  Not Applicable   • Suspected chronic renal insufficiency [N18.9]  Yes   • Chronic HFrEF 35-40% s/p upgrade BiV ICD and AV node ablation 3/2021 [I50.22]  Yes   • GI bleed [K92.2]  Unknown   • PAF s/p Watchman device 9/25/2020/ Bi-V ICD and AVN ablation 3/2021 [I48.21]  Yes      Resolved Hospital Problems   No resolved problems to display.        Brief Hospital Course to date:  Colin Albrecht is a 76 y.o. male with cardiomyopathy/SSS s/p ablation/pacemaker and subsequent upgraded ICD, PUD, recent CABG x2 discharge 4 days PTA return to the hospital with worsening weakness found to have Hgb of 6.2    Assessment/plan    Symptomatic anemia from GI bleed  Melena  Hx PUD, now prepyloric ulcer  -S/p 3U PRBCs  -Continue PPI bid x1 mo  -GI following, s/p EGD with prepyloric ulcer  -Aspirin on hold  -H&H remained stable today    Acute LT UE DVT, provoked  -Discussed with cardiology who discussed with GI, plan for initiation of apixaban after thoracentesis    CAD s/p recent CABG  Systolic CHF, EF 36-40%, compensated  Postoperative pleural effusion  -S/p two-vessel CABG 6/3/2021  -Aspirin held for anemia/bleeding  -Continue statin, Toprol-XL, oral Bumex, Entresto  -Pulmonology following, planned thoracentesis today    Dyspnea  COPD  -Recent CTA w/o PE, BL effusions noted; discharged on steroids for COPD exacerbation  -Steroids on hold for GI bleed  -Continue nebs  -On room air    Paroxysmal atrial fibrillation  SSS Hx ablation  Recent upgraded ICD  Perioperative VT/VF last hospitalization  -Hx watchman device, no anticoagulation  -Rate controlled  -Cardiology following    CKD stage 3  -Baseline creatinine 1.1-1.3; EGFR 50s  -At approximate baseline    Hypothyroidism  -Continue levothyroxine    DVT prophylaxis:  Medical and mechanical DVT prophylaxis orders are present.       Disposition: Potentially home tomorrow if H&H remained stable and no complications post thoracentesis    CODE STATUS:   Code Status and Medical  Interventions:   Ordered at: 06/14/21 1211     Level Of Support Discussed With:    Patient     Code Status:    CPR     Medical Interventions (Level of Support Prior to Arrest):    Full       Oziel Kimble,   06/16/21

## 2021-06-16 NOTE — PROGRESS NOTES
[facility] at [facility]  INPATIENT PROGRESS NOTE         [facility] 5H    6/16/2021      PATIENT IDENTIFICATION:   Name:  Colin Albrecht      MRN:  [id number]     76 y.o.  male             Reason for visit: CAD, CHF, A. fib      SUBJECTIVE:   Patient states he feels better this morning, walked, less short of breath.  Up eating breakfast this morning.  He is eager to return home today.  He does complain of left upper extremity edema and swelling, no pain.     OBJECTIVE:  Vitals:    06/15/21 2112 06/16/21 0007 06/16/21 0300 06/16/21 0831   BP: 160/71 144/65 134/69 156/69   BP Location:  Left arm Left arm    Patient Position:  Lying Lying    Pulse: 69 73 77 75   Resp: 17 20 18    Temp:  96.6 °F (35.9 °C) 96.8 °F (36 °C)    TempSrc:  Axillary Axillary    SpO2: 99%      Weight:       Height:               Body mass index is 24.2 kg/m².    Intake/Output Summary (Last 24 hours) at 6/16/2021 0904  Last data filed at 6/16/2021 0300  Gross per 24 hour   Intake 200 ml   Output 950 ml   Net -750 ml       Telemetry: Personally reviewed, paced at 60 bpm    Exam:  General Appearance:   · well developed  · well nourished  Neck:  · thyroid not enlarged  · supple  Respiratory:  · no respiratory distress  · normal breath sounds  · no rales  Cardiovascular:  · no jugular venous distention  · regular rhythm  · apical impulse normal  · S1 normal, S2 normal  · no S3, no S4   · no murmur  · no rub, no thrill  · carotid pulses normal; no bruit  · pedal pulses normal  · lower extremity edema: Trace  · Left upper extremity, edematous, ecchymotic in the AC fold  Skin:   warm, dry      No Known Allergies  Scheduled meds:       atorvastatin, 40 mg, Oral, Nightly  budesonide-formoterol, 2 puff, Inhalation, BID - RT  bumetanide, 0.5 mg, Oral, Daily  ipratropium-albuterol, 3 mL, Nebulization, 4x Daily - RT  levothyroxine, 75 mcg, Oral, Q AM  metoprolol succinate XL, 50 mg, Oral, Q24H  multivitamin with  minerals, 1 tablet, Oral, Daily  pantoprazole, 40 mg, Intravenous, Q12H  sacubitril-valsartan, 1 tablet, Oral, Q12H  sodium chloride, 10 mL, Intravenous, Q12H  tamsulosin, 0.4 mg, Oral, Daily      IV meds:                      hold, 1 each  lactated ringers, 9 mL/hr  sodium chloride, 50 mL/hr, Last Rate: Stopped (06/15/21 1630)      Data Review:  Results from last 7 days   Lab Units 06/16/21  0711 06/15/21  0331 06/14/21  0906 06/10/21  0905   SODIUM mmol/L 140 141 142 141   BUN mg/dL 39* 59* 79* 34*   CREATININE mg/dL 0.84 1.18 1.34* 1.25   GLUCOSE mg/dL 107* 122* 145* 223*     Results from last 7 days   Lab Units 06/16/21  0711 06/15/21  2348 06/15/21  1706 06/15/21  0802 06/14/21  2347 06/14/21  0906 06/10/21  0546   WBC 10*3/mm3 10.01  --   --   --   --  31.80* 7.68   HEMOGLOBIN g/dL 8.4* 8.1* 8.9* 8.7* 8.7* 6.2* 9.8*         Results from last 7 days   Lab Units 06/14/21  0906   ALT (SGPT) U/L 17   AST (SGOT) U/L 19     No results found for: DIGOXIN   Lab Results   Component Value Date    TSH 3.830 06/03/2021           Invalid input(s): LDLCALC    Estimated Creatinine Clearance: 91.9 mL/min (by C-G formula based on SCr of 0.84 mg/dL).        Imaging (last 24 hr):   I personally reviewed the most recent chest x-ray and other pertinent imaging studies.  Results for orders placed during the hospital encounter of 06/14/21    XR Chest 1 View    Narrative  EXAMINATION: XR CHEST 1 VW-06/14/2021:    INDICATION: SOA, triage protocol.    COMPARISON: Chest x-ray 06/10/2021.    FINDINGS: Cardiac size enlarged status post median sternotomy and CABG  with bibasilar opacifications, right slightly greater than left, and  probable trace to small pleural effusions similar to prior.    Impression  Similar central vascular congestion appearance and trace to  small bilateral pleural effusions from prior.    D:  06/14/2021  E:  06/14/2021    This report was finalized on 6/14/2021 4:26 PM by Dr. Ap Harris.        Last  ECHO:  Results for orders placed during the hospital encounter of 06/14/21    STAT Adult Transthoracic Echo Complete W/ Cont if Necessary Per Protocol    Interpretation Summary  · Left ventricular ejection fraction appears to be 36 - 40%. Left ventricular systolic function is moderately decreased.  · Left ventricular diastolic function is consistent with (grade II w/high LAP) pseudonormalization.  · The right ventricular cavity is mildly dilated.  · Mildly reduced right ventricular systolic function noted.  · Left atrial volume is moderately increased.  · Estimated right ventricular systolic pressure from tricuspid regurgitation is mildly elevated (35-45 mmHg). Calculated right ventricular systolic pressure from tricuspid regurgitation is 35 mmHg.  · No significant pericardial effusion.        PROBLEM LIST:     Symptomatic anemia    PAF s/p Watchman device 9/25/2020/ Bi-V ICD and AVN ablation 3/2021    GI bleed    Chronic HFrEF 35-40% s/p upgrade BiV ICD and AV node ablation 3/2021    Hypertension    Former smoker    COPD     Suspected chronic renal insufficiency    S/P CABG x 2 on 6/3/2021    Nonsustained ventricular tachycardia 6/5/2021 (CMS/HCC)    Gastrointestinal hemorrhage      Initial cardiac assessment: Pleasant 76-year-old man from [place] follows with Dr. Park and Dr. Prado, history of chronic systolic heart failure, BiV ICD and AV node ablation, permanent atrial fibrillation, watchman device, CKD stage III.  Presented with symptoms concerning for unstable angina, LHC on 6/2/2021 showed 80% ostial left main stenosis.    ASSESSMENT/PLAN:  1.  CAD, 80% left main:  Dr. Shields performed 2V CABG 6/3/2020 (LIMA-LAD, SVG-circumflex)  EF 35-40%, no pericardial effusion on echo 6/14/2021  Continue aspirin  Continue metoprolol  Atorvastatin 40 mg daily    2.  Chronic systolic heart failure:  Echo 6/2/2021 showed EF 35-40% with moderate MR and moderate LAE.  Continue guideline directed medical therapy  Continue  Bumex  Changed short acting metoprolol to long-acting  Changed ramipril to Entresto, after 36-hour washout      3.  Frequent PVCs/NSVT:  BiV ICD adjusted on day of admission, now paced at 60 with minimal PVCs  [employer] ICD interrogated 6 S14/21, BiV paced 99%, PVCs much improved    Base rate increased to 70 bpm 6-15/21, tolerating well.    4.  Anemia:  History of PUD  Transfuse for hemoglobin goal greater than 7, GI following, EGD 6/15/2021 showed nonbleeding ulcer    5.  Permanent atrial fibrillation:  Status post AV node ablation and BiV ICD placement with Dr. Prado   9/2020  Watchman in place due to history of GI bleed  No anticoagulation needed    6.  Persistent dyspnea despite revascularization:  Most likely multifactorial given underlying COPD, CAD, systolic heart failure, anemia  Appreciate pulmonary's assistance, plan for thoracentesis this morning  Continue Bumex at current dose  Repeat echo 6/14/2021 shows EF 35-40%, no significant change from previous.  No significant pericardial effusion or new valvular lesion.    7.  New left upper extremity swelling:  Left upper extremity duplex ordered.          Pietro Quintana MD  6/16/2021    09:04 EDT     Addendum:11:04 EDT  Left upper extremity duplex shows acute DVT in the left subclavian and partial on the left axillary as well as nonocclusive SVT of the left cephalic vein in the upper arm.  I discussed the situation with Dr. Warren and Dr. Horta by phone, both are in agreement that anticoagulation is necessary, therefore we will start Eliquis 5 mg twice daily (will not start DVT treatment dosing due to recent GI bleed).  Hold aspirin while on Eliquis.  Patient should remain in the hospital and monitor hemoglobin in the morning.  Patient will be instructed to monitor for signs of bleeding.  He will need a hemoglobin checked 4-5 days post discharge.    Pietro Quintana M.D., F.A.C.C.  Interventional Cardiology  06/16/21  11:05 EDT

## 2021-06-17 ENCOUNTER — APPOINTMENT (OUTPATIENT)
Dept: PULMONOLOGY | Facility: HOSPITAL | Age: 76
End: 2021-06-17

## 2021-06-17 ENCOUNTER — APPOINTMENT (OUTPATIENT)
Dept: GENERAL RADIOLOGY | Facility: HOSPITAL | Age: 76
End: 2021-06-17

## 2021-06-17 VITALS
SYSTOLIC BLOOD PRESSURE: 116 MMHG | TEMPERATURE: 97.8 F | DIASTOLIC BLOOD PRESSURE: 62 MMHG | HEIGHT: 74 IN | OXYGEN SATURATION: 98 % | RESPIRATION RATE: 18 BRPM | HEART RATE: 70 BPM | BODY MASS INDEX: 24.13 KG/M2 | WEIGHT: 188 LBS

## 2021-06-17 LAB
ANION GAP SERPL CALCULATED.3IONS-SCNC: 5 MMOL/L (ref 5–15)
BASOPHILS # BLD AUTO: 0.01 10*3/MM3 (ref 0–0.2)
BASOPHILS NFR BLD AUTO: 0.1 % (ref 0–1.5)
BUN SERPL-MCNC: 30 MG/DL (ref 8–23)
BUN/CREAT SERPL: 31.3 (ref 7–25)
CALCIUM SPEC-SCNC: 8.3 MG/DL (ref 8.6–10.5)
CHLORIDE SERPL-SCNC: 107 MMOL/L (ref 98–107)
CO2 SERPL-SCNC: 25 MMOL/L (ref 22–29)
CREAT SERPL-MCNC: 0.96 MG/DL (ref 0.76–1.27)
CYTO UR: NORMAL
DEPRECATED RDW RBC AUTO: 59.4 FL (ref 37–54)
EOSINOPHIL # BLD AUTO: 0.21 10*3/MM3 (ref 0–0.4)
EOSINOPHIL NFR BLD AUTO: 2.7 % (ref 0.3–6.2)
ERYTHROCYTE [DISTWIDTH] IN BLOOD BY AUTOMATED COUNT: 18.5 % (ref 12.3–15.4)
GFR SERPL CREATININE-BSD FRML MDRD: 76 ML/MIN/1.73
GLUCOSE SERPL-MCNC: 142 MG/DL (ref 65–99)
HCT VFR BLD AUTO: 26.7 % (ref 37.5–51)
HGB BLD-MCNC: 8.6 G/DL (ref 13–17.7)
IMM GRANULOCYTES # BLD AUTO: 0.11 10*3/MM3 (ref 0–0.05)
IMM GRANULOCYTES NFR BLD AUTO: 1.4 % (ref 0–0.5)
LAB AP CASE REPORT: NORMAL
LAB AP CLINICAL INFORMATION: NORMAL
LYMPHOCYTES # BLD AUTO: 0.84 10*3/MM3 (ref 0.7–3.1)
LYMPHOCYTES NFR BLD AUTO: 10.8 % (ref 19.6–45.3)
MCH RBC QN AUTO: 31.3 PG (ref 26.6–33)
MCHC RBC AUTO-ENTMCNC: 32.2 G/DL (ref 31.5–35.7)
MCV RBC AUTO: 97.1 FL (ref 79–97)
MONOCYTES # BLD AUTO: 0.85 10*3/MM3 (ref 0.1–0.9)
MONOCYTES NFR BLD AUTO: 10.9 % (ref 5–12)
NEUTROPHILS NFR BLD AUTO: 5.79 10*3/MM3 (ref 1.7–7)
NEUTROPHILS NFR BLD AUTO: 74.1 % (ref 42.7–76)
NRBC BLD AUTO-RTO: 0.3 /100 WBC (ref 0–0.2)
PATH REPORT.FINAL DX SPEC: NORMAL
PATH REPORT.GROSS SPEC: NORMAL
PLATELET # BLD AUTO: 202 10*3/MM3 (ref 140–450)
PMV BLD AUTO: 10 FL (ref 6–12)
POTASSIUM SERPL-SCNC: 3.6 MMOL/L (ref 3.5–5.2)
RBC # BLD AUTO: 2.75 10*6/MM3 (ref 4.14–5.8)
SODIUM SERPL-SCNC: 137 MMOL/L (ref 136–145)
WBC # BLD AUTO: 7.81 10*3/MM3 (ref 3.4–10.8)

## 2021-06-17 PROCEDURE — 94060 EVALUATION OF WHEEZING: CPT | Performed by: INTERNAL MEDICINE

## 2021-06-17 PROCEDURE — 63710000001 SACUBITRIL-VALSARTAN 24-26 MG TABLET: Performed by: INTERNAL MEDICINE

## 2021-06-17 PROCEDURE — 63710000001 LEVOTHYROXINE 75 MCG TABLET: Performed by: INTERNAL MEDICINE

## 2021-06-17 PROCEDURE — 99217 PR OBSERVATION CARE DISCHARGE MANAGEMENT: CPT | Performed by: INTERNAL MEDICINE

## 2021-06-17 PROCEDURE — A9270 NON-COVERED ITEM OR SERVICE: HCPCS | Performed by: INTERNAL MEDICINE

## 2021-06-17 PROCEDURE — 80048 BASIC METABOLIC PNL TOTAL CA: CPT | Performed by: INTERNAL MEDICINE

## 2021-06-17 PROCEDURE — 63710000001 BISACODYL 5 MG TABLET DELAYED-RELEASE: Performed by: INTERNAL MEDICINE

## 2021-06-17 PROCEDURE — 63710000001 APIXABAN 5 MG TABLET: Performed by: INTERNAL MEDICINE

## 2021-06-17 PROCEDURE — G0378 HOSPITAL OBSERVATION PER HR: HCPCS

## 2021-06-17 PROCEDURE — 63710000001 METOPROLOL SUCCINATE XL 50 MG TABLET SUSTAINED-RELEASE 24 HOUR: Performed by: INTERNAL MEDICINE

## 2021-06-17 PROCEDURE — 94799 UNLISTED PULMONARY SVC/PX: CPT

## 2021-06-17 PROCEDURE — 71045 X-RAY EXAM CHEST 1 VIEW: CPT

## 2021-06-17 PROCEDURE — A9270 NON-COVERED ITEM OR SERVICE: HCPCS

## 2021-06-17 PROCEDURE — 94727 GAS DIL/WSHOT DETER LNG VOL: CPT

## 2021-06-17 PROCEDURE — 85025 COMPLETE CBC W/AUTO DIFF WBC: CPT | Performed by: INTERNAL MEDICINE

## 2021-06-17 PROCEDURE — 94727 GAS DIL/WSHOT DETER LNG VOL: CPT | Performed by: INTERNAL MEDICINE

## 2021-06-17 PROCEDURE — 63710000001 PANTOPRAZOLE 40 MG TABLET DELAYED-RELEASE

## 2021-06-17 PROCEDURE — 63710000001 MULTIVITAMIN WITH MINERALS TABLET: Performed by: INTERNAL MEDICINE

## 2021-06-17 PROCEDURE — 63710000001 BUMETANIDE 1 MG TABLET: Performed by: INTERNAL MEDICINE

## 2021-06-17 PROCEDURE — 63710000001 TAMSULOSIN 0.4 MG CAPSULE: Performed by: INTERNAL MEDICINE

## 2021-06-17 PROCEDURE — 94060 EVALUATION OF WHEEZING: CPT

## 2021-06-17 PROCEDURE — 99232 SBSQ HOSP IP/OBS MODERATE 35: CPT | Performed by: INTERNAL MEDICINE

## 2021-06-17 RX ORDER — ALBUTEROL SULFATE 2.5 MG/3ML
2.5 SOLUTION RESPIRATORY (INHALATION) ONCE
Status: COMPLETED | OUTPATIENT
Start: 2021-06-17 | End: 2021-06-17

## 2021-06-17 RX ORDER — BISACODYL 5 MG
5 TABLET, DELAYED RELEASE (ENTERIC COATED) ORAL DAILY PRN
Qty: 15 TABLET | Refills: 0 | Status: SHIPPED | OUTPATIENT
Start: 2021-06-17 | End: 2021-06-30

## 2021-06-17 RX ORDER — ALBUTEROL SULFATE 90 UG/1
2 AEROSOL, METERED RESPIRATORY (INHALATION) EVERY 4 HOURS PRN
Qty: 18 G | Refills: 0 | Status: SHIPPED | OUTPATIENT
Start: 2021-06-17 | End: 2021-06-30

## 2021-06-17 RX ORDER — PANTOPRAZOLE SODIUM 40 MG/1
40 TABLET, DELAYED RELEASE ORAL
Qty: 60 TABLET | Refills: 0 | Status: SHIPPED | OUTPATIENT
Start: 2021-06-17 | End: 2021-06-30 | Stop reason: SDUPTHER

## 2021-06-17 RX ORDER — METOPROLOL SUCCINATE 50 MG/1
50 TABLET, EXTENDED RELEASE ORAL
Qty: 30 TABLET | Refills: 0 | Status: SHIPPED | OUTPATIENT
Start: 2021-06-18 | End: 2021-06-30

## 2021-06-17 RX ADMIN — ALBUTEROL SULFATE 2.5 MG: 2.5 SOLUTION RESPIRATORY (INHALATION) at 10:03

## 2021-06-17 RX ADMIN — TAMSULOSIN HYDROCHLORIDE 0.4 MG: 0.4 CAPSULE ORAL at 08:43

## 2021-06-17 RX ADMIN — LEVOTHYROXINE SODIUM 75 MCG: 0.07 TABLET ORAL at 05:54

## 2021-06-17 RX ADMIN — BUMETANIDE 0.5 MG: 1 TABLET ORAL at 08:43

## 2021-06-17 RX ADMIN — SODIUM CHLORIDE, PRESERVATIVE FREE 10 ML: 5 INJECTION INTRAVENOUS at 08:43

## 2021-06-17 RX ADMIN — METOPROLOL SUCCINATE 50 MG: 50 TABLET, EXTENDED RELEASE ORAL at 08:43

## 2021-06-17 RX ADMIN — Medication 1 TABLET: at 08:43

## 2021-06-17 RX ADMIN — BISACODYL 10 MG: 5 TABLET, COATED ORAL at 12:12

## 2021-06-17 RX ADMIN — SACUBITRIL AND VALSARTAN 1 TABLET: 24; 26 TABLET, FILM COATED ORAL at 08:43

## 2021-06-17 RX ADMIN — APIXABAN 5 MG: 5 TABLET, FILM COATED ORAL at 08:43

## 2021-06-17 RX ADMIN — PANTOPRAZOLE SODIUM 40 MG: 40 TABLET, DELAYED RELEASE ORAL at 08:43

## 2021-06-17 NOTE — CASE MANAGEMENT/SOCIAL WORK
Case Management Discharge Note      Final Note: SHIMON spoke with patient and guest to discussed discharge planning. Denied any discharge needs at this time. Plan is home with family transporting.         Selected Continued Care - Discharged on 6/17/2021 Admission date: 6/14/2021 - Discharge disposition: Home or Self Care    Destination    No services have been selected for the patient.              Durable Medical Equipment    No services have been selected for the patient.              Dialysis/Infusion    No services have been selected for the patient.              Home Medical Care    No services have been selected for the patient.              Therapy    No services have been selected for the patient.              Community Resources    No services have been selected for the patient.              Community & DME    No services have been selected for the patient.                       Final Discharge Disposition Code: 01 - home or self-care

## 2021-06-17 NOTE — PROGRESS NOTES
[facility] at [facility]  INPATIENT PROGRESS NOTE         [facility] 5H    6/17/2021      PATIENT IDENTIFICATION:   Name:  Colin Albrecht      MRN:  [id number]     76 y.o.  male             Reason for visit: CAD, CHF, A. fib      SUBJECTIVE:   Patient off the floor at time of my visit     OBJECTIVE:  Vitals:    06/16/21 2358 06/17/21 0354 06/17/21 0839 06/17/21 1003   BP: 113/55 101/77 116/62    BP Location: Right arm Left arm Left arm    Patient Position: Lying Lying Lying    Pulse: 69 77 69 70   Resp: 16 16 16 18   Temp: 97.6 °F (36.4 °C) 97.5 °F (36.4 °C) 97.8 °F (36.6 °C)    TempSrc: Oral Oral Oral    SpO2:  95% 98%    Weight:       Height:               Body mass index is 24.14 kg/m².    Intake/Output Summary (Last 24 hours) at 6/17/2021 1058  Last data filed at 6/17/2021 0956  Gross per 24 hour   Intake 810 ml   Output 1100 ml   Net -290 ml       Telemetry: Personally reviewed, paced at 60 bpm    Exam:      No Known Allergies  Scheduled meds:       apixaban, 5 mg, Oral, Q12H  atorvastatin, 40 mg, Oral, Nightly  budesonide-formoterol, 2 puff, Inhalation, BID - RT  bumetanide, 0.5 mg, Oral, Daily  levothyroxine, 75 mcg, Oral, Q AM  melatonin, 10 mg, Oral, Nightly  metoprolol succinate XL, 50 mg, Oral, Q24H  multivitamin with minerals, 1 tablet, Oral, Daily  pantoprazole, 40 mg, Oral, BID AC  sacubitril-valsartan, 1 tablet, Oral, Q12H  sodium chloride, 10 mL, Intravenous, Q12H  tamsulosin, 0.4 mg, Oral, Daily  temazepam, 15 mg, Oral, Nightly      IV meds:                      sodium chloride, 50 mL/hr, Last Rate: Stopped (06/15/21 1630)      Data Review:  Results from last 7 days   Lab Units 06/17/21  0605 06/16/21  0711 06/15/21  0331 06/14/21  0906   SODIUM mmol/L 137 140 141 142   BUN mg/dL 30* 39* 59* 79*   CREATININE mg/dL 0.96 0.84 1.18 1.34*   GLUCOSE mg/dL 142* 107* 122* 145*     Results from last 7 days   Lab Units 06/17/21  0605 06/16/21  1601 06/16/21  0711  06/15/21  2348 06/15/21  1706 06/14/21  0906   WBC 10*3/mm3 7.81  --  10.01  --   --  31.80*   HEMOGLOBIN g/dL 8.6* 9.5* 8.4* 8.1* 8.9* 6.2*         Results from last 7 days   Lab Units 06/14/21  0906   ALT (SGPT) U/L 17   AST (SGOT) U/L 19     No results found for: DIGOXIN   Lab Results   Component Value Date    TSH 3.830 06/03/2021           Invalid input(s): LDLCALC    Estimated Creatinine Clearance: 80.2 mL/min (by C-G formula based on SCr of 0.96 mg/dL).        Imaging (last 24 hr):   I personally reviewed the most recent chest x-ray and other pertinent imaging studies.  Results for orders placed during the hospital encounter of 06/14/21    XR Chest 1 View    Narrative  EXAMINATION: XR CHEST 1 VW- 06/17/2021    INDICATION: f/u after thoracentesis; K92.2-Gastrointestinal hemorrhage,  unspecified; D64.9-Anemia, unspecified; Z95.1-Presence of aortocoronary  bypass graft; I95.9-Hypotension, unspecified    COMPARISON: 06/14/2021    FINDINGS: Right pleural effusion is no longer seen. There is only trace  if any left effusion. No pneumothorax or other new pulmonary parenchymal  disease is seen. Heart is normal in size. Vasculature remains  cephalized.    Impression  No evidence of remaining right pleural effusion. No  pneumothorax or other new chest disease is seen.    D:  06/17/2021  E:  06/17/2021        Last ECHO:  Results for orders placed during the hospital encounter of 06/14/21    STAT Adult Transthoracic Echo Complete W/ Cont if Necessary Per Protocol    Interpretation Summary  · Left ventricular ejection fraction appears to be 36 - 40%. Left ventricular systolic function is moderately decreased.  · Left ventricular diastolic function is consistent with (grade II w/high LAP) pseudonormalization.  · The right ventricular cavity is mildly dilated.  · Mildly reduced right ventricular systolic function noted.  · Left atrial volume is moderately increased.  · Estimated right ventricular systolic pressure from  tricuspid regurgitation is mildly elevated (35-45 mmHg). Calculated right ventricular systolic pressure from tricuspid regurgitation is 35 mmHg.  · No significant pericardial effusion.        PROBLEM LIST:     Symptomatic anemia    PAF s/p Watchman device 9/25/2020/ Bi-V ICD and AVN ablation 3/2021    GI bleed    Chronic HFrEF 35-40% s/p upgrade BiV ICD and AV node ablation 3/2021    Hypertension    Former smoker    COPD     Suspected chronic renal insufficiency    S/P CABG x 2 on 6/3/2021    Nonsustained ventricular tachycardia 6/5/2021 (CMS/HCC)    Gastrointestinal hemorrhage      Initial cardiac assessment: Pleasant 76-year-old man from [place] follows with Dr. Park and Dr. Prado, history of chronic systolic heart failure, BiV ICD and AV node ablation, permanent atrial fibrillation, watchman device, CKD stage III.  Presented with symptoms concerning for unstable angina, LHC on 6/2/2021 showed 80% ostial left main stenosis.    ASSESSMENT/PLAN:  1.  CAD, 80% left main:  Dr. Shields performed 2V CABG 6/3/2020 (LIMA-LAD, SVG-circumflex)  EF 35-40%, no pericardial effusion on echo 6/14/2021  Continue aspirin  Continue metoprolol  Atorvastatin 40 mg daily    2.  Chronic systolic heart failure:  Echo 6/2/2021 showed EF 35-40% with moderate MR and moderate LAE.  Continue guideline directed medical therapy  Continue Bumex  Continue Toprol and Entresto      3.  Frequent PVCs/NSVT:  BiV ICD adjusted on day of admission, now paced at 60 with minimal PVCs  Medtronic ICD interrogated 6 S14/21, BiV paced 99%, PVCs much improved    Base rate increased to 70 bpm 6-15/21, tolerating well.    4.  Anemia:  History of PUD  Transfuse for hemoglobin goal greater than 7, GI following, EGD 6/15/2021 showed nonbleeding ulcer    5.  Permanent atrial fibrillation:  Status post AV node ablation and BiV ICD placement with Dr. Prado   9/2020  Watchman in place due to history of GI bleed    6.  Persistent dyspnea despite  revascularization:  Most likely multifactorial given underlying COPD, CAD, systolic heart failure, anemia  Thoracentesis 6/16/2021 resulted in 1.2 L of serosanguineous fluid removed.  Overall patient improving from dyspnea standpoint.  Continue Bumex at current dose  Repeat echo 6/14/2021 shows EF 35-40%, no significant change from previous.  No significant pericardial effusion or new valvular lesion.    7.  Left upper extremity DVT:  Discussed in detail with Dr. Warren, Adam, and Shyanne  All in agreement for Eliquis 5 mg twice daily.  No aspirin while on Eliquis  3 months treatment duration due to high risk of recurrent GI hemorrhage.  All risk benefits and alternatives explained in detail the patient, he is agreeable with this plan.    Okay for discharge from cardiac standpoint.  Follow-up with primary cardiologist in [place] in 3-4 weeks    Patient needs hemoglobin checked in 2-3 days      Pietro Quintana MD  6/17/2021    10:58 EDT

## 2021-06-17 NOTE — PLAN OF CARE
Problem: Fall Injury Risk  Goal: Absence of Fall and Fall-Related Injury  Intervention: Promote Injury-Free Environment  Recent Flowsheet Documentation  Taken 6/17/2021 0800 by Kiel Fofana, RN  Safety Promotion/Fall Prevention:   activity supervised   assistive device/personal items within reach   clutter free environment maintained   fall prevention program maintained   nonskid shoes/slippers when out of bed   room organization consistent   safety round/check completed     Problem: Adult Inpatient Plan of Care  Goal: Absence of Hospital-Acquired Illness or Injury  Intervention: Prevent Skin Injury  Recent Flowsheet Documentation  Taken 6/17/2021 1000 by Kiel Fofana, RN  Skin Protection:   adhesive use limited   incontinence pads utilized   tubing/devices free from skin contact  Taken 6/17/2021 0800 by Kiel Fofana, RN  Body Position: position changed independently  Skin Protection:   adhesive use limited   incontinence pads utilized   tubing/devices free from skin contact     Problem: Adult Inpatient Plan of Care  Goal: Optimal Comfort and Wellbeing  Intervention: Provide Person-Centered Care  Recent Flowsheet Documentation  Taken 6/17/2021 0800 by Kiel Fofana, RN  Trust Relationship/Rapport:   choices provided   care explained   empathic listening provided   questions answered   questions encouraged   reassurance provided   thoughts/feelings acknowledged   emotional support provided   Goal Outcome Evaluation:

## 2021-06-17 NOTE — PROGRESS NOTES
Apixaban (Eliquis) Education    Patient is on Apixaban. Education provided on 6/17/21 verbally and in writing. Information provided includes effects of medication, drug-drug and drug-food interactions, and signs/symptoms of bleeding and clotting. Patient verbalized understanding through teach back. All pertinent questions were answered.    Crow Cunha, PharmD, BCPS  6/17/2021  13:23 EDT

## 2021-06-17 NOTE — PROGRESS NOTES
INTENSIVIST / PULMONARY FOLLOW UP NOTE     Hospital:  LOS: 0 days   Mr. Colin Albrecht, 76 y.o. male is followed for:     Symptomatic anemia    PAF s/p Watchman device 9/25/2020/ Bi-V ICD and AVN ablation 3/2021    GI bleed    Chronic HFrEF 35-40% s/p upgrade BiV ICD and AV node ablation 3/2021    Hypertension    Former smoker    COPD     Suspected chronic renal insufficiency    S/P CABG x 2 on 6/3/2021    Nonsustained ventricular tachycardia 6/5/2021 (CMS/HCC)    Gastrointestinal hemorrhage          SUBJECTIVE   Feeling much better today, had thoracentesis and 1.2 liters drained    The patient's relevant past medical, surgical, family, and social history were reviewed    Allergies and medications were reviewed    ROS:  Per subjective, all other systems were reviewed and were negative        OBJECTIVE     Vital Sign Min/Max for last 24 hours:  Temp  Min: 97.5 °F (36.4 °C)  Max: 97.8 °F (36.6 °C)   BP  Min: 101/77  Max: 143/75   Pulse  Min: 69  Max: 77   Resp  Min: 16  Max: 20   SpO2  Min: 92 %  Max: 100 %   No data recorded     Physical Exam:  General Appearance:  Conversant, in no acute distress  Eyes:  No scleral icterus or pallor, pupils normal  Ears, Nose, Mouth, Throat:  Atraumatic, oropharynx clear  Neck:  Trachea midline, thyroid normal  Respiratory:  Clear to auscultation bilaterally, normal effort, no tenderness to palpation  Cardiovascular:  Regular rate and rhythm, no murmurs, no peripheral edema, no thrill  Gastrointestinal:  Soft, nontender, nondistended, no hepatosplenomegaly  Skin:  Normal temperature, no rash  Psychiatric:  Alert and oriented x 3, normal judgement and insight  Neuro:  No new focal neurologic deficits observed    Telemetry:              Hemodynamics:   CVP:     PAP:     PAOP:     CO:     CI:     SVI:     SVR:       SpO2: 98 % SpO2  Min: 92 %  Max: 100 %   Device:      Flow Rate:   No data recorded     Mechanical Ventilator Settings:                                          Intake/Ouptut 24 hrs (7:00AM - 6:59 AM)  Intake & Output (last 3 days)       06/14 0701 - 06/15 0700 06/15 0701 - 06/16 0700 06/16 0701 - 06/17 0700 06/17 0701 - 06/18 0700    P.O.   690 360    I.V. (mL/kg)  200 (2.3)      Blood 1406       Total Intake(mL/kg) 1406 (16.4) 200 (2.3) 690 (8.1) 360 (4.2)    Urine (mL/kg/hr) 2175 950 (0.5) 900 (0.4) 200 (0.4)    Stool 0       Total Output 2175 950 900 200    Net -769 -750 -210 +160            Urine Unmeasured Occurrence   1 x     Stool Unmeasured Occurrence 1 x             Lines, Drains & Airways    Active LDAs     Name:   Placement date:   Placement time:   Site:   Days:    Peripheral IV 06/14/21 0905 Right Antecubital   06/14/21    0905    Antecubital   2                Hematology:  Results from last 7 days   Lab Units 06/17/21  0605 06/16/21  1601 06/16/21  0711 06/15/21  2348 06/15/21  1706 06/15/21  0802 06/14/21  2347 06/14/21  0906   WBC 10*3/mm3 7.81  --  10.01  --   --   --   --  31.80*   HEMOGLOBIN g/dL 8.6* 9.5* 8.4* 8.1* 8.9* 8.7* 8.7* 6.2*   HEMATOCRIT % 26.7* 30.0* 26.0* 25.4* 29.2* 27.1* 27.4* 19.4*   PLATELETS 10*3/mm3 202  --  195  --   --   --   --  308   MONOCYTES % %  --   --   --   --   --   --   --  4.0*     Electrolytes, Magnesium and Phosphorus:  Results from last 7 days   Lab Units 06/17/21  0605 06/16/21  0711 06/15/21  0331 06/14/21  0906   SODIUM mmol/L 137 140 141 142   CHLORIDE mmol/L 107 111* 109* 108*   POTASSIUM mmol/L 3.6 3.5 4.1 4.4   CO2 mmol/L 25.0 23.0 25.0 24.0   MAGNESIUM mg/dL  --   --   --  2.4     Renal:  Results from last 7 days   Lab Units 06/17/21  0605 06/16/21  0711 06/15/21  0331 06/14/21  0906   CREATININE mg/dL 0.96 0.84 1.18 1.34*   BUN mg/dL 30* 39* 59* 79*     Estimated Creatinine Clearance: 80.2 mL/min (by C-G formula based on SCr of 0.96 mg/dL).  Hepatic:  Results from last 7 days   Lab Units 06/14/21  0906   ALK PHOS U/L 61   BILIRUBIN mg/dL 0.7   ALT (SGPT) U/L 17   AST (SGOT) U/L 19     Arterial Blood  Gases:              Lab Results   Component Value Date    LACTATE 2.8 (C) 06/14/2021       Relevant imaging studies and labs from 06/17/21 were reviewed and interpreted by me    Medications (drips):  sodium chloride, Last Rate: Stopped (06/15/21 1630)        apixaban, 5 mg, Oral, Q12H  atorvastatin, 40 mg, Oral, Nightly  budesonide-formoterol, 2 puff, Inhalation, BID - RT  bumetanide, 0.5 mg, Oral, Daily  levothyroxine, 75 mcg, Oral, Q AM  melatonin, 10 mg, Oral, Nightly  metoprolol succinate XL, 50 mg, Oral, Q24H  multivitamin with minerals, 1 tablet, Oral, Daily  pantoprazole, 40 mg, Oral, BID AC  sacubitril-valsartan, 1 tablet, Oral, Q12H  sodium chloride, 10 mL, Intravenous, Q12H  tamsulosin, 0.4 mg, Oral, Daily  temazepam, 15 mg, Oral, Nightly        Assessment/Plan   IMPRESSION / PLAN     Inpatient Problem List:  76 y.o.male:  Active Hospital Problems    Diagnosis    • **Symptomatic anemia    • Gastrointestinal hemorrhage      Added automatically from request for surgery [id number]     • Nonsustained ventricular tachycardia 6/5/2021 (CMS/[facility])    • COPD     • Former smoker    • Hypertension    • S/P CABG x 2 on 6/3/2021    • Suspected chronic renal insufficiency    • Chronic HFrEF 35-40% s/p upgrade BiV ICD and AV node ablation 3/2021      a. Patient reports normal LHC 10-12 years ago  b. Echocardiogram 1/27/2016: EF 45 to 55%, unchanged from previous echo 2012  c. Echocardiogram 8/27/2019: EF 35%, mild to moderate MR, mild to moderate TR, RVSP 40 mmHg  d. Nuclear stress test 8/26/2019: Normal LV perfusion EF 33%  e. Upgrade to BIV ICD at time of AV node ablation 3/2021     • GI bleed    • PAF s/p Watchman device 9/25/2020/ Bi-V ICD and AVN ablation 3/2021      a. CHADsvasc = 4 off Xarelto x 1 year due to bleeding, never been on Eliquis  b. Echocardiogram 1/27/2016: EF 45 to 55%, unchanged from previous echo 2012  c. Echocardiogram 8/27/2019: EF 35 to 40%, mild to moderate MR, mild to moderate TR, RVSP 40  mmHg  d. Nuclear stress test 8/26/2019: Normal LV perfusion EF 33%  e. Implantation of a left atrial appendage occlusive device (Watchman device) 09/25/20 by Dr. Yonny Prado  f. PORSHA on 11/13/20 with well seated device, EF 35%, mod MR, mild to mod TR, post-procedural ASD with left-to-right flow  g. Upgrade to BIV ICD and AV Node ablation 3/2021, Dr. Prado          Impression:  76 y.o.male with relevant PMH of Tobacco Abuse 50 py quit 2010, COPD w/ pre-op PFT showing FEV1 30%, Chronic SHF EF 35%, Permanent Afib - unable to tolerate DOAC secondary to bleeding and s/p Watchman 9/2020, BiV-AICD, AVN ablation, HTN, who was admitted 6/2/2021 for cardiac cath which showed severe 80% ostial LM disease, LVEDP 11.  Echo 6/2 w/ EF 35-40%, mod MR, RVSP 35-45, Diastolic Dysfunction.  Underwent 2vCABG with Dr. Shields on 6/3/21.  Discharged 6/10/21.       Now re-admitted 6/14/2021 with increasing TREVIZO not much better than before his heart surgery.  Also w/ melena.  Found to have Hgb 6.2  (discharged on ASA and steroids for possible AECOPD).  Noted to have moderate sized right pleural effusion ~500 ml I would estimate on CT.  No overt wheezing.     Suspect dyspnea combination of known CHF, COPD, Right Pleural Effusion, Symptomatic Anemia.  Consulted to optimize Pulmonary aspect.     Plan:  Pleural Effusion - Classic post CABG effusion by pleural fluid studies.  Will follow up final cultures / cytology.  Will see back in office ~ 1 month w/ CXR to ensure no re-occurrence.     COPD FEV1 30% on pre-op [place] - no overt wheezing.  Full PFT done post thoracentesis not scanned in yet for review.  I do think he could benefit from a LABA / LAMA inhaler - Anoro / Bevespi / Stiolto - whichever is cheapest.  Qualifies for lung cancer screening which I can arrange as outpatient.    Overall looks stable for discharge today.  He knows to monitor closely for melena / black stools and avoid NSAIDs.       Porter Medina MD  Intensive Care  Medicine  06/17/21 13:10 EDT

## 2021-06-17 NOTE — PLAN OF CARE
Goal Outcome Evaluation:              Outcome Summary: VSS, pt alert and oriented. denies sob.  pt requesting to take a shower.

## 2021-06-17 NOTE — DISCHARGE SUMMARY
[facility] Medicine Services  DISCHARGE SUMMARY    Patient Name: Colin Albrecht  : 1945  MRN: [id number]    Date of Admission: 2021  8:55 AM  Date of Discharge: 2021  Primary Care Physician: Arun Soliman MD    Consults     Date and Time Order Name Status Description    2021  2:26 PM Inpatient Cardiology Consult Completed     2021  2:25 PM Inpatient Cardiology Consult Completed     2021  1:14 PM Inpatient Gastroenterology Consult Completed     6/3/2021  5:05 PM Inpatient Consult to Cardiology Completed     2021  1:07 PM Inpatient Cardiothoracic Surgery Consult Completed           Hospital Course     Presenting Problem:   Symptomatic anemia [D64.9]    Active Hospital Problems    Diagnosis  POA   • **Symptomatic anemia [D64.9]  Yes   • Gastrointestinal hemorrhage [K92.2]  Unknown   • Nonsustained ventricular tachycardia 2021 (CMS/HCC) [I47.2]  Yes   • COPD  [J44.9]  Yes   • Former smoker [Z87.891]  Not Applicable   • Hypertension [I10]  Yes   • S/P CABG x 2 on 6/3/2021 [Z95.1]  Not Applicable   • Suspected chronic renal insufficiency [N18.9]  Yes   • Chronic HFrEF 35-40% s/p upgrade BiV ICD and AV node ablation 3/2021 [I50.22]  Yes   • GI bleed [K92.2]  Unknown   • PAF s/p Watchman device 2020/ Bi-V ICD and AVN ablation 3/2021 [I48.21]  Yes      Resolved Hospital Problems   No resolved problems to display.          Hospital Course:  Colin Albrecht is a 76 y.o. male with cardiomyopathy, SSS s/p ablation, pacemaker, subsequent upgrade to ICD, known prior PUD, recently admitted to this facility for CABG x2 and discharged 4 days prior to presentation this admission.  He returned home and developed worsening weakness, fatigue, and on presentation was found to have a hemoglobin of 6.2.  He was admitted the facility, transfused in total 3U PRBCs.  He underwent EGD which showed prepyloric ulcer without stigmata of recent bleeding.  He will need  to continue twice daily PPI x1 month.  He was also evaluated by cardiology and his Lopressor was changed to Toprol-XL; ramipril was changed to Entresto after 36-hour washout.  Pulmonology evaluated the patient and obtained a thoracentesis to prevent trapped lung, additionally had PFTs and recommended LABA/LAMA combo-patient is prescribed Stiolto at discharge.  Further complication of his hospitalization is a LT UE DVT likely provoked related to his recent CABG, he has been started on Eliquis which she will likely need for 3 months and can be considered for discontinuation. His H&H remained stable and he is discharging home.    Symptomatic anemia from GI bleed  Melena  Hx PUD, now prepyloric ulcer  -S/p 3U PRBCs  -Continue PPI bid x1 mo then decrease to daily  -GI has followed, s/p EGD with prepyloric ulcer  -H&H remained stable today, consider recheck at follow-up     Acute LT UE DVT, provoked  -Discussed with cardiology who discussed with GI, restarted on apixaban     CAD s/p recent CABG  Systolic CHF, EF 36-40%, compensated  Postoperative pleural effusion  -S/p two-vessel CABG 6/3/2021  -Continue aspirin per cardiology  -Continue statin, Toprol-XL, oral Bumex, Entresto  -S/p thoracentesis 6/16/2021     Dyspnea  COPD  -Recent CTA w/o PE, BL effusions noted; discharged on steroids for COPD exacerbation  -On room air  -Continue home albuterol, start Stiolto per pulmonology recommendations     Paroxysmal atrial fibrillation  SSS Hx ablation  Recent upgraded ICD  Perioperative VT/VF last hospitalization  -Hx watchman device, can consider DC apixaban after 3 months therapy  -Rate controlled     CKD stage 3  -Baseline creatinine 1.1-1.3; EGFR 50s  -At approximate baseline     Hypothyroidism  -Continue levothyroxine    Discharge Follow Up Recommendations for outpatient labs/diagnostics:  Follow-up PCP 1 to 2 weeks post hospitalization, recheck H&H  Follow-up cardiology 3-4 weeks, ICM with systolic CHF, changed to  Entresto  Follow-up pulmonology 3-4 weeks, COPD on Stiolto  Follow-up GI 3 to 4 weeks, prepyloric ulcer post EGD    Day of Discharge     HPI:   No complaints this morning, eager to go home.  Had his PFTs this morning without issue.    Review of Systems  Gen- No fevers, chills  CV- No chest pain, palpitations  Resp- No cough, dyspnea  GI- No N/V/D, abd pain    Vital Signs:   Temp:  [97.5 °F (36.4 °C)-97.8 °F (36.6 °C)] 97.8 °F (36.6 °C)  Heart Rate:  [69-77] 70  Resp:  [16-18] 18  BP: (101-127)/(55-77) 116/62     Physical Exam:  Constitutional: Awake, alert, mildly ill-appearing, sitting up in bedside chair  HENT: NCAT, mucous membranes moist  Respiratory: Clear to auscultation bilaterally, respiratory effort normal   Cardiovascular: RRR, no murmurs, rubs, or gallops, palpable radial pulses  Gastrointestinal: Positive bowel sounds, soft, nontender, nondistended  Musculoskeletal: LT UE edema  Psychiatric: Appropriate affect, cooperative  Neurologic: Speech clear, moving all extremities symmetrically  Skin: Midsternal sternotomy incision clean and intact    Pertinent  and/or Most Recent Results     LAB RESULTS:      Lab 06/17/21  0605 06/16/21  1601 06/16/21  0711 06/15/21  2348 06/15/21  1706 06/14/21  1336 06/14/21  0906   WBC 7.81  --  10.01  --   --   --  31.80*   HEMOGLOBIN 8.6* 9.5* 8.4* 8.1* 8.9*  --  6.2*   HEMATOCRIT 26.7* 30.0* 26.0* 25.4* 29.2*  --  19.4*   PLATELETS 202  --  195  --   --   --  308   NEUTROS ABS 5.79  --   --   --   --   --  27.35*   IMMATURE GRANS (ABS) 0.11*  --   --   --   --   --   --    LYMPHS ABS 0.84  --   --   --   --   --   --    MONOS ABS 0.85  --   --   --   --   --   --    EOS ABS 0.21  --   --   --   --   --  0.00   MCV 97.1*  --  96.3  --   --   --  103.7*   PROCALCITONIN  --   --   --   --   --   --  0.18   LACTATE  --   --   --   --   --  2.8* 2.9*         Lab 06/17/21  0605 06/16/21  0711 06/15/21  0331 06/14/21  0906   SODIUM 137 140 141 142   POTASSIUM 3.6 3.5 4.1 4.4    CHLORIDE 107 111* 109* 108*   CO2 25.0 23.0 25.0 24.0   ANION GAP 5.0 6.0 7.0 10.0   BUN 30* 39* 59* 79*   CREATININE 0.96 0.84 1.18 1.34*   GLUCOSE 142* 107* 122* 145*   CALCIUM 8.3* 8.4* 8.4* 9.4   MAGNESIUM  --   --   --  2.4         Lab 06/14/21  0906   TOTAL PROTEIN 5.9*   ALBUMIN 3.80   GLOBULIN 2.1   ALT (SGPT) 17   AST (SGOT) 19   BILIRUBIN 0.7   ALK PHOS 61         Lab 06/14/21  0906   PROBNP 1,684.0   TROPONIN T 0.018             Lab 06/14/21  1108   ABO TYPING O   RH TYPING Positive   ANTIBODY SCREEN Negative         Brief Urine Lab Results  (Last result in the past 365 days)      Color   Clarity   Blood   Leuk Est   Nitrite   Protein   CREAT   Urine HCG        06/14/21 1510 Yellow Clear Negative Negative Negative Negative             Microbiology Results (last 10 days)     Procedure Component Value - Date/Time    Body Fluid Culture - Body Fluid, Pleural Cavity [[id number]] Collected: 06/16/21 1422    Lab Status: Preliminary result Specimen: Body Fluid from Pleural Cavity Updated: 06/17/21 0624     Body Fluid Culture No growth     Gram Stain Few (2+) WBCs seen      No organisms seen    COVID PRE-OP / PRE-PROCEDURE SCREENING ORDER (NO ISOLATION) - Swab, Nasopharynx [[id number]]  (Normal) Collected: 06/15/21 1202    Lab Status: Final result Specimen: Swab from Nasopharynx Updated: 06/15/21 1221    Narrative:      The following orders were created for panel order COVID PRE-OP / PRE-PROCEDURE SCREENING ORDER (NO ISOLATION) - Swab, Nasopharynx.  Procedure                               Abnormality         Status                     ---------                               -----------         ------                     COVID-19, ABBOTT IN-HOUS...[[id number]]  Normal              Final result                 Please view results for these tests on the individual orders.    COVID-19, ABBOTT IN-HOUSE,NASAL Swab (NO TRANSPORT MEDIA) 2 HR TAT - Swab, Nasopharynx [[id number]]  (Normal) Collected: 06/15/21 1202    Lab  Status: Final result Specimen: Swab from Nasopharynx Updated: 06/15/21 1221     COVID19 Presumptive Negative    Narrative:      Fact sheet for providers: https://www.fda.gov/media/503076/download     Fact sheet for patients: https://www.fda.gov/media/310544/download    Test performed by PCR.  If inconsistent with clinical signs and symptoms patient should be tested with different authorized molecular test.    Blood Culture - Blood, Hand, Right [[id number]] Collected: 06/14/21 1005    Lab Status: Preliminary result Specimen: Blood from Hand, Right Updated: 06/17/21 1032     Blood Culture No growth at 3 days    Blood Culture - Blood, Arm, Right [[id number]] Collected: 06/14/21 1000    Lab Status: Preliminary result Specimen: Blood from Arm, Right Updated: 06/17/21 1032     Blood Culture No growth at 3 days          STAT Adult Transthoracic Echo Complete W/ Cont if Necessary Per Protocol    Result Date: 6/15/2021  · Left ventricular ejection fraction appears to be 36 - 40%. Left ventricular systolic function is moderately decreased. · Left ventricular diastolic function is consistent with (grade II w/high LAP) pseudonormalization. · The right ventricular cavity is mildly dilated. · Mildly reduced right ventricular systolic function noted. · Left atrial volume is moderately increased. · Estimated right ventricular systolic pressure from tricuspid regurgitation is mildly elevated (35-45 mmHg). Calculated right ventricular systolic pressure from tricuspid regurgitation is 35 mmHg. · No significant pericardial effusion.      Adult Transthoracic Echo Complete W/ Cont if Necessary Per Protocol    Result Date: 6/9/2021  · Left ventricular ejection fraction appears to be 41 - 45%. Left ventricular systolic function is mildly decreased. · Left ventricular diastolic function is consistent with (grade II w/high LAP) pseudonormalization. · No evidence of significant pericardial effusion.      CT Angiogram Chest With & Without  Contrast    Result Date: 6/9/2021  EXAMINATION: CT ANGIOGRAM CHEST W WO CONTRAST-  INDICATION: Shortness of breath (Ped 0-18y)  TECHNIQUE: Axial pulmonary arterial phase IV contrast enhanced CT angiogram of the chest per PE protocol. Two-dimensional reconstructions were postprocessed.  The radiation dose reduction device was turned on for each scan per the ALARA (As Low as Reasonably Achievable) protocol.  COMPARISON: NONE  FINDINGS: No pathologic axillary adenopathy or other worrisome body wall soft tissue finding in the chest. There are circumscribed low-density findings in the liver, incompletely characterized but favored to represent benign cysts. No additional acute findings in the partially imaged upper abdomen. There are moderate right and small left pleural effusions. Operative changes are noted from recent CABG with minimal ventral pneumomediastinum and small area of air and fluid along the left pleural/pericardial border extending along the left diaphragm. Left atrial appendage clip noted. Trace pericardial effusion. The pulmonary arteries are well-opacified and without evidence of filling defect concerning for acute pulmonary embolus. Evaluation of the lung fields demonstrates some mild centrilobular emphysema, otherwise without evidence of acute infectious process. There is some mild atelectasis along the bilateral pleural effusions.      No pulmonary embolus.  Moderate right and small left pleural effusions with some associated basilar volume loss. Otherwise no acute infectious process such as pneumonia. Moderate background emphysema changes are present.  Operative changes are noted along the anterior chest wall from recent CABG with some trace pneumomediastinum and small amount of fluid noted tracking along the left hemidiaphragm.   This report was finalized on 6/9/2021 11:23 AM by Anson Chen.      CT Abdomen Pelvis With Contrast    Result Date: 6/14/2021  EXAMINATION: CT CHEST PULMONARY  EMBOLISM-, CT ABDOMEN AND PELVIS W CONTRAST-  INDICATION: Shortness of breath, recent surgery.  TECHNIQUE: CT angiogram chest following PE protocol with intravenous contrast and 2-D reconstructions performed, CT abdomen and pelvis with intravenous contrast administration.  The radiation dose reduction device was turned on for each scan per the ALARA (As Low as Reasonably Achievable) protocol.  COMPARISON: CT angiogram chest 06/09/2021.  FINDINGS: CTA CHEST: Thyroid is homogeneous in attenuation. No bulky mediastinal adenopathy. Central airways are patent. Esophagus seen in normal course and caliber. Atherosclerotic nonaneurysmal thoracic aorta. Cardiac size borderline enlarged status post median sternotomy and CABG as well as atrial appendage occlusion device in place. Satisfactory opacification of the pulmonary arterial tree without filling defect to suggest pulmonary embolus. Atherosclerotic nonaneurysmal thoracic aorta. Extended lung windows reveal small to moderate volume right and trace left pleural effusions stable to slightly increased from prior comparison 06/09/2021 without new consolidation. Degenerative changes of the thoracic spine without acute osseous finding of the thoracic spine or chest. No chest wall soft tissue findings.  ABDOMEN AND PELVIS: Liver demonstrates hepatic cysts measuring up to a 3.5 cm left hepatic cyst without abnormal enhancement features. Gallbladder contracted and unremarkable. No biliary dilatation. Pancreas and spleen unremarkable with splenule adjacent to the lateral margin of the spleen. Adrenals demonstrate a subcentimeter right adrenal adenoma. Kidneys without hydronephrosis or hydroureter. No bulky retroperitoneal adenopathy. Atherosclerotic nonaneurysmal abdominal aorta with patent celiac and SMA origins. Portal veins and IVC are patent. GI tract evaluation without focal thickening or disproportionate dilatation of bowel. Moderately distended urinary bladder without wall  thickening. Moderate distention of the rectum with intermixed gas and stool. Degenerative changes of the spine and pelvis without aggressive osseous lesion. No soft tissue body wall findings of acuity.      1. No PE. 2. Stable to slightly increasing small to moderate volume right and trace to small left pleural effusions with adjacent atelectasis. 3. No acute findings within the abdomen or pelvis, however, noted moderate distention of the rectal vault with intermixed gas and stool.  D:  06/14/2021 E:  06/14/2021   This report was finalized on 6/14/2021 4:26 PM by Dr. Ap Harris.      XR Chest 1 View    Result Date: 6/17/2021  EXAMINATION: XR CHEST 1 VW- 06/17/2021  INDICATION: f/u after thoracentesis; K92.2-Gastrointestinal hemorrhage, unspecified; D64.9-Anemia, unspecified; Z95.1-Presence of aortocoronary bypass graft; I95.9-Hypotension, unspecified  COMPARISON: 06/14/2021  FINDINGS: Right pleural effusion is no longer seen. There is only trace if any left effusion. No pneumothorax or other new pulmonary parenchymal disease is seen. Heart is normal in size. Vasculature remains cephalized.      No evidence of remaining right pleural effusion. No pneumothorax or other new chest disease is seen.   D:  06/17/2021 E:  06/17/2021      XR Chest 1 View    Result Date: 6/14/2021  EXAMINATION: XR CHEST 1 VW-06/14/2021:  INDICATION: SOA, triage protocol.  COMPARISON: Chest x-ray 06/10/2021.  FINDINGS: Cardiac size enlarged status post median sternotomy and CABG with bibasilar opacifications, right slightly greater than left, and probable trace to small pleural effusions similar to prior.      Similar central vascular congestion appearance and trace to small bilateral pleural effusions from prior.  D:  06/14/2021 E:  06/14/2021  This report was finalized on 6/14/2021 4:26 PM by Dr. Ap Harris.      XR Chest 1 View    Result Date: 6/10/2021  EXAMINATION: XR CHEST 1 VW-  INDICATION: post op  COMPARISON: One day prior   FINDINGS: Unchanged small bilateral pleural effusions. No distinct pneumothorax. Scattered areas of subsegmental atelectasis again noted. Unchanged heart and mediastinal contours.      No significant interval change from recent comparison.  This report was finalized on 6/10/2021 10:43 AM by Anson Chen.      XR Chest 1 View    Result Date: 6/9/2021  CR Chest 1 Vw INDICATION: Change in status. Patient has atherosclerotic heart disease and unstable angina. COMPARISON:  Chest x-ray 6/7/2021 FINDINGS: 2 portable AP view(s) of the chest. There are postoperative changes to the heart with moderate cardiac silhouette enlargement. This is not changed. There are small bilateral pleural effusions and there is mild vascular congestion and interstitial edema, increased from previous. There is a focal opacity at the left lateral hemithorax which is also present previously and is probably loculated pleural fluid. There is no pneumothorax. Patchy bibasilar airspace disease could be atelectasis and/or patchy pulmonary edema but follow-up is recommended to exclude underlying aspiration or pneumonia.     Interval worsening in the appearance the chest with findings most consistent with worsening volume status and the development of mild congestive heart failure. Clinical correlation and follow-up is recommended to ensure resolution of findings. Signer Name: Shreya Carroll MD  Signed: 6/9/2021 9:14 AM  Workstation Name: [id number]  [facility]    XR Chest 1 View    Result Date: 6/7/2021  EXAMINATION: XR CHEST 1 VW- 06/07/2021  INDICATION: Postop CABG; I25.118-Atherosclerotic heart disease of native coronary artery with other forms of angina pectoris; I20.0-Unstable angina  COMPARISON: 06/05/2021  FINDINGS: Portable chest reveals heart to be enlarged. Pacer leads in satisfactory position. Prominence of the pulmonary vascularity with pleural-based opacity stable within the left lung. Mild increased markings seen in  the lung bases bilaterally with blunting of the costophrenic angles. Findings are all stable.      Stable chest as above.  D:  06/07/2021 E:  06/07/2021  This report was finalized on 6/7/2021 3:39 PM by Dr. Karine Limon MD.      CT Chest Pulmonary Embolism    Result Date: 6/14/2021  EXAMINATION: CT CHEST PULMONARY EMBOLISM-, CT ABDOMEN AND PELVIS W CONTRAST-  INDICATION: Shortness of breath, recent surgery.  TECHNIQUE: CT angiogram chest following PE protocol with intravenous contrast and 2-D reconstructions performed, CT abdomen and pelvis with intravenous contrast administration.  The radiation dose reduction device was turned on for each scan per the ALARA (As Low as Reasonably Achievable) protocol.  COMPARISON: CT angiogram chest 06/09/2021.  FINDINGS: CTA CHEST: Thyroid is homogeneous in attenuation. No bulky mediastinal adenopathy. Central airways are patent. Esophagus seen in normal course and caliber. Atherosclerotic nonaneurysmal thoracic aorta. Cardiac size borderline enlarged status post median sternotomy and CABG as well as atrial appendage occlusion device in place. Satisfactory opacification of the pulmonary arterial tree without filling defect to suggest pulmonary embolus. Atherosclerotic nonaneurysmal thoracic aorta. Extended lung windows reveal small to moderate volume right and trace left pleural effusions stable to slightly increased from prior comparison 06/09/2021 without new consolidation. Degenerative changes of the thoracic spine without acute osseous finding of the thoracic spine or chest. No chest wall soft tissue findings.  ABDOMEN AND PELVIS: Liver demonstrates hepatic cysts measuring up to a 3.5 cm left hepatic cyst without abnormal enhancement features. Gallbladder contracted and unremarkable. No biliary dilatation. Pancreas and spleen unremarkable with splenule adjacent to the lateral margin of the spleen. Adrenals demonstrate a subcentimeter right adrenal adenoma. Kidneys  without hydronephrosis or hydroureter. No bulky retroperitoneal adenopathy. Atherosclerotic nonaneurysmal abdominal aorta with patent celiac and SMA origins. Portal veins and IVC are patent. GI tract evaluation without focal thickening or disproportionate dilatation of bowel. Moderately distended urinary bladder without wall thickening. Moderate distention of the rectum with intermixed gas and stool. Degenerative changes of the spine and pelvis without aggressive osseous lesion. No soft tissue body wall findings of acuity.      1. No PE. 2. Stable to slightly increasing small to moderate volume right and trace to small left pleural effusions with adjacent atelectasis. 3. No acute findings within the abdomen or pelvis, however, noted moderate distention of the rectal vault with intermixed gas and stool.  D:  06/14/2021 E:  06/14/2021   This report was finalized on 6/14/2021 4:26 PM by Dr. Ap Harris.      Duplex Venous Upper Extremity - Left CAR    Result Date: 6/16/2021  · Acute left upper extremity deep vein thrombosis noted in the subclavian and axillary. · Acute left upper extremity superficial thrombophlebitis noted in the cephalic (upper arm). · All other left sided vessels appear normal.      CT Guided Thoracentesis    Result Date: 6/16/2021  EXAMINATION: CT-GUIDED THORACENTESIS - 06/16/2021  INDICATION: K92.2-Gastrointestinal hemorrhage, unspecified; D64.9-Anemia, unspecified; Z95.1-Presence of aortocoronary bypass graft; I95.9-Hypotension, unspecified. Right pleural effusion.  TECHNIQUE: CT-guided right thoracentesis for a right pleural effusion.  The radiation dose reduction device was turned on for each scan per the ALARA (As Low as Reasonably Achievable) protocol.  COMPARISON: None.  FINDINGS: The procedure and risks were explained to the patient. Informed consent was obtained and signed. Patient was placed in the left posterior oblique position upon the table. The right posterior chest wall was prepped  and draped in a sterile fashion. 1% lidocaine was used as a local anesthetic. Under CT guidance, an 8 Maori curve catheter was advanced into the pleural fluid collection. Approximately 1.2 L of red-tinged fluid was removed and sent to cytology for further analysis. No immediate complication occurred.      Significant right thoracentesis with 1.2 L of a red-tinged fluid removed and sent to cytology for further analysis. No immediate complication occurred.  DICTATED:   06/16/2021 EDITED/ls :   06/16/2021   This report was finalized on 6/16/2021 8:00 PM by Dr. Karine Limon MD.        Results for orders placed during the hospital encounter of 06/14/21    Duplex Venous Upper Extremity - Left CAR    Interpretation Summary  · Acute left upper extremity deep vein thrombosis noted in the subclavian and axillary.  · Acute left upper extremity superficial thrombophlebitis noted in the cephalic (upper arm).  · All other left sided vessels appear normal.      Results for orders placed during the hospital encounter of 06/14/21    Duplex Venous Upper Extremity - Left CAR    Interpretation Summary  · Acute left upper extremity deep vein thrombosis noted in the subclavian and axillary.  · Acute left upper extremity superficial thrombophlebitis noted in the cephalic (upper arm).  · All other left sided vessels appear normal.      Results for orders placed during the hospital encounter of 06/14/21    STAT Adult Transthoracic Echo Complete W/ Cont if Necessary Per Protocol    Interpretation Summary  · Left ventricular ejection fraction appears to be 36 - 40%. Left ventricular systolic function is moderately decreased.  · Left ventricular diastolic function is consistent with (grade II w/high LAP) pseudonormalization.  · The right ventricular cavity is mildly dilated.  · Mildly reduced right ventricular systolic function noted.  · Left atrial volume is moderately increased.  · Estimated right ventricular systolic pressure from  tricuspid regurgitation is mildly elevated (35-45 mmHg). Calculated right ventricular systolic pressure from tricuspid regurgitation is 35 mmHg.  · No significant pericardial effusion.      Pending Labs     Order Current Status    Non-gynecologic Cytology In process    Blood Culture - Blood, Arm, Right Preliminary result    Blood Culture - Blood, Hand, Right Preliminary result    Body Fluid Culture - Body Fluid, Pleural Cavity Preliminary result        Discharge Details        Discharge Medications      New Medications      Instructions Start Date   apixaban 5 MG tablet tablet  Commonly known as: ELIQUIS   5 mg, Oral, Every 12 Hours Scheduled      Dulcolax 5 MG EC tablet  Generic drug: bisacodyl   5 mg, Oral, Daily PRN      metoprolol succinate XL 50 MG 24 hr tablet  Commonly known as: TOPROL-XL   50 mg, Oral, Every 24 Hours Scheduled   Start Date: June 18, 2021     pantoprazole 40 MG EC tablet  Commonly known as: PROTONIX   40 mg, Oral, 2 Times Daily Before Meals      sacubitril-valsartan 24-26 MG tablet  Commonly known as: ENTRESTO   1 tablet, Oral, Every 12 Hours Scheduled      tiotropium bromide-olodaterol 2.5-2.5 MCG/ACT aerosol solution inhaler  Commonly known as: STIOLTO RESPIMAT   1 puff, Inhalation, Daily, 2 inh once a day         Continue These Medications      Instructions Start Date   albuterol sulfate  (90 Base) MCG/ACT inhaler  Commonly known as: PROVENTIL HFA;VENTOLIN HFA;PROAIR HFA   2 puffs, Inhalation, Every 4 Hours PRN      aspirin  MG tablet   325 mg, Oral, Daily      atorvastatin 40 MG tablet  Commonly known as: LIPITOR   40 mg, Oral, Nightly      bumetanide 0.5 MG tablet  Commonly known as: BUMEX   0.5 mg, Oral, Daily      CoQ10 100 MG capsule   1 capsule, Oral, Daily      ferrous sulfate 325 (65 FE) MG tablet   325 mg, Oral, Daily With Breakfast      fish oil 1000 MG capsule capsule   1,000 mg, Oral, Every Other Day      Garlic 450 MG capsule   1 capsule, Oral, Daily         HYDROcodone-acetaminophen 7.5-325 MG per tablet  Commonly known as: NORCO   1 tablet, Oral, Every 6 Hours PRN      levothyroxine 50 MCG tablet  Commonly known as: SYNTHROID, LEVOTHROID   50 mcg, Oral, Daily, Please have PCP manage this medication.      multivitamin with minerals tablet tablet   1 tablet, Oral, Daily      tamsulosin 0.4 MG capsule 24 hr capsule  Commonly known as: FLOMAX   0.4 mg, Oral, Daily         Stop These Medications    metoprolol tartrate 50 MG tablet  Commonly known as: LOPRESSOR     predniSONE 20 MG tablet  Commonly known as: DELTASONE     ramipril 5 MG capsule  Commonly known as: ALTACE     Symbicort 80-4.5 MCG/ACT inhaler  Generic drug: budesonide-formoterol            No Known Allergies      Discharge Disposition:  Home or Self Care    Diet:  Hospital:No active diet order         CODE STATUS:    Code Status and Medical Interventions:   Ordered at: 06/14/21 1211     Level Of Support Discussed With:    Patient     Code Status:    CPR     Medical Interventions (Level of Support Prior to Arrest):    Full       Future Appointments   Date Time Provider Department Center   6/30/2021  1:00 PM Yonny Prado MD [facility]   7/1/2021 11:30 AM Daiana Michaud APRN [facility]   10/1/2021  2:00 PM Yonny Prado MD [facility]       Additional Instructions for the Follow-ups that You Need to Schedule     Discharge Follow-up with PCP   As directed       Currently Documented PCP:    Arun Soliman MD    PCP Phone Number:    [phone]     Follow Up Details: 1-2 weeks         Discharge Follow-up with Specialty: [facility] 3-4 weeks   As directed      Specialty: [facility] 3-4 weeks         Discharge Follow-up with Specified Provider: Dr. Porter Medina 3-4 weeks   As directed      To: Dr. Porter Medina 3-4 weeks         Discharge Follow-up with Specified Provider: Dr. Quintana 3-4 weeks   As directed      To: Dr. Quintana 3-4 weeks                     Oziel Putnam  DO [place]  06/17/21      Time Spent on Discharge:  I spent  35  minutes on this discharge activity which included: face-to-face encounter with the patient, reviewing the data in the system, coordination of the care with the nursing staff as well as consultants, documentation, and entering orders.

## 2021-06-18 ENCOUNTER — TELEPHONE (OUTPATIENT)
Dept: CARDIOLOGY | Facility: CLINIC | Age: 76
End: 2021-06-18

## 2021-06-18 ENCOUNTER — READMISSION MANAGEMENT (OUTPATIENT)
Dept: CALL CENTER | Facility: HOSPITAL | Age: 76
End: 2021-06-18

## 2021-06-18 ENCOUNTER — NURSE TRIAGE (OUTPATIENT)
Dept: CALL CENTER | Facility: HOSPITAL | Age: 76
End: 2021-06-18

## 2021-06-18 LAB
LAB AP CASE REPORT: NORMAL
PATH REPORT.FINAL DX SPEC: NORMAL

## 2021-06-18 NOTE — OUTREACH NOTE
Prep Survey      Responses   [facility] facility patient discharged from?  [place]   Is LACE score < 7 ?  No   Emergency Room discharge w/ pulse ox?  No   Eligibility  Readm Mgmt   Discharge diagnosis  **Symptomatic anemia Symptomatic anemia from GI bleed   Does the patient have one of the following disease processes/diagnoses(primary or secondary)?  Other   Does the patient have Home health ordered?  No   Is there a DME ordered?  No   Prep survey completed?  Yes          Mara Wood RN

## 2021-06-18 NOTE — TELEPHONE ENCOUNTER
"Caller states that the entresto is over $ 700 a month and he is unable to afford it.  The pharmacy sent a message to the prescribing MD but has not heard back.  Routed to case management.    Reason for Disposition  • [1] Caller requesting NON-URGENT health information AND [2] PCP's office is the best resource    Additional Information  • Negative: [1] Caller is not with the adult (patient) AND [2] reporting urgent symptoms  • Negative: Lab result questions  • Negative: Medication questions  • Negative: Caller can't be reached by phone  • Negative: Caller has already spoken to PCP or another triager  • Negative: RN needs further essential information from caller in order to complete triage  • Negative: Requesting regular office appointment  • Negative: Health Information question, no triage required and triager able to answer question  • Negative: General information question, no triage required and triager able to answer question    Answer Assessment - Initial Assessment Questions  1. REASON FOR CALL or QUESTION: \"What is your reason for calling today?\" or \"How can I best help you?\" or \"What question do you have that I can help answer?\"      I cannot afford the entresto.  My pharmacy sent a message to the doctor but they have not heard back.    Protocols used: INFORMATION ONLY CALL - NO TRIAGE-ADULT-      "

## 2021-06-19 LAB
BACTERIA FLD CULT: NORMAL
BACTERIA SPEC AEROBE CULT: NORMAL
BACTERIA SPEC AEROBE CULT: NORMAL
GRAM STN SPEC: NORMAL
GRAM STN SPEC: NORMAL

## 2021-06-21 NOTE — DISCHARGE SUMMARY
CTS Discharge Summary    Patient Care Team:  Arun Soliman MD as PCP - General (Family Medicine)  Semaj Park MD (Cardiology)      Date of Admission: 6/2/2021 10:03 AM  Date of Discharge:  6/10/2021    Discharge Diagnosis  Past Medical History:   Diagnosis Date   • Abnormal ECG    • Arrhythmia    • Atrial fibrillation (CMS/HCC)    • CHF (congestive heart failure) (CMS/HCC)    • Depression    • History of transfusion     bleeding stomach ulcer ~2014 x2, no reaction    • Hypertension    • Stomach ulcer    • Wears reading eyeglasses          CAD with unstable angina    PAF s/p Watchman device 9/25/2020/ Bi-V ICD and AVN ablation 3/2021    SSS     Dilated CM     Chronic HFrEF 35-40% s/p upgrade BiV ICD and AV node ablation 3/2021    Hypertension    Former smoker    COPD     Suspected chronic renal insufficiency    S/P CABG x 2 on 6/3/2021    Acute-on-chronic kidney injury (CMS/HCC)    Nonsustained ventricular tachycardia 6/5/2021 (CMS/HCC)      History of Present Illness  Patient gives a several week history of not several months history of progressive shortness of breath and fatigue.  Patient has been followed closely by Dr. Prado and has had by bad placed as well as AV node ablation and a watchman device placed.  He is currently not on Eliquis.  He denies any chest pain.  His main symptoms are shortness of breath.  He underwent cardiac evaluation cardiac catheterization by Dr. Pietro Quintana.  This revealed a 80% left main.       Hospital Course  Patient is a 76 y.o. male with a history of coronary artery disease, atrial fibrillation, hypertension, COPD, CKD, and former tobacco user who was admitted to [facility] on 6/2/2021.  He was admitted from the Cath Lab after undergoing a cardiac catheterization which revealed coronary artery disease.  After being evaluated by CT surgery services, he was found a candidate for a coronary artery bypass procedure.  This was a scheduled for the following day.  Patient  underwent preoperative testing imaging.  Surgery was performed on 6/03 by Dr. Jaime Shields.  The procedure went well, and patient was placed in the ICU intubated and in stable condition.  Cardiology continue to follow patient that the course of his hospital stay.    POD 1, patient remained stable postoperatively.  He was extubated and doing well the day prior.  A-line and Lowe catheter were discontinued on this day.   POD 2, Patient unable to urinate after lowe was DC'd. At his request he was given ample time to void. Unable to cath with standard straight cath d/t resistance, coude was ordered and placed without difficulty.  Chest tubes discontinued on this day.  Patient also had runs of V. Tach as well.  Patient has ICD.  POD 3, patient remained stable.  No episodes of V. tach on this day.  No events of note.  POD 4, patient doing well on this day.  After being evaluated by CT surgery services, patient found to be stable to be transferred to the telemetry floor.  Order was placed.  There were no telemetry for beds available, so patient remained in the ICU.  POD 5, patient continues to slowly improve postoperatively.  Awaiting telemetry floor bed.  Was able to be transferred up to the floor later in the evening.  No other events of note.  POD 6, on this day, patient was complaining of being very short of breath.  Chest x-ray, echocardiogram, and CT scan of the chest were ordered to rule out pericardial fusion or other problem such as pulmonary embolism.  Chest x-ray shows persistent right pleural effusion less on the left.  Diuretics given.  POD 7, patient doing well this day.  Shortness of breath has resolved.  After being evaluated by all members of the healthcare team, patient medically stable and satisfactory to be discharged home.    Procedures Performed  Procedure(s):  MEDIAN STERNOTOMY, CORONARY ARTERY BYPASS WITH INTERNAL MAMMARY ARTERY GRAFT X 2, EVH OF THE RIGHT GREATER SAPHENOUS ANA       Consults:    Consults     Date and Time Order Name Status Description    6/3/2021  5:05 PM Inpatient Consult to Cardiology Completed     6/2/2021  1:07 PM Inpatient Cardiothoracic Surgery Consult Completed             Discharge Medications     Discharge Medications      New Medications      Instructions Start Date   atorvastatin 40 MG tablet  Commonly known as: LIPITOR   40 mg, Oral, Nightly      bumetanide 0.5 MG tablet  Commonly known as: BUMEX   0.5 mg, Oral, Daily      ferrous sulfate 325 (65 FE) MG tablet   325 mg, Oral, Daily With Breakfast      tamsulosin 0.4 MG capsule 24 hr capsule  Commonly known as: FLOMAX   0.4 mg, Oral, Daily         Changes to Medications      Instructions Start Date   aspirin  MG tablet  What changed:   · medication strength  · how much to take   325 mg, Oral, Daily      levothyroxine 50 MCG tablet  Commonly known as: SYNTHROID, LEVOTHROID  What changed: additional instructions   50 mcg, Oral, Daily, Please have PCP manage this medication.         Continue These Medications      Instructions Start Date   CoQ10 100 MG capsule   1 capsule, Oral, Daily      fish oil 1000 MG capsule capsule   1,000 mg, Oral, Every Other Day      Garlic 450 MG capsule   1 capsule, Oral, Daily      multivitamin with minerals tablet tablet   1 tablet, Oral, Daily         Stop These Medications    bisoprolol-hydrochlorothiazide 5-6.25 MG per tablet  Commonly known as: ZIAC        ASK your doctor about these medications      Instructions Start Date   HYDROcodone-acetaminophen 7.5-325 MG per tablet  Commonly known as: NORCO  Ask about: Should I take this medication?   1 tablet, Oral, Every 6 Hours PRN             Discharge Diet:     Activity at Discharge:   Activity Instructions     Bathing Restrictions      Type of Restriction: Bathing    Bathing Restrictions: No Tub Bath    Driving Restrictions      Type of Restriction: Driving    Driving Restrictions: No Driving While Taking Narcotics    Lifting Restrictions       Type of Restriction: Lifting    Lifting Restrictions: Lifting Restriction (Indicate Limit)    Weight Limit (Pounds): 10    Length of Lifting Restriction: Until after follow-up appointment.        Do not drive while taking narcotics    Follow-up Appointments  Future Appointments   Date Time Provider Department Center   6/30/2021  1:00 PM Yonny Prado MD [facility]   7/1/2021 11:30 AM Daiana Michaud APRN [facility]   10/1/2021  2:00 PM Yonny Prado MD [facility]          Mauro West PA-C  06/21/21  08:16 EDT

## 2021-06-23 ENCOUNTER — READMISSION MANAGEMENT (OUTPATIENT)
Dept: CALL CENTER | Facility: HOSPITAL | Age: 76
End: 2021-06-23

## 2021-06-23 NOTE — OUTREACH NOTE
Medical Week 1 Survey      Responses   [facility] patient discharged from?  [place]   Does the patient have one of the following disease processes/diagnoses(primary or secondary)?  Other   Week 1 attempt successful?  Yes   Call start time  1353   Rescheduled  Rescheduled-pt requested   Discharge diagnosis  Symptomatic anemia Symptomatic anemia from GI bleed          Alyssa Medrano RN

## 2021-06-24 ENCOUNTER — READMISSION MANAGEMENT (OUTPATIENT)
Dept: CALL CENTER | Facility: HOSPITAL | Age: 76
End: 2021-06-24

## 2021-06-24 NOTE — OUTREACH NOTE
Medical Week 1 Survey      Responses   [facility] patient discharged from?  [place]   Does the patient have one of the following disease processes/diagnoses(primary or secondary)?  Other   Week 1 attempt successful?  Yes   Call start time  1241   Rescheduled  Revoked [This was a rescheduled call. Disenrolled. ]   Call end time  1241   Discharge diagnosis  Symptomatic anemia Symptomatic anemia from GI bleed          Leidy Cabrera RN

## 2021-06-30 ENCOUNTER — OFFICE VISIT (OUTPATIENT)
Dept: CARDIOLOGY | Facility: CLINIC | Age: 76
End: 2021-06-30

## 2021-06-30 VITALS
WEIGHT: 178 LBS | HEIGHT: 74 IN | DIASTOLIC BLOOD PRESSURE: 62 MMHG | SYSTOLIC BLOOD PRESSURE: 110 MMHG | OXYGEN SATURATION: 99 % | BODY MASS INDEX: 22.84 KG/M2 | HEART RATE: 74 BPM

## 2021-06-30 DIAGNOSIS — I25.810 CORONARY ARTERY DISEASE INVOLVING CORONARY BYPASS GRAFT OF NATIVE HEART WITHOUT ANGINA PECTORIS: ICD-10-CM

## 2021-06-30 DIAGNOSIS — I42.0 CARDIOMYOPATHY, DILATED (HCC): Chronic | ICD-10-CM

## 2021-06-30 DIAGNOSIS — I48.21 PERMANENT ATRIAL FIBRILLATION (HCC): Primary | ICD-10-CM

## 2021-06-30 DIAGNOSIS — I82.621 ACUTE DEEP VEIN THROMBOSIS (DVT) OF BRACHIAL VEIN OF RIGHT UPPER EXTREMITY (HCC): ICD-10-CM

## 2021-06-30 PROBLEM — I82.629 ACUTE DEEP VEIN THROMBOSIS (DVT) OF UPPER EXTREMITY: Status: ACTIVE | Noted: 2021-06-30

## 2021-06-30 PROCEDURE — 99214 OFFICE O/P EST MOD 30 MIN: CPT | Performed by: INTERNAL MEDICINE

## 2021-06-30 PROCEDURE — 93284 PRGRMG EVAL IMPLANTABLE DFB: CPT | Performed by: INTERNAL MEDICINE

## 2021-06-30 RX ORDER — ATORVASTATIN CALCIUM 40 MG/1
40 TABLET, FILM COATED ORAL NIGHTLY
Qty: 30 TABLET | Refills: 6 | Status: SHIPPED | OUTPATIENT
Start: 2021-06-30 | End: 2022-07-20

## 2021-06-30 RX ORDER — LEVOTHYROXINE SODIUM 0.07 MG/1
75 TABLET ORAL DAILY
COMMUNITY
End: 2022-06-07

## 2021-06-30 RX ORDER — PANTOPRAZOLE SODIUM 40 MG/1
40 TABLET, DELAYED RELEASE ORAL
Qty: 60 TABLET | Refills: 6 | Status: SHIPPED | OUTPATIENT
Start: 2021-06-30 | End: 2022-06-07

## 2021-06-30 NOTE — PROGRESS NOTES
Colin Albrecht  1945  [phone]    06/30/2021    [facility] CARDIOLOGY     Referring Provider: No ref. provider found     Arun Soliman  [address] [place] [address]    Chief Complaint   Patient presents with   • Permanent atrial fibrillation     Problem List:   1. Permanent Atrial Fibrillation   a. CHADsvasc = 4 off Xarelto x 1 year due to bleeding, never been on Eliquis  b. Echocardiogram 1/27/2016: EF 45 to 55%, unchanged from previous echo 2012  c. Echocardiogram 8/27/2019: EF 35 to 40%, mild to moderate MR, mild to moderate TR, RVSP 40 mmHg  d. Nuclear stress test 8/26/2019: Normal LV perfusion EF 33%  e. Implantation of a left atrial appendage occlusive device (Watchman device) 09/25/20 by Dr. Yonny Prado  f. PORSHA on 11/13/20 with well seated device, EF 35%, mod MR, mild to mod TR, post-procedural ASD with left-to-right flow  g. Upgrade to BIV ICD and AV Node ablation 3/2021, Dr. Prado  2. Dilated cardiomyopathy/CHF  a. Patient reports normal LHC 10-12 years ago  b. Echocardiogram 1/27/2016: EF 45 to 55%, unchanged from previous echo 2012  c. Echocardiogram 8/27/2019: EF 35%, mild to moderate MR, mild to moderate TR, RVSP 40 mmHg  d. Nuclear stress test 8/26/2019: Normal LV perfusion EF 33%  e. Upgrade to BIV ICD at time of AV node ablation 3/2021   3. Sick sinus syndrome  a. Dual-chamber permanent pacemaker -[employer]tr[employer] device  b. Upgrade to BIV ICD at time of AV node ablation 3/2021   4. Hypertension  5. Coronary artery Disease:   1. 6/3/21  CABG x 2 per Dr Cornelius HARRIS-LAD, SVG-circumflex.   6. Gastritis/GIB/Peptic Ulcer Disease  a. 2013: GIB requiring PRBCs, EGD showed bleeding ulcer in the antrum 9/2013, repeat EGD 12/2013 showed healed ulcer  b. Upper GI Bleeding 3/2019, EGD showed gastric antral ulcer with stigmata or recent bleeding - treated with IV/PO Protonix - Xarelto discontinued  7. Epistaxis - occurred on Xarelto  8. Stage III CKD   9. Surgical  History:  a. none    Allergies  No Known Allergies    Current Medications    Current Outpatient Medications:   •  apixaban (ELIQUIS) 5 MG tablet tablet, Take 1 tablet by mouth Every 12 (Twelve) Hours. Indications: DVT/PE (active thrombosis), Disp: 60 tablet, Rfl: 0  •  atorvastatin (LIPITOR) 40 MG tablet, Take 1 tablet by mouth Every Night., Disp: 90 tablet, Rfl: 3  •  Coenzyme Q10 (CoQ10) 100 MG capsule, Take 1 capsule by mouth Daily., Disp: , Rfl:   •  ferrous sulfate 325 (65 FE) MG tablet, Take 1 tablet by mouth Daily With Breakfast., Disp: 30 tablet, Rfl: 5  •  levothyroxine (SYNTHROID, LEVOTHROID) 75 MCG tablet, Take 75 mcg by mouth Daily., Disp: , Rfl:   •  metoprolol succinate XL (TOPROL-XL) 50 MG 24 hr tablet, Take 1 tablet by mouth Daily., Disp: 30 tablet, Rfl: 0  •  multivitamin with minerals (MULTIVITAMIN ADULT PO), Take 1 tablet by mouth Daily., Disp: , Rfl:   •  pantoprazole (PROTONIX) 40 MG EC tablet, Take 1 tablet by mouth 2 (Two) Times a Day Before Meals., Disp: 60 tablet, Rfl: 0  •  sacubitril-valsartan (ENTRESTO) 24-26 MG tablet, Take 1 tablet by mouth Every 12 (Twelve) Hours., Disp: 60 tablet, Rfl: 0  •  tamsulosin (FLOMAX) 0.4 MG capsule 24 hr capsule, Take 1 capsule by mouth Daily., Disp: 30 capsule, Rfl: 5    History of Present Illness     Pt presents for follow up of permanent atrial fibrillation s/p AVN RFA and upgrade to BiV ICD in March, CAD with recent 2V CABG, DCM, CHF. Since we last saw the pt, he has been recovering from 2V CABG which was done on 6/3/2021. He was re-admitted 4 days later with GI ulcer and put on PPI. He also was found to have an acute upper extremity DVT and was put on Eliquis. He has two weeks left on his prescription. He is now on Entresto as well.  He still feels SOB with exertion. He has palpitations which feel like hard heartbeats. Overall feels ok. No syncope. BP has been doing alright, but has been low 95 mmHg. He does not feel presyncopal. He did have  "thoracentesis 6/16/2021 due to persistent dyspnea after his CABG. 1.2 L removed.     ROS:  General:  +  fatigue, - weight gain or loss  Cardiovascular:  Denies CP, PND, syncope, near syncope, - edema or palpitations.  Pulmonary:  + TREVIZO, - cough, or wheezing      Vitals:    06/30/21 1301   BP: 110/62   BP Location: Left arm   Patient Position: Sitting   Pulse: 74   SpO2: 99%   Weight: 80.7 kg (178 lb)   Height: 188 cm (74\")     Body mass index is 22.85 kg/m².  PE:  General: NAD. A & O x 3   Neck: no JVD, no carotid bruits, no TM  Heart RRR, NL S1, S2, S4 present, no rubs, murmurs  Lungs: CTA, + wheezes, - rhonchi, or rales  Abd: soft, non-tender, NL BS  Ext: No musculoskeletal deformities, no edema, cyanosis, or clubbing  Psych: normal mood and affect    Diagnostic Data:    BiV ICD: BiV paced 87.5%. Normal function. 100% afib. Battery 7.5 years. Increase HR to 75 bpm. Increase rate responsiveness    Procedures    1. Permanent atrial fibrillation (CMS/HCC)    2. Dilated CM     3. Coronary artery disease involving coronary bypass graft of native heart without angina pectoris    4. Acute deep vein thrombosis (DVT) of brachial vein of right upper extremity (CMS/HCC)          Plan:  1. Permanent atrial fibrillation:  - s/p AVN RFA and upgrade to BiV ICD 3/2021  - s/p Watchman Device on ASA     2. Dilated Cardiomyopathy/CHF class III on beta-blockers and ACE inhibitor.   - normal BiV ICD function  - persistent TREVIZO s/p Thoracentesis 6/16/2021 resulted in 1.2 L of serosanguineous fluid removed.  - Would stop Toprol due to side effects. Add higher rate responsiveness level      3. CAD:  - s/p CABG x 2 6/3/2021     4. UE DVT:  - now on Eliquis since 6/2021    F/up in 6 months    Electronically signed by ETIENNE Maxwell, 06/30/21, 1:23 PM EDT.      I, Yonny Prado MD, personally performed the services described in this documentation as scribed by the above named individual in my presence, and it is both accurate and " complete.  6/30/2021  13:47 EDT

## 2021-07-01 ENCOUNTER — OFFICE VISIT (OUTPATIENT)
Dept: CARDIAC SURGERY | Facility: CLINIC | Age: 76
End: 2021-07-01

## 2021-07-01 VITALS
HEIGHT: 74 IN | WEIGHT: 179 LBS | DIASTOLIC BLOOD PRESSURE: 66 MMHG | BODY MASS INDEX: 22.97 KG/M2 | TEMPERATURE: 97.3 F | HEART RATE: 67 BPM | OXYGEN SATURATION: 98 % | SYSTOLIC BLOOD PRESSURE: 136 MMHG

## 2021-07-01 DIAGNOSIS — Z95.1 S/P CABG (CORONARY ARTERY BYPASS GRAFT): ICD-10-CM

## 2021-07-01 PROCEDURE — 99024 POSTOP FOLLOW-UP VISIT: CPT | Performed by: NURSE PRACTITIONER

## 2021-07-01 PROCEDURE — 71046 X-RAY EXAM CHEST 2 VIEWS: CPT | Performed by: NURSE PRACTITIONER

## 2021-07-01 NOTE — PROGRESS NOTES
[facility] Office Follow Up Note     Date of Encounter: 07/01/2021     MRN Number: [id number]  Name: Colin Albrecht  Phone Number: [phone]     Referred By: No ref. provider found  PCP: Arun Soliman MD    Chief Complaint:    Chief Complaint   Patient presents with   • Post-op     Hospital follow up s/p CABG 6/3/21.   • Coronary Artery Disease       Subjective      History of Present Illness:    Colin Albrecht is a 76 y.o. male former smoker with history of COPD, HTN, HLD on statin therapy, CKD, PAF on ASA with Watchman device, SSS s/p ablation with AICD, HFrEF (35-40%), and CAD s/p CABGx2 with right EVH on 6/3/21 with Dr Shields.  Postoperatively patient had urinary retention, runs of V. tach, right pleural effusion treated with diuretics, and was discharged on POD #7. On 6/14 he represented to [facility] ED for generalized weakness and SOA, found to be anemic with hemoglobin of 6.2 requiring 3 units of PRBCs. He underwent EGD that showed prepyloric ulcer and treated with PPI twice daily x1 month.  He had medication managed by cardiology as well as thoracentesis for pleural effusion with pulmonology and PFTs indicating initiation of LABA.  He was also diagnosed with left upper extremity DVT and started on Eliquis therapy x3 months. He is to follow up with PCP,  Cardiology, pulmonology, and GI after this hospitalization. He was seen by his cardiologist Dr Prado yesterday and had pacemaker adjusted. Pt states its to early to tell if that has helped with his ongoing weakness and TREVIZO. He has no concerns regarding incisional wound healing.     Review of Systems:  Review of Systems   Constitutional: Negative for chills, decreased appetite, diaphoresis, fever, malaise/fatigue, night sweats, weight gain and weight loss.   HENT: Negative.  Negative for hoarse voice.    Eyes: Negative.  Negative for blurred vision, double vision and visual disturbance.   Cardiovascular: Positive for  dyspnea on exertion. Negative for chest pain, claudication, irregular heartbeat, leg swelling, near-syncope, orthopnea, palpitations, paroxysmal nocturnal dyspnea and syncope.   Respiratory: Negative.  Negative for cough, hemoptysis, shortness of breath, sputum production and wheezing.    Hematologic/Lymphatic: Negative.  Negative for adenopathy and bleeding problem. Does not bruise/bleed easily.   Skin: Negative.  Negative for color change, nail changes, poor wound healing and rash.   Musculoskeletal: Negative.  Negative for back pain, falls and muscle cramps.   Gastrointestinal: Negative.  Negative for abdominal pain, dysphagia and heartburn.   Genitourinary: Negative for flank pain.   Neurological: Positive for weakness. Negative for brief paralysis, disturbances in coordination, dizziness, focal weakness, headaches, light-headedness, loss of balance, numbness, paresthesias, sensory change and vertigo.   Psychiatric/Behavioral: Negative.  Negative for depression and suicidal ideas.   Allergic/Immunologic: Negative for persistent infections.       I have reviewed the following portions of the patient's history: allergies, current medications, past family history, past medical history, past social history, past surgical history and problem list and confirm it's accurate.    Allergies:  No Known Allergies    Medications:      Current Outpatient Medications:   •  apixaban (ELIQUIS) 5 MG tablet tablet, Take 1 tablet by mouth Every 12 (Twelve) Hours. Indications: DVT/PE (active thrombosis), Disp: 60 tablet, Rfl: 6  •  atorvastatin (LIPITOR) 40 MG tablet, Take 1 tablet by mouth Every Night., Disp: 30 tablet, Rfl: 6  •  Coenzyme Q10 (CoQ10) 100 MG capsule, Take 1 capsule by mouth Daily., Disp: , Rfl:   •  ferrous sulfate 325 (65 FE) MG tablet, Take 1 tablet by mouth Daily With Breakfast., Disp: 30 tablet, Rfl: 5  •  levothyroxine (SYNTHROID, LEVOTHROID) 75 MCG tablet, Take 75 mcg by mouth Daily., Disp: , Rfl:   •   multivitamin with minerals (MULTIVITAMIN ADULT PO), Take 1 tablet by mouth Daily., Disp: , Rfl:   •  pantoprazole (PROTONIX) 40 MG EC tablet, Take 1 tablet by mouth 2 (Two) Times a Day Before Meals., Disp: 60 tablet, Rfl: 6  •  sacubitril-valsartan (ENTRESTO) 24-26 MG tablet, Take 1 tablet by mouth Every 12 (Twelve) Hours., Disp: 60 tablet, Rfl: 6  •  tamsulosin (FLOMAX) 0.4 MG capsule 24 hr capsule, Take 1 capsule by mouth Daily., Disp: 30 capsule, Rfl: 5    History:   Past Medical History:   Diagnosis Date   • Abnormal ECG    • Arrhythmia    • Atrial fibrillation (CMS/HCC)    • CHF (congestive heart failure) (CMS/HCC)    • Depression    • History of transfusion     bleeding stomach ulcer ~2014 x2, no reaction    • Hypertension    • Stomach ulcer    • Wears reading eyeglasses        Past Surgical History:   Procedure Laterality Date   • ATRIAL APPENDAGE EXCLUSION LEFT WITH TRANSESOPHAGEAL ECHOCARDIOGRAM N/A 9/25/2020    Procedure: Atrial Appendage Occlusion (Watchman), start Eliquis 2 weeks prior and hold 1 day, GA;  Surgeon: Yonny Prado MD;  Location:  [facility];  Service: Cardiology;  Laterality: N/A;   • CARDIAC CATHETERIZATION     • CARDIAC CATHETERIZATION N/A 6/2/2021    Procedure: Left Heart Cath- ADD ON/ WALK IN FOR RDS PER KT;  Surgeon: Pietro Quintana MD;  Location:  [facility];  Service: Cardiovascular;  Laterality: N/A;   • CARDIAC ELECTROPHYSIOLOGY PROCEDURE N/A 3/26/2021    Procedure: DDD PPM upgrade to Biv ICD (MDT), no meds to hold;  Surgeon: Yonny Prado MD;  Location:  [facility];  Service: Cardiology;  Laterality: N/A;   • CARDIAC ELECTROPHYSIOLOGY PROCEDURE N/A 3/26/2021    Procedure: AVN RFA;  Surgeon: Yonny Prado MD;  Location:  [facility];  Service: Cardiovascular;  Laterality: N/A;   • COLONOSCOPY     • CORONARY ARTERY BYPASS GRAFT N/A 6/3/2021    Procedure: MEDIAN STERNOTOMY, CORONARY ARTERY BYPASS WITH  "INTERNAL MAMMARY ARTERY GRAFT X 2, EVH OF THE RIGHT GREATER SAPHENOUS ANA;  Surgeon: Jaime Shields MD;  Location: [facility];  Service: Cardiothoracic;  Laterality: N/A;   • ENDOSCOPY N/A 6/15/2021    Procedure: ESOPHAGOGASTRODUODENOSCOPY;  Surgeon: Fransico Warren MD;  Location:  [facility];  Service: Gastroenterology;  Laterality: N/A;   • INSERT / REPLACE / REMOVE PACEMAKER         Social History     Socioeconomic History   • Marital status:      Spouse name: Not on file   • Number of children: 3   • Years of education: Not on file   • Highest education level: Not on file   Tobacco Use   • Smoking status: Former Smoker     Packs/day: 1.00     Years: 50.00     Pack years: 50.00     Types: Cigarettes     Quit date: 7/29/2010     Years since quitting: 10.9   • Smokeless tobacco: Never Used   Vaping Use   • Vaping Use: Never used   Substance and Sexual Activity   • Alcohol use: Never   • Drug use: Never   • Sexual activity: Defer        Family History   Problem Relation Age of Onset   • Stroke Father    • Lung cancer Sister    • Alcohol abuse Brother        Objective     Physical Exam:  Vitals:    07/01/21 1112   BP: 136/66   BP Location: Right arm   Patient Position: Sitting   Pulse: 67   Temp: 97.3 °F (36.3 °C)   SpO2: 98%   Weight: 81.2 kg (179 lb)   Height: 188 cm (74\")      Body mass index is 22.98 kg/m².    Physical Exam  Vitals and nursing note reviewed.   Constitutional:       Appearance: Normal appearance.   HENT:      Head: Normocephalic and atraumatic.   Eyes:      Pupils: Pupils are equal, round, and reactive to light.   Neck:      Vascular: No carotid bruit.   Cardiovascular:      Rate and Rhythm: Normal rate and regular rhythm.      Pulses: Normal pulses.      Heart sounds: Normal heart sounds, S1 normal and S2 normal. No murmur heard.     Pulmonary:      Effort: Pulmonary effort is normal.      Breath sounds: Normal breath sounds.   Abdominal:      Palpations: Abdomen is soft. "   Musculoskeletal:         General: Normal range of motion.      Cervical back: Normal range of motion and neck supple.      Right lower leg: No edema.      Left lower leg: No edema.   Skin:     General: Skin is warm and dry.      Capillary Refill: Capillary refill takes less than 2 seconds.      Comments: Mid-sternotomy incision: wound edges well approximated, no erythema, edema, or induration  Mediastinal tubes sites: well healing without erythema, edema, or drainage  Endoscopic vein harvest site: well healing without erythema, edema, or drainage   Neurological:      General: No focal deficit present.      Mental Status: He is alert and oriented to person, place, and time. Mental status is at baseline.      GCS: GCS eye subscore is 4. GCS verbal subscore is 5. GCS motor subscore is 6.      Motor: Motor function is intact.      Coordination: Coordination is intact.      Gait: Gait is intact.   Psychiatric:         Mood and Affect: Mood normal.         Speech: Speech normal.         Behavior: Behavior normal. Behavior is cooperative.         Cognition and Memory: Cognition normal.       Imaging/Labs:  XR Chest 07/01/2021: Lungs hyperinflated. No signs of pneumothorax, pleural effusion, or acute airspace consolidation noted. Blunting of left costovertebral angle Sternal wires intact. Pacemaker noted. Cardiomediastinal structures appear within normal limits. Personally reviewed.  Official radiology report pending    CT Chest Pulmonary Embolism Result Date: 6/14/2021  1. No PE. 2. Stable to slightly increasing small to moderate volume right and trace to small left pleural effusions with adjacent atelectasis. 3. No acute findings within the abdomen or pelvis, however, noted moderate distention of the rectal vault with intermixed gas and stool.  D:  06/14/2021 E:  06/14/2021   This report was finalized on 6/14/2021 4:26 PM by Dr. Ap Harris.      CT Guided Thoracentesis Result Date: 6/16/2021  Significant right  thoracentesis with 1.2 L of a red-tinged fluid removed and sent to cytology for further analysis. No immediate complication occurred.  DICTATED:   06/16/2021 EDITED/ls :   06/16/2021   This report was finalized on 6/16/2021 8:00 PM by Dr. Karine Limon MD.       Duplex Venous Upper Extremity - Left CAR  Interpretation Summary  · Acute left upper extremity deep vein thrombosis noted in the subclavian and axillary.  · Acute left upper extremity superficial thrombophlebitis noted in the cephalic (upper arm).  · All other left sided vessels appear normal.    Carotid duplex 6/2/2021  · Proximal right internal carotid artery plaque without significant stenosis.  · Proximal left internal carotid artery plaque without significant stenosis.  · Bilateral antegrade vertebral flow.  ·   Assessment / Plan      Assessment / Plan:  Diagnoses and all orders for this visit:    1. S/P CABG (coronary artery bypass graft)       Colin Albrecht is a 76 y.o. male former smoker with history of COPD, HTN, HLD on statin therapy, CKD, PAF on ASA with Watchman device, SSS s/p ablation with AICD, HFrEF (35-40%), and CAD s/p CABGx2 with right EVH on 6/3/21 with Dr Shields.  Postoperatively patient had urinary retention, runs of V. tach, right pleural effusion treated with diuretics, and was discharged on POD #7. On 6/14 he represented to [facility] ED for generalized weakness and SOA, found to be anemic with hemoglobin of 6.2 requiring 3 units of PRBCs. He underwent EGD that showed prepyloric ulcer and treated with PPI twice daily x1 month.  He had medication managed by cardiology as well as thoracentesis for pleural effusion with pulmonology and PFTs indicating initiation of LABA.  He was also diagnosed with left upper extremity DVT and started on Eliquis therapy x3 months. He is to follow up with PCP, cardiology, pulmonology, and GI after this hospitalization. He was seen by his cardiologist Dr Prado yesterday and had pacemaker adjusted.  Pt states its to early to tell if that has helped with his ongoing weakness and TREVIZO. He thinks that he has just had a rough go of it but is getting better. He has been able to walk around and work in his garage, he just has to go slow and cant go far. He has no concerns regarding incisional wound healing. On exam he has beautifully healing sternotomy and EVH sites without any signs of complications. His CXR in clinic today is stable with blunting of left costovertebral angle. Since he has had a difficult post-operative course and he is slow to recover I would like to see him back in 2 months with repeat chest xray to check on his progress before releasing him to cardiology. Pt is agreeable to plan. Pt has declined cardiac rehab at this time as he states he is active enough at home.     Patient Education:  Activity Restrictions: You may resume driving at 6 weeks post-operatively. No lifting more than 10 lbs for 12 weeks (3 months). At this time you can slowly increase your weight as tolerated. You should not partake in any activities that involve twisting of your upper chest and torso such as (but not limited to) golfing or tennis, lawn mowing including zero turn mowers, use of law trimmers or edgers, shoveling, vacuuming, etc.   Wound Care: continue to keep your incisions clean and dry. You may use plain antibacterial soap (i.e. dial) and water to clean and pat dry. No lotions, creams, oils, powders, salves, or ointments.       Follow Up:   Return in about 2 months (around 9/1/2021) for F/U Daiana, Imaging next visit - CXR.   Or sooner for any further concerns or worsening sign and symptoms. If unable to reach us in the office please dial 911 or go to the nearest emergency department.      Daiana LOPEZ  [facility]

## 2021-07-13 ENCOUNTER — TELEPHONE (OUTPATIENT)
Dept: CARDIOLOGY | Facility: CLINIC | Age: 76
End: 2021-07-13

## 2021-07-13 NOTE — TELEPHONE ENCOUNTER
Eliazar Kasper, an RN with [facility] called and left a message. I tried to call her back. No answer. I left a message.

## 2021-08-07 PROCEDURE — 93295 DEV INTERROG REMOTE 1/2/MLT: CPT | Performed by: INTERNAL MEDICINE

## 2021-08-07 PROCEDURE — 93296 REM INTERROG EVL PM/IDS: CPT | Performed by: INTERNAL MEDICINE

## 2021-09-14 ENCOUNTER — APPOINTMENT (OUTPATIENT)
Dept: PREADMISSION TESTING | Facility: HOSPITAL | Age: 76
End: 2021-09-14

## 2021-09-17 ENCOUNTER — HOSPITAL ENCOUNTER (OUTPATIENT)
Dept: CARDIOLOGY | Facility: HOSPITAL | Age: 76
Discharge: HOME | End: 2021-09-17
Admitting: INTERNAL MEDICINE

## 2021-09-17 VITALS
SYSTOLIC BLOOD PRESSURE: 129 MMHG | OXYGEN SATURATION: 99 % | DIASTOLIC BLOOD PRESSURE: 78 MMHG | RESPIRATION RATE: 14 BRPM | HEART RATE: 76 BPM

## 2021-09-17 DIAGNOSIS — I48.21 PERMANENT ATRIAL FIBRILLATION (HCC): ICD-10-CM

## 2021-09-17 PROBLEM — I20.0 UNSTABLE ANGINA (HCC): Status: RESOLVED | Noted: 2021-06-02 | Resolved: 2021-09-17

## 2021-09-17 LAB
BH CV ECHO MEAS - BSA(HAYCOCK): 2.1 M^2
BH CV ECHO MEAS - BSA: 2.1 M^2
BH CV ECHO MEAS - BZI_BMI: 23 KILOGRAMS/M^2
BH CV ECHO MEAS - BZI_METRIC_HEIGHT: 188 CM
BH CV ECHO MEAS - BZI_METRIC_WEIGHT: 81.2 KG
LV EF 2D ECHO EST: 35 %
MAXIMAL PREDICTED HEART RATE: 145 BPM
STRESS TARGET HR: 123 BPM

## 2021-09-17 PROCEDURE — 93325 DOPPLER ECHO COLOR FLOW MAPG: CPT | Performed by: INTERNAL MEDICINE

## 2021-09-17 PROCEDURE — 25010000002 FENTANYL CITRATE (PF) 50 MCG/ML SOLUTION: Performed by: INTERNAL MEDICINE

## 2021-09-17 PROCEDURE — 93321 DOPPLER ECHO F-UP/LMTD STD: CPT

## 2021-09-17 PROCEDURE — 93321 DOPPLER ECHO F-UP/LMTD STD: CPT | Performed by: INTERNAL MEDICINE

## 2021-09-17 PROCEDURE — 93325 DOPPLER ECHO COLOR FLOW MAPG: CPT

## 2021-09-17 PROCEDURE — 93312 ECHO TRANSESOPHAGEAL: CPT | Performed by: INTERNAL MEDICINE

## 2021-09-17 PROCEDURE — 93312 ECHO TRANSESOPHAGEAL: CPT

## 2021-09-17 PROCEDURE — 25010000002 MIDAZOLAM PER 1 MG: Performed by: INTERNAL MEDICINE

## 2021-09-17 RX ORDER — FENTANYL CITRATE 50 UG/ML
INJECTION, SOLUTION INTRAMUSCULAR; INTRAVENOUS
Status: COMPLETED | OUTPATIENT
Start: 2021-09-17 | End: 2021-09-17

## 2021-09-17 RX ORDER — MIDAZOLAM HYDROCHLORIDE 1 MG/ML
INJECTION INTRAMUSCULAR; INTRAVENOUS
Status: COMPLETED | OUTPATIENT
Start: 2021-09-17 | End: 2021-09-17

## 2021-09-17 RX ADMIN — MIDAZOLAM HYDROCHLORIDE 1 MG: 1 INJECTION, SOLUTION INTRAMUSCULAR; INTRAVENOUS at 14:03

## 2021-09-17 RX ADMIN — FENTANYL CITRATE 50 MCG: 50 INJECTION, SOLUTION INTRAMUSCULAR; INTRAVENOUS at 14:04

## 2021-09-17 RX ADMIN — MIDAZOLAM HYDROCHLORIDE 1 MG: 1 INJECTION, SOLUTION INTRAMUSCULAR; INTRAVENOUS at 14:06

## 2021-09-17 RX ADMIN — METHOHEXITAL SODIUM 20 MG: 500 INJECTION, POWDER, LYOPHILIZED, FOR SOLUTION INTRAMUSCULAR; INTRAVENOUS; RECTAL at 14:02

## 2021-09-17 RX ADMIN — MIDAZOLAM HYDROCHLORIDE 1 MG: 1 INJECTION, SOLUTION INTRAMUSCULAR; INTRAVENOUS at 14:02

## 2021-09-17 RX ADMIN — METHOHEXITAL SODIUM 20 MG: 500 INJECTION, POWDER, LYOPHILIZED, FOR SOLUTION INTRAMUSCULAR; INTRAVENOUS; RECTAL at 14:09

## 2021-09-17 NOTE — H&P
[facility] at [facility]  Cardiovascular History and Physical Note         Patient is a 76-year-old gentleman with a history of chronic systolic heart failure, dilated cardiomyopathy, coronary artery disease status post CABG in June of this year, permanent atrial fibrillation status post AV yasemin ablation and biventricular ICD who is being referred by Dr. Ha Prado to undergo transesophageal echocardiogram for surveillance of watchman device placed 1 year ago.  The patient was recently hospitalized at [facility] in June for unstable angina and underwent cardiac catheterization which showed severe ostial left main disease.  Ultimately underwent surgical revascularization receiving an SVG to the circumflex and LIMA to the LAD.  He was initially taking daily aspirin but he was under the impression aspirin interfered with Entresto so he stopped taking it.  He thinks he should be on the higher dose of Entresto at this point.  His primary cardiologist is Dr. Pietro Quintana.        Past medical and surgical history, social and family history reviewed in EMR.    REVIEW OF SYSTEMS:   H&P ROS reviewed and pertinent CV ROS as noted in HPI.         Vital Sign Min/Max for last 24 hours  No data recorded   BP  Min: 140/80  Max: 140/80   Pulse  Min: 75  Max: 75   Resp  Min: 14  Max: 14   SpO2  Min: 97 %  Max: 97 %   No data recorded    No intake or output data in the 24 hours ending 09/17/21 1313        Constitutional:       Appearance: Healthy appearance. Well-developed.   Eyes:      General: Lids are normal. No scleral icterus.     Conjunctiva/sclera: Conjunctivae normal.   HENT:      Head: Normocephalic and atraumatic.   Neck:      Thyroid: No thyromegaly.      Vascular: No carotid bruit or JVD.   Pulmonary:      Effort: Pulmonary effort is normal.      Breath sounds: Normal breath sounds. No wheezing. No rhonchi. No rales.   Cardiovascular:      Normal rate. Irregularly irregular  rhythm.      Murmurs: There is no murmur.      No gallop. No rub.      Comments: Paced  Pulses:     Intact distal pulses.   Edema:     Peripheral edema absent.   Abdominal:      General: There is no distension.      Palpations: Abdomen is soft. There is no abdominal mass.   Musculoskeletal:      Cervical back: Normal range of motion. Skin:     General: Skin is warm and dry.      Findings: No rash.   Neurological:      General: No focal deficit present.      Mental Status: Alert and oriented to person, place, and time.      Gait: Gait is intact.   Psychiatric:         Attention and Perception: Attention normal.         Mood and Affect: Mood normal.         Behavior: Behavior normal.         Lab Review:   Labs reviewed in the electronic medical record.  Pertinent findings include:  Lab Results   Component Value Date    GLUCOSE 142 (H) 06/17/2021    BUN 30 (H) 06/17/2021    CREATININE 0.96 06/17/2021    EGFRIFNONA 76 06/17/2021    BCR 31.3 (H) 06/17/2021    K 3.6 06/17/2021    CO2 25.0 06/17/2021    CALCIUM 8.3 (L) 06/17/2021    ALBUMIN 3.80 06/14/2021    AST 19 06/14/2021    ALT 17 06/14/2021     Lab Results   Component Value Date    WBC 7.81 06/17/2021    HGB 8.6 (L) 06/17/2021    HCT 26.7 (L) 06/17/2021    MCV 97.1 (H) 06/17/2021     06/17/2021     Lab Results   Component Value Date    CHOL 101 06/07/2021    TRIG 70 06/07/2021    HDL 52 06/02/2021     (H) 06/02/2021                Active Hospital Problems    Diagnosis     **S/P Watchman device      Implantation of a left atrial appendage occlusive device (Watchman device) 09/25/20 by Dr. Yonny Prado  PORSHA (11/13/2020): With well seated device, EF 35%, mod MR, mild to mod TR, post-procedural ASD with left-to-right flow      Permanent atrial fibrillation (CMS/HCC)      CHADsvasc = 4 off Xarelto x 1 year due to bleeding, never been on Eliquis  Echo (1/27/2016): EF 45 to 55%, unchanged from previous echo 2012  Echo (8/27/2019): EF 35 to 40%, mild to  moderate MR, mild to moderate TR, RVSP 40 mmHg  MPS (8/26/2019): Normal LV perfusion EF 33%  Implantation of a left atrial appendage occlusive device (Watchman device) 09/25/20 by Dr. Yonny Prado  PORSHA (11/13/2020): With well seated device, EF 35%, mod MR, mild to mod TR, post-procedural ASD with left-to-right flow  Upgrade to BIV ICD and AV Node ablation 3/2021, Dr. Prado      Essential hypertension     Coronary artery disease involving coronary bypass graft of native heart without angina pectoris      Cardiac cath (6/2/2021): Ostial left main 80% stenosis.  CABG x2 6/3/2021      Dilated CM       Patient reports normal LHC 10-12 years ago  Echo (1/27/2016): EF 45 to 55%, unchanged from previous echo 2012  Echo (8/27/2019): EF 35%, mild to moderate MR, mild to moderate TR, RVSP 40 mmHg  MPS (8/26/2019): Normal LV perfusion EF 33%  Upgrade to BIV ICD at time of AV node ablation 3/2021        Patient presents today to undergo transesophageal echocardiogram for surveillance of his watchman device.  The risk and benefits of the procedure were discussed and patient is agreeable to proceed       Proceed with transesophageal echocardiogram  Resume aspirin 81 mg daily  Defer titration of Entresto to primary cardiologist Dr. Pietro Quintana  Further recommendations to follow      JOHN Moe for Dr. Wesley    Patient's PORSHA was completed without complications.  The watchman device is well-seated.  There is a trivial amount of peridevice flow flow less than 5 mm.  Patient can remain on single antiplatelet therapy and encouraged him to resume his daily aspirin.

## 2021-10-01 ENCOUNTER — OFFICE VISIT (OUTPATIENT)
Dept: CARDIOLOGY | Facility: CLINIC | Age: 76
End: 2021-10-01

## 2021-10-01 VITALS
OXYGEN SATURATION: 99 % | SYSTOLIC BLOOD PRESSURE: 129 MMHG | TEMPERATURE: 96.2 F | WEIGHT: 174 LBS | BODY MASS INDEX: 21.64 KG/M2 | HEIGHT: 75 IN | DIASTOLIC BLOOD PRESSURE: 80 MMHG | HEART RATE: 89 BPM

## 2021-10-01 DIAGNOSIS — I50.22 CHRONIC SYSTOLIC CONGESTIVE HEART FAILURE (HCC): ICD-10-CM

## 2021-10-01 DIAGNOSIS — Z95.1 S/P CABG X 2: ICD-10-CM

## 2021-10-01 DIAGNOSIS — Z95.818 PRESENCE OF WATCHMAN LEFT ATRIAL APPENDAGE CLOSURE DEVICE: ICD-10-CM

## 2021-10-01 DIAGNOSIS — I48.21 PERMANENT ATRIAL FIBRILLATION (HCC): Primary | ICD-10-CM

## 2021-10-01 PROCEDURE — 93284 PRGRMG EVAL IMPLANTABLE DFB: CPT | Performed by: INTERNAL MEDICINE

## 2021-10-01 PROCEDURE — 99213 OFFICE O/P EST LOW 20 MIN: CPT | Performed by: INTERNAL MEDICINE

## 2021-10-01 RX ORDER — ASPIRIN 81 MG/1
81 TABLET ORAL DAILY
COMMUNITY

## 2021-10-01 NOTE — PROGRESS NOTES
Colin Albrecht  1945  [phone]    10/01/2021    [facility] CARDIOLOGY     Referring Provider: No ref. provider found     Arun Soliman  [address] [place] [address]    Chief Complaint   Patient presents with   • permanent atrial fibrillation     Problem List:   1. Permanent Atrial Fibrillation   a. CHADsvasc = 4 off Xarelto x 1 year due to bleeding, never been on Eliquis  b. Echocardiogram 1/27/2016: EF 45 to 55%, unchanged from previous echo 2012  c. Echocardiogram 8/27/2019: EF 35 to 40%, mild to moderate MR, mild to moderate TR, RVSP 40 mmHg  d. Nuclear stress test 8/26/2019: Normal LV perfusion EF 33%  e. Implantation of a left atrial appendage occlusive device (Watchman device) 09/25/20 by Dr. Yonny Prado  f. PORSHA on 11/13/20 with well seated device, EF 35%, mod MR, mild to mod TR, post-procedural ASD with left-to-right flow  g. Upgrade to BIV ICD and AV Node ablation 3/2021, Dr. Prado  2. Dilated cardiomyopathy/CHF  a. Patient reports normal LHC 10-12 years ago  b. Echocardiogram 1/27/2016: EF 45 to 55%, unchanged from previous echo 2012  c. Echocardiogram 8/27/2019: EF 35%, mild to moderate MR, mild to moderate TR, RVSP 40 mmHg  d. Nuclear stress test 8/26/2019: Normal LV perfusion EF 33%  e. Upgrade to BIV ICD at time of AV node ablation 3/2021   3. Sick sinus syndrome  a. Dual-chamber permanent pacemaker -Medtronic device  b. Upgrade to BIV ICD at time of AV node ablation 3/2021   4. Hypertension  5. Coronary artery Disease:   1. 6/3/21  CABG x 2 per Dr Cornelius HARRIS-LAD, SVG-circumflex.   6. Gastritis/GIB/Peptic Ulcer Disease  a. 2013: GIB requiring PRBCs, EGD showed bleeding ulcer in the antrum 9/2013, repeat EGD 12/2013 showed healed ulcer  b. Upper GI Bleeding 3/2019, EGD showed gastric antral ulcer with stigmata or recent bleeding - treated with IV/PO Protonix - Xarelto discontinued  7. Epistaxis - occurred on Xarelto  8. Stage III CKD   9. Surgical  History:  a. none  Allergies  No Known Allergies    Current Medications    Current Outpatient Medications:   •  aspirin 81 MG EC tablet, Take 81 mg by mouth Daily., Disp: , Rfl:   •  atorvastatin (LIPITOR) 40 MG tablet, Take 1 tablet by mouth Every Night., Disp: 30 tablet, Rfl: 6  •  Coenzyme Q10 (CoQ10) 100 MG capsule, Take 1 capsule by mouth Daily., Disp: , Rfl:   •  levothyroxine (SYNTHROID, LEVOTHROID) 75 MCG tablet, Take 75 mcg by mouth Daily., Disp: , Rfl:   •  sacubitril-valsartan (ENTRESTO) 24-26 MG tablet, Take 1 tablet by mouth Every 12 (Twelve) Hours., Disp: 60 tablet, Rfl: 6  •  apixaban (ELIQUIS) 5 MG tablet tablet, Take 1 tablet by mouth Every 12 (Twelve) Hours. Indications: DVT/PE (active thrombosis), Disp: 60 tablet, Rfl: 6  •  ferrous sulfate 325 (65 FE) MG tablet, Take 1 tablet by mouth Daily With Breakfast., Disp: 30 tablet, Rfl: 5  •  multivitamin with minerals (MULTIVITAMIN ADULT PO), Take 1 tablet by mouth Daily., Disp: , Rfl:   •  pantoprazole (PROTONIX) 40 MG EC tablet, Take 1 tablet by mouth 2 (Two) Times a Day Before Meals., Disp: 60 tablet, Rfl: 6  •  tamsulosin (FLOMAX) 0.4 MG capsule 24 hr capsule, Take 1 capsule by mouth Daily., Disp: 30 capsule, Rfl: 5    History of Present Illness     Pt presents for follow up of AF/SSS/CHF/DCM/HTN . Since we last saw the pt, pt has done significantly better.  His shortness of breath is almost completely resolved.  He denies any significant tachypalpitations.  No near syncope or syncope.  Blood pressures have been stable at home.  Denies any hospitalizations, ER visits, bleeding, or TIA/CVA symptoms. Overall feels improved    ROS:  General:  + mild fatigue, No weight gain or loss  Cardiovascular:  Denies CP, PND, syncope, near syncope, edema or palpitations.  Pulmonary:  + mild TREVIZO, No cough, or wheezing      Vitals:    10/01/21 1408   BP: 129/80   BP Location: Left arm   Patient Position: Sitting   Pulse: 89   Temp: 96.2 °F (35.7 °C)   SpO2: 99%  "  Weight: 78.9 kg (174 lb)   Height: 190.5 cm (75\")     Body mass index is 21.75 kg/m².  PE:  General: NAD  Neck: no JVD, no carotid bruits, no TM  Heart RRR, NL S1, S2, S4 present, no rubs, murmurs  Lungs: CTA, no wheezes, rhonchi, or rales  Abd: soft, non-tender, NL BS  Ext: No musculoskeletal deformities, no edema, cyanosis, or clubbing  Psych: normal mood and affect    Diagnostic Data:    BiV ICD: BiV paced 92%. Normal function. 100% afib. Battery 7.5 years. Increase HR to 75 bpm. No VT    Procedures    1. Permanent atrial fibrillation (HCC)    2. Chronic systolic congestive heart failure (HCC)    3. S/P Watchman device    4. S/P CABG x 2 on 6/3/2021          Plan:  1. Permanent atrial fibrillation:  - s/p AVN RFA and upgrade to BiV ICD 3/2021  - s/p Watchman Device on ASA     2. Dilated Cardiomyopathy/CHF class III on beta-blockers and ACE inhibitor.   Presently with class I to class II heart failure symptoms.  - normal BiV ICD function  We will repeat echocardiogram in 6 months to reassess LVEF.    3. CAD:  - s/p CABG x 2 6/3/2021 Doing well       F/up in 6 months        "

## 2022-03-17 ENCOUNTER — TELEPHONE (OUTPATIENT)
Dept: CARDIOLOGY | Facility: CLINIC | Age: 77
End: 2022-03-17

## 2022-03-17 NOTE — TELEPHONE ENCOUNTER
Per [employer] web site, pt's home monitor isn't communicating.     I spoke with pt who reports the home monitor is in the closet. He didn't think he needed to have it plugged in. I educated patient on benefits of home monitoring. Pt verbalized understanding and states he will plug in the home monitor and place at his bedside.

## 2022-05-09 ENCOUNTER — TELEPHONE (OUTPATIENT)
Dept: CARDIOLOGY | Facility: CLINIC | Age: 77
End: 2022-05-09

## 2022-05-09 NOTE — TELEPHONE ENCOUNTER
Pt called concerned that his [employer] Carelink home monitor may not be working. I checked the [employer] web site & the patieint's home monitor is communicating with [employer].

## 2022-06-07 ENCOUNTER — OFFICE VISIT (OUTPATIENT)
Dept: CARDIOLOGY | Facility: CLINIC | Age: 77
End: 2022-06-07

## 2022-06-07 VITALS
BODY MASS INDEX: 22.11 KG/M2 | HEIGHT: 75 IN | DIASTOLIC BLOOD PRESSURE: 70 MMHG | HEART RATE: 91 BPM | SYSTOLIC BLOOD PRESSURE: 142 MMHG | OXYGEN SATURATION: 96 % | WEIGHT: 177.8 LBS

## 2022-06-07 DIAGNOSIS — I48.21 PERMANENT ATRIAL FIBRILLATION: ICD-10-CM

## 2022-06-07 DIAGNOSIS — I42.0 CARDIOMYOPATHY, DILATED: Chronic | ICD-10-CM

## 2022-06-07 DIAGNOSIS — I50.22 CHRONIC SYSTOLIC CONGESTIVE HEART FAILURE: Primary | ICD-10-CM

## 2022-06-07 DIAGNOSIS — I47.29 NONSUSTAINED VENTRICULAR TACHYCARDIA: ICD-10-CM

## 2022-06-07 DIAGNOSIS — I25.810 CORONARY ARTERY DISEASE INVOLVING CORONARY BYPASS GRAFT OF NATIVE HEART WITHOUT ANGINA PECTORIS: ICD-10-CM

## 2022-06-07 PROCEDURE — 93000 ELECTROCARDIOGRAM COMPLETE: CPT | Performed by: PHYSICIAN ASSISTANT

## 2022-06-07 PROCEDURE — 99214 OFFICE O/P EST MOD 30 MIN: CPT | Performed by: PHYSICIAN ASSISTANT

## 2022-06-07 PROCEDURE — 93284 PRGRMG EVAL IMPLANTABLE DFB: CPT | Performed by: PHYSICIAN ASSISTANT

## 2022-06-07 RX ORDER — METOPROLOL SUCCINATE 25 MG/1
25 TABLET, EXTENDED RELEASE ORAL DAILY
Qty: 30 TABLET | Refills: 11 | Status: SHIPPED | OUTPATIENT
Start: 2022-06-07

## 2022-06-07 RX ORDER — SACUBITRIL AND VALSARTAN 49; 51 MG/1; MG/1
1 TABLET, FILM COATED ORAL 2 TIMES DAILY
Qty: 60 TABLET | Refills: 6 | Status: SHIPPED | OUTPATIENT
Start: 2022-06-07 | End: 2022-06-22 | Stop reason: DRUGHIGH

## 2022-06-07 RX ORDER — LEVOTHYROXINE SODIUM 0.05 MG/1
50 TABLET ORAL DAILY
COMMUNITY
Start: 2022-05-23 | End: 2022-12-14 | Stop reason: ALTCHOICE

## 2022-06-07 NOTE — PROGRESS NOTES
Colin Albrecht  1945  [phone]    10/01/2021    [facility] CARDIOLOGY     Referring Provider: No ref. provider found     Arun Soliman  [address] [place] [address]    Chief Complaint   Patient presents with   • Atrial Fibrillation   • Dizziness     Problem List:   1. Permanent Atrial Fibrillation   a. CHADsvasc = 4 off Xarelto x 1 year due to bleeding, never been on Eliquis  b. Echocardiogram 1/27/2016: EF 45 to 55%, unchanged from previous echo 2012  c. Echocardiogram 8/27/2019: EF 35 to 40%, mild to moderate MR, mild to moderate TR, RVSP 40 mmHg  d. Nuclear stress test 8/26/2019: Normal LV perfusion EF 33%  e. Implantation of a left atrial appendage occlusive device (Watchman device) 09/25/20 by Dr. Yonny Prado  f. PORSHA on 11/13/20 with well seated device, EF 35%, mod MR, mild to mod TR, post-procedural ASD with left-to-right flow  g. Upgrade to BIV ICD and AV Node ablation 3/2021, Dr. Prado  2. Dilated cardiomyopathy/CHF  a. Patient reports normal LHC 10-12 years ago  b. Echocardiogram 1/27/2016: EF 45 to 55%, unchanged from previous echo 2012  c. Echocardiogram 8/27/2019: EF 35%, mild to moderate MR, mild to moderate TR, RVSP 40 mmHg  d. Nuclear stress test 8/26/2019: Normal LV perfusion EF 33%  e. Upgrade to BIV ICD at time of AV node ablation 3/2021   f. EF 40 % 6/14/2021  3. Sick sinus syndrome  a. Dual-chamber permanent pacemaker -Medtronic device  b. Upgrade to BIV ICD at time of AV node ablation 3/2021   4. Hypertension  5. Coronary artery Disease:   1. 6/3/21  CABG x 2 per Dr Cornelius HARRIS-LAD, SVG-circumflex.   6. Gastritis/GIB/Peptic Ulcer Disease  a. 2013: GIB requiring PRBCs, EGD showed bleeding ulcer in the antrum 9/2013, repeat EGD 12/2013 showed healed ulcer  b. Upper GI Bleeding 3/2019, EGD showed gastric antral ulcer with stigmata or recent bleeding - treated with IV/PO Protonix - Xarelto discontinued  7. Epistaxis - occurred on Xarelto  8. Stage III  "CKD   9. Surgical History:  a. none  Allergies  No Known Allergies    Current Medications    Current Outpatient Medications:   •  aspirin 81 MG EC tablet, Take 81 mg by mouth Daily., Disp: , Rfl:   •  atorvastatin (LIPITOR) 40 MG tablet, Take 1 tablet by mouth Every Night., Disp: 30 tablet, Rfl: 6  •  Coenzyme Q10 (CoQ10) 100 MG capsule, Take 1 capsule by mouth Daily., Disp: , Rfl:   •  levothyroxine (SYNTHROID, LEVOTHROID) 50 MCG tablet, Take 50 mcg by mouth Daily., Disp: , Rfl:   •  multivitamin with minerals tablet tablet, Take 1 tablet by mouth Daily., Disp: , Rfl:   •  apixaban (ELIQUIS) 5 MG tablet tablet, Take 1 tablet by mouth Every 12 (Twelve) Hours. Indications: DVT/PE (active thrombosis), Disp: 60 tablet, Rfl: 6  •  sacubitril-valsartan (Entresto) 49-51 MG tablet, Take 1 tablet by mouth 2 (Two) Times a Day., Disp: 60 tablet, Rfl: 6    History of Present Illness     Pt presents for follow up of AF/SSS/CHF/DCM/HTN.  He continues to do well.  He actually asked if he can titrate his Entresto as he feels like titis to take more.  Blood.'s been somewhat elevated 140 systolic range.  He does not have any progressive heart failure symptoms dizziness near syncope or ICD shocks.  He does report symptoms of sleep apnea are worse sometimes he falls asleep he wakes up gasping for breath.  He would like to have a sleep study.  He is not having chest pain or chest tightness suggesting angina.  Overall he is feeling doing well he would like to titrate Entresto if he could.  In addition he would like to recheck an echocardiogram to see if his LV function has improved.    Vitals:    06/07/22 1410   BP: 142/70   BP Location: Left arm   Patient Position: Sitting   Pulse: 91   SpO2: 96%   Weight: 80.6 kg (177 lb 12.8 oz)   Height: 190.5 cm (75\")     Body mass index is 22.22 kg/m².  PE:  General: NAD  Neck: no JVD, no carotid bruits, no TM  Heart RRR, NL S1, S2, S4 present, no rubs, murmurs  Lungs: CTA, no wheezes, rhonchi, or " rales  Abd: soft, non-tender, NL BS  Ext: No musculoskeletal deformities, no edema, cyanosis, or clubbing  Psych: normal mood and affect    Diagnostic Data:    BiV ICD:  Medtronic DDIR 75 bpm.  BiV paced 100%.  Atrial fibrillation permanent.  Battery voltage 2.97 V with 6.4 years remaining.  Charge time 3.9 seconds.  No VT.    ECG 12 Lead    Date/Time: 6/7/2022 4:13 PM  Performed by: Anson Peraza PA  Authorized by: Anson Peraza PA   Rhythm: atrial fibrillation and paced  Conduction: conduction normal    Clinical impression: abnormal EKG            1. Chronic systolic congestive heart failure (HCC)    2. Coronary artery disease involving coronary bypass graft of native heart without angina pectoris    3. Dilated CM     4. Nonsustained ventricular tachycardia 6/5/2021 (CMS/HCC)    5. Permanent atrial fibrillation (HCC)          Plan:  1. Permanent atrial fibrillation:  - s/p AVN RFA and upgrade to BiV ICD 3/2021  - s/p Watchman Device on ASA, patient remains on Eliquis because of upper extremity DVT.     2. Dilated Cardiomyopathy/CHF class III  Patient has some increasing shortness of breath since last visit he would like to t try Entresto to 49-51 as reports his blood pressures been elevated recently.  We will have a follow-up BMP in 5 days.  - normal BiV ICD function  We will repeat echocardiogram  reassess LVEF.    3. CAD:  - s/p CABG x 2 6/3/2021, currently on aspirin and statin therapy.  No angina symptoms.       Increase Entresto to 49-51 mg twice daily, follow-up BMP in 5 days.  Monitor blood pressure closely.  Referral for sleep study to rule out sleep apnea  Continue monitor sodium intake, weight daily as needed Lasix  F/up in 6 months

## 2022-06-21 DIAGNOSIS — I50.22 CHRONIC SYSTOLIC CONGESTIVE HEART FAILURE: ICD-10-CM

## 2022-06-22 ENCOUNTER — TELEPHONE (OUTPATIENT)
Dept: CARDIOLOGY | Facility: CLINIC | Age: 77
End: 2022-06-22

## 2022-06-22 DIAGNOSIS — I50.22 CHRONIC SYSTOLIC CONGESTIVE HEART FAILURE: ICD-10-CM

## 2022-06-22 DIAGNOSIS — I42.0 CARDIOMYOPATHY, DILATED: Primary | ICD-10-CM

## 2022-06-22 RX ORDER — SACUBITRIL AND VALSARTAN 24; 26 MG/1; MG/1
1 TABLET, FILM COATED ORAL 2 TIMES DAILY
Qty: 60 TABLET | Refills: 6
Start: 2022-06-22 | End: 2022-07-21 | Stop reason: SDUPTHER

## 2022-06-22 NOTE — TELEPHONE ENCOUNTER
Patient states that he does not take Lasix and does not have any at home. What dose of Lasix do you want him to take for 3 days?                                                    *Order for BMP faxed to [phone]*

## 2022-06-22 NOTE — TELEPHONE ENCOUNTER
Anson Peraza PA Childers, Tracy R RN  Patient's BMP reveals potassium is elevated on higher dose Entresto.  Can you please call the patient and let her know we did reduce Entresto back to normal dose and take an extra dose of Lasix for 3 days.  Recheck BMP in 5 days.  Thank you

## 2022-06-23 NOTE — TELEPHONE ENCOUNTER
Patient notified and aware. Order for BMP faxed to [facility] at [phone].

## 2022-07-20 RX ORDER — ATORVASTATIN CALCIUM 40 MG/1
40 TABLET, FILM COATED ORAL NIGHTLY
Qty: 90 TABLET | Refills: 3 | Status: SHIPPED | OUTPATIENT
Start: 2022-07-20

## 2022-07-21 RX ORDER — SACUBITRIL AND VALSARTAN 24; 26 MG/1; MG/1
1 TABLET, FILM COATED ORAL 2 TIMES DAILY
Qty: 60 TABLET | Refills: 6 | Status: SHIPPED | OUTPATIENT
Start: 2022-07-21

## 2022-09-16 PROCEDURE — 93295 DEV INTERROG REMOTE 1/2/MLT: CPT | Performed by: INTERNAL MEDICINE

## 2022-09-16 PROCEDURE — 93296 REM INTERROG EVL PM/IDS: CPT | Performed by: INTERNAL MEDICINE

## 2022-10-20 ENCOUNTER — TELEPHONE (OUTPATIENT)
Dept: CARDIOLOGY | Facility: CLINIC | Age: 77
End: 2022-10-20

## 2022-10-20 NOTE — TELEPHONE ENCOUNTER
Pt received information letter regarding heart failure monitoring program. Educated pt on purpose of heart failure monitoring secondary to his diagnosed of dilated cardiomyopathy & CHF. Advised pt to check w/his insurance about cost coverage. Pt verbalized understanding & compliance.     Pt called to report that his insurance stated it would cover the cost of heart failure monitoring & that he agrees to participate in the program.

## 2022-12-14 ENCOUNTER — OFFICE VISIT (OUTPATIENT)
Dept: CARDIOLOGY | Facility: CLINIC | Age: 77
End: 2022-12-14

## 2022-12-14 VITALS
WEIGHT: 175.6 LBS | BODY MASS INDEX: 21.83 KG/M2 | HEART RATE: 53 BPM | DIASTOLIC BLOOD PRESSURE: 72 MMHG | OXYGEN SATURATION: 97 % | HEIGHT: 75 IN | SYSTOLIC BLOOD PRESSURE: 136 MMHG

## 2022-12-14 DIAGNOSIS — I25.810 CORONARY ARTERY DISEASE INVOLVING CORONARY BYPASS GRAFT OF NATIVE HEART WITHOUT ANGINA PECTORIS: ICD-10-CM

## 2022-12-14 DIAGNOSIS — I50.22 CHRONIC SYSTOLIC CONGESTIVE HEART FAILURE: ICD-10-CM

## 2022-12-14 DIAGNOSIS — I42.0 CARDIOMYOPATHY, DILATED: Chronic | ICD-10-CM

## 2022-12-14 DIAGNOSIS — I49.3 PVC (PREMATURE VENTRICULAR CONTRACTION): ICD-10-CM

## 2022-12-14 DIAGNOSIS — I48.21 PERMANENT ATRIAL FIBRILLATION: Primary | ICD-10-CM

## 2022-12-14 PROCEDURE — 93284 PRGRMG EVAL IMPLANTABLE DFB: CPT | Performed by: INTERNAL MEDICINE

## 2022-12-14 PROCEDURE — 99213 OFFICE O/P EST LOW 20 MIN: CPT | Performed by: INTERNAL MEDICINE

## 2022-12-14 RX ORDER — ESCITALOPRAM OXALATE 10 MG/1
TABLET ORAL DAILY
COMMUNITY
Start: 2022-11-01

## 2022-12-14 NOTE — PROGRESS NOTES
Colin Albrecht  1945  [phone]    12/14/2022    [facility] CARDIOLOGY MAIN CAMPUS     Referring Provider: No ref. provider found     Arun Soliman  [address] [place] [address]    Chief Complaint   Patient presents with   • Permanent atrial fibrillation (HCC)       Problem List:   1. Permanent Atrial Fibrillation   a. CHADsvasc = 4 off Xarelto x 1 year due to bleeding, never been on Eliquis  b. Echocardiogram 1/27/2016: EF 45 to 55%, unchanged from previous echo 2012  c. Echocardiogram 8/27/2019: EF 35 to 40%, mild to moderate MR, mild to moderate TR, RVSP 40 mmHg  d. Nuclear stress test 8/26/2019: Normal LV perfusion EF 33%  e. Implantation of a left atrial appendage occlusive device (Watchman device) 09/25/20 by Dr. Yonny Prado  f. PORSHA on 11/13/20 with well seated device, EF 35%, mod MR, mild to mod TR, post-procedural ASD with left-to-right flow  g. Upgrade to BIV ICD and AV Node ablation 3/2021, Dr. Prado  2. Dilated cardiomyopathy/CHF  a. Patient reports normal LHC 10-12 years ago  b. Echocardiogram 1/27/2016: EF 45 to 55%, unchanged from previous echo 2012  c. Echocardiogram 8/27/2019: EF 35%, mild to moderate MR, mild to moderate TR, RVSP 40 mmHg  d. Nuclear stress test 8/26/2019: Normal LV perfusion EF 33%  e. Upgrade to BIV ICD at time of AV node ablation 3/2021   f. EF 40 % 6/14/2021  g. Echo 8/2022: EF 35%, mod TR, mild MR, mod VT  3. Sick sinus syndrome  a. Dual-chamber permanent pacemaker -Medtronic device  b. Upgrade to BIV ICD at time of AV node ablation 3/2021   4. Hypertension  5. Coronary artery Disease:   1. 6/3/21  CABG x 2 per Dr Cornelius AHRRIS-LAD, SVG-circumflex.   6. Gastritis/GIB/Peptic Ulcer Disease  a. 2013: GIB requiring PRBCs, EGD showed bleeding ulcer in the antrum 9/2013, repeat EGD 12/2013 showed healed ulcer  b. Upper GI Bleeding 3/2019, EGD showed gastric antral ulcer with stigmata or recent bleeding - treated with IV/PO Protonix - Xarelto  "discontinued  7. Epistaxis - occurred on Xarelto  8. Stage III CKD   9. Surgical History:  a. none    Allergies  No Known Allergies    Current Medications    Current Outpatient Medications:   •  apixaban (ELIQUIS) 5 MG tablet tablet, Take 1 tablet by mouth Every 12 (Twelve) Hours. Indications: DVT/PE (active thrombosis), Disp: 60 tablet, Rfl: 6  •  aspirin 81 MG EC tablet, Take 81 mg by mouth Daily., Disp: , Rfl:   •  atorvastatin (LIPITOR) 40 MG tablet, TAKE 1 TABLET BY MOUTH EVERY NIGHT., Disp: 90 tablet, Rfl: 3  •  Coenzyme Q10 (CoQ10) 100 MG capsule, Take 1 capsule by mouth Daily., Disp: , Rfl:   •  escitalopram (LEXAPRO) 10 MG tablet, Daily., Disp: , Rfl:   •  metoprolol succinate XL (TOPROL-XL) 25 MG 24 hr tablet, Take 1 tablet by mouth Daily., Disp: 30 tablet, Rfl: 11  •  multivitamin with minerals tablet tablet, Take 1 tablet by mouth Daily., Disp: , Rfl:   •  sacubitril-valsartan (Entresto) 24-26 MG tablet, Take 1 tablet by mouth 2 (Two) Times a Day., Disp: 60 tablet, Rfl: 6  •  levothyroxine (SYNTHROID, LEVOTHROID) 50 MCG tablet, Take 50 mcg by mouth Daily., Disp: , Rfl:     History of Present Illness     Pt presents for follow up of AF/SSS/CHF/DCM/HTN. Since we last saw the pt, pt denies any palps, SOB, CP, LH, and dizziness. He did present to [facility] 3 months ago for Hydrocele and has the fluid drained. Denies any hospitalizations, ER visits, bleeding, or TIA/CVA symptoms. Overall feels well. BP is well controlled.     ROS:  General:  Denies fatigue, weight gain or loss  Cardiovascular:  Denies CP, PND, syncope, near syncope, edema or palpitations.  Pulmonary:  + mild TREVIZO, -cough, or wheezing      Vitals:    12/14/22 1504   BP: 136/72   BP Location: Left arm   Patient Position: Sitting   Pulse: 53   SpO2: 97%   Weight: 79.7 kg (175 lb 9.6 oz)   Height: 190.5 cm (75\")     Body mass index is 21.95 kg/m².  PE:  General: NAD  Neck: no JVD, no carotid bruits, no TM  Heart IRR, IRR, NL S1, S2, no " rubs, murmurs, extrasystoles  Lungs: CTA, no wheezes, rhonchi, or rales  Abd: soft, non-tender, NL BS  Ext: No musculoskeletal deformities, no edema, cyanosis, or clubbing  Psych: normal mood and affect    Diagnostic Data:    BiV ICD:  Medtronic DDIR, BiV pacing 68% due to PVCs, RV threshold 0.6 v at 0.4 ms, LV threshold 0.3 v at 1.0 ms, battery 4.8 years, underlying rhythm afib with PVC's, 100% afib, 16 episodes of NSVT - no therapy.     Procedures    1. Permanent atrial fibrillation (HCC)    2. Dilated CM     3. Chronic systolic congestive heart failure (HCC)    4. Coronary artery disease involving coronary bypass graft of native heart without angina pectoris          Plan:  1. Permanent atrial fibrillation:  - s/p AVN RFA and upgrade to BiV ICD 3/2021  - s/p Watchman Device on ASA  -100% afib on device interrogation today     2. Dilated Cardiomyopathy/CHF class I-II  Patient SOB has improved.   - BiV pacing 68% due to PVCs  -Echo 8/2022: EF 35%, mod TR, mild MR, mod NE     3. CAD:  - s/p CABG x 2 6/3/2021, currently on aspirin and statin therapy.  No angina symptoms.     Continue monitor sodium intake, weight daily as needed Lasix    F/up in 6 months in [facility]    Scribed for Yonny Prado MD by JOHN Ochoa. 12/14/2022  15:11 EST     I, Yonny Prado MD, personally performed the services described in this documentation as scribed by the above named individual in my presence, and it is both accurate and complete.  12/14/2022  15:41 EST

## 2023-03-17 PROCEDURE — 93295 DEV INTERROG REMOTE 1/2/MLT: CPT | Performed by: INTERNAL MEDICINE

## 2023-03-17 PROCEDURE — 93296 REM INTERROG EVL PM/IDS: CPT | Performed by: INTERNAL MEDICINE

## 2023-04-19 ENCOUNTER — TELEPHONE (OUTPATIENT)
Dept: CARDIOLOGY | Facility: CLINIC | Age: 78
End: 2023-04-19
Payer: MEDICARE

## 2023-04-19 NOTE — TELEPHONE ENCOUNTER
Remote reading received.  His presenting EGM is  afib with trigeminy PVCs.  BIV paced 65.6%.  Last office visit in December BIV pacing was 68%.  Known 100% AF.  Heart failure diagnostics on remote are normal.  He also has some ventricular sensing episodes of his own conduction - longest 32 min with rates showing 140-160 bpm.

## 2023-04-19 NOTE — TELEPHONE ENCOUNTER
Patient called and states that over the past few weeks he has become SOB especially at night when he tries to sleep he wakes up gasping. He also states he has chest pain near his defibrillator. He denies weight gain and states he has been taking his medications. He also states that his heart rate at times is around 30.  I transferred him to the device clinic to assist with a remote download and updated him that scheduling would contact him with an appointment.

## 2023-04-24 RX ORDER — METOPROLOL SUCCINATE 25 MG/1
25 TABLET, EXTENDED RELEASE ORAL 2 TIMES DAILY
Qty: 60 TABLET | Refills: 5 | Status: SHIPPED | OUTPATIENT
Start: 2023-04-24

## 2023-04-24 NOTE — TELEPHONE ENCOUNTER
Patient is currently taking 25mg of Toprol XL q day. Do you want him to take Toprol XL 50mg BID or Lopressor 50mg BID?

## 2023-04-28 ENCOUNTER — OFFICE VISIT (OUTPATIENT)
Dept: CARDIOLOGY | Facility: CLINIC | Age: 78
End: 2023-04-28
Payer: MEDICARE

## 2023-04-28 VITALS
SYSTOLIC BLOOD PRESSURE: 108 MMHG | WEIGHT: 180.6 LBS | OXYGEN SATURATION: 98 % | DIASTOLIC BLOOD PRESSURE: 58 MMHG | BODY MASS INDEX: 22.46 KG/M2 | HEIGHT: 75 IN

## 2023-04-28 DIAGNOSIS — I25.810 CORONARY ARTERY DISEASE INVOLVING CORONARY BYPASS GRAFT OF NATIVE HEART WITHOUT ANGINA PECTORIS: ICD-10-CM

## 2023-04-28 DIAGNOSIS — R06.02 SOB (SHORTNESS OF BREATH): ICD-10-CM

## 2023-04-28 DIAGNOSIS — Z95.818 PRESENCE OF WATCHMAN LEFT ATRIAL APPENDAGE CLOSURE DEVICE: ICD-10-CM

## 2023-04-28 DIAGNOSIS — I48.21 PERMANENT ATRIAL FIBRILLATION: ICD-10-CM

## 2023-04-28 DIAGNOSIS — I49.5 SSS (SICK SINUS SYNDROME): Primary | Chronic | ICD-10-CM

## 2023-04-28 RX ORDER — FUROSEMIDE 40 MG/1
40 TABLET ORAL DAILY
Qty: 30 TABLET | Refills: 11 | Status: SHIPPED | OUTPATIENT
Start: 2023-04-28

## 2023-04-28 RX ORDER — TRAZODONE HYDROCHLORIDE 50 MG/1
TABLET ORAL
COMMUNITY
Start: 2023-04-25

## 2023-04-28 RX ORDER — AMIODARONE HYDROCHLORIDE 200 MG/1
200 TABLET ORAL 2 TIMES DAILY
Qty: 30 TABLET | Refills: 5 | Status: SHIPPED | OUTPATIENT
Start: 2023-04-28

## 2023-04-28 NOTE — PROGRESS NOTES
Colin Albrecht  1945  [phone]      [facility] CARDIOLOGY MAIN CAMPUS     Arun Soliman  [address] [place] [address]        Problem List:   1. Permanent Atrial Fibrillation   a. CHADsvasc = 4 off Xarelto x 1 year due to bleeding, never been on Eliquis  b. Echocardiogram 1/27/2016: EF 45 to 55%, unchanged from previous echo 2012  c. Echocardiogram 8/27/2019: EF 35 to 40%, mild to moderate MR, mild to moderate TR, RVSP 40 mmHg  d. Nuclear stress test 8/26/2019: Normal LV perfusion EF 33%  e. Implantation of a left atrial appendage occlusive device (Watchman device) 09/25/20 by Dr. Yonny Prado  f. PORSHA on 11/13/20 with well seated device, EF 35%, mod MR, mild to mod TR, post-procedural ASD with left-to-right flow  g. Upgrade to BIV ICD and AV Node ablation 3/2021, Dr. Prado  2. Frequent PVC's  3. Dilated cardiomyopathy/CHF  a. Patient reports normal LHC 10-12 years ago  b. Echocardiogram 1/27/2016: EF 45 to 55%, unchanged from previous echo 2012  c. Echocardiogram 8/27/2019: EF 35%, mild to moderate MR, mild to moderate TR, RVSP 40 mmHg  d. Nuclear stress test 8/26/2019: Normal LV perfusion EF 33%  e. Upgrade to BIV ICD at time of AV node ablation 3/2021   f. EF 40 % 6/14/2021  g. Echo 8/2022: EF 35%, mod TR, mild MR, mod MS  4. Sick sinus syndrome  a. Dual-chamber permanent pacemaker -Medtronic device  b. Upgrade to BIV ICD at time of AV node ablation 3/2021   5. Hypertension  6. Coronary artery Disease:   1. 6/3/21  CABG x 2 per Dr Cornelius HARRIS-LAD, SVG-circumflex.   7. Gastritis/GIB/Peptic Ulcer Disease  a. 2013: GIB requiring PRBCs, EGD showed bleeding ulcer in the antrum 9/2013, repeat EGD 12/2013 showed healed ulcer  b. Upper GI Bleeding 3/2019, EGD showed gastric antral ulcer with stigmata or recent bleeding - treated with IV/PO Protonix - Xarelto discontinued  8. Epistaxis - occurred on Xarelto  9. Stage III CKD   10. Surgical History:  a. none    Allergies  No  "Known Allergies    Current Medications    Current Outpatient Medications:   •  aspirin 81 MG EC tablet, Take 1 tablet by mouth Daily., Disp: , Rfl:   •  atorvastatin (LIPITOR) 40 MG tablet, TAKE 1 TABLET BY MOUTH EVERY NIGHT., Disp: 90 tablet, Rfl: 3  •  Coenzyme Q10 (CoQ10) 100 MG capsule, Take 1 capsule by mouth Daily., Disp: , Rfl:   •  escitalopram (LEXAPRO) 10 MG tablet, Daily., Disp: , Rfl:   •  metoprolol succinate XL (TOPROL-XL) 25 MG 24 hr tablet, Take 1 tablet by mouth 2 (Two) Times a Day., Disp: 60 tablet, Rfl: 5  •  multivitamin with minerals tablet tablet, Take 1 tablet by mouth Daily., Disp: , Rfl:   •  sacubitril-valsartan (Entresto) 24-26 MG tablet, Take 1 tablet by mouth 2 (Two) Times a Day., Disp: 60 tablet, Rfl: 6  •  traZODone (DESYREL) 50 MG tablet, TAKE 1 TABLET BY MOUTH EVERY DAY AT BEDTIME FOR INSOMNIA, Disp: , Rfl:   •  amiodarone (PACERONE) 200 MG tablet, Take 1 tablet by mouth 2 (Two) Times a Day. Take twice a day for one week than once a day., Disp: 30 tablet, Rfl: 5  •  furosemide (LASIX) 40 MG tablet, Take 1 tablet by mouth Daily. Take as needed for edema or weight gain, Disp: 30 tablet, Rfl: 11    History of Present Illness     Pt presents for follow up of AF/SSS/CHF/DCM/HTN. Since we last saw the pt, he tells me he is quite miserable.  He cannot walk across the room without being short of breath.  This has become progressively worse.  He states he has having a lot of symptoms of heaviness in his chest short of breath and he is so fatigued he can barely do anything.  He states he is unable to take higher doses Entresto because of hypotension and his elevated creatinine level.  He has not had ICD shocks or syncope events.  He is unsure why he has not seen his general cardiologist since being discharged from his bypass surgery.  He does admit that he has some wheezing at times he thinks he has \"COPD\" and this may be playing a role in some of the shortness of breath.    ROS:  General:  " "Denies fatigue, weight gain or loss  Cardiovascular:  Denies CP, PND, syncope, near syncope, edema or palpitations.  Pulmonary:  + Severe TREVIZO, -cough, or wheezing      Vitals:    04/28/23 1438   BP: 108/58   BP Location: Left arm   Patient Position: Sitting   Cuff Size: Adult   SpO2: 98%   Weight: 81.9 kg (180 lb 9.6 oz)   Height: 190.5 cm (75\")     Body mass index is 22.57 kg/m².  PE:  General: NAD  Neck: no JVD, no carotid bruits, no TM  Heart IRR, IRR, NL S1, S2, no rubs, murmurs, extrasystoles  Lungs: Course breath sounds, wheezes.   Abd: soft, non-tender, NL BS  Ext: No musculoskeletal deformities, no edema, cyanosis, or clubbing  Psych: normal mood and affect    Diagnostic Data:    BiV ICD: Medtronic device interrogation.  DDDR at 75.  Normal thresholds and impedances.  Battery voltage 3.1 years remaining.  BiV pacing only 65% due to PVCs.      :  ECG 12 Lead    Date/Time: 4/28/2023 4:42 PM  Performed by: Anson Peraza PA  Authorized by: Anson Peraza PA   Comparison: compared with previous ECG from 6/7/2022  Similar to previous ECG  Rhythm: atrial fibrillation  Ectopy: unifocal PVCs  Rate: normal  Conduction: conduction normal  QRS axis: normal  Other findings: non-specific ST-T wave changes    Clinical impression: non-specific ECG            1. SSS     2. Permanent atrial fibrillation    3. S/P Watchman device    4. Coronary artery disease involving coronary bypass graft of native heart without angina pectoris    5. SOB (shortness of breath)          Plan:  1. Permanent atrial fibrillation:  - s/p AVN RFA and upgrade to BiV ICD 3/2021  - s/p Watchman Device on ASA  -100% afib      2. Dilated Cardiomyopathy/CHF class I-II  Patient SOB has improved.   - BiV pacing 68% due to PVCs  -Echo 8/2022: EF 35%, mod TR, mild MR, mod OK.  Currently patient is on Toprol and Entresto.  He recently increased his Toprol therapy this has made him no less short of breath.  He is unable to titrate Entresto due to " creatinine level and hyperkalemia.     3. CAD:  - s/p CABG x 2 6/3/2021, currently on aspirin and statin therapy.   He is quite short of breath.  He does appear volume overloaded I did give him some Lasix and asked him to weigh himself on a daily basis if his fluid level increases or he notices edema to take Lasix as needed.  He would like to establish care with a primary cardiologist regarding his coronary vascular disease.     4. Frequent PVC's : He has high burden of PVCs some this may be related to some of the shortness of breath.  Would like to suppress these with amiodarone therapy.  I discussed this medicine in detail with his family and the patient.  He is agreeable to take it 200 mg twice daily for 1 week and then once daily.  He will have follow-up CMP, thyroid function panel pulmonary function test for baseline.  Continue monitor sodium intake, weight daily as needed Lasix        F/up in 6 months in [facility]  Follow-up with Dr. Quintana establish care regarding coronary disease, ischemic cardiomyopathy and heart failure..  Electronically signed by ETIENNE Gant, 04/28/23, 4:45 PM EDT.

## 2023-05-03 NOTE — PROGRESS NOTES
OFFICE VISIT  NOTE  [facility] CARDIOLOGY MAIN CAMPUS      Name: Colin Albrecht    Date: 2023  MRN:  [id number]  :  1945      REFERRING/PRIMARY PROVIDER:  Arun Soliman MD     Chief Complaint   Patient presents with   • SSS        HPI: Colin Albrecht is a 78 y.o. male who presents today for overdue follow up of CAD. History of CABG x2 by Dr. Shields 2021, permanent Afib s/p Watchman and AVN ablation, SHF, s/p Bi-V ICD, frequent PVCs, HTN, SSS, CKD III. He was recently seen by Jose Raul Peraza, having worsening exertional dyspnea and some chest heaviness. Also with frequent PVCs, started on Amiodarone.  Last echo 2022 showed EF 35%.  He reports PND, gets short of breath 3 to 4 hours after laying down, worsening shortness of breath over last 3-4 months worse over the last several days with irregular heart rhythm.  Denies chest pain or pressure.    ROS:Pertinent positives as listed in the HPI.  All other systems reviewed and negative.    Past Medical History:   Diagnosis Date   • Abnormal ECG    • Arrhythmia    • Atrial fibrillation    • CHF (congestive heart failure)    • Depression    • History of transfusion     bleeding stomach ulcer ~2014 x2, no reaction    • Hypertension    • Stomach ulcer    • Wears reading eyeglasses        Past Surgical History:   Procedure Laterality Date   • ATRIAL APPENDAGE EXCLUSION LEFT WITH TRANSESOPHAGEAL ECHOCARDIOGRAM N/A 2020    Procedure: Atrial Appendage Occlusion (Watchman), start Eliquis 2 weeks prior and hold 1 day, GA;  Surgeon: Yonny Prado MD;  Location: [facility] EP INVASIVE LOCATION;  Service: Cardiology;  Laterality: N/A;   • CARDIAC CATHETERIZATION     • CARDIAC CATHETERIZATION N/A 2021    Procedure: Left Heart Cath- ADD ON/ WALK IN FOR RDS PER KT;  Surgeon: Pietro Quintana MD;  Location:  [facility] CATH INVASIVE LOCATION;  Service: Cardiovascular;  Laterality: N/A;   • CARDIAC ELECTROPHYSIOLOGY PROCEDURE N/A 3/26/2021     Procedure: DDD PPM upgrade to Biv ICD (MDT), no meds to hold;  Surgeon: Yonny Prado MD;  Location:  [facility] EP INVASIVE LOCATION;  Service: Cardiology;  Laterality: N/A;   • CARDIAC ELECTROPHYSIOLOGY PROCEDURE N/A 3/26/2021    Procedure: AVN RFA;  Surgeon: Yonny Prado MD;  Location:  [facility] EP INVASIVE LOCATION;  Service: Cardiovascular;  Laterality: N/A;   • COLONOSCOPY     • CORONARY ARTERY BYPASS GRAFT N/A 6/3/2021    Procedure: MEDIAN STERNOTOMY, CORONARY ARTERY BYPASS WITH INTERNAL MAMMARY ARTERY GRAFT X 2, EVH OF THE RIGHT GREATER SAPHENOUS ANA;  Surgeon: Jaime Shields MD;  Location:  [facility] OR;  Service: Cardiothoracic;  Laterality: N/A;   • ENDOSCOPY N/A 6/15/2021    Procedure: ESOPHAGOGASTRODUODENOSCOPY;  Surgeon: Fransico Warren MD;  Location:  [facility] ENDOSCOPY;  Service: Gastroenterology;  Laterality: N/A;   • INSERT / REPLACE / REMOVE PACEMAKER         Social History     Socioeconomic History   • Marital status:    • Number of children: 3   Tobacco Use   • Smoking status: Former     Packs/day: 1.00     Years: 50.00     Pack years: 50.00     Types: Cigarettes     Quit date: 2010     Years since quittin.7   • Smokeless tobacco: Never   Vaping Use   • Vaping Use: Never used   Substance and Sexual Activity   • Alcohol use: Never   • Drug use: Never   • Sexual activity: Defer       Family History   Problem Relation Age of Onset   • Stroke Father    • Lung cancer Sister    • Alcohol abuse Brother         No Known Allergies    Current Outpatient Medications   Medication Instructions   • amiodarone (PACERONE) 200 mg, Oral, 2 Times Daily, Take twice a day for one week than once a day.   • aspirin 81 mg, Oral, Daily   • atorvastatin (LIPITOR) 40 mg, Oral, Nightly   • Coenzyme Q10 (CoQ10) 100 MG capsule 1 capsule, Oral, Daily   • escitalopram (LEXAPRO) 10 MG tablet Daily   • furosemide (LASIX) 40 mg, Oral, Daily, Take as needed for edema or weight gain   • metoprolol succinate XL  "(TOPROL-XL) 25 mg, Oral, 2 Times Daily   • multivitamin with minerals tablet tablet 1 tablet, Oral, Daily   • sacubitril-valsartan (Entresto) 24-26 MG tablet 1 tablet, Oral, 2 Times Daily   • traZODone (DESYREL) 50 MG tablet TAKE 1 TABLET BY MOUTH EVERY DAY AT BEDTIME FOR INSOMNIA       Vitals:    05/04/23 1311   BP: 110/68   BP Location: Right arm   Patient Position: Sitting   Cuff Size: Adult   Pulse: 93   SpO2: 96%   Weight: 81.2 kg (179 lb)   Height: 190.5 cm (75\")     Body mass index is 22.37 kg/m².    PHYSICAL EXAM:    General Appearance:   · well developed  · well nourished  Neck:  · thyroid not enlarged  · supple  Respiratory:  · no respiratory distress  · normal breath sounds  · no rales  Cardiovascular:  · no jugular venous distention  · regular rhythm  · apical impulse normal  · S1 normal, S2 normal  · no S3, no S4   · no murmur  · no rub, no thrill  · carotid pulses normal; no bruit  · pedal pulses normal  · lower extremity edema: none    Skin:   warm, dry    RESULTS:     ECG 12 Lead    Date/Time: 5/4/2023 1:38 PM  Performed by: Pietro Quintana MD  Authorized by: Pietro Quintana MD   Comparison: compared with previous ECG from 4/28/2023  Similar to previous ECG  Rhythm: sinus rhythm  Ectopy: unifocal PVCs and bigeminy  Rate: normal    Clinical impression: abnormal EKG  Comments: Unchanged from EKG 1 week ago.            Results for orders placed during the hospital encounter of 09/17/21    Adult Transesophageal Echo (PORSHA) W/ Cont if Necessary Per Protocol    Interpretation Summary  · 24 mm Watchman FLX device in place. The device is well-seated. There is no device associated thrombus. There is a small amount of peridevice flow noted in the 0 degree angle measuring 2.5 to 3 mm. This is below the 5 mm device threshold.  · Left ventricular ejection fraction appears to be 36 - 40%. Left ventricular systolic function is moderately decreased.  · Moderate mitral valve regurgitation is present.  · There is " no evidence of pericardial effusion  · Pacemaker leads noted in the right atrium/ventricle        Labs:  Lab Results   Component Value Date    CHOL 101 06/07/2021    TRIG 70 06/07/2021    HDL 52 06/02/2021     (H) 06/02/2021    AST 19 06/14/2021    ALT 17 06/14/2021     Lab Results   Component Value Date    HGBA1C 5.70 (H) 06/02/2021     Creatinine   Date Value Ref Range Status   06/17/2021 0.96 0.76 - 1.27 mg/dL Final   06/16/2021 0.84 0.76 - 1.27 mg/dL Final   06/15/2021 1.18 0.76 - 1.27 mg/dL Final   06/02/2021 1.40 (H) 0.60 - 1.30 mg/dL Final     Comment:     Serial Number: [id number]:  [id number]     eGFR Non  Amer   Date Value Ref Range Status   06/17/2021 76 >60 mL/min/1.73 Final   06/16/2021 89 >60 mL/min/1.73 Final   06/15/2021 60 (L) >60 mL/min/1.73 Final         ASSESSMENT:  Problem List Items Addressed This Visit        Cardiac and Vasculature    Permanent atrial fibrillation    Overview     · CHADsvasc = 4 off Xarelto x 1 year due to bleeding, never been on Eliquis  · Echo (1/27/2016): EF 45 to 55%, unchanged from previous echo 2012  · Echo (8/27/2019): EF 35 to 40%, mild to moderate MR, mild to moderate TR, RVSP 40 mmHg  · MPS (8/26/2019): Normal LV perfusion EF 33%  · Implantation of a left atrial appendage occlusive device (Watchman device) 09/25/20 by Dr. Yonny Prado  · PORSHA (11/13/2020): With well seated device, EF 35%, mod MR, mild to mod TR, post-procedural ASD with left-to-right flow  · Upgrade to BIV ICD and AV Node ablation 3/2021, Dr. Prado         Relevant Orders    CBC & Differential    S/P Watchman device    Overview     · Implantation of a left atrial appendage occlusive device (Watchman device) 09/25/20 by Dr. Yonny Prado  · PORSHA (11/13/2020): With well seated device, EF 35%, mod MR, mild to mod TR, post-procedural ASD with left-to-right flow         Relevant Orders    CBC & Differential    Chronic systolic congestive heart failure    Overview     a. Patient reports  normal C 10-12 years ago  b. Echocardiogram 1/27/2016: EF 45 to 55%, unchanged from previous echo 2012  c. Echocardiogram 8/27/2019: EF 35%, mild to moderate MR, mild to moderate TR, RVSP 40 mmHg  d. Nuclear stress test 8/26/2019: Normal LV perfusion EF 33%  e. Upgrade to BIV ICD at time of AV node ablation 3/2021         Relevant Orders    proBNP    TSH Rfx On Abnormal To Free T4    XR chest pa and lateral    Coronary artery disease involving coronary bypass graft of native heart without angina pectoris - Primary    Overview     · Cardiac cath (6/2/2021): Ostial left main 80% stenosis.  · CABG x2 6/3/2021 with STEVEN Milian to LAD, SVG to LCx         Relevant Orders    CBC & Differential    S/P CABG x 2 on 6/3/2021    Relevant Orders    CBC & Differential    proBNP    TSH Rfx On Abnormal To Free T4    XR chest pa and lateral       Pulmonary and Pneumonias    COPD  (Chronic)    Relevant Orders    proBNP    TSH Rfx On Abnormal To Free T4    XR chest pa and lateral       PLAN:    1. Coronary artery disease  S/p CABG x2 June 2021    2.   Acute on chronic systolic heart failure  EF 35% by echo 08/2022 with mild MR, moderate TR  Acute exacerbation of chronic systolic heart failure, PND, orthopnea, all present, high risk for morbidity mortality with need for close monitoring of renal function in 2 weeks due to CKD    Start Lasix 40 mg daily, resume Entresto twice daily, hold off on spironolactone due to hyperkalemia and CKD  Repeat CMP in 2 weeks at heart failure clinic visit    Return in 2 weeks for close follow-up and labs.  Check chest x-ray today      Check proBNP, CMP, and CBC today    3.  Permanent Afib  S/p AVN ablation and BiV ICD  S/p Watchman device placement     Today's EKG shows A-fib with bigeminy, amiodarone initiated 4/28 to suppress PVCs which I agree with.    Ultimately he may need ablation +/- BiV upgrade    4. Hypertension   Goal blood pressure less than 140/90, currently at goal, continue current  medical therapy    5. Hyperlipidemia  LDL goal less than 70, currently at goal with LDL 60 4/2023  Continue atorvastatin at current dose    6. Frequent PVCs  Recently started on Amiodarone 200mg daily for suppression     I personally interpreted his previous ECG which shows bigeminy, personally reviewed most recent labs 4/25 from outside hospital showing creatinine 1.4, potassium of 5.0, normal LFTs, and LDL of 60.  Personally interpreted most recent chest x-ray was read from 2021.    We discussed possibility of hospitalization but at this time patient, family, and I are in agreement for outpatient treatment but advised patient to call at any time or return to ER if symptoms worsen he may need hospitalization due to severity of illness.    Advance Care Planning   ACP discussion was held with the patient during this visit. Patient does not have an advance directive, information provided.        Return in 2 weeks for close follow-up  Follow-up   Return in about 3 months (around 8/4/2023).    Pietro Quintana MD, FACC, [facility]  Interventional Cardiology

## 2023-05-04 ENCOUNTER — HOSPITAL ENCOUNTER (OUTPATIENT)
Dept: GENERAL RADIOLOGY | Facility: HOSPITAL | Age: 78
Discharge: HOME | End: 2023-05-04
Payer: MEDICARE

## 2023-05-04 ENCOUNTER — TELEPHONE (OUTPATIENT)
Dept: CARDIOLOGY | Facility: CLINIC | Age: 78
End: 2023-05-04

## 2023-05-04 ENCOUNTER — LAB (OUTPATIENT)
Dept: LAB | Facility: HOSPITAL | Age: 78
End: 2023-05-04
Payer: MEDICARE

## 2023-05-04 ENCOUNTER — OFFICE VISIT (OUTPATIENT)
Dept: CARDIOLOGY | Facility: CLINIC | Age: 78
End: 2023-05-04
Payer: MEDICARE

## 2023-05-04 VITALS
HEART RATE: 93 BPM | DIASTOLIC BLOOD PRESSURE: 68 MMHG | WEIGHT: 179 LBS | HEIGHT: 75 IN | OXYGEN SATURATION: 96 % | BODY MASS INDEX: 22.26 KG/M2 | SYSTOLIC BLOOD PRESSURE: 110 MMHG

## 2023-05-04 DIAGNOSIS — J44.9 CHRONIC OBSTRUCTIVE PULMONARY DISEASE, UNSPECIFIED COPD TYPE: Chronic | ICD-10-CM

## 2023-05-04 DIAGNOSIS — I50.22 CHRONIC SYSTOLIC CONGESTIVE HEART FAILURE: ICD-10-CM

## 2023-05-04 DIAGNOSIS — I25.810 CORONARY ARTERY DISEASE INVOLVING CORONARY BYPASS GRAFT OF NATIVE HEART WITHOUT ANGINA PECTORIS: Primary | ICD-10-CM

## 2023-05-04 DIAGNOSIS — Z95.1 S/P CABG X 2: ICD-10-CM

## 2023-05-04 DIAGNOSIS — I48.21 PERMANENT ATRIAL FIBRILLATION: ICD-10-CM

## 2023-05-04 DIAGNOSIS — I25.810 CORONARY ARTERY DISEASE INVOLVING CORONARY BYPASS GRAFT OF NATIVE HEART WITHOUT ANGINA PECTORIS: ICD-10-CM

## 2023-05-04 DIAGNOSIS — Z95.818 PRESENCE OF WATCHMAN LEFT ATRIAL APPENDAGE CLOSURE DEVICE: ICD-10-CM

## 2023-05-04 PROCEDURE — 71046 X-RAY EXAM CHEST 2 VIEWS: CPT

## 2023-05-04 NOTE — TELEPHONE ENCOUNTER
Per RDS, get pt appt with H&V in two weeks for CHF. H&V got him an appt on 5/18. I have tried to call the pt twice to give him appt date and time phone keeps ringing for over a min, no VM comes on, he does not have MyChart. I also called the  Lab as he was there today to see if he was still there to give him the message, but he had already left.

## 2023-05-05 ENCOUNTER — TELEPHONE (OUTPATIENT)
Dept: CARDIOLOGY | Facility: CLINIC | Age: 78
End: 2023-05-05
Payer: MEDICARE

## 2023-05-05 LAB
BASOPHILS # BLD AUTO: 0.03 10*3/MM3 (ref 0–0.2)
BASOPHILS NFR BLD AUTO: 0.4 % (ref 0–1.5)
EOSINOPHIL # BLD AUTO: 0.06 10*3/MM3 (ref 0–0.4)
EOSINOPHIL NFR BLD AUTO: 0.9 % (ref 0.3–6.2)
ERYTHROCYTE [DISTWIDTH] IN BLOOD BY AUTOMATED COUNT: 12.4 % (ref 12.3–15.4)
HCT VFR BLD AUTO: 42.6 % (ref 37.5–51)
HGB BLD-MCNC: 14.5 G/DL (ref 13–17.7)
IMM GRANULOCYTES # BLD AUTO: 0.02 10*3/MM3 (ref 0–0.05)
IMM GRANULOCYTES NFR BLD AUTO: 0.3 % (ref 0–0.5)
LYMPHOCYTES # BLD AUTO: 1.14 10*3/MM3 (ref 0.7–3.1)
LYMPHOCYTES NFR BLD AUTO: 16.8 % (ref 19.6–45.3)
MCH RBC QN AUTO: 32.8 PG (ref 26.6–33)
MCHC RBC AUTO-ENTMCNC: 34 G/DL (ref 31.5–35.7)
MCV RBC AUTO: 96.4 FL (ref 79–97)
MONOCYTES # BLD AUTO: 0.63 10*3/MM3 (ref 0.1–0.9)
MONOCYTES NFR BLD AUTO: 9.3 % (ref 5–12)
NEUTROPHILS # BLD AUTO: 4.89 10*3/MM3 (ref 1.7–7)
NEUTROPHILS NFR BLD AUTO: 72.3 % (ref 42.7–76)
NRBC BLD AUTO-RTO: 0 /100 WBC (ref 0–0.2)
NT-PROBNP SERPL-MCNC: 5941 PG/ML (ref 0–486)
PLATELET # BLD AUTO: 177 10*3/MM3 (ref 140–450)
RBC # BLD AUTO: 4.42 10*6/MM3 (ref 4.14–5.8)
T4 FREE SERPL-MCNC: 1.4 NG/DL (ref 0.93–1.7)
TSH SERPL DL<=0.005 MIU/L-ACNC: 7.34 UIU/ML (ref 0.27–4.2)
WBC # BLD AUTO: 6.77 10*3/MM3 (ref 3.4–10.8)

## 2023-05-05 NOTE — TELEPHONE ENCOUNTER
Spoke with pt regarding lab results. Was able to give him the appt date and time of H&V appt, pt wrote this down. He reports his BP is low today after starting Lasix 40 mg yesterday. BP running around 85-90/51-60. He has had a decrease in weight from 179 lbs yesterday, this morning was 174.3 lbs. He reports good urine output today, feeling okay overall. Advised Lasix is as needed and he can hold it tomorrow and see if weight gain of 3-5 lbs in 24 hours occurs, or swelling occurs the next day and he may take it. Instructed if SBP remains in the 80's throughout the rest of the day and he starts to become symptomatic with dizziness, fatigue, or worsening cardiac symptoms to proceed to the ER. Pt verbalized understanding and agreeable to plan

## 2023-05-05 NOTE — PROGRESS NOTES
Please inform the patient of their test results.  Normal thyroid, proBNP elevated consistent with CHF, continue with medication recommendations made yesterday. Thank you.

## 2023-05-05 NOTE — TELEPHONE ENCOUNTER
----- Message from Pietro Quintana MD sent at 5/5/2023  8:26 AM EDT -----  Please inform the patient of their test results.  Normal thyroid, proBNP elevated consistent with CHF, continue with medication recommendations made yesterday. Thank you.

## 2023-05-18 ENCOUNTER — HOSPITAL ENCOUNTER (OUTPATIENT)
Dept: CARDIOLOGY | Facility: HOSPITAL | Age: 78
Discharge: HOME | End: 2023-05-18
Payer: MEDICARE

## 2023-05-18 ENCOUNTER — LAB (OUTPATIENT)
Dept: LAB | Facility: HOSPITAL | Age: 78
End: 2023-05-18
Payer: MEDICARE

## 2023-05-18 ENCOUNTER — OFFICE VISIT (OUTPATIENT)
Dept: CARDIOLOGY | Facility: HOSPITAL | Age: 78
End: 2023-05-18
Payer: MEDICARE

## 2023-05-18 VITALS
DIASTOLIC BLOOD PRESSURE: 75 MMHG | HEIGHT: 75 IN | BODY MASS INDEX: 22.33 KG/M2 | TEMPERATURE: 97.3 F | RESPIRATION RATE: 16 BRPM | SYSTOLIC BLOOD PRESSURE: 122 MMHG | WEIGHT: 179.6 LBS | OXYGEN SATURATION: 99 % | HEART RATE: 78 BPM

## 2023-05-18 DIAGNOSIS — I49.3 PVC (PREMATURE VENTRICULAR CONTRACTION): ICD-10-CM

## 2023-05-18 DIAGNOSIS — I50.22 CHRONIC SYSTOLIC CONGESTIVE HEART FAILURE: ICD-10-CM

## 2023-05-18 DIAGNOSIS — I25.810 CORONARY ARTERY DISEASE INVOLVING CORONARY BYPASS GRAFT OF NATIVE HEART WITHOUT ANGINA PECTORIS: ICD-10-CM

## 2023-05-18 DIAGNOSIS — R06.02 SHORTNESS OF BREATH: ICD-10-CM

## 2023-05-18 DIAGNOSIS — I48.21 PERMANENT ATRIAL FIBRILLATION: ICD-10-CM

## 2023-05-18 DIAGNOSIS — I50.22 CHRONIC SYSTOLIC CONGESTIVE HEART FAILURE: Primary | ICD-10-CM

## 2023-05-18 PROCEDURE — 36415 COLL VENOUS BLD VENIPUNCTURE: CPT

## 2023-05-18 PROCEDURE — 93005 ELECTROCARDIOGRAM TRACING: CPT | Performed by: NURSE PRACTITIONER

## 2023-05-18 PROCEDURE — 80048 BASIC METABOLIC PNL TOTAL CA: CPT

## 2023-05-18 PROCEDURE — 83880 ASSAY OF NATRIURETIC PEPTIDE: CPT

## 2023-05-18 RX ORDER — TORSEMIDE 20 MG/1
20 TABLET ORAL DAILY
Qty: 90 TABLET | Refills: 1 | Status: SHIPPED | OUTPATIENT
Start: 2023-05-18

## 2023-05-18 NOTE — H&P (VIEW-ONLY)
"Chief Complaint  Congestive Heart Failure    Subjective      History of Present Illness {CC  Problem List  Visit  Diagnosis   Encounters  Notes  Medications  Labs  Result Review Imaging  Media :23}     Colin Albrecht, 78 y.o. male with history of CABG x2 by Dr. Shields 6/2021, permanent Afib s/p Watchman and AVN ablation, SHF, s/p Bi-V ICD, frequent PVCs, HTN, SSS, CKD III presents to [facility] for Congestive Heart Failure.  Primary cardiologist is Dr. Quintana.    During recent visit with Dr. Quintana furosemide 40 Mg daily started, and Entresto resumed.  Bigeminal PVCs had improved since recent electrophysiology appointment and initiation of amiodarone.  Recommendation for CMP in 2 weeks with heart valve clinic visit.  Lab work completed with elevated BNP.    Presents today noting shortness of breath, orthopnea, PND, LE edema. Weight down approximately 1lb over the past 2 weeks. Reports not much diuretic response with furosemide. Denies chest pain, tachypalpitation, near syncope/syncope. Unsteady gait occasionally when he is walking, but no lightheadedness.       Objective     Vital Signs:   Vitals:    05/18/23 1300 05/18/23 1302 05/18/23 1303   BP: 128/72 125/77 122/75   BP Location: Right arm Left arm Left arm   Patient Position: Sitting Standing Sitting   Cuff Size: Adult Adult Adult   Pulse: 78 79 78   Resp:   16   Temp: 97.3 °F (36.3 °C) 97.3 °F (36.3 °C) 97.3 °F (36.3 °C)   TempSrc: Temporal Temporal Temporal   SpO2: 98% 99% 99%   Weight:   81.5 kg (179 lb 9.6 oz)   Height:   190.5 cm (75\")     Body mass index is 22.45 kg/m².  Physical Exam  Vitals and nursing note reviewed.   Constitutional:       Appearance: Normal appearance.   HENT:      Head: Normocephalic.   Eyes:      Extraocular Movements: Extraocular movements intact.   Neck:      Vascular: No carotid bruit.   Cardiovascular:      Rate and Rhythm: Normal rate and regular rhythm.      Pulses: Normal pulses.      " Heart sounds: Normal heart sounds, S1 normal and S2 normal. No murmur heard.  Pulmonary:      Effort: Pulmonary effort is normal. No respiratory distress.      Breath sounds: Rhonchi present.   Musculoskeletal:      Cervical back: Neck supple.      Right lower leg: Edema present.      Left lower leg: Edema present.      Comments: 1+bilateral LE edema   Skin:     General: Skin is warm and dry.   Neurological:      General: No focal deficit present.      Mental Status: He is alert.   Psychiatric:         Mood and Affect: Mood normal.         Behavior: Behavior normal.         Thought Content: Thought content normal.       Data Reviewed:{ Labs  Result Review  Imaging  Med Tab  Media :23}     ECG 12 Lead (05/18/2023 13:58)    CBC & Differential (05/04/2023 13:58)  proBNP (05/04/2023 13:58)  TSH Rfx On Abnormal To Free T4 (05/04/2023 13:58)  ECG 12 Lead (04/28/2023 16:42)  ECG 12 Lead (05/04/2023)  SCANNED - ECHOCARDIOGRAM (08/02/2022)  STAT Adult Transthoracic Echo Complete W/ Cont if Necessary Per Protocol (06/14/2021 16:49)  Cardiac Catheterization/Vascular Study (06/02/2021 11:48)      Assessment & Plan   Assessment and Plan {CC Problem List  Visit Diagnosis  ROS  Review (Popup)  Health Maintenance  Quality  BestPractice  Medications  SmartSets  SnapShot Encounters  Media :23}     1. Chronic systolic congestive heart failure  -Most recent TTE with LVEF 35% 8/1/2022.  -s/p BiV ICD  -NYHA class III. Appears mildly hypervolemic on exam. Not much diuretic response with furosemide 40mg daily  -Transition furosemide to torsemide 20mg daily  -Initiate Jardiance 10mg daily  -BMP/BNP today  -Continue GDMT: Toprol-XL, Entresto. No aldosterone antagonist r/t CKD, previous hyperkalemia  -Will message  to help schedule TTE    2. PVC  -Recently with frequent PVCs and initiation of amiodarone  -ECG today with paced rhythm, no PVCs  -Continue amiodarone 200mg daily, Toprol-XL    3. Coronary  artery disease involving coronary bypass graft of native heart without angina pectoris  -s/p CABG 2021  -Denies anginal symptoms  -Continue ASA, statin    4. Permanent atrial fibrillation  -CHADSASC:7  -s/p AVN RFA, Biv ICD  -s/p Watchman  -Continue ASA with Watchman in place      Follow Up {Instructions Charge Capture  Follow-up Communications :23}     Return in about 3 weeks (around 6/8/2023) for Office follow-up, Recheck.    Time Spent: I spent 46 minutes caring for Colin Albrecht on this date of service, 38 minutes face-to-face time in exam room. This time includes time spent by me in the following activities: preparing for the visit, reviewing tests, obtaining and/or reviewing a separately obtained history, performing a medically appropriate examination and/or evaluation, counseling and educating the patient/family/caregiver, documenting information in the medical record and independently interpreting results and communicating that information with the patient/family/caregiver. All time noted occurred on the date of service.    Patient was given instructions and counseling regarding his condition or for health maintenance advice. Please see specific information pulled into the AVS if appropriate.  Patient was instructed to call the [facility] with any questions, concerns, or worsening symptoms.    Dictated Utilizing Dragon Dictation   Please note that portions of this note were completed with a voice recognition program.   Part of this note may be an electronic transcription/translation of spoken language to printed text using the Dragon Dictation System.

## 2023-05-18 NOTE — PROGRESS NOTES
"Chief Complaint  Congestive Heart Failure    Subjective      History of Present Illness {CC  Problem List  Visit  Diagnosis   Encounters  Notes  Medications  Labs  Result Review Imaging  Media :23}     Colin Albrecht, 78 y.o. male with history of CABG x2 by Dr. Shields 6/2021, permanent Afib s/p Watchman and AVN ablation, SHF, s/p Bi-V ICD, frequent PVCs, HTN, SSS, CKD III presents to [facility] clinic for Congestive Heart Failure.  Primary cardiologist is Dr. Quintana.    During recent visit with Dr. Quintana furosemide 40 Mg daily started, and Entresto resumed.  Bigeminal PVCs had improved since recent electrophysiology appointment and initiation of amiodarone.  Recommendation for CMP in 2 weeks with heart valve clinic visit.  Lab work completed with elevated BNP.    Presents today noting shortness of breath, orthopnea, PND, LE edema. Weight down approximately 1lb over the past 2 weeks. Reports not much diuretic response with furosemide. Denies chest pain, tachypalpitation, near syncope/syncope. Unsteady gait occasionally when he is walking, but no lightheadedness.       Objective     Vital Signs:   Vitals:    05/18/23 1300 05/18/23 1302 05/18/23 1303   BP: 128/72 125/77 122/75   BP Location: Right arm Left arm Left arm   Patient Position: Sitting Standing Sitting   Cuff Size: Adult Adult Adult   Pulse: 78 79 78   Resp:   16   Temp: 97.3 °F (36.3 °C) 97.3 °F (36.3 °C) 97.3 °F (36.3 °C)   TempSrc: Temporal Temporal Temporal   SpO2: 98% 99% 99%   Weight:   81.5 kg (179 lb 9.6 oz)   Height:   190.5 cm (75\")     Body mass index is 22.45 kg/m².  Physical Exam  Vitals and nursing note reviewed.   Constitutional:       Appearance: Normal appearance.   HENT:      Head: Normocephalic.   Eyes:      Extraocular Movements: Extraocular movements intact.   Neck:      Vascular: No carotid bruit.   Cardiovascular:      Rate and Rhythm: Normal rate and regular rhythm.      Pulses: Normal pulses.      " Heart sounds: Normal heart sounds, S1 normal and S2 normal. No murmur heard.  Pulmonary:      Effort: Pulmonary effort is normal. No respiratory distress.      Breath sounds: Rhonchi present.   Musculoskeletal:      Cervical back: Neck supple.      Right lower leg: Edema present.      Left lower leg: Edema present.      Comments: 1+bilateral LE edema   Skin:     General: Skin is warm and dry.   Neurological:      General: No focal deficit present.      Mental Status: He is alert.   Psychiatric:         Mood and Affect: Mood normal.         Behavior: Behavior normal.         Thought Content: Thought content normal.       Data Reviewed:{ Labs  Result Review  Imaging  Med Tab  Media :23}     ECG 12 Lead (05/18/2023 13:58)    CBC & Differential (05/04/2023 13:58)  proBNP (05/04/2023 13:58)  TSH Rfx On Abnormal To Free T4 (05/04/2023 13:58)  ECG 12 Lead (04/28/2023 16:42)  ECG 12 Lead (05/04/2023)  SCANNED - ECHOCARDIOGRAM (08/02/2022)  STAT Adult Transthoracic Echo Complete W/ Cont if Necessary Per Protocol (06/14/2021 16:49)  Cardiac Catheterization/Vascular Study (06/02/2021 11:48)      Assessment & Plan   Assessment and Plan {CC Problem List  Visit Diagnosis  ROS  Review (Popup)  Health Maintenance  Quality  BestPractice  Medications  SmartSets  SnapShot Encounters  Media :23}     1. Chronic systolic congestive heart failure  -Most recent TTE with LVEF 35% 8/1/2022.  -s/p BiV ICD  -NYHA class III. Appears mildly hypervolemic on exam. Not much diuretic response with furosemide 40mg daily  -Transition furosemide to torsemide 20mg daily  -Initiate Jardiance 10mg daily  -BMP/BNP today  -Continue GDMT: Toprol-XL, Entresto. No aldosterone antagonist r/t CKD, previous hyperkalemia  -Will message  to help schedule TTE    2. PVC  -Recently with frequent PVCs and initiation of amiodarone  -ECG today with paced rhythm, no PVCs  -Continue amiodarone 200mg daily, Toprol-XL    3. Coronary  artery disease involving coronary bypass graft of native heart without angina pectoris  -s/p CABG 2021  -Denies anginal symptoms  -Continue ASA, statin    4. Permanent atrial fibrillation  -CHADSASC:7  -s/p AVN RFA, Biv ICD  -s/p Watchman  -Continue ASA with Watchman in place      Follow Up {Instructions Charge Capture  Follow-up Communications :23}     Return in about 3 weeks (around 6/8/2023) for Office follow-up, Recheck.    Time Spent: I spent 46 minutes caring for Colin Albrecht on this date of service, 38 minutes face-to-face time in exam room. This time includes time spent by me in the following activities: preparing for the visit, reviewing tests, obtaining and/or reviewing a separately obtained history, performing a medically appropriate examination and/or evaluation, counseling and educating the patient/family/caregiver, documenting information in the medical record and independently interpreting results and communicating that information with the patient/family/caregiver. All time noted occurred on the date of service.    Patient was given instructions and counseling regarding his condition or for health maintenance advice. Please see specific information pulled into the AVS if appropriate.  Patient was instructed to call the [facility] with any questions, concerns, or worsening symptoms.    Dictated Utilizing Dragon Dictation   Please note that portions of this note were completed with a voice recognition program.   Part of this note may be an electronic transcription/translation of spoken language to printed text using the Dragon Dictation System.

## 2023-05-18 NOTE — PATIENT INSTRUCTIONS
- Lab work on 7th floor    - Office will call to schedule testing    - Start torsemide/Jardiance

## 2023-05-19 ENCOUNTER — TELEPHONE (OUTPATIENT)
Dept: CARDIOLOGY | Facility: HOSPITAL | Age: 78
End: 2023-05-19
Payer: MEDICARE

## 2023-05-19 DIAGNOSIS — I50.22 CHRONIC SYSTOLIC CONGESTIVE HEART FAILURE: Primary | ICD-10-CM

## 2023-05-19 LAB
ANION GAP SERPL CALCULATED.3IONS-SCNC: 8 MMOL/L (ref 5–15)
BUN SERPL-MCNC: 33 MG/DL (ref 8–23)
BUN/CREAT SERPL: 20.8 (ref 7–25)
CALCIUM SPEC-SCNC: 9.7 MG/DL (ref 8.6–10.5)
CHLORIDE SERPL-SCNC: 102 MMOL/L (ref 98–107)
CO2 SERPL-SCNC: 29 MMOL/L (ref 22–29)
CREAT SERPL-MCNC: 1.59 MG/DL (ref 0.76–1.27)
EGFRCR SERPLBLD CKD-EPI 2021: 44.4 ML/MIN/1.73
GLUCOSE SERPL-MCNC: 110 MG/DL (ref 65–99)
NT-PROBNP SERPL-MCNC: 7162 PG/ML (ref 0–1800)
POTASSIUM SERPL-SCNC: 5.8 MMOL/L (ref 3.5–5.2)
SODIUM SERPL-SCNC: 139 MMOL/L (ref 136–145)

## 2023-05-19 NOTE — PROGRESS NOTES
Lab work yesterday with elevated creatinine/potassium. Instructed patient to discontinue Entresto. Repeat BMP next Wednesday when at hospital for TTE. Appreciative of call and receptive of plan.

## 2023-05-19 NOTE — TELEPHONE ENCOUNTER
Caller: Colin Albrecht    Relationship: Self    Best call back number: [phone]    What orders are you requesting (i.e. lab or imaging): LAB ORDERS     In what timeframe would the patient need to come in: WHENEVER NEEDED BEFORE HIS NEXT APPOINTMENT ON 06/13/23    Where will you receive your lab/imaging services: [facility]    Additional notes: PATIENT WAS CALLED AND ADVISED THAT HIS POTASSIUM WAS VERY HIGH AND ADVISED TO STOP ENTRESTO AND TO GO HAVE HIS LABS DRAWN AFTER. HE WOULD LIKE TO KNOW HOW LONG AFTER STOPPING THIS MEDICATION DOES HE NEED TO WAIT TO HAVE HIS LABS DRAWN.

## 2023-05-20 LAB
QT INTERVAL: 446 MS
QTC INTERVAL: 508 MS

## 2023-05-24 ENCOUNTER — HOSPITAL ENCOUNTER (OUTPATIENT)
Dept: PULMONOLOGY | Facility: HOSPITAL | Age: 78
Discharge: HOME | End: 2023-05-24
Payer: MEDICARE

## 2023-05-24 ENCOUNTER — HOSPITAL ENCOUNTER (OUTPATIENT)
Dept: CARDIOLOGY | Facility: HOSPITAL | Age: 78
Discharge: HOME | End: 2023-05-24
Payer: MEDICARE

## 2023-05-24 ENCOUNTER — LAB (OUTPATIENT)
Dept: LAB | Facility: HOSPITAL | Age: 78
End: 2023-05-24
Payer: MEDICARE

## 2023-05-24 VITALS
BODY MASS INDEX: 22.34 KG/M2 | HEIGHT: 75 IN | DIASTOLIC BLOOD PRESSURE: 71 MMHG | SYSTOLIC BLOOD PRESSURE: 123 MMHG | WEIGHT: 179.68 LBS

## 2023-05-24 DIAGNOSIS — Z95.818 PRESENCE OF WATCHMAN LEFT ATRIAL APPENDAGE CLOSURE DEVICE: ICD-10-CM

## 2023-05-24 DIAGNOSIS — I48.21 PERMANENT ATRIAL FIBRILLATION: ICD-10-CM

## 2023-05-24 DIAGNOSIS — I25.810 CORONARY ARTERY DISEASE INVOLVING CORONARY BYPASS GRAFT OF NATIVE HEART WITHOUT ANGINA PECTORIS: ICD-10-CM

## 2023-05-24 DIAGNOSIS — I49.5 SSS (SICK SINUS SYNDROME): Chronic | ICD-10-CM

## 2023-05-24 DIAGNOSIS — I50.22 CHRONIC SYSTOLIC CONGESTIVE HEART FAILURE: ICD-10-CM

## 2023-05-24 DIAGNOSIS — R06.02 SOB (SHORTNESS OF BREATH): ICD-10-CM

## 2023-05-24 PROCEDURE — 94729 DIFFUSING CAPACITY: CPT

## 2023-05-24 PROCEDURE — 80048 BASIC METABOLIC PNL TOTAL CA: CPT

## 2023-05-24 PROCEDURE — 93306 TTE W/DOPPLER COMPLETE: CPT

## 2023-05-24 PROCEDURE — 94727 GAS DIL/WSHOT DETER LNG VOL: CPT

## 2023-05-24 PROCEDURE — 36415 COLL VENOUS BLD VENIPUNCTURE: CPT

## 2023-05-24 PROCEDURE — 94010 BREATHING CAPACITY TEST: CPT

## 2023-05-25 LAB
ANION GAP SERPL CALCULATED.3IONS-SCNC: 11.1 MMOL/L (ref 5–15)
ASCENDING AORTA: 3.5 CM
BH CV ECHO MEAS - AO MAX PG: 3.5 MMHG
BH CV ECHO MEAS - AO MEAN PG: 2 MMHG
BH CV ECHO MEAS - AO ROOT DIAM: 3.5 CM
BH CV ECHO MEAS - AO V2 MAX: 93 CM/SEC
BH CV ECHO MEAS - AO V2 VTI: 17.6 CM
BH CV ECHO MEAS - AVA(I,D): 1.82 CM2
BH CV ECHO MEAS - EDV(CUBED): 216 ML
BH CV ECHO MEAS - EDV(MOD-SP2): 246 ML
BH CV ECHO MEAS - EDV(MOD-SP4): 145 ML
BH CV ECHO MEAS - EF(MOD-BP): 20.4 %
BH CV ECHO MEAS - EF(MOD-SP2): 25.6 %
BH CV ECHO MEAS - EF(MOD-SP4): 14.5 %
BH CV ECHO MEAS - ESV(CUBED): 195.1 ML
BH CV ECHO MEAS - ESV(MOD-SP2): 183 ML
BH CV ECHO MEAS - ESV(MOD-SP4): 124 ML
BH CV ECHO MEAS - FS: 3.3 %
BH CV ECHO MEAS - IVS/LVPW: 1 CM
BH CV ECHO MEAS - IVSD: 1 CM
BH CV ECHO MEAS - LA DIMENSION: 4.3 CM
BH CV ECHO MEAS - LAT PEAK E' VEL: 5 CM/SEC
BH CV ECHO MEAS - LV DIASTOLIC VOL/BSA (35-75): 69.3 CM2
BH CV ECHO MEAS - LV MASS(C)D: 246.9 GRAMS
BH CV ECHO MEAS - LV MAX PG: 1.66 MMHG
BH CV ECHO MEAS - LV MEAN PG: 1 MMHG
BH CV ECHO MEAS - LV SYSTOLIC VOL/BSA (12-30): 59.2 CM2
BH CV ECHO MEAS - LV V1 MAX: 64.4 CM/SEC
BH CV ECHO MEAS - LV V1 VTI: 9.7 CM
BH CV ECHO MEAS - LVIDD: 6 CM
BH CV ECHO MEAS - LVIDS: 5.8 CM
BH CV ECHO MEAS - LVOT AREA: 3.3 CM2
BH CV ECHO MEAS - LVOT DIAM: 2.05 CM
BH CV ECHO MEAS - LVPWD: 1 CM
BH CV ECHO MEAS - MED PEAK E' VEL: 3.2 CM/SEC
BH CV ECHO MEAS - MV DEC TIME: 0.19 MSEC
BH CV ECHO MEAS - MV E MAX VEL: 87.4 CM/SEC
BH CV ECHO MEAS - PA ACC TIME: 0.09 SEC
BH CV ECHO MEAS - PA PR(ACCEL): 40.1 MMHG
BH CV ECHO MEAS - PI END-D VEL: 170 CM/SEC
BH CV ECHO MEAS - RAP SYSTOLE: 3 MMHG
BH CV ECHO MEAS - RVSP: 25 MMHG
BH CV ECHO MEAS - SI(MOD-SP2): 30.1 ML/M2
BH CV ECHO MEAS - SI(MOD-SP4): 10 ML/M2
BH CV ECHO MEAS - SV(LVOT): 32 ML
BH CV ECHO MEAS - SV(MOD-SP2): 63 ML
BH CV ECHO MEAS - SV(MOD-SP4): 21 ML
BH CV ECHO MEAS - TAPSE (>1.6): 2.03 CM
BH CV ECHO MEAS - TR MAX PG: 22.3 MMHG
BH CV ECHO MEAS - TR MAX VEL: 236 CM/SEC
BH CV ECHO MEASUREMENTS AVERAGE E/E' RATIO: 21.32
BH CV XLRA - RV BASE: 5 CM
BH CV XLRA - RV LENGTH: 8.7 CM
BH CV XLRA - RV MID: 3.2 CM
BH CV XLRA - TDI S': 9.2 CM/SEC
BUN SERPL-MCNC: 28 MG/DL (ref 8–23)
BUN/CREAT SERPL: 17.8 (ref 7–25)
CALCIUM SPEC-SCNC: 9.6 MG/DL (ref 8.6–10.5)
CHLORIDE SERPL-SCNC: 99 MMOL/L (ref 98–107)
CO2 SERPL-SCNC: 28.9 MMOL/L (ref 22–29)
CREAT SERPL-MCNC: 1.57 MG/DL (ref 0.76–1.27)
EGFRCR SERPLBLD CKD-EPI 2021: 45.1 ML/MIN/1.73
GLUCOSE SERPL-MCNC: 107 MG/DL (ref 65–99)
MAXIMAL PREDICTED HEART RATE: 143 BPM
POTASSIUM SERPL-SCNC: 4.5 MMOL/L (ref 3.5–5.2)
SODIUM SERPL-SCNC: 139 MMOL/L (ref 136–145)
STRESS TARGET HR: 122 BPM

## 2023-05-26 ENCOUNTER — TELEPHONE (OUTPATIENT)
Dept: CARDIOLOGY | Facility: CLINIC | Age: 78
End: 2023-05-26
Payer: MEDICARE

## 2023-05-26 DIAGNOSIS — J44.9 CHRONIC OBSTRUCTIVE PULMONARY DISEASE, UNSPECIFIED COPD TYPE: Primary | Chronic | ICD-10-CM

## 2023-05-26 NOTE — TELEPHONE ENCOUNTER
----- Message from ETIENNE Dobbins sent at 5/25/2023  4:23 PM EDT -----  Can you please put a referral in for a pulmonary consultation regarding severe COPD, lung restriction disease.  He has abnormal PFTs.  Thank you.  ----- Message -----  From: Pietro Quintana MD  Sent: 5/25/2023   4:01 PM EDT  To: ETIENNE Dobbins

## 2023-05-30 ENCOUNTER — TELEPHONE (OUTPATIENT)
Dept: CARDIOLOGY | Facility: CLINIC | Age: 78
End: 2023-05-30

## 2023-05-30 NOTE — TELEPHONE ENCOUNTER
Roxie Quiñones PA-C You; Pietro Quintana MD 12 minutes ago (4:03 PM)     ]  Sounds like his heart failure is getting worse, and I think he needs to come to the hospital for IV diuretics and close monitoring.     --------------------------------------------------------------------------------------------    Pt notified and verbalized understanding.

## 2023-05-30 NOTE — TELEPHONE ENCOUNTER
"Pt reports when he lays down he is extremely out of breath and experiences a pressure in the \"bottom of his heart\". He states he is normally out of breath with movement but this is worse. He feels fatigued, weak and has blurry vision all the time. He says when he wakes up the chest pressure goes away and SOA becomes better.     BP/ HR:    124/80 HR 83  121/89   123/89   114/74  106/56    Please advise.   "

## 2023-05-31 DIAGNOSIS — R06.02 SOB (SHORTNESS OF BREATH): ICD-10-CM

## 2023-05-31 DIAGNOSIS — I25.810 CORONARY ARTERY DISEASE INVOLVING CORONARY BYPASS GRAFT OF NATIVE HEART WITHOUT ANGINA PECTORIS: ICD-10-CM

## 2023-05-31 DIAGNOSIS — I50.22 CHRONIC SYSTOLIC CONGESTIVE HEART FAILURE: Primary | ICD-10-CM

## 2023-05-31 DIAGNOSIS — I20.8 ANGINAL EQUIVALENT: ICD-10-CM

## 2023-05-31 PROBLEM — I20.89 ANGINAL EQUIVALENT: Status: ACTIVE | Noted: 2023-05-31

## 2023-05-31 NOTE — PROGRESS NOTES
Contacted patient regarding requested echocardiogram results.  Echocardiogram with decreased LVEF to 21-25%.  Recent PFTs with severe obstruction, and referred to pulmonary by EP team.    Patient notes continued shortness of breath, denies exertional chest pain.  Continues with good diuretic response with torsemide, but shortness of breath continues.  Discussed TTE results with primary cardiology and recommendation for left heart catheterization.    Contacted patient and receptive of left heart catheterization plan.  Discussed emergency department precautions if he feels worse, or has chest pain/increased shortness of breath in interim.  Receptive of plan, appreciative of call.  We will contact cardiology scheduling to help arrange for left heart catheterization.

## 2023-06-01 ENCOUNTER — PREP FOR SURGERY (OUTPATIENT)
Dept: OTHER | Facility: HOSPITAL | Age: 78
End: 2023-06-01

## 2023-06-01 RX ORDER — ACETAMINOPHEN 325 MG/1
650 TABLET ORAL EVERY 4 HOURS PRN
OUTPATIENT
Start: 2023-06-01

## 2023-06-01 RX ORDER — SODIUM CHLORIDE 9 MG/ML
40 INJECTION, SOLUTION INTRAVENOUS AS NEEDED
OUTPATIENT
Start: 2023-06-01

## 2023-06-01 RX ORDER — NITROGLYCERIN 0.4 MG/1
0.4 TABLET SUBLINGUAL
OUTPATIENT
Start: 2023-06-01

## 2023-06-01 RX ORDER — SODIUM CHLORIDE 0.9 % (FLUSH) 0.9 %
10 SYRINGE (ML) INJECTION AS NEEDED
OUTPATIENT
Start: 2023-06-01

## 2023-06-01 RX ORDER — SODIUM CHLORIDE 0.9 % (FLUSH) 0.9 %
10 SYRINGE (ML) INJECTION EVERY 12 HOURS SCHEDULED
OUTPATIENT
Start: 2023-06-01

## 2023-06-06 ENCOUNTER — HOSPITAL ENCOUNTER (OUTPATIENT)
Facility: HOSPITAL | Age: 78
Setting detail: HOSPITAL OUTPATIENT SURGERY
Discharge: HOME | End: 2023-06-06
Attending: INTERNAL MEDICINE | Admitting: INTERNAL MEDICINE
Payer: MEDICARE

## 2023-06-06 VITALS
TEMPERATURE: 96.7 F | HEART RATE: 75 BPM | WEIGHT: 173.6 LBS | HEIGHT: 75 IN | RESPIRATION RATE: 18 BRPM | DIASTOLIC BLOOD PRESSURE: 92 MMHG | BODY MASS INDEX: 21.58 KG/M2 | OXYGEN SATURATION: 95 % | SYSTOLIC BLOOD PRESSURE: 139 MMHG

## 2023-06-06 DIAGNOSIS — I25.810 CORONARY ARTERY DISEASE INVOLVING CORONARY BYPASS GRAFT OF NATIVE HEART WITHOUT ANGINA PECTORIS: ICD-10-CM

## 2023-06-06 DIAGNOSIS — I20.8 ANGINAL EQUIVALENT: ICD-10-CM

## 2023-06-06 DIAGNOSIS — I50.23 ACUTE ON CHRONIC HFREF (HEART FAILURE WITH REDUCED EJECTION FRACTION): Primary | ICD-10-CM

## 2023-06-06 DIAGNOSIS — I50.22 CHRONIC SYSTOLIC CONGESTIVE HEART FAILURE: ICD-10-CM

## 2023-06-06 DIAGNOSIS — R06.02 SOB (SHORTNESS OF BREATH): ICD-10-CM

## 2023-06-06 LAB
ALBUMIN SERPL-MCNC: 4 G/DL (ref 3.5–5.2)
ALBUMIN/GLOB SERPL: 1.4 G/DL
ALP SERPL-CCNC: 83 U/L (ref 39–117)
ALT SERPL W P-5'-P-CCNC: 31 U/L (ref 1–41)
ANION GAP SERPL CALCULATED.3IONS-SCNC: 16 MMOL/L (ref 5–15)
AST SERPL-CCNC: 36 U/L (ref 1–40)
BILIRUB SERPL-MCNC: 1.6 MG/DL (ref 0–1.2)
BUN SERPL-MCNC: 25 MG/DL (ref 8–23)
BUN/CREAT SERPL: 19.5 (ref 7–25)
CALCIUM SPEC-SCNC: 9.5 MG/DL (ref 8.6–10.5)
CHLORIDE SERPL-SCNC: 99 MMOL/L (ref 98–107)
CHOLEST SERPL-MCNC: 138 MG/DL (ref 0–200)
CO2 SERPL-SCNC: 21 MMOL/L (ref 22–29)
CREAT BLDA-MCNC: 1.3 MG/DL (ref 0.6–1.3)
CREAT SERPL-MCNC: 1.28 MG/DL (ref 0.76–1.27)
DEPRECATED RDW RBC AUTO: 49.1 FL (ref 37–54)
EGFRCR SERPLBLD CKD-EPI 2021: 57.6 ML/MIN/1.73
ERYTHROCYTE [DISTWIDTH] IN BLOOD BY AUTOMATED COUNT: 13.5 % (ref 12.3–15.4)
GLOBULIN UR ELPH-MCNC: 2.8 GM/DL
GLUCOSE SERPL-MCNC: 102 MG/DL (ref 65–99)
HCT VFR BLD AUTO: 44.3 % (ref 37.5–51)
HDLC SERPL-MCNC: 62 MG/DL (ref 40–60)
HGB BLD-MCNC: 14.7 G/DL (ref 13–17.7)
LDLC SERPL CALC-MCNC: 61 MG/DL (ref 0–100)
LDLC/HDLC SERPL: 0.98 {RATIO}
MCH RBC QN AUTO: 33 PG (ref 26.6–33)
MCHC RBC AUTO-ENTMCNC: 33.2 G/DL (ref 31.5–35.7)
MCV RBC AUTO: 99.3 FL (ref 79–97)
PLATELET # BLD AUTO: 172 10*3/MM3 (ref 140–450)
PMV BLD AUTO: 10.5 FL (ref 6–12)
POTASSIUM SERPL-SCNC: 4.3 MMOL/L (ref 3.5–5.2)
PROT SERPL-MCNC: 6.8 G/DL (ref 6–8.5)
RBC # BLD AUTO: 4.46 10*6/MM3 (ref 4.14–5.8)
SODIUM SERPL-SCNC: 136 MMOL/L (ref 136–145)
T4 FREE SERPL-MCNC: 1.45 NG/DL (ref 0.93–1.7)
TRIGL SERPL-MCNC: 76 MG/DL (ref 0–150)
TSH SERPL DL<=0.05 MIU/L-ACNC: 13.68 UIU/ML (ref 0.27–4.2)
VLDLC SERPL-MCNC: 15 MG/DL (ref 5–40)
WBC NRBC COR # BLD: 7.76 10*3/MM3 (ref 3.4–10.8)

## 2023-06-06 PROCEDURE — 93459 L HRT ART/GRFT ANGIO: CPT | Performed by: INTERNAL MEDICINE

## 2023-06-06 PROCEDURE — 84439 ASSAY OF FREE THYROXINE: CPT | Performed by: PHYSICIAN ASSISTANT

## 2023-06-06 PROCEDURE — 25010000002 FENTANYL CITRATE (PF) 50 MCG/ML SOLUTION: Performed by: INTERNAL MEDICINE

## 2023-06-06 PROCEDURE — 25010000002 MIDAZOLAM PER 1 MG: Performed by: INTERNAL MEDICINE

## 2023-06-06 PROCEDURE — 82565 ASSAY OF CREATININE: CPT

## 2023-06-06 PROCEDURE — 25510000001 IOPAMIDOL PER 1 ML: Performed by: INTERNAL MEDICINE

## 2023-06-06 PROCEDURE — 80061 LIPID PANEL: CPT | Performed by: PHYSICIAN ASSISTANT

## 2023-06-06 PROCEDURE — 85027 COMPLETE CBC AUTOMATED: CPT | Performed by: PHYSICIAN ASSISTANT

## 2023-06-06 PROCEDURE — 25010000002 HEPARIN (PORCINE) PER 1000 UNITS: Performed by: INTERNAL MEDICINE

## 2023-06-06 PROCEDURE — 80053 COMPREHEN METABOLIC PANEL: CPT | Performed by: PHYSICIAN ASSISTANT

## 2023-06-06 PROCEDURE — 84443 ASSAY THYROID STIM HORMONE: CPT | Performed by: PHYSICIAN ASSISTANT

## 2023-06-06 PROCEDURE — C1894 INTRO/SHEATH, NON-LASER: HCPCS | Performed by: INTERNAL MEDICINE

## 2023-06-06 PROCEDURE — 83036 HEMOGLOBIN GLYCOSYLATED A1C: CPT | Performed by: PHYSICIAN ASSISTANT

## 2023-06-06 PROCEDURE — C1769 GUIDE WIRE: HCPCS | Performed by: INTERNAL MEDICINE

## 2023-06-06 RX ORDER — ACETAMINOPHEN 325 MG/1
650 TABLET ORAL EVERY 4 HOURS PRN
Status: DISCONTINUED | OUTPATIENT
Start: 2023-06-06 | End: 2023-06-06 | Stop reason: HOSPADM

## 2023-06-06 RX ORDER — NICARDIPINE HCL-0.9% SOD CHLOR 1 MG/10 ML
SYRINGE (ML) INTRAVENOUS
Status: DISCONTINUED | OUTPATIENT
Start: 2023-06-06 | End: 2023-06-06 | Stop reason: HOSPADM

## 2023-06-06 RX ORDER — MIDAZOLAM HYDROCHLORIDE 1 MG/ML
INJECTION INTRAMUSCULAR; INTRAVENOUS
Status: DISCONTINUED | OUTPATIENT
Start: 2023-06-06 | End: 2023-06-06 | Stop reason: HOSPADM

## 2023-06-06 RX ORDER — TORSEMIDE 20 MG/1
20 TABLET ORAL 2 TIMES DAILY
Qty: 90 TABLET | Refills: 1 | Status: SHIPPED | OUTPATIENT
Start: 2023-06-06

## 2023-06-06 RX ORDER — LIDOCAINE HYDROCHLORIDE 10 MG/ML
INJECTION, SOLUTION EPIDURAL; INFILTRATION; INTRACAUDAL; PERINEURAL
Status: DISCONTINUED | OUTPATIENT
Start: 2023-06-06 | End: 2023-06-06 | Stop reason: HOSPADM

## 2023-06-06 RX ORDER — NITROGLYCERIN 0.4 MG/1
0.4 TABLET SUBLINGUAL
Status: DISCONTINUED | OUTPATIENT
Start: 2023-06-06 | End: 2023-06-06

## 2023-06-06 RX ORDER — HEPARIN SODIUM 1000 [USP'U]/ML
INJECTION, SOLUTION INTRAVENOUS; SUBCUTANEOUS
Status: DISCONTINUED | OUTPATIENT
Start: 2023-06-06 | End: 2023-06-06 | Stop reason: HOSPADM

## 2023-06-06 RX ORDER — SODIUM CHLORIDE 0.9 % (FLUSH) 0.9 %
10 SYRINGE (ML) INJECTION EVERY 12 HOURS SCHEDULED
Status: DISCONTINUED | OUTPATIENT
Start: 2023-06-06 | End: 2023-06-06 | Stop reason: HOSPADM

## 2023-06-06 RX ORDER — SODIUM CHLORIDE 9 MG/ML
40 INJECTION, SOLUTION INTRAVENOUS AS NEEDED
Status: DISCONTINUED | OUTPATIENT
Start: 2023-06-06 | End: 2023-06-06 | Stop reason: HOSPADM

## 2023-06-06 RX ORDER — SODIUM CHLORIDE 0.9 % (FLUSH) 0.9 %
10 SYRINGE (ML) INJECTION AS NEEDED
Status: DISCONTINUED | OUTPATIENT
Start: 2023-06-06 | End: 2023-06-06 | Stop reason: HOSPADM

## 2023-06-06 RX ORDER — NITROGLYCERIN 0.4 MG/1
0.4 TABLET SUBLINGUAL
Status: DISCONTINUED | OUTPATIENT
Start: 2023-06-06 | End: 2023-06-06 | Stop reason: HOSPADM

## 2023-06-06 RX ORDER — FENTANYL CITRATE 50 UG/ML
INJECTION, SOLUTION INTRAMUSCULAR; INTRAVENOUS
Status: DISCONTINUED | OUTPATIENT
Start: 2023-06-06 | End: 2023-06-06 | Stop reason: HOSPADM

## 2023-06-06 NOTE — ASSESSMENT & PLAN NOTE
Coronary artery disease is improving with treatment.  Continue current treatment regimen.  Cardiac status will be reassessed in 3 months.

## 2023-06-06 NOTE — INTERVAL H&P NOTE
H&P reviewed. The patient was examined and there are no changes to the H&P.      EF has decreased to 21-25%; continues with SOA    Left radial approach for left heart cath    Procedure was discussed with pt including risk and benefits.  Pt verbalized understanding.     Recommendations to follow procedure per Dr. Quintana.    Electronically signed by Tracy Castaneda PA-C, 06/06/23, 10:49 AM EDT.     The risks, benefits, and alternative options of cardiac catheterization were discussed with the patient.  The risks include death, MI, stroke, infection, vascular injury requiring surgical repair and/or blood transfusion, coronary dissection, renal dysfunction/failure, allergic reaction, emergent CABG.  If PCI is needed there is a 1-2% risk of emergent CABG.  The patient is agreeable for cardiac catheterization, possible PCI or CABG. Plan is to proceed with cardiac catheterization and possible PCI.     Pietro Quintana MD, FACC, [facility]  Interventional Cardiology  06/06/23  12:50 EDT

## 2023-06-07 ENCOUNTER — DOCUMENTATION (OUTPATIENT)
Dept: CARDIAC REHAB | Facility: HOSPITAL | Age: 78
End: 2023-06-07
Payer: MEDICARE

## 2023-06-07 LAB — HBA1C MFR BLD: 6 % (ref 4.8–5.6)

## 2023-06-07 NOTE — PROGRESS NOTES
Cardiac Rehab staff mailed referral letter to patient regarding Phase II Cardiac Rehab program. Instruction for patient to contact [facility] Cardiac Rehab Department for additional program information and to forward referral to closest Cardiac Rehab program.

## 2023-06-13 ENCOUNTER — OFFICE VISIT (OUTPATIENT)
Dept: CARDIOLOGY | Facility: HOSPITAL | Age: 78
End: 2023-06-13
Payer: MEDICARE

## 2023-06-13 ENCOUNTER — LAB (OUTPATIENT)
Dept: LAB | Facility: HOSPITAL | Age: 78
End: 2023-06-13
Payer: MEDICARE

## 2023-06-13 VITALS
DIASTOLIC BLOOD PRESSURE: 74 MMHG | RESPIRATION RATE: 20 BRPM | WEIGHT: 172.25 LBS | BODY MASS INDEX: 21.42 KG/M2 | OXYGEN SATURATION: 98 % | HEART RATE: 85 BPM | TEMPERATURE: 98.3 F | HEIGHT: 75 IN | SYSTOLIC BLOOD PRESSURE: 112 MMHG

## 2023-06-13 DIAGNOSIS — I25.810 CORONARY ARTERY DISEASE INVOLVING CORONARY BYPASS GRAFT OF NATIVE HEART WITHOUT ANGINA PECTORIS: ICD-10-CM

## 2023-06-13 DIAGNOSIS — I50.22 CHRONIC SYSTOLIC CONGESTIVE HEART FAILURE: Primary | ICD-10-CM

## 2023-06-13 DIAGNOSIS — I48.21 PERMANENT ATRIAL FIBRILLATION: ICD-10-CM

## 2023-06-13 DIAGNOSIS — I50.22 CHRONIC SYSTOLIC CONGESTIVE HEART FAILURE: ICD-10-CM

## 2023-06-13 LAB
ANION GAP SERPL CALCULATED.3IONS-SCNC: 8 MMOL/L (ref 5–15)
BUN SERPL-MCNC: 29 MG/DL (ref 8–23)
BUN/CREAT SERPL: 21.5 (ref 7–25)
CALCIUM SPEC-SCNC: 9.8 MG/DL (ref 8.6–10.5)
CHLORIDE SERPL-SCNC: 99 MMOL/L (ref 98–107)
CO2 SERPL-SCNC: 31 MMOL/L (ref 22–29)
CREAT SERPL-MCNC: 1.35 MG/DL (ref 0.76–1.27)
EGFRCR SERPLBLD CKD-EPI 2021: 54.1 ML/MIN/1.73
GLUCOSE SERPL-MCNC: 102 MG/DL (ref 65–99)
NT-PROBNP SERPL-MCNC: 9166 PG/ML (ref 0–1800)
POTASSIUM SERPL-SCNC: 5.2 MMOL/L (ref 3.5–5.2)
SODIUM SERPL-SCNC: 138 MMOL/L (ref 136–145)

## 2023-06-13 PROCEDURE — 36415 COLL VENOUS BLD VENIPUNCTURE: CPT

## 2023-06-13 PROCEDURE — 83880 ASSAY OF NATRIURETIC PEPTIDE: CPT

## 2023-06-13 PROCEDURE — 80048 BASIC METABOLIC PNL TOTAL CA: CPT

## 2023-06-13 NOTE — PROGRESS NOTES
Chief Complaint  Congestive Heart Failure and Follow-up    Subjective      History of Present Illness {CC  Problem List  Visit  Diagnosis   Encounters  Notes  Medications  Labs  Result Review Imaging  Media :23}     Colin Albrecht, 78 y.o. male with history of CABG x2 by Dr. Shields 6/2021, permanent Afib s/p Watchman and AVN ablation, SHF, s/p Bi-V ICD, frequent PVCs, HTN, SSS, CKD III presents to [facility] Val[facility] clinic for Congestive Heart Failure and Follow-up.  Primary cardiologist is Dr. Quintana.    Since previous evaluation patient completed left heart catheterization with widely patent grafts.  LVEDP 20.  Torsemide was doubled for 1 week with instructions to repeat [facility] heart valve clinic follow-up.      Presents today noting improvement in dyspnea symptoms after increase torsemide.  Approximately 7lb weight loss since previous evaluation.  Continues good diuretic response with torsemide twice daily.  Lower extremity edema resolved.  Does note improved but continued orthopnea/PND.  Denies chest pain, tachypalpitation, syncope.      Previous evaluation:  During recent visit with Dr. Quintana furosemide 40 Mg daily started, and Entresto resumed.  Bigeminal PVCs had improved since recent electrophysiology appointment and initiation of amiodarone.  Recommendation for CMP in 2 weeks with heart valve clinic visit.  Lab work completed with elevated BNP.    Presents today noting shortness of breath, orthopnea, PND, LE edema. Weight down approximately 1lb over the past 2 weeks. Reports not much diuretic response with furosemide. Denies chest pain, tachypalpitation, near syncope/syncope. Unsteady gait occasionally when he is walking, but no lightheadedness.       Objective     Vital Signs:   Vitals:    06/13/23 1326   BP: 112/74   BP Location: Left arm   Patient Position: Sitting   Cuff Size: Adult   Pulse: 85   Resp: 20   Temp: 98.3 °F (36.8 °C)   TempSrc: Temporal   SpO2: 98%   Weight: 78.1 kg  "(172 lb 4 oz)   Height: 190.5 cm (75\")       Body mass index is 21.53 kg/m².  Physical Exam  Vitals and nursing note reviewed.   Constitutional:       Appearance: Normal appearance.   HENT:      Head: Normocephalic.   Eyes:      Extraocular Movements: Extraocular movements intact.   Neck:      Vascular: No carotid bruit.   Cardiovascular:      Rate and Rhythm: Normal rate and regular rhythm.      Pulses: Normal pulses.      Heart sounds: Normal heart sounds, S1 normal and S2 normal. No murmur heard.  Pulmonary:      Effort: Pulmonary effort is normal. No respiratory distress.      Breath sounds: Rhonchi present.   Musculoskeletal:      Cervical back: Neck supple.      Right lower leg: Edema present.      Left lower leg: Edema present.      Comments: 1+bilateral LE edema   Skin:     General: Skin is warm and dry.   Neurological:      General: No focal deficit present.      Mental Status: He is alert.   Psychiatric:         Mood and Affect: Mood normal.         Behavior: Behavior normal.         Thought Content: Thought content normal.     Data Reviewed:{ Labs  Result Review  Imaging  Med Tab  Media :23}     CBC (No Diff) (06/06/2023 10:29)  Comprehensive Metabolic Panel (06/06/2023 10:29)  Lipid Panel (06/06/2023 10:29)  Hemoglobin A1c (06/06/2023 10:29)  TSH Rfx On Abnormal To Free T4 (06/06/2023 10:29)  ECG 12 Lead (05/18/2023 13:58)    CBC & Differential (05/04/2023 13:58)  proBNP (05/04/2023 13:58)  TSH Rfx On Abnormal To Free T4 (05/04/2023 13:58)  ECG 12 Lead (04/28/2023 16:42)  ECG 12 Lead (05/04/2023)  SCANNED - ECHOCARDIOGRAM (08/02/2022)  STAT Adult Transthoracic Echo Complete W/ Cont if Necessary Per Protocol (06/14/2021 16:49)  Cardiac Catheterization/Vascular Study (06/02/2021 11:48)      Assessment & Plan   Assessment and Plan {CC Problem List  Visit Diagnosis  ROS  Review (Popup)  Health Maintenance  Quality  BestPractice  Medications  SmartSets  SnapShot Encounters  Media :23}     1. " Chronic systolic congestive heart failure  -Most recent TTE with LVEF 35% 8/1/2022.  -s/p BiV ICD  -NYHA class II. Appears near euvolemic on exam.   -Continue torsemide 20mg BID  -Initiate Jardiance 10mg daily  -BMP/BNP today  -Continue GDMT: Toprol-XL. No ACE/ARB/ARNI, aldosterone antagonist r/t CKD, previous hyperkalemia  -May consider restarting low dose ARB at f/u pending BMP  -Follow-up in approximately 1 month for recheck  -Continue scheduled establishing care with pulmonary in approximately 2 weeks    2. PVC  -Recently with frequent PVCs and initiation of amiodarone  -Improvement in PVCs on amiodarone  -Continue amiodarone 200mg daily, Toprol-XL    3. Coronary artery disease involving coronary bypass graft of native heart without angina pectoris  -s/p CABG 2021  -Denies anginal symptoms  -Continue ASA, statin    4. Permanent atrial fibrillation  -CHADSASC:7  -s/p AVN RFA, Biv ICD  -s/p Watchman  -Continue ASA with Watchman in place      Follow Up {Instructions Charge Capture  Follow-up Communications :23}     Return in about 1 month (around 7/13/2023) for Office follow-up, fluid check, Results follow-up.    Time Spent: I spent 52 minutes caring for Colin Albrecht on this date of service, 31 minutes face-to-face time in exam room. This time includes time spent by me in the following activities: preparing for the visit, reviewing tests, obtaining and/or reviewing a separately obtained history, performing a medically appropriate examination and/or evaluation, counseling and educating the patient/family/caregiver, documenting information in the medical record and independently interpreting results and communicating that information with the patient/family/caregiver. All time noted occurred on the date of service.    Patient was given instructions and counseling regarding his condition or for health maintenance advice. Please see specific information pulled into the AVS if appropriate.  Patient was  instructed to call the [facility] with any questions, concerns, or worsening symptoms.    Dictated Utilizing Dragon Dictation   Please note that portions of this note were completed with a voice recognition program.   Part of this note may be an electronic transcription/translation of spoken language to printed text using the Dragon Dictation System.

## 2023-06-14 NOTE — PROGRESS NOTES
Please let patient know kidney function was stable. Continue current medications and follow-up as scheduled.   Thanks

## 2023-06-15 ENCOUNTER — TELEPHONE (OUTPATIENT)
Dept: CARDIOLOGY | Facility: HOSPITAL | Age: 78
End: 2023-06-15
Payer: MEDICARE

## 2023-06-15 NOTE — TELEPHONE ENCOUNTER
----- Message from Heidy Teixeira MA sent at 6/15/2023  8:25 AM EDT -----  Spoke to pt concerning his labs. Pt understood and had no further questions or concerns at this time.  ----- Message -----  From: Tab Simmons APRN  Sent: 6/14/2023   8:48 AM EDT  To: Heidy Teixeira MA    Please let patient know kidney function was stable. Continue current medications and follow-up as scheduled.   Thanks

## 2023-08-10 ENCOUNTER — TELEPHONE (OUTPATIENT)
Dept: CARDIOLOGY | Facility: CLINIC | Age: 78
End: 2023-08-10
Payer: MEDICARE

## 2023-08-10 NOTE — TELEPHONE ENCOUNTER
Caller: Colin Albrecht    Relationship: Self    Best call back number: [phone]    What medications are you currently taking:   Current Outpatient Medications on File Prior to Visit   Medication Sig Dispense Refill    albuterol sulfate  (90 Base) MCG/ACT inhaler Inhale 2 puffs Every 4 (Four) Hours As Needed for Wheezing. 54 g 3    amiodarone (PACERONE) 200 MG tablet Take 1 tablet by mouth 2 (Two) Times a Day. Take twice a day for one week than once a day. 30 tablet 5    aspirin 81 MG EC tablet Take 1 tablet by mouth Daily.      atorvastatin (LIPITOR) 40 MG tablet TAKE 1 TABLET BY MOUTH EVERY NIGHT. 90 tablet 3    Coenzyme Q10 (CoQ10) 100 MG capsule Take 1 capsule by mouth Daily.      empagliflozin (Jardiance) 10 MG tablet tablet Take 1 tablet by mouth Daily. 90 tablet 1    escitalopram (LEXAPRO) 10 MG tablet Take 1 tablet by mouth Daily.      levothyroxine (SYNTHROID, LEVOTHROID) 50 MCG tablet Take 1 tablet by mouth Daily.      metoprolol succinate XL (TOPROL-XL) 25 MG 24 hr tablet Take 1 tablet by mouth 2 (Two) Times a Day. 60 tablet 5    multivitamin with minerals tablet tablet Take 1 tablet by mouth Daily.      polyethylene glycol (MIRALAX) 17 GM/SCOOP powder DISSOLVE 1 CAPFUL IN 8 OUNCES OF LIQUID AND DRINK ONCE DAILY      tiotropium bromide-olodaterol (STIOLTO RESPIMAT) 2.5-2.5 MCG/ACT aerosol solution inhaler Inhale 2 puffs Daily. 3 each 3    torsemide (DEMADEX) 20 MG tablet Take 1 tablet by mouth 2 (Two) Times a Day. 90 tablet 1    traZODone (DESYREL) 50 MG tablet TAKE 1 TABLET BY MOUTH EVERY DAY AT BEDTIME FOR INSOMNIA       No current facility-administered medications on file prior to visit.          When did you start taking these medications: 6.6.2023    Which medication are you concerned about: TORSEMIDE    Who prescribed you this medication: DR. JIMENEZ    What are your concerns: MEDICATION IS CAUSING PATIENT TO SWELL AND HE ISNT GOING TO THE BATHROOM AS HE SHOULD. HE IS REQUESTING TO  GO BACK ON THE FUROSEMIDE-IT DID NOT CAUSE THESE PROBLEMS.     How long have you had these concerns: 2 MONTHS

## 2023-08-10 NOTE — TELEPHONE ENCOUNTER
"From the HUB-    \"MEDICATION IS CAUSING PATIENT TO SWELL AND HE ISNT GOING TO THE BATHROOM AS HE SHOULD. HE IS REQUESTING TO GO BACK ON THE FUROSEMIDE-IT DID NOT CAUSE THESE PROBLEMS\"    Furosemide 40 mg daily changed to torsemide 20 mg daily while inpatient in May due to not much diuretic response with furosemide. Last BMP 6/13/23 Bun 29, creatinine 1.35, GFR 54.1. Sadi Simmons, APRN note on 6/13 says torsemide was doubled for 1 week, and pt noted improvement in dyspnea symptoms after increase torsemide.  Approximately 7lb weight loss since previous evaluation.  Continues good diuretic response with torsemide twice daily.  Lower extremity edema resolved. He has a f/u with RDS 8/17    Please advise  "

## 2023-08-11 RX ORDER — FUROSEMIDE 40 MG/1
40 TABLET ORAL DAILY
Qty: 90 TABLET | Refills: 1 | Status: SHIPPED | OUTPATIENT
Start: 2023-08-11

## 2023-08-16 NOTE — PROGRESS NOTES
OFFICE VISIT  NOTE  [facility] CARDIOLOGY      Name: Colin Albrecht    Date: 2023  MRN:  [id number]  :  1945      REFERRING/PRIMARY PROVIDER:  Arun Soliman MD     Chief Complaint   Patient presents with    Follow-up     Coronary artery disease involving coronary bypass graft of native heart without angina pectoris     HPI: Colin Albrecht is a 78 y.o. male who presents today for follow up of CAD, chronic SHF. History of CABG x2 by Dr. Shields 2021, permanent Afib s/p Watchman and AVN ablation, SHF, s/p Bi-V ICD, frequent PVCs, HTN, SSS, CKD III. Started on Amiodarone by Jose Raul Peraza for frequent PVCs.  Echo 2023 with EF 21-25%, moderate MR, mod-severe TR, normal RVSP. He underwent cath 23 for worsening dyspnea, heart failure symptoms, and found to have widely patent grafts and competitive flow in the LAD from the native vessel, LVEDP 20mmHg. His Torsemide was doubled at that time.  He could not tolerate torsemide he thought it made him worse.  He is having a lot of worsening symptoms over the past 3 to 4 months, orthopnea, PND, shortness of breath and fatigue with any activity.    ROS:Pertinent positives as listed in the HPI.  All other systems reviewed and negative.    Past Medical History:   Diagnosis Date    Abnormal ECG     Arrhythmia     Atrial fibrillation     CHF (congestive heart failure)     Depression     History of transfusion     bleeding stomach ulcer ~2014 x2, no reaction     Hypertension     Stomach ulcer     Wears reading eyeglasses        Past Surgical History:   Procedure Laterality Date    ATRIAL APPENDAGE EXCLUSION LEFT WITH TRANSESOPHAGEAL ECHOCARDIOGRAM N/A 2020    Procedure: Atrial Appendage Occlusion (Watchman), start Eliquis 2 weeks prior and hold 1 day, GA;  Surgeon: Yonny Prado MD;  Location: [facility] INVASIVE LOCATION;  Service: Cardiology;  Laterality: N/A;    CARDIAC CATHETERIZATION      CARDIAC CATHETERIZATION N/A 2021     Procedure: Left Heart Cath- ADD ON/ WALK IN FOR RDS PER KT;  Surgeon: Pietro Quintana MD;  Location:  [facility] CATH INVASIVE LOCATION;  Service: Cardiovascular;  Laterality: N/A;    CARDIAC CATHETERIZATION N/A 2023    Procedure: Coronary angiography;  Surgeon: Pietro Quintana MD;  Location:  [facility] CATH INVASIVE LOCATION;  Service: Cardiovascular;  Laterality: N/A;  LVEF decrease with known CAD.  Discussed with Dr. Quintana, recommendation of [facility].     CARDIAC ELECTROPHYSIOLOGY PROCEDURE N/A 3/26/2021    Procedure: DDD PPM upgrade to Biv ICD (MDT), no meds to hold;  Surgeon: Yonny Prado MD;  Location:  [facility] EP INVASIVE LOCATION;  Service: Cardiology;  Laterality: N/A;    CARDIAC ELECTROPHYSIOLOGY PROCEDURE N/A 3/26/2021    Procedure: AVN RFA;  Surgeon: Yonny Prado MD;  Location:  [facility] EP INVASIVE LOCATION;  Service: Cardiovascular;  Laterality: N/A;    COLONOSCOPY      CORONARY ARTERY BYPASS GRAFT N/A 6/3/2021    Procedure: MEDIAN STERNOTOMY, CORONARY ARTERY BYPASS WITH INTERNAL MAMMARY ARTERY GRAFT X 2, EVH OF THE RIGHT GREATER SAPHENOUS ANA;  Surgeon: Jaime Shields MD;  Location:  [facility] OR;  Service: Cardiothoracic;  Laterality: N/A;    ENDOSCOPY N/A 6/15/2021    Procedure: ESOPHAGOGASTRODUODENOSCOPY;  Surgeon: Fransico Warren MD;  Location:  [facility] ENDOSCOPY;  Service: Gastroenterology;  Laterality: N/A;    INSERT / REPLACE / REMOVE PACEMAKER         Social History     Socioeconomic History    Marital status:     Number of children: 3   Tobacco Use    Smoking status: Former     Packs/day: 1.00     Years: 50.00     Pack years: 50.00     Types: Cigarettes     Quit date: 2010     Years since quittin.0     Passive exposure: Past    Smokeless tobacco: Never   Vaping Use    Vaping Use: Never used   Substance and Sexual Activity    Alcohol use: Not Currently    Drug use: Never    Sexual activity: Defer       Family History   Problem Relation Age of Onset    Stroke Father      "Lung cancer Sister     Alcohol abuse Brother         No Known Allergies    Current Outpatient Medications   Medication Instructions    albuterol sulfate  (90 Base) MCG/ACT inhaler 2 puffs, Inhalation, Every 4 Hours PRN    amiodarone (PACERONE) 200 mg, Oral, 2 Times Daily, Take twice a day for one week than once a day.    aspirin 81 mg, Oral, Daily    atorvastatin (LIPITOR) 40 mg, Oral, Nightly    Coenzyme Q10 (CoQ10) 100 MG capsule 1 capsule, Oral, Daily, OTC    empagliflozin (JARDIANCE) 10 mg, Oral, Daily    escitalopram (LEXAPRO) 10 mg, Oral, Daily    furosemide (LASIX) 40 mg, Oral, Daily    levothyroxine (SYNTHROID, LEVOTHROID) 50 MCG tablet 1 tablet, Oral, Daily    metoprolol succinate XL (TOPROL-XL) 25 mg, Oral, 2 Times Daily    multivitamin with minerals tablet tablet 1 tablet, Oral, Daily    polyethylene glycol (MIRALAX) 17 GM/SCOOP powder DISSOLVE 1 CAPFUL IN 8 OUNCES OF LIQUID AND DRINK ONCE DAILY    tiotropium bromide-olodaterol (STIOLTO RESPIMAT) 2.5-2.5 MCG/ACT aerosol solution inhaler 2 puffs, Inhalation, Daily    traZODone (DESYREL) 50 MG tablet TAKE 1 TABLET BY MOUTH EVERY DAY AT BEDTIME FOR INSOMNIA       Vitals:    08/17/23 1258   BP: 108/62   BP Location: Right arm   Patient Position: Sitting   Cuff Size: Adult   Pulse: 80   Resp: 20   SpO2: 96%   Weight: 77.6 kg (171 lb)   Height: 190.5 cm (75\")     Body mass index is 21.37 kg/mý.    PHYSICAL EXAM:    General Appearance:   well developed  well nourished  Neck:  thyroid not enlarged  supple  Respiratory:  no respiratory distress  normal breath sounds  no rales  Cardiovascular:  no jugular venous distention  regular rhythm  apical impulse normal  S1 normal, S2 normal  no S3, no S4   no murmur  no rub, no thrill  carotid pulses normal; no bruit  pedal pulses normal  lower extremity edema: none    Skin:   warm, dry    RESULTS:   Procedures    Medtronic biventricular ICD, DDDR, 75% BiV paced, P wave 2.3 mV, impedance 399 ohms, RV threshold 0.6, " impedance 309 ohms, LV 0.4 threshold, impedance 3 9 9 ohms, 3.1 years battery life, breakthrough A-fib preventing further BiV pacing    Results for orders placed during the hospital encounter of 05/24/23    Adult Transthoracic Echo Complete w/ Color, Spectral and Contrast if necessary per protocol    Interpretation Summary    Left ventricular systolic function is severely decreased. Calculated left ventricular EF = 20.4% Left ventricular ejection fraction appears to be 21 - 25%.    The left ventricular cavity is mildly dilated.    Left ventricular diastolic function is consistent with (grade II w/high LAP) pseudonormalization.    Moderately reduced right ventricular systolic function noted.    The left atrial cavity is moderate to severely dilated.    The right atrial cavity is mild to moderately  dilated.    Moderate mitral valve regurgitation is present.    Moderate to severe tricuspid valve regurgitation is present.    Estimated right ventricular systolic pressure from tricuspid regurgitation is normal (<35 mmHg).        Labs:  Lab Results   Component Value Date    CHOL 138 06/06/2023    TRIG 76 06/06/2023    HDL 62 (H) 06/06/2023    LDL 61 06/06/2023    AST 36 06/06/2023    ALT 31 06/06/2023     Lab Results   Component Value Date    HGBA1C 6.00 (H) 06/06/2023     Creatinine   Date Value Ref Range Status   06/13/2023 1.35 (H) 0.76 - 1.27 mg/dL Final   06/06/2023 1.30 0.60 - 1.30 mg/dL Final     Comment:     Serial Number: [id number]:  [id number]   06/06/2023 1.28 (H) 0.76 - 1.27 mg/dL Final   05/24/2023 1.57 (H) 0.76 - 1.27 mg/dL Final   06/02/2021 1.40 (H) 0.60 - 1.30 mg/dL Final     Comment:     Serial Number: [id number]:  [id number]     eGFR Non  Amer   Date Value Ref Range Status   06/17/2021 76 >60 mL/min/1.73 Final   06/16/2021 89 >60 mL/min/1.73 Final   06/15/2021 60 (L) >60 mL/min/1.73 Final         ASSESSMENT:  Problem List Items Addressed This Visit          Cardiac and Vasculature    SSS   (Chronic)    Overview     Dual-chamber permanent pacemaker -Medtronic device  Upgrade to BIV ICD at time of AV node ablation 3/2021          Permanent atrial fibrillation    Overview     CHADsvasc = 4 off Xarelto x 1 year due to bleeding, never been on Eliquis  Echo (1/27/2016): EF 45 to 55%, unchanged from previous echo 2012  Echo (8/27/2019): EF 35 to 40%, mild to moderate MR, mild to moderate TR, RVSP 40 mmHg  MPS (8/26/2019): Normal LV perfusion EF 33%  Implantation of a left atrial appendage occlusive device (Watchman device) 09/25/20 by Dr. Yonny Prado  PORSHA (11/13/2020): With well seated device, EF 35%, mod MR, mild to mod TR, post-procedural ASD with left-to-right flow  Upgrade to BIV ICD and AV Node ablation 3/2021, Dr. Prado         S/P Watchman device    Overview     Implantation of a left atrial appendage occlusive device (Watchman device) 09/25/20 by Dr. Yonny Prado  PORSHA (11/13/2020): With well seated device, EF 35%, mod MR, mild to mod TR, post-procedural ASD with left-to-right flow         Chronic systolic congestive heart failure    Overview     Patient reports normal LHC 10-12 years ago  Echocardiogram 1/27/2016: EF 45 to 55%, unchanged from previous echo 2012  Echocardiogram 8/27/2019: EF 35%, mild to moderate MR, mild to moderate TR, RVSP 40 mmHg  Nuclear stress test 8/26/2019: Normal LV perfusion EF 33%  Upgrade to BIV ICD at time of AV node ablation 3/2021         Coronary artery disease involving coronary bypass graft of native heart without angina pectoris - Primary    Overview     Cardiac cath (6/2/2021): Ostial left main 80% stenosis.  CABG x2 6/3/2021 with Dr. Shields, HARRIS to LAD, SVG to Lcx  6/623: LHC BHL patent LIMA but competitive flow via native LAD, patent SVG-circumflex with no disease, ostial left main 50-70%, minimal 10 to 20% RCA disease.  LVEDP 20 mmHg, medical management for severe systolic heart failure.         Relevant Orders    TSH Rfx On Abnormal To Free T4     Basic Metabolic Panel    XR chest pa and lateral    proBNP    Essential hypertension    S/P CABG x 2 on 6/3/2021    Acute on chronic HFrEF (heart failure with reduced ejection fraction)    Relevant Orders    TSH Rfx On Abnormal To Free T4    Basic Metabolic Panel    XR chest pa and lateral    proBNP       PLAN:  Coronary artery disease  S/p CABG x2 June 2021  [facility] 6/2023 with widely patent grafts, competitive flow in the LAD from native vessel      2.   Acute on chronic systolic heart failure  Echo 2/2023 with EF 21-25%, LVEDP 20mmHg at time of cath 6/2023  Continue Jardiance, Toprol, diuretic     He had hyperkalemia with Entresto but he is also eating a lot of fruit and vegetables discussed importance of low potassium diet repeat BMP today if potassium and kidney function is stable we will start low-dose Entresto half a pill twice a day and repeat a BMP 1 to 2 weeks later.    Check thyroid function and chest x-ray as well today along with proBNP and BMP    He is having worsening NYHA IV heart failure symptoms, may need to think about referral to  advanced heart failure clinic if he does not improve in the next month or 2.    3.  Permanent Afib  S/p AVN ablation and BiV ICD  S/p Watchman device placement      4. Hypertension   Goal blood pressure less than 140/90  Low blood pressure with dizziness, okay to increase hydration     5. Hyperlipidemia  LDL goal less than 70, currently at goal with LDL 61   Continue atorvastatin at current dose     6. Frequent PVCs  Recently started on Amiodarone 200mg daily for suppression   Less PVCs on device interrogation but is having breakthrough A-fib we will discuss with EP        Advance Care Planning   ACP discussion was held with the patient during this visit. Patient has an advance directive (not in EMR), copy requested.          Follow-up   Return in about 4 months (around 12/17/2023).  Pietro Quintana MD, FACC, [facility]  Interventional Cardiology

## 2023-08-17 ENCOUNTER — LAB (OUTPATIENT)
Dept: LAB | Facility: HOSPITAL | Age: 78
End: 2023-08-17
Payer: MEDICARE

## 2023-08-17 ENCOUNTER — HOSPITAL ENCOUNTER (OUTPATIENT)
Dept: GENERAL RADIOLOGY | Facility: HOSPITAL | Age: 78
Discharge: HOME | End: 2023-08-17
Payer: MEDICARE

## 2023-08-17 ENCOUNTER — OFFICE VISIT (OUTPATIENT)
Dept: CARDIOLOGY | Facility: CLINIC | Age: 78
End: 2023-08-17
Payer: MEDICARE

## 2023-08-17 VITALS
BODY MASS INDEX: 21.26 KG/M2 | HEART RATE: 80 BPM | RESPIRATION RATE: 20 BRPM | HEIGHT: 75 IN | OXYGEN SATURATION: 96 % | WEIGHT: 171 LBS | DIASTOLIC BLOOD PRESSURE: 62 MMHG | SYSTOLIC BLOOD PRESSURE: 108 MMHG

## 2023-08-17 DIAGNOSIS — I49.5 SSS (SICK SINUS SYNDROME): Chronic | ICD-10-CM

## 2023-08-17 DIAGNOSIS — I50.22 CHRONIC SYSTOLIC CONGESTIVE HEART FAILURE: ICD-10-CM

## 2023-08-17 DIAGNOSIS — Z95.818 PRESENCE OF WATCHMAN LEFT ATRIAL APPENDAGE CLOSURE DEVICE: ICD-10-CM

## 2023-08-17 DIAGNOSIS — I50.23 ACUTE ON CHRONIC HFREF (HEART FAILURE WITH REDUCED EJECTION FRACTION): ICD-10-CM

## 2023-08-17 DIAGNOSIS — I25.810 CORONARY ARTERY DISEASE INVOLVING CORONARY BYPASS GRAFT OF NATIVE HEART WITHOUT ANGINA PECTORIS: ICD-10-CM

## 2023-08-17 DIAGNOSIS — I48.21 PERMANENT ATRIAL FIBRILLATION: ICD-10-CM

## 2023-08-17 DIAGNOSIS — Z95.1 S/P CABG X 2: ICD-10-CM

## 2023-08-17 DIAGNOSIS — I25.810 CORONARY ARTERY DISEASE INVOLVING CORONARY BYPASS GRAFT OF NATIVE HEART WITHOUT ANGINA PECTORIS: Primary | ICD-10-CM

## 2023-08-17 DIAGNOSIS — I10 ESSENTIAL HYPERTENSION: ICD-10-CM

## 2023-08-17 LAB
ANION GAP SERPL CALCULATED.3IONS-SCNC: 12.8 MMOL/L (ref 5–15)
BUN SERPL-MCNC: 26 MG/DL (ref 8–23)
BUN/CREAT SERPL: 17.7 (ref 7–25)
CALCIUM SPEC-SCNC: 9.9 MG/DL (ref 8.6–10.5)
CHLORIDE SERPL-SCNC: 98 MMOL/L (ref 98–107)
CO2 SERPL-SCNC: 28.2 MMOL/L (ref 22–29)
CREAT SERPL-MCNC: 1.47 MG/DL (ref 0.76–1.27)
EGFRCR SERPLBLD CKD-EPI 2021: 48.8 ML/MIN/1.73
GLUCOSE SERPL-MCNC: 99 MG/DL (ref 65–99)
NT-PROBNP SERPL-MCNC: ABNORMAL PG/ML (ref 0–1800)
POTASSIUM SERPL-SCNC: 4.7 MMOL/L (ref 3.5–5.2)
SODIUM SERPL-SCNC: 139 MMOL/L (ref 136–145)
TSH SERPL DL<=0.05 MIU/L-ACNC: 15.6 UIU/ML (ref 0.27–4.2)

## 2023-08-17 PROCEDURE — 84443 ASSAY THYROID STIM HORMONE: CPT

## 2023-08-17 PROCEDURE — 84439 ASSAY OF FREE THYROXINE: CPT

## 2023-08-17 PROCEDURE — 80048 BASIC METABOLIC PNL TOTAL CA: CPT

## 2023-08-17 PROCEDURE — 36415 COLL VENOUS BLD VENIPUNCTURE: CPT

## 2023-08-17 PROCEDURE — 83880 ASSAY OF NATRIURETIC PEPTIDE: CPT

## 2023-08-17 PROCEDURE — 71046 X-RAY EXAM CHEST 2 VIEWS: CPT

## 2023-08-18 ENCOUNTER — TELEPHONE (OUTPATIENT)
Dept: CARDIOLOGY | Facility: CLINIC | Age: 78
End: 2023-08-18
Payer: MEDICARE

## 2023-08-18 DIAGNOSIS — I42.0 CARDIOMYOPATHY, DILATED: Primary | Chronic | ICD-10-CM

## 2023-08-18 LAB — T4 FREE SERPL-MCNC: 1.42 NG/DL (ref 0.93–1.7)

## 2023-08-18 RX ORDER — SACUBITRIL AND VALSARTAN 24; 26 MG/1; MG/1
0.5 TABLET, FILM COATED ORAL 2 TIMES DAILY
Qty: 90 TABLET | Refills: 3 | Status: SHIPPED | OUTPATIENT
Start: 2023-08-18

## 2023-08-18 NOTE — PROGRESS NOTES
Please inform the patient of their test results.  Please order a CT of the chest without contrast for further evaluation of the left lower lung opacity. Thank you.

## 2023-08-18 NOTE — TELEPHONE ENCOUNTER
Spoke with pt regarding lab results and RDS recommendations. Will mail lab orders to him. He will get them done in [place]. Amendable to start low dose Entresto 1/2 BID. No further questions at this time.

## 2023-08-18 NOTE — TELEPHONE ENCOUNTER
Pt cost for 90-day supply of entresto was $250. He got the pharmacist to do a 20 day supply to be more affordable. He'd like to do a short supply until his BMP labs come back to see if he will continue it. Lab results faxed to PCP to discuss TSH. Advised once cxr results are back I will reach out. Pt verbalized understanding and agreeable to plan

## 2023-08-18 NOTE — TELEPHONE ENCOUNTER
----- Message from Pietro Quintana MD sent at 8/18/2023  8:08 AM EDT -----  Please inform the patient of their test results.  Start low-dose Entresto half a pill twice a day with repeat BMP in 1 week. Thank you.

## 2023-08-18 NOTE — PROGRESS NOTES
Please inform the patient of their test results.  Start low-dose Entresto half a pill twice a day with repeat BMP in 1 week. Thank you.

## 2023-08-21 ENCOUNTER — TELEPHONE (OUTPATIENT)
Dept: CARDIOLOGY | Facility: CLINIC | Age: 78
End: 2023-08-21
Payer: MEDICARE

## 2023-08-21 DIAGNOSIS — R06.02 SOB (SHORTNESS OF BREATH): Primary | ICD-10-CM

## 2023-08-21 DIAGNOSIS — R93.89 ABNORMAL CXR: ICD-10-CM

## 2023-08-21 NOTE — TELEPHONE ENCOUNTER
----- Message from Pietro Quintana MD sent at 8/18/2023  3:56 PM EDT -----  Please inform the patient of their test results.  Please order a CT of the chest without contrast for further evaluation of the left lower lung opacity. Thank you.

## 2023-08-22 ENCOUNTER — TELEPHONE (OUTPATIENT)
Dept: CARDIOLOGY | Facility: CLINIC | Age: 78
End: 2023-08-22
Payer: MEDICARE

## 2023-08-22 NOTE — TELEPHONE ENCOUNTER
Confirmed with pharmacy Jardiance is covered but co pay is $476 for 90 day supply. Went over pt assistance option with the pt, he says he will just try to pay for it. Advised to call me if he should change his mind.

## 2023-08-25 DIAGNOSIS — I50.22 CHRONIC SYSTOLIC CONGESTIVE HEART FAILURE: ICD-10-CM

## 2023-08-25 RX ORDER — METOPROLOL SUCCINATE 25 MG/1
25 TABLET, EXTENDED RELEASE ORAL 2 TIMES DAILY
Qty: 180 TABLET | Refills: 2 | Status: SHIPPED | OUTPATIENT
Start: 2023-08-25

## 2023-08-28 RX ORDER — ALBUTEROL SULFATE 90 UG/1
2 AEROSOL, METERED RESPIRATORY (INHALATION) EVERY 4 HOURS PRN
Qty: 54 G | Refills: 3 | Status: SHIPPED | OUTPATIENT
Start: 2023-08-28

## 2023-08-28 RX ORDER — ALBUTEROL SULFATE 90 UG/1
2 AEROSOL, METERED RESPIRATORY (INHALATION) EVERY 4 HOURS PRN
Qty: 54 G | Refills: 3 | Status: SHIPPED | OUTPATIENT
Start: 2023-08-28 | End: 2023-08-28 | Stop reason: SDUPTHER

## 2023-08-29 ENCOUNTER — TELEPHONE (OUTPATIENT)
Dept: CARDIOLOGY | Facility: CLINIC | Age: 78
End: 2023-08-29
Payer: MEDICARE

## 2023-08-29 NOTE — TELEPHONE ENCOUNTER
Patient called and left a  asking that his prescription not be sent to [facility] as he would like to keep getting medications at local pharmacy. Prescription for Jardiance 10mg daily was resent to local  in [place].     Jenelle Rosario, AlexD

## 2023-08-29 NOTE — TELEPHONE ENCOUNTER
Pt calling to see if labs reflect he may still continue Entresto. He got labs at outside facility. I have requested the results to be faxed to us will forward to NSK to review for Entresto guidance.

## 2023-08-30 NOTE — TELEPHONE ENCOUNTER
Notified pt. He asked me to remove [facility] from his pharmacies and wanted me to call them to cancel prescriptions there. Spoke with [facility] and cancelled cardiac medications. Advised I sent Entresto to [facility] in [place]. Pt verbalized understanding and agreeable to plan

## 2023-08-31 RX ORDER — SACUBITRIL AND VALSARTAN 24; 26 MG/1; MG/1
0.5 TABLET, FILM COATED ORAL 2 TIMES DAILY
Qty: 90 TABLET | Refills: 3 | Status: SHIPPED | OUTPATIENT
Start: 2023-08-31

## 2023-09-01 NOTE — PLAN OF CARE
Problem: Fall Injury Risk  Goal: Absence of Fall and Fall-Related Injury  Outcome: Ongoing, Progressing  Intervention: Promote Injury-Free Environment  Recent Flowsheet Documentation  Taken 6/15/2021 0200 by Radha Kasper RN  Safety Promotion/Fall Prevention:   activity supervised   assistive device/personal items within reach   clutter free environment maintained   fall prevention program maintained   nonskid shoes/slippers when out of bed   safety round/check completed  Taken 6/15/2021 0000 by Radha Kasper RN  Safety Promotion/Fall Prevention:   activity supervised   assistive device/personal items within reach   clutter free environment maintained   fall prevention program maintained   nonskid shoes/slippers when out of bed   safety round/check completed  Taken 6/14/2021 2200 by Radha Kasper RN  Safety Promotion/Fall Prevention:   activity supervised   assistive device/personal items within reach   clutter free environment maintained   fall prevention program maintained   nonskid shoes/slippers when out of bed   safety round/check completed  Taken 6/14/2021 2000 by Radha Kasper RN  Safety Promotion/Fall Prevention:   activity supervised   assistive device/personal items within reach   clutter free environment maintained   fall prevention program maintained   nonskid shoes/slippers when out of bed   safety round/check completed     Problem: Adult Inpatient Plan of Care  Goal: Plan of Care Review  Outcome: Ongoing, Progressing  Goal: Patient-Specific Goal (Individualized)  Outcome: Ongoing, Progressing  Goal: Absence of Hospital-Acquired Illness or Injury  Outcome: Ongoing, Progressing  Intervention: Identify and Manage Fall Risk  Recent Flowsheet Documentation  Taken 6/15/2021 0200 by Radha Kasper RN  Safety Promotion/Fall Prevention:   activity supervised   assistive device/personal items within reach   clutter free environment maintained   fall prevention program maintained   nonskid shoes/slippers  SUBJECTIVE:  Nurse's notes reviewed, confirmed.       Jenny Lomeli is a 35 year old female at 31w6d admitted with pre-eclampsia with severe features.  She is s/p magnesium x 48 hrs and  steroids.  She denies headache, blurred vision, RUQ/epigastric pain.  Reports heartburn resolved with TUMS. Reports elan lezama contractions, no change in vaginal discharge. Reports good Fetal Movement.     OBJECTIVE:         Visit Vitals  /72   Pulse 77   Temp 98.3 °F (36.8 °C) (Oral)   Resp 15   Ht 5' 3\" (1.6 m)   Wt 68 kg   LMP 2023 (Approximate)   SpO2 98%   BMI 26.57 kg/m²          GENERAL:  Well developed, well nourished female in NAD.       ABDOMEN:  Soft, gravid, nontender, no uterine tenderness.         FHTS 140, moderate variability, +accels    MFM U/S 2023 - EFW 1460 (24%ile), BPP 8/8, nml umbilical artery dopplers, cephalic presentation    HELLP Labs 2023:  Creatinine 0.59  AST/ALT       ASSESSMENT:  31w6d with pre-eclampsia with severe features - currently stable.    PLAN:    > FWB reassuring, cat 1, s/p  steroids  and   > Continue labetalol to 500mg TID.  > HELLP labs q72hrs  > Continue Inpt management.     when out of bed   safety round/check completed  Taken 6/15/2021 0000 by Radha Kasper RN  Safety Promotion/Fall Prevention:   activity supervised   assistive device/personal items within reach   clutter free environment maintained   fall prevention program maintained   nonskid shoes/slippers when out of bed   safety round/check completed  Taken 6/14/2021 2200 by Radha Kasper RN  Safety Promotion/Fall Prevention:   activity supervised   assistive device/personal items within reach   clutter free environment maintained   fall prevention program maintained   nonskid shoes/slippers when out of bed   safety round/check completed  Taken 6/14/2021 2000 by Radha Kasper RN  Safety Promotion/Fall Prevention:   activity supervised   assistive device/personal items within reach   clutter free environment maintained   fall prevention program maintained   nonskid shoes/slippers when out of bed   safety round/check completed  Intervention: Prevent Skin Injury  Recent Flowsheet Documentation  Taken 6/15/2021 0200 by Radha Kasper RN  Body Position: position changed independently  Skin Protection:   adhesive use limited   incontinence pads utilized   tubing/devices free from skin contact  Taken 6/15/2021 0000 by Radha Kasper RN  Body Position: position changed independently  Skin Protection:   adhesive use limited   incontinence pads utilized   tubing/devices free from skin contact  Taken 6/14/2021 2200 by Radha Kasper RN  Body Position: position changed independently  Skin Protection:   adhesive use limited   incontinence pads utilized   tubing/devices free from skin contact  Taken 6/14/2021 2000 by Radha Kasper RN  Body Position: position changed independently  Skin Protection:   adhesive use limited   incontinence pads utilized   tubing/devices free from skin contact  Intervention: Prevent Infection  Recent Flowsheet Documentation  Taken 6/14/2021 2200 by Radha Kasper RN  Infection Prevention:   rest/sleep  promoted   personal protective equipment utilized  Goal: Optimal Comfort and Wellbeing  Outcome: Ongoing, Progressing  Intervention: Provide Person-Centered Care  Recent Flowsheet Documentation  Taken 6/14/2021 2000 by Radha Kasper RN  Trust Relationship/Rapport:   care explained   choices provided   thoughts/feelings acknowledged  Goal: Readiness for Transition of Care  Outcome: Ongoing, Progressing   Goal Outcome Evaluation:

## 2023-09-18 ENCOUNTER — TELEPHONE (OUTPATIENT)
Dept: CARDIOLOGY | Facility: CLINIC | Age: 78
End: 2023-09-18
Payer: MEDICARE

## 2023-09-18 RX ORDER — AMIODARONE HYDROCHLORIDE 200 MG/1
200 TABLET ORAL DAILY
Qty: 90 TABLET | Refills: 2 | Status: SHIPPED | OUTPATIENT
Start: 2023-09-18

## 2023-09-18 NOTE — TELEPHONE ENCOUNTER
Patient called requesting a return call.  He needs a refill of amiodarone sent to [facility] pharmacy.  Sent in for patient.

## 2023-09-28 ENCOUNTER — TELEPHONE (OUTPATIENT)
Dept: CARDIOLOGY | Facility: CLINIC | Age: 78
End: 2023-09-28

## 2023-09-28 NOTE — TELEPHONE ENCOUNTER
Caller: Colin Albrecht    Relationship to patient: Self    Best call back number: [phone]    Type of visit: FU    Requested date: ASAP      If rescheduling, when is the original appointment: 2.2.24     Additional notes: PATIENT STATES HE GOT A COVERAGE EXTENSION FROM [employer] AND WOULD LIKE TO FOLLOW UP WITH PROVIDER TO GET DEFIBRILLATOR CHECKED BEFORE HIS INSURANCE RUNS OUT ON 12.21.23.

## 2023-10-03 NOTE — TELEPHONE ENCOUNTER
PT CALLED BACK IN TODAY AND REALLY WANTS TO GET IN TO GET HIS CHECK UP BEFORE HIS INSURANCE RUNS OUT. PLEASE REACH OUT TO SCHEDULE.

## 2023-11-01 ENCOUNTER — OFFICE VISIT (OUTPATIENT)
Dept: CARDIOLOGY | Facility: CLINIC | Age: 78
End: 2023-11-01
Payer: MEDICARE

## 2023-11-01 VITALS
HEIGHT: 75 IN | WEIGHT: 170 LBS | HEART RATE: 82 BPM | OXYGEN SATURATION: 95 % | BODY MASS INDEX: 21.14 KG/M2 | DIASTOLIC BLOOD PRESSURE: 60 MMHG | SYSTOLIC BLOOD PRESSURE: 102 MMHG

## 2023-11-01 DIAGNOSIS — I50.22 CHRONIC SYSTOLIC CONGESTIVE HEART FAILURE: ICD-10-CM

## 2023-11-01 DIAGNOSIS — I25.810 CORONARY ARTERY DISEASE INVOLVING CORONARY BYPASS GRAFT OF NATIVE HEART WITHOUT ANGINA PECTORIS: ICD-10-CM

## 2023-11-01 DIAGNOSIS — I48.21 PERMANENT ATRIAL FIBRILLATION: Primary | ICD-10-CM

## 2023-11-01 DIAGNOSIS — I42.0 CARDIOMYOPATHY, DILATED: Chronic | ICD-10-CM

## 2023-11-01 RX ORDER — LEVOTHYROXINE SODIUM 0.07 MG/1
1 TABLET ORAL DAILY
COMMUNITY
Start: 2023-10-11

## 2023-11-01 NOTE — PROGRESS NOTES
Colin Albrecht  1945  [phone]    11/01/2023    [facility] CARDIOLOGY     Referring Provider: No ref. provider found     Arun Soliman  [address] [place] [address]    Chief Complaint   Patient presents with    Atrial Fibrillation     Problem List:   Permanent Atrial Fibrillation   CHADsvasc = 4 off Xarelto x 1 year due to bleeding, never been on Eliquis  Echocardiogram 1/27/2016: EF 45 to 55%, unchanged from previous echo 2012  Echocardiogram 8/27/2019: EF 35 to 40%, mild to moderate MR, mild to moderate TR, RVSP 40 mmHg  Nuclear stress test 8/26/2019: Normal LV perfusion EF 33%  Implantation of a left atrial appendage occlusive device (Watchman device) 09/25/20 by Dr. Yonny Prado  PORSHA on 11/13/20 with well seated device, EF 35%, mod MR, mild to mod TR, post-procedural ASD with left-to-right flow  Upgrade to BIV ICD and AV Node ablation 3/2021, Dr. Prado  Frequent PVC's  Dilated cardiomyopathy/CHF  Patient reports normal LHC 10-12 years ago  Echocardiogram 1/27/2016: EF 45 to 55%, unchanged from previous echo 2012  Echocardiogram 8/27/2019: EF 35%, mild to moderate MR, mild to moderate TR, RVSP 40 mmHg  Nuclear stress test 8/26/2019: Normal LV perfusion EF 33%  Upgrade to BIV ICD at time of AV node ablation 3/2021   EF 40 % 6/14/2021  Echo 8/2022: EF 35%, mod TR, mild MR, mod IN  Echocardiogram 5/24/2023: EF 21-25%, LA cavity moderately to severely dilated. RA cavity mild to moderately dilated. Moderate MR, moderate to severe TR  Sick sinus syndrome  Dual-chamber permanent pacemaker -Medtronic device  Upgrade to BIV ICD at time of AV node ablation 3/2021   Hypertension  Coronary artery Disease:   6/3/21  CABG x 2 per Dr Cornelius HARRIS-LAD, SVG-circumflex.   LHC 6/6/2023: widely patent grafts with competitive flow in the LAD from native vessel, patent SVG-OM, RCA with minimal disease. LVEDP 20 mmHg  Gastritis/GIB/Peptic Ulcer Disease  2013: GIB requiring PRBCs, EGD showed  bleeding ulcer in the antrum 9/2013, repeat EGD 12/2013 showed healed ulcer  Upper GI Bleeding 3/2019, EGD showed gastric antral ulcer with stigmata or recent bleeding - treated with IV/PO Protonix - Xarelto discontinued  Epistaxis - occurred on Xarelto  Stage III CKD   Surgical History:  none    Allergies  No Known Allergies    Current Medications    Current Outpatient Medications:     albuterol sulfate  (90 Base) MCG/ACT inhaler, Inhale 2 puffs Every 4 (Four) Hours As Needed for Wheezing., Disp: 54 g, Rfl: 3    amiodarone (PACERONE) 200 MG tablet, Take 1 tablet by mouth Daily. Take twice a day for one week than once a day., Disp: 90 tablet, Rfl: 2    aspirin 81 MG EC tablet, Take 1 tablet by mouth Daily., Disp: , Rfl:     atorvastatin (LIPITOR) 40 MG tablet, TAKE 1 TABLET BY MOUTH EVERY NIGHT., Disp: 90 tablet, Rfl: 3    Coenzyme Q10 (CoQ10) 100 MG capsule, Take 1 capsule by mouth Daily. OTC, Disp: , Rfl:     empagliflozin (JARDIANCE) 10 MG tablet tablet, Take 1 tablet by mouth Daily., Disp: 90 tablet, Rfl: 1    escitalopram (LEXAPRO) 10 MG tablet, Take 1 tablet by mouth Daily., Disp: , Rfl:     furosemide (LASIX) 40 MG tablet, Take 1 tablet by mouth Daily., Disp: 90 tablet, Rfl: 1    levothyroxine (SYNTHROID, LEVOTHROID) 75 MCG tablet, Take 1 tablet by mouth Daily., Disp: , Rfl:     metoprolol succinate XL (TOPROL-XL) 25 MG 24 hr tablet, Take 1 tablet by mouth 2 (Two) Times a Day., Disp: 180 tablet, Rfl: 2    multivitamin with minerals tablet tablet, Take 1 tablet by mouth Daily., Disp: , Rfl:     polyethylene glycol (MIRALAX) 17 GM/SCOOP powder, DISSOLVE 1 CAPFUL IN 8 OUNCES OF LIQUID AND DRINK ONCE DAILY, Disp: , Rfl:     sacubitril-valsartan (Entresto) 24-26 MG tablet, Take 0.5 tablets by mouth 2 (Two) Times a Day., Disp: 90 tablet, Rfl: 3    tiotropium bromide-olodaterol (STIOLTO RESPIMAT) 2.5-2.5 MCG/ACT aerosol solution inhaler, Inhale 2 puffs Daily., Disp: 3 each, Rfl: 3    traZODone (DESYREL) 50 MG  "tablet, TAKE 1 TABLET BY MOUTH EVERY DAY AT BEDTIME FOR INSOMNIA, Disp: , Rfl:     History of Present Illness     Pt presents for follow up of AF/SSS/CHF/DCM/HTN. Since we last saw the pt, he was doing very poorly from May 2023-August 2023. He had repeat LHC 6/2023 which showed patent grafts. His diuretic was adjusted. He has been on Amiodarone since April 2023 for treatment of his PVCs. He restarted his entresto recently and is now feeling well. He states he is feeling much better. He denies orthopnea, PND. He is doing a lot more walking, going to stores.  He denies CP, syncope.  Denies any hospitalizations, ER visits, bleeding, or TIA/CVA symptoms. Overall feels well. No CVA like symptoms.     ROS:  General:  + fatigue, - weight gain or loss  Cardiovascular:  Denies CP, PND, syncope, near syncope, edema or palpitations.  Pulmonary:  Denies TREVIZO, cough, or wheezing      Vitals:    11/01/23 1438   BP: 102/60   BP Location: Left arm   Patient Position: Sitting   Pulse: 82   SpO2: 95%   Weight: 77.1 kg (170 lb)   Height: 190.5 cm (75\")     Body mass index is 21.25 kg/m².  PE:  General: NAD  Neck: no JVD, no carotid bruits, no TM  Heart RRR, NL S1, S2, S4 present, no rubs, murmurs  Lungs: CTA, no wheezes, rhonchi, or rales  Abd: soft, non-tender, NL BS  Ext: No musculoskeletal deformities, no edema, cyanosis, or clubbing  Psych: normal mood and affect    Diagnostic Data:    BiV ICD Manual Interrogation: normal function. BiV paced 83%. 100% AFIB. Normal thresholds and impedances. 2.6 years on battery.       ECG 12 Lead    Date/Time: 11/1/2023 2:49 PM  Performed by: Yonny Prado MD    Authorized by: Yonny Prado MD  Comparison: compared with previous ECG from 5/18/2023  Similar to previous ECG  Rhythm: paced  Ectopy: unifocal PVCs  BPM: 89               1. Permanent atrial fibrillation    2. Dilated CM     3. Chronic systolic congestive heart failure    4. Coronary artery disease involving coronary bypass " graft of native heart without angina pectoris          Plan:  1. Permanent atrial fibrillation:  - s/p AVN RFA and upgrade to BiV ICD 3/2021  - s/p Watchman Device on ASA       2. Dilated Cardiomyopathy/CHF class II symptoms - overall improved.   - BiV pacing  -Echocardiogram 5/24/2023: EF 21-25%, LA cavity moderately to severely dilated. RA cavity mild to moderately dilated. Moderate MR, moderate to severe TR  - Currently patient is on Toprol and Entresto, Jardiance.   He is unable to titrate Entresto due to creatinine level and hyperkalemia.     3. CAD:  - s/p CABG x 2 6/3/2021, currently on aspirin and statin therapy.   - follows with Dr. Quintana as well. Recent LHC 6/2023 with patent grafts.       4. Frequent PVC's:   - started on Amiodarone 4/2023, improved symptoms. Needs thyroid and liver function tests. Will check labwork he has had done and if not, will order TSH/CMP.        F/up in 6 months    Electronically signed by ETIENNE Maxwell, 11/01/23, 3:09 PM EDT.

## 2024-01-05 ENCOUNTER — OFFICE VISIT (OUTPATIENT)
Dept: PULMONOLOGY | Facility: CLINIC | Age: 79
End: 2024-01-05
Payer: MEDICARE

## 2024-01-05 VITALS
TEMPERATURE: 97.8 F | DIASTOLIC BLOOD PRESSURE: 72 MMHG | OXYGEN SATURATION: 100 % | BODY MASS INDEX: 20.64 KG/M2 | HEART RATE: 61 BPM | HEIGHT: 75 IN | WEIGHT: 166 LBS | SYSTOLIC BLOOD PRESSURE: 114 MMHG

## 2024-01-05 DIAGNOSIS — J44.9 CHRONIC OBSTRUCTIVE PULMONARY DISEASE, UNSPECIFIED COPD TYPE: Primary | Chronic | ICD-10-CM

## 2024-01-05 DIAGNOSIS — I50.9 PLEURAL EFFUSION DUE TO CONGESTIVE HEART FAILURE: ICD-10-CM

## 2024-01-05 DIAGNOSIS — R06.02 SOB (SHORTNESS OF BREATH): ICD-10-CM

## 2024-01-05 PROBLEM — N17.9 ACUTE-ON-CHRONIC KIDNEY INJURY: Status: RESOLVED | Noted: 2021-06-04 | Resolved: 2024-01-05

## 2024-01-05 PROBLEM — K92.2 GI BLEED: Status: RESOLVED | Noted: 2020-07-29 | Resolved: 2024-01-05

## 2024-01-05 PROBLEM — I82.629 ACUTE DEEP VEIN THROMBOSIS (DVT) OF UPPER EXTREMITY: Status: RESOLVED | Noted: 2021-06-30 | Resolved: 2024-01-05

## 2024-01-05 PROBLEM — K92.2 GASTROINTESTINAL HEMORRHAGE: Status: RESOLVED | Noted: 2021-06-14 | Resolved: 2024-01-05

## 2024-01-05 PROBLEM — N18.9 ACUTE-ON-CHRONIC KIDNEY INJURY: Status: RESOLVED | Noted: 2021-06-04 | Resolved: 2024-01-05

## 2024-01-05 PROBLEM — I50.23 ACUTE ON CHRONIC HFREF (HEART FAILURE WITH REDUCED EJECTION FRACTION): Status: RESOLVED | Noted: 2023-06-06 | Resolved: 2024-01-05

## 2024-01-05 PROCEDURE — 1159F MED LIST DOCD IN RCRD: CPT | Performed by: INTERNAL MEDICINE

## 2024-01-05 PROCEDURE — 3074F SYST BP LT 130 MM HG: CPT | Performed by: INTERNAL MEDICINE

## 2024-01-05 PROCEDURE — 1160F RVW MEDS BY RX/DR IN RCRD: CPT | Performed by: INTERNAL MEDICINE

## 2024-01-05 PROCEDURE — 3078F DIAST BP <80 MM HG: CPT | Performed by: INTERNAL MEDICINE

## 2024-01-05 PROCEDURE — 99214 OFFICE O/P EST MOD 30 MIN: CPT | Performed by: INTERNAL MEDICINE

## 2024-01-05 RX ORDER — HYDROXYZINE PAMOATE 25 MG/1
CAPSULE ORAL
COMMUNITY
Start: 2023-10-10

## 2024-01-05 RX ORDER — TEMAZEPAM 15 MG/1
CAPSULE ORAL
COMMUNITY
Start: 2023-12-12

## 2024-01-05 RX ORDER — TAMSULOSIN HYDROCHLORIDE 0.4 MG/1
1 CAPSULE ORAL DAILY
COMMUNITY
Start: 2023-09-28

## 2024-01-05 NOTE — PROGRESS NOTES
Subjective:     Chief Complaint:   Chief Complaint   Patient presents with    Chronic obstructive pulmonary disease, unspecified COPD type     Follow up       HPI:    Colin Albrecht is a 78 y.o. male here for follow-up of shortness of breath    I saw him in initial consultation in June 2023 for dyspnea an abnormal FTs.  He had a mixed obstructive and restrictive defect on his PFTs.  Chest x-ray revealed loculated pleural fluid on the left.    I placed him on Stiolto and albuterol as well as oxygen with exertion at night.  Since he has seen me he has markedly better.  In fact he uses the inhalers on more of an as-needed basis.  He was placed back on Entresto and attributes that to his improvement.  He also remains on Jardiance and loop diuretics    Current medications are:   Current Outpatient Medications:     albuterol sulfate  (90 Base) MCG/ACT inhaler, Inhale 2 puffs Every 4 (Four) Hours As Needed for Wheezing., Disp: 54 g, Rfl: 3    amiodarone (PACERONE) 200 MG tablet, Take 1 tablet by mouth Daily. Take twice a day for one week than once a day., Disp: 90 tablet, Rfl: 2    aspirin 81 MG EC tablet, Take 1 tablet by mouth Daily., Disp: , Rfl:     atorvastatin (LIPITOR) 40 MG tablet, TAKE 1 TABLET BY MOUTH EVERY NIGHT., Disp: 90 tablet, Rfl: 3    Coenzyme Q10 (CoQ10) 100 MG capsule, Take 1 capsule by mouth Daily. OTC, Disp: , Rfl:     empagliflozin (JARDIANCE) 10 MG tablet tablet, Take 1 tablet by mouth Daily., Disp: 90 tablet, Rfl: 1    escitalopram (LEXAPRO) 10 MG tablet, Take 1 tablet by mouth Daily., Disp: , Rfl:     furosemide (LASIX) 40 MG tablet, Take 1 tablet by mouth Daily., Disp: 90 tablet, Rfl: 1    hydrOXYzine pamoate (VISTARIL) 25 MG capsule, , Disp: , Rfl:     levothyroxine (SYNTHROID, LEVOTHROID) 75 MCG tablet, Take 1 tablet by mouth Daily., Disp: , Rfl:     metoprolol succinate XL (TOPROL-XL) 25 MG 24 hr tablet, Take 1 tablet by mouth 2 (Two) Times a Day., Disp: 180 tablet, Rfl: 2     multivitamin with minerals tablet tablet, Take 1 tablet by mouth Daily., Disp: , Rfl:     polyethylene glycol (MIRALAX) 17 GM/SCOOP powder, DISSOLVE 1 CAPFUL IN 8 OUNCES OF LIQUID AND DRINK ONCE DAILY, Disp: , Rfl:     sacubitril-valsartan (Entresto) 24-26 MG tablet, Take 0.5 tablets by mouth 2 (Two) Times a Day., Disp: 90 tablet, Rfl: 3    tamsulosin (FLOMAX) 0.4 MG capsule 24 hr capsule, Take 1 capsule by mouth Daily., Disp: , Rfl:     temazepam (RESTORIL) 15 MG capsule, TAKE 1 CAPSULE BY MOUTH EVERY DAY AT BEDTIME AS NEEDED FOR SLEEP, Disp: , Rfl:     tiotropium bromide-olodaterol (STIOLTO RESPIMAT) 2.5-2.5 MCG/ACT aerosol solution inhaler, Inhale 2 puffs Daily., Disp: 3 each, Rfl: 3    traZODone (DESYREL) 50 MG tablet, TAKE 1 TABLET BY MOUTH EVERY DAY AT BEDTIME FOR INSOMNIA, Disp: , Rfl: .      The patient's relevant past medical, surgical, family and social history were reviewed and updated in Epic as appropriate.     ROS:    Review of Systems  ROS as documented in patient questionnaire unless as noted otherwise    Objective:    Physical Exam  Vitals reviewed.   Constitutional:       Appearance: He is well-developed.   HENT:      Head: Normocephalic and atraumatic.      Mouth/Throat:      Mouth: Mucous membranes are moist.      Pharynx: Oropharynx is clear.   Neck:      Thyroid: No thyromegaly.   Cardiovascular:      Rate and Rhythm: Normal rate. Rhythm irregular.      Heart sounds: No murmur heard.     No friction rub. No gallop.   Pulmonary:      Effort: Pulmonary effort is normal. No respiratory distress.      Breath sounds: No wheezing or rales.   Musculoskeletal:      Cervical back: Neck supple.   Skin:     General: Skin is warm and dry.   Neurological:      Mental Status: He is alert and oriented to person, place, and time.   Psychiatric:         Behavior: Behavior normal.         Thought Content: Thought content normal.         Data:    CXR:  Median sternotomy.  Multilead pacemaker.  Significant  improvement in loculated left pleural fluid.  Now just some residual pleural thickening on the left.    PFT: 6/30/2023: Mixed obstructive and restrictive defect with decreased diffusion    Assessment:    Problem List Items Addressed This Visit          Pulmonary Problems    COPD  - Primary (Chronic)    Relevant Orders    XR Chest PA & Lateral    SOB (shortness of breath)    Overview     Added automatically from request for surgery [id number]         Pleural effusion due to congestive heart failure    Overview     Loculated on left              COPD: Continue Stiolto and as needed albuterol  Hypoxemia: 2 L pulsed with exertion and 2 L nightly  Congestive heart failure: Better compensated currently with significant reduction in the left pleural effusion.  This has nearly resolved compared to August.  This has an unusual appearance to the loculation.  I suspect this may be due to his prior chest surgery with pleural scarring.    Plan:    Continue Stiolto 2 inhalations daily  Albuterol as needed  Oxygen as needed during the day and nightly  Management of CHF per cardiology  6-month follow-up with chest x-ray    Level of Risk Moderate due to: prescription drug management    Discussed in detail with the patient.  He will call prior to his follow up visit for any new problems.    Signed by  Gerald Wei MD

## 2024-01-30 RX ORDER — METOPROLOL SUCCINATE 25 MG/1
25 TABLET, EXTENDED RELEASE ORAL 2 TIMES DAILY
Qty: 60 TABLET | Refills: 5 | Status: SHIPPED | OUTPATIENT
Start: 2024-01-30

## 2024-05-06 RX ORDER — SACUBITRIL AND VALSARTAN 24; 26 MG/1; MG/1
0.5 TABLET, FILM COATED ORAL 2 TIMES DAILY
Qty: 90 TABLET | Refills: 2 | Status: SHIPPED | OUTPATIENT
Start: 2024-05-06

## 2024-07-17 ENCOUNTER — HOSPITAL ENCOUNTER (OUTPATIENT)
Dept: GENERAL RADIOLOGY | Facility: HOSPITAL | Age: 79
Discharge: HOME | End: 2024-07-17
Admitting: INTERNAL MEDICINE
Payer: MEDICARE

## 2024-07-17 ENCOUNTER — OFFICE VISIT (OUTPATIENT)
Dept: CARDIOLOGY | Facility: CLINIC | Age: 79
End: 2024-07-17
Payer: MEDICARE

## 2024-07-17 VITALS
SYSTOLIC BLOOD PRESSURE: 124 MMHG | HEIGHT: 75 IN | DIASTOLIC BLOOD PRESSURE: 66 MMHG | BODY MASS INDEX: 21.96 KG/M2 | WEIGHT: 176.6 LBS | HEART RATE: 78 BPM | OXYGEN SATURATION: 96 %

## 2024-07-17 DIAGNOSIS — I48.21 PERMANENT ATRIAL FIBRILLATION: ICD-10-CM

## 2024-07-17 DIAGNOSIS — I49.5 SSS (SICK SINUS SYNDROME): Chronic | ICD-10-CM

## 2024-07-17 DIAGNOSIS — Z95.818 PRESENCE OF WATCHMAN LEFT ATRIAL APPENDAGE CLOSURE DEVICE: ICD-10-CM

## 2024-07-17 DIAGNOSIS — I25.810 CORONARY ARTERY DISEASE INVOLVING CORONARY BYPASS GRAFT OF NATIVE HEART WITHOUT ANGINA PECTORIS: ICD-10-CM

## 2024-07-17 DIAGNOSIS — I50.22 CHRONIC SYSTOLIC CONGESTIVE HEART FAILURE: ICD-10-CM

## 2024-07-17 DIAGNOSIS — I42.0 CARDIOMYOPATHY, DILATED: Chronic | ICD-10-CM

## 2024-07-17 DIAGNOSIS — I49.3 PVC (PREMATURE VENTRICULAR CONTRACTION): ICD-10-CM

## 2024-07-17 DIAGNOSIS — J44.9 CHRONIC OBSTRUCTIVE PULMONARY DISEASE, UNSPECIFIED COPD TYPE: Chronic | ICD-10-CM

## 2024-07-17 DIAGNOSIS — R06.02 SOB (SHORTNESS OF BREATH): Primary | ICD-10-CM

## 2024-07-17 DIAGNOSIS — R06.02 SHORTNESS OF BREATH: Primary | ICD-10-CM

## 2024-07-17 PROCEDURE — 71046 X-RAY EXAM CHEST 2 VIEWS: CPT

## 2024-07-17 RX ORDER — ROPINIROLE 0.5 MG/1
0.5 TABLET, FILM COATED ORAL
COMMUNITY
Start: 2024-06-24

## 2024-07-17 NOTE — PROGRESS NOTES
Colin Albrecht  1945  [phone]    07/17/2024    [facility] CARDIOLOGY     Referring Provider: No ref. provider found     Arun Soliman MD  [address] [place] [address]    Chief Complaint   Patient presents with    Permanent atrial fibrillation       Problem List:     Permanent Atrial Fibrillation   CHADsvasc = 4 off Xarelto x 1 year due to bleeding, never been on Eliquis  Echocardiogram 1/27/2016: EF 45 to 55%, unchanged from previous echo 2012  Echocardiogram 8/27/2019: EF 35 to 40%, mild to moderate MR, mild to moderate TR, RVSP 40 mmHg  Nuclear stress test 8/26/2019: Normal LV perfusion EF 33%  Implantation of a left atrial appendage occlusive device (Watchman device) 09/25/20 by Dr. Yonny Prado  PORSHA on 11/13/20 with well seated device, EF 35%, mod MR, mild to mod TR, post-procedural ASD with left-to-right flow  Upgrade to BIV ICD and AV Node ablation 3/2021, Dr. Prado  Echo 5/24/2023 LVEF 21-25%  Frequent PVC's  Dilated cardiomyopathy/CHF  Patient reports normal LHC 10-12 years ago  Echocardiogram 1/27/2016: EF 45 to 55%, unchanged from previous echo 2012  Echocardiogram 8/27/2019: EF 35%, mild to moderate MR, mild to moderate TR, RVSP 40 mmHg  Nuclear stress test 8/26/2019: Normal LV perfusion EF 33%  Upgrade to BIV ICD at time of AV node ablation 3/2021   EF 40 % 6/14/2021  Echo 8/2022: EF 35%, mod TR, mild MR, mod NH  Echocardiogram 5/24/2023: EF 21-25%, LA cavity moderately to severely dilated. RA cavity mild to moderately dilated. Moderate MR, moderate to severe TR  Sick sinus syndrome  Dual-chamber permanent pacemaker -Medtronic device  Upgrade to BIV ICD at time of AV node ablation 3/2021   Hypertension  Coronary artery Disease:   6/3/21  CABG x 2 per Dr Cornelius HARRIS-LAD, SVG-circumflex.   LHC 6/6/2023: widely patent grafts with competitive flow in the LAD from native vessel, patent SVG-OM, RCA with minimal disease. LVEDP 20 mmHg  Gastritis/GIB/Peptic Ulcer  Disease  2013: GIB requiring PRBCs, EGD showed bleeding ulcer in the antrum 9/2013, repeat EGD 12/2013 showed healed ulcer  Upper GI Bleeding 3/2019, EGD showed gastric antral ulcer with stigmata or recent bleeding - treated with IV/PO Protonix - Xarelto discontinued  Epistaxis - occurred on Xarelto  Stage III CKD   Surgical History:  none     Allergies  No Known Allergies    Current Medications    Current Outpatient Medications:     albuterol sulfate  (90 Base) MCG/ACT inhaler, Inhale 2 puffs Every 4 (Four) Hours As Needed for Wheezing., Disp: 54 g, Rfl: 3    amiodarone (PACERONE) 200 MG tablet, Take 1 tablet by mouth Daily. Take twice a day for one week than once a day., Disp: 90 tablet, Rfl: 2    aspirin 81 MG EC tablet, Take 1 tablet by mouth Daily., Disp: , Rfl:     atorvastatin (LIPITOR) 40 MG tablet, TAKE 1 TABLET BY MOUTH EVERY NIGHT., Disp: 90 tablet, Rfl: 3    Coenzyme Q10 (CoQ10) 100 MG capsule, Take 1 capsule by mouth Daily. OTC, Disp: , Rfl:     empagliflozin (JARDIANCE) 10 MG tablet tablet, Take 1 tablet by mouth Daily., Disp: 90 tablet, Rfl: 2    furosemide (LASIX) 40 MG tablet, Take 1 tablet by mouth Daily., Disp: 90 tablet, Rfl: 1    levothyroxine (SYNTHROID, LEVOTHROID) 75 MCG tablet, Take 1 tablet by mouth Daily., Disp: , Rfl:     metoprolol succinate XL (TOPROL-XL) 25 MG 24 hr tablet, Take 1 tablet by mouth twice daily, Disp: 60 tablet, Rfl: 5    multivitamin with minerals tablet tablet, Take 1 tablet by mouth Daily., Disp: , Rfl:     O2 (OXYGEN), Inhale 1 L/min Every Night., Disp: , Rfl:     polyethylene glycol (MIRALAX) 17 GM/SCOOP powder, DISSOLVE 1 CAPFUL IN 8 OUNCES OF LIQUID AND DRINK ONCE DAILY, Disp: , Rfl:     rOPINIRole (REQUIP) 0.5 MG tablet, Take 1 tablet by mouth every night at bedtime., Disp: , Rfl:     sacubitril-valsartan (Entresto) 24-26 MG tablet, Take 0.5 tablets by mouth 2 (Two) Times a Day., Disp: 90 tablet, Rfl: 2    temazepam (RESTORIL) 15 MG capsule, TAKE 1 CAPSULE  "BY MOUTH EVERY DAY AT BEDTIME AS NEEDED FOR SLEEP, Disp: , Rfl:     tiotropium bromide-olodaterol (STIOLTO RESPIMAT) 2.5-2.5 MCG/ACT aerosol solution inhaler, Inhale 2 puffs Daily., Disp: 3 each, Rfl: 3    traZODone (DESYREL) 50 MG tablet, TAKE 1 TABLET BY MOUTH EVERY DAY AT BEDTIME FOR INSOMNIA, Disp: , Rfl:     tamsulosin (FLOMAX) 0.4 MG capsule 24 hr capsule, Take 1 capsule by mouth Daily. (Patient not taking: Reported on 7/17/2024), Disp: , Rfl:     History of Present Illness     Pt presents for follow up of CHF/AF/PVC/DCM/HTN. Since we last saw the pt, pt states that he has had progressive shortness of breath at rest and with exertion over the past few months.  He is also complained of increased yellow phlegm and inability to catch a deep breath.  No active chest pain.  No near-syncope or syncope.  Unaware of any sustained ventricular arrhythmias.  Blood pressure and heart rates have been stable.  States he is using inhaler but does not seem to get better.      Vitals:    07/17/24 1444   BP: 124/66   BP Location: Right arm   Patient Position: Sitting   Cuff Size: Adult   Pulse: 78   SpO2: 96%   Weight: 80.1 kg (176 lb 9.6 oz)   Height: 190.5 cm (75\")     Body mass index is 22.07 kg/m².  PE:  General: NAD  Neck: no JVD, no carotid bruits, no TM  Heart RRR, NL S1, S2, S3 is present.  MR murmur present as well as TR murmur.  Lungs: Diffuse expiratory wheezes noted today.  Abd: soft, non-tender, NL BS  Ext: No musculoskeletal deformities, no edema, cyanosis, or clubbing  Psych: normal mood and affect    Diagnostic Data:    BiV ICD Manual Interrogation: normal function. BiV paced 88%. 100% AFIB. Normal thresholds and impedances. 1.6 years on battery.       ECG 12 Lead    Date/Time: 7/17/2024 3:12 PM  Performed by: Yonny Prado MD    Authorized by: Yonny Prado MD  Comparison: compared with previous ECG from 11/2/2023  Comparison to previous ECG: PVCs resolved.  Rhythm: atrial fibrillation and paced  BPM: " 80               1. SOB (shortness of breath)    2. Permanent atrial fibrillation    3. S/P Watchman device    4. Chronic systolic congestive heart failure    5. Coronary artery disease involving coronary bypass graft of native heart without angina pectoris    6. PVC (premature ventricular contraction)          Plan:    1.  Worsening shortness of breath at rest and both with exertion multifactor Toradol in nature.  At this point we will discontinue amiodarone due to the potential chance of worsening his underlying COPD.  Will pursue PA and lateral chest x-ray today.  Will have him follow-up with Dr. Wei, as soon as possible for pulmonary function test to determine if steroid therapy is necessary.     2.  Permanent atrial fibrillation:  - s/p AVN RFA and upgrade to BiV ICD 3/2021  - s/p Watchman Device on ASA        3. Dilated Cardiomyopathy/CHF class II symptoms - overall improved.   - BiV pacing  -Echocardiogram 5/24/2023: EF 21-25%, LA cavity moderately to severely dilated. RA cavity mild to moderately dilated. Moderate MR, moderate to severe TR  - Currently patient is on Toprol and Entresto, Jardiance.   He is unable to titrate Entresto due to creatinine level and hyperkalemia.     4. CAD:  - s/p CABG x 2 6/3/2021, currently on aspirin and statin therapy.   - follows with Dr. Quintana as well. Recent [facility] 6/2023 with patent grafts.        5. Frequent PVC's:   - started on Amiodarone 4/2023, improved symptoms.  See #1.  Will discontinue amiodarone at this time.  Follow clinically.       F/up in 6 months

## 2024-08-09 ENCOUNTER — TELEPHONE (OUTPATIENT)
Dept: CARDIOLOGY | Facility: CLINIC | Age: 79
End: 2024-08-09
Payer: MEDICARE

## 2024-08-09 ENCOUNTER — OFFICE VISIT (OUTPATIENT)
Dept: PULMONOLOGY | Facility: CLINIC | Age: 79
End: 2024-08-09
Payer: MEDICARE

## 2024-08-09 VITALS
DIASTOLIC BLOOD PRESSURE: 76 MMHG | BODY MASS INDEX: 22.01 KG/M2 | OXYGEN SATURATION: 96 % | TEMPERATURE: 97.8 F | HEIGHT: 75 IN | SYSTOLIC BLOOD PRESSURE: 128 MMHG | WEIGHT: 177 LBS | HEART RATE: 66 BPM

## 2024-08-09 DIAGNOSIS — I50.22 CHRONIC SYSTOLIC CONGESTIVE HEART FAILURE: ICD-10-CM

## 2024-08-09 DIAGNOSIS — R06.02 SOB (SHORTNESS OF BREATH): ICD-10-CM

## 2024-08-09 DIAGNOSIS — J44.9 CHRONIC OBSTRUCTIVE PULMONARY DISEASE, UNSPECIFIED COPD TYPE: Primary | Chronic | ICD-10-CM

## 2024-08-09 DIAGNOSIS — Z99.81 DEPENDENCE ON SUPPLEMENTAL OXYGEN: ICD-10-CM

## 2024-08-09 DIAGNOSIS — I50.9 PLEURAL EFFUSION DUE TO CONGESTIVE HEART FAILURE: ICD-10-CM

## 2024-08-09 NOTE — PROGRESS NOTES
Subjective:     Chief Complaint:   Chief Complaint   Patient presents with    COPD     F/u       HPI:    Colin Albrecht is a 79 y.o. male here for follow-up of shortness of breath    I saw him in initial consultation in June 2023 for dyspnea an abnormal FTs.  He had a mixed obstructive and restrictive defect on his PFTs.  Chest x-ray revealed loculated pleural fluid on the left.    I placed him on Stiolto and albuterol as well as oxygen with exertion at night.      His chest x-ray significantly improved regarding the pleural fluid.  However, he has been more dyspneic over the last several months.  Dr. Prado was concerned about his amiodarone at the time of his last visit and discontinued that.  His chest x-ray actually was slightly improved with reduction in the left pleural thickening/fluid.    He continues to use his oxygen primarily at night and as needed during the day with exertion.    He notes no increased sputum production.  He notes no fevers or chills.  He has no significant unusual weight loss.    Current medications are:   Current Outpatient Medications:     albuterol sulfate  (90 Base) MCG/ACT inhaler, Inhale 2 puffs Every 4 (Four) Hours As Needed for Wheezing., Disp: 54 g, Rfl: 3    aspirin 81 MG EC tablet, Take 1 tablet by mouth Daily., Disp: , Rfl:     atorvastatin (LIPITOR) 40 MG tablet, TAKE 1 TABLET BY MOUTH EVERY NIGHT., Disp: 90 tablet, Rfl: 3    Coenzyme Q10 (CoQ10) 100 MG capsule, Take 1 capsule by mouth Daily. OTC, Disp: , Rfl:     empagliflozin (JARDIANCE) 10 MG tablet tablet, Take 1 tablet by mouth Daily., Disp: 90 tablet, Rfl: 2    furosemide (LASIX) 40 MG tablet, Take 1 tablet by mouth Daily., Disp: 90 tablet, Rfl: 1    levothyroxine (SYNTHROID, LEVOTHROID) 75 MCG tablet, Take 1 tablet by mouth Daily., Disp: , Rfl:     metoprolol succinate XL (TOPROL-XL) 25 MG 24 hr tablet, Take 1 tablet by mouth twice daily, Disp: 60 tablet, Rfl: 5    multivitamin with minerals tablet  tablet, Take 1 tablet by mouth Daily., Disp: , Rfl:     O2 (OXYGEN), Inhale 1 L/min Every Night., Disp: , Rfl:     polyethylene glycol (MIRALAX) 17 GM/SCOOP powder, DISSOLVE 1 CAPFUL IN 8 OUNCES OF LIQUID AND DRINK ONCE DAILY, Disp: , Rfl:     rOPINIRole (REQUIP) 0.5 MG tablet, Take 1 tablet by mouth every night at bedtime., Disp: , Rfl:     sacubitril-valsartan (Entresto) 24-26 MG tablet, Take 0.5 tablets by mouth 2 (Two) Times a Day., Disp: 90 tablet, Rfl: 2    temazepam (RESTORIL) 15 MG capsule, TAKE 1 CAPSULE BY MOUTH EVERY DAY AT BEDTIME AS NEEDED FOR SLEEP, Disp: , Rfl:     tiotropium bromide-olodaterol (STIOLTO RESPIMAT) 2.5-2.5 MCG/ACT aerosol solution inhaler, Inhale 2 puffs Daily., Disp: 3 each, Rfl: 3    traZODone (DESYREL) 50 MG tablet, TAKE 1 TABLET BY MOUTH EVERY DAY AT BEDTIME FOR INSOMNIA, Disp: , Rfl: .      The patient's relevant past medical, surgical, family and social history were reviewed and updated in Epic as appropriate.     ROS:    Review of Systems  ROS as documented in patient questionnaire unless as noted otherwise    Objective:    Physical Exam  Vitals reviewed.   Constitutional:       Appearance: He is well-developed.   HENT:      Head: Normocephalic and atraumatic.      Mouth/Throat:      Mouth: Mucous membranes are moist.      Pharynx: Oropharynx is clear.   Neck:      Thyroid: No thyromegaly.   Cardiovascular:      Rate and Rhythm: Normal rate. Rhythm irregular.      Heart sounds: No murmur heard.     No friction rub. No gallop.   Pulmonary:      Effort: Pulmonary effort is normal. No respiratory distress.      Breath sounds: Rales present. No wheezing.      Comments: Mild basilar scattered crackles  Musculoskeletal:      Cervical back: Neck supple.   Skin:     General: Skin is warm and dry.   Neurological:      Mental Status: He is alert and oriented to person, place, and time.   Psychiatric:         Behavior: Behavior normal.         Thought Content: Thought content normal.          Data:    CXR:  Median sternotomy.  Multilead pacemaker.  Significant improvement in loculated left pleural fluid.  This has completely resolved for the most part.    PFT:  Mixed obstructive and restrictive defect with decreased diffusion.  This is actually improved today slightly compared to June 2023    Assessment:    Problem List Items Addressed This Visit          Pulmonary Problems    COPD  - Primary (Chronic)    Relevant Orders    Spirometry with Diffusion Capacity & Lung Volumes (Completed)    SOB (shortness of breath)    Dependence on supplemental oxygen    RESOLVED: Pleural effusion due to congestive heart failure    Overview     Loculated on left            Other    Chronic systolic congestive heart failure         COPD: I suspect a combination of his heart failure and his COPD are the main drivers of his dyspnea.  I do not see any evidence of interstitial lung disease or reduction in volumes on his PFTs that would go along with amiodarone pulmonary toxicity though this has been discontinued for about a month.  I would like to switch him from Stiolto to Trelegy 200 on a trial basis.  Hypoxemia: Continue 2 L pulsed with exertion and 2 L nightly  Congestive heart failure: Better compensated currently with significant reduction in the left pleural effusion.  This has nearly resolved compared to August.  His x-ray is better than his most recent comparisons.  This has an unusual appearance to the loculation.  I suspect this may be due to his prior chest surgery with pleural scarring.    Plan:    Add Trelegy 200 daily in lieu of the Stiolto  Albuterol as needed  Oxygen as needed during the day and nightly.  The dose is 2 L.  He continues to benefit from this and should still use it.  He will call me when his Trelegy samples ran out in a month and I can prescribe it for him if it helps.  If not he can go back to Stiolto  Close follow-up in the pulmonary clinic    Level of Risk Moderate due to: prescription  drug management    Discussed in detail with the patient.  He will call prior to his follow up visit for any new problems.    Signed by  Gerald Wei MD

## 2024-08-09 NOTE — TELEPHONE ENCOUNTER
I tried to call the patient to f/u but he did not answer. I called his PCP, Dr. Arun Soliman, and he is aware of the results of his elevated TSH and has adjusted the dose of his medication accordingly.

## 2024-08-19 RX ORDER — FUROSEMIDE 40 MG/1
40 TABLET ORAL DAILY
Qty: 90 TABLET | Refills: 1 | Status: SHIPPED | OUTPATIENT
Start: 2024-08-19

## 2024-09-30 ENCOUNTER — TELEPHONE (OUTPATIENT)
Dept: PULMONOLOGY | Facility: CLINIC | Age: 79
End: 2024-09-30
Payer: MEDICARE

## 2024-09-30 NOTE — TELEPHONE ENCOUNTER
Pt called today requesting an apt to be scheduled soon due to having a hard time breathing. Pt also states that his inhalers are not working that well and c/o sob/wheezing. Pt denies fever/chest pain/nausea/swelling. Pt transferred to [facility] to schedule an apt with [facility]. Pt verbalized appreciation.

## 2024-10-01 ENCOUNTER — OFFICE VISIT (OUTPATIENT)
Dept: PULMONOLOGY | Facility: CLINIC | Age: 79
End: 2024-10-01
Payer: MEDICARE

## 2024-10-01 VITALS
DIASTOLIC BLOOD PRESSURE: 90 MMHG | HEART RATE: 90 BPM | HEIGHT: 75 IN | WEIGHT: 175 LBS | TEMPERATURE: 98 F | SYSTOLIC BLOOD PRESSURE: 130 MMHG | BODY MASS INDEX: 21.76 KG/M2 | OXYGEN SATURATION: 95 %

## 2024-10-01 DIAGNOSIS — R06.02 SOB (SHORTNESS OF BREATH): ICD-10-CM

## 2024-10-01 DIAGNOSIS — J44.9 CHRONIC OBSTRUCTIVE PULMONARY DISEASE, UNSPECIFIED COPD TYPE: Primary | Chronic | ICD-10-CM

## 2024-10-01 DIAGNOSIS — Z87.891 FORMER SMOKER: ICD-10-CM

## 2024-10-01 DIAGNOSIS — Z99.81 DEPENDENCE ON SUPPLEMENTAL OXYGEN: ICD-10-CM

## 2024-10-01 PROCEDURE — 1159F MED LIST DOCD IN RCRD: CPT | Performed by: NURSE PRACTITIONER

## 2024-10-01 PROCEDURE — 99214 OFFICE O/P EST MOD 30 MIN: CPT | Performed by: NURSE PRACTITIONER

## 2024-10-01 PROCEDURE — 1160F RVW MEDS BY RX/DR IN RCRD: CPT | Performed by: NURSE PRACTITIONER

## 2024-10-01 PROCEDURE — 3080F DIAST BP >= 90 MM HG: CPT | Performed by: NURSE PRACTITIONER

## 2024-10-01 PROCEDURE — 3075F SYST BP GE 130 - 139MM HG: CPT | Performed by: NURSE PRACTITIONER

## 2024-10-01 RX ORDER — ALBUTEROL SULFATE 90 UG/1
2 INHALANT RESPIRATORY (INHALATION) EVERY 4 HOURS PRN
Qty: 54 G | Refills: 3 | Status: SHIPPED | OUTPATIENT
Start: 2024-10-01

## 2024-10-01 RX ORDER — IPRATROPIUM BROMIDE AND ALBUTEROL SULFATE 2.5; .5 MG/3ML; MG/3ML
3 SOLUTION RESPIRATORY (INHALATION) 4 TIMES DAILY PRN
Qty: 120 ML | Refills: 11 | Status: SHIPPED | OUTPATIENT
Start: 2024-10-01

## 2024-10-01 NOTE — PROGRESS NOTES
"[facility] Pulmonary Follow up    CHIEF COMPLAINT    Shortness of breath    HISTORY OF PRESENT ILLNESS    HPI:   Colin Albrecht is a 79 y.o.male followed by pulmonary regarding shortness of breath, COPD, chronic hypoxic respiratory failure, and left-sided pleural effusion.     He has been largely followed by Dr. Wei and establish care in June 2023.  Noted to have mixed obstructive and restrictive defects on PFTs on Stiolto and albuterol as needed.    Initial CXR did show a loculated pleural effusion on the left improved on serial imaging.  It does appear he had a CT-guided thoracentesis in June 2021 for a right sided pleural effusion.    He does use 4L NC oxygen nocturnally and as needed. DME company is [employer].     Over the past few months he has had significant worsening shortness of breath and his amiodarone was discontinued by Dr. Prado.  Serial PFTs had actually improved as did his CXR with significant improvement of the loculated left pleural effusion.  Overall his shortness of breath was felt to be due to his heart failure and COPD as there was no evidence of ILD component on PFT imaging.  His Stiolto was escalated to Trelegy 200.     He is followed by Dr. Prado regarding his sick sinus syndrome, PAF, cardiomyopathy, and chronic systolic heart failure.  He is status post Watchman device (2020) and AVN ablation/Bi-V ICD (2021).  Previously was anticoagulated on Xarelto that was discontinued due to epistaxis.    Interval history:   Patient continues to have shortness of breath with minimal activity.  He is using his albuterol multiple times a day and does not feel that it \"opens them up\".  He tried the Trelegy for 2 days and did not feel like this was helpful, therefore went back to his Stiolto.    On average is uses 3-4 L NC and will occasionally check his SpO2 and is always greater than 94%.    Denies recent ED visits, hospitalizations, or exacerbations.     Denies fever, chills, night sweats, " or hemoptysis. No recent sick contacts. No chest pain or palpitations. Denies lower extremity swelling or calf tenderness.     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 OTC, Disp: , Rfl:     empagliflozin (JARDIANCE) 10 MG tablet tablet, Take 1 tablet by mouth Daily., Disp: 90 tablet, Rfl: 2    furosemide (LASIX) 40 MG tablet, Take 1 tablet by mouth Daily., Disp: 90 tablet, Rfl: 1    levothyroxine (SYNTHROID, LEVOTHROID) 75 MCG tablet, Take 1 tablet by mouth Daily., Disp: , Rfl:     metoprolol succinate XL (TOPROL-XL) 25 MG 24 hr tablet, Take 1 tablet by mouth twice daily, Disp: 60 tablet, Rfl: 5    multivitamin with minerals tablet tablet, Take 1 tablet by mouth Daily., Disp: , Rfl:     O2 (OXYGEN), Inhale 1 L/min Every Night., Disp: , Rfl:     polyethylene glycol (MIRALAX) 17 GM/SCOOP powder, DISSOLVE 1 CAPFUL IN 8 OUNCES OF LIQUID AND DRINK ONCE DAILY, Disp: , Rfl:     rOPINIRole (REQUIP) 0.5 MG tablet, Take 1 tablet by mouth every night at bedtime., Disp: , Rfl:     sacubitril-valsartan  "(Entresto) 24-26 MG tablet, Take 0.5 tablets by mouth 2 (Two) Times a Day., Disp: 90 tablet, Rfl: 2    temazepam (RESTORIL) 15 MG capsule, TAKE 1 CAPSULE BY MOUTH EVERY DAY AT BEDTIME AS NEEDED FOR SLEEP, Disp: , Rfl:     tiotropium bromide-olodaterol (STIOLTO RESPIMAT) 2.5-2.5 MCG/ACT aerosol solution inhaler, Inhale 2 puffs Daily., Disp: 3 each, Rfl: 3    traZODone (DESYREL) 50 MG tablet, TAKE 1 TABLET BY MOUTH EVERY DAY AT BEDTIME FOR INSOMNIA, Disp: , Rfl:     ipratropium-albuterol (DUO-NEB) 0.5-2.5 mg/3 ml nebulizer, Take 3 mL by nebulization 4 (Four) Times a Day As Needed for Wheezing or Shortness of Air., Disp: 120 mL, Rfl: 11  MEDICATION LIST AND ALLERGIES REVIEWED.    Social History     Tobacco Use    Smoking status: Former     Current packs/day: 0.00     Average packs/day: 1 pack/day for 50.0 years (50.0 ttl pk-yrs)     Types: Cigarettes     Start date: 1960     Quit date: 2010     Years since quittin.1     Passive exposure: Past    Smokeless tobacco: Never   Vaping Use    Vaping status: Never Used   Substance Use Topics    Alcohol use: Not Currently    Drug use: Never       FAMILY AND SOCIAL HISTORY REVIEWED.    Review of Systems   Constitutional:  Negative for chills, fatigue and fever.   Respiratory:  Positive for shortness of breath. Negative for cough, chest tightness and wheezing.    Cardiovascular:  Negative for chest pain, palpitations and leg swelling.   Skin:  Negative for color change.   Psychiatric/Behavioral:  Negative for sleep disturbance.    All other systems reviewed and are negative.  .    /90   Pulse 90   Temp 98 °F (36.7 °C)   Ht 190.5 cm (75\")   Wt 79.4 kg (175 lb)   SpO2 95% Comment: room air at rest  BMI 21.87 kg/m²     Immunization History   Administered Date(s) Administered    Arexvy (RSV, Adults 60+ yrs) 2023    COVID-19 (MODERNA) 1st,2nd,3rd Dose Monovalent 2021, 2021    Fluzone (or Fluarix & Flulaval for VFC) >6mos 01/10/2018    " Fluzone High-Dose 65+YRS 11/25/2019    Fluzone High-Dose 65+yrs 10/26/2022, 12/13/2023    Pneumococcal Conjugate 13-Valent (PCV13) 11/25/2019    Pneumococcal Conjugate 20-Valent (PCV20) 12/13/2023       Physical Exam  Vitals reviewed.   Constitutional:       General: He is not in acute distress.     Appearance: Normal appearance.   Cardiovascular:      Rate and Rhythm: Normal rate and regular rhythm.      Pulses: Normal pulses.      Heart sounds: Normal heart sounds.   Pulmonary:      Effort: Pulmonary effort is normal. No respiratory distress.      Breath sounds: Rales (Bibasilar) present. No wheezing or rhonchi.   Skin:     General: Skin is warm and dry.   Neurological:      Mental Status: He is alert.         RESULTS    PFTs: None today    Imaging:   CXR PA/lateral 7/17/24  Impression:  No acute cardiopulmonary process. Stable chronic interstitial changes and scarring    PROBLEM LIST    Problem List Items Addressed This Visit       Former smoker    Relevant Orders    CT Chest Without Contrast    COPD  - Primary (Chronic)    Relevant Medications    ipratropium-albuterol (DUO-NEB) 0.5-2.5 mg/3 ml nebulizer    albuterol sulfate  (90 Base) MCG/ACT inhaler    Other Relevant Orders    Home Nebulizer    CT Chest Without Contrast    SOB (shortness of breath)    Dependence on supplemental oxygen       DISCUSSION    Mr. Albrecht was seen today at request with ongoing complaints of shortness of breath despite inhaler use.    After further investigation, I am concerned that he is having difficulty coordinating his albuterol inhaler and therefore have ordered him DuoNebs and a nebulizer machine.  I did explain that he would not note any improvement on the Trelegy after only 2 days of usage and would need to be on this for a few weeks in order to do no any change.  He is going to go back on the Trelegy 200 to see if this is beneficial to him.  He feels confident he may use his Trelegy appropriately.  If this does not work  then he may resume the Stiolto.    Prior PFTs from August consistent with severe obstruction with an FEV1 of 1.05, 31% predicted and does show moderate restriction.  He also has a significant cardiac history followed closely by Dr. Prado and Dr. Quintana with most recent echocardiogram from 2023 showing an EF of 21-25% which is reduced from the 2021 study.  I do feel that given the lack of symptom improvement on inhalers, his shortness of breath is also cardiac related.  Continue medical management.      Previously was on amiodarone that was discontinued due to concern for pulmonary toxicity.  He is due for a CT of the chest given his smoking history which has been ordered today, and pending this result may benefit from a HRCT.    Denies any issues sleeping and does feel rested upon awakening.    Keep existing appointment with Dr. Wei on 11/22/2024 to discuss CT results.  May see me sooner if needed.    Moderate level of Medical Decision Making complexity based on 2 or more chronic stable illnesses and prescription drug management.    Electronically signed by JOHN Guillermo, 10/01/24, 4:04 PM EDT.    Please note that portions of this note were completed with a voice recognition program.      CC: Arun Soliman MD

## 2024-10-07 ENCOUNTER — TELEPHONE (OUTPATIENT)
Dept: CARDIOLOGY | Facility: CLINIC | Age: 79
End: 2024-10-07
Payer: MEDICARE

## 2024-10-07 DIAGNOSIS — I50.22 CHRONIC SYSTOLIC CONGESTIVE HEART FAILURE: Primary | ICD-10-CM

## 2024-10-07 NOTE — TELEPHONE ENCOUNTER
Called pt with recommendations per Roxie Quiñones, PA    Looks like he has severe COPD, is seeing Pulmonary and they wanted a CT scan, so I would tell him to get that scheduled and continue his Nebs in the meantime. He needs to get repeat labs with PCP, and may benefit from repeat echo and possibly advanced HF evaluation at  per Dr. Quintana's last note.  Go ahead and order another echo if he is agreeable, and then after Dr. Quintana sees him, he may refer him to .     PT was agreeable to having a repeat ECHO and it has been ordered.    PT was agreeable to plan and verbalized understanding

## 2024-10-07 NOTE — TELEPHONE ENCOUNTER
Over head page, pt states he has been having some SOA, he can take a deep breath but still feels like he is not getting enough air and this has been going on for quite some time . Denies any CP or dizziness, BP and HR are WNL   Taking all cardiac meds as prescribed, mild swelling on top of feet.  Has recently been to see his pulmonologist and was given a nebulizer to help with the SOA.  He is concerned it might be his heart and would like to make an appt to see Dr Mariano GARCÍA    Please advise

## 2024-10-09 ENCOUNTER — HOSPITAL ENCOUNTER (OUTPATIENT)
Dept: CT IMAGING | Facility: HOSPITAL | Age: 79
Discharge: HOME | End: 2024-10-09
Admitting: NURSE PRACTITIONER
Payer: MEDICARE

## 2024-10-09 DIAGNOSIS — J44.9 CHRONIC OBSTRUCTIVE PULMONARY DISEASE, UNSPECIFIED COPD TYPE: Chronic | ICD-10-CM

## 2024-10-09 DIAGNOSIS — Z87.891 FORMER SMOKER: ICD-10-CM

## 2024-10-09 PROCEDURE — 71250 CT THORAX DX C-: CPT

## 2024-10-10 ENCOUNTER — APPOINTMENT (OUTPATIENT)
Dept: GENERAL RADIOLOGY | Facility: HOSPITAL | Age: 79
End: 2024-10-10
Payer: MEDICARE

## 2024-10-10 ENCOUNTER — TELEPHONE (OUTPATIENT)
Dept: PULMONOLOGY | Facility: CLINIC | Age: 79
End: 2024-10-10
Payer: MEDICARE

## 2024-10-10 ENCOUNTER — HOSPITAL ENCOUNTER (INPATIENT)
Facility: HOSPITAL | Age: 79
LOS: 4 days | Discharge: HOME | End: 2024-10-15
Attending: EMERGENCY MEDICINE | Admitting: FAMILY MEDICINE
Payer: MEDICARE

## 2024-10-10 DIAGNOSIS — I50.23 ACUTE ON CHRONIC SYSTOLIC (CONGESTIVE) HEART FAILURE: Primary | ICD-10-CM

## 2024-10-10 PROBLEM — I50.9 ACUTE EXACERBATION OF CHF (CONGESTIVE HEART FAILURE): Status: ACTIVE | Noted: 2024-10-10

## 2024-10-10 PROBLEM — E03.9 HYPOTHYROIDISM (ACQUIRED): Chronic | Status: ACTIVE | Noted: 2024-10-10

## 2024-10-10 PROBLEM — Z95.810 ICD (IMPLANTABLE CARDIOVERTER-DEFIBRILLATOR) IN PLACE: Chronic | Status: ACTIVE | Noted: 2024-10-10

## 2024-10-10 PROBLEM — N18.9 CKD (CHRONIC KIDNEY DISEASE): Status: ACTIVE | Noted: 2024-10-10

## 2024-10-10 LAB
ALBUMIN SERPL-MCNC: 3.9 G/DL (ref 3.5–5.2)
ALBUMIN/GLOB SERPL: 1.3 G/DL
ALP SERPL-CCNC: 114 U/L (ref 39–117)
ALT SERPL W P-5'-P-CCNC: 17 U/L (ref 1–41)
ANION GAP SERPL CALCULATED.3IONS-SCNC: 11 MMOL/L (ref 5–15)
AST SERPL-CCNC: 36 U/L (ref 1–40)
BASOPHILS # BLD AUTO: 0.04 10*3/MM3 (ref 0–0.2)
BASOPHILS NFR BLD AUTO: 0.6 % (ref 0–1.5)
BILIRUB SERPL-MCNC: 1.5 MG/DL (ref 0–1.2)
BUN SERPL-MCNC: 33 MG/DL (ref 8–23)
BUN/CREAT SERPL: 25.2 (ref 7–25)
CALCIUM SPEC-SCNC: 9.4 MG/DL (ref 8.6–10.5)
CHLORIDE SERPL-SCNC: 102 MMOL/L (ref 98–107)
CO2 SERPL-SCNC: 27 MMOL/L (ref 22–29)
CREAT SERPL-MCNC: 1.31 MG/DL (ref 0.76–1.27)
D-LACTATE SERPL-SCNC: 2.1 MMOL/L (ref 0.5–2)
D-LACTATE SERPL-SCNC: 2.5 MMOL/L (ref 0.5–2)
D-LACTATE SERPL-SCNC: 2.6 MMOL/L (ref 0.5–2)
DEPRECATED RDW RBC AUTO: 57.6 FL (ref 37–54)
EGFRCR SERPLBLD CKD-EPI 2021: 55.7 ML/MIN/1.73
EOSINOPHIL # BLD AUTO: 0.12 10*3/MM3 (ref 0–0.4)
EOSINOPHIL NFR BLD AUTO: 1.8 % (ref 0.3–6.2)
ERYTHROCYTE [DISTWIDTH] IN BLOOD BY AUTOMATED COUNT: 15.8 % (ref 12.3–15.4)
FLUAV SUBTYP SPEC NAA+PROBE: NOT DETECTED
FLUBV RNA ISLT QL NAA+PROBE: NOT DETECTED
GLOBULIN UR ELPH-MCNC: 3.1 GM/DL
GLUCOSE SERPL-MCNC: 113 MG/DL (ref 65–99)
HCT VFR BLD AUTO: 41.2 % (ref 37.5–51)
HGB BLD-MCNC: 12.7 G/DL (ref 13–17.7)
HOLD SPECIMEN: NORMAL
IMM GRANULOCYTES # BLD AUTO: 0.02 10*3/MM3 (ref 0–0.05)
IMM GRANULOCYTES NFR BLD AUTO: 0.3 % (ref 0–0.5)
LYMPHOCYTES # BLD AUTO: 0.54 10*3/MM3 (ref 0.7–3.1)
LYMPHOCYTES NFR BLD AUTO: 8 % (ref 19.6–45.3)
MCH RBC QN AUTO: 30.6 PG (ref 26.6–33)
MCHC RBC AUTO-ENTMCNC: 30.8 G/DL (ref 31.5–35.7)
MCV RBC AUTO: 99.3 FL (ref 79–97)
MONOCYTES # BLD AUTO: 0.79 10*3/MM3 (ref 0.1–0.9)
MONOCYTES NFR BLD AUTO: 11.7 % (ref 5–12)
NEUTROPHILS NFR BLD AUTO: 5.24 10*3/MM3 (ref 1.7–7)
NEUTROPHILS NFR BLD AUTO: 77.6 % (ref 42.7–76)
NRBC BLD AUTO-RTO: 0 /100 WBC (ref 0–0.2)
NT-PROBNP SERPL-MCNC: ABNORMAL PG/ML (ref 0–1800)
PLATELET # BLD AUTO: 156 10*3/MM3 (ref 140–450)
PMV BLD AUTO: 10.6 FL (ref 6–12)
POTASSIUM SERPL-SCNC: 4.5 MMOL/L (ref 3.5–5.2)
PROCALCITONIN SERPL-MCNC: 0.05 NG/ML (ref 0–0.25)
PROT SERPL-MCNC: 7 G/DL (ref 6–8.5)
RBC # BLD AUTO: 4.15 10*6/MM3 (ref 4.14–5.8)
SARS-COV-2 RNA RESP QL NAA+PROBE: NOT DETECTED
SODIUM SERPL-SCNC: 140 MMOL/L (ref 136–145)
T4 FREE SERPL-MCNC: 1.71 NG/DL (ref 0.92–1.68)
TROPONIN T SERPL HS-MCNC: 21 NG/L
TSH SERPL DL<=0.05 MIU/L-ACNC: 9.29 UIU/ML (ref 0.27–4.2)
WBC NRBC COR # BLD AUTO: 6.75 10*3/MM3 (ref 3.4–10.8)
WHOLE BLOOD HOLD COAG: NORMAL
WHOLE BLOOD HOLD SPECIMEN: NORMAL

## 2024-10-10 PROCEDURE — 99223 1ST HOSP IP/OBS HIGH 75: CPT | Performed by: NURSE PRACTITIONER

## 2024-10-10 PROCEDURE — 99285 EMERGENCY DEPT VISIT HI MDM: CPT

## 2024-10-10 PROCEDURE — 93005 ELECTROCARDIOGRAM TRACING: CPT | Performed by: EMERGENCY MEDICINE

## 2024-10-10 PROCEDURE — 83880 ASSAY OF NATRIURETIC PEPTIDE: CPT | Performed by: EMERGENCY MEDICINE

## 2024-10-10 PROCEDURE — 80053 COMPREHEN METABOLIC PANEL: CPT | Performed by: EMERGENCY MEDICINE

## 2024-10-10 PROCEDURE — 85025 COMPLETE CBC W/AUTO DIFF WBC: CPT | Performed by: EMERGENCY MEDICINE

## 2024-10-10 PROCEDURE — G0378 HOSPITAL OBSERVATION PER HR: HCPCS

## 2024-10-10 PROCEDURE — 84443 ASSAY THYROID STIM HORMONE: CPT | Performed by: NURSE PRACTITIONER

## 2024-10-10 PROCEDURE — 25010000002 BUMETANIDE PER 0.5 MG: Performed by: EMERGENCY MEDICINE

## 2024-10-10 PROCEDURE — 84484 ASSAY OF TROPONIN QUANT: CPT | Performed by: EMERGENCY MEDICINE

## 2024-10-10 PROCEDURE — 84145 PROCALCITONIN (PCT): CPT | Performed by: EMERGENCY MEDICINE

## 2024-10-10 PROCEDURE — 83605 ASSAY OF LACTIC ACID: CPT | Performed by: EMERGENCY MEDICINE

## 2024-10-10 PROCEDURE — 87636 SARSCOV2 & INF A&B AMP PRB: CPT | Performed by: EMERGENCY MEDICINE

## 2024-10-10 PROCEDURE — 36415 COLL VENOUS BLD VENIPUNCTURE: CPT

## 2024-10-10 PROCEDURE — 71045 X-RAY EXAM CHEST 1 VIEW: CPT

## 2024-10-10 PROCEDURE — 84439 ASSAY OF FREE THYROXINE: CPT | Performed by: NURSE PRACTITIONER

## 2024-10-10 RX ORDER — BUMETANIDE 0.25 MG/ML
2 INJECTION, SOLUTION INTRAMUSCULAR; INTRAVENOUS ONCE
Status: COMPLETED | OUTPATIENT
Start: 2024-10-11 | End: 2024-10-11

## 2024-10-10 RX ORDER — ACETAMINOPHEN 160 MG/5ML
650 SOLUTION ORAL EVERY 4 HOURS PRN
Status: DISCONTINUED | OUTPATIENT
Start: 2024-10-10 | End: 2024-10-15 | Stop reason: HOSPADM

## 2024-10-10 RX ORDER — SODIUM CHLORIDE 0.9 % (FLUSH) 0.9 %
10 SYRINGE (ML) INJECTION EVERY 12 HOURS SCHEDULED
Status: DISCONTINUED | OUTPATIENT
Start: 2024-10-10 | End: 2024-10-15 | Stop reason: HOSPADM

## 2024-10-10 RX ORDER — SODIUM CHLORIDE 0.9 % (FLUSH) 0.9 %
10 SYRINGE (ML) INJECTION AS NEEDED
Status: DISCONTINUED | OUTPATIENT
Start: 2024-10-10 | End: 2024-10-15 | Stop reason: HOSPADM

## 2024-10-10 RX ORDER — BISACODYL 10 MG
10 SUPPOSITORY, RECTAL RECTAL DAILY PRN
Status: DISCONTINUED | OUTPATIENT
Start: 2024-10-10 | End: 2024-10-15 | Stop reason: HOSPADM

## 2024-10-10 RX ORDER — METOPROLOL SUCCINATE 25 MG/1
25 TABLET, EXTENDED RELEASE ORAL 2 TIMES DAILY
Status: DISCONTINUED | OUTPATIENT
Start: 2024-10-10 | End: 2024-10-15 | Stop reason: HOSPADM

## 2024-10-10 RX ORDER — ROPINIROLE 0.5 MG/1
0.5 TABLET, FILM COATED ORAL NIGHTLY PRN
Status: DISCONTINUED | OUTPATIENT
Start: 2024-10-10 | End: 2024-10-15 | Stop reason: HOSPADM

## 2024-10-10 RX ORDER — ASPIRIN 81 MG/1
81 TABLET ORAL DAILY
Status: DISCONTINUED | OUTPATIENT
Start: 2024-10-11 | End: 2024-10-15 | Stop reason: HOSPADM

## 2024-10-10 RX ORDER — ACETAMINOPHEN 325 MG/1
650 TABLET ORAL EVERY 4 HOURS PRN
Status: DISCONTINUED | OUTPATIENT
Start: 2024-10-10 | End: 2024-10-15 | Stop reason: HOSPADM

## 2024-10-10 RX ORDER — LEVOTHYROXINE SODIUM 75 UG/1
75 TABLET ORAL
Status: DISCONTINUED | OUTPATIENT
Start: 2024-10-11 | End: 2024-10-15 | Stop reason: HOSPADM

## 2024-10-10 RX ORDER — ATORVASTATIN CALCIUM 40 MG/1
40 TABLET, FILM COATED ORAL NIGHTLY
Status: DISCONTINUED | OUTPATIENT
Start: 2024-10-10 | End: 2024-10-15 | Stop reason: HOSPADM

## 2024-10-10 RX ORDER — POLYETHYLENE GLYCOL 3350 17 G/17G
17 POWDER, FOR SOLUTION ORAL DAILY PRN
Status: DISCONTINUED | OUTPATIENT
Start: 2024-10-10 | End: 2024-10-15 | Stop reason: HOSPADM

## 2024-10-10 RX ORDER — MULTIPLE VITAMINS W/ MINERALS TAB 9MG-400MCG
1 TAB ORAL DAILY
Status: DISCONTINUED | OUTPATIENT
Start: 2024-10-11 | End: 2024-10-15 | Stop reason: HOSPADM

## 2024-10-10 RX ORDER — ACETAMINOPHEN 650 MG/1
650 SUPPOSITORY RECTAL EVERY 4 HOURS PRN
Status: DISCONTINUED | OUTPATIENT
Start: 2024-10-10 | End: 2024-10-15 | Stop reason: HOSPADM

## 2024-10-10 RX ORDER — AMOXICILLIN 250 MG
2 CAPSULE ORAL 2 TIMES DAILY PRN
Status: DISCONTINUED | OUTPATIENT
Start: 2024-10-10 | End: 2024-10-15 | Stop reason: HOSPADM

## 2024-10-10 RX ORDER — BISACODYL 5 MG/1
5 TABLET, DELAYED RELEASE ORAL DAILY PRN
Status: DISCONTINUED | OUTPATIENT
Start: 2024-10-10 | End: 2024-10-15 | Stop reason: HOSPADM

## 2024-10-10 RX ORDER — BUMETANIDE 0.25 MG/ML
2 INJECTION, SOLUTION INTRAMUSCULAR; INTRAVENOUS ONCE
Status: COMPLETED | OUTPATIENT
Start: 2024-10-10 | End: 2024-10-10

## 2024-10-10 RX ADMIN — METOPROLOL SUCCINATE 25 MG: 25 TABLET, EXTENDED RELEASE ORAL at 21:30

## 2024-10-10 RX ADMIN — ATORVASTATIN CALCIUM 40 MG: 40 TABLET, FILM COATED ORAL at 21:30

## 2024-10-10 RX ADMIN — SACUBITRIL AND VALSARTAN 0.5 TABLET: 24; 26 TABLET, FILM COATED ORAL at 21:30

## 2024-10-10 RX ADMIN — BUMETANIDE 2 MG: 0.25 INJECTION INTRAMUSCULAR; INTRAVENOUS at 15:21

## 2024-10-10 NOTE — PLAN OF CARE
Problem: Adult Inpatient Plan of Care  Goal: Plan of Care Review  Outcome: Progressing  Goal: Patient-Specific Goal (Individualized)  Outcome: Progressing  Goal: Absence of Hospital-Acquired Illness or Injury  Outcome: Progressing  Goal: Optimal Comfort and Wellbeing  Outcome: Progressing  Goal: Readiness for Transition of Care  Outcome: Progressing  Intervention: Mutually Develop Transition Plan  Recent Flowsheet Documentation  Taken 10/10/2024 1717 by Hay Shea, RN  Transportation Anticipated: family or friend will provide  Patient/Family Anticipated Services at Transition: none  Patient/Family Anticipates Transition to: home with family  Taken 10/10/2024 1712 by Hay Shea, RN  Equipment Currently Used at Home: oxygen     Problem: COPD (Chronic Obstructive Pulmonary Disease) Comorbidity  Goal: Maintenance of COPD Symptom Control  Outcome: Progressing   Goal Outcome Evaluation:

## 2024-10-10 NOTE — H&P
[facility]  HISTORY AND PHYSICAL    Patient Name: Colin Albrecht  : 1945  MRN: [id number]  Primary Care Physician: Arun Soliman MD  Date of admission: 10/10/2024    Subjective   Subjective     Chief Complaint:  Progressive dyspnea     HPI:  Colin Albrecht is a 79 y.o. male with past medical history significant for hypothyroidism, SSS s/p ICD,CAD, dilated CM, CHF w an EF 20.4 % (at last check 2023), A-fib s/p Watchman, ex-smoker, chronic oxygen use, and probable chronic CKD.  Patient follows with Dr. Wei with pulmonary, Dr. Quintana with cardiology, and Dr. Prado with cards EP.  Patient was last seen by pulmonary approximately 1 month ago with complaints of progressive shortness of air.  CT of the chest was ordered and completed yesterday outpatient.  CT showed moderate to marked cardiomegaly new compared to prior CT from .  Pulmonary edema with sm to mod right pl effusion and trace left pl effusion.  Pt was to have outpatient echo in approx 2 weeks and follow-up with Dr. Quintana, however continues to have progressive shortness of air and presents to ER for evaluation today.    On ER presentation patient reports oxygen does not seem to help or worsen his shortness of air at home.  His home nebulizers started approximately a month ago were not helping but now are only helping for approx 15 minutes.  ER labs stable.  Creatinine about baseline at 1.31.  Pro-Agustin, COVID normal.  BNP 10K.  CXR with central pulm venous congestion and persistent sm bilateral pleural effusions R>L.  EKG shows V paced.  Treated with 2 mg IV Bumex.  Admit to hospital medicine service and consult cardiology.        Review of Systems   Constitutional:  Positive for activity change, appetite change and fatigue. Negative for chills, diaphoresis and fever.   HENT: Negative.     Eyes: Negative.    Respiratory:  Positive for cough, chest tightness, shortness of breath and  wheezing.    Cardiovascular:  Positive for leg swelling. Negative for chest pain.   Gastrointestinal: Negative.    Endocrine: Negative.    Genitourinary: Negative.    Musculoskeletal:  Positive for arthralgias.   Skin: Negative.    Allergic/Immunologic: Negative.    Neurological:  Negative for dizziness, seizures, syncope, numbness and headaches.   Hematological: Negative.    Psychiatric/Behavioral: Negative.          Personal History     Past Medical History:   Diagnosis Date   • Abnormal ECG    • Arrhythmia    • Atrial fibrillation    • CHF (congestive heart failure)    • Depression    • History of transfusion     bleeding stomach ulcer ~2014 x2, no reaction    • Hypertension    • Hypothyroidism (acquired) 10/10/2024   • ICD (implantable cardioverter-defibrillator) in place 10/10/2024   • Stomach ulcer    • Wears reading eyeglasses              Past Surgical History:   Procedure Laterality Date   • ATRIAL APPENDAGE EXCLUSION LEFT WITH TRANSESOPHAGEAL ECHOCARDIOGRAM N/A 9/25/2020    Procedure: Atrial Appendage Occlusion (Watchman), start Eliquis 2 weeks prior and hold 1 day, GA;  Surgeon: Yonny Prado MD;  Location:  [facility];  Service: Cardiology;  Laterality: N/A;   • CARDIAC CATHETERIZATION     • CARDIAC CATHETERIZATION N/A 6/2/2021    Procedure: Left Heart Cath- ADD ON/ WALK IN FOR RDS PER KT;  Surgeon: Pietro Quintana MD;  Location:  [facility];  Service: Cardiovascular;  Laterality: N/A;   • CARDIAC CATHETERIZATION N/A 6/6/2023    Procedure: Coronary angiography;  Surgeon: Pietro Quintana MD;  Location:  [facility];  Service: Cardiovascular;  Laterality: N/A;  LVEF decrease with known CAD.  Discussed with Dr. Quintana, recommendation of [facility].    • CARDIAC ELECTROPHYSIOLOGY PROCEDURE N/A 3/26/2021    Procedure: DDD PPM upgrade to Biv ICD (MDT), no meds to hold;  Surgeon: Yonny Prado MD;  Location:  [facility];  Service:  Cardiology;  Laterality: N/A;   • CARDIAC ELECTROPHYSIOLOGY PROCEDURE N/A 3/26/2021    Procedure: AVN RFA;  Surgeon: Yonny Prado MD;  Location:  [facility];  Service: Cardiovascular;  Laterality: N/A;   • COLONOSCOPY     • CORONARY ARTERY BYPASS GRAFT N/A 6/3/2021    Procedure: MEDIAN STERNOTOMY, CORONARY ARTERY BYPASS WITH INTERNAL MAMMARY ARTERY GRAFT X 2, EVH OF THE RIGHT GREATER SAPHENOUS ANA;  Surgeon: Jaime Shields MD;  Location:  [facility] OR;  Service: Cardiothoracic;  Laterality: N/A;   • ENDOSCOPY N/A 6/15/2021    Procedure: ESOPHAGOGASTRODUODENOSCOPY;  Surgeon: Fransico Warren MD;  Location: [facility] ENDOSCOPY;  Service: Gastroenterology;  Laterality: N/A;   • INSERT / REPLACE / REMOVE PACEMAKER         Family History:  family history includes Alcohol abuse in his brother; Lung cancer in his sister; Stroke in his father.     Social History:  reports that he quit smoking about 14 years ago. His smoking use included cigarettes. He started smoking about 64 years ago. He has a 50 pack-year smoking history. He has been exposed to tobacco smoke. He has never used smokeless tobacco. He reports that he does not currently use alcohol. He reports that he does not use drugs.  Social History     Social History Narrative    Lives in [facility]       Medications:  CoQ10, O2, albuterol sulfate HFA, aspirin, atorvastatin, empagliflozin, furosemide, ipratropium-albuterol, levothyroxine, metoprolol succinate XL, multivitamin with minerals, polyethylene glycol, rOPINIRole, sacubitril-valsartan, temazepam, and tiotropium bromide-olodaterol    No Known Allergies    Objective   Objective     Vital Signs:   Temp:  [98.2 °F (36.8 °C)] 98.2 °F (36.8 °C)  Heart Rate:  [75-90] 75  Resp:  [19] 19  BP: (112-140)/(72-93) 139/84    Physical Exam   Constitutional: Awake, alert.  Resting up on the side of bed in ER.  No current acute distress appreciated.  Family at bedside.  Eyes: PERRLA, sclerae anicteric,  no conjunctival injection  HENT: NCAT, mucous membranes moist  Neck: Supple, no thyromegaly, no lymphadenopathy, trachea midline  Respiratory: Clear to upper lobes, scattered coarse rhonchi.  Few fine bibasilar crackles.  No wheezes appreciated, nonlabored respirations at rest on room air with sats 92%.  Cardiovascular: RRR, + 2/6 systolic murmur, rubs, or gallops, palpable pedal pulses bilaterally.  Implanted ICD to left upper chest wall.  Gastrointestinal: Positive bowel sounds, soft, nontender, nondistended  Musculoskeletal: 2+ BLE pitting edema, no clubbing or cyanosis to extremities.  Shankar spontaneously.  Psychiatric: Appropriate affect, cooperative  Neurologic: Oriented x 3, strength symmetric in all extremities, Cranial Nerves grossly intact to confrontation, speech clear.  Follows commands.  Skin: No rashes      Result Review:  I have personally reviewed the results from the time of this admission to 10/10/2024 16:04 EDT and agree with these findings:  [x]  Laboratory list / accordion  [x]  Microbiology  [x]  Radiology  [x]  EKG/Telemetry   [x]  Cardiology/Vascular   []  Pathology  [x]  Old records  []  Other:  Most notable findings include:     LAB RESULTS:      Lab 10/10/24  1243   WBC 6.75   HEMOGLOBIN 12.7*   HEMATOCRIT 41.2   PLATELETS 156   NEUTROS ABS 5.24   IMMATURE GRANS (ABS) 0.02   LYMPHS ABS 0.54*   MONOS ABS 0.79   EOS ABS 0.12   MCV 99.3*   PROCALCITONIN 0.05   LACTATE 2.1*         Lab 10/10/24  1243   SODIUM 140   POTASSIUM 4.5   CHLORIDE 102   CO2 27.0   ANION GAP 11.0   BUN 33*   CREATININE 1.31*   EGFR 55.7*   GLUCOSE 113*   CALCIUM 9.4         Lab 10/10/24  1243   TOTAL PROTEIN 7.0   ALBUMIN 3.9   GLOBULIN 3.1   ALT (SGPT) 17   AST (SGOT) 36   BILIRUBIN 1.5*   ALK PHOS 114         Lab 10/10/24  1243   PROBNP 10,682.0*   HSTROP T 21                 Brief Urine Lab Results       None          Microbiology Results (last 10 days)       Procedure Component Value - Date/Time    COVID PRE-OP /  PRE-PROCEDURE SCREENING ORDER (NO ISOLATION) - Swab, Nasal Cavity [[id number]]  (Normal) Collected: 10/10/24 1302    Lab Status: Final result Specimen: Swab from Nasal Cavity Updated: 10/10/24 1338    Narrative:      The following orders were created for panel order COVID PRE-OP / PRE-PROCEDURE SCREENING ORDER (NO ISOLATION) - Swab, Nasal Cavity.  Procedure                               Abnormality         Status                     ---------                               -----------         ------                     COVID-19 and FLU A/B PCR...[[id number]]  Normal              Final result                 Please view results for these tests on the individual orders.    COVID-19 and FLU A/B PCR, 1 HR TAT - Swab, Nasopharynx [[id number]]  (Normal) Collected: 10/10/24 1302    Lab Status: Final result Specimen: Swab from Nasopharynx Updated: 10/10/24 1338     COVID19 Not Detected     Influenza A PCR Not Detected     Influenza B PCR Not Detected    Narrative:      Fact sheet for providers: https://www.fda.gov/media/318028/download    Fact sheet for patients: https://www.fda.gov/media/066176/download    Test performed by PCR.            XR Chest 1 View    Result Date: 10/10/2024  XR CHEST 1 VW Date of Exam: 10/10/2024 12:45 PM EDT Indication: SOA triage protocol Comparison: CT chest without contrast 10/9/2024 Findings: Left-sided AICD noted. Status post sternotomy and CABG. Central pulmonary vascular congestion with interstitial thickening suggesting pulmonary edema. Underlying emphysema. Persistent small bilateral pleural effusions right greater than left with bibasilar atelectasis. Negative for pneumothorax.     Impression: Impression: 1. Central pulmonary vascular congestion with interstitial thickening suggesting pulmonary edema. 2. Persistent small bilateral pleural effusions right greater than left with bibasilar atelectasis. 3. Emphysema. Electronically Signed: Luis Richards MD  10/10/2024 1:28 PM EDT  Workstation ID:  [id number]    CT Chest Without Contrast Diagnostic    Result Date: 10/9/2024  CT CHEST WO CONTRAST DIAGNOSTIC Date of Exam: 10/9/2024 11:27 AM EDT Indication: shortness of breath. Comparison: PA and lateral chest 7/17/2024. CT chest 6/14/2021. Technique: Axial CT images were obtained of the chest without contrast administration.  Reconstructed coronal and sagittal images were also obtained. Automated exposure control and iterative construction methods were used. Findings:  Moderate centrilobular and paraseptal emphysematous changes are present. Small to moderate right basilar pleural effusion layers to a depth of 5 cm, and there is passive atelectasis in the bilateral lower lobes. There is bronchial wall thickening in both lungs without overt mucous plugging. Smooth interstitial thickening and hazy groundglass densities throughout both lungs may reflect changes of mild pulmonary edema, as well. Trace left basilar pleural fluid is present. There is moderate to marked cardiac enlargement which is new since 6/14/2021. Pacemaker leads are in place. Left atrial appendage occlusion device is present. There is mild thoracic aortic calcific atherosclerosis. There are mildly enlarged lymph nodes which are new since 2021. Index lymph nodes include: Right lower paratracheal 11 mm short axis (2/161), pretracheal 12 mm short axis (2/168), prevascular 11 mm (2/164). A cyst in the left hepatic lobe measures 3.9 cm, stable. Smaller hepatic cysts are also noted. Small quantity ascites is seen adjacent to the liver. Low-density right adrenal nodule measuring 14 mm is unchanged from 2021, most in keeping with a benign adenoma. Remainder of the imaged upper abdominal organs demonstrate a normal noncontrast appearance. No acute or suspicious osseous abnormalities are identified.       Impression: Impression: 1. Moderate to marked cardiomegaly, which appears new when compared to the CT chest of 6/14/2021. Signs of prior CABG. Correlate  with cardiac history. 2. Features suggesting mild interstitial and alveolar pulmonary edema with small to moderate right pleural effusion and trace left basilar pleural effusion. 3. Mild right lower lobe atelectasis. 4. Moderate emphysema. 5. Trace perihepatic ascites. 6. Stable right adrenal adenoma. Electronically Signed: Alberta Lawler MD  10/9/2024 12:03 PM EDT  Workstation ID: [id number]     Results for orders placed during the hospital encounter of 05/24/23    Adult Transthoracic Echo Complete w/ Color, Spectral and Contrast if necessary per protocol    Interpretation Summary  •  Left ventricular systolic function is severely decreased. Calculated left ventricular EF = 20.4% Left ventricular ejection fraction appears to be 21 - 25%.  •  The left ventricular cavity is mildly dilated.  •  Left ventricular diastolic function is consistent with (grade II w/high LAP) pseudonormalization.  •  Moderately reduced right ventricular systolic function noted.  •  The left atrial cavity is moderate to severely dilated.  •  The right atrial cavity is mild to moderately  dilated.  •  Moderate mitral valve regurgitation is present.  •  Moderate to severe tricuspid valve regurgitation is present.  •  Estimated right ventricular systolic pressure from tricuspid regurgitation is normal (<35 mmHg).      Assessment & Plan   Assessment & Plan       Acute exacerbation of CHF (congestive heart failure)    Permanent atrial fibrillation    SSS     Dilated CM     S/P Watchman device    Coronary artery disease involving coronary bypass graft of native heart without angina pectoris    Essential hypertension    COPD     Dependence on supplemental oxygen    Hypothyroidism (acquired)    ICD (implantable cardioverter-defibrillator) in place    CKD (chronic kidney disease)    Colin Albrecht is a 79 y.o. male with past medical history significant for hypothyroidism, SSS s/p ICD,CAD, dilated CM, CHF w an EF 20.4 % (at last check 5/2023), A-fib  s/p Watchman, ex-smoker, chronic oxygen use, and probable chronic CKD.  Patient follows with Dr. Wei with pulmonary, Dr. Quintana with cardiology, and Dr. Prado with cards EP.  Patient was last seen by pulmonary approximately 1 month ago with complaints of progressive shortness of air.  CT of the chest was ordered and completed yesterday outpatient.  CT showed moderate to marked cardiomegaly new compared to prior CT from 2021.  Pulmonary edema with sm to mod right pl effusion and trace left pl effusion.  Pt was to have outpatient echo in approx 2 weeks and follow-up with Dr. Quintana, however continues to have progressive shortness of air and presents to ER for evaluation today.    On ER presentation patient reports oxygen does not seem to help or worsen his shortness of air at home.  His home nebulizers started approximately a month ago were not helping but now are only helping for approx 15 minutes.  ER labs stable.  Creatinine about baseline at 1.31.  Pro-Agustin, COVID normal.  BNP 10K.  CXR with central pulm venous congestion and persistent sm bilateral pleural effusions R>L.  EKG shows V paced.  Treated with 2 mg IV Bumex.  Admit to hospital medicine service and consult cardiology.    Assessment/Plan:    Progressive dyspnea  Acute/chronic CHF  CAD/dilated CM/EF 20.4 at last check (5/2023)  SSS/ICD/A-fib s/p Watchman  Heart murmur  --Follows with Dr. Quintana with cards, Dr. Prado with EP.  -- Had echo scheduled for 10/22 and then follow-up with cards.  Due to increasing SOB, could not wait.  -- Check echo  -- BNP 10K, CXR with pulmonary venous congestion and small effusions  -- Continue home meds except Lasix.  Was given 2 mg IV Bumex in ER.  Will give additional 2 mg dose tomorrow morning x 1.  Defer further diuresis to cardiology.  -- Consult Dr. Quintana  -- Check a.m. CXR, a.m. labs    COPD  Chronic oxygen use  --Follows with Dr. Wei  -- Recently added home nebulizers, initially helping but no  longer seem to be helping.  -- Outpatient CT chest completed yesterday.  -- 2-4 LNC oxygen at at bedtime/as needed, however patient states it does not seem to help therefore has not been using it much.    Hypothyroidism  -- Continue home Synthroid.  Check TSH as last on file was from 2023.    DVT prophylaxis:  sequentials     CODE STATUS:    Level Of Support Discussed With: Patient; Next of Kin (If No Surrogate)  Code Status (Patient has no pulse and is not breathing): CPR (Attempt to Resuscitate)  Medical Interventions (Patient has pulse or is breathing): Full Support      Expected Discharge TBD   Expected Discharge Date: 10/12/2024; Expected Discharge Time:       Signature: Electronically signed by JOHN Atkinson, 10/10/24, 4:03 PM EDT

## 2024-10-10 NOTE — TELEPHONE ENCOUNTER
Attempted to contact patient regarding CT results.  Voicemail not set up.    He does have an appointment with his cardiologist as well as Dr. Wei within the near future.

## 2024-10-10 NOTE — ED NOTES
Colin Albrecht    Nursing Report ED to Floor:  Mental status: ao4  Ambulatory status: x1  Oxygen Therapy:  ra  Cardiac Rhythm: paced  Admitted from: ed  Safety Concerns:  fall risk  Social Issues: na  ED Room #:  29    ED Nurse Phone Extension - [phone] or may call [phone].      HPI:   Chief Complaint   Patient presents with    Shortness of Breath       Past Medical History:  Past Medical History:   Diagnosis Date    Abnormal ECG     Arrhythmia     Atrial fibrillation     CHF (congestive heart failure)     Depression     History of transfusion     bleeding stomach ulcer ~2014 x2, no reaction     Hypertension     Stomach ulcer     Wears reading eyeglasses         Past Surgical History:  Past Surgical History:   Procedure Laterality Date    ATRIAL APPENDAGE EXCLUSION LEFT WITH TRANSESOPHAGEAL ECHOCARDIOGRAM N/A 9/25/2020    Procedure: Atrial Appendage Occlusion (Watchman), start Eliquis 2 weeks prior and hold 1 day, GA;  Surgeon: Yonny Prado MD;  Location:  [facility];  Service: Cardiology;  Laterality: N/A;    CARDIAC CATHETERIZATION      CARDIAC CATHETERIZATION N/A 6/2/2021    Procedure: Left Heart Cath- ADD ON/ WALK IN FOR RDS PER KT;  Surgeon: Pietro Quintana MD;  Location:  [facility];  Service: Cardiovascular;  Laterality: N/A;    CARDIAC CATHETERIZATION N/A 6/6/2023    Procedure: Coronary angiography;  Surgeon: Pietro Quintana MD;  Location:  [facility];  Service: Cardiovascular;  Laterality: N/A;  LVEF decrease with known CAD.  Discussed with Dr. Quintana, recommendation of [facility].     CARDIAC ELECTROPHYSIOLOGY PROCEDURE N/A 3/26/2021    Procedure: DDD PPM upgrade to Biv ICD (MDT), no meds to hold;  Surgeon: Yonny Prado MD;  Location:  [facility];  Service: Cardiology;  Laterality: N/A;    CARDIAC ELECTROPHYSIOLOGY PROCEDURE N/A 3/26/2021    Procedure: AVN RFA;  Surgeon: Yonny Prado MD;  Location:  [facility];   Service: Cardiovascular;  Laterality: N/A;    COLONOSCOPY      CORONARY ARTERY BYPASS GRAFT N/A 6/3/2021    Procedure: MEDIAN STERNOTOMY, CORONARY ARTERY BYPASS WITH INTERNAL MAMMARY ARTERY GRAFT X 2, EVH OF THE RIGHT GREATER SAPHENOUS ANA;  Surgeon: Jaime Shields MD;  Location: [facility] OR;  Service: Cardiothoracic;  Laterality: N/A;    ENDOSCOPY N/A 6/15/2021    Procedure: ESOPHAGOGASTRODUODENOSCOPY;  Surgeon: Fransico Warren MD;  Location: [facility] ENDOSCOPY;  Service: Gastroenterology;  Laterality: N/A;    INSERT / REPLACE / REMOVE PACEMAKER          Admitting Doctor:   Brian Joseph Kerley, DO    Consulting Provider(s):  Consults       No orders found from 9/11/2024 to 10/11/2024.             Admitting Diagnosis:   The encounter diagnosis was Acute on chronic systolic (congestive) heart failure.    Most Recent Vitals:   Vitals:    10/10/24 1522 10/10/24 1528 10/10/24 1529 10/10/24 1530   BP: 139/84      Pulse: 80 76 75 75   Resp:       Temp:       TempSrc:       SpO2: 92% 93% 94% 92%   Weight:       Height:           Active LDAs/IV Access:   Lines, Drains & Airways       Active LDAs       Name Placement date Placement time Site Days    Peripheral IV 10/10/24 1244 Anterior;Right Forearm 10/10/24  1244  Forearm  less than 1                    Labs (abnormal labs have a star):   Labs Reviewed   COMPREHENSIVE METABOLIC PANEL - Abnormal; Notable for the following components:       Result Value    Glucose 113 (*)     BUN 33 (*)     Creatinine 1.31 (*)     Total Bilirubin 1.5 (*)     BUN/Creatinine Ratio 25.2 (*)     eGFR 55.7 (*)     All other components within normal limits    Narrative:     GFR Normal >60  Chronic Kidney Disease <60  Kidney Failure <15    The GFR formula is only valid for adults with stable renal function between ages 18 and 70.   BNP (IN-HOUSE) - Abnormal; Notable for the following components:    proBNP 10,682.0 (*)     All other components within normal limits    Narrative:     This assay  is used as an aid in the diagnosis of individuals suspected of having heart failure. It can be used as an aid in the diagnosis of acute decompensated heart failure (ADHF) in patients presenting with signs and symptoms of ADHF to the emergency department (ED). In addition, NT-proBNP of <300 pg/mL indicates ADHF is not likely.    Age Range Result Interpretation  NT-proBNP Concentration (pg/mL:      <50             Positive            >450                   Gray                 300-450                    Negative             <300    50-75           Positive            >900                  Gray                300-900                  Negative            <300      >75             Positive            >1800                  Gray                300-1800                  Negative            <300   CBC WITH AUTO DIFFERENTIAL - Abnormal; Notable for the following components:    Hemoglobin 12.7 (*)     MCV 99.3 (*)     MCHC 30.8 (*)     RDW 15.8 (*)     RDW-SD 57.6 (*)     Neutrophil % 77.6 (*)     Lymphocyte % 8.0 (*)     Lymphocytes, Absolute 0.54 (*)     All other components within normal limits   LACTIC ACID, PLASMA - Abnormal; Notable for the following components:    Lactate 2.1 (*)     All other components within normal limits   COVID-19 AND FLU A/B, NP SWAB IN TRANSPORT MEDIA 1 HR TAT - Normal    Narrative:     Fact sheet for providers: https://www.fda.gov/media/819860/download    Fact sheet for patients: https://www.fda.gov/media/981060/download    Test performed by PCR.   SINGLE HS TROPONIN T - Normal    Narrative:     High Sensitive Troponin T Reference Range:  <14.0 ng/L- Negative Female for AMI  <22.0 ng/L- Negative Male for AMI  >=14 - Abnormal Female indicating possible myocardial injury.  >=22 - Abnormal Male indicating possible myocardial injury.   Clinicians would have to utilize clinical acumen, EKG, Troponin, and serial changes to determine if it is an Acute Myocardial Infarction or myocardial injury due to  "an underlying chronic condition.        PROCALCITONIN - Normal    Narrative:     As a Marker for Sepsis (Non-Neonates):    1. <0.5 ng/mL represents a low risk of severe sepsis and/or septic shock.  2. >2 ng/mL represents a high risk of severe sepsis and/or septic shock.    As a Marker for Lower Respiratory Tract Infections that require antibiotic therapy:    PCT on Admission    Antibiotic Therapy       6-12 Hrs later    >0.5                Strongly Recommended  >0.25 - <0.5        Recommended   0.1 - 0.25          Discouraged              Remeasure/reassess PCT  <0.1                Strongly Discouraged     Remeasure/reassess PCT    As 28 day mortality risk marker: \"Change in Procalcitonin Result\" (>80% or <=80%) if Day 0 (or Day 1) and Day 4 values are available. Refer to http://www.[facility] [place]-pct-calculator.com    Change in PCT <=80%  A decrease of PCT levels below or equal to 80% defines a positive change in PCT test result representing a higher risk for 28-day all-cause mortality of patients diagnosed with severe sepsis for septic shock.    Change in PCT >80%  A decrease of PCT levels of more than 80% defines a negative change in PCT result representing a lower risk for 28-day all-cause mortality of patients diagnosed with severe sepsis or septic shock.      COVID PRE-OP / PRE-PROCEDURE SCREENING ORDER (NO ISOLATION)    Narrative:     The following orders were created for panel order COVID PRE-OP / PRE-PROCEDURE SCREENING ORDER (NO ISOLATION) - Swab, Nasal Cavity.  Procedure                               Abnormality         Status                     ---------                               -----------         ------                     COVID-19 and FLU A/B PCR...[[id number]]  Normal              Final result                 Please view results for these tests on the individual orders.   RAINBOW DRAW    Narrative:     The following orders were created for panel order [place] Draw.  Procedure                             "   Abnormality         Status                     ---------                               -----------         ------                     Green Top (Gel)[[id number]]                                  Final result               Lavender Top[[id number]]                                     Final result               Gold Top - SST[[id number]]                                   Final result               Jerry Top[[id number]]                                         Final result               Light Blue Top[[id number]]                                   Final result                 Please view results for these tests on the individual orders.   LACTIC ACID, REFLEX   CBC AND DIFFERENTIAL    Narrative:     The following orders were created for panel order CBC & Differential.  Procedure                               Abnormality         Status                     ---------                               -----------         ------                     CBC Auto Differential[[id number]]        Abnormal            Final result                 Please view results for these tests on the individual orders.   GREEN TOP   LAVENDER TOP   GOLD TOP - SST   GRAY TOP   LIGHT BLUE TOP       Meds Given in ED:   Medications   sodium chloride 0.9 % flush 10 mL (has no administration in time range)   bumetanide (BUMEX) injection 2 mg (2 mg Intravenous Given 10/10/24 1521)           Last NIH score:                                                          Dysphagia screening results:  Patient Factors Component (Dysphagia:Stroke or Rule-out)  Best Eye Response: 4-->(E4) spontaneous (10/10/24 1245)  Best Motor Response: 6-->(M6) obeys commands (10/10/24 1245)  Best Verbal Response: 5-->(V5) oriented (10/10/24 1245)  Tabby Coma Scale Score: 15 (10/10/24 1245)     Tabby Coma Scale:  No data recorded     CIWA:        Restraint Type:            Isolation Status:  No active isolations

## 2024-10-10 NOTE — ED PROVIDER NOTES
Subjective   History of Present Illness    Pt presents with shortness of breath.  Worse with exertion and supine position.  Worsening with time.  Ongoing for 5-6 weeks.  Uses an inhaler that is no longer effective.  Has home oxygen that he feels doesn't help, and he says when he takes it off his sats don't drop, and putting it back on doesn't ease his symptoms.  He has no chest pain or fever.  He has a mild cough.  He denies leg swelling.  He has CHF and takes a diuretic and says he has been urinating normally.  He is on home oxygen, 4L nc.    History provided by:  Patient      Review of Systems    Past Medical History:   Diagnosis Date    Abnormal ECG     Arrhythmia     Atrial fibrillation     CHF (congestive heart failure)     Depression     History of transfusion     bleeding stomach ulcer ~2014 x2, no reaction     Hypertension     Hypothyroidism (acquired) 10/10/2024    ICD (implantable cardioverter-defibrillator) in place 10/10/2024    Stomach ulcer     Wears reading eyeglasses        No Known Allergies    Past Surgical History:   Procedure Laterality Date    ATRIAL APPENDAGE EXCLUSION LEFT WITH TRANSESOPHAGEAL ECHOCARDIOGRAM N/A 9/25/2020    Procedure: Atrial Appendage Occlusion (Watchman), start Eliquis 2 weeks prior and hold 1 day, GA;  Surgeon: Yonny Prado MD;  Location: [facility] EP INVASIVE LOCATION;  Service: Cardiology;  Laterality: N/A;    CARDIAC CATHETERIZATION      CARDIAC CATHETERIZATION N/A 6/2/2021    Procedure: Left Heart Cath- ADD ON/ WALK IN FOR RDS PER KT;  Surgeon: Pietro Quintana MD;  Location:  [facility];  Service: Cardiovascular;  Laterality: N/A;    CARDIAC CATHETERIZATION N/A 6/6/2023    Procedure: Coronary angiography;  Surgeon: Pietro Quintana MD;  Location:  [facility];  Service: Cardiovascular;  Laterality: N/A;  LVEF decrease with known CAD.  Discussed with Dr. Quintana, recommendation of [facility].     CARDIAC ELECTROPHYSIOLOGY PROCEDURE N/A  3/26/2021    Procedure: DDD PPM upgrade to Biv ICD (MDT), no meds to hold;  Surgeon: Yonny Prado MD;  Location:  [facility];  Service: Cardiology;  Laterality: N/A;    CARDIAC ELECTROPHYSIOLOGY PROCEDURE N/A 3/26/2021    Procedure: AVN RFA;  Surgeon: Yonny Prado MD;  Location:  [facility];  Service: Cardiovascular;  Laterality: N/A;    COLONOSCOPY      CORONARY ARTERY BYPASS GRAFT N/A 6/3/2021    Procedure: MEDIAN STERNOTOMY, CORONARY ARTERY BYPASS WITH INTERNAL MAMMARY ARTERY GRAFT X 2, EVH OF THE RIGHT GREATER SAPHENOUS ANA;  Surgeon: Jaime Shields MD;  Location:  [facility] OR;  Service: Cardiothoracic;  Laterality: N/A;    ENDOSCOPY N/A 6/15/2021    Procedure: ESOPHAGOGASTRODUODENOSCOPY;  Surgeon: Fransico Warren MD;  Location:  [facility] ENDOSCOPY;  Service: Gastroenterology;  Laterality: N/A;    INSERT / REPLACE / REMOVE PACEMAKER         Family History   Problem Relation Age of Onset    Stroke Father     Lung cancer Sister     Alcohol abuse Brother        Social History     Socioeconomic History    Marital status:     Number of children: 3   Tobacco Use    Smoking status: Former     Current packs/day: 0.00     Average packs/day: 1 pack/day for 50.0 years (50.0 ttl pk-yrs)     Types: Cigarettes     Start date: 1960     Quit date: 2010     Years since quittin.2     Passive exposure: Past    Smokeless tobacco: Never   Vaping Use    Vaping status: Never Used   Substance and Sexual Activity    Alcohol use: Not Currently    Drug use: Never    Sexual activity: Defer           Objective   Physical Exam  Vitals and nursing note reviewed.   Constitutional:       General: He is not in acute distress.     Appearance: Normal appearance. He is not ill-appearing.   HENT:      Head: Normocephalic and atraumatic.      Mouth/Throat:      Mouth: Mucous membranes are moist.   Eyes:      General: No scleral icterus.        Right eye: No discharge.         Left  eye: No discharge.      Conjunctiva/sclera: Conjunctivae normal.   Cardiovascular:      Rate and Rhythm: Normal rate and regular rhythm.      Heart sounds: No murmur heard.  Pulmonary:      Effort: Pulmonary effort is normal. No respiratory distress.      Breath sounds: Examination of the right-lower field reveals decreased breath sounds. Decreased breath sounds and rhonchi (diffuse) present. No wheezing.   Abdominal:      General: Bowel sounds are normal. There is no distension.      Palpations: Abdomen is soft.      Tenderness: There is no abdominal tenderness. There is no guarding or rebound.   Musculoskeletal:         General: No swelling. Normal range of motion.      Cervical back: Normal range of motion and neck supple.      Right lower leg: Edema present.      Left lower leg: Edema present.      Comments: +1 BLE edema   Skin:     General: Skin is warm and dry.      Findings: No rash.   Neurological:      General: No focal deficit present.      Mental Status: He is alert and oriented to person, place, and time. Mental status is at baseline.   Psychiatric:         Mood and Affect: Mood normal.         Behavior: Behavior normal.         Thought Content: Thought content normal.         Procedures           ED Course    I reviewed outside records.  CT chest performed yesterday shows significant cardiomegaly as well as mild pulmonary edema and a R pleural effusion, as well as moderate emphysema.  Pulmonary note 10/1/24 for chronic hypoxic resp failure and COPD, notes COPD meds, history of pleural effusion in the past.  Note indicates he was on amiodarone recently which was stopped by cards due to dyspnea, and that he has PAF and SSS s/p Watchman device, AV node ablation and Bi-V ICD, not anticoagulated.  Echo 5/25/23 EF 21-25%, moderate MR, TR.      CXR read by me CHF, effusions.      EKG read by me ventricular paced with a PVC.    Labs c/w CHF.      Patient stable on serial rechecks.  Discussed exam findings, test  results so far and concerns in detail at the bedside.  Discussed need for admission for further evaluation and treatment.  I discussed the patient's presentation, test results and need for admission with the hospitalist.                                           Medical Decision Making  Problems Addressed:  Acute on chronic systolic (congestive) heart failure: chronic illness or injury with severe exacerbation, progression, or side effects of treatment that poses a threat to life or bodily functions    Amount and/or Complexity of Data Reviewed  External Data Reviewed: notes.  Labs: ordered. Decision-making details documented in ED Course.  Radiology: ordered and independent interpretation performed. Decision-making details documented in ED Course.  ECG/medicine tests: ordered and independent interpretation performed. Decision-making details documented in ED Course.  Discussion of management or test interpretation with external provider(s): hospitalist    Risk  Prescription drug management.  Decision regarding hospitalization.        Final diagnoses:   Acute on chronic systolic (congestive) heart failure       ED Disposition  ED Disposition       ED Disposition   Decision to Admit    Condition   --    Comment   Level of Care: Telemetry [5]   Diagnosis: Acute exacerbation of CHF (congestive heart failure) [[id number]]   Admitting Physician: KERLEY, BRIAN JOSEPH [[id number]]   Attending Physician: KERLEY, BRIAN JOSEPH [[id number]]                 No follow-up provider specified.       Medication List      No changes were made to your prescriptions during this visit.            Anthony Sibley MD  10/10/24 [id number]

## 2024-10-11 ENCOUNTER — APPOINTMENT (OUTPATIENT)
Dept: CARDIOLOGY | Facility: HOSPITAL | Age: 79
End: 2024-10-11
Payer: MEDICARE

## 2024-10-11 ENCOUNTER — APPOINTMENT (OUTPATIENT)
Dept: GENERAL RADIOLOGY | Facility: HOSPITAL | Age: 79
End: 2024-10-11
Payer: MEDICARE

## 2024-10-11 LAB
ALBUMIN SERPL-MCNC: 3.6 G/DL (ref 3.5–5.2)
ALBUMIN/GLOB SERPL: 1.2 G/DL
ALP SERPL-CCNC: 108 U/L (ref 39–117)
ALT SERPL W P-5'-P-CCNC: 16 U/L (ref 1–41)
ANION GAP SERPL CALCULATED.3IONS-SCNC: 7 MMOL/L (ref 5–15)
AST SERPL-CCNC: 30 U/L (ref 1–40)
BH CV ECHO MEAS - AO MAX PG: 4.5 MMHG
BH CV ECHO MEAS - AO MEAN PG: 2.5 MMHG
BH CV ECHO MEAS - AO ROOT DIAM: 3.7 CM
BH CV ECHO MEAS - AO V2 MAX: 105 CM/SEC
BH CV ECHO MEAS - AO V2 VTI: 19.7 CM
BH CV ECHO MEAS - AVA(I,D): 2.23 CM2
BH CV ECHO MEAS - EDV(CUBED): 250 ML
BH CV ECHO MEAS - EDV(MOD-SP2): 308 ML
BH CV ECHO MEAS - EDV(MOD-SP4): 233 ML
BH CV ECHO MEAS - EF(MOD-BP): 20.5 %
BH CV ECHO MEAS - EF(MOD-SP2): 24.7 %
BH CV ECHO MEAS - EF(MOD-SP4): 20.2 %
BH CV ECHO MEAS - ESV(CUBED): 140.6 ML
BH CV ECHO MEAS - ESV(MOD-SP2): 232 ML
BH CV ECHO MEAS - ESV(MOD-SP4): 186 ML
BH CV ECHO MEAS - FS: 17.5 %
BH CV ECHO MEAS - IVS/LVPW: 1 CM
BH CV ECHO MEAS - IVSD: 1.2 CM
BH CV ECHO MEAS - LA DIMENSION: 4.8 CM
BH CV ECHO MEAS - LAT PEAK E' VEL: 6.7 CM/SEC
BH CV ECHO MEAS - LV DIASTOLIC VOL/BSA (35-75): 112.9 CM2
BH CV ECHO MEAS - LV MASS(C)D: 340.4 GRAMS
BH CV ECHO MEAS - LV MAX PG: 2.05 MMHG
BH CV ECHO MEAS - LV MEAN PG: 1 MMHG
BH CV ECHO MEAS - LV SYSTOLIC VOL/BSA (12-30): 90.2 CM2
BH CV ECHO MEAS - LV V1 MAX: 71.6 CM/SEC
BH CV ECHO MEAS - LV V1 VTI: 11.6 CM
BH CV ECHO MEAS - LVIDD: 6.3 CM
BH CV ECHO MEAS - LVIDS: 5.2 CM
BH CV ECHO MEAS - LVOT AREA: 3.8 CM2
BH CV ECHO MEAS - LVOT DIAM: 2.2 CM
BH CV ECHO MEAS - LVPWD: 1.2 CM
BH CV ECHO MEAS - MED PEAK E' VEL: 8.8 CM/SEC
BH CV ECHO MEAS - MR MAX PG: 85 MMHG
BH CV ECHO MEAS - MR MAX VEL: 461 CM/SEC
BH CV ECHO MEAS - MR MEAN PG: 51 MMHG
BH CV ECHO MEAS - MR MEAN VEL: 332 CM/SEC
BH CV ECHO MEAS - MR VTI: 146 CM
BH CV ECHO MEAS - MV MAX PG: 3.6 MMHG
BH CV ECHO MEAS - MV MEAN PG: 2 MMHG
BH CV ECHO MEAS - MV V2 VTI: 22.9 CM
BH CV ECHO MEAS - MVA(VTI): 1.92 CM2
BH CV ECHO MEAS - PA ACC TIME: 0.1 SEC
BH CV ECHO MEAS - PA V2 MAX: 86.6 CM/SEC
BH CV ECHO MEAS - PI END-D VEL: 207 CM/SEC
BH CV ECHO MEAS - RAP SYSTOLE: 15 MMHG
BH CV ECHO MEAS - RVSP: 50 MMHG
BH CV ECHO MEAS - SV(LVOT): 43.9 ML
BH CV ECHO MEAS - SV(MOD-SP2): 76 ML
BH CV ECHO MEAS - SV(MOD-SP4): 47 ML
BH CV ECHO MEAS - SVI(LVOT): 21.3 ML/M2
BH CV ECHO MEAS - SVI(MOD-SP2): 36.8 ML/M2
BH CV ECHO MEAS - SVI(MOD-SP4): 22.8 ML/M2
BH CV ECHO MEAS - TR MAX PG: 32.9 MMHG
BH CV ECHO MEAS - TR MAX VEL: 286.5 CM/SEC
BH CV XLRA - RV BASE: 5.2 CM
BH CV XLRA - RV LENGTH: 9.6 CM
BH CV XLRA - RV MID: 4.3 CM
BH CV XLRA - TDI S': 10.1 CM/SEC
BILIRUB SERPL-MCNC: 1.4 MG/DL (ref 0–1.2)
BUN SERPL-MCNC: 32 MG/DL (ref 8–23)
BUN/CREAT SERPL: 20.9 (ref 7–25)
CALCIUM SPEC-SCNC: 9.2 MG/DL (ref 8.6–10.5)
CHLORIDE SERPL-SCNC: 103 MMOL/L (ref 98–107)
CHOLEST SERPL-MCNC: 97 MG/DL (ref 0–200)
CO2 SERPL-SCNC: 31 MMOL/L (ref 22–29)
CREAT SERPL-MCNC: 1.53 MG/DL (ref 0.76–1.27)
D-LACTATE SERPL-SCNC: 1.1 MMOL/L (ref 0.5–2)
DEPRECATED RDW RBC AUTO: 55.6 FL (ref 37–54)
EGFRCR SERPLBLD CKD-EPI 2021: 46.2 ML/MIN/1.73
ERYTHROCYTE [DISTWIDTH] IN BLOOD BY AUTOMATED COUNT: 15.8 % (ref 12.3–15.4)
GLOBULIN UR ELPH-MCNC: 3.1 GM/DL
GLUCOSE SERPL-MCNC: 92 MG/DL (ref 65–99)
HBA1C MFR BLD: 6.3 % (ref 4.8–5.6)
HCT VFR BLD AUTO: 39.5 % (ref 37.5–51)
HDLC SERPL-MCNC: 44 MG/DL (ref 40–60)
HGB BLD-MCNC: 12.5 G/DL (ref 13–17.7)
LDLC SERPL CALC-MCNC: 42 MG/DL (ref 0–100)
LDLC/HDLC SERPL: 1.01 {RATIO}
LEFT ATRIUM VOLUME INDEX: 53.4 ML/M2
MAGNESIUM SERPL-MCNC: 2.3 MG/DL (ref 1.6–2.4)
MCH RBC QN AUTO: 30.5 PG (ref 26.6–33)
MCHC RBC AUTO-ENTMCNC: 31.6 G/DL (ref 31.5–35.7)
MCV RBC AUTO: 96.3 FL (ref 79–97)
PLATELET # BLD AUTO: 156 10*3/MM3 (ref 140–450)
PMV BLD AUTO: 10.2 FL (ref 6–12)
POTASSIUM SERPL-SCNC: 4.5 MMOL/L (ref 3.5–5.2)
PROT SERPL-MCNC: 6.7 G/DL (ref 6–8.5)
QT INTERVAL: 430 MS
QTC INTERVAL: 523 MS
RBC # BLD AUTO: 4.1 10*6/MM3 (ref 4.14–5.8)
SODIUM SERPL-SCNC: 141 MMOL/L (ref 136–145)
TRIGL SERPL-MCNC: 42 MG/DL (ref 0–150)
VLDLC SERPL-MCNC: 11 MG/DL (ref 5–40)
WBC NRBC COR # BLD AUTO: 6.53 10*3/MM3 (ref 3.4–10.8)

## 2024-10-11 PROCEDURE — 83735 ASSAY OF MAGNESIUM: CPT | Performed by: FAMILY MEDICINE

## 2024-10-11 PROCEDURE — 93306 TTE W/DOPPLER COMPLETE: CPT

## 2024-10-11 PROCEDURE — 99232 SBSQ HOSP IP/OBS MODERATE 35: CPT | Performed by: INTERNAL MEDICINE

## 2024-10-11 PROCEDURE — 97162 PT EVAL MOD COMPLEX 30 MIN: CPT

## 2024-10-11 PROCEDURE — 71045 X-RAY EXAM CHEST 1 VIEW: CPT

## 2024-10-11 PROCEDURE — 80053 COMPREHEN METABOLIC PANEL: CPT | Performed by: NURSE PRACTITIONER

## 2024-10-11 PROCEDURE — 94640 AIRWAY INHALATION TREATMENT: CPT

## 2024-10-11 PROCEDURE — 83036 HEMOGLOBIN GLYCOSYLATED A1C: CPT | Performed by: NURSE PRACTITIONER

## 2024-10-11 PROCEDURE — 93306 TTE W/DOPPLER COMPLETE: CPT | Performed by: INTERNAL MEDICINE

## 2024-10-11 PROCEDURE — 94664 DEMO&/EVAL PT USE INHALER: CPT

## 2024-10-11 PROCEDURE — 85027 COMPLETE CBC AUTOMATED: CPT | Performed by: NURSE PRACTITIONER

## 2024-10-11 PROCEDURE — 94799 UNLISTED PULMONARY SVC/PX: CPT

## 2024-10-11 PROCEDURE — 25010000002 SULFUR HEXAFLUORIDE MICROSPH 60.7-25 MG RECONSTITUTED SUSPENSION: Performed by: INTERNAL MEDICINE

## 2024-10-11 PROCEDURE — 94761 N-INVAS EAR/PLS OXIMETRY MLT: CPT

## 2024-10-11 PROCEDURE — 80061 LIPID PANEL: CPT | Performed by: NURSE PRACTITIONER

## 2024-10-11 PROCEDURE — 25010000002 BUMETANIDE PER 0.5 MG: Performed by: NURSE PRACTITIONER

## 2024-10-11 PROCEDURE — 83605 ASSAY OF LACTIC ACID: CPT | Performed by: EMERGENCY MEDICINE

## 2024-10-11 PROCEDURE — 99222 1ST HOSP IP/OBS MODERATE 55: CPT | Performed by: INTERNAL MEDICINE

## 2024-10-11 RX ORDER — ARFORMOTEROL TARTRATE 15 UG/2ML
15 SOLUTION RESPIRATORY (INHALATION)
Status: DISCONTINUED | OUTPATIENT
Start: 2024-10-11 | End: 2024-10-15 | Stop reason: HOSPADM

## 2024-10-11 RX ORDER — IPRATROPIUM BROMIDE AND ALBUTEROL SULFATE 2.5; .5 MG/3ML; MG/3ML
3 SOLUTION RESPIRATORY (INHALATION) 4 TIMES DAILY PRN
Status: DISCONTINUED | OUTPATIENT
Start: 2024-10-11 | End: 2024-10-15 | Stop reason: HOSPADM

## 2024-10-11 RX ADMIN — LEVOTHYROXINE SODIUM 75 MCG: 0.07 TABLET ORAL at 05:49

## 2024-10-11 RX ADMIN — SACUBITRIL AND VALSARTAN 0.5 TABLET: 24; 26 TABLET, FILM COATED ORAL at 22:08

## 2024-10-11 RX ADMIN — BUMETANIDE 2 MG: 0.25 INJECTION INTRAMUSCULAR; INTRAVENOUS at 05:49

## 2024-10-11 RX ADMIN — SACUBITRIL AND VALSARTAN 0.5 TABLET: 24; 26 TABLET, FILM COATED ORAL at 10:38

## 2024-10-11 RX ADMIN — Medication 10 ML: at 22:08

## 2024-10-11 RX ADMIN — METOPROLOL SUCCINATE 25 MG: 25 TABLET, EXTENDED RELEASE ORAL at 22:07

## 2024-10-11 RX ADMIN — ASPIRIN 81 MG: 81 TABLET, COATED ORAL at 11:56

## 2024-10-11 RX ADMIN — METOPROLOL SUCCINATE 25 MG: 25 TABLET, EXTENDED RELEASE ORAL at 10:39

## 2024-10-11 RX ADMIN — IPRATROPIUM BROMIDE 0.5 MG: 0.5 SOLUTION RESPIRATORY (INHALATION) at 15:41

## 2024-10-11 RX ADMIN — IPRATROPIUM BROMIDE AND ALBUTEROL SULFATE 3 ML: 2.5; .5 SOLUTION RESPIRATORY (INHALATION) at 10:45

## 2024-10-11 RX ADMIN — IPRATROPIUM BROMIDE 0.5 MG: 0.5 SOLUTION RESPIRATORY (INHALATION) at 20:15

## 2024-10-11 RX ADMIN — SULFUR HEXAFLUORIDE 2 ML: KIT at 10:26

## 2024-10-11 RX ADMIN — EMPAGLIFLOZIN 10 MG: 10 TABLET, FILM COATED ORAL at 10:39

## 2024-10-11 RX ADMIN — ATORVASTATIN CALCIUM 40 MG: 40 TABLET, FILM COATED ORAL at 22:07

## 2024-10-11 RX ADMIN — ARFORMOTEROL TARTRATE 15 MCG: 15 SOLUTION RESPIRATORY (INHALATION) at 20:15

## 2024-10-11 RX ADMIN — POLYETHYLENE GLYCOL 3350 17 G: 17 POWDER, FOR SOLUTION ORAL at 11:56

## 2024-10-11 RX ADMIN — Medication 10 ML: at 11:59

## 2024-10-11 NOTE — PLAN OF CARE
Problem: Adult Inpatient Plan of Care  Goal: Absence of Hospital-Acquired Illness or Injury  Intervention: Identify and Manage Fall Risk  Recent Flowsheet Documentation  Taken 10/11/2024 1800 by Katiana Tam RN  Safety Promotion/Fall Prevention:   assistive device/personal items within reach   clutter free environment maintained   fall prevention program maintained   nonskid shoes/slippers when out of bed   room organization consistent   safety round/check completed   toileting scheduled  Taken 10/11/2024 1600 by Katiana Tam RN  Safety Promotion/Fall Prevention:   assistive device/personal items within reach   clutter free environment maintained   fall prevention program maintained   nonskid shoes/slippers when out of bed   room organization consistent   safety round/check completed   toileting scheduled  Taken 10/11/2024 1400 by Katiana Tam RN  Safety Promotion/Fall Prevention:   assistive device/personal items within reach   clutter free environment maintained   fall prevention program maintained   nonskid shoes/slippers when out of bed   room organization consistent   safety round/check completed   toileting scheduled  Taken 10/11/2024 1200 by Katiana Tam RN  Safety Promotion/Fall Prevention:   assistive device/personal items within reach   clutter free environment maintained   fall prevention program maintained   nonskid shoes/slippers when out of bed   room organization consistent   safety round/check completed   toileting scheduled  Taken 10/11/2024 1000 by Katiana Tam, RN  Safety Promotion/Fall Prevention: patient off unit  Taken 10/11/2024 0800 by Katiana Tam, SASHA  Safety Promotion/Fall Prevention:   assistive device/personal items within reach   activity supervised   clutter free environment maintained   fall prevention program maintained   nonskid shoes/slippers when out of bed   room organization consistent   safety round/check completed   toileting scheduled  Intervention: Prevent Skin  Injury  Recent Flowsheet Documentation  Taken 10/11/2024 1800 by Katiana Tam RN  Body Position: position changed independently  Skin Protection:   adhesive use limited   incontinence pads utilized   tubing/devices free from skin contact   transparent dressing maintained  Taken 10/11/2024 1600 by Katiana Tam RN  Body Position: position changed independently  Skin Protection:   adhesive use limited   incontinence pads utilized   transparent dressing maintained   tubing/devices free from skin contact  Taken 10/11/2024 1400 by Katiana Tam RN  Body Position: position changed independently  Skin Protection:   adhesive use limited   incontinence pads utilized   tubing/devices free from skin contact   transparent dressing maintained  Taken 10/11/2024 1200 by Katiana Tam RN  Body Position: position changed independently  Skin Protection:   adhesive use limited   incontinence pads utilized   tubing/devices free from skin contact   transparent dressing maintained  Taken 10/11/2024 0800 by Katiana Tam RN  Body Position: position changed independently  Skin Protection:   adhesive use limited   incontinence pads utilized   tubing/devices free from skin contact   transparent dressing maintained  Intervention: Prevent and Manage VTE (Venous Thromboembolism) Risk  Recent Flowsheet Documentation  Taken 10/11/2024 1800 by Katiana Tam RN  Activity Management: activity encouraged  Taken 10/11/2024 1600 by Katiana Tam RN  Activity Management: activity encouraged  Taken 10/11/2024 1400 by Katiana Tam RN  Activity Management: activity encouraged  Taken 10/11/2024 1200 by Katiana Tam RN  Activity Management: activity encouraged  Taken 10/11/2024 0800 by Katiana Tam RN  Activity Management: activity encouraged  VTE Prevention/Management: sequential compression devices off  Range of Motion: active ROM (range of motion) encouraged  Intervention: Prevent Infection  Recent Flowsheet Documentation  Taken 10/11/2024  1800 by Katiana Tam RN  Infection Prevention:   cohorting utilized   environmental surveillance performed   rest/sleep promoted   single patient room provided  Taken 10/11/2024 1600 by Katiana Tam RN  Infection Prevention:   cohorting utilized   environmental surveillance performed   rest/sleep promoted   single patient room provided  Taken 10/11/2024 1400 by Katiana Tam RN  Infection Prevention:   cohorting utilized   environmental surveillance performed   rest/sleep promoted   single patient room provided  Taken 10/11/2024 1200 by Katiana Tam RN  Infection Prevention:   cohorting utilized   environmental surveillance performed   rest/sleep promoted   single patient room provided  Taken 10/11/2024 0800 by Katiana Tam RN  Infection Prevention:   cohorting utilized   environmental surveillance performed   rest/sleep promoted   single patient room provided  Goal: Optimal Comfort and Wellbeing  Intervention: Provide Person-Centered Care  Recent Flowsheet Documentation  Taken 10/11/2024 0800 by Katiana Tam RN  Trust Relationship/Rapport:   care explained   choices provided   questions answered   questions encouraged     Problem: COPD (Chronic Obstructive Pulmonary Disease) Comorbidity  Goal: Maintenance of COPD Symptom Control  Intervention: Maintain COPD-Symptom Control  Recent Flowsheet Documentation  Taken 10/11/2024 1800 by Katiana Tam RN  Medication Review/Management: medications reviewed  Taken 10/11/2024 1600 by Katiana Tam RN  Medication Review/Management: medications reviewed  Taken 10/11/2024 1400 by Katiana Tam RN  Medication Review/Management: medications reviewed  Taken 10/11/2024 1200 by Katiana Tam RN  Medication Review/Management: medications reviewed  Taken 10/11/2024 0800 by Katiana Tam RN  Supportive Measures: active listening utilized  Medication Review/Management: medications reviewed     Problem: Fall Injury Risk  Goal: Absence of Fall and Fall-Related  Injury  Intervention: Identify and Manage Contributors  Recent Flowsheet Documentation  Taken 10/11/2024 1800 by Katiana Tam RN  Medication Review/Management: medications reviewed  Self-Care Promotion: independence encouraged  Taken 10/11/2024 1600 by Katiana Tam RN  Medication Review/Management: medications reviewed  Self-Care Promotion: independence encouraged  Taken 10/11/2024 1400 by Katiana Tam RN  Medication Review/Management: medications reviewed  Self-Care Promotion: independence encouraged  Taken 10/11/2024 1200 by Katiana Tam RN  Medication Review/Management: medications reviewed  Taken 10/11/2024 0800 by Katiana Tam RN  Medication Review/Management: medications reviewed  Intervention: Promote Injury-Free Environment  Recent Flowsheet Documentation  Taken 10/11/2024 1800 by Katiana Tam RN  Safety Promotion/Fall Prevention:   assistive device/personal items within reach   clutter free environment maintained   fall prevention program maintained   nonskid shoes/slippers when out of bed   room organization consistent   safety round/check completed   toileting scheduled  Taken 10/11/2024 1600 by Katiana Tam RN  Safety Promotion/Fall Prevention:   assistive device/personal items within reach   clutter free environment maintained   fall prevention program maintained   nonskid shoes/slippers when out of bed   room organization consistent   safety round/check completed   toileting scheduled  Taken 10/11/2024 1400 by Katiana Tam RN  Safety Promotion/Fall Prevention:   assistive device/personal items within reach   clutter free environment maintained   fall prevention program maintained   nonskid shoes/slippers when out of bed   room organization consistent   safety round/check completed   toileting scheduled  Taken 10/11/2024 1200 by Katiana Tam RN  Safety Promotion/Fall Prevention:   assistive device/personal items within reach   clutter free environment maintained   fall prevention  program maintained   nonskid shoes/slippers when out of bed   room organization consistent   safety round/check completed   toileting scheduled  Taken 10/11/2024 1000 by Katiana Tam, RN  Safety Promotion/Fall Prevention: patient off unit  Taken 10/11/2024 0800 by Katiana Tam, RN  Safety Promotion/Fall Prevention:   assistive device/personal items within reach   activity supervised   clutter free environment maintained   fall prevention program maintained   nonskid shoes/slippers when out of bed   room organization consistent   safety round/check completed   toileting scheduled   Goal Outcome Evaluation:

## 2024-10-11 NOTE — PLAN OF CARE
Problem: Adult Inpatient Plan of Care  Goal: Absence of Hospital-Acquired Illness or Injury  Intervention: Identify and Manage Fall Risk  Recent Flowsheet Documentation  Taken 10/11/2024 0430 by Lalita Arroyo, RN  Safety Promotion/Fall Prevention:   activity supervised   assistive device/personal items within reach   clutter free environment maintained   fall prevention program maintained   lighting adjusted   nonskid shoes/slippers when out of bed   safety round/check completed   room organization consistent  Taken 10/11/2024 0230 by Lalita Arroyo, RN  Safety Promotion/Fall Prevention:   activity supervised   assistive device/personal items within reach   clutter free environment maintained   fall prevention program maintained   lighting adjusted   nonskid shoes/slippers when out of bed   room organization consistent   safety round/check completed  Taken 10/11/2024 0030 by Lalita Arroyo, RN  Safety Promotion/Fall Prevention:   activity supervised   assistive device/personal items within reach   clutter free environment maintained   fall prevention program maintained   lighting adjusted   nonskid shoes/slippers when out of bed   room organization consistent   safety round/check completed  Taken 10/10/2024 2230 by Lalita Arroyo, RN  Safety Promotion/Fall Prevention:   activity supervised   assistive device/personal items within reach   clutter free environment maintained   fall prevention program maintained   lighting adjusted   nonskid shoes/slippers when out of bed   room organization consistent   safety round/check completed  Taken 10/10/2024 2130 by Lalita Arroyo, RN  Safety Promotion/Fall Prevention:   activity supervised   assistive device/personal items within reach   clutter free environment maintained   fall prevention program maintained   lighting adjusted   nonskid shoes/slippers when out of bed   room organization consistent   safety round/check completed  Intervention: Prevent Skin  Injury  Recent Flowsheet Documentation  Taken 10/11/2024 0430 by Lalita Arroyo RN  Body Position: position changed independently  Taken 10/11/2024 0230 by Lalita Arroyo RN  Body Position: position changed independently  Taken 10/11/2024 0030 by Lalita Arroyo RN  Body Position: position changed independently  Taken 10/10/2024 2230 by Lalita Arroyo RN  Body Position: position changed independently  Taken 10/10/2024 2130 by Lalita Arroyo RN  Body Position: position changed independently  Intervention: Prevent and Manage VTE (Venous Thromboembolism) Risk  Recent Flowsheet Documentation  Taken 10/10/2024 2130 by Lalita Arroyo RN  Activity Management: activity encouraged  Intervention: Prevent Infection  Recent Flowsheet Documentation  Taken 10/11/2024 0430 by Lalita Arroyo RN  Infection Prevention:   environmental surveillance performed   hand hygiene promoted   rest/sleep promoted   single patient room provided  Taken 10/11/2024 0230 by Lalita Arroyo RN  Infection Prevention:   environmental surveillance performed   hand hygiene promoted   rest/sleep promoted   single patient room provided  Taken 10/11/2024 0030 by Lalita Arroyo RN  Infection Prevention:   environmental surveillance performed   hand hygiene promoted   rest/sleep promoted   single patient room provided  Taken 10/10/2024 2230 by Lalita Arroyo RN  Infection Prevention:   environmental surveillance performed   hand hygiene promoted   rest/sleep promoted   single patient room provided  Taken 10/10/2024 2130 by Lalita Arroyo RN  Infection Prevention:   environmental surveillance performed   hand hygiene promoted   rest/sleep promoted   single patient room provided  Goal: Optimal Comfort and Wellbeing  Intervention: Provide Person-Centered Care  Recent Flowsheet Documentation  Taken 10/10/2024 2130 by Lalita Arroyo RN  Trust Relationship/Rapport:   care explained   choices provided   questions answered    questions encouraged   thoughts/feelings acknowledged   Goal Outcome Evaluation:

## 2024-10-11 NOTE — THERAPY EVALUATION
Patient Name: Colin Albrecht  : 1945    MRN: [id number]                              Today's Date: 10/11/2024       Admit Date: 10/10/2024    Visit Dx:     ICD-10-CM ICD-9-CM   1. Acute on chronic systolic (congestive) heart failure  I50.23 428.23     428.0     Patient Active Problem List   Diagnosis    Permanent atrial fibrillation    SSS     Dilated CM     S/P Watchman device    Chronic systolic congestive heart failure    Coronary artery disease involving coronary bypass graft of native heart without angina pectoris    Essential hypertension    Former smoker    COPD     Suspected chronic renal insufficiency    S/P CABG x 2 on 6/3/2021    Nonsustained ventricular tachycardia    Symptomatic anemia    SOB (shortness of breath)    Anginal equivalent    Dependence on supplemental oxygen    Acute exacerbation of CHF (congestive heart failure)    Hypothyroidism (acquired)    ICD (implantable cardioverter-defibrillator) in place    CKD (chronic kidney disease)     Past Medical History:   Diagnosis Date    Abnormal ECG     Arrhythmia     Atrial fibrillation     CHF (congestive heart failure)     Depression     History of transfusion     bleeding stomach ulcer ~2014 x2, no reaction     Hypertension     Hypothyroidism (acquired) 10/10/2024    ICD (implantable cardioverter-defibrillator) in place 10/10/2024    Stomach ulcer     Wears reading eyeglasses      Past Surgical History:   Procedure Laterality Date    ATRIAL APPENDAGE EXCLUSION LEFT WITH TRANSESOPHAGEAL ECHOCARDIOGRAM N/A 2020    Procedure: Atrial Appendage Occlusion (Watchman), start Eliquis 2 weeks prior and hold 1 day, GA;  Surgeon: Yonny Prado MD;  Location:  [facility];  Service: Cardiology;  Laterality: N/A;    CARDIAC CATHETERIZATION      CARDIAC CATHETERIZATION N/A 2021    Procedure: Left Heart Cath- ADD ON/ WALK IN FOR RDS PER KT;  Surgeon: Pietro Quintana MD;  Location:  [facility]IVE LOCATION;  Service:  Cardiovascular;  Laterality: N/A;    CARDIAC CATHETERIZATION N/A 6/6/2023    Procedure: Coronary angiography;  Surgeon: Pietro Quintana MD;  Location:  [facility];  Service: Cardiovascular;  Laterality: N/A;  LVEF decrease with known CAD.  Discussed with Dr. Quintana, recommendation of [facility].     CARDIAC ELECTROPHYSIOLOGY PROCEDURE N/A 3/26/2021    Procedure: DDD PPM upgrade to Biv ICD (MDT), no meds to hold;  Surgeon: Yonny Prado MD;  Location:  [facility];  Service: Cardiology;  Laterality: N/A;    CARDIAC ELECTROPHYSIOLOGY PROCEDURE N/A 3/26/2021    Procedure: AVN RFA;  Surgeon: Yonny Prado MD;  Location:  [facility];  Service: Cardiovascular;  Laterality: N/A;    COLONOSCOPY      CORONARY ARTERY BYPASS GRAFT N/A 6/3/2021    Procedure: MEDIAN STERNOTOMY, CORONARY ARTERY BYPASS WITH INTERNAL MAMMARY ARTERY GRAFT X 2, EVH OF THE RIGHT GREATER SAPHENOUS ANA;  Surgeon: Jaime Shields MD;  Location:  [facility] OR;  Service: Cardiothoracic;  Laterality: N/A;    ENDOSCOPY N/A 6/15/2021    Procedure: ESOPHAGOGASTRODUODENOSCOPY;  Surgeon: Fransico Warren MD;  Location: [facility] ENDOSCOPY;  Service: Gastroenterology;  Laterality: N/A;    INSERT / REPLACE / REMOVE PACEMAKER        General Information       Row Name 10/11/24 3574          Physical Therapy Time and Intention    Document Type evaluation  -KW     Mode of Treatment individual therapy;physical therapy  -       Row Name 10/11/24 1352          General Information    Patient Profile Reviewed yes  -KW     Prior Level of Function independent:;all household mobility;community mobility;gait;transfer;bed mobility  -KW     Barriers to Rehab medically complex  -KW       Row Name 10/11/24 1350          Living Environment    People in Home spouse  -KW       Row Name 10/11/24 1350          Home Main Entrance    Number of Stairs, Main Entrance none  -KW       Row Name 10/11/24 1350          Stairs Within Home,  Primary    Number of Stairs, Within Home, Primary none  -KW       Row Name 10/11/24 1350          Cognition    Orientation Status (Cognition) oriented x 3  -KW       Row Name 10/11/24 1350          Safety Issues, Functional Mobility    Impairments Affecting Function (Mobility) endurance/activity tolerance;shortness of breath  -KW               User Key  (r) = Recorded By, (t) = Taken By, (c) = Cosigned By      Initials Name Provider Type    KW Swetha Chatman PT Physical Therapist                   Mobility       Row Name 10/11/24 1350          Bed Mobility    Bed Mobility supine-sit-supine  -KW     Supine-Sit-Supine [place] (Bed Mobility) independent  -KW       Row Name 10/11/24 1350          Bed-Chair Transfer    Bed-Chair [place] (Transfers) independent  -KW       Row Name 10/11/24 1350          Sit-Stand Transfer    Sit-Stand [place] (Transfers) standby assist  -KW       Row Name 10/11/24 1350          Gait/Stairs (Locomotion)    [place] Level (Gait) standby assist  -KW     Distance in Feet (Gait) 120  -KW               User Key  (r) = Recorded By, (t) = Taken By, (c) = Cosigned By      Initials Name Provider Type    KW Swetha Chatman PT Physical Therapist                   Obj/Interventions       Row Name 10/11/24 1350          Strength Comprehensive (MMT)    General Manual Muscle Testing (MMT) Assessment no strength deficits identified  -KW       Row Name 10/11/24 1350          Balance    Balance Assessment sitting static balance;sitting dynamic balance;sit to stand dynamic balance;standing static balance;standing dynamic balance  -KW     Static Sitting Balance independent  -KW     Dynamic Sitting Balance independent  -KW     Position, Sitting Balance unsupported;sitting edge of bed  -KW     Sit to Stand Dynamic Balance independent  -KW     Static Standing Balance independent  -KW     Dynamic Standing Balance standby assist  -KW     Position/Device Used, Standing Balance  unsupported  -KW     Balance Interventions sitting;standing;sit to stand;narrowed base of support;single limb stance;tandem standing;eyes closed during activity  -               User Key  (r) = Recorded By, (t) = Taken By, (c) = Cosigned By      Initials Name Provider Type    Swetha Villalba PT Physical Therapist                   Goals/Plan    No documentation.                  Clinical Impression       [facility] Name 10/11/24 1350          Pain    Pretreatment Pain Rating 0/10 - no pain  -KW       Row Name 10/11/24 [facility]          Plan of Care Review    Plan of Care Reviewed With patient  -KW     Progress no change  -     Outcome Evaluation PT eval complete. Patient demonstrating complete independence with bed mobility, and transfers. Patient demonstrates adequate balance but poor activity tolerance. Patient became SOA with ambulation dropping to 84% however with PLB nicko to >90%. Patient provided extensive education about importance of continued mobility/ benefits of PT for improved activity tolerance. He verbalized understanding and reports that he can ambulate independently for endurance training, declining continued PT despite education. PT will sign off at this time per patient request  -KW       Row Name 10/11/24 [facility]          Therapy Assessment/Plan (PT)    Criteria for Skilled Interventions Met (PT) no;no problems identified which require skilled intervention  -     Therapy Frequency (PT) evaluation only  -       Row Name 10/11/24 1350          Vital Signs    Pre SpO2 (%) 100  -KW     Intra SpO2 (%) 84  -KW     Post SpO2 (%) 93  -KW       Row Name 10/11/24 1350          Positioning and Restraints    Pre-Treatment Position in bed  -KW     Post Treatment Position bed  -KW     In Bed sitting EOB;call light within reach  -               User Key  (r) = Recorded By, (t) = Taken By, (c) = Cosigned By      Initials Name Provider Type    Swetha Villalba PT Physical Therapist                    Outcome Measures       Row Name 10/11/24 0800          How much help from another person do you currently need...    Turning from your back to your side while in flat bed without using bedrails? 4  -LS     Moving from lying on back to sitting on the side of a flat bed without bedrails? 4  -LS     Moving to and from a bed to a chair (including a wheelchair)? 4  -LS     Standing up from a chair using your arms (e.g., wheelchair, bedside chair)? 4  -LS     Climbing 3-5 steps with a railing? 4  -LS     To walk in hospital room? 4  -LS     AM-PAC 6 Clicks Score (PT) 24  -LS     Highest Level of Mobility Goal 8 --> Walked 250 feet or more  -LS               User Key  (r) = Recorded By, (t) = Taken By, (c) = Cosigned By      Initials Name Provider Type    Katiana Mojica RN Registered Nurse                                   PT Recommendation and Plan     Plan of Care Reviewed With: patient  Progress: no change  Outcome Evaluation: PT eval complete. Patient demonstrating complete independence with bed mobility, and transfers. Patient demonstrates adequate balance but poor activity tolerance. Patient became SOA with ambulation dropping to 84% however with PLB nicko to >90%. Patient provided extensive education about importance of continued mobility/ benefits of PT for improved activity tolerance. He verbalized understanding and reports that he can ambulate independently for endurance training, declining continued PT despite education. PT will sign off at this time per patient request     Time Calculation:   PT Evaluation Complexity  History, PT Evaluation Complexity: 3 or more personal factors and/or comorbidities  Examination of Body Systems (PT Eval Complexity): total of 3 or more elements  Clinical Presentation (PT Evaluation Complexity): evolving  Clinical Decision Making (PT Evaluation Complexity): moderate complexity  Overall Complexity (PT Evaluation Complexity): moderate complexity     PT Charges       Row Name  10/11/24 1350             Time Calculation    Start Time 1350  -KW      PT Received On 10/11/24  -KW      PT Goal Re-Cert Due Date 10/21/24  -KW         Untimed Charges    PT Eval/Re-eval Minutes 56  -KW         Total Minutes    Untimed Charges Total Minutes 56  -KW       Total Minutes 56  -KW                User Key  (r) = Recorded By, (t) = Taken By, (c) = Cosigned By      Initials Name Provider Type    Swetha Villalba PT Physical Therapist                  Therapy Charges for Today       Code Description Service Date Service Provider Modifiers Qty    [id number] HC PT EVAL MOD COMPLEXITY 4 10/11/2024 Swetha Chatman PT GP 1            PT G-Codes  Outcome Measure Options: AM-PAC 6 Clicks Basic Mobility (PT)  AM-PAC 6 Clicks Score (PT): 24  [place] Total Score: 52  PT Discharge Summary  Anticipated Discharge Disposition (PT): home    Swetha Chatman PT  10/11/2024

## 2024-10-11 NOTE — PLAN OF CARE
Goal Outcome Evaluation:  Plan of Care Reviewed With: patient        Progress: no change  Outcome Evaluation: PT eval complete. Patient demonstrating complete independence with bed mobility, and transfers. Patient demonstrates adequate balance but poor activity tolerance. Patient became SOA with ambulation dropping to 84% however with PLB nicko to >90%. Patient provided extensive education about importance of continued mobility/ benefits of PT for improved activity tolerance. He verbalized understanding and reports that he can ambulate independently for endurance training, declining continued PT despite education. PT will sign off at this time per patient request      Anticipated Discharge Disposition (PT): home

## 2024-10-11 NOTE — PROGRESS NOTES
[facility] Medicine Services  PROGRESS NOTE    Patient Name: Colin Albrecht  : 1945  MRN: [id number]    Date of Admission: 10/10/2024  Primary Care Physician: Arun Soliman MD    Subjective   Subjective     CC:  Progressive soa    HPI:  Feels ok.  Breathing better since had neb this AM.        Objective   Objective     Vital Signs:   Temp:  [96.4 °F (35.8 °C)-98 °F (36.7 °C)] 98 °F (36.7 °C)  Heart Rate:  [73-90] 75  Resp:  [14-18] 18  BP: (110-139)/(49-85) 127/49  Flow (L/min):  [2-3] 2     Physical Exam:  Constitutional: No acute distress, awake, alert  HENT: NCAT, mucous membranes moist  Respiratory: Clear to auscultation bilaterally, respiratory effort normal on 2LNC  Cardiovascular: RRR, no murmurs, rubs, or gallops  Gastrointestinal: Positive bowel sounds, soft, nontender, nondistended  Musculoskeletal: 1+pitting edema bilateral LE's  Psychiatric: Appropriate affect, cooperative  Neurologic: Oriented x 3, FLOREZ, speech clear  Skin: No rashes      Results Reviewed:  LAB RESULTS:      Lab 10/11/24  0606 10/11/24  0024 10/10/24  1945 10/10/24  1605 10/10/24  1243   WBC 6.53  --   --   --  6.75   HEMOGLOBIN 12.5*  --   --   --  12.7*   HEMATOCRIT 39.5  --   --   --  41.2   PLATELETS 156  --   --   --  156   NEUTROS ABS  --   --   --   --  5.24   IMMATURE GRANS (ABS)  --   --   --   --  0.02   LYMPHS ABS  --   --   --   --  0.54*   MONOS ABS  --   --   --   --  0.79   EOS ABS  --   --   --   --  0.12   MCV 96.3  --   --   --  99.3*   PROCALCITONIN  --   --   --   --  0.05   LACTATE  --  1.1 2.6* 2.5* 2.1*   HSTROP T  --   --   --   --  21         Lab 10/11/24  0606 10/10/24  1243   SODIUM 141 140   POTASSIUM 4.5 4.5   CHLORIDE 103 102   CO2 31.0* 27.0   ANION GAP 7.0 11.0   BUN 32* 33*   CREATININE 1.53* 1.31*   EGFR 46.2* 55.7*   GLUCOSE 92 113*   CALCIUM 9.2 9.4   MAGNESIUM 2.3  --    HEMOGLOBIN A1C 6.30*  --    TSH  --  9.290*         Lab 10/11/24  0606 10/10/24  1243    TOTAL PROTEIN 6.7 7.0   ALBUMIN 3.6 3.9   GLOBULIN 3.1 3.1   ALT (SGPT) 16 17   AST (SGOT) 30 36   BILIRUBIN 1.4* 1.5*   ALK PHOS 108 114         Lab 10/10/24  1243   PROBNP 10,682.0*   HSTROP T 21         Lab 10/11/24  0606   CHOLESTEROL 97   LDL CHOL 42   HDL CHOL 44   TRIGLYCERIDES 42             Brief Urine Lab Results       None            Microbiology Results Abnormal       Procedure Component Value - Date/Time    COVID PRE-OP / PRE-PROCEDURE SCREENING ORDER (NO ISOLATION) - Swab, Nasal Cavity [[id number]]  (Normal) Collected: 10/10/24 1302    Lab Status: Final result Specimen: Swab from Nasal Cavity Updated: 10/10/24 1338    Narrative:      The following orders were created for panel order COVID PRE-OP / PRE-PROCEDURE SCREENING ORDER (NO ISOLATION) - Swab, Nasal Cavity.  Procedure                               Abnormality         Status                     ---------                               -----------         ------                     COVID-19 and FLU A/B PCR...[[id number]]  Normal              Final result                 Please view results for these tests on the individual orders.    COVID-19 and FLU A/B PCR, 1 HR TAT - Swab, Nasopharynx [[id number]]  (Normal) Collected: 10/10/24 1302    Lab Status: Final result Specimen: Swab from Nasopharynx Updated: 10/10/24 1338     COVID19 Not Detected     Influenza A PCR Not Detected     Influenza B PCR Not Detected    Narrative:      Fact sheet for providers: https://www.fda.gov/media/785231/download    Fact sheet for patients: https://www.fda.gov/media/328205/download    Test performed by PCR.            Adult Transthoracic Echo Complete W/ Cont if Necessary Per Protocol    Result Date: 10/11/2024    Left ventricular systolic function is severely decreased. Calculated left ventricular EF = 20.5% Left ventricular ejection fraction appears to be less than 20%.   The left ventricular cavity is moderately dilated.   Left ventricular wall thickness is consistent  with mild concentric hypertrophy.   Severely reduced right ventricular systolic function noted.   The right ventricular cavity is moderately dilated.   The left atrial cavity is moderate to severely dilated.   The right atrial cavity is severely  dilated.   There is mild calcification of the aortic valve.   Moderate mitral valve regurgitation is present.   Moderate tricuspid valve regurgitation is present.   Estimated right ventricular systolic pressure from tricuspid regurgitation is moderately elevated (45-55 mmHg).   Moderate pulmonic valve regurgitation is present. No significant change compared to the 2023 echo.     XR Chest 1 View    Result Date: 10/11/2024  XR CHEST 1 VW Date of Exam: 10/11/2024 2:59 AM EDT Indication: f/u dyspnea, a/c chf, pl effusions Comparison: October 10, 2024 Findings: A cardiac ICD device is present. Sternotomy wires noted. The heart is enlarged. There are bibasilar densities which could reflect effusions. Pulmonary vascular markings are prominent. There are some interstitial changes. Some vascular congestion and edema could account for this.     Impression: Impression: 1.Cardiomegaly with what looks like some vascular congestion and probable interstitial edema. 2.Bibasilar effusions Electronically Signed: Percy Mcguire MD  10/11/2024 7:12 AM EDT  Workstation ID: [id number]    XR Chest 1 View    Result Date: 10/10/2024  XR CHEST 1 VW Date of Exam: 10/10/2024 12:45 PM EDT Indication: SOA triage protocol Comparison: CT chest without contrast 10/9/2024 Findings: Left-sided AICD noted. Status post sternotomy and CABG. Central pulmonary vascular congestion with interstitial thickening suggesting pulmonary edema. Underlying emphysema. Persistent small bilateral pleural effusions right greater than left with bibasilar atelectasis. Negative for pneumothorax.     Impression: Impression: 1. Central pulmonary vascular congestion with interstitial thickening suggesting pulmonary edema. 2. Persistent  small bilateral pleural effusions right greater than left with bibasilar atelectasis. 3. Emphysema. Electronically Signed: Luis Richards MD  10/10/2024 1:28 PM EDT  Workstation ID: [id number]     Results for orders placed during the hospital encounter of 10/10/24    Adult Transthoracic Echo Complete W/ Cont if Necessary Per Protocol    Interpretation Summary    Left ventricular systolic function is severely decreased. Calculated left ventricular EF = 20.5% Left ventricular ejection fraction appears to be less than 20%.    The left ventricular cavity is moderately dilated.    Left ventricular wall thickness is consistent with mild concentric hypertrophy.    Severely reduced right ventricular systolic function noted.    The right ventricular cavity is moderately dilated.    The left atrial cavity is moderate to severely dilated.    The right atrial cavity is severely  dilated.    There is mild calcification of the aortic valve.    Moderate mitral valve regurgitation is present.    Moderate tricuspid valve regurgitation is present.    Estimated right ventricular systolic pressure from tricuspid regurgitation is moderately elevated (45-55 mmHg).    Moderate pulmonic valve regurgitation is present.    No significant change compared to the 2023 echo.      Current medications:  Scheduled Meds:arformoterol, 15 mcg, Nebulization, BID - RT  aspirin, 81 mg, Oral, Daily  atorvastatin, 40 mg, Oral, Nightly  empagliflozin, 10 mg, Oral, Daily  ipratropium, 0.5 mg, Nebulization, 4x Daily - RT  levothyroxine, 75 mcg, Oral, Q AM  metoprolol succinate XL, 25 mg, Oral, BID  multivitamin with minerals, 1 tablet, Oral, Daily  pharmacy consult - [place], , Does not apply, Daily  sacubitril-valsartan, 0.5 tablet, Oral, BID  sodium chloride, 10 mL, Intravenous, Q12H      Continuous Infusions:   PRN Meds:.  acetaminophen **OR** acetaminophen **OR** acetaminophen    senna-docusate sodium **AND** polyethylene glycol **AND** bisacodyl **AND**  bisacodyl    ipratropium-albuterol    rOPINIRole    sodium chloride    sodium chloride    Assessment & Plan   Assessment & Plan     Active Hospital Problems    Diagnosis  POA    **Acute exacerbation of CHF (congestive heart failure) [I50.9]  Yes    Hypothyroidism (acquired) [E03.9]  Yes    ICD (implantable cardioverter-defibrillator) in place [Z95.810]  Yes    CKD (chronic kidney disease) [N18.9]  Yes    Dependence on supplemental oxygen [Z99.81]  Not Applicable    COPD  [J44.9]  Yes    Essential hypertension [I10]  Yes    Coronary artery disease involving coronary bypass graft of native heart without angina pectoris [I25.810]  Yes    Dilated CM  [I42.0]  Yes    S/P Watchman device [Z95.818]  Yes    SSS  [I49.5]  Yes    Permanent atrial fibrillation [I48.21]  Yes      Resolved Hospital Problems   No resolved problems to display.        Brief Hospital Course to date:  Colin Albrecht is a 79 y.o. male with past medical history significant for hypothyroidism, SSS s/p ICD,CAD, dilated CM, CHF w an EF 20.4 % (at last check 5/2023), A-fib s/p Watchman, ex-smoker, chronic oxygen use, and probable chronic CKD.  Patient follows with Dr. Wei with pulmonary, Dr. Quintana with cardiology, and Dr. Prado with cards EP.  Patient was last seen by pulmonary approximately 1 month ago with complaints of progressive shortness of air.  CT of the chest was completed outpatient PTA.  CT showed moderate to marked cardiomegaly new compared to prior CT from 2021, pulmonary edema with sm to mod right pl effusion and trace left pl effusion.  Pt was to have outpatient echo in approx 2 weeks and follow-up with Dr. Quintana, however continues to have progressive shortness of air    Progressive dyspnea  Acute/chronic CHF  CAD/dilated CM/EF 20.4 at last check (5/2023)  SSS/ICD/A-fib s/p Watchman  Heart murmur  --Follows with Dr. Quintana with cards, Dr. Prado with EP.  -- Check echo  -- BNP 10K, CXR with pulmonary venous congestion and  small effusions  -- cards/Dr. Quintana following.  Recs bumex 2mg IV daily.  Continue toprol, entresto, and jardiance, will try increasing entresto dose.  S/p watchman so no need for AC     COPD  Chronic oxygen use  --Follows with Dr. Wei  -- Recently added home nebulizers, initially helped  -- wears 2LNC O2 at home     Hypothyroidism  -- Continue home Synthroid.  Check TSH as last on file was from 2023.       Expected Discharge Location and Transportation:   Expected Discharge   Expected Discharge Date: 10/13/2024; Expected Discharge Time:      VTE Prophylaxis:  Mechanical VTE prophylaxis orders are present.         AM-PAC 6 Clicks Score (PT): 24 (10/11/24 0800)    CODE STATUS:   Code Status and Medical Interventions: CPR (Attempt to Resuscitate); Full Support   Ordered at: 10/10/24 1602     Level Of Support Discussed With:    Patient    Next of Kin (If No Surrogate)     Code Status (Patient has no pulse and is not breathing):    CPR (Attempt to Resuscitate)     Medical Interventions (Patient has pulse or is breathing):    Full Support       Ariel Moralez MD  10/11/24

## 2024-10-11 NOTE — PROGRESS NOTES
Discharge Planning Assessment  [facility]     Patient Name: Colin Albrecht  MRN: [id number]  Today's Date: 10/11/2024    Admit Date: 10/10/2024        Discharge Needs Assessment       Row Name 10/11/24 1552       Living Environment    People in Home spouse    Quality of Family Relationships helpful       Discharge Needs Assessment    Equipment Currently Used at Home oxygen    Concerns to be Addressed discharge planning    Anticipated Changes Related to Illness none    Equipment Needed After Discharge oxygen                   Discharge Plan       Row Name 10/11/24 1553       Plan    Plan Comments Lives in [place] and has [employer] Medicare. His goal is to be discharged home. He is independent with no needs currently. Case management will continue to follow for discharge needs.    Final Discharge Disposition Code 01 - home or self-care                  Continued Care and Services - Admitted Since 10/10/2024    No active coordination exists for this encounter.       Expected Discharge Date and Time       Expected Discharge Date Expected Discharge Time    Oct 13, 2024            Demographic Summary       Row Name 10/11/24 1551       General Information    Admission Type inpatient    Arrived From home    Referral Source patient    Reason for Consult discharge planning    Preferred Language English       Contact Information    Permission Granted to Share Info With     Contact Information Obtained for                    Functional Status       Row Name 10/11/24 1552       Functional Status    Usual Activity Tolerance good    Current Activity Tolerance fair       Functional Status, IADL    Medications independent    Meal Preparation independent    Housekeeping independent    Laundry independent    Shopping independent                   Psychosocial    No documentation.                  Abuse/Neglect    No documentation.                  Legal    No documentation.                  Substance Abuse     No documentation.                  Patient Forms    No documentation.                     FAITH Michael

## 2024-10-11 NOTE — CONSULTS
[facility] at [facility]  Cardiology Consult Note  [facility] 5F          Patient Identification:  Colin Albrecht      [id number]  79 y.o.        male  1945       Date of Consultation:  10/11/24    Reason for Consultation: Acute on chronic heart failure with reduced ejection fraction    PCP: Arun Soliman MD  Primary cardiologist: Kalyan    History of Present Illness:     Pleasant 79-year-old gentleman with a history of chronic heart failure with reduced EF EF previously 25%, CAD status post CABG last cath in 2023 with patent grafts, history of paroxysmal A-fib status post watchman and AV node ablation with BiV ICD followed by Dr. Prado, also has chronic oxygen use severe COPD, and CKD stage IIIa-B.  He reports progressive shortness of breath mainly with exertion over the past 3 to 4 months, getting worse also has gained about 7 to 8 pounds over the last 1 to 2 weeks, came to the emergency room due to worsening dyspnea.  He has not doubled his Lasix at home.  Previously half ref GDMT has been limited by CKD and hypotension.  Currently feels slightly better after receiving IV Bumex, with acceptable urine output.    Past History:  Past Medical History:   Diagnosis Date    Abnormal ECG     Arrhythmia     Atrial fibrillation     CHF (congestive heart failure)     Depression     History of transfusion     bleeding stomach ulcer ~2014 x2, no reaction     Hypertension     Hypothyroidism (acquired) 10/10/2024    ICD (implantable cardioverter-defibrillator) in place 10/10/2024    Stomach ulcer     Wears reading eyeglasses      Past Surgical History:   Procedure Laterality Date    ATRIAL APPENDAGE EXCLUSION LEFT WITH TRANSESOPHAGEAL ECHOCARDIOGRAM N/A 9/25/2020    Procedure: Atrial Appendage Occlusion (Watchman), start Eliquis 2 weeks prior and hold 1 day, GA;  Surgeon: Yonny Prado MD;  Location: [facility] INVASIVE LOCATION;  Service: Cardiology;   Laterality: N/A;    CARDIAC CATHETERIZATION      CARDIAC CATHETERIZATION N/A 2021    Procedure: Left Heart Cath- ADD ON/ WALK IN FOR RDS PER KT;  Surgeon: Pietro Quintana MD;  Location:  [facility] CATH INVASIVE LOCATION;  Service: Cardiovascular;  Laterality: N/A;    CARDIAC CATHETERIZATION N/A 2023    Procedure: Coronary angiography;  Surgeon: Pietro Quintana MD;  Location:  [facility] CATH INVASIVE LOCATION;  Service: Cardiovascular;  Laterality: N/A;  LVEF decrease with known CAD.  Discussed with Dr. Quintana, recommendation of [facility].     CARDIAC ELECTROPHYSIOLOGY PROCEDURE N/A 3/26/2021    Procedure: DDD PPM upgrade to Biv ICD (MDT), no meds to hold;  Surgeon: Yonny Prado MD;  Location:  [facility] EP INVASIVE LOCATION;  Service: Cardiology;  Laterality: N/A;    CARDIAC ELECTROPHYSIOLOGY PROCEDURE N/A 3/26/2021    Procedure: AVN RFA;  Surgeon: Yonny Prado MD;  Location:  [facility] EP INVASIVE LOCATION;  Service: Cardiovascular;  Laterality: N/A;    COLONOSCOPY      CORONARY ARTERY BYPASS GRAFT N/A 6/3/2021    Procedure: MEDIAN STERNOTOMY, CORONARY ARTERY BYPASS WITH INTERNAL MAMMARY ARTERY GRAFT X 2, EVH OF THE RIGHT GREATER SAPHENOUS ANA;  Surgeon: Jaime Shields MD;  Location:  [facility] OR;  Service: Cardiothoracic;  Laterality: N/A;    ENDOSCOPY N/A 6/15/2021    Procedure: ESOPHAGOGASTRODUODENOSCOPY;  Surgeon: Fransico Warren MD;  Location:  [facility] ENDOSCOPY;  Service: Gastroenterology;  Laterality: N/A;    INSERT / REPLACE / REMOVE PACEMAKER       No Known Allergies  Social History     Socioeconomic History    Marital status:     Number of children: 3   Tobacco Use    Smoking status: Former     Current packs/day: 0.00     Average packs/day: 1 pack/day for 50.0 years (50.0 ttl pk-yrs)     Types: Cigarettes     Start date: 1960     Quit date: 2010     Years since quittin.2     Passive exposure: Past    Smokeless tobacco: Never   Vaping Use    Vaping status: Never Used   Substance  and Sexual Activity    Alcohol use: Not Currently    Drug use: Never    Sexual activity: Defer     Family History   Problem Relation Age of Onset    Stroke Father     Lung cancer Sister     Alcohol abuse Brother      Medications:  Medications Prior to Admission   Medication Sig Dispense Refill Last Dose    albuterol sulfate  (90 Base) MCG/ACT inhaler Inhale 2 puffs Every 4 (Four) Hours As Needed for Wheezing. 54 g 3 Past Week    aspirin 81 MG EC tablet Take 1 tablet by mouth Daily.   10/10/2024    atorvastatin (LIPITOR) 40 MG tablet TAKE 1 TABLET BY MOUTH EVERY NIGHT. 90 tablet 3 10/10/2024    Coenzyme Q10 (CoQ10) 100 MG capsule Take 1 capsule by mouth Daily. OTC   10/10/2024    empagliflozin (JARDIANCE) 10 MG tablet tablet Take 1 tablet by mouth Daily. 90 tablet 2 10/10/2024    furosemide (LASIX) 40 MG tablet Take 1 tablet by mouth Daily. 90 tablet 1 10/10/2024    ipratropium-albuterol (DUO-NEB) 0.5-2.5 mg/3 ml nebulizer Take 3 mL by nebulization 4 (Four) Times a Day As Needed for Wheezing or Shortness of Air. 120 mL 11 Past Week    levothyroxine (SYNTHROID, LEVOTHROID) 75 MCG tablet Take 1 tablet by mouth Daily.   10/10/2024    metoprolol succinate XL (TOPROL-XL) 25 MG 24 hr tablet Take 1 tablet by mouth twice daily 60 tablet 5 10/10/2024    multivitamin with minerals tablet tablet Take 1 tablet by mouth Daily.   10/10/2024    O2 (OXYGEN) Inhale 3 L/min Every Night.   10/9/2024    polyethylene glycol (MIRALAX) 17 GM/SCOOP powder DISSOLVE 1 CAPFUL IN 8 OUNCES OF LIQUID AND DRINK ONCE DAILY   10/10/2024    rOPINIRole (REQUIP) 0.5 MG tablet Take 1 tablet by mouth At Night As Needed.   Past Week    sacubitril-valsartan (Entresto) 24-26 MG tablet Take 0.5 tablets by mouth 2 (Two) Times a Day. 90 tablet 2 10/10/2024    temazepam (RESTORIL) 15 MG capsule TAKE 1 CAPSULE BY MOUTH EVERY DAY AT BEDTIME AS NEEDED FOR SLEEP   Past Week    tiotropium bromide-olodaterol (STIOLTO RESPIMAT) 2.5-2.5 MCG/ACT aerosol solution  "inhaler Inhale 2 puffs Daily. 3 each 3 10/10/2024     Current medications:  aspirin, 81 mg, Oral, Daily  atorvastatin, 40 mg, Oral, Nightly  empagliflozin, 10 mg, Oral, Daily  levothyroxine, 75 mcg, Oral, Q AM  metoprolol succinate XL, 25 mg, Oral, BID  multivitamin with minerals, 1 tablet, Oral, Daily  pharmacy consult - [place], , Does not apply, Daily  sacubitril-valsartan, 0.5 tablet, Oral, BID  sodium chloride, 10 mL, Intravenous, Q12H      Current IV drips:       Review of Systems:    Constitutional no fever,  no weight loss   Skin no rash   Otolaryngeal no difficulty swallowing   Cardiovascular See HPI   Pulmonary no cough, no sputum production   Gastrointestinal no constipation, no diarrhea   Genitourinary no dysuria, no hematuria   Hematologic no easy bruisability, no abnormal bleeding   Musculoskeletal no muscle pain   Neurologic no dizziness, no falls     Physical exam:    /77 (BP Location: Right arm, Patient Position: Lying)   Pulse 80   Temp 98 °F (36.7 °C) (Oral)   Resp 18   Ht 190.5 cm (75\")   Wt 78.9 kg (173 lb 14.4 oz)   SpO2 97%   BMI 21.74 kg/m²  Body mass index is 21.74 kg/m².   Oxygen saturation   SpO2  Min: 91 %  Max: 100 %    General Appearance:   well developed  well nourished  HENT:   oropharynx moist  lips not cyanotic  Neck:  thyroid not enlarged  supple  Respiratory:  no respiratory distress  normal breath sounds  no rales  Cardiovascular:  no jugular venous distention  regular rhythm  apical impulse normal  S1 normal, S2 normal  no S3, no S4   no murmur  no rub, no thrill  carotid pulses normal; no bruit  pedal pulses normal  lower extremity edema: none    Gastrointestinal:   bowel sounds normal  non-tender  no hepatomegaly, no splenomegaly  Musculoskeletal:  no clubbing of fingers.   normocephalic, head atraumatic  Skin:   warm, dry  Psychiatric:  judgement and insight appropriate  normal mood and affect    Cardiographics:     ECG  (personally reviewed) ventricular paced " "rhythm   Telemetry:  (personally reviewed) paced rhythm with frequent PVCs   ECHO:   Results for orders placed during the hospital encounter of 05/24/23    Adult Transthoracic Echo Complete w/ Color, Spectral and Contrast if necessary per protocol    Interpretation Summary    Left ventricular systolic function is severely decreased. Calculated left ventricular EF = 20.4% Left ventricular ejection fraction appears to be 21 - 25%.    The left ventricular cavity is mildly dilated.    Left ventricular diastolic function is consistent with (grade II w/high LAP) pseudonormalization.    Moderately reduced right ventricular systolic function noted.    The left atrial cavity is moderate to severely dilated.    The right atrial cavity is mild to moderately  dilated.    Moderate mitral valve regurgitation is present.    Moderate to severe tricuspid valve regurgitation is present.    Estimated right ventricular systolic pressure from tricuspid regurgitation is normal (<35 mmHg).       CATH: 6/2023, widely patent grafts LIMA-LAD and SVG-OM, mild mid RCA 20%.  70% left main disease.   CARDIOLITE:      Lab Review:       Results from last 7 days   Lab Units 10/11/24  0606 10/10/24  1243   WBC 10*3/mm3 6.53 6.75   HEMOGLOBIN g/dL 12.5* 12.7*   HEMATOCRIT % 39.5 41.2            Results from last 7 days   Lab Units 10/11/24  0606 10/10/24  1243   SODIUM mmol/L 141 140   BUN mg/dL 32* 33*   CREATININE mg/dL 1.53* 1.31*   GLUCOSE mg/dL 92 113*      Estimated Creatinine Clearance: 44.4 mL/min (A) (by C-G formula based on SCr of 1.53 mg/dL (H)).     Lab Results   Component Value Date    CHOL 97 10/11/2024    TRIG 42 10/11/2024    HDL 44 10/11/2024    LDL 42 10/11/2024    AST 30 10/11/2024    ALT 16 10/11/2024             Lab Results   Component Value Date    TSH 9.290 (H) 10/10/2024        Lab Results   Component Value Date    HGBA1C 6.30 (H) 10/11/2024           No results found for: \"DIGOXIN\"   No results found for: " "\"DDIMERQUANT\"     Imaging:  All pertinent imaging studies were personally reviewed.    Assessment:      Acute exacerbation of CHF (congestive heart failure)    Permanent atrial fibrillation    SSS     Dilated CM     S/P Watchman device    Coronary artery disease involving coronary bypass graft of native heart without angina pectoris    Essential hypertension    COPD     Dependence on supplemental oxygen    Hypothyroidism (acquired)    ICD (implantable cardioverter-defibrillator) in place    CKD (chronic kidney disease)      Initial cardiac assessment: 79-year-old gentleman with a history of ischemic cardiomyopathy, HFrEF EF 25% with paroxysmal A-fib, CAD, watchman, AV node ablation with BiV ICD, presenting with acute on chronic HFrEF.      Recommendations:  1.  Acute on chronic HFrEF:  Continue IV Bumex 2 mg daily, goal net -1.5 to 2 L daily  Continue GDMT with Toprol, Entresto, and Jardiance  Will not add spironolactone at this time due to limitation of hypotension and CKD    Will try to increase Entresto to full tablet twice daily  Strict I's and O's and daily weights.    At time of discharge she will provide patient with instructions on when to double his home diuretic dose.    Recheck echocardiogram    2.  PAF:  Status post watchman and AV node ablation  No need for full anticoagulation  Paced rhythm.    3.  CAD:  Continue aspirin and statin  No signs of acute coronary syndrome, negative troponin, no chest pain.    4.  Chronic COPD on oxygen:  Followed by pulmonary as an outpatient    5.  CKD stage IIIa:  Complicates all aspects of care  Monitor closely with diuresis  Continue SGLT2 inhibitor      Thank you for allowing us to share in his care.    Weekend plan:  Continue IV diuresis for goal net -1.5 to 2 L daily as long as renal function and blood pressure tolerate  Uptitrate Entresto as tolerated    Pietro Quintana MD  10/11/2024  09:20 EDT    "

## 2024-10-11 NOTE — PAYOR COMM NOTE
"Ref# [id number]   10/10/24 Observation Admission  10/11/24 Flipped to Inpatient    OWEN Mendiola, RN  Utilization Review  Phone [phone]  Fax [phone]    [place] [id number]         Monica Albrecht (79 y.o. Male)       Date of Birth   1945    Social Security Number       Address   [address] [address]    Home Phone   [phone]    MRN   [id number]       [facility]    Marital Status                               Admission Date   10/10/24    Admission Type   Emergency    Admitting Provider   Ariel Moralez MD    Attending Provider   Ariel Moralez MD    Department, Room/Bed   [facility] 5F, S519/1       Discharge Date       Discharge Disposition       Discharge Destination                                 Attending Provider: Ariel Moralez MD    Allergies: No Known Allergies    Isolation: None   Infection: None   Code Status: CPR    Ht: 190.5 cm (75\")   Wt: 78.9 kg (173 lb 15.1 oz)    Admission Cmt: None   Principal Problem: Acute exacerbation of CHF (congestive heart failure) [I50.9]                   Active Insurance as of 10/10/2024       Primary Coverage       Payor Plan Insurance Group Employer/Plan Group    ANTH MEDICARE REPLACEMENT ANTH MEDICARE ADVANTAGE [id number]       Payor Plan Address Payor Plan Phone Number Payor Plan Fax Number Effective Dates    [address] [phone]  1/1/2024 - None Entered    [facility] [id number]         Subscriber Name Subscriber Birth Date Member ID       MONICA ALBRECHT 1945 [id number]                     Emergency Contacts        (Rel.) Home Phone Work Phone Mobile Phone    DESMOND NG (Friend) [phone] -- [phone]    KERONTUAN (Son) [phone] -- [phone]    Lenore Albrecht (Spouse) [phone] -- --              [place]: [facility] [id number] Tax ID [id number]  Insurance Information                  ANTH " MEDICARE REPLACEMENT/ANTHEM MEDICARE ADVANTAGE Phone: [phone]    Subscriber: Colin Albrecht Subscriber#: [id number]    Group#: [id number] Precert#: --             History & Physical        Shae Knight APRN at 10/10/24 1530       Attestation signed by Kerley, Brian Joseph, DO at 10/10/24 2211    I have reviewed this documentation and agree.                      [facility] Medicine Services  HISTORY AND PHYSICAL    Patient Name: Colin Albrecht  : 1945  MRN: [id number]  Primary Care Physician: Arun Soliman MD  Date of admission: 10/10/2024    Subjective  Subjective     Chief Complaint:  Progressive dyspnea     HPI:  Colin Albrecht is a 79 y.o. male with past medical history significant for hypothyroidism, SSS s/p ICD,CAD, dilated CM, CHF w an EF 20.4 % (at last check 2023), A-fib s/p Watchman, ex-smoker, chronic oxygen use, and probable chronic CKD.  Patient follows with Dr. Wei with pulmonary, Dr. Quintana with cardiology, and Dr. Prado with cards EP.  Patient was last seen by pulmonary approximately 1 month ago with complaints of progressive shortness of air.  CT of the chest was ordered and completed yesterday outpatient.  CT showed moderate to marked cardiomegaly new compared to prior CT from .  Pulmonary edema with sm to mod right pl effusion and trace left pl effusion.  Pt was to have outpatient echo in approx 2 weeks and follow-up with Dr. Quintana, however continues to have progressive shortness of air and presents to ER for evaluation today.    On ER presentation patient reports oxygen does not seem to help or worsen his shortness of air at home.  His home nebulizers started approximately a month ago were not helping but now are only helping for approx 15 minutes.  ER labs stable.  Creatinine about baseline at 1.31.  Pro-Agustin, COVID normal.  BNP 10K.  CXR with central pulm venous congestion and persistent sm bilateral pleural  effusions R>L.  EKG shows V paced.  Treated with 2 mg IV Bumex.  Admit to hospital medicine service and consult cardiology.        Review of Systems   Constitutional:  Positive for activity change, appetite change and fatigue. Negative for chills, diaphoresis and fever.   HENT: Negative.     Eyes: Negative.    Respiratory:  Positive for cough, chest tightness, shortness of breath and wheezing.    Cardiovascular:  Positive for leg swelling. Negative for chest pain.   Gastrointestinal: Negative.    Endocrine: Negative.    Genitourinary: Negative.    Musculoskeletal:  Positive for arthralgias.   Skin: Negative.    Allergic/Immunologic: Negative.    Neurological:  Negative for dizziness, seizures, syncope, numbness and headaches.   Hematological: Negative.    Psychiatric/Behavioral: Negative.          Personal History     Past Medical History:   Diagnosis Date    Abnormal ECG     Arrhythmia     Atrial fibrillation     CHF (congestive heart failure)     Depression     History of transfusion     bleeding stomach ulcer ~2014 x2, no reaction     Hypertension     Hypothyroidism (acquired) 10/10/2024    ICD (implantable cardioverter-defibrillator) in place 10/10/2024    Stomach ulcer     Wears reading eyeglasses              Past Surgical History:   Procedure Laterality Date    ATRIAL APPENDAGE EXCLUSION LEFT WITH TRANSESOPHAGEAL ECHOCARDIOGRAM N/A 9/25/2020    Procedure: Atrial Appendage Occlusion (Watchman), start Eliquis 2 weeks prior and hold 1 day, GA;  Surgeon: Yonny Prado MD;  Location: [facility] EP INVASIVE LOCATION;  Service: Cardiology;  Laterality: N/A;    CARDIAC CATHETERIZATION      CARDIAC CATHETERIZATION N/A 6/2/2021    Procedure: Left Heart Cath- ADD ON/ WALK IN FOR RDS PER KT;  Surgeon: Pietro Quintana MD;  Location: [facility] CATH INVASIVE LOCATION;  Service: Cardiovascular;  Laterality: N/A;    CARDIAC CATHETERIZATION N/A 6/6/2023    Procedure: Coronary angiography;  Surgeon: Pietro Quintana MD;   Location:  [facility] CATH INVASIVE LOCATION;  Service: Cardiovascular;  Laterality: N/A;  LVEF decrease with known CAD.  Discussed with Dr. Quintana, recommendation of [facility].     CARDIAC ELECTROPHYSIOLOGY PROCEDURE N/A 3/26/2021    Procedure: DDD PPM upgrade to Biv ICD (MDT), no meds to hold;  Surgeon: Yonny Prado MD;  Location:  [facility] EP INVASIVE LOCATION;  Service: Cardiology;  Laterality: N/A;    CARDIAC ELECTROPHYSIOLOGY PROCEDURE N/A 3/26/2021    Procedure: AVN RFA;  Surgeon: Yonny Prado MD;  Location:  [facility] EP INVASIVE LOCATION;  Service: Cardiovascular;  Laterality: N/A;    COLONOSCOPY      CORONARY ARTERY BYPASS GRAFT N/A 6/3/2021    Procedure: MEDIAN STERNOTOMY, CORONARY ARTERY BYPASS WITH INTERNAL MAMMARY ARTERY GRAFT X 2, EVH OF THE RIGHT GREATER SAPHENOUS ANA;  Surgeon: Jaime Shields MD;  Location: [facility] OR;  Service: Cardiothoracic;  Laterality: N/A;    ENDOSCOPY N/A 6/15/2021    Procedure: ESOPHAGOGASTRODUODENOSCOPY;  Surgeon: Fransico Warren MD;  Location: [facility] ENDOSCOPY;  Service: Gastroenterology;  Laterality: N/A;    INSERT / REPLACE / REMOVE PACEMAKER         Family History:  family history includes Alcohol abuse in his brother; Lung cancer in his sister; Stroke in his father.     Social History:  reports that he quit smoking about 14 years ago. His smoking use included cigarettes. He started smoking about 64 years ago. He has a 50 pack-year smoking history. He has been exposed to tobacco smoke. He has never used smokeless tobacco. He reports that he does not currently use alcohol. He reports that he does not use drugs.  Social History     Social History Narrative    Lives in [facility]       Medications:  CoQ10, O2, albuterol sulfate HFA, aspirin, atorvastatin, empagliflozin, furosemide, ipratropium-albuterol, levothyroxine, metoprolol succinate XL, multivitamin with minerals, polyethylene glycol, rOPINIRole, sacubitril-valsartan, temazepam, and tiotropium  bromide-olodaterol    No Known Allergies    Objective  Objective     Vital Signs:   Temp:  [98.2 °F (36.8 °C)] 98.2 °F (36.8 °C)  Heart Rate:  [75-90] 75  Resp:  [19] 19  BP: (112-140)/(72-93) 139/84    Physical Exam   Constitutional: Awake, alert.  Resting up on the side of bed in ER.  No current acute distress appreciated.  Family at bedside.  Eyes: PERRLA, sclerae anicteric, no conjunctival injection  HENT: NCAT, mucous membranes moist  Neck: Supple, no thyromegaly, no lymphadenopathy, trachea midline  Respiratory: Clear to upper lobes, scattered coarse rhonchi.  Few fine bibasilar crackles.  No wheezes appreciated, nonlabored respirations at rest on room air with sats 92%.  Cardiovascular: RRR, + 2/6 systolic murmur, rubs, or gallops, palpable pedal pulses bilaterally.  Implanted ICD to left upper chest wall.  Gastrointestinal: Positive bowel sounds, soft, nontender, nondistended  Musculoskeletal: 2+ BLE pitting edema, no clubbing or cyanosis to extremities.  Shankar spontaneously.  Psychiatric: Appropriate affect, cooperative  Neurologic: Oriented x 3, strength symmetric in all extremities, Cranial Nerves grossly intact to confrontation, speech clear.  Follows commands.  Skin: No rashes      Result Review:  I have personally reviewed the results from the time of this admission to 10/10/2024 16:04 EDT and agree with these findings:  [x]  Laboratory list / accordion  [x]  Microbiology  [x]  Radiology  [x]  EKG/Telemetry   [x]  Cardiology/Vascular   []  Pathology  [x]  Old records  []  Other:  Most notable findings include:     LAB RESULTS:      Lab 10/10/24  1243   WBC 6.75   HEMOGLOBIN 12.7*   HEMATOCRIT 41.2   PLATELETS 156   NEUTROS ABS 5.24   IMMATURE GRANS (ABS) 0.02   LYMPHS ABS 0.54*   MONOS ABS 0.79   EOS ABS 0.12   MCV 99.3*   PROCALCITONIN 0.05   LACTATE 2.1*         Lab 10/10/24  1243   SODIUM 140   POTASSIUM 4.5   CHLORIDE 102   CO2 27.0   ANION GAP 11.0   BUN 33*   CREATININE 1.31*   EGFR 55.7*   GLUCOSE  113*   CALCIUM 9.4         Lab 10/10/24  1243   TOTAL PROTEIN 7.0   ALBUMIN 3.9   GLOBULIN 3.1   ALT (SGPT) 17   AST (SGOT) 36   BILIRUBIN 1.5*   ALK PHOS 114         Lab 10/10/24  1243   PROBNP 10,682.0*   HSTROP T 21                 Brief Urine Lab Results       None          Microbiology Results (last 10 days)       Procedure Component Value - Date/Time    COVID PRE-OP / PRE-PROCEDURE SCREENING ORDER (NO ISOLATION) - Swab, Nasal Cavity [[id number]]  (Normal) Collected: 10/10/24 1302    Lab Status: Final result Specimen: Swab from Nasal Cavity Updated: 10/10/24 1338    Narrative:      The following orders were created for panel order COVID PRE-OP / PRE-PROCEDURE SCREENING ORDER (NO ISOLATION) - Swab, Nasal Cavity.  Procedure                               Abnormality         Status                     ---------                               -----------         ------                     COVID-19 and FLU A/B PCR...[[id number]]  Normal              Final result                 Please view results for these tests on the individual orders.    COVID-19 and FLU A/B PCR, 1 HR TAT - Swab, Nasopharynx [[id number]]  (Normal) Collected: 10/10/24 1302    Lab Status: Final result Specimen: Swab from Nasopharynx Updated: 10/10/24 1338     COVID19 Not Detected     Influenza A PCR Not Detected     Influenza B PCR Not Detected    Narrative:      Fact sheet for providers: https://www.fda.gov/media/299967/download    Fact sheet for patients: https://www.fda.gov/media/844511/download    Test performed by PCR.            XR Chest 1 View    Result Date: 10/10/2024  XR CHEST 1 VW Date of Exam: 10/10/2024 12:45 PM EDT Indication: SOA triage protocol Comparison: CT chest without contrast 10/9/2024 Findings: Left-sided AICD noted. Status post sternotomy and CABG. Central pulmonary vascular congestion with interstitial thickening suggesting pulmonary edema. Underlying emphysema. Persistent small bilateral pleural effusions right greater than  left with bibasilar atelectasis. Negative for pneumothorax.     Impression: Impression: 1. Central pulmonary vascular congestion with interstitial thickening suggesting pulmonary edema. 2. Persistent small bilateral pleural effusions right greater than left with bibasilar atelectasis. 3. Emphysema. Electronically Signed: Luis Richards MD  10/10/2024 1:28 PM EDT  Workstation ID: [id number]    CT Chest Without Contrast Diagnostic    Result Date: 10/9/2024  CT CHEST WO CONTRAST DIAGNOSTIC Date of Exam: 10/9/2024 11:27 AM EDT Indication: shortness of breath. Comparison: PA and lateral chest 7/17/2024. CT chest 6/14/2021. Technique: Axial CT images were obtained of the chest without contrast administration.  Reconstructed coronal and sagittal images were also obtained. Automated exposure control and iterative construction methods were used. Findings:  Moderate centrilobular and paraseptal emphysematous changes are present. Small to moderate right basilar pleural effusion layers to a depth of 5 cm, and there is passive atelectasis in the bilateral lower lobes. There is bronchial wall thickening in both lungs without overt mucous plugging. Smooth interstitial thickening and hazy groundglass densities throughout both lungs may reflect changes of mild pulmonary edema, as well. Trace left basilar pleural fluid is present. There is moderate to marked cardiac enlargement which is new since 6/14/2021. Pacemaker leads are in place. Left atrial appendage occlusion device is present. There is mild thoracic aortic calcific atherosclerosis. There are mildly enlarged lymph nodes which are new since 2021. Index lymph nodes include: Right lower paratracheal 11 mm short axis (2/161), pretracheal 12 mm short axis (2/168), prevascular 11 mm (2/164). A cyst in the left hepatic lobe measures 3.9 cm, stable. Smaller hepatic cysts are also noted. Small quantity ascites is seen adjacent to the liver. Low-density right adrenal nodule measuring 14  mm is unchanged from 2021, most in keeping with a benign adenoma. Remainder of the imaged upper abdominal organs demonstrate a normal noncontrast appearance. No acute or suspicious osseous abnormalities are identified.       Impression: Impression: 1. Moderate to marked cardiomegaly, which appears new when compared to the CT chest of 6/14/2021. Signs of prior CABG. Correlate with cardiac history. 2. Features suggesting mild interstitial and alveolar pulmonary edema with small to moderate right pleural effusion and trace left basilar pleural effusion. 3. Mild right lower lobe atelectasis. 4. Moderate emphysema. 5. Trace perihepatic ascites. 6. Stable right adrenal adenoma. Electronically Signed: Alberta Lawler MD  10/9/2024 12:03 PM EDT  Workstation ID: [id number]     Results for orders placed during the hospital encounter of 05/24/23    Adult Transthoracic Echo Complete w/ Color, Spectral and Contrast if necessary per protocol    Interpretation Summary    Left ventricular systolic function is severely decreased. Calculated left ventricular EF = 20.4% Left ventricular ejection fraction appears to be 21 - 25%.    The left ventricular cavity is mildly dilated.    Left ventricular diastolic function is consistent with (grade II w/high LAP) pseudonormalization.    Moderately reduced right ventricular systolic function noted.    The left atrial cavity is moderate to severely dilated.    The right atrial cavity is mild to moderately  dilated.    Moderate mitral valve regurgitation is present.    Moderate to severe tricuspid valve regurgitation is present.    Estimated right ventricular systolic pressure from tricuspid regurgitation is normal (<35 mmHg).      Assessment & Plan  Assessment & Plan       Acute exacerbation of CHF (congestive heart failure)    Permanent atrial fibrillation    SSS     Dilated CM     S/P Watchman device    Coronary artery disease involving coronary bypass graft of native heart without angina  pectoris    Essential hypertension    COPD     Dependence on supplemental oxygen    Hypothyroidism (acquired)    ICD (implantable cardioverter-defibrillator) in place    CKD (chronic kidney disease)    Colin Albrecht is a 79 y.o. male with past medical history significant for hypothyroidism, SSS s/p ICD,CAD, dilated CM, CHF w an EF 20.4 % (at last check 5/2023), A-fib s/p Watchman, ex-smoker, chronic oxygen use, and probable chronic CKD.  Patient follows with Dr. Wei with pulmonary, Dr. Quintana with cardiology, and Dr. Prado with cards EP.  Patient was last seen by pulmonary approximately 1 month ago with complaints of progressive shortness of air.  CT of the chest was ordered and completed yesterday outpatient.  CT showed moderate to marked cardiomegaly new compared to prior CT from 2021.  Pulmonary edema with sm to mod right pl effusion and trace left pl effusion.  Pt was to have outpatient echo in approx 2 weeks and follow-up with Dr. Quintana, however continues to have progressive shortness of air and presents to ER for evaluation today.    On ER presentation patient reports oxygen does not seem to help or worsen his shortness of air at home.  His home nebulizers started approximately a month ago were not helping but now are only helping for approx 15 minutes.  ER labs stable.  Creatinine about baseline at 1.31.  Pro-Agustin, COVID normal.  BNP 10K.  CXR with central pulm venous congestion and persistent sm bilateral pleural effusions R>L.  EKG shows V paced.  Treated with 2 mg IV Bumex.  Admit to hospital medicine service and consult cardiology.    Assessment/Plan:    Progressive dyspnea  Acute/chronic CHF  CAD/dilated CM/EF 20.4 at last check (5/2023)  SSS/ICD/A-fib s/p Watchman  Heart murmur  --Follows with Dr. Quintana with cards, Dr. Prado with EP.  -- Had echo scheduled for 10/22 and then follow-up with cards.  Due to increasing SOB, could not wait.  -- Check echo  -- BNP 10K, CXR with pulmonary  venous congestion and small effusions  -- Continue home meds except Lasix.  Was given 2 mg IV Bumex in ER.  Will give additional 2 mg dose tomorrow morning x 1.  Defer further diuresis to cardiology.  -- Consult Dr. Quintana  -- Check a.m. CXR, a.m. labs    COPD  Chronic oxygen use  --Follows with Dr. Wei  -- Recently added home nebulizers, initially helping but no longer seem to be helping.  -- Outpatient CT chest completed yesterday.  -- 2-4 LNC oxygen at at bedtime/as needed, however patient states it does not seem to help therefore has not been using it much.    Hypothyroidism  -- Continue home Synthroid.  Check TSH as last on file was from 2023.    DVT prophylaxis:  sequentials     CODE STATUS:    Level Of Support Discussed With: Patient; Next of Kin (If No Surrogate)  Code Status (Patient has no pulse and is not breathing): CPR (Attempt to Resuscitate)  Medical Interventions (Patient has pulse or is breathing): Full Support      Expected Discharge TBD   Expected Discharge Date: 10/12/2024; Expected Discharge Time:       Signature: Electronically signed by JOHN Atkinson, 10/10/24, 4:03 PM EDT                Electronically signed by Kerley, Brian Joseph, DO at 10/10/24 2211          Emergency Department Notes        Hailey Haile RN at 10/10/24 1539           Colin Albrecht    Nursing Report ED to Floor:  Mental status: ao4  Ambulatory status: x1  Oxygen Therapy:  ra  Cardiac Rhythm: paced  Admitted from: ed  Safety Concerns:  fall risk  Social Issues: na  ED Room #:  29    ED Nurse Phone Extension [phone] or may call [phone].      HPI:   Chief Complaint   Patient presents with    Shortness of Breath       Past Medical History:  Past Medical History:   Diagnosis Date    Abnormal ECG     Arrhythmia     Atrial fibrillation     CHF (congestive heart failure)     Depression     History of transfusion     bleeding stomach ulcer ~2014 x2, no reaction     Hypertension     Stomach ulcer     Wears  reading eyeglasses         Past Surgical History:  Past Surgical History:   Procedure Laterality Date    ATRIAL APPENDAGE EXCLUSION LEFT WITH TRANSESOPHAGEAL ECHOCARDIOGRAM N/A 9/25/2020    Procedure: Atrial Appendage Occlusion (Watchman), start Eliquis 2 weeks prior and hold 1 day, GA;  Surgeon: Yonny Prado MD;  Location:  [facility] EP INVASIVE LOCATION;  Service: Cardiology;  Laterality: N/A;    CARDIAC CATHETERIZATION      CARDIAC CATHETERIZATION N/A 6/2/2021    Procedure: Left Heart Cath- ADD ON/ WALK IN FOR RDS PER KT;  Surgeon: Pietro Quintana MD;  Location:  [facility] CATH INVASIVE LOCATION;  Service: Cardiovascular;  Laterality: N/A;    CARDIAC CATHETERIZATION N/A 6/6/2023    Procedure: Coronary angiography;  Surgeon: Pietro Quintana MD;  Location:  [facility] CATH INVASIVE LOCATION;  Service: Cardiovascular;  Laterality: N/A;  LVEF decrease with known CAD.  Discussed with Dr. Quintana, recommendation of [facility].     CARDIAC ELECTROPHYSIOLOGY PROCEDURE N/A 3/26/2021    Procedure: DDD PPM upgrade to Biv ICD (MDT), no meds to hold;  Surgeon: Yonny Prado MD;  Location:  [facility] EP INVASIVE LOCATION;  Service: Cardiology;  Laterality: N/A;    CARDIAC ELECTROPHYSIOLOGY PROCEDURE N/A 3/26/2021    Procedure: AVN RFA;  Surgeon: Yonny Prado MD;  Location:  [facility] EP INVASIVE LOCATION;  Service: Cardiovascular;  Laterality: N/A;    COLONOSCOPY      CORONARY ARTERY BYPASS GRAFT N/A 6/3/2021    Procedure: MEDIAN STERNOTOMY, CORONARY ARTERY BYPASS WITH INTERNAL MAMMARY ARTERY GRAFT X 2, EVH OF THE RIGHT GREATER SAPHENOUS ANA;  Surgeon: Jaime Shields MD;  Location: [facility] OR;  Service: Cardiothoracic;  Laterality: N/A;    ENDOSCOPY N/A 6/15/2021    Procedure: ESOPHAGOGASTRODUODENOSCOPY;  Surgeon: Fransico Warren MD;  Location:  [facility] ENDOSCOPY;  Service: Gastroenterology;  Laterality: N/A;    INSERT / REPLACE / REMOVE PACEMAKER          Admitting Doctor:   Brian Joseph Kerley, DO    Consulting  Provider(s):  Consults       No orders found from 9/11/2024 to 10/11/2024.             Admitting Diagnosis:   The encounter diagnosis was Acute on chronic systolic (congestive) heart failure.    Most Recent Vitals:   Vitals:    10/10/24 1522 10/10/24 1528 10/10/24 1529 10/10/24 1530   BP: 139/84      Pulse: 80 76 75 75   Resp:       Temp:       TempSrc:       SpO2: 92% 93% 94% 92%   Weight:       Height:           Active LDAs/IV Access:   Lines, Drains & Airways       Active LDAs       Name Placement date Placement time Site Days    Peripheral IV 10/10/24 1244 Anterior;Right Forearm 10/10/24  1244  Forearm  less than 1                    Labs (abnormal labs have a star):   Labs Reviewed   COMPREHENSIVE METABOLIC PANEL - Abnormal; Notable for the following components:       Result Value    Glucose 113 (*)     BUN 33 (*)     Creatinine 1.31 (*)     Total Bilirubin 1.5 (*)     BUN/Creatinine Ratio 25.2 (*)     eGFR 55.7 (*)     All other components within normal limits    Narrative:     GFR Normal >60  Chronic Kidney Disease <60  Kidney Failure <15    The GFR formula is only valid for adults with stable renal function between ages 18 and 70.   BNP (IN-HOUSE) - Abnormal; Notable for the following components:    proBNP 10,682.0 (*)     All other components within normal limits    Narrative:     This assay is used as an aid in the diagnosis of individuals suspected of having heart failure. It can be used as an aid in the diagnosis of acute decompensated heart failure (ADHF) in patients presenting with signs and symptoms of ADHF to the emergency department (ED). In addition, NT-proBNP of <300 pg/mL indicates ADHF is not likely.    Age Range Result Interpretation  NT-proBNP Concentration (pg/mL:      <50             Positive            >450                   Gray                 300-450                    Negative             <300    50-75           Positive            >900                  Gray                300-900                   Negative            <300      >75             Positive            >1800                  Gray                300-1800                  Negative            <300   CBC WITH AUTO DIFFERENTIAL - Abnormal; Notable for the following components:    Hemoglobin 12.7 (*)     MCV 99.3 (*)     MCHC 30.8 (*)     RDW 15.8 (*)     RDW-SD 57.6 (*)     Neutrophil % 77.6 (*)     Lymphocyte % 8.0 (*)     Lymphocytes, Absolute 0.54 (*)     All other components within normal limits   LACTIC ACID, PLASMA - Abnormal; Notable for the following components:    Lactate 2.1 (*)     All other components within normal limits   COVID-19 AND FLU A/B, NP SWAB IN TRANSPORT MEDIA 1 HR TAT - Normal    Narrative:     Fact sheet for providers: https://www.fda.gov/media/412932/download    Fact sheet for patients: https://www.fda.gov/media/423719/download    Test performed by PCR.   SINGLE HS TROPONIN T - Normal    Narrative:     High Sensitive Troponin T Reference Range:  <14.0 ng/L- Negative Female for AMI  <22.0 ng/L- Negative Male for AMI  >=14 - Abnormal Female indicating possible myocardial injury.  >=22 - Abnormal Male indicating possible myocardial injury.   Clinicians would have to utilize clinical acumen, EKG, Troponin, and serial changes to determine if it is an Acute Myocardial Infarction or myocardial injury due to an underlying chronic condition.        PROCALCITONIN - Normal    Narrative:     As a Marker for Sepsis (Non-Neonates):    1. <0.5 ng/mL represents a low risk of severe sepsis and/or septic shock.  2. >2 ng/mL represents a high risk of severe sepsis and/or septic shock.    As a Marker for Lower Respiratory Tract Infections that require antibiotic therapy:    PCT on Admission    Antibiotic Therapy       6-12 Hrs later    >0.5                Strongly Recommended  >0.25 - <0.5        Recommended   0.1 - 0.25          Discouraged              Remeasure/reassess PCT  <0.1                Strongly Discouraged      "Remeasure/reassess PCT    As 28 day mortality risk marker: \"Change in Procalcitonin Result\" (>80% or <=80%) if Day 0 (or Day 1) and Day 4 values are available. Refer to http://www.[facility]-pct-calculator.com    Change in PCT <=80%  A decrease of PCT levels below or equal to 80% defines a positive change in PCT test result representing a higher risk for 28-day all-cause mortality of patients diagnosed with severe sepsis for septic shock.    Change in PCT >80%  A decrease of PCT levels of more than 80% defines a negative change in PCT result representing a lower risk for 28-day all-cause mortality of patients diagnosed with severe sepsis or septic shock.      COVID PRE-OP / PRE-PROCEDURE SCREENING ORDER (NO ISOLATION)    Narrative:     The following orders were created for panel order COVID PRE-OP / PRE-PROCEDURE SCREENING ORDER (NO ISOLATION) - Swab, Nasal Cavity.  Procedure                               Abnormality         Status                     ---------                               -----------         ------                     COVID-19 and FLU A/B PCR...[[id number]]  Normal              Final result                 Please view results for these tests on the individual orders.   RAINBOW DRAW    Narrative:     The following orders were created for panel order [place] Draw.  Procedure                               Abnormality         Status                     ---------                               -----------         ------                     Green Top (Gel)[[id number]]                                  Final result               Lavender Top[[id number]]                                     Final result               Gold Top - SST[[id number]]                                   Final result               Jerry Top[[id number]]                                         Final result               Light Blue Top[[id number]]                                   Final result                 Please view results for these tests on " the individual orders.   LACTIC ACID, REFLEX   CBC AND DIFFERENTIAL    Narrative:     The following orders were created for panel order CBC & Differential.  Procedure                               Abnormality         Status                     ---------                               -----------         ------                     CBC Auto Differential[[id number]]        Abnormal            Final result                 Please view results for these tests on the individual orders.   GREEN TOP   LAVENDER TOP   GOLD TOP - SST   GRAY TOP   LIGHT BLUE TOP       Meds Given in ED:   Medications   sodium chloride 0.9 % flush 10 mL (has no administration in time range)   bumetanide (BUMEX) injection 2 mg (2 mg Intravenous Given 10/10/24 1521)           Last NIH score:                                                          Dysphagia screening results:  Patient Factors Component (Dysphagia:Stroke or Rule-out)  Best Eye Response: 4-->(E4) spontaneous (10/10/24 1245)  Best Motor Response: 6-->(M6) obeys commands (10/10/24 1245)  Best Verbal Response: 5-->(V5) oriented (10/10/24 1245)  [place] Coma Scale Score: 15 (10/10/24 1245)     [place] Coma Scale:  No data recorded     CIWA:        Restraint Type:            Isolation Status:  No active isolations           Electronically signed by Hailey Haile RN at 10/10/24 1540       Anthony Sibley MD at 10/10/24 1228          Subjective   History of Present Illness    Pt presents with shortness of breath.  Worse with exertion and supine position.  Worsening with time.  Ongoing for 5-6 weeks.  Uses an inhaler that is no longer effective.  Has home oxygen that he feels doesn't help, and he says when he takes it off his sats don't drop, and putting it back on doesn't ease his symptoms.  He has no chest pain or fever.  He has a mild cough.  He denies leg swelling.  He has CHF and takes a diuretic and says he has been urinating normally.  He is on home oxygen, 4L nc.    History  provided by:  Patient      Review of Systems    Past Medical History:   Diagnosis Date    Abnormal ECG     Arrhythmia     Atrial fibrillation     CHF (congestive heart failure)     Depression     History of transfusion     bleeding stomach ulcer ~2014 x2, no reaction     Hypertension     Hypothyroidism (acquired) 10/10/2024    ICD (implantable cardioverter-defibrillator) in place 10/10/2024    Stomach ulcer     Wears reading eyeglasses        No Known Allergies    Past Surgical History:   Procedure Laterality Date    ATRIAL APPENDAGE EXCLUSION LEFT WITH TRANSESOPHAGEAL ECHOCARDIOGRAM N/A 9/25/2020    Procedure: Atrial Appendage Occlusion (Watchman), start Eliquis 2 weeks prior and hold 1 day, GA;  Surgeon: Yonny Prado MD;  Location:  [facility];  Service: Cardiology;  Laterality: N/A;    CARDIAC CATHETERIZATION      CARDIAC CATHETERIZATION N/A 6/2/2021    Procedure: Left Heart Cath- ADD ON/ WALK IN FOR RDS PER KT;  Surgeon: Pietro Quintana MD;  Location:  [facility];  Service: Cardiovascular;  Laterality: N/A;    CARDIAC CATHETERIZATION N/A 6/6/2023    Procedure: Coronary angiography;  Surgeon: Pietro Quintana MD;  Location:  [facility];  Service: Cardiovascular;  Laterality: N/A;  LVEF decrease with known CAD.  Discussed with Dr. Quintana, recommendation of [facility].     CARDIAC ELECTROPHYSIOLOGY PROCEDURE N/A 3/26/2021    Procedure: DDD PPM upgrade to Biv ICD (MDT), no meds to hold;  Surgeon: Yonny Prado MD;  Location:  [facility];  Service: Cardiology;  Laterality: N/A;    CARDIAC ELECTROPHYSIOLOGY PROCEDURE N/A 3/26/2021    Procedure: AVN RFA;  Surgeon: Yonny Prado MD;  Location:  [facility];  Service: Cardiovascular;  Laterality: N/A;    COLONOSCOPY      CORONARY ARTERY BYPASS GRAFT N/A 6/3/2021    Procedure: MEDIAN STERNOTOMY, CORONARY ARTERY BYPASS WITH INTERNAL MAMMARY ARTERY GRAFT X 2, EVH OF THE RIGHT  GREATER SAPHENOUS ANA;  Surgeon: Jaime Shields MD;  Location:  [facility];  Service: Cardiothoracic;  Laterality: N/A;    ENDOSCOPY N/A 6/15/2021    Procedure: ESOPHAGOGASTRODUODENOSCOPY;  Surgeon: Fransico Warren MD;  Location:  [facility];  Service: Gastroenterology;  Laterality: N/A;    INSERT / REPLACE / REMOVE PACEMAKER         Family History   Problem Relation Age of Onset    Stroke Father     Lung cancer Sister     Alcohol abuse Brother        Social History     Socioeconomic History    Marital status:     Number of children: 3   Tobacco Use    Smoking status: Former     Current packs/day: 0.00     Average packs/day: 1 pack/day for 50.0 years (50.0 ttl pk-yrs)     Types: Cigarettes     Start date: 1960     Quit date: 2010     Years since quittin.2     Passive exposure: Past    Smokeless tobacco: Never   Vaping Use    Vaping status: Never Used   Substance and Sexual Activity    Alcohol use: Not Currently    Drug use: Never    Sexual activity: Defer           Objective   Physical Exam  Vitals and nursing note reviewed.   Constitutional:       General: He is not in acute distress.     Appearance: Normal appearance. He is not ill-appearing.   HENT:      Head: Normocephalic and atraumatic.      Mouth/Throat:      Mouth: Mucous membranes are moist.   Eyes:      General: No scleral icterus.        Right eye: No discharge.         Left eye: No discharge.      Conjunctiva/sclera: Conjunctivae normal.   Cardiovascular:      Rate and Rhythm: Normal rate and regular rhythm.      Heart sounds: No murmur heard.  Pulmonary:      Effort: Pulmonary effort is normal. No respiratory distress.      Breath sounds: Examination of the right-lower field reveals decreased breath sounds. Decreased breath sounds and rhonchi (diffuse) present. No wheezing.   Abdominal:      General: Bowel sounds are normal. There is no distension.      Palpations: Abdomen is soft.      Tenderness: There is no abdominal  tenderness. There is no guarding or rebound.   Musculoskeletal:         General: No swelling. Normal range of motion.      Cervical back: Normal range of motion and neck supple.      Right lower leg: Edema present.      Left lower leg: Edema present.      Comments: +1 BLE edema   Skin:     General: Skin is warm and dry.      Findings: No rash.   Neurological:      General: No focal deficit present.      Mental Status: He is alert and oriented to person, place, and time. Mental status is at baseline.   Psychiatric:         Mood and Affect: Mood normal.         Behavior: Behavior normal.         Thought Content: Thought content normal.         Procedures          ED Course    I reviewed outside records.  CT chest performed yesterday shows significant cardiomegaly as well as mild pulmonary edema and a R pleural effusion, as well as moderate emphysema.  Pulmonary note 10/1/24 for chronic hypoxic resp failure and COPD, notes COPD meds, history of pleural effusion in the past.  Note indicates he was on amiodarone recently which was stopped by cards due to dyspnea, and that he has PAF and SSS s/p Watchman device, AV node ablation and Bi-V ICD, not anticoagulated.  Echo 5/25/23 EF 21-25%, moderate MR, TR.      CXR read by me CHF, effusions.      EKG read by me ventricular paced with a PVC.    Labs c/w CHF.      Patient stable on serial rechecks.  Discussed exam findings, test results so far and concerns in detail at the bedside.  Discussed need for admission for further evaluation and treatment.  I discussed the patient's presentation, test results and need for admission with the hospitalist.                                           Medical Decision Making  Problems Addressed:  Acute on chronic systolic (congestive) heart failure: chronic illness or injury with severe exacerbation, progression, or side effects of treatment that poses a threat to life or bodily functions    Amount and/or Complexity of Data  Reviewed  External Data Reviewed: notes.  Labs: ordered. Decision-making details documented in ED Course.  Radiology: ordered and independent interpretation performed. Decision-making details documented in ED Course.  ECG/medicine tests: ordered and independent interpretation performed. Decision-making details documented in ED Course.  Discussion of management or test interpretation with external provider(s): hospitalist    Risk  Prescription drug management.  Decision regarding hospitalization.        Final diagnoses:   Acute on chronic systolic (congestive) heart failure       ED Disposition  ED Disposition       ED Disposition   Decision to Admit    Condition   --    Comment   Level of Care: Telemetry [5]   Diagnosis: Acute exacerbation of CHF (congestive heart failure) [089338]   Admitting Physician: KERLEY, BRIAN JOSEPH [[id number]]   Attending Physician: KERLEY, BRIAN JOSEPH [[id number]]                 No follow-up provider specified.       Medication List      No changes were made to your prescriptions during this visit.            Anthony Sibley MD  10/10/24 2053      Electronically signed by Anthony Sibley MD at 10/10/24 2053       Oxygen Therapy (since admission)       Date/Time SpO2 Device (Oxygen Therapy) Flow (L/min) Oxygen Concentration (%) ETCO2 (mmHg)    10/11/24 1045 100 nasal cannula 2 -- --    10/11/24 0610 -- nasal cannula 2 -- --    10/11/24 0430 -- nasal cannula 2 -- --    10/11/24 0338 97 room air -- -- --    10/11/24 0230 -- nasal cannula 2 -- --    10/11/24 0030 -- nasal cannula 2 -- --    10/10/24 2326 99 room air -- -- --    10/10/24 2230 -- room air -- -- --    10/10/24 2130 -- room air -- -- --    10/10/24 1901 -- room air -- -- --    10/10/24 1800 -- room air -- -- --    10/10/24 1716 98 room air -- -- --    10/10/24 1640 95 -- -- -- --    10/10/24 1639 100 -- -- -- --    10/10/24 1602 92 -- -- -- --    10/10/24 1601 91 -- -- -- --    10/10/24 1530 92 -- -- -- --    10/10/24 1529  94 -- -- -- --    10/10/24 1528 93 -- -- -- --    10/10/24 1522 92 -- -- -- --    10/10/24 1347 91 -- -- -- --    10/10/24 1330 91 -- -- -- --    10/10/24 1302 94 -- -- -- --    10/10/24 1301 93 -- -- -- --    10/10/24 1300 95 -- -- -- --    10/10/24 1258 94 -- -- -- --    10/10/24 1257 95 -- -- -- --    10/10/24 1256 93 -- -- -- --    10/10/24 1255 95 -- -- -- --    10/10/24 1254 94 -- -- -- --    10/10/24 1240 95 -- -- -- --    10/10/24 1230 95 -- -- -- --    10/10/24 1229 95 -- -- -- --    10/10/24 1228 94 -- -- -- --    10/10/24 1214 91 room air -- -- --          Current Facility-Administered Medications   Medication Dose Route Frequency Provider Last Rate Last Admin    acetaminophen (TYLENOL) tablet 650 mg  650 mg Oral Q4H PRN Shae Knight APRN        Or    acetaminophen (TYLENOL) 160 MG/5ML oral solution 650 mg  650 mg Oral Q4H PRN Shae Knight APRN        Or    acetaminophen (TYLENOL) suppository 650 mg  650 mg Rectal Q4H PRN Shae Knight APRN        arformoterol (BROVANA) nebulizer solution 15 mcg  15 mcg Nebulization BID - RT Ariel Moralez MD        aspirin EC tablet 81 mg  81 mg Oral Daily Shae Knight APRN        atorvastatin (LIPITOR) tablet 40 mg  40 mg Oral Nightly Shae Knight APRN   40 mg at 10/10/24 2130    sennosides-docusate (PERICOLACE) 8.6-50 MG per tablet 2 tablet  2 tablet Oral BID PRN Shae Knight APRN        And    polyethylene glycol (MIRALAX) packet 17 g  17 g Oral Daily PRN Shae Knight APRN        And    bisacodyl (DULCOLAX) EC tablet 5 mg  5 mg Oral Daily PRN Shae Knight APRN        And    bisacodyl (DULCOLAX) suppository 10 mg  10 mg Rectal Daily PRN Shae Knight APRN        empagliflozin (JARDIANCE) tablet 10 mg  10 mg Oral Daily Shae Knight APRN   10 mg at 10/11/24 1039    ipratropium (ATROVENT) nebulizer solution 0.5 mg  0.5 mg Nebulization  4x Daily - RT Ariel Moralez MD        ipratropium-albuterol (DUO-NEB) nebulizer solution 3 mL  3 mL Nebulization 4x Daily PRN Ariel Moralez MD   3 mL at 10/11/24 1045    levothyroxine (SYNTHROID, LEVOTHROID) tablet 75 mcg  75 mcg Oral Q AM Shae Knight, APRN   75 mcg at 10/11/24 0549    metoprolol succinate XL (TOPROL-XL) 24 hr tablet 25 mg  25 mg Oral BID Shae Knight, APRN   25 mg at 10/11/24 1039    multivitamin with minerals 1 tablet  1 tablet Oral Daily Shae Knight, JOHN        Pharmacy Consult - MTM   Does not apply Daily Jude Rob RPH        rOPINIRole (REQUIP) tablet 0.5 mg  0.5 mg Oral Nightly PRN Shae Knight, JOHN        sacubitril-valsartan (ENTRESTO) 24-26 MG tablet 0.5 tablet  0.5 tablet Oral BID Shae Knight, APRN   0.5 tablet at 10/11/24 1038    sodium chloride 0.9 % flush 10 mL  10 mL Intravenous PRN Anthony Sibley MD        sodium chloride 0.9 % flush 10 mL  10 mL Intravenous Q12H Shae Knight, JOHN        sodium chloride 0.9 % flush 10 mL  10 mL Intravenous PRN Shae Knight, JOHN         Lab Results (all)       Procedure Component Value Units Date/Time    Comprehensive Metabolic Panel [[id number]]  (Abnormal) Collected: 10/11/24 0606    Specimen: Blood Updated: 10/11/24 0648     Glucose 92 mg/dL      BUN 32 mg/dL      Creatinine 1.53 mg/dL      Sodium 141 mmol/L      Potassium 4.5 mmol/L      Chloride 103 mmol/L      CO2 31.0 mmol/L      Calcium 9.2 mg/dL      Total Protein 6.7 g/dL      Albumin 3.6 g/dL      ALT (SGPT) 16 U/L      AST (SGOT) 30 U/L      Alkaline Phosphatase 108 U/L      Total Bilirubin 1.4 mg/dL      Globulin 3.1 gm/dL      Comment: Calculated Result        A/G Ratio 1.2 g/dL      BUN/Creatinine Ratio 20.9     Anion Gap 7.0 mmol/L      eGFR 46.2 mL/min/1.73     Narrative:      GFR Normal >60  Chronic Kidney Disease <60  Kidney Failure <15    The GFR formula is only  valid for adults with stable renal function between ages 18 and 70.    Lipid Panel [[id number]] Collected: 10/11/24 0606    Specimen: Blood Updated: 10/11/24 0648     Total Cholesterol 97 mg/dL      Triglycerides 42 mg/dL      HDL Cholesterol 44 mg/dL      LDL Cholesterol  42 mg/dL      VLDL Cholesterol 11 mg/dL      LDL/HDL Ratio 1.01    Narrative:      Cholesterol Reference Ranges  (U.S. Department of Health and Human Services ATP III Classifications)    Desirable          <200 mg/dL  Borderline High    200-239 mg/dL  High Risk          >240 mg/dL      Triglyceride Reference Ranges  (U.S. Department of Health and Human Services ATP III Classifications)    Normal           <150 mg/dL  Borderline High  150-199 mg/dL  High             200-499 mg/dL  Very High        >500 mg/dL    HDL Reference Ranges  (U.S. Department of Health and Human Services ATP III Classifications)    Low     <40 mg/dl (major risk factor for CHD)  High    >60 mg/dl ('negative' risk factor for CHD)        LDL Reference Ranges  (U.S. Department of Health and Human Services ATP III Classifications)    Optimal          <100 mg/dL  Near Optimal     100-129 mg/dL  Borderline High  130-159 mg/dL  High             160-189 mg/dL  Very High        >189 mg/dL    Magnesium [[id number]]  (Normal) Collected: 10/11/24 0606    Specimen: Blood Updated: 10/11/24 0648     Magnesium 2.3 mg/dL     Hemoglobin A1c [[id number]]  (Abnormal) Collected: 10/11/24 0606    Specimen: Blood Updated: 10/11/24 0644     Hemoglobin A1C 6.30 %     Narrative:      Hemoglobin A1C Ranges:    Increased Risk for Diabetes  5.7% to 6.4%  Diabetes                     >= 6.5%  Diabetic Goal                < 7.0%    CBC (No Diff) [[id number]]  (Abnormal) Collected: 10/11/24 0606    Specimen: Blood Updated: 10/11/24 0619     WBC 6.53 10*3/mm3      RBC 4.10 10*6/mm3      Hemoglobin 12.5 g/dL      Hematocrit 39.5 %      MCV 96.3 fL      MCH 30.5 pg      MCHC 31.6 g/dL      RDW 15.8 %      RDW-SD  55.6 fl      MPV 10.2 fL      Platelets 156 10*3/mm3     STAT Lactic Acid, Reflex [[id number]]  (Normal) Collected: 10/11/24 0024    Specimen: Blood Updated: 10/11/24 0048     Lactate 1.1 mmol/L      Comment: Falsely depressed results may occur on samples drawn from patients receiving N-Acetylcysteine (NAC) or Metamizole.       STAT Lactic Acid, Reflex [[id number]]  (Abnormal) Collected: 10/10/24 1945    Specimen: Blood Updated: 10/10/24 2037     Lactate 2.6 mmol/L      Comment: Falsely depressed results may occur on samples drawn from patients receiving N-Acetylcysteine (NAC) or Metamizole.       T4, Free [[id number]]  (Abnormal) Collected: 10/10/24 1243    Specimen: Blood Updated: 10/10/24 1751     Free T4 1.71 ng/dL     TSH Rfx On Abnormal To Free T4 [[id number]]  (Abnormal) Collected: 10/10/24 1243    Specimen: Blood Updated: 10/10/24 1730     TSH 9.290 uIU/mL     STAT Lactic Acid, Reflex [[id number]]  (Abnormal) Collected: 10/10/24 1605    Specimen: Blood Updated: 10/10/24 1639     Lactate 2.5 mmol/L      Comment: Falsely depressed results may occur on samples drawn from patients receiving N-Acetylcysteine (NAC) or Metamizole.       Lactic Acid, Plasma [[id number]]  (Abnormal) Collected: 10/10/24 1243    Specimen: Blood Updated: 10/10/24 1341     Lactate 2.1 mmol/L      Comment: Falsely depressed results may occur on samples drawn from patients receiving N-Acetylcysteine (NAC) or Metamizole.       COVID PRE-OP / PRE-PROCEDURE SCREENING ORDER (NO ISOLATION) - Swab, Nasal Cavity [[id number]]  (Normal) Collected: 10/10/24 1302    Specimen: Swab from Nasal Cavity Updated: 10/10/24 1338    Narrative:      The following orders were created for panel order COVID PRE-OP / PRE-PROCEDURE SCREENING ORDER (NO ISOLATION) - Swab, Nasal Cavity.  Procedure                               Abnormality         Status                     ---------                               -----------         ------                     COVID-19 and FLU  "A/B PCR...[[id number]]  Normal              Final result                 Please view results for these tests on the individual orders.    COVID-19 and FLU A/B PCR, 1 HR TAT - Swab, Nasopharynx [[id number]]  (Normal) Collected: 10/10/24 1302    Specimen: Swab from Nasopharynx Updated: 10/10/24 1338     COVID19 Not Detected     Influenza A PCR Not Detected     Influenza B PCR Not Detected    Narrative:      Fact sheet for providers: https://www.fda.gov/media/559813/download    Fact sheet for patients: https://www.fda.gov/media/571198/download    Test performed by PCR.    Procalcitonin [[id number]]  (Normal) Collected: 10/10/24 1243    Specimen: Blood Updated: 10/10/24 1319     Procalcitonin 0.05 ng/mL     Narrative:      As a Marker for Sepsis (Non-Neonates):    1. <0.5 ng/mL represents a low risk of severe sepsis and/or septic shock.  2. >2 ng/mL represents a high risk of severe sepsis and/or septic shock.    As a Marker for Lower Respiratory Tract Infections that require antibiotic therapy:    PCT on Admission    Antibiotic Therapy       6-12 Hrs later    >0.5                Strongly Recommended  >0.25 - <0.5        Recommended   0.1 - 0.25          Discouraged              Remeasure/reassess PCT  <0.1                Strongly Discouraged     Remeasure/reassess PCT    As 28 day mortality risk marker: \"Change in Procalcitonin Result\" (>80% or <=80%) if Day 0 (or Day 1) and Day 4 values are available. Refer to http://www."[employer]s-pct-calculator.com    Change in PCT <=80%  A decrease of PCT levels below or equal to 80% defines a positive change in PCT test result representing a higher risk for 28-day all-cause mortality of patients diagnosed with severe sepsis for septic shock.    Change in PCT >80%  A decrease of PCT levels of more than 80% defines a negative change in PCT result representing a lower risk for 28-day all-cause mortality of patients diagnosed with severe sepsis or septic shock.       Comprehensive Metabolic " Panel [[id number]]  (Abnormal) Collected: 10/10/24 1243    Specimen: Blood Updated: 10/10/24 1314     Glucose 113 mg/dL      BUN 33 mg/dL      Creatinine 1.31 mg/dL      Sodium 140 mmol/L      Potassium 4.5 mmol/L      Chloride 102 mmol/L      CO2 27.0 mmol/L      Calcium 9.4 mg/dL      Total Protein 7.0 g/dL      Albumin 3.9 g/dL      ALT (SGPT) 17 U/L      AST (SGOT) 36 U/L      Alkaline Phosphatase 114 U/L      Total Bilirubin 1.5 mg/dL      Globulin 3.1 gm/dL      Comment: Calculated Result        A/G Ratio 1.3 g/dL      BUN/Creatinine Ratio 25.2     Anion Gap 11.0 mmol/L      eGFR 55.7 mL/min/1.73     Narrative:      GFR Normal >60  Chronic Kidney Disease <60  Kidney Failure <15    The GFR formula is only valid for adults with stable renal function between ages 18 and 70.    BNP [[id number]]  (Abnormal) Collected: 10/10/24 1243    Specimen: Blood Updated: 10/10/24 1314     proBNP 10,682.0 pg/mL     Narrative:      This assay is used as an aid in the diagnosis of individuals suspected of having heart failure. It can be used as an aid in the diagnosis of acute decompensated heart failure (ADHF) in patients presenting with signs and symptoms of ADHF to the emergency department (ED). In addition, NT-proBNP of <300 pg/mL indicates ADHF is not likely.    Age Range Result Interpretation  NT-proBNP Concentration (pg/mL:      <50             Positive            >450                   Gray                 300-450                    Negative             <300    50-75           Positive            >900                  Gray                300-900                  Negative            <300      >75             Positive            >1800                  Gray                300-1800                  Negative            <300    Single High Sensitivity Troponin T [[id number]]  (Normal) Collected: 10/10/24 1243    Specimen: Blood Updated: 10/10/24 1314     HS Troponin T 21 ng/L     Narrative:      High Sensitive Troponin T Reference  Range:  <14.0 ng/L- Negative Female for AMI  <22.0 ng/L- Negative Male for AMI  >=14 - Abnormal Female indicating possible myocardial injury.  >=22 - Abnormal Male indicating possible myocardial injury.   Clinicians would have to utilize clinical acumen, EKG, Troponin, and serial changes to determine if it is an Acute Myocardial Infarction or myocardial injury due to an underlying chronic condition.         [place] Draw [[id number]] Collected: 10/10/24 1243    Specimen: Blood Updated: 10/10/24 1300    Narrative:      The following orders were created for panel order [place] Draw.  Procedure                               Abnormality         Status                     ---------                               -----------         ------                     Green Top (Gel)[[id number]]                                  Final result               Lavender Top[[id number]]                                     Final result               Gold Top - SST[[id number]]                                   Final result               Jerry Top[[id number]]                                         Final result               Light Blue Top[[id number]]                                   Final result                 Please view results for these tests on the individual orders.    Green Top (Gel) [[id number]] Collected: 10/10/24 1243    Specimen: Blood Updated: 10/10/24 1300     Extra Tube Hold for add-ons.     Comment: Auto resulted.       Lavender Top [[id number]] Collected: 10/10/24 1243    Specimen: Blood Updated: 10/10/24 1300     Extra Tube hold for add-on     Comment: Auto resulted       Gold Top - SST [[id number]] Collected: 10/10/24 1243    Specimen: Blood Updated: 10/10/24 1300     Extra Tube Hold for add-ons.     Comment: Auto resulted.       Gray Top [[id number]] Collected: 10/10/24 1243    Specimen: Blood Updated: 10/10/24 1300     Extra Tube Hold for add-ons.     Comment: Auto resulted.       Light Blue Top [[id number]] Collected: 10/10/24 1243     Specimen: Blood Updated: 10/10/24 1300     Extra Tube Hold for add-ons.     Comment: Auto resulted       CBC & Differential [[id number]]  (Abnormal) Collected: 10/10/24 1243    Specimen: Blood Updated: 10/10/24 1255    Narrative:      The following orders were created for panel order CBC & Differential.  Procedure                               Abnormality         Status                     ---------                               -----------         ------                     CBC Auto Differential[[id number]]        Abnormal            Final result                 Please view results for these tests on the individual orders.    CBC Auto Differential [[id number]]  (Abnormal) Collected: 10/10/24 1243    Specimen: Blood Updated: 10/10/24 1255     WBC 6.75 10*3/mm3      RBC 4.15 10*6/mm3      Hemoglobin 12.7 g/dL      Hematocrit 41.2 %      MCV 99.3 fL      MCH 30.6 pg      MCHC 30.8 g/dL      RDW 15.8 %      RDW-SD 57.6 fl      MPV 10.6 fL      Platelets 156 10*3/mm3      Neutrophil % 77.6 %      Lymphocyte % 8.0 %      Monocyte % 11.7 %      Eosinophil % 1.8 %      Basophil % 0.6 %      Immature Grans % 0.3 %      Neutrophils, Absolute 5.24 10*3/mm3      Lymphocytes, Absolute 0.54 10*3/mm3      Monocytes, Absolute 0.79 10*3/mm3      Eosinophils, Absolute 0.12 10*3/mm3      Basophils, Absolute 0.04 10*3/mm3      Immature Grans, Absolute 0.02 10*3/mm3      nRBC 0.0 /100 WBC           Imaging Results (All)       Procedure Component Value Units Date/Time    XR Chest 1 View [[id number]] Collected: 10/11/24 0711     Updated: 10/11/24 0716    Narrative:      XR CHEST 1 VW    Date of Exam: 10/11/2024 2:59 AM EDT    Indication: f/u dyspnea, a/c chf, pl effusions    Comparison: October 10, 2024    Findings:  A cardiac ICD device is present. Sternotomy wires noted. The heart is enlarged. There are bibasilar densities which could reflect effusions. Pulmonary vascular markings are prominent. There are some interstitial changes. Some  vascular congestion and   edema could account for this.      Impression:      Impression:  1.Cardiomegaly with what looks like some vascular congestion and probable interstitial edema.  2.Bibasilar effusions      Electronically Signed: Percy Mcguire MD    10/11/2024 7:12 AM EDT    Workstation ID: [id number]    XR Chest 1 View [[id number]] Collected: 10/10/24 1326     Updated: 10/10/24 1331    Narrative:      XR CHEST 1 VW    Date of Exam: 10/10/2024 12:45 PM EDT    Indication: SOA triage protocol    Comparison: CT chest without contrast 10/9/2024    Findings:  Left-sided AICD noted. Status post sternotomy and CABG. Central pulmonary vascular congestion with interstitial thickening suggesting pulmonary edema. Underlying emphysema. Persistent small bilateral pleural effusions right greater than left with   bibasilar atelectasis. Negative for pneumothorax.      Impression:      Impression:  1. Central pulmonary vascular congestion with interstitial thickening suggesting pulmonary edema.  2. Persistent small bilateral pleural effusions right greater than left with bibasilar atelectasis.  3. Emphysema.        Electronically Signed: Luis Richards MD    10/10/2024 1:28 PM EDT    Workstation ID: [id number]          ECG/EMG Results (all)       Procedure Component Value Units Date/Time    ECG 12 Lead ED Triage Standing Order; SOA [[id number]] Collected: 10/10/24 1225     Updated: 10/10/24 1226     QT Interval 430 ms      QTC Interval 523 ms     Narrative:      Test Reason : soa  Blood Pressure :   */*   mmHG  Vent. Rate :  89 BPM     Atrial Rate : 389 BPM     P-R Int :   * ms          QRS Dur : 148 ms      QT Int : 430 ms       P-R-T Axes : -29 235  67 degrees     QTc Int : 523 ms    Ventricular-paced rhythm with premature ventricular or aberrantly conducted complexes  Abnormal ECG  When compared with ECG of 18-MAY-2023 13:58,  Vent. rate has increased BY  11 BPM    Referred By: eddoc           Confirmed By:     Telemetry Scan  [[id number]] Resulted: 10/10/24     Updated: 10/11/24 0958    Telemetry Scan [[id number]] Resulted: 10/10/24     Updated: 10/11/24 1019    Adult Transthoracic Echo Complete W/ Cont if Necessary Per Protocol [[id number]] Resulted: 10/11/24 1116     Updated: 10/11/24 1121     EF(MOD-bp) 20.5 %      LVIDd 6.3 cm      LVIDs 5.2 cm      IVSd 1.20 cm      LVPWd 1.20 cm      FS 17.5 %      IVS/LVPW 1.00 cm      ESV(cubed) 140.6 ml      LV Sys Vol (BSA corrected) 90.2 cm2      EDV(cubed) 250.0 ml      LV Moss Vol (BSA corrected) 112.9 cm2      LV mass(C)d 340.4 grams      LVOT area 3.8 cm2      LVOT diam 2.20 cm      EDV(MOD-sp2) 308.0 ml      EDV(MOD-sp4) 233.0 ml      ESV(MOD-sp2) 232.0 ml      ESV(MOD-sp4) 186.0 ml      SV(MOD-sp2) 76.0 ml      SV(MOD-sp4) 47.0 ml      SVi(MOD-SP2) 36.8 ml/m2      SVi(MOD-SP4) 22.8 ml/m2      SVi (LVOT) 21.3 ml/m2      EF(MOD-sp2) 24.7 %      EF(MOD-sp4) 20.2 %      LA ESV Index (BP) 53.4 ml/m2      Med Peak E' [place] 8.8 cm/sec      Lat Peak E' [place] 6.7 cm/sec      TR max [place] 286.5 cm/sec      SV(LVOT) 43.9 ml      RV Base 5.2 cm      RV Mid 4.3 cm      RV Length 9.6 cm      RV S' 10.1 cm/sec      LA dimension (2D)  4.8 cm      LV V1 max 71.6 cm/sec      LV V1 max PG 2.05 mmHg      LV V1 mean PG 1.00 mmHg      LV V1 VTI 11.6 cm      Ao pk [place] 105.0 cm/sec      Ao max PG 4.5 mmHg      Ao mean PG 2.5 mmHg      Ao V2 VTI 19.7 cm      AKUA(I,D) 2.23 cm2      MV max PG 3.6 mmHg      MV mean PG 2.00 mmHg      MV V2 VTI 22.9 cm      MVA(VTI) 1.92 cm2      MR max [place] 461.0 cm/sec      MR max PG 85.0 mmHg      MR mean [place] 332.0 cm/sec      MR mean PG 51.0 mmHg      MR .0 cm      TR max PG 32.9 mmHg      PA V2 max 86.6 cm/sec      PA acc time 0.10 sec      PI end-d [place] 207.0 cm/sec      Ao root diam 3.7 cm      RVSP(TR) 50 mmHg      RAP systole 15 mmHg     Narrative:        Left ventricular systolic function is severely decreased. Calculated   left ventricular EF = 20.5% Left ventricular ejection  fraction appears to   be less than 20%.    The left ventricular cavity is moderately dilated.    Left ventricular wall thickness is consistent with mild concentric   hypertrophy.    Severely reduced right ventricular systolic function noted.    The right ventricular cavity is moderately dilated.    The left atrial cavity is moderate to severely dilated.    The right atrial cavity is severely  dilated.    There is mild calcification of the aortic valve.    Moderate mitral valve regurgitation is present.    Moderate tricuspid valve regurgitation is present.    Estimated right ventricular systolic pressure from tricuspid   regurgitation is moderately elevated (45-55 mmHg).    Moderate pulmonic valve regurgitation is present.    No significant change compared to the 2023 echo.            Physician Progress Notes (all)    No notes of this type exist for this encounter.          Consult Notes (all)        Pietro Quintana MD at 10/11/24 0920        Consult Orders    1. Inpatient Cardiology Consult [[id number]] ordered by Shae Knight APRN at 10/10/24 1602                 [facility]  Cardiology Consult Note  [facility] 5F          Patient Identification:  Colin Albrecht      [id number]  79 y.o.        male  1945       Date of Consultation:  10/11/24    Reason for Consultation: Acute on chronic heart failure with reduced ejection fraction    PCP: Arun Soliman MD  Primary cardiologist: Mariano/Johan    History of Present Illness:     Pleasant 79-year-old gentleman with a history of chronic heart failure with reduced EF EF previously 25%, CAD status post CABG last cath in 2023 with patent grafts, history of paroxysmal A-fib status post watchman and AV node ablation with BiV ICD followed by Dr. Prado, also has chronic oxygen use severe COPD, and CKD stage IIIa-B.  He reports progressive shortness of breath mainly with exertion over the past 3  to 4 months, getting worse also has gained about 7 to 8 pounds over the last 1 to 2 weeks, came to the emergency room due to worsening dyspnea.  He has not doubled his Lasix at home.  Previously half ref GDMT has been limited by CKD and hypotension.  Currently feels slightly better after receiving IV Bumex, with acceptable urine output.    Past History:  Past Medical History:   Diagnosis Date    Abnormal ECG     Arrhythmia     Atrial fibrillation     CHF (congestive heart failure)     Depression     History of transfusion     bleeding stomach ulcer ~2014 x2, no reaction     Hypertension     Hypothyroidism (acquired) 10/10/2024    ICD (implantable cardioverter-defibrillator) in place 10/10/2024    Stomach ulcer     Wears reading eyeglasses      Past Surgical History:   Procedure Laterality Date    ATRIAL APPENDAGE EXCLUSION LEFT WITH TRANSESOPHAGEAL ECHOCARDIOGRAM N/A 9/25/2020    Procedure: Atrial Appendage Occlusion (Watchman), start Eliquis 2 weeks prior and hold 1 day, GA;  Surgeon: Yonny Prado MD;  Location:  [facility];  Service: Cardiology;  Laterality: N/A;    CARDIAC CATHETERIZATION      CARDIAC CATHETERIZATION N/A 6/2/2021    Procedure: Left Heart Cath- ADD ON/ WALK IN FOR RDS PER KT;  Surgeon: Pietro Quintana MD;  Location:  [facility];  Service: Cardiovascular;  Laterality: N/A;    CARDIAC CATHETERIZATION N/A 6/6/2023    Procedure: Coronary angiography;  Surgeon: Pietro Quintana MD;  Location:  [facility];  Service: Cardiovascular;  Laterality: N/A;  LVEF decrease with known CAD.  Discussed with Dr. Quintana, recommendation of [facility].     CARDIAC ELECTROPHYSIOLOGY PROCEDURE N/A 3/26/2021    Procedure: DDD PPM upgrade to Biv ICD (MDT), no meds to hold;  Surgeon: Yonny Prado MD;  Location:  [facility];  Service: Cardiology;  Laterality: N/A;    CARDIAC ELECTROPHYSIOLOGY PROCEDURE N/A 3/26/2021    Procedure: AVN RFA;  Surgeon:  Yonny Prado MD;  Location:  [facility];  Service: Cardiovascular;  Laterality: N/A;    COLONOSCOPY      CORONARY ARTERY BYPASS GRAFT N/A 6/3/2021    Procedure: MEDIAN STERNOTOMY, CORONARY ARTERY BYPASS WITH INTERNAL MAMMARY ARTERY GRAFT X 2, EVH OF THE RIGHT GREATER SAPHENOUS ANA;  Surgeon: Jaime Shields MD;  Location:  [facility] OR;  Service: Cardiothoracic;  Laterality: N/A;    ENDOSCOPY N/A 6/15/2021    Procedure: ESOPHAGOGASTRODUODENOSCOPY;  Surgeon: Fransico Warren MD;  Location:  [facility];  Service: Gastroenterology;  Laterality: N/A;    INSERT / REPLACE / REMOVE PACEMAKER       No Known Allergies  Social History     Socioeconomic History    Marital status:     Number of children: 3   Tobacco Use    Smoking status: Former     Current packs/day: 0.00     Average packs/day: 1 pack/day for 50.0 years (50.0 ttl pk-yrs)     Types: Cigarettes     Start date: 1960     Quit date: 2010     Years since quittin.2     Passive exposure: Past    Smokeless tobacco: Never   Vaping Use    Vaping status: Never Used   Substance and Sexual Activity    Alcohol use: Not Currently    Drug use: Never    Sexual activity: Defer     Family History   Problem Relation Age of Onset    Stroke Father     Lung cancer Sister     Alcohol abuse Brother      Medications:  Medications Prior to Admission   Medication Sig Dispense Refill Last Dose    albuterol sulfate  (90 Base) MCG/ACT inhaler Inhale 2 puffs Every 4 (Four) Hours As Needed for Wheezing. 54 g 3 Past Week    aspirin 81 MG EC tablet Take 1 tablet by mouth Daily.   10/10/2024    atorvastatin (LIPITOR) 40 MG tablet TAKE 1 TABLET BY MOUTH EVERY NIGHT. 90 tablet 3 10/10/2024    Coenzyme Q10 (CoQ10) 100 MG capsule Take 1 capsule by mouth Daily. OTC   10/10/2024    empagliflozin (JARDIANCE) 10 MG tablet tablet Take 1 tablet by mouth Daily. 90 tablet 2 10/10/2024    furosemide (LASIX) 40 MG tablet Take 1 tablet by mouth Daily.  90 tablet 1 10/10/2024    ipratropium-albuterol (DUO-NEB) 0.5-2.5 mg/3 ml nebulizer Take 3 mL by nebulization 4 (Four) Times a Day As Needed for Wheezing or Shortness of Air. 120 mL 11 Past Week    levothyroxine (SYNTHROID, LEVOTHROID) 75 MCG tablet Take 1 tablet by mouth Daily.   10/10/2024    metoprolol succinate XL (TOPROL-XL) 25 MG 24 hr tablet Take 1 tablet by mouth twice daily 60 tablet 5 10/10/2024    multivitamin with minerals tablet tablet Take 1 tablet by mouth Daily.   10/10/2024    O2 (OXYGEN) Inhale 3 L/min Every Night.   10/9/2024    polyethylene glycol (MIRALAX) 17 GM/SCOOP powder DISSOLVE 1 CAPFUL IN 8 OUNCES OF LIQUID AND DRINK ONCE DAILY   10/10/2024    rOPINIRole (REQUIP) 0.5 MG tablet Take 1 tablet by mouth At Night As Needed.   Past Week    sacubitril-valsartan (Entresto) 24-26 MG tablet Take 0.5 tablets by mouth 2 (Two) Times a Day. 90 tablet 2 10/10/2024    temazepam (RESTORIL) 15 MG capsule TAKE 1 CAPSULE BY MOUTH EVERY DAY AT BEDTIME AS NEEDED FOR SLEEP   Past Week    tiotropium bromide-olodaterol (STIOLTO RESPIMAT) 2.5-2.5 MCG/ACT aerosol solution inhaler Inhale 2 puffs Daily. 3 each 3 10/10/2024     Current medications:  aspirin, 81 mg, Oral, Daily  atorvastatin, 40 mg, Oral, Nightly  empagliflozin, 10 mg, Oral, Daily  levothyroxine, 75 mcg, Oral, Q AM  metoprolol succinate XL, 25 mg, Oral, BID  multivitamin with minerals, 1 tablet, Oral, Daily  pharmacy consult - MTM, , Does not apply, Daily  sacubitril-valsartan, 0.5 tablet, Oral, BID  sodium chloride, 10 mL, Intravenous, Q12H      Current IV drips:       Review of Systems:    Constitutional no fever,  no weight loss   Skin no rash   Otolaryngeal no difficulty swallowing   Cardiovascular See HPI   Pulmonary no cough, no sputum production   Gastrointestinal no constipation, no diarrhea   Genitourinary no dysuria, no hematuria   Hematologic no easy bruisability, no abnormal bleeding   Musculoskeletal no muscle pain   Neurologic no  "dizziness, no falls     Physical exam:    /77 (BP Location: Right arm, Patient Position: Lying)   Pulse 80   Temp 98 °F (36.7 °C) (Oral)   Resp 18   Ht 190.5 cm (75\")   Wt 78.9 kg (173 lb 14.4 oz)   SpO2 97%   BMI 21.74 kg/m²  Body mass index is 21.74 kg/m².   Oxygen saturation   SpO2  Min: 91 %  Max: 100 %    General Appearance:   well developed  well nourished  HENT:   oropharynx moist  lips not cyanotic  Neck:  thyroid not enlarged  supple  Respiratory:  no respiratory distress  normal breath sounds  no rales  Cardiovascular:  no jugular venous distention  regular rhythm  apical impulse normal  S1 normal, S2 normal  no S3, no S4   no murmur  no rub, no thrill  carotid pulses normal; no bruit  pedal pulses normal  lower extremity edema: none    Gastrointestinal:   bowel sounds normal  non-tender  no hepatomegaly, no splenomegaly  Musculoskeletal:  no clubbing of fingers.   normocephalic, head atraumatic  Skin:   warm, dry  Psychiatric:  judgement and insight appropriate  normal mood and affect    Cardiographics:     ECG  (personally reviewed) ventricular paced rhythm   Telemetry:  (personally reviewed) paced rhythm with frequent PVCs   ECHO:   Results for orders placed during the hospital encounter of 05/24/23    Adult Transthoracic Echo Complete w/ Color, Spectral and Contrast if necessary per protocol    Interpretation Summary    Left ventricular systolic function is severely decreased. Calculated left ventricular EF = 20.4% Left ventricular ejection fraction appears to be 21 - 25%.    The left ventricular cavity is mildly dilated.    Left ventricular diastolic function is consistent with (grade II w/high LAP) pseudonormalization.    Moderately reduced right ventricular systolic function noted.    The left atrial cavity is moderate to severely dilated.    The right atrial cavity is mild to moderately  dilated.    Moderate mitral valve regurgitation is present.    Moderate to severe tricuspid valve " "regurgitation is present.    Estimated right ventricular systolic pressure from tricuspid regurgitation is normal (<35 mmHg).       CATH: 6/2023, widely patent grafts LIMA-LAD and SVG-OM, mild mid RCA 20%.  70% left main disease.   CARDIOLITE:      Lab Review:       Results from last 7 days   Lab Units 10/11/24  0606 10/10/24  1243   WBC 10*3/mm3 6.53 6.75   HEMOGLOBIN g/dL 12.5* 12.7*   HEMATOCRIT % 39.5 41.2            Results from last 7 days   Lab Units 10/11/24  0606 10/10/24  1243   SODIUM mmol/L 141 140   BUN mg/dL 32* 33*   CREATININE mg/dL 1.53* 1.31*   GLUCOSE mg/dL 92 113*      Estimated Creatinine Clearance: 44.4 mL/min (A) (by C-G formula based on SCr of 1.53 mg/dL (H)).     Lab Results   Component Value Date    CHOL 97 10/11/2024    TRIG 42 10/11/2024    HDL 44 10/11/2024    LDL 42 10/11/2024    AST 30 10/11/2024    ALT 16 10/11/2024             Lab Results   Component Value Date    TSH 9.290 (H) 10/10/2024        Lab Results   Component Value Date    HGBA1C 6.30 (H) 10/11/2024           No results found for: \"DIGOXIN\"   No results found for: \"DDIMERQUANT\"     Imaging:  All pertinent imaging studies were personally reviewed.    Assessment:      Acute exacerbation of CHF (congestive heart failure)    Permanent atrial fibrillation    SSS     Dilated CM     S/P Watchman device    Coronary artery disease involving coronary bypass graft of native heart without angina pectoris    Essential hypertension    COPD     Dependence on supplemental oxygen    Hypothyroidism (acquired)    ICD (implantable cardioverter-defibrillator) in place    CKD (chronic kidney disease)      Initial cardiac assessment: 79-year-old gentleman with a history of ischemic cardiomyopathy, HFrEF EF 25% with paroxysmal A-fib, CAD, watchman, AV node ablation with BiV ICD, presenting with acute on chronic HFrEF.      Recommendations:  1.  Acute on chronic HFrEF:  Continue IV Bumex 2 mg daily, goal net -1.5 to 2 L daily  Continue GDMT with " Toprol, Entresto, and Jardiance  Will not add spironolactone at this time due to limitation of hypotension and CKD    Will try to increase Entresto to full tablet twice daily  Strict I's and O's and daily weights.    At time of discharge she will provide patient with instructions on when to double his home diuretic dose.    Recheck echocardiogram    2.  PAF:  Status post watchman and AV node ablation  No need for full anticoagulation  Paced rhythm.    3.  CAD:  Continue aspirin and statin  No signs of acute coronary syndrome, negative troponin, no chest pain.    4.  Chronic COPD on oxygen:  Followed by pulmonary as an outpatient    5.  CKD stage IIIa:  Complicates all aspects of care  Monitor closely with diuresis  Continue SGLT2 inhibitor      Thank you for allowing us to share in his care.    Weekend plan:  Continue IV diuresis for goal net -1.5 to 2 L daily as long as renal function and blood pressure tolerate  Uptitrate Entresto as tolerated    Pietro Quintana MD  10/11/2024  09:20 EDT      Electronically signed by Pietro Quintana MD at 10/11/24 7006

## 2024-10-12 ENCOUNTER — APPOINTMENT (OUTPATIENT)
Dept: GENERAL RADIOLOGY | Facility: HOSPITAL | Age: 79
End: 2024-10-12
Payer: MEDICARE

## 2024-10-12 LAB
ALBUMIN SERPL-MCNC: 3.5 G/DL (ref 3.5–5.2)
ALBUMIN/GLOB SERPL: 1.3 G/DL
ALP SERPL-CCNC: 102 U/L (ref 39–117)
ALT SERPL W P-5'-P-CCNC: 10 U/L (ref 1–41)
ANION GAP SERPL CALCULATED.3IONS-SCNC: 9 MMOL/L (ref 5–15)
AST SERPL-CCNC: 27 U/L (ref 1–40)
BASOPHILS # BLD AUTO: 0.02 10*3/MM3 (ref 0–0.2)
BASOPHILS NFR BLD AUTO: 0.3 % (ref 0–1.5)
BILIRUB SERPL-MCNC: 1.4 MG/DL (ref 0–1.2)
BUN SERPL-MCNC: 35 MG/DL (ref 8–23)
BUN/CREAT SERPL: 26.1 (ref 7–25)
CALCIUM SPEC-SCNC: 9 MG/DL (ref 8.6–10.5)
CHLORIDE SERPL-SCNC: 101 MMOL/L (ref 98–107)
CO2 SERPL-SCNC: 29 MMOL/L (ref 22–29)
CREAT SERPL-MCNC: 1.34 MG/DL (ref 0.76–1.27)
DEPRECATED RDW RBC AUTO: 57.1 FL (ref 37–54)
EGFRCR SERPLBLD CKD-EPI 2021: 54.2 ML/MIN/1.73
EOSINOPHIL # BLD AUTO: 0.18 10*3/MM3 (ref 0–0.4)
EOSINOPHIL NFR BLD AUTO: 3.1 % (ref 0.3–6.2)
ERYTHROCYTE [DISTWIDTH] IN BLOOD BY AUTOMATED COUNT: 15.8 % (ref 12.3–15.4)
GLOBULIN UR ELPH-MCNC: 2.7 GM/DL
GLUCOSE SERPL-MCNC: 127 MG/DL (ref 65–99)
HCT VFR BLD AUTO: 39.6 % (ref 37.5–51)
HGB BLD-MCNC: 12.6 G/DL (ref 13–17.7)
IMM GRANULOCYTES # BLD AUTO: 0.01 10*3/MM3 (ref 0–0.05)
IMM GRANULOCYTES NFR BLD AUTO: 0.2 % (ref 0–0.5)
LYMPHOCYTES # BLD AUTO: 0.57 10*3/MM3 (ref 0.7–3.1)
LYMPHOCYTES NFR BLD AUTO: 10 % (ref 19.6–45.3)
MAGNESIUM SERPL-MCNC: 2.4 MG/DL (ref 1.6–2.4)
MCH RBC QN AUTO: 31 PG (ref 26.6–33)
MCHC RBC AUTO-ENTMCNC: 31.8 G/DL (ref 31.5–35.7)
MCV RBC AUTO: 97.5 FL (ref 79–97)
MONOCYTES # BLD AUTO: 0.62 10*3/MM3 (ref 0.1–0.9)
MONOCYTES NFR BLD AUTO: 10.8 % (ref 5–12)
NEUTROPHILS NFR BLD AUTO: 4.32 10*3/MM3 (ref 1.7–7)
NEUTROPHILS NFR BLD AUTO: 75.6 % (ref 42.7–76)
NRBC BLD AUTO-RTO: 0 /100 WBC (ref 0–0.2)
PLATELET # BLD AUTO: 149 10*3/MM3 (ref 140–450)
PMV BLD AUTO: 10.5 FL (ref 6–12)
POTASSIUM SERPL-SCNC: 4.6 MMOL/L (ref 3.5–5.2)
PROT SERPL-MCNC: 6.2 G/DL (ref 6–8.5)
RBC # BLD AUTO: 4.06 10*6/MM3 (ref 4.14–5.8)
SODIUM SERPL-SCNC: 139 MMOL/L (ref 136–145)
WBC NRBC COR # BLD AUTO: 5.72 10*3/MM3 (ref 3.4–10.8)

## 2024-10-12 PROCEDURE — 97166 OT EVAL MOD COMPLEX 45 MIN: CPT

## 2024-10-12 PROCEDURE — 99232 SBSQ HOSP IP/OBS MODERATE 35: CPT | Performed by: FAMILY MEDICINE

## 2024-10-12 PROCEDURE — 85025 COMPLETE CBC W/AUTO DIFF WBC: CPT | Performed by: FAMILY MEDICINE

## 2024-10-12 PROCEDURE — 94799 UNLISTED PULMONARY SVC/PX: CPT

## 2024-10-12 PROCEDURE — 99232 SBSQ HOSP IP/OBS MODERATE 35: CPT | Performed by: INTERNAL MEDICINE

## 2024-10-12 PROCEDURE — 80053 COMPREHEN METABOLIC PANEL: CPT | Performed by: FAMILY MEDICINE

## 2024-10-12 PROCEDURE — 94664 DEMO&/EVAL PT USE INHALER: CPT

## 2024-10-12 PROCEDURE — 94761 N-INVAS EAR/PLS OXIMETRY MLT: CPT

## 2024-10-12 PROCEDURE — 83735 ASSAY OF MAGNESIUM: CPT | Performed by: FAMILY MEDICINE

## 2024-10-12 PROCEDURE — 71045 X-RAY EXAM CHEST 1 VIEW: CPT

## 2024-10-12 RX ORDER — BUMETANIDE 2 MG/1
2 TABLET ORAL DAILY
Status: DISCONTINUED | OUTPATIENT
Start: 2024-10-12 | End: 2024-10-13

## 2024-10-12 RX ORDER — ROPINIROLE 0.5 MG/1
0.5 TABLET, FILM COATED ORAL NIGHTLY
Status: DISCONTINUED | OUTPATIENT
Start: 2024-10-12 | End: 2024-10-15 | Stop reason: HOSPADM

## 2024-10-12 RX ORDER — TEMAZEPAM 15 MG/1
15 CAPSULE ORAL NIGHTLY
Status: DISCONTINUED | OUTPATIENT
Start: 2024-10-12 | End: 2024-10-15 | Stop reason: HOSPADM

## 2024-10-12 RX ORDER — GUAIFENESIN 600 MG/1
1200 TABLET, EXTENDED RELEASE ORAL EVERY 12 HOURS SCHEDULED
Status: DISCONTINUED | OUTPATIENT
Start: 2024-10-12 | End: 2024-10-15 | Stop reason: HOSPADM

## 2024-10-12 RX ADMIN — Medication 5 MG: at 22:03

## 2024-10-12 RX ADMIN — ARFORMOTEROL TARTRATE 15 MCG: 15 SOLUTION RESPIRATORY (INHALATION) at 09:46

## 2024-10-12 RX ADMIN — TEMAZEPAM 15 MG: 15 CAPSULE ORAL at 22:07

## 2024-10-12 RX ADMIN — GUAIFENESIN 1200 MG: 600 TABLET, EXTENDED RELEASE ORAL at 22:03

## 2024-10-12 RX ADMIN — ATORVASTATIN CALCIUM 40 MG: 40 TABLET, FILM COATED ORAL at 22:03

## 2024-10-12 RX ADMIN — SACUBITRIL AND VALSARTAN 0.5 TABLET: 24; 26 TABLET, FILM COATED ORAL at 09:33

## 2024-10-12 RX ADMIN — Medication 10 ML: at 09:34

## 2024-10-12 RX ADMIN — Medication 1 TABLET: at 09:32

## 2024-10-12 RX ADMIN — EMPAGLIFLOZIN 10 MG: 10 TABLET, FILM COATED ORAL at 09:33

## 2024-10-12 RX ADMIN — BUMETANIDE 2 MG: 2 TABLET ORAL at 13:56

## 2024-10-12 RX ADMIN — ASPIRIN 81 MG: 81 TABLET, COATED ORAL at 09:33

## 2024-10-12 RX ADMIN — SACUBITRIL AND VALSARTAN 0.5 TABLET: 24; 26 TABLET, FILM COATED ORAL at 22:03

## 2024-10-12 RX ADMIN — ROPINIROLE 0.5 MG: 0.5 TABLET, FILM COATED ORAL at 22:03

## 2024-10-12 RX ADMIN — METOPROLOL SUCCINATE 25 MG: 25 TABLET, EXTENDED RELEASE ORAL at 09:32

## 2024-10-12 RX ADMIN — LEVOTHYROXINE SODIUM 75 MCG: 0.07 TABLET ORAL at 06:54

## 2024-10-12 RX ADMIN — GUAIFENESIN 1200 MG: 600 TABLET, EXTENDED RELEASE ORAL at 09:32

## 2024-10-12 RX ADMIN — TEMAZEPAM 15 MG: 15 CAPSULE ORAL at 03:15

## 2024-10-12 RX ADMIN — ROPINIROLE 0.5 MG: 0.5 TABLET, FILM COATED ORAL at 00:34

## 2024-10-12 RX ADMIN — METOPROLOL SUCCINATE 25 MG: 25 TABLET, EXTENDED RELEASE ORAL at 22:03

## 2024-10-12 RX ADMIN — IPRATROPIUM BROMIDE 0.5 MG: 0.5 SOLUTION RESPIRATORY (INHALATION) at 09:46

## 2024-10-12 RX ADMIN — Medication 10 ML: at 22:04

## 2024-10-12 NOTE — THERAPY DISCHARGE NOTE
Acute Care - Occupational Therapy Discharge  [facility]    Patient Name: Colin Albrecht  : 1945    MRN: [id number]                              Today's Date: 10/12/2024       Admit Date: 10/10/2024    Visit Dx:     ICD-10-CM ICD-9-CM   1. Acute on chronic systolic (congestive) heart failure  I50.23 428.23     428.0     Patient Active Problem List   Diagnosis    Permanent atrial fibrillation    SSS     Dilated CM     S/P Watchman device    Chronic systolic congestive heart failure    Coronary artery disease involving coronary bypass graft of native heart without angina pectoris    Essential hypertension    Former smoker    COPD     Suspected chronic renal insufficiency    S/P CABG x 2 on 6/3/2021    Nonsustained ventricular tachycardia    Symptomatic anemia    SOB (shortness of breath)    Anginal equivalent    Dependence on supplemental oxygen    Acute exacerbation of CHF (congestive heart failure)    Hypothyroidism (acquired)    ICD (implantable cardioverter-defibrillator) in place    CKD (chronic kidney disease)     Past Medical History:   Diagnosis Date    Abnormal ECG     Arrhythmia     Atrial fibrillation     CHF (congestive heart failure)     Depression     History of transfusion     bleeding stomach ulcer ~2014 x2, no reaction     Hypertension     Hypothyroidism (acquired) 10/10/2024    ICD (implantable cardioverter-defibrillator) in place 10/10/2024    Stomach ulcer     Wears reading eyeglasses      Past Surgical History:   Procedure Laterality Date    ATRIAL APPENDAGE EXCLUSION LEFT WITH TRANSESOPHAGEAL ECHOCARDIOGRAM N/A 2020    Procedure: Atrial Appendage Occlusion (Watchman), start Eliquis 2 weeks prior and hold 1 day, GA;  Surgeon: Yonny Prado MD;  Location: [facility] INVASIVE LOCATION;  Service: Cardiology;  Laterality: N/A;    CARDIAC CATHETERIZATION      CARDIAC CATHETERIZATION N/A 2021    Procedure: Left Heart Cath- ADD ON/ WALK IN FOR RDS PER KT;  Surgeon: Pietro Quintana  MD SANDIP;  Location:  [facility] CATH INVASIVE LOCATION;  Service: Cardiovascular;  Laterality: N/A;    CARDIAC CATHETERIZATION N/A 6/6/2023    Procedure: Coronary angiography;  Surgeon: Pietro Quintana MD;  Location:  [facility] CATH INVASIVE LOCATION;  Service: Cardiovascular;  Laterality: N/A;  LVEF decrease with known CAD.  Discussed with Dr. Quintana, recommendation of [facility].     CARDIAC ELECTROPHYSIOLOGY PROCEDURE N/A 3/26/2021    Procedure: DDD PPM upgrade to Biv ICD (MDT), no meds to hold;  Surgeon: Yonny Prado MD;  Location:  [facility] EP INVASIVE LOCATION;  Service: Cardiology;  Laterality: N/A;    CARDIAC ELECTROPHYSIOLOGY PROCEDURE N/A 3/26/2021    Procedure: AVN RFA;  Surgeon: Yonny Prado MD;  Location:  [facility] EP INVASIVE LOCATION;  Service: Cardiovascular;  Laterality: N/A;    COLONOSCOPY      CORONARY ARTERY BYPASS GRAFT N/A 6/3/2021    Procedure: MEDIAN STERNOTOMY, CORONARY ARTERY BYPASS WITH INTERNAL MAMMARY ARTERY GRAFT X 2, EVH OF THE RIGHT GREATER SAPHENOUS ANA;  Surgeon: Jaime Shields MD;  Location:  [facility] OR;  Service: Cardiothoracic;  Laterality: N/A;    ENDOSCOPY N/A 6/15/2021    Procedure: ESOPHAGOGASTRODUODENOSCOPY;  Surgeon: Fransico Warren MD;  Location: [facility] ENDOSCOPY;  Service: Gastroenterology;  Laterality: N/A;    INSERT / REPLACE / REMOVE PACEMAKER        General Information       Row Name 10/12/24 0844          OT Time and Intention    Subjective Information --  -AF     Document Type discharge evaluation/summary  -AF     Mode of Treatment occupational therapy  -AF     Patient Effort good  -AF     Symptoms Noted During/After Treatment fatigue  -AF       Row Name 10/12/24 0844          General Information    Patient Profile Reviewed yes  -AF     Prior Level of Function independent:;all household mobility;community mobility;gait;transfer;bed mobility;ADL's;home management;driving;work;yard work  -AF     Existing Precautions/Restrictions fall;oxygen therapy device and L/min  -AF      Barriers to Rehab medically complex  -AF       Row Name 10/12/24 0844          Occupational Profile    Environmental Supports and Barriers (Occupational Profile) Pt lives at home with spouse. He was indp. in all ADLs/IADLs prior - pt drove and worked. Pt has tub/shower and utilizes no AE at baseline. Pt has had to utlize 2LNC as needed recently (past 3-4 months) d/t progressive SOA.  -AF       Row Name 10/12/24 0844          Living Environment    People in Home spouse  -AF       Row Name 10/12/24 08          Home Main Entrance    Number of Stairs, Main Entrance none  -AF       Row Name 10/12/24 0844          Stairs Within Home, Primary    Number of Stairs, Within Home, Primary none  -AF       Row Name 10/12/24 0844          Cognition    Orientation Status (Cognition) oriented x 3  -AF       Row Name 10/12/24 0844          Safety Issues/Impairments Affecting Functional Mobility    Safety Issues Affecting Function (Mobility) insight into deficits/self-awareness  -AF     Impairments Affecting Function (Mobility) endurance/activity tolerance;shortness of breath  -AF               User Key  (r) = Recorded By, (t) = Taken By, (c) = Cosigned By      Initials Name Provider Type    AF Sherri Arenas OT Occupational Therapist                   Mobility/ADL's       Row Name 10/12/24 0946          Bed Mobility    Bed Mobility supine-sit-supine  -AF     Supine-Sit-Supine [place] (Bed Mobility) independent  -AF       Row Name 10/12/24 0946          Sit-Stand Transfer    Sit-Stand [place] (Transfers) standby assist  -AF       Row Name 10/12/24 0946          Functional Mobility    Functional Mobility- Comment pt declined mobility.  -AF       Row Name 10/12/24 0946          Activities of Daily Living    BADL Assessment/Intervention other (see comments)  pt declined all BADLs.  -AF               User Key  (r) = Recorded By, (t) = Taken By, (c) = Cosigned By      Initials Name Provider Type    AF Sherri Arenas OT  Occupational Therapist                   Obj/Interventions       Row Name 10/12/24 0947          Sensory Assessment (Somatosensory)    Sensory Assessment (Somatosensory) UE sensation intact  -AF       Row Name 10/12/24 0947          Vision Assessment/Intervention    Visual Impairment/Limitations WFL  -AF       Row Name 10/12/24 0947          Range of Motion Comprehensive    General Range of Motion bilateral upper extremity ROM WFL  -AF       Row Name 10/12/24 0947          Strength Comprehensive (MMT)    General Manual Muscle Testing (MMT) Assessment no strength deficits identified  -AF       [facility] Name 10/12/24 0947          Balance    Balance Assessment sitting static balance;sitting dynamic balance;sit to stand dynamic balance;standing static balance  -AF     Static Sitting Balance independent  -AF     Dynamic Sitting Balance independent  -AF     Position, Sitting Balance unsupported;sitting edge of bed  -AF     Sit to Stand Dynamic Balance independent  -AF     Static Standing Balance independent  -AF     Position/Device Used, Standing Balance unsupported  -AF     Balance Interventions sitting;sit to stand;supported;static  -AF               User Key  (r) = Recorded By, (t) = Taken By, (c) = Cosigned By      Initials Name Provider Type    Sherri Lala OT Occupational Therapist                   Goals/Plan    No documentation.                  Clinical Impression       [facility] Name 10/12/24 0948          Pain Assessment    Pretreatment Pain Rating 0/10 - no pain  -AF     Posttreatment Pain Rating 0/10 - no pain  -AF     Pre/Posttreatment Pain Comment pt tolerated, only complained of being tired d/t restless leg syndrome.  -AF       Row Name 10/12/24 0948          Plan of Care Review    Plan of Care Reviewed With patient  -AF     Outcome Evaluation PT evaluation complete. Pt demo'd indp. w/ bed mobility and transfers. Pt declined BADLs. Pt contiues to have SOA with mobility, stayed >90%. Pt educ. on benefits of OT  to improve activity tolerance for BADLs/IADLs. However, pt declined OT despite education. OT will sign off at this time per patient request.  -AF       Row Name 10/12/24 0948          Vital Signs    Pre SpO2 (%) 96  -AF     Intra SpO2 (%) 90  -AF     Post SpO2 (%) 91  -AF     Pre Patient Position Supine  -AF     Intra Patient Position Standing  -AF     Post Patient Position Supine  -AF       Row Name 10/12/24 0948          Positioning and Restraints    Pre-Treatment Position in bed  -AF     Post Treatment Position bed  -AF     In Bed notified nsg;call light within reach;supine;encouraged to call for assist  RN approved no exit alarm.  -AF               User Key  (r) = Recorded By, (t) = Taken By, (c) = Cosigned By      Initials Name Provider Type    Sherri Lala OT Occupational Therapist                   Outcome Measures       Row Name 10/12/24 0951          How much help from another is currently needed...    Putting on and taking off regular lower body clothing? 3  -AF     Bathing (including washing, rinsing, and drying) 3  -AF     Toileting (which includes using toilet bed pan or urinal) 4  -AF     Putting on and taking off regular upper body clothing 4  -AF     Taking care of personal grooming (such as brushing teeth) 4  -AF     Eating meals 4  -AF     AM-PAC 6 Clicks Score (OT) 22  -AF       Row Name 10/12/24 0951          Functional Assessment    Outcome Measure Options AM-PAC 6 Clicks Daily Activity (OT)  -AF               User Key  (r) = Recorded By, (t) = Taken By, (c) = Cosigned By      Initials Name Provider Type    AF Sherri Arneas OT Occupational Therapist                  Occupational Therapy Education       Title: PT OT SLP Therapies (In Progress)       Topic: Occupational Therapy (In Progress)       Point: ADL training (Done)       Description:   Instruct learner(s) on proper safety adaptation and remediation techniques during self care or transfers.   Instruct in proper use of assistive  devices.                  Learning Progress Summary            Patient Acceptance, E,TB, VU by AF at 10/12/2024 0951                      Point: Home exercise program (Done)       Description:   Instruct learner(s) on appropriate technique for monitoring, assisting and/or progressing therapeutic exercises/activities.                  Learning Progress Summary            Patient Acceptance, E,TB, VU by AF at 10/12/2024 0951                      Point: Precautions (Not Started)       Description:   Instruct learner(s) on prescribed precautions during self-care and functional transfers.                  Learner Progress:  Not documented in this visit.              Point: Body mechanics (Done)       Description:   Instruct learner(s) on proper positioning and spine alignment during self-care, functional mobility activities and/or exercises.                  Learning Progress Summary            Patient Acceptance, E,TB, VU by AF at 10/12/2024 0951                                      User Key       Initials Effective Dates Name Provider Type Discipline     08/15/23 -  Sherri Arenas OT Occupational Therapist OT                  OT Recommendation and Plan     Plan of Care Review  Plan of Care Reviewed With: patient  Outcome Evaluation: PT evaluation complete. Pt demo'd indp. w/ bed mobility and transfers. Pt declined BADLs. Pt contiues to have SOA with mobility, stayed >90%. Pt educ. on benefits of OT to improve activity tolerance for BADLs/IADLs. However, pt declined OT despite education. OT will sign off at this time per patient request.  Plan of Care Reviewed With: patient  Outcome Evaluation: PT evaluation complete. Pt demo'd indp. w/ bed mobility and transfers. Pt declined BADLs. Pt contiues to have SOA with mobility, stayed >90%. Pt educ. on benefits of OT to improve activity tolerance for BADLs/IADLs. However, pt declined OT despite education. OT will sign off at this time per patient request.     Time  Calculation:   Evaluation Complexity (OT)  Review Occupational Profile/Medical/Therapy History Complexity: expanded/moderate complexity  Assessment, Occupational Performance/Identification of Deficit Complexity: 3-5 performance deficits  Clinical Decision Making Complexity (OT): detailed assessment/moderate complexity  Overall Complexity of Evaluation (OT): moderate complexity     Time Calculation- OT       Row Name 10/12/24 0952             Time Calculation- OT    OT Start Time 0750  -AF      OT Received On 10/12/24  -AF         Untimed Charges    OT Eval/Re-eval Minutes 46  -AF         Total Minutes    Untimed Charges Total Minutes 46  -AF       Total Minutes 46  -AF                User Key  (r) = Recorded By, (t) = Taken By, (c) = Cosigned By      Initials Name Provider Type    AF Dagoberto, JYOTI Polanco Occupational Therapist                  Therapy Charges for Today       Code Description Service Date Service Provider Modifiers Qty    [id number]  OT EVAL MOD COMPLEXITY 4 10/12/2024 Sherri Arenas OT GO 1                    Sherri Arenas OT  10/12/2024

## 2024-10-12 NOTE — PLAN OF CARE
Goal Outcome Evaluation:  Plan of Care Reviewed With: patient           Outcome Evaluation: PT evaluation complete. Pt demo'd indp. w/ bed mobility and transfers. Pt declined BADLs. Pt contiues to have SOA with mobility, stayed >90%. Pt educ. on benefits of OT to improve activity tolerance for BADLs/IADLs. However, pt declined OT despite education. OT will sign off at this time per patient request.

## 2024-10-12 NOTE — PLAN OF CARE
Goal Outcome Evaluation:      Pt resting in bed, able to ambulate sba, remains without complaint cont to monitor

## 2024-10-12 NOTE — PROGRESS NOTES
[facility] at [facility]  IP Progress Note      Chief Complaint/Reason for service: Acute on chronic heart failure with reduced EF    Subjective   Subjective: The patient states his breathing had gotten better and is able to walk to the bathroom.  However this morning he states he is having trouble clearing his airway.  He is complaining of really thick stringy type sputum.  He states he does use breathing treatments at home.  He is also complaining of profound fatigue because he is only slept 2 hours in the last 24 hours.  He also complains of restless leg symptoms    Past medical, surgical, social and family history reviewed in the patient's electronic medical record.    Objective     Vital Sign Min/Max for last 24 hours  Temp  Min: 97.6 °F (36.4 °C)  Max: 98.2 °F (36.8 °C)   BP  Min: 103/64  Max: 127/49   Pulse  Min: 73  Max: 101   Resp  Min: 17  Max: 18   SpO2  Min: 88 %  Max: 100 %   Flow (L/min) (Oxygen Therapy)  Min: 2  Max: 3    No intake or output data in the 24 hours ending 10/12/24 0638          Current Facility-Administered Medications:     acetaminophen (TYLENOL) tablet 650 mg, 650 mg, Oral, Q4H PRN **OR** acetaminophen (TYLENOL) 160 MG/5ML oral solution 650 mg, 650 mg, Oral, Q4H PRN **OR** acetaminophen (TYLENOL) suppository 650 mg, 650 mg, Rectal, Q4H PRN, Shae Knight, APRN    arformoterol (BROVANA) nebulizer solution 15 mcg, 15 mcg, Nebulization, BID - RT, Ariel Moralez MD, 15 mcg at 10/11/24 2015    aspirin EC tablet 81 mg, 81 mg, Oral, Daily, Shae Knight, APRN, 81 mg at 10/11/24 1156    atorvastatin (LIPITOR) tablet 40 mg, 40 mg, Oral, Nightly, Shae Knight, APRN, 40 mg at 10/11/24 2207    sennosides-docusate (PERICOLACE) 8.6-50 MG per tablet 2 tablet, 2 tablet, Oral, BID PRN **AND** polyethylene glycol (MIRALAX) packet 17 g, 17 g, Oral, Daily PRN, 17 g at 10/11/24 1156 **AND** bisacodyl (DULCOLAX) EC tablet 5 mg, 5 mg, Oral, Daily  PRN **AND** bisacodyl (DULCOLAX) suppository 10 mg, 10 mg, Rectal, Daily PRN, Shae Knight APRN    empagliflozin (JARDIANCE) tablet 10 mg, 10 mg, Oral, Daily, Shae Knight APRN, 10 mg at 10/11/24 1039    ipratropium (ATROVENT) nebulizer solution 0.5 mg, 0.5 mg, Nebulization, 4x Daily - RT, Ariel Moralez MD, 0.5 mg at 10/11/24 2015    ipratropium-albuterol (DUO-NEB) nebulizer solution 3 mL, 3 mL, Nebulization, 4x Daily PRN, Ariel Moralez MD, 3 mL at 10/11/24 1045    levothyroxine (SYNTHROID, LEVOTHROID) tablet 75 mcg, 75 mcg, Oral, Q AM, Shae Knight APRN, 75 mcg at 10/11/24 0549    metoprolol succinate XL (TOPROL-XL) 24 hr tablet 25 mg, 25 mg, Oral, BID, Shae Knight APRN, 25 mg at 10/11/24 2207    multivitamin with minerals 1 tablet, 1 tablet, Oral, Daily, Shae Knight APRN    Pharmacy Consult - [facility], , Does not apply, Daily, Jude Rob, [facility]    rOPINIRole (REQUIP) tablet 0.5 mg, 0.5 mg, Oral, Nightly PRN, Shae Knight APRN, 0.5 mg at 10/12/24 0034    sacubitril-valsartan (ENTRESTO) 24-26 MG tablet 0.5 tablet, 0.5 tablet, Oral, BID, Shae Knight APRN, 0.5 tablet at 10/11/24 2208    sodium chloride 0.9 % flush 10 mL, 10 mL, Intravenous, PRN, Anthony Sibley MD    sodium chloride 0.9 % flush 10 mL, 10 mL, Intravenous, Q12H, Shae Knight APRN, 10 mL at 10/11/24 2208    sodium chloride 0.9 % flush 10 mL, 10 mL, Intravenous, PRN, Shae Knight APRN    temazepam (RESTORIL) capsule 15 mg, 15 mg, Oral, Nightly, Pietro Wheat DO, 15 mg at 10/12/24 0315    Physical Exam: General Pleasant elderly male with some resting tachypnea sat 95%        HEENT: No JVP.  Nasal O2 present.       Respiratory: Equal bilateral symmetrical expansion with some reduced breath sounds on the right no significant wheezing noted       Cardiovascular: Regular rate and rhythm with soft systolic murmur no edema  palpation       GI: Soft and flat       Lower Extremities: No lesions       Neuro: Facial expressions are symmetrical moves all 4 extremities       Skin: Warm and dry no edema palpation       Psych: Pleasant affect    Results Review: I's and O's not recorded.  Heart rate 73-1 01.  Blood pressure 10 3-1 27.    Radiology Results:  Imaging Results (Last 72 Hours)       Procedure Component Value Units Date/Time    XR Chest 1 View [[id number]] Collected: 10/11/24 0711     Updated: 10/11/24 0716    Narrative:      XR CHEST 1 VW    Date of Exam: 10/11/2024 2:59 AM EDT    Indication: f/u dyspnea, a/c chf, pl effusions    Comparison: October 10, 2024    Findings:  A cardiac ICD device is present. Sternotomy wires noted. The heart is enlarged. There are bibasilar densities which could reflect effusions. Pulmonary vascular markings are prominent. There are some interstitial changes. Some vascular congestion and   edema could account for this.      Impression:      Impression:  1.Cardiomegaly with what looks like some vascular congestion and probable interstitial edema.  2.Bibasilar effusions      Electronically Signed: Percy Mcguire MD    10/11/2024 7:12 AM EDT    Workstation ID: [id number]    XR Chest 1 View [[id number]] Collected: 10/10/24 1326     Updated: 10/10/24 1331    Narrative:      XR CHEST 1 VW    Date of Exam: 10/10/2024 12:45 PM EDT    Indication: SOA triage protocol    Comparison: CT chest without contrast 10/9/2024    Findings:  Left-sided AICD noted. Status post sternotomy and CABG. Central pulmonary vascular congestion with interstitial thickening suggesting pulmonary edema. Underlying emphysema. Persistent small bilateral pleural effusions right greater than left with   bibasilar atelectasis. Negative for pneumothorax.      Impression:      Impression:  1. Central pulmonary vascular congestion with interstitial thickening suggesting pulmonary edema.  2. Persistent small bilateral pleural effusions right greater  than left with bibasilar atelectasis.  3. Emphysema.        Electronically Signed: Luis Richards MD    10/10/2024 1:28 PM EDT    Workstation ID: [id number]                ECHO: EF is 25%    Tele: A-fib    Assessment   Assessment/Plan: Acute on chronic heart failure with reduced EF.  Intake and outtake are not recorded so I will order that today.  States his breathing had improved significantly until he had this problem with coughing up thick sputum this morning.  Clinically he has no JVP or edema.  Continue same meds  2 thick secretions-will humidify oxygen, check a portable chest x-ray to look for any mucous plugging.    Pietro Astudillo MD  10/12/24  06:38 EDT

## 2024-10-12 NOTE — PROGRESS NOTES
[facility] Medicine Services  PROGRESS NOTE    Patient Name: Colin Albrecht  : 1945  MRN: [id number]    Date of Admission: 10/10/2024  Primary Care Physician: Arun Soliman MD    Subjective   Subjective     CC:  Progressive soa    HPI:  Patient is a 79-year-old male seen and examined by me this a.m., he is resting comfortably in bed but reports earlier this morning he was congested and had a hard time coughing up some sputum, he reports he is now finally got that up and now feeling better. He denies any other new acute complaints, following with cardiology at this time.       Objective   Objective     Vital Signs:   Temp:  [97.4 °F (36.3 °C)-98.2 °F (36.8 °C)] 97.4 °F (36.3 °C)  Heart Rate:  [] 77  Resp:  [17-18] 18  BP: (103-116)/(64-72) 116/67  Flow (L/min) (Oxygen Therapy):  [2-3] 2     Physical Exam:  Constitutional: No acute distress, awake, alert, frail appearing, currently on 2L NC   HENT: NCAT, mucous membranes moist  Respiratory: Decreased BS bilaterally, respiratory effort normal, no rhonchi or wheezing  Cardiovascular: RRR, no murmurs, rubs, or gallops  Gastrointestinal: Positive bowel sounds, soft, nontender, nondistended  Musculoskeletal: 1+pitting edema bilateral LE's  Psychiatric: Appropriate affect, cooperative  Neurologic: Oriented x 3, FLOREZ, speech clear  Skin: No rashes      Results Reviewed:  LAB RESULTS:      Lab 10/11/24  0606 10/11/24  0024 10/10/24  1945 10/10/24  1605 10/10/24  1243   WBC 6.53  --   --   --  6.75   HEMOGLOBIN 12.5*  --   --   --  12.7*   HEMATOCRIT 39.5  --   --   --  41.2   PLATELETS 156  --   --   --  156   NEUTROS ABS  --   --   --   --  5.24   IMMATURE GRANS (ABS)  --   --   --   --  0.02   LYMPHS ABS  --   --   --   --  0.54*   MONOS ABS  --   --   --   --  0.79   EOS ABS  --   --   --   --  0.12   MCV 96.3  --   --   --  99.3*   PROCALCITONIN  --   --   --   --  0.05   LACTATE  --  1.1 2.6* 2.5* 2.1*   HSTROP T  --   --    --   --  21         Lab 10/11/24  0606 10/10/24  1243   SODIUM 141 140   POTASSIUM 4.5 4.5   CHLORIDE 103 102   CO2 31.0* 27.0   ANION GAP 7.0 11.0   BUN 32* 33*   CREATININE 1.53* 1.31*   EGFR 46.2* 55.7*   GLUCOSE 92 113*   CALCIUM 9.2 9.4   MAGNESIUM 2.3  --    HEMOGLOBIN A1C 6.30*  --    TSH  --  9.290*         Lab 10/11/24  0606 10/10/24  1243   TOTAL PROTEIN 6.7 7.0   ALBUMIN 3.6 3.9   GLOBULIN 3.1 3.1   ALT (SGPT) 16 17   AST (SGOT) 30 36   BILIRUBIN 1.4* 1.5*   ALK PHOS 108 114         Lab 10/10/24  1243   PROBNP 10,682.0*   HSTROP T 21         Lab 10/11/24  0606   CHOLESTEROL 97   LDL CHOL 42   HDL CHOL 44   TRIGLYCERIDES 42             Brief Urine Lab Results       None            Microbiology Results Abnormal       Procedure Component Value - Date/Time    COVID PRE-OP / PRE-PROCEDURE SCREENING ORDER (NO ISOLATION) - Swab, Nasal Cavity [[id number]]  (Normal) Collected: 10/10/24 1302    Lab Status: Final result Specimen: Swab from Nasal Cavity Updated: 10/10/24 1338    Narrative:      The following orders were created for panel order COVID PRE-OP / PRE-PROCEDURE SCREENING ORDER (NO ISOLATION) - Swab, Nasal Cavity.  Procedure                               Abnormality         Status                     ---------                               -----------         ------                     COVID-19 and FLU A/B PCR...[[id number]]  Normal              Final result                 Please view results for these tests on the individual orders.    COVID-19 and FLU A/B PCR, 1 HR TAT - Swab, Nasopharynx [[id number]]  (Normal) Collected: 10/10/24 1302    Lab Status: Final result Specimen: Swab from Nasopharynx Updated: 10/10/24 1338     COVID19 Not Detected     Influenza A PCR Not Detected     Influenza B PCR Not Detected    Narrative:      Fact sheet for providers: https://www.fda.gov/media/825273/download    Fact sheet for patients: https://www.fda.gov/media/447132/download    Test performed by PCR.            XR Chest 1  View    Result Date: 10/12/2024  XR CHEST 1 VW Date of Exam: 10/12/2024 7:10 AM EDT Indication: Shortness of breath. Comparison: 10/11/2024. Findings: There are persistent bilateral basal pleural effusions which are small in size with atelectasis, right greater than left side. The heart is enlarged. The patient is had a previous median sternotomy. There is a left-sided transvenous pacemaker in place. The pulmonary vascular markings are increased felt to represent mild pulmonary edema. There is no pneumothorax. There are chronic age-related changes involving the bony thorax and thoracic aorta.     Impression: Impression: No interval change from yesterday with abnormalities as described above. Electronically Signed: Candido Palomino MD  10/12/2024 10:23 AM EDT  Workstation ID: [id number]    Adult Transthoracic Echo Complete W/ Cont if Necessary Per Protocol    Result Date: 10/11/2024    Left ventricular systolic function is severely decreased. Calculated left ventricular EF = 20.5% Left ventricular ejection fraction appears to be less than 20%.   The left ventricular cavity is moderately dilated.   Left ventricular wall thickness is consistent with mild concentric hypertrophy.   Severely reduced right ventricular systolic function noted.   The right ventricular cavity is moderately dilated.   The left atrial cavity is moderate to severely dilated.   The right atrial cavity is severely  dilated.   There is mild calcification of the aortic valve.   Moderate mitral valve regurgitation is present.   Moderate tricuspid valve regurgitation is present.   Estimated right ventricular systolic pressure from tricuspid regurgitation is moderately elevated (45-55 mmHg).   Moderate pulmonic valve regurgitation is present. No significant change compared to the 2023 echo.     XR Chest 1 View    Result Date: 10/11/2024  XR CHEST 1 VW Date of Exam: 10/11/2024 2:59 AM EDT Indication: f/u dyspnea, a/c chf, pl effusions Comparison: October 10,  2024 Findings: A cardiac ICD device is present. Sternotomy wires noted. The heart is enlarged. There are bibasilar densities which could reflect effusions. Pulmonary vascular markings are prominent. There are some interstitial changes. Some vascular congestion and edema could account for this.     Impression: Impression: 1.Cardiomegaly with what looks like some vascular congestion and probable interstitial edema. 2.Bibasilar effusions Electronically Signed: Percy Mcguire MD  10/11/2024 7:12 AM EDT  Workstation ID: [id number]     Results for orders placed during the hospital encounter of 10/10/24    Adult Transthoracic Echo Complete W/ Cont if Necessary Per Protocol    Interpretation Summary    Left ventricular systolic function is severely decreased. Calculated left ventricular EF = 20.5% Left ventricular ejection fraction appears to be less than 20%.    The left ventricular cavity is moderately dilated.    Left ventricular wall thickness is consistent with mild concentric hypertrophy.    Severely reduced right ventricular systolic function noted.    The right ventricular cavity is moderately dilated.    The left atrial cavity is moderate to severely dilated.    The right atrial cavity is severely  dilated.    There is mild calcification of the aortic valve.    Moderate mitral valve regurgitation is present.    Moderate tricuspid valve regurgitation is present.    Estimated right ventricular systolic pressure from tricuspid regurgitation is moderately elevated (45-55 mmHg).    Moderate pulmonic valve regurgitation is present.    No significant change compared to the 2023 echo.      Current medications:  Scheduled Meds:arformoterol, 15 mcg, Nebulization, BID - RT  aspirin, 81 mg, Oral, Daily  atorvastatin, 40 mg, Oral, Nightly  empagliflozin, 10 mg, Oral, Daily  guaiFENesin, 1,200 mg, Oral, Q12H  ipratropium, 0.5 mg, Nebulization, 4x Daily - RT  levothyroxine, 75 mcg, Oral, Q AM  melatonin, 5 mg, Oral,  Nightly  metoprolol succinate XL, 25 mg, Oral, BID  multivitamin with minerals, 1 tablet, Oral, Daily  pharmacy consult - MTM, , Does not apply, Daily  rOPINIRole, 0.5 mg, Oral, Nightly  sacubitril-valsartan, 0.5 tablet, Oral, BID  sodium chloride, 10 mL, Intravenous, Q12H  temazepam, 15 mg, Oral, Nightly      Continuous Infusions:   PRN Meds:.  acetaminophen **OR** acetaminophen **OR** acetaminophen    senna-docusate sodium **AND** polyethylene glycol **AND** bisacodyl **AND** bisacodyl    ipratropium-albuterol    rOPINIRole    sodium chloride    sodium chloride    Assessment & Plan   Assessment & Plan     Active Hospital Problems    Diagnosis  POA    **Acute exacerbation of CHF (congestive heart failure) [I50.9]  Yes    Hypothyroidism (acquired) [E03.9]  Yes    ICD (implantable cardioverter-defibrillator) in place [Z95.810]  Yes    CKD (chronic kidney disease) [N18.9]  Yes    Dependence on supplemental oxygen [Z99.81]  Not Applicable    COPD  [J44.9]  Yes    Essential hypertension [I10]  Yes    Coronary artery disease involving coronary bypass graft of native heart without angina pectoris [I25.810]  Yes    Dilated CM  [I42.0]  Yes    S/P Watchman device [Z95.818]  Yes    SSS  [I49.5]  Yes    Permanent atrial fibrillation [I48.21]  Yes      Resolved Hospital Problems   No resolved problems to display.        Brief Hospital Course to date:  Colin Albrecht is a 79 y.o. male with past medical history significant for hypothyroidism, SSS s/p ICD,CAD, dilated CM, CHF w an EF 20.4 % (at last check 5/2023), A-fib s/p Watchman, ex-smoker, chronic oxygen use, and probable chronic CKD.  Patient follows with Dr. Wei with pulmonary, Dr. Quintana with cardiology, and Dr. Prado with cards EP.  Patient was last seen by pulmonary approximately 1 month ago with complaints of progressive shortness of air.  CT of the chest was completed outpatient PTA.  CT showed moderate to marked cardiomegaly new compared to prior CT  from 2021, pulmonary edema with sm to mod right pl effusion and trace left pl effusion.  Pt was to have outpatient echo in approx 2 weeks and follow-up with Dr. Quintana, however continues to have progressive shortness of air    Progressive dyspnea  Acute/chronic systolic CHF  CAD/dilated CM/EF 20.4 at last check (5/2023)  SSS/ICD/A-fib s/p Watchman  Heart murmur  --Follows with Dr. Quintana with cards, Dr. Prado with EP.  -- Echo with reduced EF of 20%  -- BNP 10K, CXR with pulmonary venous congestion and small effusions  -- cards/Dr. Quintana following.  Was on diuresis but appears to have fell off, will order Bumex 2 mg PO daily for now, follow with cardiology   -- Awaiting labs, ordered for this afternoon now   -- Continue toprol, entresto, and jardiance. Entresto dosage has been increased S/p watchman so no need for AC     COPD  Chronic oxygen use  --Follows with Dr. Wei  -- Recently added home nebulizers, initially helped  -- wears 2LNC O2 at home     Hypothyroidism  -- Continue home Synthroid.    -- TSH elevated but free T4 upper limit of normal, outpatient follow up with his regular PCP for repeat labs         Expected Discharge Location and Transportation:   Expected Discharge   Expected Discharge Date: 10/13/2024; Expected Discharge Time:      VTE Prophylaxis:  Mechanical VTE prophylaxis orders are present.         AM-PAC 6 Clicks Score (PT): 24 (10/12/24 0800)    CODE STATUS:   Code Status and Medical Interventions: CPR (Attempt to Resuscitate); Full Support   Ordered at: 10/10/24 1602     Level Of Support Discussed With:    Patient    Next of Kin (If No Surrogate)     Code Status (Patient has no pulse and is not breathing):    CPR (Attempt to Resuscitate)     Medical Interventions (Patient has pulse or is breathing):    Full Support       RADHA Jimenez, DO  10/12/24

## 2024-10-13 LAB
ALBUMIN SERPL-MCNC: 3.6 G/DL (ref 3.5–5.2)
ALBUMIN/GLOB SERPL: 1.4 G/DL
ALP SERPL-CCNC: 115 U/L (ref 39–117)
ALT SERPL W P-5'-P-CCNC: 12 U/L (ref 1–41)
ANION GAP SERPL CALCULATED.3IONS-SCNC: 12 MMOL/L (ref 5–15)
AST SERPL-CCNC: 25 U/L (ref 1–40)
BASOPHILS # BLD AUTO: 0.04 10*3/MM3 (ref 0–0.2)
BASOPHILS NFR BLD AUTO: 0.8 % (ref 0–1.5)
BILIRUB SERPL-MCNC: 1.2 MG/DL (ref 0–1.2)
BUN SERPL-MCNC: 35 MG/DL (ref 8–23)
BUN/CREAT SERPL: 23.8 (ref 7–25)
CALCIUM SPEC-SCNC: 8.9 MG/DL (ref 8.6–10.5)
CHLORIDE SERPL-SCNC: 101 MMOL/L (ref 98–107)
CO2 SERPL-SCNC: 29 MMOL/L (ref 22–29)
CREAT SERPL-MCNC: 1.47 MG/DL (ref 0.76–1.27)
DEPRECATED RDW RBC AUTO: 55.8 FL (ref 37–54)
EGFRCR SERPLBLD CKD-EPI 2021: 48.5 ML/MIN/1.73
EOSINOPHIL # BLD AUTO: 0.16 10*3/MM3 (ref 0–0.4)
EOSINOPHIL NFR BLD AUTO: 3.1 % (ref 0.3–6.2)
ERYTHROCYTE [DISTWIDTH] IN BLOOD BY AUTOMATED COUNT: 15.7 % (ref 12.3–15.4)
GLOBULIN UR ELPH-MCNC: 2.5 GM/DL
GLUCOSE SERPL-MCNC: 138 MG/DL (ref 65–99)
HCT VFR BLD AUTO: 39.6 % (ref 37.5–51)
HGB BLD-MCNC: 12.5 G/DL (ref 13–17.7)
IMM GRANULOCYTES # BLD AUTO: 0.04 10*3/MM3 (ref 0–0.05)
IMM GRANULOCYTES NFR BLD AUTO: 0.8 % (ref 0–0.5)
LYMPHOCYTES # BLD AUTO: 0.62 10*3/MM3 (ref 0.7–3.1)
LYMPHOCYTES NFR BLD AUTO: 12.1 % (ref 19.6–45.3)
MAGNESIUM SERPL-MCNC: 2.4 MG/DL (ref 1.6–2.4)
MCH RBC QN AUTO: 30.8 PG (ref 26.6–33)
MCHC RBC AUTO-ENTMCNC: 31.6 G/DL (ref 31.5–35.7)
MCV RBC AUTO: 97.5 FL (ref 79–97)
MONOCYTES # BLD AUTO: 0.59 10*3/MM3 (ref 0.1–0.9)
MONOCYTES NFR BLD AUTO: 11.5 % (ref 5–12)
NEUTROPHILS NFR BLD AUTO: 3.69 10*3/MM3 (ref 1.7–7)
NEUTROPHILS NFR BLD AUTO: 71.7 % (ref 42.7–76)
NRBC BLD AUTO-RTO: 0 /100 WBC (ref 0–0.2)
NT-PROBNP SERPL-MCNC: 7901 PG/ML (ref 0–1800)
PLATELET # BLD AUTO: 165 10*3/MM3 (ref 140–450)
PMV BLD AUTO: 10.4 FL (ref 6–12)
POTASSIUM SERPL-SCNC: 4.4 MMOL/L (ref 3.5–5.2)
PROT SERPL-MCNC: 6.1 G/DL (ref 6–8.5)
RBC # BLD AUTO: 4.06 10*6/MM3 (ref 4.14–5.8)
SODIUM SERPL-SCNC: 142 MMOL/L (ref 136–145)
WBC NRBC COR # BLD AUTO: 5.14 10*3/MM3 (ref 3.4–10.8)

## 2024-10-13 PROCEDURE — 94664 DEMO&/EVAL PT USE INHALER: CPT

## 2024-10-13 PROCEDURE — 94761 N-INVAS EAR/PLS OXIMETRY MLT: CPT

## 2024-10-13 PROCEDURE — 94799 UNLISTED PULMONARY SVC/PX: CPT

## 2024-10-13 PROCEDURE — 80053 COMPREHEN METABOLIC PANEL: CPT | Performed by: FAMILY MEDICINE

## 2024-10-13 PROCEDURE — 85025 COMPLETE CBC W/AUTO DIFF WBC: CPT | Performed by: FAMILY MEDICINE

## 2024-10-13 PROCEDURE — 83735 ASSAY OF MAGNESIUM: CPT | Performed by: FAMILY MEDICINE

## 2024-10-13 PROCEDURE — 99232 SBSQ HOSP IP/OBS MODERATE 35: CPT | Performed by: INTERNAL MEDICINE

## 2024-10-13 PROCEDURE — 83880 ASSAY OF NATRIURETIC PEPTIDE: CPT | Performed by: INTERNAL MEDICINE

## 2024-10-13 PROCEDURE — 25010000002 BUMETANIDE PER 0.5 MG: Performed by: FAMILY MEDICINE

## 2024-10-13 PROCEDURE — 99232 SBSQ HOSP IP/OBS MODERATE 35: CPT | Performed by: FAMILY MEDICINE

## 2024-10-13 PROCEDURE — 25010000002 BUMETANIDE PER 0.5 MG: Performed by: INTERNAL MEDICINE

## 2024-10-13 RX ORDER — BUMETANIDE 0.25 MG/ML
1 INJECTION, SOLUTION INTRAMUSCULAR; INTRAVENOUS ONCE
Status: COMPLETED | OUTPATIENT
Start: 2024-10-13 | End: 2024-10-13

## 2024-10-13 RX ADMIN — LEVOTHYROXINE SODIUM 75 MCG: 0.07 TABLET ORAL at 05:39

## 2024-10-13 RX ADMIN — ARFORMOTEROL TARTRATE 15 MCG: 15 SOLUTION RESPIRATORY (INHALATION) at 07:18

## 2024-10-13 RX ADMIN — Medication 1 TABLET: at 09:45

## 2024-10-13 RX ADMIN — GUAIFENESIN 1200 MG: 600 TABLET, EXTENDED RELEASE ORAL at 09:45

## 2024-10-13 RX ADMIN — Medication 10 ML: at 09:47

## 2024-10-13 RX ADMIN — TEMAZEPAM 15 MG: 15 CAPSULE ORAL at 22:23

## 2024-10-13 RX ADMIN — METOPROLOL SUCCINATE 25 MG: 25 TABLET, EXTENDED RELEASE ORAL at 22:21

## 2024-10-13 RX ADMIN — ASPIRIN 81 MG: 81 TABLET, COATED ORAL at 09:45

## 2024-10-13 RX ADMIN — ARFORMOTEROL TARTRATE 15 MCG: 15 SOLUTION RESPIRATORY (INHALATION) at 20:55

## 2024-10-13 RX ADMIN — Medication 5 MG: at 22:21

## 2024-10-13 RX ADMIN — ATORVASTATIN CALCIUM 40 MG: 40 TABLET, FILM COATED ORAL at 22:20

## 2024-10-13 RX ADMIN — Medication 10 ML: at 22:23

## 2024-10-13 RX ADMIN — SACUBITRIL AND VALSARTAN 0.5 TABLET: 24; 26 TABLET, FILM COATED ORAL at 22:20

## 2024-10-13 RX ADMIN — GUAIFENESIN 1200 MG: 600 TABLET, EXTENDED RELEASE ORAL at 22:23

## 2024-10-13 RX ADMIN — METOPROLOL SUCCINATE 25 MG: 25 TABLET, EXTENDED RELEASE ORAL at 10:58

## 2024-10-13 RX ADMIN — BUMETANIDE 1 MG: 0.25 INJECTION INTRAMUSCULAR; INTRAVENOUS at 09:47

## 2024-10-13 RX ADMIN — IPRATROPIUM BROMIDE 0.5 MG: 0.5 SOLUTION RESPIRATORY (INHALATION) at 07:19

## 2024-10-13 RX ADMIN — ROPINIROLE 0.5 MG: 0.5 TABLET, FILM COATED ORAL at 22:20

## 2024-10-13 RX ADMIN — EMPAGLIFLOZIN 10 MG: 10 TABLET, FILM COATED ORAL at 09:45

## 2024-10-13 RX ADMIN — SACUBITRIL AND VALSARTAN 0.5 TABLET: 24; 26 TABLET, FILM COATED ORAL at 09:46

## 2024-10-13 RX ADMIN — BUMETANIDE 1 MG: 0.25 INJECTION INTRAMUSCULAR; INTRAVENOUS at 17:14

## 2024-10-13 RX ADMIN — IPRATROPIUM BROMIDE 0.5 MG: 0.5 SOLUTION RESPIRATORY (INHALATION) at 20:55

## 2024-10-13 RX ADMIN — ROPINIROLE 0.5 MG: 0.5 TABLET, FILM COATED ORAL at 22:24

## 2024-10-13 NOTE — PROGRESS NOTES
[facility] Medicine Services  PROGRESS NOTE    Patient Name: Colin Albrecht  : 1945  MRN: [id number]    Date of Admission: 10/10/2024  Primary Care Physician: Arun Soliman MD    Subjective   Subjective     CC:  Progressive soa    HPI:  Patient is a 79-year-old male seen and examined by me this a.m., he is resting comfortably on bedside couch and currently on room air.  He reports overall his breathing is doing better but he is still short of breath especially with exertion to the restroom, reviewed cardiology's recommendations and plans are for continued IV diuresis today, if his symptoms persist possible repeat CT of the chest without contrast for evaluation of his right pleural effusion and consideration for possible thoracentesis.  He denies any new acute complaints or problems otherwise at this time      Objective   Objective     Vital Signs:   Temp:  [97.6 °F (36.4 °C)-97.9 °F (36.6 °C)] 97.8 °F (36.6 °C)  Heart Rate:  [77-95] 77  Resp:  [16-20] 20  BP: (101-118)/(51-74) 114/74  Flow (L/min) (Oxygen Therapy):  [2] 2     Physical Exam:  Constitutional: No acute distress, awake, alert, frail appearing, on RA resting on bedside couch  HENT: NCAT, mucous membranes moist  Respiratory: Decreased BS bilaterally, respiratory effort normal, no rhonchi or wheezing  Cardiovascular: RRR, no murmurs, rubs, or gallops  Gastrointestinal: Positive bowel sounds, soft, nontender, nondistended  Musculoskeletal: trace edema bilateral LE's  Psychiatric: Appropriate affect, cooperative  Neurologic: Oriented x 3, FLOREZ, speech clear  Skin: No rashes      Results Reviewed:  LAB RESULTS:      Lab 10/13/24  0510 10/12/24  1346 10/11/24  0606 10/11/24  0024 10/10/24  1945 10/10/24  1605 10/10/24  1243   WBC 5.14 5.72 6.53  --   --   --  6.75   HEMOGLOBIN 12.5* 12.6* 12.5*  --   --   --  12.7*   HEMATOCRIT 39.6 39.6 39.5  --   --   --  41.2   PLATELETS 165 149 156  --   --   --  156   NEUTROS ABS  3.69 4.32  --   --   --   --  5.24   IMMATURE GRANS (ABS) 0.04 0.01  --   --   --   --  0.02   LYMPHS ABS 0.62* 0.57*  --   --   --   --  0.54*   MONOS ABS 0.59 0.62  --   --   --   --  0.79   EOS ABS 0.16 0.18  --   --   --   --  0.12   MCV 97.5* 97.5* 96.3  --   --   --  99.3*   PROCALCITONIN  --   --   --   --   --   --  0.05   LACTATE  --   --   --  1.1 2.6* 2.5* 2.1*   HSTROP T  --   --   --   --   --   --  21         Lab 10/13/24  0510 10/12/24  1346 10/11/24  0606 10/10/24  1243   SODIUM 142 139 141 140   POTASSIUM 4.4 4.6 4.5 4.5   CHLORIDE 101 101 103 102   CO2 29.0 29.0 31.0* 27.0   ANION GAP 12.0 9.0 7.0 11.0   BUN 35* 35* 32* 33*   CREATININE 1.47* 1.34* 1.53* 1.31*   EGFR 48.5* 54.2* 46.2* 55.7*   GLUCOSE 138* 127* 92 113*   CALCIUM 8.9 9.0 9.2 9.4   MAGNESIUM 2.4 2.4 2.3  --    HEMOGLOBIN A1C  --   --  6.30*  --    TSH  --   --   --  9.290*         Lab 10/13/24  0510 10/12/24  1346 10/11/24  0606 10/10/24  1243   TOTAL PROTEIN 6.1 6.2 6.7 7.0   ALBUMIN 3.6 3.5 3.6 3.9   GLOBULIN 2.5 2.7 3.1 3.1   ALT (SGPT) 12 10 16 17   AST (SGOT) 25 27 30 36   BILIRUBIN 1.2 1.4* 1.4* 1.5*   ALK PHOS 115 102 108 114         Lab 10/13/24  0510 10/10/24  1243   PROBNP 7,901.0* 10,682.0*   HSTROP T  --  21         Lab 10/11/24  0606   CHOLESTEROL 97   LDL CHOL 42   HDL CHOL 44   TRIGLYCERIDES 42             Brief Urine Lab Results       None            Microbiology Results Abnormal       Procedure Component Value - Date/Time    COVID PRE-OP / PRE-PROCEDURE SCREENING ORDER (NO ISOLATION) - Swab, Nasal Cavity [[id number]]  (Normal) Collected: 10/10/24 1302    Lab Status: Final result Specimen: Swab from Nasal Cavity Updated: 10/10/24 6540    Narrative:      The following orders were created for panel order COVID PRE-OP / PRE-PROCEDURE SCREENING ORDER (NO ISOLATION) - Swab, Nasal Cavity.  Procedure                               Abnormality         Status                     ---------                               -----------          ------                     COVID-19 and FLU A/B PCR...[[id number]]  Normal              Final result                 Please view results for these tests on the individual orders.    COVID-19 and FLU A/B PCR, 1 HR TAT - Swab, Nasopharynx [[id number]]  (Normal) Collected: 10/10/24 1302    Lab Status: Final result Specimen: Swab from Nasopharynx Updated: 10/10/24 1338     COVID19 Not Detected     Influenza A PCR Not Detected     Influenza B PCR Not Detected    Narrative:      Fact sheet for providers: https://www.fda.gov/media/623961/download    Fact sheet for patients: https://www.fda.gov/media/855706/download    Test performed by PCR.            XR Chest 1 View    Result Date: 10/12/2024  XR CHEST 1 VW Date of Exam: 10/12/2024 7:10 AM EDT Indication: Shortness of breath. Comparison: 10/11/2024. Findings: There are persistent bilateral basal pleural effusions which are small in size with atelectasis, right greater than left side. The heart is enlarged. The patient is had a previous median sternotomy. There is a left-sided transvenous pacemaker in place. The pulmonary vascular markings are increased felt to represent mild pulmonary edema. There is no pneumothorax. There are chronic age-related changes involving the bony thorax and thoracic aorta.     Impression: Impression: No interval change from yesterday with abnormalities as described above. Electronically Signed: Candido Palomino MD  10/12/2024 10:23 AM EDT  Workstation ID: [id number]     Results for orders placed during the hospital encounter of 10/10/24    Adult Transthoracic Echo Complete W/ Cont if Necessary Per Protocol    Interpretation Summary    Left ventricular systolic function is severely decreased. Calculated left ventricular EF = 20.5% Left ventricular ejection fraction appears to be less than 20%.    The left ventricular cavity is moderately dilated.    Left ventricular wall thickness is consistent with mild concentric hypertrophy.    Severely reduced  right ventricular systolic function noted.    The right ventricular cavity is moderately dilated.    The left atrial cavity is moderate to severely dilated.    The right atrial cavity is severely  dilated.    There is mild calcification of the aortic valve.    Moderate mitral valve regurgitation is present.    Moderate tricuspid valve regurgitation is present.    Estimated right ventricular systolic pressure from tricuspid regurgitation is moderately elevated (45-55 mmHg).    Moderate pulmonic valve regurgitation is present.    No significant change compared to the 2023 echo.      Current medications:  Scheduled Meds:arformoterol, 15 mcg, Nebulization, BID - RT  aspirin, 81 mg, Oral, Daily  atorvastatin, 40 mg, Oral, Nightly  empagliflozin, 10 mg, Oral, Daily  guaiFENesin, 1,200 mg, Oral, Q12H  ipratropium, 0.5 mg, Nebulization, 4x Daily - RT  levothyroxine, 75 mcg, Oral, Q AM  melatonin, 5 mg, Oral, Nightly  metoprolol succinate XL, 25 mg, Oral, BID  multivitamin with minerals, 1 tablet, Oral, Daily  pharmacy consult - MTM, , Does not apply, Daily  rOPINIRole, 0.5 mg, Oral, Nightly  sacubitril-valsartan, 0.5 tablet, Oral, BID  sodium chloride, 10 mL, Intravenous, Q12H  temazepam, 15 mg, Oral, Nightly      Continuous Infusions:   PRN Meds:.  acetaminophen **OR** acetaminophen **OR** acetaminophen    senna-docusate sodium **AND** polyethylene glycol **AND** bisacodyl **AND** bisacodyl    ipratropium-albuterol    rOPINIRole    sodium chloride    sodium chloride    Assessment & Plan   Assessment & Plan     Active Hospital Problems    Diagnosis  POA    **Acute exacerbation of CHF (congestive heart failure) [I50.9]  Yes    Hypothyroidism (acquired) [E03.9]  Yes    ICD (implantable cardioverter-defibrillator) in place [Z95.810]  Yes    CKD (chronic kidney disease) [N18.9]  Yes    Dependence on supplemental oxygen [Z99.81]  Not Applicable    COPD  [J44.9]  Yes    Essential hypertension [I10]  Yes    Coronary artery disease  involving coronary bypass graft of native heart without angina pectoris [I25.810]  Yes    Dilated CM  [I42.0]  Yes    S/P Watchman device [Z95.818]  Yes    SSS  [I49.5]  Yes    Permanent atrial fibrillation [I48.21]  Yes      Resolved Hospital Problems   No resolved problems to display.        Brief Hospital Course to date:  Colin Albrecht is a 79 y.o. male with past medical history significant for hypothyroidism, SSS s/p ICD,CAD, dilated CM, CHF w an EF 20.4 % (at last check 5/2023), A-fib s/p Watchman, ex-smoker, chronic oxygen use, and probable chronic CKD.  Patient follows with Dr. Wei with pulmonary, Dr. Quintana with cardiology, and Dr. Prado with cards EP.  Patient was last seen by pulmonary approximately 1 month ago with complaints of progressive shortness of air.  CT of the chest was completed outpatient PTA.  CT showed moderate to marked cardiomegaly new compared to prior CT from 2021, pulmonary edema with sm to mod right pl effusion and trace left pl effusion.  Pt was to have outpatient echo in approx 2 weeks and follow-up with Dr. Quintana, however continues to have progressive shortness of air. Patient seen again on the M of 10/13, following with Dr. Astudillo this weekend, patient overall reports breathing improving but still SOA with exertion, IV diuresis again on 10/13, if symptoms persist possible CT chest and consideration for possible thoracentesis if right pleural effusion persists.     Progressive dyspnea  Acute/chronic systolic CHF  CAD/dilated CM/EF 20.4 at last check (5/2023)  SSS/ICD/A-fib s/p Watchman  Heart murmur  R sided pleural effusion   --Follows with Dr. Quintana with cards, Dr. Prado with EP.  -- Echo with reduced EF of 20%  -- BNP 10K, CXR with pulmonary venous congestion and small effusions  -- rajendra/Dr. Quintana following.    -- Diuresis again reordered IV Bumex on 10/13 with 1 mg BID  -- If symptoms continue possible repeat CT chest for evaluation and possible  consideration for right sided thoracentesis, continue to follow and have ordered for overnight   -- Continue toprol, entresto, and jardiance. Entresto dosage has been increased S/p watchman so no need for AC     COPD  Chronic oxygen use  --Follows with Dr. Wei  -- Recently added home nebulizers, initially helped  -- wears 2LNC O2 at home     Hypothyroidism  -- Continue home Synthroid.    -- TSH elevated but free T4 upper limit of normal, outpatient follow up with his regular PCP for repeat labs         Expected Discharge Location and Transportation:   Expected Discharge   Expected Discharge Date: 10/15/2024; Expected Discharge Time:      VTE Prophylaxis:  Mechanical VTE prophylaxis orders are present.         AM-PAC 6 Clicks Score (PT): 24 (10/12/24 2000)    CODE STATUS:   Code Status and Medical Interventions: CPR (Attempt to Resuscitate); Full Support   Ordered at: 10/10/24 1602     Level Of Support Discussed With:    Patient    Next of Kin (If No Surrogate)     Code Status (Patient has no pulse and is not breathing):    CPR (Attempt to Resuscitate)     Medical Interventions (Patient has pulse or is breathing):    Full Support       RADHA Jimenez, DO  10/13/24

## 2024-10-13 NOTE — PLAN OF CARE
Problem: Adult Inpatient Plan of Care  Goal: Absence of Hospital-Acquired Illness or Injury  Intervention: Identify and Manage Fall Risk  Recent Flowsheet Documentation  Taken 10/13/2024 0400 by Catrina Gore, RN  Safety Promotion/Fall Prevention:   activity supervised   assistive device/personal items within reach   clutter free environment maintained   fall prevention program maintained   lighting adjusted   nonskid shoes/slippers when out of bed   room organization consistent   safety round/check completed  Taken 10/13/2024 0200 by Catrina Gore, RN  Safety Promotion/Fall Prevention:   activity supervised   assistive device/personal items within reach   clutter free environment maintained   fall prevention program maintained   lighting adjusted   nonskid shoes/slippers when out of bed   room organization consistent   safety round/check completed  Taken 10/13/2024 0000 by Catrina Gore, RN  Safety Promotion/Fall Prevention:   activity supervised   assistive device/personal items within reach   clutter free environment maintained   fall prevention program maintained   lighting adjusted   nonskid shoes/slippers when out of bed   room organization consistent   safety round/check completed  Taken 10/12/2024 2200 by Catrina Gore, RN  Safety Promotion/Fall Prevention:   activity supervised   assistive device/personal items within reach   clutter free environment maintained   fall prevention program maintained   lighting adjusted   nonskid shoes/slippers when out of bed   room organization consistent   safety round/check completed  Taken 10/12/2024 2000 by Catrina Gore, RN  Safety Promotion/Fall Prevention:   activity supervised   assistive device/personal items within reach   clutter free environment maintained   fall prevention program maintained   lighting adjusted   nonskid shoes/slippers when out of bed   room organization consistent   safety round/check completed  Intervention:  Prevent Skin Injury  Recent Flowsheet Documentation  Taken 10/13/2024 0400 by Catrina Gore RN  Body Position: position changed independently  Taken 10/13/2024 0200 by Catrina Gore RN  Body Position: position changed independently  Taken 10/13/2024 0000 by Catrina Gore RN  Body Position: position changed independently  Taken 10/12/2024 2200 by Catrina Gore RN  Body Position: position changed independently  Skin Protection: incontinence pads utilized  Taken 10/12/2024 2000 by Catrina Gore RN  Body Position: position changed independently  Intervention: Prevent Infection  Recent Flowsheet Documentation  Taken 10/12/2024 2000 by Catrina Gore RN  Infection Prevention:   environmental surveillance performed   hand hygiene promoted   rest/sleep promoted   single patient room provided  Goal: Optimal Comfort and Wellbeing  Intervention: Provide Person-Centered Care  Recent Flowsheet Documentation  Taken 10/13/2024 0200 by Catrina Gore RN  Trust Relationship/Rapport:   care explained   choices provided   emotional support provided   empathic listening provided   questions answered   questions encouraged   reassurance provided   thoughts/feelings acknowledged  Taken 10/13/2024 0000 by Catrina Gore RN  Trust Relationship/Rapport:   care explained   choices provided   emotional support provided   empathic listening provided   questions answered   questions encouraged   reassurance provided   thoughts/feelings acknowledged  Taken 10/12/2024 2200 by Catrina Gore RN  Trust Relationship/Rapport:   care explained   choices provided   emotional support provided   empathic listening provided   questions answered   questions encouraged   reassurance provided   thoughts/feelings acknowledged  Taken 10/12/2024 2000 by Catrina Gore RN  Trust Relationship/Rapport:   care explained   choices provided   emotional support provided   empathic listening provided    questions answered   questions encouraged   reassurance provided   thoughts/feelings acknowledged  Taken 10/12/2024 1800 by Catrina Gore, RN  Trust Relationship/Rapport:   care explained   choices provided   emotional support provided   empathic listening provided   questions answered   questions encouraged   reassurance provided   thoughts/feelings acknowledged     Problem: COPD (Chronic Obstructive Pulmonary Disease) Comorbidity  Goal: Maintenance of COPD Symptom Control  Intervention: Maintain COPD-Symptom Control  Recent Flowsheet Documentation  Taken 10/13/2024 0400 by Catrina Gore, RN  Supportive Measures:   active listening utilized   decision-making supported   positive reinforcement provided   relaxation techniques promoted   self-care encouraged  Medication Review/Management: medications reviewed  Taken 10/13/2024 0200 by Catrina Gore, RN  Supportive Measures:   active listening utilized   decision-making supported   positive reinforcement provided   problem-solving facilitated   self-care encouraged   self-reflection promoted  Taken 10/13/2024 0000 by Catrina Gore, RN  Supportive Measures:   active listening utilized   positive reinforcement provided   problem-solving facilitated   self-care encouraged   decision-making supported  Medication Review/Management: medications reviewed  Taken 10/12/2024 2200 by Catrina Gore, RN  Supportive Measures:   active listening utilized   decision-making supported   positive reinforcement provided   problem-solving facilitated   self-care encouraged   self-reflection promoted  Medication Review/Management: medications reviewed  Taken 10/12/2024 2000 by Catrina Gore, RN  Supportive Measures:   active listening utilized   decision-making supported   positive reinforcement provided   problem-solving facilitated   self-care encouraged   self-reflection promoted  Medication Review/Management: medications reviewed  Taken 10/12/2024  1800 by Catrina Gore RN  Supportive Measures:   active listening utilized   decision-making supported   positive reinforcement provided   problem-solving facilitated   self-care encouraged   self-reflection promoted     Problem: Fall Injury Risk  Goal: Absence of Fall and Fall-Related Injury  Intervention: Identify and Manage Contributors  Recent Flowsheet Documentation  Taken 10/13/2024 0400 by Catrina Gore RN  Medication Review/Management: medications reviewed  Taken 10/13/2024 0000 by Catrina Gore RN  Medication Review/Management: medications reviewed  Taken 10/12/2024 2200 by Catrina Gore RN  Medication Review/Management: medications reviewed  Taken 10/12/2024 2000 by Catrina Gore RN  Medication Review/Management: medications reviewed  Intervention: Promote Injury-Free Environment  Recent Flowsheet Documentation  Taken 10/13/2024 0400 by Catrina Gore RN  Safety Promotion/Fall Prevention:   activity supervised   assistive device/personal items within reach   clutter free environment maintained   fall prevention program maintained   lighting adjusted   nonskid shoes/slippers when out of bed   room organization consistent   safety round/check completed  Taken 10/13/2024 0200 by Catrina Gore RN  Safety Promotion/Fall Prevention:   activity supervised   assistive device/personal items within reach   clutter free environment maintained   fall prevention program maintained   lighting adjusted   nonskid shoes/slippers when out of bed   room organization consistent   safety round/check completed  Taken 10/13/2024 0000 by Catrina Gore RN  Safety Promotion/Fall Prevention:   activity supervised   assistive device/personal items within reach   clutter free environment maintained   fall prevention program maintained   lighting adjusted   nonskid shoes/slippers when out of bed   room organization consistent   safety round/check completed  Taken 10/12/2024 2200 by  Catrina Gore, RN  Safety Promotion/Fall Prevention:   activity supervised   assistive device/personal items within reach   clutter free environment maintained   fall prevention program maintained   lighting adjusted   nonskid shoes/slippers when out of bed   room organization consistent   safety round/check completed  Taken 10/12/2024 2000 by Catrina Gore, RN  Safety Promotion/Fall Prevention:   activity supervised   assistive device/personal items within reach   clutter free environment maintained   fall prevention program maintained   lighting adjusted   nonskid shoes/slippers when out of bed   room organization consistent   safety round/check completed     Problem: Adult Inpatient Plan of Care  Goal: Absence of Hospital-Acquired Illness or Injury  Intervention: Identify and Manage Fall Risk  Recent Flowsheet Documentation  Taken 10/13/2024 0400 by Catrina Gore, RN  Safety Promotion/Fall Prevention:   activity supervised   assistive device/personal items within reach   clutter free environment maintained   fall prevention program maintained   lighting adjusted   nonskid shoes/slippers when out of bed   room organization consistent   safety round/check completed  Taken 10/13/2024 0200 by Catrina Gore, RN  Safety Promotion/Fall Prevention:   activity supervised   assistive device/personal items within reach   clutter free environment maintained   fall prevention program maintained   lighting adjusted   nonskid shoes/slippers when out of bed   room organization consistent   safety round/check completed  Taken 10/13/2024 0000 by Catrina Gore, RN  Safety Promotion/Fall Prevention:   activity supervised   assistive device/personal items within reach   clutter free environment maintained   fall prevention program maintained   lighting adjusted   nonskid shoes/slippers when out of bed   room organization consistent   safety round/check completed  Taken 10/12/2024 2200 by Bao  Catrina SANDERS RN  Safety Promotion/Fall Prevention:   activity supervised   assistive device/personal items within reach   clutter free environment maintained   fall prevention program maintained   lighting adjusted   nonskid shoes/slippers when out of bed   room organization consistent   safety round/check completed  Taken 10/12/2024 2000 by Gore, Catrina M, RN  Safety Promotion/Fall Prevention:   activity supervised   assistive device/personal items within reach   clutter free environment maintained   fall prevention program maintained   lighting adjusted   nonskid shoes/slippers when out of bed   room organization consistent   safety round/check completed  Intervention: Prevent Skin Injury  Recent Flowsheet Documentation  Taken 10/13/2024 0400 by Catrina Gore RN  Body Position: position changed independently  Taken 10/13/2024 0200 by Catrina Gore RN  Body Position: position changed independently  Taken 10/13/2024 0000 by Catrina Gore RN  Body Position: position changed independently  Taken 10/12/2024 2200 by Catrina Gore RN  Body Position: position changed independently  Skin Protection: incontinence pads utilized  Taken 10/12/2024 2000 by Catrina Gore RN  Body Position: position changed independently  Intervention: Prevent Infection  Recent Flowsheet Documentation  Taken 10/12/2024 2000 by Catrina Gore RN  Infection Prevention:   environmental surveillance performed   hand hygiene promoted   rest/sleep promoted   single patient room provided  Goal: Optimal Comfort and Wellbeing  Intervention: Provide Person-Centered Care  Recent Flowsheet Documentation  Taken 10/13/2024 0200 by Catrina Gore RN  Trust Relationship/Rapport:   care explained   choices provided   emotional support provided   empathic listening provided   questions answered   questions encouraged   reassurance provided   thoughts/feelings acknowledged  Taken 10/13/2024 0000 by Catrina Gore  TOMMY RN  Trust Relationship/Rapport:   care explained   choices provided   emotional support provided   empathic listening provided   questions answered   questions encouraged   reassurance provided   thoughts/feelings acknowledged  Taken 10/12/2024 2200 by Catrina Gore RN  Trust Relationship/Rapport:   care explained   choices provided   emotional support provided   empathic listening provided   questions answered   questions encouraged   reassurance provided   thoughts/feelings acknowledged  Taken 10/12/2024 2000 by Catrina Gore RN  Trust Relationship/Rapport:   care explained   choices provided   emotional support provided   empathic listening provided   questions answered   questions encouraged   reassurance provided   thoughts/feelings acknowledged  Taken 10/12/2024 1800 by Catrina Gore RN  Trust Relationship/Rapport:   care explained   choices provided   emotional support provided   empathic listening provided   questions answered   questions encouraged   reassurance provided   thoughts/feelings acknowledged     Problem: Heart Failure  Goal: Optimal Coping  Intervention: Support Psychosocial Response  Recent Flowsheet Documentation  Taken 10/13/2024 0400 by Catrina Gore, RN  Supportive Measures:   active listening utilized   decision-making supported   positive reinforcement provided   relaxation techniques promoted   self-care encouraged  Taken 10/13/2024 0200 by Catrina Gore, RN  Supportive Measures:   active listening utilized   decision-making supported   positive reinforcement provided   problem-solving facilitated   self-care encouraged   self-reflection promoted  Taken 10/13/2024 0000 by Catrina Gore RN  Supportive Measures:   active listening utilized   positive reinforcement provided   problem-solving facilitated   self-care encouraged   decision-making supported  Taken 10/12/2024 2200 by Catrina Gore, RN  Supportive Measures:   active listening  utilized   decision-making supported   positive reinforcement provided   problem-solving facilitated   self-care encouraged   self-reflection promoted  Family/Support System Care:   self-care encouraged   support provided  Taken 10/12/2024 2000 by Catrina Gore RN  Supportive Measures:   active listening utilized   decision-making supported   positive reinforcement provided   problem-solving facilitated   self-care encouraged   self-reflection promoted  Family/Support System Care:   self-care encouraged   support provided  Taken 10/12/2024 1800 by Catrina Gore RN  Supportive Measures:   active listening utilized   decision-making supported   positive reinforcement provided   problem-solving facilitated   self-care encouraged   self-reflection promoted  Family/Support System Care:   self-care encouraged   support provided  Goal: Optimal Cardiac Output and Blood Flow  Intervention: Optimize Cardiac Output  Recent Flowsheet Documentation  Taken 10/13/2024 0200 by Catrina Gore RN  Environmental Support: rest periods encouraged  Taken 10/13/2024 0000 by Catrina Gore RN  Environmental Support:   calm environment promoted   distractions minimized   rest periods encouraged  Taken 10/12/2024 2200 by Catrina Gore RN  Environmental Support:   calm environment promoted   distractions minimized  Taken 10/12/2024 2000 by Catrina Gore RN  Environmental Support:   calm environment promoted   distractions minimized  Taken 10/12/2024 1800 by Catrina Gore RN  Environmental Support:   calm environment promoted   rest periods encouraged  Goal: Optimal Functional Ability  Intervention: Optimize Functional Ability  Recent Flowsheet Documentation  Taken 10/13/2024 0200 by Catrina Gore, RN  Activity Management: activity minimized  Taken 10/12/2024 2200 by Catrina Gore, RN  Activity Management: activity encouraged  Taken 10/12/2024 2000 by Catrina Gore RN  Activity  Management: ambulated in room  Goal: Effective Oxygenation and Ventilation  Intervention: Promote Airway Secretion Clearance  Recent Flowsheet Documentation  Taken 10/13/2024 0200 by Catrina Gore RN  Activity Management: activity minimized  Taken 10/12/2024 2200 by Catrina Gore RN  Activity Management: activity encouraged  Taken 10/12/2024 2000 by Catrina Gore, RN  Activity Management: ambulated in room  Cough And Deep Breathing: done independently per patient  Intervention: Optimize Oxygenation and Ventilation  Recent Flowsheet Documentation  Taken 10/13/2024 0400 by Catrina Gore RN  Head of Bed (HOB) Positioning: HOB at 15 degrees  Taken 10/13/2024 0200 by Catrina Gore RN  Head of Bed (HOB) Positioning: HOB elevated  Taken 10/13/2024 0000 by Catrina Gore RN  Head of Bed (HOB) Positioning: HOB elevated  Taken 10/12/2024 2200 by Catrina Gore RN  Head of Bed (HOB) Positioning: HOB lowered  Taken 10/12/2024 2000 by Catrina Gore RN  Head of Bed (HOB) Positioning: HOB lowered  Goal: Effective Breathing Pattern During Sleep  Intervention: Monitor and Manage Obstructive Sleep Apnea  Recent Flowsheet Documentation  Taken 10/13/2024 0400 by Catrina Gore RN  Medication Review/Management: medications reviewed  Taken 10/13/2024 0000 by Catrina Gore RN  Medication Review/Management: medications reviewed  Taken 10/12/2024 2200 by Catrina Gore RN  Medication Review/Management: medications reviewed  Taken 10/12/2024 2000 by Catrina Gore RN  Medication Review/Management: medications reviewed     Problem: Fall Injury Risk  Goal: Absence of Fall and Fall-Related Injury  Intervention: Identify and Manage Contributors  Recent Flowsheet Documentation  Taken 10/13/2024 0400 by Catrina Gore RN  Medication Review/Management: medications reviewed  Taken 10/13/2024 0000 by Catrina Gore RN  Medication Review/Management: medications  reviewed  Taken 10/12/2024 2200 by Catrina Gore, RN  Medication Review/Management: medications reviewed  Taken 10/12/2024 2000 by Catrina Gore, RN  Medication Review/Management: medications reviewed  Intervention: Promote Injury-Free Environment  Recent Flowsheet Documentation  Taken 10/13/2024 0400 by Catrina Gore, RN  Safety Promotion/Fall Prevention:   activity supervised   assistive device/personal items within reach   clutter free environment maintained   fall prevention program maintained   lighting adjusted   nonskid shoes/slippers when out of bed   room organization consistent   safety round/check completed  Taken 10/13/2024 0200 by Catrina Gore, RN  Safety Promotion/Fall Prevention:   activity supervised   assistive device/personal items within reach   clutter free environment maintained   fall prevention program maintained   lighting adjusted   nonskid shoes/slippers when out of bed   room organization consistent   safety round/check completed  Taken 10/13/2024 0000 by Catrina Gore, RN  Safety Promotion/Fall Prevention:   activity supervised   assistive device/personal items within reach   clutter free environment maintained   fall prevention program maintained   lighting adjusted   nonskid shoes/slippers when out of bed   room organization consistent   safety round/check completed  Taken 10/12/2024 2200 by Catrina Gore, RN  Safety Promotion/Fall Prevention:   activity supervised   assistive device/personal items within reach   clutter free environment maintained   fall prevention program maintained   lighting adjusted   nonskid shoes/slippers when out of bed   room organization consistent   safety round/check completed  Taken 10/12/2024 2000 by Catrina Gore, RN  Safety Promotion/Fall Prevention:   activity supervised   assistive device/personal items within reach   clutter free environment maintained   fall prevention program maintained   lighting  adjusted   nonskid shoes/slippers when out of bed   room organization consistent   safety round/check completed   Goal Outcome Evaluation:

## 2024-10-13 NOTE — PROGRESS NOTES
"[facility] at [facility]  IP Progress Note      Chief Complaint/Reason for service: Acute on chronic heart failure with reduced EF    Subjective   Subjective: The patient states his sputum problem has improved.  He is using flutter valve incentive spirometry incentive the the thick secretions are coming out.  Complains of significant dyspnea with ambulation to the bathroom.  States at home when he is doing well he can ambulate without any issues.  He says \"I feel like I cannot take a deep breath\"    Past medical, surgical, social and family history reviewed in the patient's electronic medical record.    Objective     Vital Sign Min/Max for last 24 hours  Temp  Min: 97.4 °F (36.3 °C)  Max: 97.9 °F (36.6 °C)   BP  Min: 107/65  Max: 118/65   Pulse  Min: 77  Max: 95   Resp  Min: 16  Max: 18   SpO2  Min: 93 %  Max: 96 %   Flow (L/min) (Oxygen Therapy)  Min: 2  Max: 2      Intake/Output Summary (Last 24 hours) at 10/13/2024 0638  Last data filed at 10/12/2024 2000  Gross per 24 hour   Intake 120 ml   Output --   Net 120 ml             Current Facility-Administered Medications:     acetaminophen (TYLENOL) tablet 650 mg, 650 mg, Oral, Q4H PRN **OR** acetaminophen (TYLENOL) 160 MG/5ML oral solution 650 mg, 650 mg, Oral, Q4H PRN **OR** acetaminophen (TYLENOL) suppository 650 mg, 650 mg, Rectal, Q4H PRN, Shae Knight, APRN    arformoterol (BROVANA) nebulizer solution 15 mcg, 15 mcg, Nebulization, BID - RT, Ariel Moralez MD, 15 mcg at 10/12/24 0946    aspirin EC tablet 81 mg, 81 mg, Oral, Daily, Shae Knight, APRN, 81 mg at 10/12/24 0933    atorvastatin (LIPITOR) tablet 40 mg, 40 mg, Oral, Nightly, Shae Knight, APRN, 40 mg at 10/12/24 2203    sennosides-docusate (PERICOLACE) 8.6-50 MG per tablet 2 tablet, 2 tablet, Oral, BID PRN **AND** polyethylene glycol (MIRALAX) packet 17 g, 17 g, Oral, Daily PRN, 17 g at 10/11/24 1156 **AND** bisacodyl (DULCOLAX) EC tablet " 5 mg, 5 mg, Oral, Daily PRN **AND** bisacodyl (DULCOLAX) suppository 10 mg, 10 mg, Rectal, Daily PRN, Shae Knight APRN    bumetanide (BUMEX) tablet 2 mg, 2 mg, Oral, Daily, TOMMY Jimenez, , 2 mg at 10/12/24 1356    empagliflozin (JARDIANCE) tablet 10 mg, 10 mg, Oral, Daily, Shae Knight APRN, 10 mg at 10/12/24 0933    guaiFENesin (MUCINEX) 12 hr tablet 1,200 mg, 1,200 mg, Oral, Q12H, TOMMY Jimenez, DO, 1,200 mg at 10/12/24 2203    ipratropium (ATROVENT) nebulizer solution 0.5 mg, 0.5 mg, Nebulization, 4x Daily - RT, Ariel Moralez MD, 0.5 mg at 10/12/24 0946    ipratropium-albuterol (DUO-NEB) nebulizer solution 3 mL, 3 mL, Nebulization, 4x Daily PRN, Ariel Moralez MD, 3 mL at 10/11/24 1045    levothyroxine (SYNTHROID, LEVOTHROID) tablet 75 mcg, 75 mcg, Oral, Q AM, Shae Knight APRN, 75 mcg at 10/13/24 0539    melatonin tablet 5 mg, 5 mg, Oral, Nightly, Pietro Astudillo MD, 5 mg at 10/12/24 2203    metoprolol succinate XL (TOPROL-XL) 24 hr tablet 25 mg, 25 mg, Oral, BID, Shae Knight APRN, 25 mg at 10/12/24 2203    multivitamin with minerals 1 tablet, 1 tablet, Oral, Daily, Shae Knight APRN, 1 tablet at 10/12/24 0932    Pharmacy Consult - [facility], , Does not apply, Daily, Jude Rob, [facility]    rOPINIRole (REQUIP) tablet 0.5 mg, 0.5 mg, Oral, Nightly PRN, Shae Knight APRN, 0.5 mg at 10/12/24 0034    rOPINIRole (REQUIP) tablet 0.5 mg, 0.5 mg, Oral, Nightly, Pietro Astudillo MD, 0.5 mg at 10/12/24 2203    sacubitril-valsartan (ENTRESTO) 24-26 MG tablet 0.5 tablet, 0.5 tablet, Oral, BID, Shae Knight APRN, 0.5 tablet at 10/12/24 2203    sodium chloride 0.9 % flush 10 mL, 10 mL, Intravenous, PRN, Anthony Sibley MD    sodium chloride 0.9 % flush 10 mL, 10 mL, Intravenous, Q12H, Shae Knight APRN, 10 mL at 10/12/24 2204    sodium chloride 0.9 % flush 10 mL, 10 mL, Intravenous, PRN,  Shae Knight APRN    temazepam (RESTORIL) capsule 15 mg, 15 mg, Oral, Nightly, Pietro Wheat DO, 15 mg at 10/12/24 2204    Physical Exam: General Elderly frail-appearing male in bed with no resting dyspnea        HEENT: No obvious JVP.  Nasal O2 present.       Respiratory: Equal bilateral symmetrical expansion reduced breath sounds on the right with a E to a changes       Cardiovascular: Regular rate and rhythm with ectopics and no significant edema       GI: Soft and flat       Lower Extremities: No lesions       Neuro: Facial expressions are symmetrical moves all 4 extremities       Skin: Warm and dry with no edema palpation       Psych: Pleasant affect    Results Review: Intake exceeds output by 820 mL.  Heart rate 70s to 90s.  Blood pressures 10 7-1 18.  GFR is improved to 54.8, it was 46.2 yesterday.  Hemoglobin 12.5    Radiology Results:  Imaging Results (Last 72 Hours)       Procedure Component Value Units Date/Time    XR Chest 1 View [[id number]] Collected: 10/12/24 1021     Updated: 10/12/24 1026    Narrative:      XR CHEST 1 VW    Date of Exam: 10/12/2024 7:10 AM EDT    Indication: Shortness of breath.    Comparison: 10/11/2024.    Findings:  There are persistent bilateral basal pleural effusions which are small in size with atelectasis, right greater than left side. The heart is enlarged. The patient is had a previous median sternotomy. There is a left-sided transvenous pacemaker in place.   The pulmonary vascular markings are increased felt to represent mild pulmonary edema. There is no pneumothorax. There are chronic age-related changes involving the bony thorax and thoracic aorta.      Impression:      Impression:  No interval change from yesterday with abnormalities as described above.      Electronically Signed: Candido Palomino MD    10/12/2024 10:23 AM EDT    Workstation ID: [id number]    XR Chest 1 View [[id number]] Collected: 10/11/24 0711     Updated: 10/11/24 0716    Narrative:      XR  CHEST 1 VW    Date of Exam: 10/11/2024 2:59 AM EDT    Indication: f/u dyspnea, a/c chf, pl effusions    Comparison: October 10, 2024    Findings:  A cardiac ICD device is present. Sternotomy wires noted. The heart is enlarged. There are bibasilar densities which could reflect effusions. Pulmonary vascular markings are prominent. There are some interstitial changes. Some vascular congestion and   edema could account for this.      Impression:      Impression:  1.Cardiomegaly with what looks like some vascular congestion and probable interstitial edema.  2.Bibasilar effusions      Electronically Signed: Percy Mcguire MD    10/11/2024 7:12 AM EDT    Workstation ID: [id number]    XR Chest 1 View [[id number]] Collected: 10/10/24 1326     Updated: 10/10/24 1331    Narrative:      XR CHEST 1 VW    Date of Exam: 10/10/2024 12:45 PM EDT    Indication: SOA triage protocol    Comparison: CT chest without contrast 10/9/2024    Findings:  Left-sided AICD noted. Status post sternotomy and CABG. Central pulmonary vascular congestion with interstitial thickening suggesting pulmonary edema. Underlying emphysema. Persistent small bilateral pleural effusions right greater than left with   bibasilar atelectasis. Negative for pneumothorax.      Impression:      Impression:  1. Central pulmonary vascular congestion with interstitial thickening suggesting pulmonary edema.  2. Persistent small bilateral pleural effusions right greater than left with bibasilar atelectasis.  3. Emphysema.        Electronically Signed: Luis Richards MD    10/10/2024 1:28 PM EDT    Workstation ID: [id number]              ECHO: EF less than 20%        Assessment   Assessment/Plan: Acute on chronic heart failure reduced EF-check BNP.  Patient still feels significantly short of breath ambulating to the bathroom.  He states when he is home and doing well he has no issues with dyspnea on exertion.  Also states he feels like he cannot take a deep breath.  Since his GFR is  improved significantly I will start him on IV Bumex 1 mg every 12 hours and reassess in the morning  2 kidney disease-GFR is improved-start diuretics  3 if patient still has significant dyspnea despite significant diuresis would recommend a CT scan with possible thoracentesis of the right lung since the effusion can be larger by CT scan as opposed to chest x-ray      Pietro Astudillo MD  10/13/24  06:38 EDT

## 2024-10-14 ENCOUNTER — APPOINTMENT (OUTPATIENT)
Dept: CT IMAGING | Facility: HOSPITAL | Age: 79
End: 2024-10-14
Payer: MEDICARE

## 2024-10-14 LAB
ALBUMIN SERPL-MCNC: 3.3 G/DL (ref 3.5–5.2)
ALBUMIN/GLOB SERPL: 1.4 G/DL
ALP SERPL-CCNC: 99 U/L (ref 39–117)
ALT SERPL W P-5'-P-CCNC: 13 U/L (ref 1–41)
ANION GAP SERPL CALCULATED.3IONS-SCNC: 6 MMOL/L (ref 5–15)
AST SERPL-CCNC: 23 U/L (ref 1–40)
BASOPHILS # BLD AUTO: 0.05 10*3/MM3 (ref 0–0.2)
BASOPHILS NFR BLD AUTO: 1 % (ref 0–1.5)
BILIRUB SERPL-MCNC: 1 MG/DL (ref 0–1.2)
BUN SERPL-MCNC: 35 MG/DL (ref 8–23)
BUN/CREAT SERPL: 24.5 (ref 7–25)
CALCIUM SPEC-SCNC: 8.5 MG/DL (ref 8.6–10.5)
CHLORIDE SERPL-SCNC: 103 MMOL/L (ref 98–107)
CO2 SERPL-SCNC: 31 MMOL/L (ref 22–29)
CREAT SERPL-MCNC: 1.43 MG/DL (ref 0.76–1.27)
DEPRECATED RDW RBC AUTO: 57.1 FL (ref 37–54)
EGFRCR SERPLBLD CKD-EPI 2021: 50.2 ML/MIN/1.73
EOSINOPHIL # BLD AUTO: 0.21 10*3/MM3 (ref 0–0.4)
EOSINOPHIL NFR BLD AUTO: 4 % (ref 0.3–6.2)
ERYTHROCYTE [DISTWIDTH] IN BLOOD BY AUTOMATED COUNT: 15.9 % (ref 12.3–15.4)
GLOBULIN UR ELPH-MCNC: 2.4 GM/DL
GLUCOSE SERPL-MCNC: 89 MG/DL (ref 65–99)
HCT VFR BLD AUTO: 37.8 % (ref 37.5–51)
HGB BLD-MCNC: 12 G/DL (ref 13–17.7)
IMM GRANULOCYTES # BLD AUTO: 0.01 10*3/MM3 (ref 0–0.05)
IMM GRANULOCYTES NFR BLD AUTO: 0.2 % (ref 0–0.5)
LYMPHOCYTES # BLD AUTO: 0.92 10*3/MM3 (ref 0.7–3.1)
LYMPHOCYTES NFR BLD AUTO: 17.7 % (ref 19.6–45.3)
MAGNESIUM SERPL-MCNC: 2.5 MG/DL (ref 1.6–2.4)
MCH RBC QN AUTO: 30.8 PG (ref 26.6–33)
MCHC RBC AUTO-ENTMCNC: 31.7 G/DL (ref 31.5–35.7)
MCV RBC AUTO: 97.2 FL (ref 79–97)
MONOCYTES # BLD AUTO: 0.75 10*3/MM3 (ref 0.1–0.9)
MONOCYTES NFR BLD AUTO: 14.4 % (ref 5–12)
NEUTROPHILS NFR BLD AUTO: 3.26 10*3/MM3 (ref 1.7–7)
NEUTROPHILS NFR BLD AUTO: 62.7 % (ref 42.7–76)
NRBC BLD AUTO-RTO: 0 /100 WBC (ref 0–0.2)
NT-PROBNP SERPL-MCNC: 6760 PG/ML (ref 0–1800)
PLATELET # BLD AUTO: 161 10*3/MM3 (ref 140–450)
PMV BLD AUTO: 10 FL (ref 6–12)
POTASSIUM SERPL-SCNC: 4.2 MMOL/L (ref 3.5–5.2)
PROT SERPL-MCNC: 5.7 G/DL (ref 6–8.5)
RBC # BLD AUTO: 3.89 10*6/MM3 (ref 4.14–5.8)
SODIUM SERPL-SCNC: 140 MMOL/L (ref 136–145)
WBC NRBC COR # BLD AUTO: 5.2 10*3/MM3 (ref 3.4–10.8)

## 2024-10-14 PROCEDURE — 94799 UNLISTED PULMONARY SVC/PX: CPT

## 2024-10-14 PROCEDURE — 83735 ASSAY OF MAGNESIUM: CPT | Performed by: FAMILY MEDICINE

## 2024-10-14 PROCEDURE — 80053 COMPREHEN METABOLIC PANEL: CPT | Performed by: FAMILY MEDICINE

## 2024-10-14 PROCEDURE — 83880 ASSAY OF NATRIURETIC PEPTIDE: CPT | Performed by: FAMILY MEDICINE

## 2024-10-14 PROCEDURE — 25010000002 BUMETANIDE PER 0.5 MG: Performed by: FAMILY MEDICINE

## 2024-10-14 PROCEDURE — 94664 DEMO&/EVAL PT USE INHALER: CPT

## 2024-10-14 PROCEDURE — 99232 SBSQ HOSP IP/OBS MODERATE 35: CPT | Performed by: PHYSICIAN ASSISTANT

## 2024-10-14 PROCEDURE — 99232 SBSQ HOSP IP/OBS MODERATE 35: CPT | Performed by: FAMILY MEDICINE

## 2024-10-14 PROCEDURE — 71250 CT THORAX DX C-: CPT

## 2024-10-14 PROCEDURE — 85025 COMPLETE CBC W/AUTO DIFF WBC: CPT | Performed by: FAMILY MEDICINE

## 2024-10-14 RX ORDER — BUMETANIDE 0.25 MG/ML
1 INJECTION, SOLUTION INTRAMUSCULAR; INTRAVENOUS DAILY
Status: DISCONTINUED | OUTPATIENT
Start: 2024-10-14 | End: 2024-10-15 | Stop reason: HOSPADM

## 2024-10-14 RX ADMIN — SACUBITRIL AND VALSARTAN 0.5 TABLET: 24; 26 TABLET, FILM COATED ORAL at 09:44

## 2024-10-14 RX ADMIN — IPRATROPIUM BROMIDE 0.5 MG: 0.5 SOLUTION RESPIRATORY (INHALATION) at 07:48

## 2024-10-14 RX ADMIN — IPRATROPIUM BROMIDE 0.5 MG: 0.5 SOLUTION RESPIRATORY (INHALATION) at 13:01

## 2024-10-14 RX ADMIN — ATORVASTATIN CALCIUM 40 MG: 40 TABLET, FILM COATED ORAL at 20:58

## 2024-10-14 RX ADMIN — METOPROLOL SUCCINATE 25 MG: 25 TABLET, EXTENDED RELEASE ORAL at 11:09

## 2024-10-14 RX ADMIN — Medication 1 TABLET: at 09:43

## 2024-10-14 RX ADMIN — LEVOTHYROXINE SODIUM 75 MCG: 0.07 TABLET ORAL at 05:49

## 2024-10-14 RX ADMIN — METOPROLOL SUCCINATE 25 MG: 25 TABLET, EXTENDED RELEASE ORAL at 20:58

## 2024-10-14 RX ADMIN — SACUBITRIL AND VALSARTAN 0.5 TABLET: 24; 26 TABLET, FILM COATED ORAL at 20:58

## 2024-10-14 RX ADMIN — ARFORMOTEROL TARTRATE 15 MCG: 15 SOLUTION RESPIRATORY (INHALATION) at 07:48

## 2024-10-14 RX ADMIN — ASPIRIN 81 MG: 81 TABLET, COATED ORAL at 09:44

## 2024-10-14 RX ADMIN — Medication 10 ML: at 09:46

## 2024-10-14 RX ADMIN — EMPAGLIFLOZIN 10 MG: 10 TABLET, FILM COATED ORAL at 09:43

## 2024-10-14 RX ADMIN — BUMETANIDE 1 MG: 0.25 INJECTION INTRAMUSCULAR; INTRAVENOUS at 11:10

## 2024-10-14 NOTE — PROGRESS NOTES
[facility] Medicine Services  PROGRESS NOTE    Patient Name: Colin Albrecht  : 1945  MRN: [id number]    Date of Admission: 10/10/2024  Primary Care Physician: Arun Soliman MD    Subjective   Subjective     CC:  Progressive soa    HPI:  Patient is a 79-year-old male seen and examined by me this a.m.,still with continued issues with SOA, plans for follow up CT chest today, no other new acute complaints or problems at this time.       Objective   Objective     Vital Signs:   Temp:  [97.7 °F (36.5 °C)] 97.7 °F (36.5 °C)  Heart Rate:  [73-93] 73  Resp:  [16-18] 18  BP: ()/(56-76) 98/66  Flow (L/min) (Oxygen Therapy):  [2] 2     Physical Exam:  Constitutional: No acute distress, awake, alert, frail appearing, on RA resting in bed. Currently on 2L NC  HENT: NCAT, mucous membranes moist  Respiratory: Decreased BS bilaterally, crackles more evident on the right, respiratory effort normal, no rhonchi or wheezing  Cardiovascular: RRR, no murmurs, rubs, or gallops  Gastrointestinal: Positive bowel sounds, soft, nontender, nondistended  Musculoskeletal: trace edema bilateral LE's  Psychiatric: Appropriate affect, cooperative  Neurologic: Oriented x 3, FLOREZ, speech clear  Skin: No rashes      Results Reviewed:  LAB RESULTS:      Lab 10/14/24  0523 10/13/24  0510 10/12/24  1346 10/11/24  0606 10/11/24  0024 10/10/24  1945 10/10/24  1605 10/10/24  1243   WBC 5.20 5.14 5.72 6.53  --   --   --  6.75   HEMOGLOBIN 12.0* 12.5* 12.6* 12.5*  --   --   --  12.7*   HEMATOCRIT 37.8 39.6 39.6 39.5  --   --   --  41.2   PLATELETS 161 165 149 156  --   --   --  156   NEUTROS ABS 3.26 3.69 4.32  --   --   --   --  5.24   IMMATURE GRANS (ABS) 0.01 0.04 0.01  --   --   --   --  0.02   LYMPHS ABS 0.92 0.62* 0.57*  --   --   --   --  0.54*   MONOS ABS 0.75 0.59 0.62  --   --   --   --  0.79   EOS ABS 0.21 0.16 0.18  --   --   --   --  0.12   MCV 97.2* 97.5* 97.5* 96.3  --   --   --  99.3*    PROCALCITONIN  --   --   --   --   --   --   --  0.05   LACTATE  --   --   --   --  1.1 2.6* 2.5* 2.1*   HSTROP T  --   --   --   --   --   --   --  21         Lab 10/14/24  0523 10/13/24  0510 10/12/24  1346 10/11/24  0606 10/10/24  1243   SODIUM 140 142 139 141 140   POTASSIUM 4.2 4.4 4.6 4.5 4.5   CHLORIDE 103 101 101 103 102   CO2 31.0* 29.0 29.0 31.0* 27.0   ANION GAP 6.0 12.0 9.0 7.0 11.0   BUN 35* 35* 35* 32* 33*   CREATININE 1.43* 1.47* 1.34* 1.53* 1.31*   EGFR 50.2* 48.5* 54.2* 46.2* 55.7*   GLUCOSE 89 138* 127* 92 113*   CALCIUM 8.5* 8.9 9.0 9.2 9.4   MAGNESIUM 2.5* 2.4 2.4 2.3  --    HEMOGLOBIN A1C  --   --   --  6.30*  --    TSH  --   --   --   --  9.290*         Lab 10/14/24  0523 10/13/24  0510 10/12/24  1346 10/11/24  0606 10/10/24  1243   TOTAL PROTEIN 5.7* 6.1 6.2 6.7 7.0   ALBUMIN 3.3* 3.6 3.5 3.6 3.9   GLOBULIN 2.4 2.5 2.7 3.1 3.1   ALT (SGPT) 13 12 10 16 17   AST (SGOT) 23 25 27 30 36   BILIRUBIN 1.0 1.2 1.4* 1.4* 1.5*   ALK PHOS 99 115 102 108 114         Lab 10/14/24  0523 10/13/24  0510 10/10/24  1243   PROBNP 6,760.0* 7,901.0* 10,682.0*   HSTROP T  --   --  21         Lab 10/11/24  0606   CHOLESTEROL 97   LDL CHOL 42   HDL CHOL 44   TRIGLYCERIDES 42             Brief Urine Lab Results       None            Microbiology Results Abnormal       Procedure Component Value - Date/Time    COVID PRE-OP / PRE-PROCEDURE SCREENING ORDER (NO ISOLATION) - Swab, Nasal Cavity [[id number]]  (Normal) Collected: 10/10/24 1302    Lab Status: Final result Specimen: Swab from Nasal Cavity Updated: 10/10/24 0885    Narrative:      The following orders were created for panel order COVID PRE-OP / PRE-PROCEDURE SCREENING ORDER (NO ISOLATION) - Swab, Nasal Cavity.  Procedure                               Abnormality         Status                     ---------                               -----------         ------                     COVID-19 and FLU A/B PCR...[[id number]]  Normal              Final result                  Please view results for these tests on the individual orders.    COVID-19 and FLU A/B PCR, 1 HR TAT - Swab, Nasopharynx [[id number]]  (Normal) Collected: 10/10/24 1302    Lab Status: Final result Specimen: Swab from Nasopharynx Updated: 10/10/24 3355     COVID19 Not Detected     Influenza A PCR Not Detected     Influenza B PCR Not Detected    Narrative:      Fact sheet for providers: https://www.fda.gov/media/825285/download    Fact sheet for patients: https://www.fda.gov/media/949002/download    Test performed by PCR.            CT Chest Without Contrast Diagnostic    Result Date: 10/14/2024  CT CHEST WO CONTRAST DIAGNOSTIC Date of Exam: 10/14/2024 11:45 AM EDT Indication: Follow up R Pleural effusion following diuresis. Comparison: AP chest radiograph 10/12/2024, CT chest 10/9/2024 Technique: Axial CT images were obtained of the chest without contrast administration.  Reconstructed coronal and sagittal images were also obtained. Automated exposure control and iterative construction methods were used. Findings: Moderate emphysematous changes are present. Mild smooth interstitial thickening is seen within the lung bases suggesting mild edema. Small to moderate right basilar pleural effusion layers to a depth of 2.9 cm with passive right lower lobe atelectasis, similar to 10/9/2024. No new airspace disease is identified. Calcified pleural plaque is seen in the left mid thorax. Stable cardiomegaly with atrial appendage occlusion device and signs of prior CABG. Pacemaker device in place. No pericardial effusion is seen. Mediastinal adenopathy is unchanged, including prevascular node 9 mm short axis and right paratracheal node 10  mm short axis and AP window node 9 mm short axis (series 2 image 50). No new or progressive adenopathy. Hepatic cysts are present, largest in the left lobe measuring about 4 cm. Stable low-density right adrenal nodule most in keeping with a benign adenoma. Uncomplicated cholelithiasis.  Remainder of the imaged upper abdominal organs demonstrate a normal noncontrast appearance. No acute or suspicious osseous abnormality.     Impression: Impression: 1. Mild basilar interstitial edema with small to moderate right pleural effusion and right basilar atelectasis, unchanged from 10/9/2024. No new airspace disease. 2. Stable cardiomegaly. 3. Stable enlarged mediastinal nodes, favored to represent benign reactive findings. 3. Additional chronic findings include uncomplicated cholelithiasis, moderate emphysema, stable right adrenal adenoma, hepatic cysts. Electronically Signed: Alberta Lawelr MD  10/14/2024 12:26 PM EDT  Workstation ID: [id number]     Results for orders placed during the hospital encounter of 10/10/24    Adult Transthoracic Echo Complete W/ Cont if Necessary Per Protocol    Interpretation Summary    Left ventricular systolic function is severely decreased. Calculated left ventricular EF = 20.5% Left ventricular ejection fraction appears to be less than 20%.    The left ventricular cavity is moderately dilated.    Left ventricular wall thickness is consistent with mild concentric hypertrophy.    Severely reduced right ventricular systolic function noted.    The right ventricular cavity is moderately dilated.    The left atrial cavity is moderate to severely dilated.    The right atrial cavity is severely  dilated.    There is mild calcification of the aortic valve.    Moderate mitral valve regurgitation is present.    Moderate tricuspid valve regurgitation is present.    Estimated right ventricular systolic pressure from tricuspid regurgitation is moderately elevated (45-55 mmHg).    Moderate pulmonic valve regurgitation is present.    No significant change compared to the 2023 echo.      Current medications:  Scheduled Meds:arformoterol, 15 mcg, Nebulization, BID - RT  aspirin, 81 mg, Oral, Daily  atorvastatin, 40 mg, Oral, Nightly  bumetanide, 1 mg, Intravenous, Daily  empagliflozin, 10 mg,  Oral, Daily  guaiFENesin, 1,200 mg, Oral, Q12H  ipratropium, 0.5 mg, Nebulization, 4x Daily - RT  levothyroxine, 75 mcg, Oral, Q AM  melatonin, 5 mg, Oral, Nightly  metoprolol succinate XL, 25 mg, Oral, BID  multivitamin with minerals, 1 tablet, Oral, Daily  pharmacy consult - [facility], , Does not apply, Daily  rOPINIRole, 0.5 mg, Oral, Nightly  sacubitril-valsartan, 0.5 tablet, Oral, BID  sodium chloride, 10 mL, Intravenous, Q12H  temazepam, 15 mg, Oral, Nightly      Continuous Infusions:   PRN Meds:.  acetaminophen **OR** acetaminophen **OR** acetaminophen    senna-docusate sodium **AND** polyethylene glycol **AND** bisacodyl **AND** bisacodyl    ipratropium-albuterol    rOPINIRole    sodium chloride    sodium chloride    Assessment & Plan   Assessment & Plan     Active Hospital Problems    Diagnosis  POA    **Acute exacerbation of CHF (congestive heart failure) [I50.9]  Yes    Hypothyroidism (acquired) [E03.9]  Yes    ICD (implantable cardioverter-defibrillator) in place [Z95.810]  Yes    CKD (chronic kidney disease) [N18.9]  Yes    Dependence on supplemental oxygen [Z99.81]  Not Applicable    COPD  [J44.9]  Yes    Essential hypertension [I10]  Yes    Coronary artery disease involving coronary bypass graft of native heart without angina pectoris [I25.810]  Yes    Dilated CM  [I42.0]  Yes    S/P Watchman device [Z95.818]  Yes    SSS  [I49.5]  Yes    Permanent atrial fibrillation [I48.21]  Yes      Resolved Hospital Problems   No resolved problems to display.        Brief Hospital Course to date:  Colin Albrecht is a 79 y.o. male with past medical history significant for hypothyroidism, SSS s/p ICD,CAD, dilated CM, CHF w an EF 20.4 % (at last check 5/2023), A-fib s/p Watchman, ex-smoker, chronic oxygen use, and probable chronic CKD.  Patient follows with Dr. Wei with pulmonary, Dr. Quintana with cardiology, and Dr. Prado with cards EP.  Patient was last seen by pulmonary approximately 1 month ago with  complaints of progressive shortness of air.  CT of the chest was completed outpatient PTA.  CT showed moderate to marked cardiomegaly new compared to prior CT from 2021, pulmonary edema with sm to mod right pl effusion and trace left pl effusion.  Pt was to have outpatient echo in approx 2 weeks and follow-up with Dr. Quintana, however continues to have progressive shortness of air. Patient seen again on the M of 10/13, following with Dr. Astudillo this weekend, patient overall reports breathing improving but still SOA with exertion, IV diuresis again on 10/13, if symptoms persist possible CT chest and consideration for possible thoracentesis if right pleural effusion persists. Seen again on 10/14, patient with more SOA this AM, more significant crackles as well, CT chest reviewed and will plan for thoracentesis at this time, likely in the AM of 10/15, continue to follow with cardiology at this time.     Progressive dyspnea  Acute/chronic systolic CHF  CAD/dilated CM/EF 20.4 at last check (5/2023)  SSS/ICD/A-fib s/p Watchman  Heart murmur  R sided pleural effusion   --Follows with Dr. Quintana with cards, Dr. Prado with EP.  -- Echo with reduced EF of 20%  -- BNP 10K, CXR with pulmonary venous congestion and small effusions  -- rajendra/Dr. Quintana following.    -- Diuresis again reordered IV Bumex on 10/13 with 1 mg BID  -- Repeat CT chest today with continued moderate pleural effusion, no significant change, plans now for thoracentesis, consult to IR for hopeful procedure possibly on 10/15   -- Continue toprol, entresto, and jardiance. Entresto dosage has been increased S/p watchman so no need for AC     COPD  Chronic oxygen use  --Follows with Dr. Wei  -- Recently added home nebulizers, initially helped  -- wears 2LNC O2 at home     Hypothyroidism  -- Continue home Synthroid.    -- TSH elevated but free T4 upper limit of normal, outpatient follow up with his regular PCP for repeat labs         Expected Discharge  Location and Transportation:   Expected Discharge   Expected Discharge Date: 10/15/2024; Expected Discharge Time:      VTE Prophylaxis:  Mechanical VTE prophylaxis orders are present.         AM-PAC 6 Clicks Score (PT): 21 (10/14/24 0830)    CODE STATUS:   Code Status and Medical Interventions: CPR (Attempt to Resuscitate); Full Support   Ordered at: 10/10/24 1602     Level Of Support Discussed With:    Patient    Next of Kin (If No Surrogate)     Code Status (Patient has no pulse and is not breathing):    CPR (Attempt to Resuscitate)     Medical Interventions (Patient has pulse or is breathing):    Full Support       RADHA Jimenez,   10/14/24

## 2024-10-14 NOTE — PLAN OF CARE
Problem: Adult Inpatient Plan of Care  Goal: Plan of Care Review  Outcome: Progressing  Goal: Patient-Specific Goal (Individualized)  Outcome: Progressing  Goal: Absence of Hospital-Acquired Illness or Injury  Outcome: Progressing  Intervention: Identify and Manage Fall Risk  Recent Flowsheet Documentation  Taken 10/14/2024 1400 by Hay Shea RN  Safety Promotion/Fall Prevention:   assistive device/personal items within reach   activity supervised   clutter free environment maintained   fall prevention program maintained   lighting adjusted   nonskid shoes/slippers when out of bed   room organization consistent   safety round/check completed  Taken 10/14/2024 1200 by Hay Shea RN  Safety Promotion/Fall Prevention:   activity supervised   assistive device/personal items within reach   clutter free environment maintained   fall prevention program maintained   lighting adjusted   nonskid shoes/slippers when out of bed   room organization consistent   safety round/check completed  Taken 10/14/2024 1000 by Hay Shea RN  Safety Promotion/Fall Prevention:   safety round/check completed   room organization consistent   nonskid shoes/slippers when out of bed   lighting adjusted   fall prevention program maintained   assistive device/personal items within reach   clutter free environment maintained   activity supervised  Taken 10/14/2024 0830 by Hay Shea RN  Safety Promotion/Fall Prevention:   safety round/check completed   room organization consistent   nonskid shoes/slippers when out of bed   lighting adjusted   fall prevention program maintained   clutter free environment maintained   activity supervised   assistive device/personal items within reach  Intervention: Prevent Skin Injury  Recent Flowsheet Documentation  Taken 10/14/2024 1600 by Hay Shea, RN  Skin Protection: transparent dressing maintained  Taken 10/14/2024 1400 by Hay Shea RN  Body Position: position changed  independently  Skin Protection: transparent dressing maintained  Taken 10/14/2024 1200 by Hay Shea RN  Body Position: position changed independently  Skin Protection: transparent dressing maintained  Taken 10/14/2024 1000 by Hay Shea RN  Body Position: position changed independently  Skin Protection: transparent dressing maintained  Taken 10/14/2024 0830 by Hay Shea RN  Body Position: position changed independently  Skin Protection:   drying agents applied   transparent dressing maintained   skin sealant/moisture barrier applied  Intervention: Prevent and Manage VTE (Venous Thromboembolism) Risk  Recent Flowsheet Documentation  Taken 10/14/2024 0830 by Hay Shea RN  VTE Prevention/Management:   bilateral   SCDs (sequential compression devices) off  Goal: Optimal Comfort and Wellbeing  Outcome: Progressing  Intervention: Provide Person-Centered Care  Recent Flowsheet Documentation  Taken 10/14/2024 1600 by Hay Shea RN  Trust Relationship/Rapport: care explained  Taken 10/14/2024 1400 by Hay Shea RN  Trust Relationship/Rapport: care explained  Taken 10/14/2024 1200 by Hay Shea RN  Trust Relationship/Rapport: care explained  Taken 10/14/2024 1000 by Hay Shea RN  Trust Relationship/Rapport: care explained  Taken 10/14/2024 0830 by Hay Shea RN  Trust Relationship/Rapport: care explained  Goal: Readiness for Transition of Care  Outcome: Progressing     Problem: Heart Failure  Goal: Optimal Coping  Outcome: Progressing  Goal: Optimal Cardiac Output and Blood Flow  Outcome: Progressing  Goal: Stable Heart Rate and Rhythm  Outcome: Progressing  Goal: Fluid and Electrolyte Balance  Outcome: Progressing  Goal: Optimal Functional Ability  Outcome: Progressing  Intervention: Optimize Functional Ability  Recent Flowsheet Documentation  Taken 10/14/2024 1400 by Hay Shea RN  Activity Management: activity encouraged  Taken 10/14/2024 1200 by Shabbir  Hay RN  Activity Management: activity encouraged  Taken 10/14/2024 1000 by Hay Shea RN  Activity Management: activity encouraged  Taken 10/14/2024 0830 by Hay Shea RN  Activity Management: activity encouraged  Goal: Improved Oral Intake  Outcome: Progressing  Goal: Effective Oxygenation and Ventilation  Outcome: Progressing  Intervention: Promote Airway Secretion Clearance  Recent Flowsheet Documentation  Taken 10/14/2024 1600 by Hay Shea RN  Cough And Deep Breathing: done independently per patient  Taken 10/14/2024 1400 by Hay Shea RN  Activity Management: activity encouraged  Taken 10/14/2024 1200 by Hay Shea RN  Activity Management: activity encouraged  Taken 10/14/2024 1000 by Hay Shea RN  Activity Management: activity encouraged  Taken 10/14/2024 0830 by Hay Shea RN  Activity Management: activity encouraged  Cough And Deep Breathing: done independently per patient  Intervention: Optimize Oxygenation and Ventilation  Recent Flowsheet Documentation  Taken 10/14/2024 1400 by Hay Shea RN  Head of Bed (HOB) Positioning: HOB elevated  Taken 10/14/2024 1200 by Hay Shea RN  Head of Bed (HOB) Positioning: HOB elevated  Taken 10/14/2024 1000 by Hay Shea RN  Head of Bed (HOB) Positioning: HOB elevated  Taken 10/14/2024 0830 by Hay Shea RN  Head of Bed (HOB) Positioning: HOB elevated  Goal: Effective Breathing Pattern During Sleep  Outcome: Progressing  Intervention: Monitor and Manage Obstructive Sleep Apnea  Recent Flowsheet Documentation  Taken 10/14/2024 1400 by Hay Shea RN  Medication Review/Management: medications reviewed  Taken 10/14/2024 1200 by Hay Shea RN  Medication Review/Management: medications reviewed  Taken 10/14/2024 1000 by Hay Shea RN  Medication Review/Management: medications reviewed  Taken 10/14/2024 0830 by Hay Shea, RN  Medication Review/Management: medications reviewed      Problem: Fall Injury Risk  Goal: Absence of Fall and Fall-Related Injury  Outcome: Progressing  Intervention: Identify and Manage Contributors  Recent Flowsheet Documentation  Taken 10/14/2024 1400 by Hay Shea RN  Medication Review/Management: medications reviewed  Taken 10/14/2024 1200 by Hay Shea RN  Medication Review/Management: medications reviewed  Taken 10/14/2024 1000 by Hay Shea RN  Medication Review/Management: medications reviewed  Taken 10/14/2024 0830 by Hay Shea RN  Medication Review/Management: medications reviewed  Intervention: Promote Injury-Free Environment  Recent Flowsheet Documentation  Taken 10/14/2024 1400 by Hay hSea RN  Safety Promotion/Fall Prevention:   assistive device/personal items within reach   activity supervised   clutter free environment maintained   fall prevention program maintained   lighting adjusted   nonskid shoes/slippers when out of bed   room organization consistent   safety round/check completed  Taken 10/14/2024 1200 by Hay Shea RN  Safety Promotion/Fall Prevention:   activity supervised   assistive device/personal items within reach   clutter free environment maintained   fall prevention program maintained   lighting adjusted   nonskid shoes/slippers when out of bed   room organization consistent   safety round/check completed  Taken 10/14/2024 1000 by Hay Shea RN  Safety Promotion/Fall Prevention:   safety round/check completed   room organization consistent   nonskid shoes/slippers when out of bed   lighting adjusted   fall prevention program maintained   assistive device/personal items within reach   clutter free environment maintained   activity supervised  Taken 10/14/2024 0830 by Hay Shea, RN  Safety Promotion/Fall Prevention:   safety round/check completed   room organization consistent   nonskid shoes/slippers when out of bed   lighting adjusted   fall prevention program maintained   clutter free  environment maintained   activity supervised   assistive device/personal items within reach   Goal Outcome Evaluation:

## 2024-10-14 NOTE — PROGRESS NOTES
"  [facility]  PROGRESS NOTE    Date of Admission: 10/10/2024  Date of Service: 10/14/24    Primary Care Physician: Arun Soliman MD    Chief Complaint: f/u A/C HFrEF  Problem List:   Acute exacerbation of CHF (congestive heart failure)    Permanent atrial fibrillation    SSS     Dilated CM     S/P Watchman device    Coronary artery disease involving coronary bypass graft of native heart without angina pectoris    Essential hypertension    COPD     Dependence on supplemental oxygen    Hypothyroidism (acquired)    ICD (implantable cardioverter-defibrillator) in place    CKD (chronic kidney disease)      Subjective      Breathing and LE edema is better, however he still has spells of dyspnea at rest. Planning to undergo CT chest today to relook effusions      Objective   Vitals: /76   Pulse 77   Temp 97.7 °F (36.5 °C) (Oral)   Resp 18   Ht 190.5 cm (75\")   Wt 77 kg (169 lb 12.1 oz)   SpO2 97%   BMI 21.22 kg/m²     Physical Exam:  GENERAL: Alert, cooperative, in no acute distress.   HEART: Regular rhythm, normal rate, and no murmurs, gallops, or rubs.   LUNGS: Diminished bases with crackles bilaterally. No wheezing or rhonchi. On 2L NC  NEUROLOGIC: No focal abnormalities involving strength or sensation are noted.   EXTREMITIES: No clubbing, cyanosis, or edema noted.     Results:  Results from last 7 days   Lab Units 10/14/24  0523 10/13/24  0510 10/12/24  1346   WBC 10*3/mm3 5.20 5.14 5.72   HEMOGLOBIN g/dL 12.0* 12.5* 12.6*   HEMATOCRIT % 37.8 39.6 39.6   PLATELETS 10*3/mm3 161 165 149     Results from last 7 days   Lab Units 10/14/24  0523 10/13/24  0510 10/12/24  1346   SODIUM mmol/L 140 142 139   POTASSIUM mmol/L 4.2 4.4 4.6   CHLORIDE mmol/L 103 101 101   CO2 mmol/L 31.0* 29.0 29.0   BUN mg/dL 35* 35* 35*   CREATININE mg/dL 1.43* 1.47* 1.34*   GLUCOSE mg/dL 89 138* 127*      Lab Results   Component Value Date    CHOL 97 10/11/2024    TRIG 42 10/11/2024    HDL 44 " 10/11/2024    LDL 42 10/11/2024    AST 23 10/14/2024    ALT 13 10/14/2024     Results from last 7 days   Lab Units 10/11/24  0606   HEMOGLOBIN A1C % 6.30*     Results from last 7 days   Lab Units 10/11/24  0606   CHOLESTEROL mg/dL 97   TRIGLYCERIDES mg/dL 42   HDL CHOL mg/dL 44   LDL CHOL mg/dL 42     Results from last 7 days   Lab Units 10/10/24  1243   TSH uIU/mL 9.290*   FREE T4 ng/dL 1.71*         Results from last 7 days   Lab Units 10/10/24  1243   HSTROP T ng/L 21     Results from last 7 days   Lab Units 10/13/24  0510   PROBNP pg/mL 7,901.0*       Intake/Output Summary (Last 24 hours) at 10/14/2024 1116  Last data filed at 10/14/2024 0300  Gross per 24 hour   Intake 480 ml   Output --   Net 480 ml     I personally reviewed the patient's EKG/Telemetry data    Radiology Data:   No radiology results for the last day    Results for orders placed during the hospital encounter of 10/10/24    Adult Transthoracic Echo Complete W/ Cont if Necessary Per Protocol    Interpretation Summary    Left ventricular systolic function is severely decreased. Calculated left ventricular EF = 20.5% Left ventricular ejection fraction appears to be less than 20%.    The left ventricular cavity is moderately dilated.    Left ventricular wall thickness is consistent with mild concentric hypertrophy.    Severely reduced right ventricular systolic function noted.    The right ventricular cavity is moderately dilated.    The left atrial cavity is moderate to severely dilated.    The right atrial cavity is severely  dilated.    There is mild calcification of the aortic valve.    Moderate mitral valve regurgitation is present.    Moderate tricuspid valve regurgitation is present.    Estimated right ventricular systolic pressure from tricuspid regurgitation is moderately elevated (45-55 mmHg).    Moderate pulmonic valve regurgitation is present.    No significant change compared to the 2023 echo.        Current Medications:  arformoterol,  15 mcg, Nebulization, BID - RT  aspirin, 81 mg, Oral, Daily  atorvastatin, 40 mg, Oral, Nightly  bumetanide, 1 mg, Intravenous, Daily  empagliflozin, 10 mg, Oral, Daily  guaiFENesin, 1,200 mg, Oral, Q12H  ipratropium, 0.5 mg, Nebulization, 4x Daily - RT  levothyroxine, 75 mcg, Oral, Q AM  melatonin, 5 mg, Oral, Nightly  metoprolol succinate XL, 25 mg, Oral, BID  multivitamin with minerals, 1 tablet, Oral, Daily  pharmacy consult - MTM, , Does not apply, Daily  rOPINIRole, 0.5 mg, Oral, Nightly  sacubitril-valsartan, 0.5 tablet, Oral, BID  sodium chloride, 10 mL, Intravenous, Q12H  temazepam, 15 mg, Oral, Nightly           Initial cardiac assessment: 79-year-old gentleman with a history of ischemic cardiomyopathy, HFrEF EF 20% with paroxysmal A-fib, CAD, watchman, AV node ablation with BiV ICD, presenting with acute on chronic HFrEF.        Recommendations:  1.  Acute on chronic HFrEF:  Echo with LVEF 20%, biventricular failure   Continue IV Bumex 2 mg daily, goal net -1.5 to 2 L daily  Continue GDMT with Toprol, Entresto, and Jardiance  Will not add spironolactone at this time due to limitation of hypotension and CKD     Strict I's and O's and daily weights discussed with patient and nursing staff   Plan for repeat CT Chest to relook effusion today per Dr. Astudillo and consider possible thoracentesis if sizeable      At time of discharge we will provide patient with instructions on when to double his home diuretic dose.     2.  PAF:  Status post watchman and AV node ablation  No need for full anticoagulation  Paced rhythm.     3.  CAD:  Continue aspirin and statin  No signs of acute coronary syndrome, negative troponin, no chest pain.     4.  Chronic COPD on oxygen:  Followed by pulmonary as an outpatient     5.  CKD stage IIIa:  Complicates all aspects of care  Monitor closely with diuresis, Cr stable so far   Continue SGLT2 inhibitor        Electronically signed by Roxie Quiñones PA-C, 10/14/24, 11:23 AM  EDT.

## 2024-10-14 NOTE — PLAN OF CARE
Problem: Adult Inpatient Plan of Care  Goal: Absence of Hospital-Acquired Illness or Injury  Intervention: Identify and Manage Fall Risk  Recent Flowsheet Documentation  Taken 10/14/2024 0200 by Catrina Gore RN  Safety Promotion/Fall Prevention:   activity supervised   assistive device/personal items within reach   clutter free environment maintained   fall prevention program maintained   lighting adjusted   nonskid shoes/slippers when out of bed   room organization consistent   safety round/check completed  Taken 10/13/2024 2200 by Catrina Gore RN  Safety Promotion/Fall Prevention:   activity supervised   assistive device/personal items within reach   clutter free environment maintained   fall prevention program maintained   lighting adjusted   nonskid shoes/slippers when out of bed   room organization consistent   safety round/check completed  Taken 10/13/2024 2000 by Gore, Catrina M, RN  Safety Promotion/Fall Prevention:   activity supervised   assistive device/personal items within reach   clutter free environment maintained   fall prevention program maintained   lighting adjusted   nonskid shoes/slippers when out of bed   room organization consistent   safety round/check completed  Intervention: Prevent Skin Injury  Recent Flowsheet Documentation  Taken 10/14/2024 0200 by Catrina Gore RN  Body Position: position changed independently  Taken 10/14/2024 0000 by Catrina Gore RN  Body Position: position changed independently  Taken 10/13/2024 2200 by Catrina Gore RN  Body Position: position changed independently  Taken 10/13/2024 2000 by Catrina Gore RN  Body Position: position changed independently  Goal: Optimal Comfort and Wellbeing  Intervention: Provide Person-Centered Care  Recent Flowsheet Documentation  Taken 10/13/2024 2200 by Catrina Gore RN  Trust Relationship/Rapport:   care explained   choices provided   emotional support provided   empathic  listening provided   questions answered   questions encouraged   reassurance provided   thoughts/feelings acknowledged  Taken 10/13/2024 2000 by Catrina Gore RN  Trust Relationship/Rapport:   care explained   choices provided   emotional support provided   empathic listening provided   questions answered   questions encouraged   reassurance provided   thoughts/feelings acknowledged     Problem: COPD (Chronic Obstructive Pulmonary Disease) Comorbidity  Goal: Maintenance of COPD Symptom Control  Intervention: Maintain COPD-Symptom Control  Recent Flowsheet Documentation  Taken 10/14/2024 0200 by Catrina Gore RN  Supportive Measures:   active listening utilized   decision-making supported   positive reinforcement provided   relaxation techniques promoted  Medication Review/Management: medications reviewed  Taken 10/14/2024 0000 by Catrina Gore RN  Supportive Measures:   active listening utilized   decision-making supported   positive reinforcement provided  Taken 10/13/2024 2200 by Catrina Gore RN  Supportive Measures:   active listening utilized   decision-making supported   positive reinforcement provided   relaxation techniques promoted  Medication Review/Management: medications reviewed  Taken 10/13/2024 2000 by Catrina Gore RN  Supportive Measures:   active listening utilized   self-care encouraged   self-reflection promoted     Problem: Fall Injury Risk  Goal: Absence of Fall and Fall-Related Injury  Intervention: Identify and Manage Contributors  Recent Flowsheet Documentation  Taken 10/14/2024 0200 by Catrina Gore RN  Medication Review/Management: medications reviewed  Taken 10/13/2024 2200 by Catrina Gore RN  Medication Review/Management: medications reviewed  Intervention: Promote Injury-Free Environment  Recent Flowsheet Documentation  Taken 10/14/2024 0200 by Catrina Gore RN  Safety Promotion/Fall Prevention:   activity supervised   assistive  device/personal items within reach   clutter free environment maintained   fall prevention program maintained   lighting adjusted   nonskid shoes/slippers when out of bed   room organization consistent   safety round/check completed  Taken 10/13/2024 2200 by Catrina Gore RN  Safety Promotion/Fall Prevention:   activity supervised   assistive device/personal items within reach   clutter free environment maintained   fall prevention program maintained   lighting adjusted   nonskid shoes/slippers when out of bed   room organization consistent   safety round/check completed  Taken 10/13/2024 2000 by Catrina Gore RN  Safety Promotion/Fall Prevention:   activity supervised   assistive device/personal items within reach   clutter free environment maintained   fall prevention program maintained   lighting adjusted   nonskid shoes/slippers when out of bed   room organization consistent   safety round/check completed     Problem: Adult Inpatient Plan of Care  Goal: Absence of Hospital-Acquired Illness or Injury  Intervention: Identify and Manage Fall Risk  Recent Flowsheet Documentation  Taken 10/14/2024 0200 by Catrina Gore, RN  Safety Promotion/Fall Prevention:   activity supervised   assistive device/personal items within reach   clutter free environment maintained   fall prevention program maintained   lighting adjusted   nonskid shoes/slippers when out of bed   room organization consistent   safety round/check completed  Taken 10/13/2024 2200 by Catrina Gore, RN  Safety Promotion/Fall Prevention:   activity supervised   assistive device/personal items within reach   clutter free environment maintained   fall prevention program maintained   lighting adjusted   nonskid shoes/slippers when out of bed   room organization consistent   safety round/check completed  Taken 10/13/2024 2000 by Catrina Gore RN  Safety Promotion/Fall Prevention:   activity supervised   assistive device/personal  items within reach   clutter free environment maintained   fall prevention program maintained   lighting adjusted   nonskid shoes/slippers when out of bed   room organization consistent   safety round/check completed  Intervention: Prevent Skin Injury  Recent Flowsheet Documentation  Taken 10/14/2024 0200 by Catrina Gore RN  Body Position: position changed independently  Taken 10/14/2024 0000 by Catrina Gore RN  Body Position: position changed independently  Taken 10/13/2024 2200 by Catrina Gore RN  Body Position: position changed independently  Taken 10/13/2024 2000 by Catrina Gore RN  Body Position: position changed independently  Goal: Optimal Comfort and Wellbeing  Intervention: Provide Person-Centered Care  Recent Flowsheet Documentation  Taken 10/13/2024 2200 by Catrina Gore RN  Trust Relationship/Rapport:   care explained   choices provided   emotional support provided   empathic listening provided   questions answered   questions encouraged   reassurance provided   thoughts/feelings acknowledged  Taken 10/13/2024 2000 by Catrina Gore RN  Trust Relationship/Rapport:   care explained   choices provided   emotional support provided   empathic listening provided   questions answered   questions encouraged   reassurance provided   thoughts/feelings acknowledged     Problem: Heart Failure  Goal: Optimal Coping  Intervention: Support Psychosocial Response  Recent Flowsheet Documentation  Taken 10/14/2024 0200 by Catrina Gore RN  Supportive Measures:   active listening utilized   decision-making supported   positive reinforcement provided   relaxation techniques promoted  Taken 10/14/2024 0000 by Catrina Gore RN  Supportive Measures:   active listening utilized   decision-making supported   positive reinforcement provided  Family/Support System Care:   self-care encouraged   support provided  Taken 10/13/2024 2200 by Catrina Gore RN  Supportive  Measures:   active listening utilized   decision-making supported   positive reinforcement provided   relaxation techniques promoted  Family/Support System Care:   self-care encouraged   support provided  Taken 10/13/2024 2000 by Catrina Gore RN  Supportive Measures:   active listening utilized   self-care encouraged   self-reflection promoted  Family/Support System Care:   self-care encouraged   support provided  Goal: Optimal Cardiac Output and Blood Flow  Intervention: Optimize Cardiac Output  Recent Flowsheet Documentation  Taken 10/14/2024 0200 by Catrina Gore RN  Environmental Support: calm environment promoted  Taken 10/14/2024 0000 by Catrina Gore RN  Environmental Support:   calm environment promoted   distractions minimized  Taken 10/13/2024 2200 by Catrina Gore RN  Environmental Support:   calm environment promoted   distractions minimized   rest periods encouraged  Goal: Optimal Functional Ability  Intervention: Optimize Functional Ability  Recent Flowsheet Documentation  Taken 10/14/2024 0200 by Catrina Gore RN  Activity Management: activity encouraged  Taken 10/13/2024 2200 by Catrina Gore RN  Activity Management: activity encouraged  Taken 10/13/2024 2000 by Catrina oGre RN  Activity Management: activity encouraged  Goal: Effective Oxygenation and Ventilation  Intervention: Promote Airway Secretion Clearance  Recent Flowsheet Documentation  Taken 10/14/2024 0200 by Catrina Gore RN  Activity Management: activity encouraged  Cough And Deep Breathing: done independently per patient  Taken 10/13/2024 2200 by Catrina Gore RN  Activity Management: activity encouraged  Taken 10/13/2024 2000 by Catrina Gore, RN  Activity Management: activity encouraged  Intervention: Optimize Oxygenation and Ventilation  Recent Flowsheet Documentation  Taken 10/14/2024 0200 by Catrina Gore RN  Head of Bed (HOB) Positioning: HOB  lowered  Taken 10/14/2024 0000 by Catrina Gore RN  Head of Bed (HOB) Positioning: HOB lowered  Taken 10/13/2024 2200 by Catrina Gore RN  Head of Bed (HOB) Positioning: HOB elevated  Taken 10/13/2024 2000 by Catrina Gore RN  Head of Bed (HOB) Positioning: HOB elevated  Goal: Effective Breathing Pattern During Sleep  Intervention: Monitor and Manage Obstructive Sleep Apnea  Recent Flowsheet Documentation  Taken 10/14/2024 0200 by Catrina Gore RN  Medication Review/Management: medications reviewed  Taken 10/13/2024 2200 by Catrina Gore RN  Medication Review/Management: medications reviewed     Problem: Fall Injury Risk  Goal: Absence of Fall and Fall-Related Injury  Intervention: Identify and Manage Contributors  Recent Flowsheet Documentation  Taken 10/14/2024 0200 by Catrina Gore RN  Medication Review/Management: medications reviewed  Taken 10/13/2024 2200 by Catrina Gore RN  Medication Review/Management: medications reviewed  Intervention: Promote Injury-Free Environment  Recent Flowsheet Documentation  Taken 10/14/2024 0200 by Catrina Gore, RN  Safety Promotion/Fall Prevention:   activity supervised   assistive device/personal items within reach   clutter free environment maintained   fall prevention program maintained   lighting adjusted   nonskid shoes/slippers when out of bed   room organization consistent   safety round/check completed  Taken 10/13/2024 2200 by Catrina Gore, RN  Safety Promotion/Fall Prevention:   activity supervised   assistive device/personal items within reach   clutter free environment maintained   fall prevention program maintained   lighting adjusted   nonskid shoes/slippers when out of bed   room organization consistent   safety round/check completed  Taken 10/13/2024 2000 by Catrina Gore, RN  Safety Promotion/Fall Prevention:   activity supervised   assistive device/personal items within reach   clutter free  environment maintained   fall prevention program maintained   lighting adjusted   nonskid shoes/slippers when out of bed   room organization consistent   safety round/check completed   Goal Outcome Evaluation:

## 2024-10-14 NOTE — PAYOR COMM NOTE
"Ref# [id number]   Still Pending  Clinical Update    OWEN Mendiola, RN  Utilization Review  Phone [phone]  Fax [phone]    [facility]  [address] [place] [id number]             Monica Albrecht (79 y.o. Male)       Date of Birth   1945    Social Security Number       Address   [address] [address]    Home Phone   [phone]    MRN   [id number]       [facility]    Marital Status                               Admission Date   10/10/24    Admission Type   Emergency    Admitting Provider   TOMMY Jimenez DO    Attending Provider   TOMMY Jimenez DO    Department, Room/Bed   [facility] 5F, S519/1       Discharge Date       Discharge Disposition       Discharge Destination                                 Attending Provider: TOMMY Jimenez DO    Allergies: No Known Allergies    Isolation: None   Infection: None   Code Status: CPR    Ht: 190.5 cm (75\")   Wt: 77 kg (169 lb 12.1 oz)    Admission Cmt: None   Principal Problem: Acute exacerbation of CHF (congestive heart failure) [I50.9]                   Active Insurance as of 10/10/2024       Primary Coverage       Payor Plan Insurance Group Employer/Plan Group    ANTHEM MEDICARE REPLACEMENT ANTHEM MEDICARE ADVANTAGE [id number]       Payor Plan Address Payor Plan Phone Number Payor Plan Fax Number Effective Dates    [address] [phone]  1/1/2024 - None Entered    [facility] [id number]         Subscriber Name Subscriber Birth Date Member ID       MONICA ALBRECHT 1945 [id number]                     Emergency Contacts        (Rel.) Home Phone Work Phone Mobile Phone    DESMOND NG (Friend) [phone] -- [phone]    KERONTUAN (Son) [phone] -- [phone]    Lneore Albrecht (Spouse) [phone] -- --              Oxygen Therapy (last 3 days)       Date/Time SpO2 Device (Oxygen Therapy) Flow (L/min) (Oxygen Therapy) Oxygen " Concentration (%) ETCO2 (mmHg)    10/14/24 1301 -- room air -- -- --    10/14/24 1110 -- room air -- -- --    10/14/24 0748 97 humidified;nasal cannula 2 -- --    10/14/24 0700 -- nasal cannula;humidified -- -- --    10/14/24 0400 -- nasal cannula 2 -- --    10/14/24 0200 -- nasal cannula 2 -- --    10/14/24 0000 -- nasal cannula 2 -- --    10/13/24 2200 -- nasal cannula 2 -- --    10/13/24 2055 90 room air -- -- --    10/13/24 2000 -- room air -- -- --    10/13/24 1918 95 room air -- -- --    10/13/24 1800 -- room air -- -- --    10/13/24 1702 95 room air -- -- --    10/13/24 1600 -- room air -- -- --    10/13/24 1526 95 room air -- -- --    10/13/24 1400 -- nasal cannula 2 -- --    10/13/24 1200 -- nasal cannula 2 -- --    10/13/24 1139 97 -- -- -- --    10/13/24 1044 -- nasal cannula -- -- --    10/13/24 1000 -- nasal cannula 2 -- --    10/13/24 0824 -- nasal cannula 2 -- --    10/13/24 0800 -- nasal cannula 2 -- --    10/13/24 0718 -- nasal cannula 2 -- --    10/13/24 0500 96 -- -- -- --    10/13/24 0400 -- nasal cannula 2 -- --    10/13/24 0200 -- nasal cannula 2 -- --    10/13/24 0000 -- nasal cannula 2 -- --    10/12/24 2200 -- nasal cannula 2 -- --    10/12/24 2022 96 nasal cannula 2 -- --    10/12/24 2000 -- nasal cannula 2 -- --    10/12/24 1800 -- room air 2 -- --    10/12/24 1600 -- room air -- -- --    10/12/24 1400 -- room air 2 -- --    10/12/24 1200 -- nasal cannula 2 -- --    10/12/24 1000 -- nasal cannula 2 -- --    10/12/24 0946 93 -- 2 -- --    10/12/24 0800 -- nasal cannula 2 -- --    10/12/24 0600 -- -- 3 -- --    10/12/24 0000 -- nasal cannula 2 -- --    10/11/24 2200 -- nasal cannula 2 -- --    10/11/24 2015 91 nasal cannula 2 -- --    10/11/24 2000 -- nasal cannula 2 -- --    10/11/24 1803 97 -- -- -- --    10/11/24 1800 88 nasal cannula 2 -- --    10/11/24 1622 99 -- -- -- --    10/11/24 1600 -- nasal cannula 2 -- --    10/11/24 1541 93 nasal cannula 2 -- --    10/11/24 1400 100 nasal  cannula 2 -- --    10/11/24 1228 -- nasal cannula 3 -- --    10/11/24 1200 96 nasal cannula 2 -- --    10/11/24 1045 100 nasal cannula 2 -- --    10/11/24 0800 99 nasal cannula 2 -- --    10/11/24 0610 -- nasal cannula 2 -- --    10/11/24 0430 -- nasal cannula 2 -- --    10/11/24 0338 97 room air -- -- --    10/11/24 0230 -- nasal cannula 2 -- --    10/11/24 0030 -- nasal cannula 2 -- --          Current Facility-Administered Medications   Medication Dose Route Frequency Provider Last Rate Last Admin    acetaminophen (TYLENOL) tablet 650 mg  650 mg Oral Q4H PRN Shae Knight APRN        Or    acetaminophen (TYLENOL) 160 MG/5ML oral solution 650 mg  650 mg Oral Q4H PRN Shae Knight APRN        Or    acetaminophen (TYLENOL) suppository 650 mg  650 mg Rectal Q4H PRN Shae Knight APRN        arformoterol (BROVANA) nebulizer solution 15 mcg  15 mcg Nebulization BID - RT Ariel Moralez MD   15 mcg at 10/14/24 0748    aspirin EC tablet 81 mg  81 mg Oral Daily Shae Knight APRN   81 mg at 10/14/24 0944    atorvastatin (LIPITOR) tablet 40 mg  40 mg Oral Nightly Shae Knight APRN   40 mg at 10/13/24 2220    sennosides-docusate (PERICOLACE) 8.6-50 MG per tablet 2 tablet  2 tablet Oral BID PRN Shae Knight APRN        And    polyethylene glycol (MIRALAX) packet 17 g  17 g Oral Daily PRN Shae Knight APRN   17 g at 10/11/24 1156    And    bisacodyl (DULCOLAX) EC tablet 5 mg  5 mg Oral Daily PRN Shae Knight APRN        And    bisacodyl (DULCOLAX) suppository 10 mg  10 mg Rectal Daily PRN Shae Knight Shreya, JOHN        bumetanide (BUMEX) injection 1 mg  1 mg Intravenous Daily TOMMY Jimenez DO   1 mg at 10/14/24 1110    empagliflozin (JARDIANCE) tablet 10 mg  10 mg Oral Daily Shae Knight APRN   10 mg at 10/14/24 0943    guaiFENesin (MUCINEX) 12 hr tablet 1,200 mg  1,200 mg Oral Q12H TOMMY Jimenez  DO Talha   1,200 mg at 10/13/24 2223    ipratropium (ATROVENT) nebulizer solution 0.5 mg  0.5 mg Nebulization 4x Daily - RT Ariel Moralez MD   0.5 mg at 10/14/24 1301    ipratropium-albuterol (DUO-NEB) nebulizer solution 3 mL  3 mL Nebulization 4x Daily PRN Ariel Moralez MD   3 mL at 10/11/24 1045    levothyroxine (SYNTHROID, LEVOTHROID) tablet 75 mcg  75 mcg Oral Q AM Shae Knight APRN   75 mcg at 10/14/24 0549    melatonin tablet 5 mg  5 mg Oral Nightly Pietro Astudillo MD   5 mg at 10/13/24 2221    metoprolol succinate XL (TOPROL-XL) 24 hr tablet 25 mg  25 mg Oral BID Shae Knight APRN   25 mg at 10/14/24 1109    multivitamin with minerals 1 tablet  1 tablet Oral Daily Shae Knight APRN   1 tablet at 10/14/24 0943    Pharmacy Consult - MTM   Does not apply Daily Jude Rob, [facility]        rOPINIRole (REQUIP) tablet 0.5 mg  0.5 mg Oral Nightly PRN Shae Knight APRN   0.5 mg at 10/13/24 2224    rOPINIRole (REQUIP) tablet 0.5 mg  0.5 mg Oral Nightly Pietro Astudillo MD   0.5 mg at 10/13/24 2220    sacubitril-valsartan (ENTRESTO) 24-26 MG tablet 0.5 tablet  0.5 tablet Oral BID Shae Knight APRN   0.5 tablet at 10/14/24 0944    sodium chloride 0.9 % flush 10 mL  10 mL Intravenous PRN Anthony Sibley MD        sodium chloride 0.9 % flush 10 mL  10 mL Intravenous Q12H Shae Knight APRN   10 mL at 10/14/24 0946    sodium chloride 0.9 % flush 10 mL  10 mL Intravenous PRN Shae Knight APRN        temazepam (RESTORIL) capsule 15 mg  15 mg Oral Nightly Pietro Wheat DO   15 mg at 10/13/24 2223     Lab Results (last 48 hours)       Procedure Component Value Units Date/Time    proBNP [[id number]] Collected: 10/14/24 1055    Specimen: Blood Updated: 10/14/24 1310    Magnesium [[id number]]  (Abnormal) Collected: 10/14/24 0523    Specimen: Blood Updated: 10/14/24 0614     Magnesium 2.5 mg/dL     Comprehensive  Metabolic Panel [[id number]]  (Abnormal) Collected: 10/14/24 0523    Specimen: Blood Updated: 10/14/24 0614     Glucose 89 mg/dL      BUN 35 mg/dL      Creatinine 1.43 mg/dL      Sodium 140 mmol/L      Potassium 4.2 mmol/L      Chloride 103 mmol/L      CO2 31.0 mmol/L      Calcium 8.5 mg/dL      Total Protein 5.7 g/dL      Albumin 3.3 g/dL      ALT (SGPT) 13 U/L      AST (SGOT) 23 U/L      Alkaline Phosphatase 99 U/L      Total Bilirubin 1.0 mg/dL      Globulin 2.4 gm/dL      Comment: Calculated Result        A/G Ratio 1.4 g/dL      BUN/Creatinine Ratio 24.5     Anion Gap 6.0 mmol/L      eGFR 50.2 mL/min/1.73     Narrative:      GFR Normal >60  Chronic Kidney Disease <60  Kidney Failure <15    The GFR formula is only valid for adults with stable renal function between ages 18 and 70.    CBC & Differential [[id number]]  (Abnormal) Collected: 10/14/24 0523    Specimen: Blood Updated: 10/14/24 0545    Narrative:      The following orders were created for panel order CBC & Differential.  Procedure                               Abnormality         Status                     ---------                               -----------         ------                     CBC Auto Differential[[id number]]        Abnormal            Final result                 Please view results for these tests on the individual orders.    CBC Auto Differential [[id number]]  (Abnormal) Collected: 10/14/24 0523    Specimen: Blood Updated: 10/14/24 0545     WBC 5.20 10*3/mm3      RBC 3.89 10*6/mm3      Hemoglobin 12.0 g/dL      Hematocrit 37.8 %      MCV 97.2 fL      MCH 30.8 pg      MCHC 31.7 g/dL      RDW 15.9 %      RDW-SD 57.1 fl      MPV 10.0 fL      Platelets 161 10*3/mm3      Neutrophil % 62.7 %      Lymphocyte % 17.7 %      Monocyte % 14.4 %      Eosinophil % 4.0 %      Basophil % 1.0 %      Immature Grans % 0.2 %      Neutrophils, Absolute 3.26 10*3/mm3      Lymphocytes, Absolute 0.92 10*3/mm3      Monocytes, Absolute 0.75 10*3/mm3       Eosinophils, Absolute 0.21 10*3/mm3      Basophils, Absolute 0.05 10*3/mm3      Immature Grans, Absolute 0.01 10*3/mm3      nRBC 0.0 /100 WBC     BNP [[id number]]  (Abnormal) Collected: 10/13/24 0510    Specimen: Blood Updated: 10/13/24 0727     proBNP 7,901.0 pg/mL     Narrative:      This assay is used as an aid in the diagnosis of individuals suspected of having heart failure. It can be used as an aid in the diagnosis of acute decompensated heart failure (ADHF) in patients presenting with signs and symptoms of ADHF to the emergency department (ED). In addition, NT-proBNP of <300 pg/mL indicates ADHF is not likely.    Age Range Result Interpretation  NT-proBNP Concentration (pg/mL:      <50             Positive            >450                   Gray                 300-450                    Negative             <300    50-75           Positive            >900                  Gray                300-900                  Negative            <300      >75             Positive            >1800                  Gray                300-1800                  Negative            <300    Magnesium [[id number]]  (Normal) Collected: 10/13/24 0510    Specimen: Blood Updated: 10/13/24 0716     Magnesium 2.4 mg/dL     Comprehensive Metabolic Panel [[id number]]  (Abnormal) Collected: 10/13/24 0510    Specimen: Blood Updated: 10/13/24 0716     Glucose 138 mg/dL      BUN 35 mg/dL      Creatinine 1.47 mg/dL      Sodium 142 mmol/L      Potassium 4.4 mmol/L      Chloride 101 mmol/L      CO2 29.0 mmol/L      Calcium 8.9 mg/dL      Total Protein 6.1 g/dL      Albumin 3.6 g/dL      ALT (SGPT) 12 U/L      AST (SGOT) 25 U/L      Alkaline Phosphatase 115 U/L      Total Bilirubin 1.2 mg/dL      Globulin 2.5 gm/dL      Comment: Calculated Result        A/G Ratio 1.4 g/dL      BUN/Creatinine Ratio 23.8     Anion Gap 12.0 mmol/L      eGFR 48.5 mL/min/1.73     Narrative:      GFR Normal >60  Chronic Kidney Disease <60  Kidney Failure <15    The  GFR formula is only valid for adults with stable renal function between ages 18 and 70.    CBC & Differential [[id number]]  (Abnormal) Collected: 10/13/24 0510    Specimen: Blood Updated: 10/13/24 0620    Narrative:      The following orders were created for panel order CBC & Differential.  Procedure                               Abnormality         Status                     ---------                               -----------         ------                     CBC Auto Differential[[id number]]        Abnormal            Final result                 Please view results for these tests on the individual orders.    CBC Auto Differential [[id number]]  (Abnormal) Collected: 10/13/24 0510    Specimen: Blood Updated: 10/13/24 0620     WBC 5.14 10*3/mm3      RBC 4.06 10*6/mm3      Hemoglobin 12.5 g/dL      Hematocrit 39.6 %      MCV 97.5 fL      MCH 30.8 pg      MCHC 31.6 g/dL      RDW 15.7 %      RDW-SD 55.8 fl      MPV 10.4 fL      Platelets 165 10*3/mm3      Neutrophil % 71.7 %      Lymphocyte % 12.1 %      Monocyte % 11.5 %      Eosinophil % 3.1 %      Basophil % 0.8 %      Immature Grans % 0.8 %      Neutrophils, Absolute 3.69 10*3/mm3      Lymphocytes, Absolute 0.62 10*3/mm3      Monocytes, Absolute 0.59 10*3/mm3      Eosinophils, Absolute 0.16 10*3/mm3      Basophils, Absolute 0.04 10*3/mm3      Immature Grans, Absolute 0.04 10*3/mm3      nRBC 0.0 /100 WBC     Magnesium [[id number]]  (Normal) Collected: 10/12/24 1346    Specimen: Blood Updated: 10/12/24 1423     Magnesium 2.4 mg/dL     Comprehensive Metabolic Panel [[id number]]  (Abnormal) Collected: 10/12/24 1346    Specimen: Blood Updated: 10/12/24 1423     Glucose 127 mg/dL      BUN 35 mg/dL      Creatinine 1.34 mg/dL      Sodium 139 mmol/L      Potassium 4.6 mmol/L      Chloride 101 mmol/L      CO2 29.0 mmol/L      Calcium 9.0 mg/dL      Total Protein 6.2 g/dL      Albumin 3.5 g/dL      ALT (SGPT) 10 U/L      AST (SGOT) 27 U/L      Alkaline Phosphatase 102 U/L       Total Bilirubin 1.4 mg/dL      Globulin 2.7 gm/dL      Comment: Calculated Result        A/G Ratio 1.3 g/dL      BUN/Creatinine Ratio 26.1     Anion Gap 9.0 mmol/L      eGFR 54.2 mL/min/1.73     Narrative:      GFR Normal >60  Chronic Kidney Disease <60  Kidney Failure <15    The GFR formula is only valid for adults with stable renal function between ages 18 and 70.    CBC & Differential [[id number]]  (Abnormal) Collected: 10/12/24 1346    Specimen: Blood Updated: 10/12/24 1404    Narrative:      The following orders were created for panel order CBC & Differential.  Procedure                               Abnormality         Status                     ---------                               -----------         ------                     CBC Auto Differential[[id number]]        Abnormal            Final result                 Please view results for these tests on the individual orders.    CBC Auto Differential [[id number]]  (Abnormal) Collected: 10/12/24 1346    Specimen: Blood Updated: 10/12/24 1404     WBC 5.72 10*3/mm3      RBC 4.06 10*6/mm3      Hemoglobin 12.6 g/dL      Hematocrit 39.6 %      MCV 97.5 fL      MCH 31.0 pg      MCHC 31.8 g/dL      RDW 15.8 %      RDW-SD 57.1 fl      MPV 10.5 fL      Platelets 149 10*3/mm3      Neutrophil % 75.6 %      Lymphocyte % 10.0 %      Monocyte % 10.8 %      Eosinophil % 3.1 %      Basophil % 0.3 %      Immature Grans % 0.2 %      Neutrophils, Absolute 4.32 10*3/mm3      Lymphocytes, Absolute 0.57 10*3/mm3      Monocytes, Absolute 0.62 10*3/mm3      Eosinophils, Absolute 0.18 10*3/mm3      Basophils, Absolute 0.02 10*3/mm3      Immature Grans, Absolute 0.01 10*3/mm3      nRBC 0.0 /100 WBC           Imaging Results (Last 48 Hours)       Procedure Component Value Units Date/Time    CT Chest Without Contrast Diagnostic [[id number]] Collected: 10/14/24 1221     Updated: 10/14/24 1229    Narrative:      CT CHEST WO CONTRAST DIAGNOSTIC    Date of Exam: 10/14/2024 11:45 AM  EDT    Indication: Follow up R Pleural effusion following diuresis.    Comparison: AP chest radiograph 10/12/2024, CT chest 10/9/2024    Technique: Axial CT images were obtained of the chest without contrast administration.  Reconstructed coronal and sagittal images were also obtained. Automated exposure control and iterative construction methods were used.      Findings:  Moderate emphysematous changes are present. Mild smooth interstitial thickening is seen within the lung bases suggesting mild edema. Small to moderate right basilar pleural effusion layers to a depth of 2.9 cm with passive right lower lobe atelectasis,   similar to 10/9/2024. No new airspace disease is identified. Calcified pleural plaque is seen in the left mid thorax.    Stable cardiomegaly with atrial appendage occlusion device and signs of prior CABG. Pacemaker device in place. No pericardial effusion is seen. Mediastinal adenopathy is unchanged, including prevascular node 9 mm short axis and right paratracheal node 10   mm short axis and AP window node 9 mm short axis (series 2 image 50). No new or progressive adenopathy.    Hepatic cysts are present, largest in the left lobe measuring about 4 cm. Stable low-density right adrenal nodule most in keeping with a benign adenoma. Uncomplicated cholelithiasis. Remainder of the imaged upper abdominal organs demonstrate a normal   noncontrast appearance.        No acute or suspicious osseous abnormality.      Impression:      Impression:    1. Mild basilar interstitial edema with small to moderate right pleural effusion and right basilar atelectasis, unchanged from 10/9/2024. No new airspace disease.  2. Stable cardiomegaly.  3. Stable enlarged mediastinal nodes, favored to represent benign reactive findings.  3. Additional chronic findings include uncomplicated cholelithiasis, moderate emphysema, stable right adrenal adenoma, hepatic cysts.        Electronically Signed: Alberta Lawler MD     "10/14/2024 12:26 PM EDT    Workstation ID: [id number]          ECG/EMG Results (last 48 hours)       Procedure Component Value Units Date/Time    Telemetry Scan [[id number]] Resulted: 10/10/24     Updated: 10/13/24 0137    Telemetry Scan [[id number]] Resulted: 10/10/24     Updated: 10/14/24 0109          Operative/Procedure Notes (all)    No notes of this type exist for this encounter.          Physician Progress Notes (last 72 hours)        Roxie Quiñones PA-C at 10/14/24 1116            [facility]  PROGRESS NOTE    Date of Admission: 10/10/2024  Date of Service: 10/14/24    Primary Care Physician: Arun Soliman MD    Chief Complaint: f/u A/C HFrEF  Problem List:   Acute exacerbation of CHF (congestive heart failure)    Permanent atrial fibrillation    SSS     Dilated CM     S/P Watchman device    Coronary artery disease involving coronary bypass graft of native heart without angina pectoris    Essential hypertension    COPD     Dependence on supplemental oxygen    Hypothyroidism (acquired)    ICD (implantable cardioverter-defibrillator) in place    CKD (chronic kidney disease)      Subjective      Breathing and LE edema is better, however he still has spells of dyspnea at rest. Planning to undergo CT chest today to relook effusions      Objective   Vitals: /76   Pulse 77   Temp 97.7 °F (36.5 °C) (Oral)   Resp 18   Ht 190.5 cm (75\")   Wt 77 kg (169 lb 12.1 oz)   SpO2 97%   BMI 21.22 kg/m²     Physical Exam:  GENERAL: Alert, cooperative, in no acute distress.   HEART: Regular rhythm, normal rate, and no murmurs, gallops, or rubs.   LUNGS: Diminished bases with crackles bilaterally. No wheezing or rhonchi. On 2L NC  NEUROLOGIC: No focal abnormalities involving strength or sensation are noted.   EXTREMITIES: No clubbing, cyanosis, or edema noted.     Results:  Results from last 7 days   Lab Units 10/14/24  0523 10/13/24  0510 10/12/24  1346   WBC 10*3/mm3 " 5.20 5.14 5.72   HEMOGLOBIN g/dL 12.0* 12.5* 12.6*   HEMATOCRIT % 37.8 39.6 39.6   PLATELETS 10*3/mm3 161 165 149     Results from last 7 days   Lab Units 10/14/24  0523 10/13/24  0510 10/12/24  1346   SODIUM mmol/L 140 142 139   POTASSIUM mmol/L 4.2 4.4 4.6   CHLORIDE mmol/L 103 101 101   CO2 mmol/L 31.0* 29.0 29.0   BUN mg/dL 35* 35* 35*   CREATININE mg/dL 1.43* 1.47* 1.34*   GLUCOSE mg/dL 89 138* 127*      Lab Results   Component Value Date    CHOL 97 10/11/2024    TRIG 42 10/11/2024    HDL 44 10/11/2024    LDL 42 10/11/2024    AST 23 10/14/2024    ALT 13 10/14/2024     Results from last 7 days   Lab Units 10/11/24  0606   HEMOGLOBIN A1C % 6.30*     Results from last 7 days   Lab Units 10/11/24  0606   CHOLESTEROL mg/dL 97   TRIGLYCERIDES mg/dL 42   HDL CHOL mg/dL 44   LDL CHOL mg/dL 42     Results from last 7 days   Lab Units 10/10/24  1243   TSH uIU/mL 9.290*   FREE T4 ng/dL 1.71*         Results from last 7 days   Lab Units 10/10/24  1243   HSTROP T ng/L 21     Results from last 7 days   Lab Units 10/13/24  0510   PROBNP pg/mL 7,901.0*       Intake/Output Summary (Last 24 hours) at 10/14/2024 1116  Last data filed at 10/14/2024 0300  Gross per 24 hour   Intake 480 ml   Output --   Net 480 ml     I personally reviewed the patient's EKG/Telemetry data    Radiology Data:   No radiology results for the last day    Results for orders placed during the hospital encounter of 10/10/24    Adult Transthoracic Echo Complete W/ Cont if Necessary Per Protocol    Interpretation Summary    Left ventricular systolic function is severely decreased. Calculated left ventricular EF = 20.5% Left ventricular ejection fraction appears to be less than 20%.    The left ventricular cavity is moderately dilated.    Left ventricular wall thickness is consistent with mild concentric hypertrophy.    Severely reduced right ventricular systolic function noted.    The right ventricular cavity is moderately dilated.    The left atrial cavity  is moderate to severely dilated.    The right atrial cavity is severely  dilated.    There is mild calcification of the aortic valve.    Moderate mitral valve regurgitation is present.    Moderate tricuspid valve regurgitation is present.    Estimated right ventricular systolic pressure from tricuspid regurgitation is moderately elevated (45-55 mmHg).    Moderate pulmonic valve regurgitation is present.    No significant change compared to the 2023 echo.        Current Medications:  arformoterol, 15 mcg, Nebulization, BID - RT  aspirin, 81 mg, Oral, Daily  atorvastatin, 40 mg, Oral, Nightly  bumetanide, 1 mg, Intravenous, Daily  empagliflozin, 10 mg, Oral, Daily  guaiFENesin, 1,200 mg, Oral, Q12H  ipratropium, 0.5 mg, Nebulization, 4x Daily - RT  levothyroxine, 75 mcg, Oral, Q AM  melatonin, 5 mg, Oral, Nightly  metoprolol succinate XL, 25 mg, Oral, BID  multivitamin with minerals, 1 tablet, Oral, Daily  pharmacy consult - MTM, , Does not apply, Daily  rOPINIRole, 0.5 mg, Oral, Nightly  sacubitril-valsartan, 0.5 tablet, Oral, BID  sodium chloride, 10 mL, Intravenous, Q12H  temazepam, 15 mg, Oral, Nightly           Initial cardiac assessment: 79-year-old gentleman with a history of ischemic cardiomyopathy, HFrEF EF 20% with paroxysmal A-fib, CAD, watchman, AV node ablation with BiV ICD, presenting with acute on chronic HFrEF.        Recommendations:  1.  Acute on chronic HFrEF:  Echo with LVEF 20%, biventricular failure   Continue IV Bumex 2 mg daily, goal net -1.5 to 2 L daily  Continue GDMT with Toprol, Entresto, and Jardiance  Will not add spironolactone at this time due to limitation of hypotension and CKD     Strict I's and O's and daily weights discussed with patient and nursing staff   Plan for repeat CT Chest to relook effusion today per Dr. Astudillo and consider possible thoracentesis if sizeable      At time of discharge we will provide patient with instructions on when to double his home diuretic dose.      2.  PAF:  Status post watchman and AV node ablation  No need for full anticoagulation  Paced rhythm.     3.  CAD:  Continue aspirin and statin  No signs of acute coronary syndrome, negative troponin, no chest pain.     4.  Chronic COPD on oxygen:  Followed by pulmonary as an outpatient     5.  CKD stage IIIa:  Complicates all aspects of care  Monitor closely with diuresis, Cr stable so far   Continue SGLT2 inhibitor        Electronically signed by Roxie Quiñones PA-C, 10/14/24, 11:23 AM EDT.        Electronically signed by Roxie Quiñones PA-C at 10/14/24 1201       TOMMY Jimenez DO at 10/13/24 1321              [facility] Medicine Services  PROGRESS NOTE    Patient Name: Colin Albrecht  : 1945  MRN: [id number]    Date of Admission: 10/10/2024  Primary Care Physician: Arun Soliman MD    Subjective   Subjective     CC:  Progressive soa    HPI:  Patient is a 79-year-old male seen and examined by me this a.m., he is resting comfortably on bedside couch and currently on room air.  He reports overall his breathing is doing better but he is still short of breath especially with exertion to the restroom, reviewed cardiology's recommendations and plans are for continued IV diuresis today, if his symptoms persist possible repeat CT of the chest without contrast for evaluation of his right pleural effusion and consideration for possible thoracentesis.  He denies any new acute complaints or problems otherwise at this time      Objective   Objective     Vital Signs:   Temp:  [97.6 °F (36.4 °C)-97.9 °F (36.6 °C)] 97.8 °F (36.6 °C)  Heart Rate:  [77-95] 77  Resp:  [16-20] 20  BP: (101-118)/(51-74) 114/74  Flow (L/min) (Oxygen Therapy):  [2] 2     Physical Exam:  Constitutional: No acute distress, awake, alert, frail appearing, on RA resting on bedside couch  HENT: NCAT, mucous membranes moist  Respiratory: Decreased BS bilaterally, respiratory effort normal, no rhonchi  or wheezing  Cardiovascular: RRR, no murmurs, rubs, or gallops  Gastrointestinal: Positive bowel sounds, soft, nontender, nondistended  Musculoskeletal: trace edema bilateral LE's  Psychiatric: Appropriate affect, cooperative  Neurologic: Oriented x 3, FLOREZ, speech clear  Skin: No rashes      Results Reviewed:  LAB RESULTS:      Lab 10/13/24  0510 10/12/24  1346 10/11/24  0606 10/11/24  0024 10/10/24  1945 10/10/24  1605 10/10/24  1243   WBC 5.14 5.72 6.53  --   --   --  6.75   HEMOGLOBIN 12.5* 12.6* 12.5*  --   --   --  12.7*   HEMATOCRIT 39.6 39.6 39.5  --   --   --  41.2   PLATELETS 165 149 156  --   --   --  156   NEUTROS ABS 3.69 4.32  --   --   --   --  5.24   IMMATURE GRANS (ABS) 0.04 0.01  --   --   --   --  0.02   LYMPHS ABS 0.62* 0.57*  --   --   --   --  0.54*   MONOS ABS 0.59 0.62  --   --   --   --  0.79   EOS ABS 0.16 0.18  --   --   --   --  0.12   MCV 97.5* 97.5* 96.3  --   --   --  99.3*   PROCALCITONIN  --   --   --   --   --   --  0.05   LACTATE  --   --   --  1.1 2.6* 2.5* 2.1*   HSTROP T  --   --   --   --   --   --  21         Lab 10/13/24  0510 10/12/24  1346 10/11/24  0606 10/10/24  1243   SODIUM 142 139 141 140   POTASSIUM 4.4 4.6 4.5 4.5   CHLORIDE 101 101 103 102   CO2 29.0 29.0 31.0* 27.0   ANION GAP 12.0 9.0 7.0 11.0   BUN 35* 35* 32* 33*   CREATININE 1.47* 1.34* 1.53* 1.31*   EGFR 48.5* 54.2* 46.2* 55.7*   GLUCOSE 138* 127* 92 113*   CALCIUM 8.9 9.0 9.2 9.4   MAGNESIUM 2.4 2.4 2.3  --    HEMOGLOBIN A1C  --   --  6.30*  --    TSH  --   --   --  9.290*         Lab 10/13/24  0510 10/12/24  1346 10/11/24  0606 10/10/24  1243   TOTAL PROTEIN 6.1 6.2 6.7 7.0   ALBUMIN 3.6 3.5 3.6 3.9   GLOBULIN 2.5 2.7 3.1 3.1   ALT (SGPT) 12 10 16 17   AST (SGOT) 25 27 30 36   BILIRUBIN 1.2 1.4* 1.4* 1.5*   ALK PHOS 115 102 108 114         Lab 10/13/24  0510 10/10/24  1243   PROBNP 7,901.0* 10,682.0*   HSTROP T  --  21         Lab 10/11/24  0606   CHOLESTEROL 97   LDL CHOL 42   HDL CHOL 44   TRIGLYCERIDES 42              Brief Urine Lab Results       None            Microbiology Results Abnormal       Procedure Component Value - Date/Time    COVID PRE-OP / PRE-PROCEDURE SCREENING ORDER (NO ISOLATION) - Swab, Nasal Cavity [[id number]]  (Normal) Collected: 10/10/24 1302    Lab Status: Final result Specimen: Swab from Nasal Cavity Updated: 10/10/24 1338    Narrative:      The following orders were created for panel order COVID PRE-OP / PRE-PROCEDURE SCREENING ORDER (NO ISOLATION) - Swab, Nasal Cavity.  Procedure                               Abnormality         Status                     ---------                               -----------         ------                     COVID-19 and FLU A/B PCR...[[id number]]  Normal              Final result                 Please view results for these tests on the individual orders.    COVID-19 and FLU A/B PCR, 1 HR TAT - Swab, Nasopharynx [[id number]]  (Normal) Collected: 10/10/24 1302    Lab Status: Final result Specimen: Swab from Nasopharynx Updated: 10/10/24 1338     COVID19 Not Detected     Influenza A PCR Not Detected     Influenza B PCR Not Detected    Narrative:      Fact sheet for providers: https://www.fda.gov/media/838135/download    Fact sheet for patients: https://www.fda.gov/media/147699/download    Test performed by PCR.            XR Chest 1 View    Result Date: 10/12/2024  XR CHEST 1 VW Date of Exam: 10/12/2024 7:10 AM EDT Indication: Shortness of breath. Comparison: 10/11/2024. Findings: There are persistent bilateral basal pleural effusions which are small in size with atelectasis, right greater than left side. The heart is enlarged. The patient is had a previous median sternotomy. There is a left-sided transvenous pacemaker in place. The pulmonary vascular markings are increased felt to represent mild pulmonary edema. There is no pneumothorax. There are chronic age-related changes involving the bony thorax and thoracic aorta.     Impression: Impression: No interval  change from yesterday with abnormalities as described above. Electronically Signed: Candido Palomino MD  10/12/2024 10:23 AM EDT  Workstation ID: [id number]     Results for orders placed during the hospital encounter of 10/10/24    Adult Transthoracic Echo Complete W/ Cont if Necessary Per Protocol    Interpretation Summary    Left ventricular systolic function is severely decreased. Calculated left ventricular EF = 20.5% Left ventricular ejection fraction appears to be less than 20%.    The left ventricular cavity is moderately dilated.    Left ventricular wall thickness is consistent with mild concentric hypertrophy.    Severely reduced right ventricular systolic function noted.    The right ventricular cavity is moderately dilated.    The left atrial cavity is moderate to severely dilated.    The right atrial cavity is severely  dilated.    There is mild calcification of the aortic valve.    Moderate mitral valve regurgitation is present.    Moderate tricuspid valve regurgitation is present.    Estimated right ventricular systolic pressure from tricuspid regurgitation is moderately elevated (45-55 mmHg).    Moderate pulmonic valve regurgitation is present.    No significant change compared to the 2023 echo.      Current medications:  Scheduled Meds:arformoterol, 15 mcg, Nebulization, BID - RT  aspirin, 81 mg, Oral, Daily  atorvastatin, 40 mg, Oral, Nightly  empagliflozin, 10 mg, Oral, Daily  guaiFENesin, 1,200 mg, Oral, Q12H  ipratropium, 0.5 mg, Nebulization, 4x Daily - RT  levothyroxine, 75 mcg, Oral, Q AM  melatonin, 5 mg, Oral, Nightly  metoprolol succinate XL, 25 mg, Oral, BID  multivitamin with minerals, 1 tablet, Oral, Daily  pharmacy consult - [facility], , Does not apply, Daily  rOPINIRole, 0.5 mg, Oral, Nightly  sacubitril-valsartan, 0.5 tablet, Oral, BID  sodium chloride, 10 mL, Intravenous, Q12H  temazepam, 15 mg, Oral, Nightly      Continuous Infusions:   PRN Meds:.  acetaminophen **OR** acetaminophen **OR**  acetaminophen    senna-docusate sodium **AND** polyethylene glycol **AND** bisacodyl **AND** bisacodyl    ipratropium-albuterol    rOPINIRole    sodium chloride    sodium chloride    Assessment & Plan   Assessment & Plan     Active Hospital Problems    Diagnosis  POA    **Acute exacerbation of CHF (congestive heart failure) [I50.9]  Yes    Hypothyroidism (acquired) [E03.9]  Yes    ICD (implantable cardioverter-defibrillator) in place [Z95.810]  Yes    CKD (chronic kidney disease) [N18.9]  Yes    Dependence on supplemental oxygen [Z99.81]  Not Applicable    COPD  [J44.9]  Yes    Essential hypertension [I10]  Yes    Coronary artery disease involving coronary bypass graft of native heart without angina pectoris [I25.810]  Yes    Dilated CM  [I42.0]  Yes    S/P Watchman device [Z95.818]  Yes    SSS  [I49.5]  Yes    Permanent atrial fibrillation [I48.21]  Yes      Resolved Hospital Problems   No resolved problems to display.        Brief Hospital Course to date:  Colin Albrecht is a 79 y.o. male with past medical history significant for hypothyroidism, SSS s/p ICD,CAD, dilated CM, CHF w an EF 20.4 % (at last check 5/2023), A-fib s/p Watchman, ex-smoker, chronic oxygen use, and probable chronic CKD.  Patient follows with Dr. Wei with pulmonary, Dr. Quintana with cardiology, and Dr. Prado with cards EP.  Patient was last seen by pulmonary approximately 1 month ago with complaints of progressive shortness of air.  CT of the chest was completed outpatient PTA.  CT showed moderate to marked cardiomegaly new compared to prior CT from 2021, pulmonary edema with sm to mod right pl effusion and trace left pl effusion.  Pt was to have outpatient echo in approx 2 weeks and follow-up with Dr. Quintana, however continues to have progressive shortness of air. Patient seen again on the M of 10/13, following with Dr. Astudillo this weekend, patient overall reports breathing improving but still SOA with exertion, IV diuresis  again on 10/13, if symptoms persist possible CT chest and consideration for possible thoracentesis if right pleural effusion persists.     Progressive dyspnea  Acute/chronic systolic CHF  CAD/dilated CM/EF 20.4 at last check (5/2023)  SSS/ICD/A-fib s/p Watchman  Heart murmur  R sided pleural effusion   --Follows with Dr. Quintana with cards, Dr. Prado with EP.  -- Echo with reduced EF of 20%  -- BNP 10K, CXR with pulmonary venous congestion and small effusions  -- cards/Dr. Quintana following.    -- Diuresis again reordered IV Bumex on 10/13 with 1 mg BID  -- If symptoms continue possible repeat CT chest for evaluation and possible consideration for right sided thoracentesis, continue to follow and have ordered for overnight   -- Continue toprol, entresto, and jardiance. Entresto dosage has been increased S/p watchman so no need for AC     COPD  Chronic oxygen use  --Follows with Dr. Wei  -- Recently added home nebulizers, initially helped  -- wears 2LNC O2 at home     Hypothyroidism  -- Continue home Synthroid.    -- TSH elevated but free T4 upper limit of normal, outpatient follow up with his regular PCP for repeat labs         Expected Discharge Location and Transportation:   Expected Discharge   Expected Discharge Date: 10/15/2024; Expected Discharge Time:      VTE Prophylaxis:  Mechanical VTE prophylaxis orders are present.         AM-PAC 6 Clicks Score (PT): 24 (10/12/24 2000)    CODE STATUS:   Code Status and Medical Interventions: CPR (Attempt to Resuscitate); Full Support   Ordered at: 10/10/24 1602     Level Of Support Discussed With:    Patient    Next of Kin (If No Surrogate)     Code Status (Patient has no pulse and is not breathing):    CPR (Attempt to Resuscitate)     Medical Interventions (Patient has pulse or is breathing):    Full Support       RADHA Jimenez DO  10/13/24        Electronically signed by TOMMY Jimenez DO at 10/13/24 6010       Pietro Astudillo MD at 10/13/24 6419   "        [facility]  IP Progress Note      Chief Complaint/Reason for service: Acute on chronic heart failure with reduced EF    Subjective   Subjective: The patient states his sputum problem has improved.  He is using flutter valve incentive spirometry incentive the the thick secretions are coming out.  Complains of significant dyspnea with ambulation to the bathroom.  States at home when he is doing well he can ambulate without any issues.  He says \"I feel like I cannot take a deep breath\"    Past medical, surgical, social and family history reviewed in the patient's electronic medical record.    Objective     Vital Sign Min/Max for last 24 hours  Temp  Min: 97.4 °F (36.3 °C)  Max: 97.9 °F (36.6 °C)   BP  Min: 107/65  Max: 118/65   Pulse  Min: 77  Max: 95   Resp  Min: 16  Max: 18   SpO2  Min: 93 %  Max: 96 %   Flow (L/min) (Oxygen Therapy)  Min: 2  Max: 2      Intake/Output Summary (Last 24 hours) at 10/13/2024 0638  Last data filed at 10/12/2024 2000  Gross per 24 hour   Intake 120 ml   Output --   Net 120 ml             Current Facility-Administered Medications:     acetaminophen (TYLENOL) tablet 650 mg, 650 mg, Oral, Q4H PRN **OR** acetaminophen (TYLENOL) 160 MG/5ML oral solution 650 mg, 650 mg, Oral, Q4H PRN **OR** acetaminophen (TYLENOL) suppository 650 mg, 650 mg, Rectal, Q4H PRN, Shae Knight, APRN    arformoterol (BROVANA) nebulizer solution 15 mcg, 15 mcg, Nebulization, BID - RT, Ariel Moralez MD, 15 mcg at 10/12/24 0946    aspirin EC tablet 81 mg, 81 mg, Oral, Daily, Shae Knight, APRN, 81 mg at 10/12/24 0933    atorvastatin (LIPITOR) tablet 40 mg, 40 mg, Oral, Nightly, Shae Knight, APRN, 40 mg at 10/12/24 2203    sennosides-docusate (PERICOLACE) 8.6-50 MG per tablet 2 tablet, 2 tablet, Oral, BID PRN **AND** polyethylene glycol (MIRALAX) packet 17 g, 17 g, Oral, Daily PRN, 17 g at 10/11/24 1156 **AND** bisacodyl (DULCOLAX) EC " tablet 5 mg, 5 mg, Oral, Daily PRN **AND** bisacodyl (DULCOLAX) suppository 10 mg, 10 mg, Rectal, Daily PRN, Shae Knight APRN    bumetanide (BUMEX) tablet 2 mg, 2 mg, Oral, Daily, TOMMY Jimenez, , 2 mg at 10/12/24 1356    empagliflozin (JARDIANCE) tablet 10 mg, 10 mg, Oral, Daily, Shae Knight APRN, 10 mg at 10/12/24 0933    guaiFENesin (MUCINEX) 12 hr tablet 1,200 mg, 1,200 mg, Oral, Q12H, TOMMY Jimenez, , 1,200 mg at 10/12/24 2203    ipratropium (ATROVENT) nebulizer solution 0.5 mg, 0.5 mg, Nebulization, 4x Daily - RT, Ariel Moralez MD, 0.5 mg at 10/12/24 0946    ipratropium-albuterol (DUO-NEB) nebulizer solution 3 mL, 3 mL, Nebulization, 4x Daily PRN, Ariel Moralez MD, 3 mL at 10/11/24 1045    levothyroxine (SYNTHROID, LEVOTHROID) tablet 75 mcg, 75 mcg, Oral, Q AM, Shae Knight APRN, 75 mcg at 10/13/24 0539    melatonin tablet 5 mg, 5 mg, Oral, Nightly, Pietro Astudillo MD, 5 mg at 10/12/24 2203    metoprolol succinate XL (TOPROL-XL) 24 hr tablet 25 mg, 25 mg, Oral, BID, Shae Knight APRN, 25 mg at 10/12/24 2203    multivitamin with minerals 1 tablet, 1 tablet, Oral, Daily, Shae Knight APRN, 1 tablet at 10/12/24 0932    Pharmacy Consult - [facility], , Does not apply, Daily, Jude Rob, [facility]    rOPINIRole (REQUIP) tablet 0.5 mg, 0.5 mg, Oral, Nightly PRN, Shae Knight APRN, 0.5 mg at 10/12/24 0034    rOPINIRole (REQUIP) tablet 0.5 mg, 0.5 mg, Oral, Nightly, Pietro Astudillo MD, 0.5 mg at 10/12/24 2203    sacubitril-valsartan (ENTRESTO) 24-26 MG tablet 0.5 tablet, 0.5 tablet, Oral, BID, Shae Knight APRN, 0.5 tablet at 10/12/24 2203    sodium chloride 0.9 % flush 10 mL, 10 mL, Intravenous, PRN, Anthony Sibley MD    sodium chloride 0.9 % flush 10 mL, 10 mL, Intravenous, Q12H, Shae Knight APRN, 10 mL at 10/12/24 2204    sodium chloride 0.9 % flush 10 mL, 10 mL, Intravenous,  Eugene ACOSTA Belinda Sue, APRN    temazepam (RESTORIL) capsule 15 mg, 15 mg, Oral, Nightly, Pietro Wheat DO, 15 mg at 10/12/24 2200    Physical Exam: General Elderly frail-appearing male in bed with no resting dyspnea        HEENT: No obvious JVP.  Nasal O2 present.       Respiratory: Equal bilateral symmetrical expansion reduced breath sounds on the right with a E to a changes       Cardiovascular: Regular rate and rhythm with ectopics and no significant edema       GI: Soft and flat       Lower Extremities: No lesions       Neuro: Facial expressions are symmetrical moves all 4 extremities       Skin: Warm and dry with no edema palpation       Psych: Pleasant affect    Results Review: Intake exceeds output by 820 mL.  Heart rate 70s to 90s.  Blood pressures 10 7-1 18.  GFR is improved to 54.8, it was 46.2 yesterday.  Hemoglobin 12.5    Radiology Results:  Imaging Results (Last 72 Hours)       Procedure Component Value Units Date/Time    XR Chest 1 View [[id number]] Collected: 10/12/24 1021     Updated: 10/12/24 1026    Narrative:      XR CHEST 1 VW    Date of Exam: 10/12/2024 7:10 AM EDT    Indication: Shortness of breath.    Comparison: 10/11/2024.    Findings:  There are persistent bilateral basal pleural effusions which are small in size with atelectasis, right greater than left side. The heart is enlarged. The patient is had a previous median sternotomy. There is a left-sided transvenous pacemaker in place.   The pulmonary vascular markings are increased felt to represent mild pulmonary edema. There is no pneumothorax. There are chronic age-related changes involving the bony thorax and thoracic aorta.      Impression:      Impression:  No interval change from yesterday with abnormalities as described above.      Electronically Signed: Candido Palomino MD    10/12/2024 10:23 AM EDT    Workstation ID: [id number]    XR Chest 1 View [[id number]] Collected: 10/11/24 0711     Updated: 10/11/24 0716    Narrative:       XR CHEST 1 VW    Date of Exam: 10/11/2024 2:59 AM EDT    Indication: f/u dyspnea, a/c chf, pl effusions    Comparison: October 10, 2024    Findings:  A cardiac ICD device is present. Sternotomy wires noted. The heart is enlarged. There are bibasilar densities which could reflect effusions. Pulmonary vascular markings are prominent. There are some interstitial changes. Some vascular congestion and   edema could account for this.      Impression:      Impression:  1.Cardiomegaly with what looks like some vascular congestion and probable interstitial edema.  2.Bibasilar effusions      Electronically Signed: Percy Mcguire MD    10/11/2024 7:12 AM EDT    Workstation ID: [id number]    XR Chest 1 View [[id number]] Collected: 10/10/24 1326     Updated: 10/10/24 1331    Narrative:      XR CHEST 1 VW    Date of Exam: 10/10/2024 12:45 PM EDT    Indication: SOA triage protocol    Comparison: CT chest without contrast 10/9/2024    Findings:  Left-sided AICD noted. Status post sternotomy and CABG. Central pulmonary vascular congestion with interstitial thickening suggesting pulmonary edema. Underlying emphysema. Persistent small bilateral pleural effusions right greater than left with   bibasilar atelectasis. Negative for pneumothorax.      Impression:      Impression:  1. Central pulmonary vascular congestion with interstitial thickening suggesting pulmonary edema.  2. Persistent small bilateral pleural effusions right greater than left with bibasilar atelectasis.  3. Emphysema.        Electronically Signed: Luis Richards MD    10/10/2024 1:28 PM EDT    Workstation ID: [id number]              ECHO: EF less than 20%        Assessment   Assessment/Plan: Acute on chronic heart failure reduced EF-check BNP.  Patient still feels significantly short of breath ambulating to the bathroom.  He states when he is home and doing well he has no issues with dyspnea on exertion.  Also states he feels like he cannot take a deep breath.  Since his GFR  is improved significantly I will start him on IV Bumex 1 mg every 12 hours and reassess in the morning  2 kidney disease-GFR is improved-start diuretics  3 if patient still has significant dyspnea despite significant diuresis would recommend a CT scan with possible thoracentesis of the right lung since the effusion can be larger by CT scan as opposed to chest x-ray      Pietro Astudillo MD  10/13/24  06:38 EDT      Electronically signed by Pietro Astudillo MD at 10/13/24 0657       TOMMY Jimenez DO at 10/12/24 0910              [facility] Medicine Services  PROGRESS NOTE    Patient Name: Colin Albrecht  : 1945  MRN: [id number]    Date of Admission: 10/10/2024  Primary Care Physician: Arun Soliman MD    Subjective   Subjective     CC:  Progressive soa    HPI:  Patient is a 79-year-old male seen and examined by me this a.m., he is resting comfortably in bed but reports earlier this morning he was congested and had a hard time coughing up some sputum, he reports he is now finally got that up and now feeling better. He denies any other new acute complaints, following with cardiology at this time.       Objective   Objective     Vital Signs:   Temp:  [97.4 °F (36.3 °C)-98.2 °F (36.8 °C)] 97.4 °F (36.3 °C)  Heart Rate:  [] 77  Resp:  [17-18] 18  BP: (103-116)/(64-72) 116/67  Flow (L/min) (Oxygen Therapy):  [2-3] 2     Physical Exam:  Constitutional: No acute distress, awake, alert, frail appearing, currently on 2L NC   HENT: NCAT, mucous membranes moist  Respiratory: Decreased BS bilaterally, respiratory effort normal, no rhonchi or wheezing  Cardiovascular: RRR, no murmurs, rubs, or gallops  Gastrointestinal: Positive bowel sounds, soft, nontender, nondistended  Musculoskeletal: 1+pitting edema bilateral LE's  Psychiatric: Appropriate affect, cooperative  Neurologic: Oriented x 3, FLOREZ, speech clear  Skin: No rashes      Results Reviewed:  LAB RESULTS:      Lab  10/11/24  0606 10/11/24  0024 10/10/24  1945 10/10/24  1605 10/10/24  1243   WBC 6.53  --   --   --  6.75   HEMOGLOBIN 12.5*  --   --   --  12.7*   HEMATOCRIT 39.5  --   --   --  41.2   PLATELETS 156  --   --   --  156   NEUTROS ABS  --   --   --   --  5.24   IMMATURE GRANS (ABS)  --   --   --   --  0.02   LYMPHS ABS  --   --   --   --  0.54*   MONOS ABS  --   --   --   --  0.79   EOS ABS  --   --   --   --  0.12   MCV 96.3  --   --   --  99.3*   PROCALCITONIN  --   --   --   --  0.05   LACTATE  --  1.1 2.6* 2.5* 2.1*   HSTROP T  --   --   --   --  21         Lab 10/11/24  0606 10/10/24  1243   SODIUM 141 140   POTASSIUM 4.5 4.5   CHLORIDE 103 102   CO2 31.0* 27.0   ANION GAP 7.0 11.0   BUN 32* 33*   CREATININE 1.53* 1.31*   EGFR 46.2* 55.7*   GLUCOSE 92 113*   CALCIUM 9.2 9.4   MAGNESIUM 2.3  --    HEMOGLOBIN A1C 6.30*  --    TSH  --  9.290*         Lab 10/11/24  0606 10/10/24  1243   TOTAL PROTEIN 6.7 7.0   ALBUMIN 3.6 3.9   GLOBULIN 3.1 3.1   ALT (SGPT) 16 17   AST (SGOT) 30 36   BILIRUBIN 1.4* 1.5*   ALK PHOS 108 114         Lab 10/10/24  1243   PROBNP 10,682.0*   HSTROP T 21         Lab 10/11/24  0606   CHOLESTEROL 97   LDL CHOL 42   HDL CHOL 44   TRIGLYCERIDES 42             Brief Urine Lab Results       None            Microbiology Results Abnormal       Procedure Component Value - Date/Time    COVID PRE-OP / PRE-PROCEDURE SCREENING ORDER (NO ISOLATION) - Swab, Nasal Cavity [[id number]]  (Normal) Collected: 10/10/24 1302    Lab Status: Final result Specimen: Swab from Nasal Cavity Updated: 10/10/24 7216    Narrative:      The following orders were created for panel order COVID PRE-OP / PRE-PROCEDURE SCREENING ORDER (NO ISOLATION) - Swab, Nasal Cavity.  Procedure                               Abnormality         Status                     ---------                               -----------         ------                     COVID-19 and FLU A/B PCR...[[id number]]  Normal              Final result                  Please view results for these tests on the individual orders.    COVID-19 and FLU A/B PCR, 1 HR TAT - Swab, Nasopharynx [[id number]]  (Normal) Collected: 10/10/24 1302    Lab Status: Final result Specimen: Swab from Nasopharynx Updated: 10/10/24 8969     COVID19 Not Detected     Influenza A PCR Not Detected     Influenza B PCR Not Detected    Narrative:      Fact sheet for providers: https://www.fda.gov/media/773035/download    Fact sheet for patients: https://www.fda.gov/media/329540/download    Test performed by PCR.            XR Chest 1 View    Result Date: 10/12/2024  XR CHEST 1 VW Date of Exam: 10/12/2024 7:10 AM EDT Indication: Shortness of breath. Comparison: 10/11/2024. Findings: There are persistent bilateral basal pleural effusions which are small in size with atelectasis, right greater than left side. The heart is enlarged. The patient is had a previous median sternotomy. There is a left-sided transvenous pacemaker in place. The pulmonary vascular markings are increased felt to represent mild pulmonary edema. There is no pneumothorax. There are chronic age-related changes involving the bony thorax and thoracic aorta.     Impression: Impression: No interval change from yesterday with abnormalities as described above. Electronically Signed: Candido Palomino MD  10/12/2024 10:23 AM EDT  Workstation ID: [id number]    Adult Transthoracic Echo Complete W/ Cont if Necessary Per Protocol    Result Date: 10/11/2024    Left ventricular systolic function is severely decreased. Calculated left ventricular EF = 20.5% Left ventricular ejection fraction appears to be less than 20%.   The left ventricular cavity is moderately dilated.   Left ventricular wall thickness is consistent with mild concentric hypertrophy.   Severely reduced right ventricular systolic function noted.   The right ventricular cavity is moderately dilated.   The left atrial cavity is moderate to severely dilated.   The right atrial cavity is severely   dilated.   There is mild calcification of the aortic valve.   Moderate mitral valve regurgitation is present.   Moderate tricuspid valve regurgitation is present.   Estimated right ventricular systolic pressure from tricuspid regurgitation is moderately elevated (45-55 mmHg).   Moderate pulmonic valve regurgitation is present. No significant change compared to the 2023 echo.     XR Chest 1 View    Result Date: 10/11/2024  XR CHEST 1 VW Date of Exam: 10/11/2024 2:59 AM EDT Indication: f/u dyspnea, a/c chf, pl effusions Comparison: October 10, 2024 Findings: A cardiac ICD device is present. Sternotomy wires noted. The heart is enlarged. There are bibasilar densities which could reflect effusions. Pulmonary vascular markings are prominent. There are some interstitial changes. Some vascular congestion and edema could account for this.     Impression: Impression: 1.Cardiomegaly with what looks like some vascular congestion and probable interstitial edema. 2.Bibasilar effusions Electronically Signed: Percy Mcguire MD  10/11/2024 7:12 AM EDT  Workstation ID: [id number]     Results for orders placed during the hospital encounter of 10/10/24    Adult Transthoracic Echo Complete W/ Cont if Necessary Per Protocol    Interpretation Summary    Left ventricular systolic function is severely decreased. Calculated left ventricular EF = 20.5% Left ventricular ejection fraction appears to be less than 20%.    The left ventricular cavity is moderately dilated.    Left ventricular wall thickness is consistent with mild concentric hypertrophy.    Severely reduced right ventricular systolic function noted.    The right ventricular cavity is moderately dilated.    The left atrial cavity is moderate to severely dilated.    The right atrial cavity is severely  dilated.    There is mild calcification of the aortic valve.    Moderate mitral valve regurgitation is present.    Moderate tricuspid valve regurgitation is present.    Estimated right  ventricular systolic pressure from tricuspid regurgitation is moderately elevated (45-55 mmHg).    Moderate pulmonic valve regurgitation is present.    No significant change compared to the 2023 echo.      Current medications:  Scheduled Meds:arformoterol, 15 mcg, Nebulization, BID - RT  aspirin, 81 mg, Oral, Daily  atorvastatin, 40 mg, Oral, Nightly  empagliflozin, 10 mg, Oral, Daily  guaiFENesin, 1,200 mg, Oral, Q12H  ipratropium, 0.5 mg, Nebulization, 4x Daily - RT  levothyroxine, 75 mcg, Oral, Q AM  melatonin, 5 mg, Oral, Nightly  metoprolol succinate XL, 25 mg, Oral, BID  multivitamin with minerals, 1 tablet, Oral, Daily  pharmacy consult - [facility], , Does not apply, Daily  rOPINIRole, 0.5 mg, Oral, Nightly  sacubitril-valsartan, 0.5 tablet, Oral, BID  sodium chloride, 10 mL, Intravenous, Q12H  temazepam, 15 mg, Oral, Nightly      Continuous Infusions:   PRN Meds:.  acetaminophen **OR** acetaminophen **OR** acetaminophen    senna-docusate sodium **AND** polyethylene glycol **AND** bisacodyl **AND** bisacodyl    ipratropium-albuterol    rOPINIRole    sodium chloride    sodium chloride    Assessment & Plan   Assessment & Plan     Active Hospital Problems    Diagnosis  POA    **Acute exacerbation of CHF (congestive heart failure) [I50.9]  Yes    Hypothyroidism (acquired) [E03.9]  Yes    ICD (implantable cardioverter-defibrillator) in place [Z95.810]  Yes    CKD (chronic kidney disease) [N18.9]  Yes    Dependence on supplemental oxygen [Z99.81]  Not Applicable    COPD  [J44.9]  Yes    Essential hypertension [I10]  Yes    Coronary artery disease involving coronary bypass graft of native heart without angina pectoris [I25.810]  Yes    Dilated CM  [I42.0]  Yes    S/P Watchman device [Z95.818]  Yes    SSS  [I49.5]  Yes    Permanent atrial fibrillation [I48.21]  Yes      Resolved Hospital Problems   No resolved problems to display.        Brief Hospital Course to date:  Colin Albrecht is a 79 y.o. male with past medical  history significant for hypothyroidism, SSS s/p ICD,CAD, dilated CM, CHF w an EF 20.4 % (at last check 5/2023), A-fib s/p Watchman, ex-smoker, chronic oxygen use, and probable chronic CKD.  Patient follows with Dr. Wei with pulmonary, Dr. Quintana with cardiology, and Dr. Prado with cards EP.  Patient was last seen by pulmonary approximately 1 month ago with complaints of progressive shortness of air.  CT of the chest was completed outpatient PTA.  CT showed moderate to marked cardiomegaly new compared to prior CT from 2021, pulmonary edema with sm to mod right pl effusion and trace left pl effusion.  Pt was to have outpatient echo in approx 2 weeks and follow-up with Dr. Quintana, however continues to have progressive shortness of air    Progressive dyspnea  Acute/chronic systolic CHF  CAD/dilated CM/EF 20.4 at last check (5/2023)  SSS/ICD/A-fib s/p Watchman  Heart murmur  --Follows with Dr. Quintana with cards, Dr. Prado with EP.  -- Echo with reduced EF of 20%  -- BNP 10K, CXR with pulmonary venous congestion and small effusions  -- rajendra/Dr. Quintana following.  Was on diuresis but appears to have fell off, will order Bumex 2 mg PO daily for now, follow with cardiology   -- Awaiting labs, ordered for this afternoon now   -- Continue toprol, entresto, and jardiance. Entresto dosage has been increased S/p watchman so no need for AC     COPD  Chronic oxygen use  --Follows with Dr. Wei  -- Recently added home nebulizers, initially helped  -- wears 2LNC O2 at home     Hypothyroidism  -- Continue home Synthroid.    -- TSH elevated but free T4 upper limit of normal, outpatient follow up with his regular PCP for repeat labs         Expected Discharge Location and Transportation:   Expected Discharge   Expected Discharge Date: 10/13/2024; Expected Discharge Time:      VTE Prophylaxis:  Mechanical VTE prophylaxis orders are present.         AM-PAC 6 Clicks Score (PT): 24 (10/12/24 0800)    CODE STATUS:    Code Status and Medical Interventions: CPR (Attempt to Resuscitate); Full Support   Ordered at: 10/10/24 1602     Level Of Support Discussed With:    Patient    Next of Kin (If No Surrogate)     Code Status (Patient has no pulse and is not breathing):    CPR (Attempt to Resuscitate)     Medical Interventions (Patient has pulse or is breathing):    Full Support       RADHA Jimenez DO  10/12/24        Electronically signed by TOMMY Jimenez DO at 10/12/24 1329       Pietro Astudillo MD at 10/12/24 0638          [facility]  IP Progress Note      Chief Complaint/Reason for service: Acute on chronic heart failure with reduced EF    Subjective   Subjective: The patient states his breathing had gotten better and is able to walk to the bathroom.  However this morning he states he is having trouble clearing his airway.  He is complaining of really thick stringy type sputum.  He states he does use breathing treatments at home.  He is also complaining of profound fatigue because he is only slept 2 hours in the last 24 hours.  He also complains of restless leg symptoms    Past medical, surgical, social and family history reviewed in the patient's electronic medical record.    Objective     Vital Sign Min/Max for last 24 hours  Temp  Min: 97.6 °F (36.4 °C)  Max: 98.2 °F (36.8 °C)   BP  Min: 103/64  Max: 127/49   Pulse  Min: 73  Max: 101   Resp  Min: 17  Max: 18   SpO2  Min: 88 %  Max: 100 %   Flow (L/min) (Oxygen Therapy)  Min: 2  Max: 3    No intake or output data in the 24 hours ending 10/12/24 0638          Current Facility-Administered Medications:     acetaminophen (TYLENOL) tablet 650 mg, 650 mg, Oral, Q4H PRN **OR** acetaminophen (TYLENOL) 160 MG/5ML oral solution 650 mg, 650 mg, Oral, Q4H PRN **OR** acetaminophen (TYLENOL) suppository 650 mg, 650 mg, Rectal, Q4H PRN, Shae Knight APRN    arformoterol (BROVANA) nebulizer solution 15 mcg, 15 mcg, Nebulization,  BID - RT, Ariel Moralez MD, 15 mcg at 10/11/24 2015    aspirin EC tablet 81 mg, 81 mg, Oral, Daily, Shae Knight APRN, 81 mg at 10/11/24 1156    atorvastatin (LIPITOR) tablet 40 mg, 40 mg, Oral, Nightly, Shae Knight APRN, 40 mg at 10/11/24 2207    sennosides-docusate (PERICOLACE) 8.6-50 MG per tablet 2 tablet, 2 tablet, Oral, BID PRN **AND** polyethylene glycol (MIRALAX) packet 17 g, 17 g, Oral, Daily PRN, 17 g at 10/11/24 1156 **AND** bisacodyl (DULCOLAX) EC tablet 5 mg, 5 mg, Oral, Daily PRN **AND** bisacodyl (DULCOLAX) suppository 10 mg, 10 mg, Rectal, Daily PRN, Shae Knight APRN    empagliflozin (JARDIANCE) tablet 10 mg, 10 mg, Oral, Daily, Shae Knigth APRN, 10 mg at 10/11/24 1039    ipratropium (ATROVENT) nebulizer solution 0.5 mg, 0.5 mg, Nebulization, 4x Daily - RT, Ariel Moralez MD, 0.5 mg at 10/11/24 2015    ipratropium-albuterol (DUO-NEB) nebulizer solution 3 mL, 3 mL, Nebulization, 4x Daily PRN, Ariel Moralez MD, 3 mL at 10/11/24 1045    levothyroxine (SYNTHROID, LEVOTHROID) tablet 75 mcg, 75 mcg, Oral, Q AM, Shae Knight APRN, 75 mcg at 10/11/24 0549    metoprolol succinate XL (TOPROL-XL) 24 hr tablet 25 mg, 25 mg, Oral, BID, Shae Knight APRN, 25 mg at 10/11/24 2207    multivitamin with minerals 1 tablet, 1 tablet, Oral, Daily, Shae Knight APRN    Pharmacy Consult - [facility], , Does not apply, Daily, Jude Rob, [facility]    rOPINIRole (REQUIP) tablet 0.5 mg, 0.5 mg, Oral, Nightly PRN, Shae Knight APRN, 0.5 mg at 10/12/24 0034    sacubitril-valsartan (ENTRESTO) 24-26 MG tablet 0.5 tablet, 0.5 tablet, Oral, BID, Shae Knight APRN, 0.5 tablet at 10/11/24 2208    sodium chloride 0.9 % flush 10 mL, 10 mL, Intravenous, PRN, Anthony Sibley MD    sodium chloride 0.9 % flush 10 mL, 10 mL, Intravenous, Q12H, Shae Knight APRN, 10 mL at 10/11/24 2208     sodium chloride 0.9 % flush 10 mL, 10 mL, Intravenous, PRN, Shae Knight APRN    temazepam (RESTORIL) capsule 15 mg, 15 mg, Oral, Nightly, Pietro Wheat DO, 15 mg at 10/12/24 0315    Physical Exam: General Pleasant elderly male with some resting tachypnea sat 95%        HEENT: No JVP.  Nasal O2 present.       Respiratory: Equal bilateral symmetrical expansion with some reduced breath sounds on the right no significant wheezing noted       Cardiovascular: Regular rate and rhythm with soft systolic murmur no edema palpation       GI: Soft and flat       Lower Extremities: No lesions       Neuro: Facial expressions are symmetrical moves all 4 extremities       Skin: Warm and dry no edema palpation       Psych: Pleasant affect    Results Review: I's and O's not recorded.  Heart rate 73-1 01.  Blood pressure 10 3-1 27.    Radiology Results:  Imaging Results (Last 72 Hours)       Procedure Component Value Units Date/Time    XR Chest 1 View [[id number]] Collected: 10/11/24 0711     Updated: 10/11/24 0716    Narrative:      XR CHEST 1 VW    Date of Exam: 10/11/2024 2:59 AM EDT    Indication: f/u dyspnea, a/c chf, pl effusions    Comparison: October 10, 2024    Findings:  A cardiac ICD device is present. Sternotomy wires noted. The heart is enlarged. There are bibasilar densities which could reflect effusions. Pulmonary vascular markings are prominent. There are some interstitial changes. Some vascular congestion and   edema could account for this.      Impression:      Impression:  1.Cardiomegaly with what looks like some vascular congestion and probable interstitial edema.  2.Bibasilar effusions      Electronically Signed: Percy Mcguire MD    10/11/2024 7:12 AM EDT    Workstation ID: [id number]    XR Chest 1 View [[id number]] Collected: 10/10/24 1326     Updated: 10/10/24 1331    Narrative:      XR CHEST 1 VW    Date of Exam: 10/10/2024 12:45 PM EDT    Indication: SOA triage protocol    Comparison: CT chest  without contrast 10/9/2024    Findings:  Left-sided AICD noted. Status post sternotomy and CABG. Central pulmonary vascular congestion with interstitial thickening suggesting pulmonary edema. Underlying emphysema. Persistent small bilateral pleural effusions right greater than left with   bibasilar atelectasis. Negative for pneumothorax.      Impression:      Impression:  1. Central pulmonary vascular congestion with interstitial thickening suggesting pulmonary edema.  2. Persistent small bilateral pleural effusions right greater than left with bibasilar atelectasis.  3. Emphysema.        Electronically Signed: Luis Richards MD    10/10/2024 1:28 PM EDT    Workstation ID: [id number]                ECHO: EF is 25%    Tele: A-fib    Assessment   Assessment/Plan: Acute on chronic heart failure with reduced EF.  Intake and outtake are not recorded so I will order that today.  States his breathing had improved significantly until he had this problem with coughing up thick sputum this morning.  Clinically he has no JVP or edema.  Continue same meds  2 thick secretions-will humidify oxygen, check a portable chest x-ray to look for any mucous plugging.    Pietro Astudillo MD  10/12/24  06:38 EDT      Electronically signed by Pietro Astudillo MD at 10/12/24 0641       Ariel Moralez MD at 10/11/24 1505              [facility] Medicine Services  PROGRESS NOTE    Patient Name: Colin Albrecht  : 1945  MRN: [id number]    Date of Admission: 10/10/2024  Primary Care Physician: Arun Soliman MD    Subjective   Subjective     CC:  Progressive soa    HPI:  Feels ok.  Breathing better since had neb this AM.        Objective   Objective     Vital Signs:   Temp:  [96.4 °F (35.8 °C)-98 °F (36.7 °C)] 98 °F (36.7 °C)  Heart Rate:  [73-90] 75  Resp:  [14-18] 18  BP: (110-139)/(49-85) 127/49  Flow (L/min):  [2-3] 2     Physical Exam:  Constitutional: No acute distress, awake, alert  HENT: NCAT,  mucous membranes moist  Respiratory: Clear to auscultation bilaterally, respiratory effort normal on 2LNC  Cardiovascular: RRR, no murmurs, rubs, or gallops  Gastrointestinal: Positive bowel sounds, soft, nontender, nondistended  Musculoskeletal: 1+pitting edema bilateral LE's  Psychiatric: Appropriate affect, cooperative  Neurologic: Oriented x 3, FLOREZ, speech clear  Skin: No rashes      Results Reviewed:  LAB RESULTS:      Lab 10/11/24  0606 10/11/24  0024 10/10/24  1945 10/10/24  1605 10/10/24  1243   WBC 6.53  --   --   --  6.75   HEMOGLOBIN 12.5*  --   --   --  12.7*   HEMATOCRIT 39.5  --   --   --  41.2   PLATELETS 156  --   --   --  156   NEUTROS ABS  --   --   --   --  5.24   IMMATURE GRANS (ABS)  --   --   --   --  0.02   LYMPHS ABS  --   --   --   --  0.54*   MONOS ABS  --   --   --   --  0.79   EOS ABS  --   --   --   --  0.12   MCV 96.3  --   --   --  99.3*   PROCALCITONIN  --   --   --   --  0.05   LACTATE  --  1.1 2.6* 2.5* 2.1*   HSTROP T  --   --   --   --  21         Lab 10/11/24  0606 10/10/24  1243   SODIUM 141 140   POTASSIUM 4.5 4.5   CHLORIDE 103 102   CO2 31.0* 27.0   ANION GAP 7.0 11.0   BUN 32* 33*   CREATININE 1.53* 1.31*   EGFR 46.2* 55.7*   GLUCOSE 92 113*   CALCIUM 9.2 9.4   MAGNESIUM 2.3  --    HEMOGLOBIN A1C 6.30*  --    TSH  --  9.290*         Lab 10/11/24  0606 10/10/24  1243   TOTAL PROTEIN 6.7 7.0   ALBUMIN 3.6 3.9   GLOBULIN 3.1 3.1   ALT (SGPT) 16 17   AST (SGOT) 30 36   BILIRUBIN 1.4* 1.5*   ALK PHOS 108 114         Lab 10/10/24  1243   PROBNP 10,682.0*   HSTROP T 21         Lab 10/11/24  0606   CHOLESTEROL 97   LDL CHOL 42   HDL CHOL 44   TRIGLYCERIDES 42             Brief Urine Lab Results       None            Microbiology Results Abnormal       Procedure Component Value - Date/Time    COVID PRE-OP / PRE-PROCEDURE SCREENING ORDER (NO ISOLATION) - Swab, Nasal Cavity [[id number]]  (Normal) Collected: 10/10/24 1302    Lab Status: Final result Specimen: Swab from Nasal Cavity  Updated: 10/10/24 7288    Narrative:      The following orders were created for panel order COVID PRE-OP / PRE-PROCEDURE SCREENING ORDER (NO ISOLATION) - Swab, Nasal Cavity.  Procedure                               Abnormality         Status                     ---------                               -----------         ------                     COVID-19 and FLU A/B PCR...[[id number]]  Normal              Final result                 Please view results for these tests on the individual orders.    COVID-19 and FLU A/B PCR, 1 HR TAT - Swab, Nasopharynx [[id number]]  (Normal) Collected: 10/10/24 1302    Lab Status: Final result Specimen: Swab from Nasopharynx Updated: 10/10/24 3298     COVID19 Not Detected     Influenza A PCR Not Detected     Influenza B PCR Not Detected    Narrative:      Fact sheet for providers: https://www.fda.gov/media/130254/download    Fact sheet for patients: https://www.fda.gov/media/175047/download    Test performed by PCR.            Adult Transthoracic Echo Complete W/ Cont if Necessary Per Protocol    Result Date: 10/11/2024    Left ventricular systolic function is severely decreased. Calculated left ventricular EF = 20.5% Left ventricular ejection fraction appears to be less than 20%.   The left ventricular cavity is moderately dilated.   Left ventricular wall thickness is consistent with mild concentric hypertrophy.   Severely reduced right ventricular systolic function noted.   The right ventricular cavity is moderately dilated.   The left atrial cavity is moderate to severely dilated.   The right atrial cavity is severely  dilated.   There is mild calcification of the aortic valve.   Moderate mitral valve regurgitation is present.   Moderate tricuspid valve regurgitation is present.   Estimated right ventricular systolic pressure from tricuspid regurgitation is moderately elevated (45-55 mmHg).   Moderate pulmonic valve regurgitation is present. No significant change compared to the  2023 echo.     XR Chest 1 View    Result Date: 10/11/2024  XR CHEST 1 VW Date of Exam: 10/11/2024 2:59 AM EDT Indication: f/u dyspnea, a/c chf, pl effusions Comparison: October 10, 2024 Findings: A cardiac ICD device is present. Sternotomy wires noted. The heart is enlarged. There are bibasilar densities which could reflect effusions. Pulmonary vascular markings are prominent. There are some interstitial changes. Some vascular congestion and edema could account for this.     Impression: Impression: 1.Cardiomegaly with what looks like some vascular congestion and probable interstitial edema. 2.Bibasilar effusions Electronically Signed: Percy Mcguire MD  10/11/2024 7:12 AM EDT  Workstation ID: [id number]    XR Chest 1 View    Result Date: 10/10/2024  XR CHEST 1 VW Date of Exam: 10/10/2024 12:45 PM EDT Indication: SOA triage protocol Comparison: CT chest without contrast 10/9/2024 Findings: Left-sided AICD noted. Status post sternotomy and CABG. Central pulmonary vascular congestion with interstitial thickening suggesting pulmonary edema. Underlying emphysema. Persistent small bilateral pleural effusions right greater than left with bibasilar atelectasis. Negative for pneumothorax.     Impression: Impression: 1. Central pulmonary vascular congestion with interstitial thickening suggesting pulmonary edema. 2. Persistent small bilateral pleural effusions right greater than left with bibasilar atelectasis. 3. Emphysema. Electronically Signed: Luis Richards MD  10/10/2024 1:28 PM EDT  Workstation ID: [id number]     Results for orders placed during the hospital encounter of 10/10/24    Adult Transthoracic Echo Complete W/ Cont if Necessary Per Protocol    Interpretation Summary    Left ventricular systolic function is severely decreased. Calculated left ventricular EF = 20.5% Left ventricular ejection fraction appears to be less than 20%.    The left ventricular cavity is moderately dilated.    Left ventricular wall thickness is  consistent with mild concentric hypertrophy.    Severely reduced right ventricular systolic function noted.    The right ventricular cavity is moderately dilated.    The left atrial cavity is moderate to severely dilated.    The right atrial cavity is severely  dilated.    There is mild calcification of the aortic valve.    Moderate mitral valve regurgitation is present.    Moderate tricuspid valve regurgitation is present.    Estimated right ventricular systolic pressure from tricuspid regurgitation is moderately elevated (45-55 mmHg).    Moderate pulmonic valve regurgitation is present.    No significant change compared to the 2023 echo.      Current medications:  Scheduled Meds:arformoterol, 15 mcg, Nebulization, BID - RT  aspirin, 81 mg, Oral, Daily  atorvastatin, 40 mg, Oral, Nightly  empagliflozin, 10 mg, Oral, Daily  ipratropium, 0.5 mg, Nebulization, 4x Daily - RT  levothyroxine, 75 mcg, Oral, Q AM  metoprolol succinate XL, 25 mg, Oral, BID  multivitamin with minerals, 1 tablet, Oral, Daily  pharmacy consult - [place], , Does not apply, Daily  sacubitril-valsartan, 0.5 tablet, Oral, BID  sodium chloride, 10 mL, Intravenous, Q12H      Continuous Infusions:   PRN Meds:.  acetaminophen **OR** acetaminophen **OR** acetaminophen    senna-docusate sodium **AND** polyethylene glycol **AND** bisacodyl **AND** bisacodyl    ipratropium-albuterol    rOPINIRole    sodium chloride    sodium chloride    Assessment & Plan   Assessment & Plan     Active Hospital Problems    Diagnosis  POA    **Acute exacerbation of CHF (congestive heart failure) [I50.9]  Yes    Hypothyroidism (acquired) [E03.9]  Yes    ICD (implantable cardioverter-defibrillator) in place [Z95.810]  Yes    CKD (chronic kidney disease) [N18.9]  Yes    Dependence on supplemental oxygen [Z99.81]  Not Applicable    COPD  [J44.9]  Yes    Essential hypertension [I10]  Yes    Coronary artery disease involving coronary bypass graft of native heart without angina  pectoris [I25.810]  Yes    Dilated CM  [I42.0]  Yes    S/P Watchman device [Z95.818]  Yes    SSS  [I49.5]  Yes    Permanent atrial fibrillation [I48.21]  Yes      Resolved Hospital Problems   No resolved problems to display.        Brief Hospital Course to date:  Colin Albrecht is a 79 y.o. male with past medical history significant for hypothyroidism, SSS s/p ICD,CAD, dilated CM, CHF w an EF 20.4 % (at last check 5/2023), A-fib s/p Watchman, ex-smoker, chronic oxygen use, and probable chronic CKD.  Patient follows with Dr. Wei with pulmonary, Dr. Quintana with cardiology, and Dr. Prado with cards EP.  Patient was last seen by pulmonary approximately 1 month ago with complaints of progressive shortness of air.  CT of the chest was completed outpatient PTA.  CT showed moderate to marked cardiomegaly new compared to prior CT from 2021, pulmonary edema with sm to mod right pl effusion and trace left pl effusion.  Pt was to have outpatient echo in approx 2 weeks and follow-up with Dr. Quintana, however continues to have progressive shortness of air    Progressive dyspnea  Acute/chronic CHF  CAD/dilated CM/EF 20.4 at last check (5/2023)  SSS/ICD/A-fib s/p Watchman  Heart murmur  --Follows with Dr. Quintana with cards, Dr. Prado with EP.  -- Check echo  -- BNP 10K, CXR with pulmonary venous congestion and small effusions  -- rajendra/Dr. Quintaan following.  Recs bumex 2mg IV daily.  Continue toprol, entresto, and jardiance, will try increasing entresto dose.  S/p watchman so no need for AC     COPD  Chronic oxygen use  --Follows with Dr. Wei  -- Recently added home nebulizers, initially helped  -- wears 2LNC O2 at home     Hypothyroidism  -- Continue home Synthroid.  Check TSH as last on file was from 2023.       Expected Discharge Location and Transportation:   Expected Discharge   Expected Discharge Date: 10/13/2024; Expected Discharge Time:      VTE Prophylaxis:  Mechanical VTE prophylaxis orders are  present.         AM-PAC 6 Clicks Score (PT): 24 (10/11/24 0800)    CODE STATUS:   Code Status and Medical Interventions: CPR (Attempt to Resuscitate); Full Support   Ordered at: 10/10/24 1602     Level Of Support Discussed With:    Patient    Next of Kin (If No Surrogate)     Code Status (Patient has no pulse and is not breathing):    CPR (Attempt to Resuscitate)     Medical Interventions (Patient has pulse or is breathing):    Full Support       Ariel Moralez MD  10/11/24        Electronically signed by Ariel Moralez MD at 10/11/24 1511       Consult Notes (last 72 hours)  Notes from 10/11/24 1312 through 10/14/24 1312   No notes of this type exist for this encounter.

## 2024-10-14 NOTE — H&P (VIEW-ONLY)
[facility] Medicine Services  PROGRESS NOTE    Patient Name: Colin Albrecht  : 1945  MRN: [id number]    Date of Admission: 10/10/2024  Primary Care Physician: Arun Soliman MD    Subjective   Subjective     CC:  Progressive soa    HPI:  Patient is a 79-year-old male seen and examined by me this a.m.,still with continued issues with SOA, plans for follow up CT chest today, no other new acute complaints or problems at this time.       Objective   Objective     Vital Signs:   Temp:  [97.7 °F (36.5 °C)] 97.7 °F (36.5 °C)  Heart Rate:  [73-93] 73  Resp:  [16-18] 18  BP: ()/(56-76) 98/66  Flow (L/min) (Oxygen Therapy):  [2] 2     Physical Exam:  Constitutional: No acute distress, awake, alert, frail appearing, on RA resting in bed. Currently on 2L NC  HENT: NCAT, mucous membranes moist  Respiratory: Decreased BS bilaterally, crackles more evident on the right, respiratory effort normal, no rhonchi or wheezing  Cardiovascular: RRR, no murmurs, rubs, or gallops  Gastrointestinal: Positive bowel sounds, soft, nontender, nondistended  Musculoskeletal: trace edema bilateral LE's  Psychiatric: Appropriate affect, cooperative  Neurologic: Oriented x 3, FLOREZ, speech clear  Skin: No rashes      Results Reviewed:  LAB RESULTS:      Lab 10/14/24  0523 10/13/24  0510 10/12/24  1346 10/11/24  0606 10/11/24  0024 10/10/24  1945 10/10/24  1605 10/10/24  1243   WBC 5.20 5.14 5.72 6.53  --   --   --  6.75   HEMOGLOBIN 12.0* 12.5* 12.6* 12.5*  --   --   --  12.7*   HEMATOCRIT 37.8 39.6 39.6 39.5  --   --   --  41.2   PLATELETS 161 165 149 156  --   --   --  156   NEUTROS ABS 3.26 3.69 4.32  --   --   --   --  5.24   IMMATURE GRANS (ABS) 0.01 0.04 0.01  --   --   --   --  0.02   LYMPHS ABS 0.92 0.62* 0.57*  --   --   --   --  0.54*   MONOS ABS 0.75 0.59 0.62  --   --   --   --  0.79   EOS ABS 0.21 0.16 0.18  --   --   --   --  0.12   MCV 97.2* 97.5* 97.5* 96.3  --   --   --  99.3*    PROCALCITONIN  --   --   --   --   --   --   --  0.05   LACTATE  --   --   --   --  1.1 2.6* 2.5* 2.1*   HSTROP T  --   --   --   --   --   --   --  21         Lab 10/14/24  0523 10/13/24  0510 10/12/24  1346 10/11/24  0606 10/10/24  1243   SODIUM 140 142 139 141 140   POTASSIUM 4.2 4.4 4.6 4.5 4.5   CHLORIDE 103 101 101 103 102   CO2 31.0* 29.0 29.0 31.0* 27.0   ANION GAP 6.0 12.0 9.0 7.0 11.0   BUN 35* 35* 35* 32* 33*   CREATININE 1.43* 1.47* 1.34* 1.53* 1.31*   EGFR 50.2* 48.5* 54.2* 46.2* 55.7*   GLUCOSE 89 138* 127* 92 113*   CALCIUM 8.5* 8.9 9.0 9.2 9.4   MAGNESIUM 2.5* 2.4 2.4 2.3  --    HEMOGLOBIN A1C  --   --   --  6.30*  --    TSH  --   --   --   --  9.290*         Lab 10/14/24  0523 10/13/24  0510 10/12/24  1346 10/11/24  0606 10/10/24  1243   TOTAL PROTEIN 5.7* 6.1 6.2 6.7 7.0   ALBUMIN 3.3* 3.6 3.5 3.6 3.9   GLOBULIN 2.4 2.5 2.7 3.1 3.1   ALT (SGPT) 13 12 10 16 17   AST (SGOT) 23 25 27 30 36   BILIRUBIN 1.0 1.2 1.4* 1.4* 1.5*   ALK PHOS 99 115 102 108 114         Lab 10/14/24  0523 10/13/24  0510 10/10/24  1243   PROBNP 6,760.0* 7,901.0* 10,682.0*   HSTROP T  --   --  21         Lab 10/11/24  0606   CHOLESTEROL 97   LDL CHOL 42   HDL CHOL 44   TRIGLYCERIDES 42             Brief Urine Lab Results       None            Microbiology Results Abnormal       Procedure Component Value - Date/Time    COVID PRE-OP / PRE-PROCEDURE SCREENING ORDER (NO ISOLATION) - Swab, Nasal Cavity [[id number]]  (Normal) Collected: 10/10/24 1302    Lab Status: Final result Specimen: Swab from Nasal Cavity Updated: 10/10/24 7296    Narrative:      The following orders were created for panel order COVID PRE-OP / PRE-PROCEDURE SCREENING ORDER (NO ISOLATION) - Swab, Nasal Cavity.  Procedure                               Abnormality         Status                     ---------                               -----------         ------                     COVID-19 and FLU A/B PCR...[[id number]]  Normal              Final result                  Please view results for these tests on the individual orders.    COVID-19 and FLU A/B PCR, 1 HR TAT - Swab, Nasopharynx [[id number]]  (Normal) Collected: 10/10/24 1302    Lab Status: Final result Specimen: Swab from Nasopharynx Updated: 10/10/24 4034     COVID19 Not Detected     Influenza A PCR Not Detected     Influenza B PCR Not Detected    Narrative:      Fact sheet for providers: https://www.fda.gov/media/723244/download    Fact sheet for patients: https://www.fda.gov/media/591511/download    Test performed by PCR.            CT Chest Without Contrast Diagnostic    Result Date: 10/14/2024  CT CHEST WO CONTRAST DIAGNOSTIC Date of Exam: 10/14/2024 11:45 AM EDT Indication: Follow up R Pleural effusion following diuresis. Comparison: AP chest radiograph 10/12/2024, CT chest 10/9/2024 Technique: Axial CT images were obtained of the chest without contrast administration.  Reconstructed coronal and sagittal images were also obtained. Automated exposure control and iterative construction methods were used. Findings: Moderate emphysematous changes are present. Mild smooth interstitial thickening is seen within the lung bases suggesting mild edema. Small to moderate right basilar pleural effusion layers to a depth of 2.9 cm with passive right lower lobe atelectasis, similar to 10/9/2024. No new airspace disease is identified. Calcified pleural plaque is seen in the left mid thorax. Stable cardiomegaly with atrial appendage occlusion device and signs of prior CABG. Pacemaker device in place. No pericardial effusion is seen. Mediastinal adenopathy is unchanged, including prevascular node 9 mm short axis and right paratracheal node 10  mm short axis and AP window node 9 mm short axis (series 2 image 50). No new or progressive adenopathy. Hepatic cysts are present, largest in the left lobe measuring about 4 cm. Stable low-density right adrenal nodule most in keeping with a benign adenoma. Uncomplicated cholelithiasis.  Remainder of the imaged upper abdominal organs demonstrate a normal noncontrast appearance. No acute or suspicious osseous abnormality.     Impression: Impression: 1. Mild basilar interstitial edema with small to moderate right pleural effusion and right basilar atelectasis, unchanged from 10/9/2024. No new airspace disease. 2. Stable cardiomegaly. 3. Stable enlarged mediastinal nodes, favored to represent benign reactive findings. 3. Additional chronic findings include uncomplicated cholelithiasis, moderate emphysema, stable right adrenal adenoma, hepatic cysts. Electronically Signed: Alberta Lawler MD  10/14/2024 12:26 PM EDT  Workstation ID: [id number]     Results for orders placed during the hospital encounter of 10/10/24    Adult Transthoracic Echo Complete W/ Cont if Necessary Per Protocol    Interpretation Summary  •  Left ventricular systolic function is severely decreased. Calculated left ventricular EF = 20.5% Left ventricular ejection fraction appears to be less than 20%.  •  The left ventricular cavity is moderately dilated.  •  Left ventricular wall thickness is consistent with mild concentric hypertrophy.  •  Severely reduced right ventricular systolic function noted.  •  The right ventricular cavity is moderately dilated.  •  The left atrial cavity is moderate to severely dilated.  •  The right atrial cavity is severely  dilated.  •  There is mild calcification of the aortic valve.  •  Moderate mitral valve regurgitation is present.  •  Moderate tricuspid valve regurgitation is present.  •  Estimated right ventricular systolic pressure from tricuspid regurgitation is moderately elevated (45-55 mmHg).  •  Moderate pulmonic valve regurgitation is present.    No significant change compared to the 2023 echo.      Current medications:  Scheduled Meds:arformoterol, 15 mcg, Nebulization, BID - RT  aspirin, 81 mg, Oral, Daily  atorvastatin, 40 mg, Oral, Nightly  bumetanide, 1 mg, Intravenous,  Daily  empagliflozin, 10 mg, Oral, Daily  guaiFENesin, 1,200 mg, Oral, Q12H  ipratropium, 0.5 mg, Nebulization, 4x Daily - RT  levothyroxine, 75 mcg, Oral, Q AM  melatonin, 5 mg, Oral, Nightly  metoprolol succinate XL, 25 mg, Oral, BID  multivitamin with minerals, 1 tablet, Oral, Daily  pharmacy consult - MTM, , Does not apply, Daily  rOPINIRole, 0.5 mg, Oral, Nightly  sacubitril-valsartan, 0.5 tablet, Oral, BID  sodium chloride, 10 mL, Intravenous, Q12H  temazepam, 15 mg, Oral, Nightly      Continuous Infusions:   PRN Meds:.•  acetaminophen **OR** acetaminophen **OR** acetaminophen  •  senna-docusate sodium **AND** polyethylene glycol **AND** bisacodyl **AND** bisacodyl  •  ipratropium-albuterol  •  rOPINIRole  •  sodium chloride  •  sodium chloride    Assessment & Plan   Assessment & Plan     Active Hospital Problems    Diagnosis  POA   • **Acute exacerbation of CHF (congestive heart failure) [I50.9]  Yes   • Hypothyroidism (acquired) [E03.9]  Yes   • ICD (implantable cardioverter-defibrillator) in place [Z95.810]  Yes   • CKD (chronic kidney disease) [N18.9]  Yes   • Dependence on supplemental oxygen [Z99.81]  Not Applicable   • COPD  [J44.9]  Yes   • Essential hypertension [I10]  Yes   • Coronary artery disease involving coronary bypass graft of native heart without angina pectoris [I25.810]  Yes   • Dilated CM  [I42.0]  Yes   • S/P Watchman device [Z95.818]  Yes   • SSS  [I49.5]  Yes   • Permanent atrial fibrillation [I48.21]  Yes      Resolved Hospital Problems   No resolved problems to display.        Brief Hospital Course to date:  Colin Albrecht is a 79 y.o. male with past medical history significant for hypothyroidism, SSS s/p ICD,CAD, dilated CM, CHF w an EF 20.4 % (at last check 5/2023), A-fib s/p Watchman, ex-smoker, chronic oxygen use, and probable chronic CKD.  Patient follows with Dr. Wei with pulmonary, Dr. Quintana with cardiology, and Dr. Prado with cards EP.  Patient was last seen by  pulmonary approximately 1 month ago with complaints of progressive shortness of air.  CT of the chest was completed outpatient PTA.  CT showed moderate to marked cardiomegaly new compared to prior CT from 2021, pulmonary edema with sm to mod right pl effusion and trace left pl effusion.  Pt was to have outpatient echo in approx 2 weeks and follow-up with Dr. Quintana, however continues to have progressive shortness of air. Patient seen again on the M of 10/13, following with Dr. Astudillo this weekend, patient overall reports breathing improving but still SOA with exertion, IV diuresis again on 10/13, if symptoms persist possible CT chest and consideration for possible thoracentesis if right pleural effusion persists. Seen again on 10/14, patient with more SOA this AM, more significant crackles as well, CT chest reviewed and will plan for thoracentesis at this time, likely in the AM of 10/15, continue to follow with cardiology at this time.     Progressive dyspnea  Acute/chronic systolic CHF  CAD/dilated CM/EF 20.4 at last check (5/2023)  SSS/ICD/A-fib s/p Watchman  Heart murmur  R sided pleural effusion   --Follows with Dr. Quintana with cards, Dr. Prado with EP.  -- Echo with reduced EF of 20%  -- BNP 10K, CXR with pulmonary venous congestion and small effusions  -- cards/Dr. Quintana following.    -- Diuresis again reordered IV Bumex on 10/13 with 1 mg BID  -- Repeat CT chest today with continued moderate pleural effusion, no significant change, plans now for thoracentesis, consult to IR for hopeful procedure possibly on 10/15   -- Continue toprol, entresto, and jardiance. Entresto dosage has been increased S/p watchman so no need for AC     COPD  Chronic oxygen use  --Follows with Dr. Wei  -- Recently added home nebulizers, initially helped  -- wears 2LNC O2 at home     Hypothyroidism  -- Continue home Synthroid.    -- TSH elevated but free T4 upper limit of normal, outpatient follow up with his regular PCP  for repeat labs         Expected Discharge Location and Transportation:   Expected Discharge   Expected Discharge Date: 10/15/2024; Expected Discharge Time:      VTE Prophylaxis:  Mechanical VTE prophylaxis orders are present.         AM-PAC 6 Clicks Score (PT): 21 (10/14/24 0830)    CODE STATUS:   Code Status and Medical Interventions: CPR (Attempt to Resuscitate); Full Support   Ordered at: 10/10/24 1602     Level Of Support Discussed With:    Patient    Next of Kin (If No Surrogate)     Code Status (Patient has no pulse and is not breathing):    CPR (Attempt to Resuscitate)     Medical Interventions (Patient has pulse or is breathing):    Full Support       RADHA Jimenez, DO  10/14/24

## 2024-10-15 ENCOUNTER — APPOINTMENT (OUTPATIENT)
Dept: ULTRASOUND IMAGING | Facility: HOSPITAL | Age: 79
End: 2024-10-15
Payer: MEDICARE

## 2024-10-15 ENCOUNTER — TELEPHONE (OUTPATIENT)
Dept: CARDIOLOGY | Facility: CLINIC | Age: 79
End: 2024-10-15
Payer: MEDICARE

## 2024-10-15 ENCOUNTER — READMISSION MANAGEMENT (OUTPATIENT)
Dept: CALL CENTER | Facility: HOSPITAL | Age: 79
End: 2024-10-15
Payer: MEDICARE

## 2024-10-15 VITALS
SYSTOLIC BLOOD PRESSURE: 114 MMHG | HEIGHT: 75 IN | HEART RATE: 83 BPM | WEIGHT: 169.31 LBS | RESPIRATION RATE: 18 BRPM | TEMPERATURE: 97.4 F | OXYGEN SATURATION: 100 % | BODY MASS INDEX: 21.05 KG/M2 | DIASTOLIC BLOOD PRESSURE: 76 MMHG

## 2024-10-15 PROBLEM — Z99.81 DEPENDENCE ON SUPPLEMENTAL OXYGEN: Status: RESOLVED | Noted: 2024-08-09 | Resolved: 2024-10-15

## 2024-10-15 PROBLEM — I50.9 ACUTE EXACERBATION OF CHF (CONGESTIVE HEART FAILURE): Status: RESOLVED | Noted: 2024-10-10 | Resolved: 2024-10-15

## 2024-10-15 LAB
ALBUMIN SERPL-MCNC: 3.3 G/DL (ref 3.5–5.2)
ALBUMIN/GLOB SERPL: 1.1 G/DL
ALP SERPL-CCNC: 102 U/L (ref 39–117)
ALT SERPL W P-5'-P-CCNC: 15 U/L (ref 1–41)
ANION GAP SERPL CALCULATED.3IONS-SCNC: 6 MMOL/L (ref 5–15)
AST SERPL-CCNC: 25 U/L (ref 1–40)
BASOPHILS # BLD AUTO: 0.05 10*3/MM3 (ref 0–0.2)
BASOPHILS NFR BLD AUTO: 1 % (ref 0–1.5)
BILIRUB SERPL-MCNC: 0.9 MG/DL (ref 0–1.2)
BUN SERPL-MCNC: 38 MG/DL (ref 8–23)
BUN/CREAT SERPL: 24.5 (ref 7–25)
CALCIUM SPEC-SCNC: 8.9 MG/DL (ref 8.6–10.5)
CHLORIDE SERPL-SCNC: 105 MMOL/L (ref 98–107)
CO2 SERPL-SCNC: 31 MMOL/L (ref 22–29)
CREAT SERPL-MCNC: 1.55 MG/DL (ref 0.76–1.27)
DEPRECATED RDW RBC AUTO: 57.5 FL (ref 37–54)
EGFRCR SERPLBLD CKD-EPI 2021: 45.5 ML/MIN/1.73
EOSINOPHIL # BLD AUTO: 0.14 10*3/MM3 (ref 0–0.4)
EOSINOPHIL NFR BLD AUTO: 2.8 % (ref 0.3–6.2)
ERYTHROCYTE [DISTWIDTH] IN BLOOD BY AUTOMATED COUNT: 15.9 % (ref 12.3–15.4)
GLOBULIN UR ELPH-MCNC: 2.9 GM/DL
GLUCOSE SERPL-MCNC: 102 MG/DL (ref 65–99)
HCT VFR BLD AUTO: 38.8 % (ref 37.5–51)
HGB BLD-MCNC: 12 G/DL (ref 13–17.7)
IMM GRANULOCYTES # BLD AUTO: 0.01 10*3/MM3 (ref 0–0.05)
IMM GRANULOCYTES NFR BLD AUTO: 0.2 % (ref 0–0.5)
INR PPP: 1.2 (ref 0.89–1.12)
LYMPHOCYTES # BLD AUTO: 0.98 10*3/MM3 (ref 0.7–3.1)
LYMPHOCYTES NFR BLD AUTO: 19.7 % (ref 19.6–45.3)
MCH RBC QN AUTO: 30.4 PG (ref 26.6–33)
MCHC RBC AUTO-ENTMCNC: 30.9 G/DL (ref 31.5–35.7)
MCV RBC AUTO: 98.2 FL (ref 79–97)
MONOCYTES # BLD AUTO: 0.72 10*3/MM3 (ref 0.1–0.9)
MONOCYTES NFR BLD AUTO: 14.5 % (ref 5–12)
NEUTROPHILS NFR BLD AUTO: 3.07 10*3/MM3 (ref 1.7–7)
NEUTROPHILS NFR BLD AUTO: 61.8 % (ref 42.7–76)
NRBC BLD AUTO-RTO: 0 /100 WBC (ref 0–0.2)
PLATELET # BLD AUTO: 171 10*3/MM3 (ref 140–450)
PMV BLD AUTO: 10.2 FL (ref 6–12)
POTASSIUM SERPL-SCNC: 3.9 MMOL/L (ref 3.5–5.2)
PROT SERPL-MCNC: 6.2 G/DL (ref 6–8.5)
PROTHROMBIN TIME: 15.3 SECONDS (ref 12.2–14.5)
RBC # BLD AUTO: 3.95 10*6/MM3 (ref 4.14–5.8)
SODIUM SERPL-SCNC: 142 MMOL/L (ref 136–145)
WBC NRBC COR # BLD AUTO: 4.97 10*3/MM3 (ref 3.4–10.8)

## 2024-10-15 PROCEDURE — 76604 US EXAM CHEST: CPT

## 2024-10-15 PROCEDURE — 80053 COMPREHEN METABOLIC PANEL: CPT | Performed by: FAMILY MEDICINE

## 2024-10-15 PROCEDURE — 76942 ECHO GUIDE FOR BIOPSY: CPT

## 2024-10-15 PROCEDURE — 25010000002 BUMETANIDE PER 0.5 MG: Performed by: FAMILY MEDICINE

## 2024-10-15 PROCEDURE — 85025 COMPLETE CBC W/AUTO DIFF WBC: CPT | Performed by: FAMILY MEDICINE

## 2024-10-15 PROCEDURE — 85610 PROTHROMBIN TIME: CPT | Performed by: NURSE PRACTITIONER

## 2024-10-15 PROCEDURE — 99232 SBSQ HOSP IP/OBS MODERATE 35: CPT | Performed by: INTERNAL MEDICINE

## 2024-10-15 PROCEDURE — 99239 HOSP IP/OBS DSCHRG MGMT >30: CPT | Performed by: FAMILY MEDICINE

## 2024-10-15 PROCEDURE — BB4BZZZ ULTRASONOGRAPHY OF PLEURA: ICD-10-PCS | Performed by: RADIOLOGY

## 2024-10-15 RX ORDER — FUROSEMIDE 40 MG/1
40 TABLET ORAL 2 TIMES DAILY
Qty: 60 TABLET | Refills: 0 | Status: SHIPPED | OUTPATIENT
Start: 2024-10-15

## 2024-10-15 RX ADMIN — BUMETANIDE 1 MG: 0.25 INJECTION INTRAMUSCULAR; INTRAVENOUS at 11:41

## 2024-10-15 RX ADMIN — LEVOTHYROXINE SODIUM 75 MCG: 0.07 TABLET ORAL at 06:07

## 2024-10-15 RX ADMIN — Medication 10 ML: at 09:12

## 2024-10-15 RX ADMIN — Medication 10 ML: at 11:42

## 2024-10-15 RX ADMIN — GUAIFENESIN 1200 MG: 600 TABLET, EXTENDED RELEASE ORAL at 11:40

## 2024-10-15 NOTE — TELEPHONE ENCOUNTER
Patient referred to [facility] program per [facility]. Referral placed in [facility].

## 2024-10-15 NOTE — PLAN OF CARE
Goal Outcome Evaluation:   Pt progressing well. Calm, Cooperative and accepting of plan of care.  Call light within reach. 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

## 2024-10-15 NOTE — NURSING NOTE
Pt frustrated with timing of care. Stating that things are going to slow and that he needs to get home.     Refused multiple medications during the night states that he doesn't take those at home and he doesn't need them here.     Pt has been NPO since midnight to prepare for possible thoracentesis.

## 2024-10-15 NOTE — PROGRESS NOTES
"  [facility] at [facility]  PROGRESS NOTE    Date of Admission: 10/10/2024  Date of Service: 10/15/24    Primary Care Physician: Arun Soliman MD    Chief Complaint: f/u A/C HFrEF  Problem List:   Permanent atrial fibrillation    SSS     Dilated CM     S/P Watchman device    Coronary artery disease involving coronary bypass graft of native heart without angina pectoris    Essential hypertension    COPD     Hypothyroidism (acquired)    ICD (implantable cardioverter-defibrillator) in place    CKD (chronic kidney disease)      Subjective      Still gets short of breath from time to time randomly, at rest or with walking, although oxygen saturation is normal.    Objective   Vitals: /76   Pulse 83   Temp 97.4 °F (36.3 °C) (Oral)   Resp 18   Ht 190.5 cm (75\")   Wt 76.8 kg (169 lb 5 oz)   SpO2 100%   BMI 21.16 kg/m²     Physical Exam:  General Appearance:   · well developed  · well nourished  Neck:  · thyroid not enlarged  · supple  Respiratory:  · no respiratory distress  · normal breath sounds  · no rales  Cardiovascular:  · no jugular venous distention  · regular rhythm  · apical impulse normal  · S1 normal, S2 normal  · no S3, no S4   · no murmur  · no rub, no thrill  · carotid pulses normal; no bruit  · pedal pulses normal  · lower extremity edema: none    Skin:   warm, dry      Results:  Results from last 7 days   Lab Units 10/15/24  0458 10/14/24  0523 10/13/24  0510   WBC 10*3/mm3 4.97 5.20 5.14   HEMOGLOBIN g/dL 12.0* 12.0* 12.5*   HEMATOCRIT % 38.8 37.8 39.6   PLATELETS 10*3/mm3 171 161 165     Results from last 7 days   Lab Units 10/15/24  0458 10/14/24  0523 10/13/24  0510   SODIUM mmol/L 142 140 142   POTASSIUM mmol/L 3.9 4.2 4.4   CHLORIDE mmol/L 105 103 101   CO2 mmol/L 31.0* 31.0* 29.0   BUN mg/dL 38* 35* 35*   CREATININE mg/dL 1.55* 1.43* 1.47*   GLUCOSE mg/dL 102* 89 138*      Lab Results   Component Value Date    CHOL 97 10/11/2024    TRIG 42 10/11/2024    HDL 44 " Patient to CT   10/11/2024    LDL 42 10/11/2024    AST 25 10/15/2024    ALT 15 10/15/2024     Results from last 7 days   Lab Units 10/11/24  0606   HEMOGLOBIN A1C % 6.30*     Results from last 7 days   Lab Units 10/11/24  0606   CHOLESTEROL mg/dL 97   TRIGLYCERIDES mg/dL 42   HDL CHOL mg/dL 44   LDL CHOL mg/dL 42     Results from last 7 days   Lab Units 10/10/24  1243   TSH uIU/mL 9.290*   FREE T4 ng/dL 1.71*     Results from last 7 days   Lab Units 10/15/24  0754   PROTIME Seconds 15.3*   INR  1.20*     Results from last 7 days   Lab Units 10/10/24  1243   HSTROP T ng/L 21     Results from last 7 days   Lab Units 10/14/24  0523   PROBNP pg/mL 6,760.0*       Intake/Output Summary (Last 24 hours) at 10/15/2024 1538  Last data filed at 10/15/2024 0611  Gross per 24 hour   Intake --   Output 775 ml   Net -775 ml     I personally reviewed the patient's EKG/Telemetry data    Radiology Data:   CT Chest Without Contrast Diagnostic    Result Date: 10/14/2024  Impression: 1. Mild basilar interstitial edema with small to moderate right pleural effusion and right basilar atelectasis, unchanged from 10/9/2024. No new airspace disease. 2. Stable cardiomegaly. 3. Stable enlarged mediastinal nodes, favored to represent benign reactive findings. 3. Additional chronic findings include uncomplicated cholelithiasis, moderate emphysema, stable right adrenal adenoma, hepatic cysts. Electronically Signed: Alberta Lawler MD  10/14/2024 12:26 PM EDT  Workstation ID: [id number]     Results for orders placed during the hospital encounter of 10/10/24    Adult Transthoracic Echo Complete W/ Cont if Necessary Per Protocol    Interpretation Summary    Left ventricular systolic function is severely decreased. Calculated left ventricular EF = 20.5% Left ventricular ejection fraction appears to be less than 20%.    The left ventricular cavity is moderately dilated.    Left ventricular wall thickness is consistent with mild concentric hypertrophy.    Severely reduced  right ventricular systolic function noted.    The right ventricular cavity is moderately dilated.    The left atrial cavity is moderate to severely dilated.    The right atrial cavity is severely  dilated.    There is mild calcification of the aortic valve.    Moderate mitral valve regurgitation is present.    Moderate tricuspid valve regurgitation is present.    Estimated right ventricular systolic pressure from tricuspid regurgitation is moderately elevated (45-55 mmHg).    Moderate pulmonic valve regurgitation is present.    No significant change compared to the 2023 echo.        Current Medications:  arformoterol, 15 mcg, Nebulization, BID - RT  aspirin, 81 mg, Oral, Daily  atorvastatin, 40 mg, Oral, Nightly  bumetanide, 1 mg, Intravenous, Daily  empagliflozin, 10 mg, Oral, Daily  guaiFENesin, 1,200 mg, Oral, Q12H  ipratropium, 0.5 mg, Nebulization, 4x Daily - RT  levothyroxine, 75 mcg, Oral, Q AM  melatonin, 5 mg, Oral, Nightly  metoprolol succinate XL, 25 mg, Oral, BID  multivitamin with minerals, 1 tablet, Oral, Daily  pharmacy consult - MTM, , Does not apply, Daily  rOPINIRole, 0.5 mg, Oral, Nightly  sacubitril-valsartan, 0.5 tablet, Oral, BID  sodium chloride, 10 mL, Intravenous, Q12H  temazepam, 15 mg, Oral, Nightly           Initial cardiac assessment: 79-year-old gentleman with a history of ischemic cardiomyopathy, HFrEF EF 20% with paroxysmal A-fib, CAD, watchman, AV node ablation with BiV ICD, presenting with acute on chronic HFrEF.        Recommendations:  1.  Acute on chronic HFrEF:  Echo with LVEF 20%, biventricular failure   Continue IV Bumex 2 mg daily, goal net -1.5 to 2 L daily  Continue GDMT with Toprol, Entresto, and Jardiance  Will not add spironolactone at this time due to limitation of hypotension and CKD     Strict I's and O's and daily weights discussed with patient and nursing staff   I feel he is reached the maximum benefit of inpatient stay with IV diuresis, I recommend discharge home  with furosemide 40 mg twice daily which is double his home dose prior to admission.    We will also refer him to [facility] for advanced heart failure options.    Consider doubling Entresto dose as outpatient and possibly adding spironolactone if renal function and blood pressure stable.     2.  PAF:  Status post watchman and AV node ablation  No need for full anticoagulation  Paced rhythm.     3.  CAD:  Continue aspirin and statin  No signs of acute coronary syndrome, negative troponin, no chest pain.     4.  Chronic COPD on oxygen:  Followed by pulmonary as an outpatient     5.  CKD stage IIIa:  Complicates all aspects of care  Monitor closely with diuresis, Cr stable so far   Continue SGLT2 inhibitor        Okay for discharge home, discussed with Dr. Barbara Quintana MD, FACC, [facility]  Interventional Cardiology

## 2024-10-15 NOTE — PROGRESS NOTES
Case Management Discharge Note      Final Note: Mr. Albrecht will be discharged home from the hospital on Tuesday 10/15/2024         Selected Continued Care - Admitted Since 10/10/2024       Destination    No services have been selected for the patient.                Durable Medical Equipment    No services have been selected for the patient.                Dialysis/Infusion    No services have been selected for the patient.                Home Medical Care    No services have been selected for the patient.                Therapy    No services have been selected for the patient.                Community Resources    No services have been selected for the patient.                Community & DME    No services have been selected for the patient.                    Transportation Services  Private: Car    Final Discharge Disposition Code: 01 - home or self-careContinued Stay Note  [facility]     Patient Name: Colin Albrecht  MRN: [id number]  Today's Date: 10/15/2024    Admit Date: 10/10/2024        Discharge Plan       Row Name 10/15/24 1352       Plan    Final Discharge Disposition Code 01 - home or self-care    Final Note Mr. Albrecht will be discharged home from the hospital on Tuesday 10/15/2024      Row Name 10/15/24 1351       Plan    Final Discharge Disposition Code 01 - home or self-care                   Discharge Codes    No documentation.                 Expected Discharge Date and Time       Expected Discharge Date Expected Discharge Time    Oct 15, 2024               FAITH Michael

## 2024-10-15 NOTE — DISCHARGE SUMMARY
[facility] Medicine Services  DISCHARGE SUMMARY    Patient Name: Colin Albrecht  : 1945  MRN: [id number]    Date of Admission: 10/10/2024 12:18 PM  Date of Discharge:  10/15/2024  Primary Care Physician: Arun Soliman MD    Consults       Date and Time Order Name Status Description    10/10/2024  5:07 PM Inpatient Cardiology Consult Completed             Hospital Course     Presenting Problem: SOA     Active Hospital Problems    Diagnosis  POA   • Hypothyroidism (acquired) [E03.9]  Yes   • ICD (implantable cardioverter-defibrillator) in place [Z95.810]  Yes   • CKD (chronic kidney disease) [N18.9]  Yes   • COPD  [J44.9]  Yes   • Essential hypertension [I10]  Yes   • Coronary artery disease involving coronary bypass graft of native heart without angina pectoris [I25.810]  Yes   • Dilated CM  [I42.0]  Yes   • S/P Watchman device [Z95.818]  Yes   • SSS  [I49.5]  Yes   • Permanent atrial fibrillation [I48.21]  Yes      Resolved Hospital Problems    Diagnosis Date Resolved POA   • **Acute exacerbation of CHF (congestive heart failure) [I50.9] 10/15/2024 Yes   • Dependence on supplemental oxygen [Z99.81] 10/15/2024 Not Applicable          Hospital Course:  Colin Albrecht is a 79 y.o. male with past medical history significant for hypothyroidism, SSS s/p ICD,CAD, dilated CM, CHF w an EF 20.4 % (at last check 2023), A-fib s/p Watchman, ex-smoker, chronic oxygen use, and probable chronic CKD.  Patient follows with Dr. Wei with pulmonary, Dr. Quintana with cardiology, and Dr. Prado with cards EP.  Patient was last seen by pulmonary approximately 1 month ago with complaints of progressive shortness of air.  CT of the chest was completed outpatient PTA.  CT showed moderate to marked cardiomegaly new compared to prior CT from , pulmonary edema with sm to mod right pl effusion and trace left pl effusion.  Pt was to have outpatient echo in approx 2 weeks and follow-up with  Dr. Quintana, however continues to have progressive shortness of air. Patient seen again on the M of 10/13, following with Dr. Astudillo this weekend, patient overall reports breathing improving but still SOA with exertion, IV diuresis again on 10/13, if symptoms persist possible CT chest and consideration for possible thoracentesis if right pleural effusion persists. Seen again on 10/14, patient with more SOA this AM, more significant crackles as well, CT chest reviewed and will plan for thoracentesis at this time, likely in the AM of 10/15, continue to follow with cardiology at this time. Patient seen on the AM of 10/15, patient taken for thoracentesis but not enough fluid for removal. Discussed with Dr. Quintana who also saw patient today, plans for discharge to home now with BID Lasix for diuresis, he will follow up with heart and valve clinic in 1 week and Dr. Quintana in 1 month. Patient agreeable to plan, plans for discharge to home.      Progressive dyspnea  Acute/chronic systolic CHF  CAD/dilated CM/EF 20.4 at last check (5/2023)  SSS/ICD/A-fib s/p Watchman  Heart murmur  R sided pleural effusion   --Follows with Dr. Quintana with cards, Dr. Prado with EP.  -- Echo with reduced EF of 20%  -- BNP 10K, CXR with pulmonary venous congestion and small effusions  -- cards/Dr. Quintana following.    -- Plans for discharge to home with Lasix BID and follow up with heart and valve in 1 week   -- seen by IR on 10/15, not enough fluid for thoracentesis, discussed with Dr. Quintana and as above   -- Continue toprol, entresto, and jardiance. Entresto dosage has been increased S/p watchman so no need for AC     COPD  Chronic oxygen use  --Follows with Dr. Wei  -- Recently added home nebulizers, initially helped  -- wears 2LNC O2 at home     Hypothyroidism  -- Continue home Synthroid.    -- TSH elevated but free T4 upper limit of normal, outpatient follow up with his regular PCP for repeat labs        Discharge Follow Up  Recommendations for outpatient labs/diagnostics:   Follow up with PCP in 1 week   Follow up with heart and valve clinic in 1 week   Follow up with Dr. Quintana in 1 month     Day of Discharge     HPI:   Patient is a 78 yo M seen and examined by me this AM, seen again after return from planned thoracentesis but not enough fluid for removal. Plans for discharge to home with increased Lasix, follow up appointments as above       Vital Signs:   Temp:  [97.4 °F (36.3 °C)-98.2 °F (36.8 °C)] 97.4 °F (36.3 °C)  Heart Rate:  [73-94] 76  Resp:  [18-20] 18  BP: ()/(54-87) 114/76  Flow (L/min) (Oxygen Therapy):  [2] 2      Physical Exam:  Constitutional: No acute distress, awake, alert, frail appearing, on RA sitting in bed. Currently on 2L NC  HENT: NCAT, mucous membranes moist  Respiratory: Decreased BS bilaterally, crackles more evident on the right, respiratory effort normal, no rhonchi or wheezing  Cardiovascular: RRR, no murmurs, rubs, or gallops  Gastrointestinal: Positive bowel sounds, soft, nontender, nondistended  Musculoskeletal: trace edema bilateral LE's  Psychiatric: Appropriate affect, cooperative  Neurologic: Oriented x 3, FLOREZ, speech clear  Skin: No rashes    Pertinent  and/or Most Recent Results     LAB RESULTS:      Lab 10/15/24  0754 10/15/24  0458 10/14/24  0523 10/13/24  0510 10/12/24  1346 10/11/24  0606 10/11/24  0024 10/10/24  1945 10/10/24  1605 10/10/24  1243   WBC  --  4.97 5.20 5.14 5.72 6.53  --   --   --  6.75   HEMOGLOBIN  --  12.0* 12.0* 12.5* 12.6* 12.5*  --   --   --  12.7*   HEMATOCRIT  --  38.8 37.8 39.6 39.6 39.5  --   --   --  41.2   PLATELETS  --  171 161 165 149 156  --   --   --  156   NEUTROS ABS  --  3.07 3.26 3.69 4.32  --   --   --   --  5.24   IMMATURE GRANS (ABS)  --  0.01 0.01 0.04 0.01  --   --   --   --  0.02   LYMPHS ABS  --  0.98 0.92 0.62* 0.57*  --   --   --   --  0.54*   MONOS ABS  --  0.72 0.75 0.59 0.62  --   --   --   --  0.79   EOS ABS  --  0.14 0.21 0.16 0.18  --    --   --   --  0.12   MCV  --  98.2* 97.2* 97.5* 97.5* 96.3  --   --   --  99.3*   PROCALCITONIN  --   --   --   --   --   --   --   --   --  0.05   LACTATE  --   --   --   --   --   --  1.1 2.6* 2.5* 2.1*   PROTIME 15.3*  --   --   --   --   --   --   --   --   --          Lab 10/15/24  0458 10/14/24  0523 10/13/24  0510 10/12/24  1346 10/11/24  0606 10/10/24  1243   SODIUM 142 140 142 139 141 140   POTASSIUM 3.9 4.2 4.4 4.6 4.5 4.5   CHLORIDE 105 103 101 101 103 102   CO2 31.0* 31.0* 29.0 29.0 31.0* 27.0   ANION GAP 6.0 6.0 12.0 9.0 7.0 11.0   BUN 38* 35* 35* 35* 32* 33*   CREATININE 1.55* 1.43* 1.47* 1.34* 1.53* 1.31*   EGFR 45.5* 50.2* 48.5* 54.2* 46.2* 55.7*   GLUCOSE 102* 89 138* 127* 92 113*   CALCIUM 8.9 8.5* 8.9 9.0 9.2 9.4   MAGNESIUM  --  2.5* 2.4 2.4 2.3  --    HEMOGLOBIN A1C  --   --   --   --  6.30*  --    TSH  --   --   --   --   --  9.290*         Lab 10/15/24  0458 10/14/24  0523 10/13/24  0510 10/12/24  1346 10/11/24  0606   TOTAL PROTEIN 6.2 5.7* 6.1 6.2 6.7   ALBUMIN 3.3* 3.3* 3.6 3.5 3.6   GLOBULIN 2.9 2.4 2.5 2.7 3.1   ALT (SGPT) 15 13 12 10 16   AST (SGOT) 25 23 25 27 30   BILIRUBIN 0.9 1.0 1.2 1.4* 1.4*   ALK PHOS 102 99 115 102 108         Lab 10/15/24  0754 10/14/24  0523 10/13/24  0510 10/10/24  1243   PROBNP  --  6,760.0* 7,901.0* 10,682.0*   HSTROP T  --   --   --  21   PROTIME 15.3*  --   --   --    INR 1.20*  --   --   --          Lab 10/11/24  0606   CHOLESTEROL 97   LDL CHOL 42   HDL CHOL 44   TRIGLYCERIDES 42             Brief Urine Lab Results       None          Microbiology Results (last 10 days)       Procedure Component Value - Date/Time    COVID PRE-OP / PRE-PROCEDURE SCREENING ORDER (NO ISOLATION) - Swab, Nasal Cavity [[id number]]  (Normal) Collected: 10/10/24 1302    Lab Status: Final result Specimen: Swab from Nasal Cavity Updated: 10/10/24 1338    Narrative:      The following orders were created for panel order COVID PRE-OP / PRE-PROCEDURE SCREENING ORDER (NO ISOLATION) -  Swab, Nasal Cavity.  Procedure                               Abnormality         Status                     ---------                               -----------         ------                     COVID-19 and FLU A/B PCR...[[id number]]  Normal              Final result                 Please view results for these tests on the individual orders.    COVID-19 and FLU A/B PCR, 1 HR TAT - Swab, Nasopharynx [[id number]]  (Normal) Collected: 10/10/24 1302    Lab Status: Final result Specimen: Swab from Nasopharynx Updated: 10/10/24 1338     COVID19 Not Detected     Influenza A PCR Not Detected     Influenza B PCR Not Detected    Narrative:      Fact sheet for providers: https://www.fda.gov/media/794189/download    Fact sheet for patients: https://www.fda.gov/media/257334/download    Test performed by PCR.            CT Chest Without Contrast Diagnostic    Result Date: 10/14/2024  CT CHEST WO CONTRAST DIAGNOSTIC Date of Exam: 10/14/2024 11:45 AM EDT Indication: Follow up R Pleural effusion following diuresis. Comparison: AP chest radiograph 10/12/2024, CT chest 10/9/2024 Technique: Axial CT images were obtained of the chest without contrast administration.  Reconstructed coronal and sagittal images were also obtained. Automated exposure control and iterative construction methods were used. Findings: Moderate emphysematous changes are present. Mild smooth interstitial thickening is seen within the lung bases suggesting mild edema. Small to moderate right basilar pleural effusion layers to a depth of 2.9 cm with passive right lower lobe atelectasis, similar to 10/9/2024. No new airspace disease is identified. Calcified pleural plaque is seen in the left mid thorax. Stable cardiomegaly with atrial appendage occlusion device and signs of prior CABG. Pacemaker device in place. No pericardial effusion is seen. Mediastinal adenopathy is unchanged, including prevascular node 9 mm short axis and right paratracheal node 10  mm short  axis and AP window node 9 mm short axis (series 2 image 50). No new or progressive adenopathy. Hepatic cysts are present, largest in the left lobe measuring about 4 cm. Stable low-density right adrenal nodule most in keeping with a benign adenoma. Uncomplicated cholelithiasis. Remainder of the imaged upper abdominal organs demonstrate a normal noncontrast appearance. No acute or suspicious osseous abnormality.     Impression: 1. Mild basilar interstitial edema with small to moderate right pleural effusion and right basilar atelectasis, unchanged from 10/9/2024. No new airspace disease. 2. Stable cardiomegaly. 3. Stable enlarged mediastinal nodes, favored to represent benign reactive findings. 3. Additional chronic findings include uncomplicated cholelithiasis, moderate emphysema, stable right adrenal adenoma, hepatic cysts. Electronically Signed: Alberta Lawler MD  10/14/2024 12:26 PM EDT  Workstation ID: [id number]    XR Chest 1 View    Result Date: 10/12/2024  XR CHEST 1 VW Date of Exam: 10/12/2024 7:10 AM EDT Indication: Shortness of breath. Comparison: 10/11/2024. Findings: There are persistent bilateral basal pleural effusions which are small in size with atelectasis, right greater than left side. The heart is enlarged. The patient is had a previous median sternotomy. There is a left-sided transvenous pacemaker in place. The pulmonary vascular markings are increased felt to represent mild pulmonary edema. There is no pneumothorax. There are chronic age-related changes involving the bony thorax and thoracic aorta.     Impression: No interval change from yesterday with abnormalities as described above. Electronically Signed: Candido Palomino MD  10/12/2024 10:23 AM EDT  Workstation ID: [id number]    Adult Transthoracic Echo Complete W/ Cont if Necessary Per Protocol    Result Date: 10/11/2024  •  Left ventricular systolic function is severely decreased. Calculated left ventricular EF = 20.5% Left ventricular ejection  fraction appears to be less than 20%. •  The left ventricular cavity is moderately dilated. •  Left ventricular wall thickness is consistent with mild concentric hypertrophy. •  Severely reduced right ventricular systolic function noted. •  The right ventricular cavity is moderately dilated. •  The left atrial cavity is moderate to severely dilated. •  The right atrial cavity is severely  dilated. •  There is mild calcification of the aortic valve. •  Moderate mitral valve regurgitation is present. •  Moderate tricuspid valve regurgitation is present. •  Estimated right ventricular systolic pressure from tricuspid regurgitation is moderately elevated (45-55 mmHg). •  Moderate pulmonic valve regurgitation is present. No significant change compared to the 2023 echo.     XR Chest 1 View    Result Date: 10/11/2024  XR CHEST 1 VW Date of Exam: 10/11/2024 2:59 AM EDT Indication: f/u dyspnea, a/c chf, pl effusions Comparison: October 10, 2024 Findings: A cardiac ICD device is present. Sternotomy wires noted. The heart is enlarged. There are bibasilar densities which could reflect effusions. Pulmonary vascular markings are prominent. There are some interstitial changes. Some vascular congestion and edema could account for this.     Impression: 1.Cardiomegaly with what looks like some vascular congestion and probable interstitial edema. 2.Bibasilar effusions Electronically Signed: Percy Mcguire MD  10/11/2024 7:12 AM EDT  Workstation ID: [id number]    XR Chest 1 View    Result Date: 10/10/2024  XR CHEST 1 VW Date of Exam: 10/10/2024 12:45 PM EDT Indication: SOA triage protocol Comparison: CT chest without contrast 10/9/2024 Findings: Left-sided AICD noted. Status post sternotomy and CABG. Central pulmonary vascular congestion with interstitial thickening suggesting pulmonary edema. Underlying emphysema. Persistent small bilateral pleural effusions right greater than left with bibasilar atelectasis. Negative for pneumothorax.      Impression: 1. Central pulmonary vascular congestion with interstitial thickening suggesting pulmonary edema. 2. Persistent small bilateral pleural effusions right greater than left with bibasilar atelectasis. 3. Emphysema. Electronically Signed: Luis Richards MD  10/10/2024 1:28 PM EDT  Workstation ID: [id number]    CT Chest Without Contrast Diagnostic    Result Date: 10/9/2024  CT CHEST WO CONTRAST DIAGNOSTIC Date of Exam: 10/9/2024 11:27 AM EDT Indication: shortness of breath. Comparison: PA and lateral chest 7/17/2024. CT chest 6/14/2021. Technique: Axial CT images were obtained of the chest without contrast administration.  Reconstructed coronal and sagittal images were also obtained. Automated exposure control and iterative construction methods were used. Findings:  Moderate centrilobular and paraseptal emphysematous changes are present. Small to moderate right basilar pleural effusion layers to a depth of 5 cm, and there is passive atelectasis in the bilateral lower lobes. There is bronchial wall thickening in both lungs without overt mucous plugging. Smooth interstitial thickening and hazy groundglass densities throughout both lungs may reflect changes of mild pulmonary edema, as well. Trace left basilar pleural fluid is present. There is moderate to marked cardiac enlargement which is new since 6/14/2021. Pacemaker leads are in place. Left atrial appendage occlusion device is present. There is mild thoracic aortic calcific atherosclerosis. There are mildly enlarged lymph nodes which are new since 2021. Index lymph nodes include: Right lower paratracheal 11 mm short axis (2/161), pretracheal 12 mm short axis (2/168), prevascular 11 mm (2/164). A cyst in the left hepatic lobe measures 3.9 cm, stable. Smaller hepatic cysts are also noted. Small quantity ascites is seen adjacent to the liver. Low-density right adrenal nodule measuring 14 mm is unchanged from 2021, most in keeping with a benign adenoma.  Remainder of the imaged upper abdominal organs demonstrate a normal noncontrast appearance. No acute or suspicious osseous abnormalities are identified.       Impression: 1. Moderate to marked cardiomegaly, which appears new when compared to the CT chest of 6/14/2021. Signs of prior CABG. Correlate with cardiac history. 2. Features suggesting mild interstitial and alveolar pulmonary edema with small to moderate right pleural effusion and trace left basilar pleural effusion. 3. Mild right lower lobe atelectasis. 4. Moderate emphysema. 5. Trace perihepatic ascites. 6. Stable right adrenal adenoma. Electronically Signed: Alberta Lawler MD  10/9/2024 12:03 PM EDT  Workstation ID: [id number]     Results for orders placed during the hospital encounter of 06/14/21    Duplex Venous Upper Extremity - Left CAR    Interpretation Summary  · Acute left upper extremity deep vein thrombosis noted in the subclavian and axillary.  · Acute left upper extremity superficial thrombophlebitis noted in the cephalic (upper arm).  · All other left sided vessels appear normal.      Results for orders placed during the hospital encounter of 06/14/21    Duplex Venous Upper Extremity - Left CAR    Interpretation Summary  · Acute left upper extremity deep vein thrombosis noted in the subclavian and axillary.  · Acute left upper extremity superficial thrombophlebitis noted in the cephalic (upper arm).  · All other left sided vessels appear normal.      Results for orders placed during the hospital encounter of 10/10/24    Adult Transthoracic Echo Complete W/ Cont if Necessary Per Protocol    Interpretation Summary  •  Left ventricular systolic function is severely decreased. Calculated left ventricular EF = 20.5% Left ventricular ejection fraction appears to be less than 20%.  •  The left ventricular cavity is moderately dilated.  •  Left ventricular wall thickness is consistent with mild concentric hypertrophy.  •  Severely reduced right  ventricular systolic function noted.  •  The right ventricular cavity is moderately dilated.  •  The left atrial cavity is moderate to severely dilated.  •  The right atrial cavity is severely  dilated.  •  There is mild calcification of the aortic valve.  •  Moderate mitral valve regurgitation is present.  •  Moderate tricuspid valve regurgitation is present.  •  Estimated right ventricular systolic pressure from tricuspid regurgitation is moderately elevated (45-55 mmHg).  •  Moderate pulmonic valve regurgitation is present.    No significant change compared to the 2023 echo.      Plan for Follow-up of Pending Labs/Results: N/A     Discharge Details        Discharge Medications        Changes to Medications        Instructions Start Date   furosemide 40 MG tablet  Commonly known as: LASIX  What changed: when to take this   40 mg, Oral, 2 Times Daily             Continue These Medications        Instructions Start Date   albuterol sulfate  (90 Base) MCG/ACT inhaler  Commonly known as: PROVENTIL HFA;VENTOLIN HFA;PROAIR HFA   2 puffs, Inhalation, Every 4 Hours PRN      aspirin 81 MG EC tablet   81 mg, Oral, Daily      atorvastatin 40 MG tablet  Commonly known as: LIPITOR   40 mg, Oral, Nightly      CoQ10 100 MG capsule   1 capsule, Oral, Daily, OTC      empagliflozin 10 MG tablet tablet  Commonly known as: JARDIANCE   10 mg, Oral, Daily      Entresto 24-26 MG tablet  Generic drug: sacubitril-valsartan   0.5 tablets, Oral, 2 Times Daily      ipratropium-albuterol 0.5-2.5 mg/3 ml nebulizer  Commonly known as: DUO-NEB   3 mL, Nebulization, 4 Times Daily PRN      levothyroxine 75 MCG tablet  Commonly known as: SYNTHROID, LEVOTHROID   1 tablet, Oral, Daily      metoprolol succinate XL 25 MG 24 hr tablet  Commonly known as: TOPROL-XL   25 mg, Oral, 2 Times Daily      multivitamin with minerals tablet tablet   1 tablet, Oral, Daily      O2  Commonly known as: OXYGEN   2.5 L/min, Inhalation, Nightly      polyethylene  glycol 17 GM/SCOOP powder  Commonly known as: MIRALAX   DISSOLVE 1 CAPFUL IN 8 OUNCES OF LIQUID AND DRINK ONCE DAILY      rOPINIRole 0.5 MG tablet  Commonly known as: REQUIP   0.5 mg, Oral, Nightly PRN      temazepam 15 MG capsule  Commonly known as: RESTORIL   TAKE 1 CAPSULE BY MOUTH EVERY DAY AT BEDTIME AS NEEDED FOR SLEEP      tiotropium bromide-olodaterol 2.5-2.5 MCG/ACT aerosol solution inhaler  Commonly known as: STIOLTO RESPIMAT   2 puffs, Inhalation, Daily               No Known Allergies      Discharge Disposition:  Home or Self Care    Diet:  Hospital:  Diet Order   Procedures   • Diet: Cardiac; Healthy Heart (2-3 Na+); Fluid Consistency: Thin (IDDSI 0)            Activity:  Resume previous as tolerated     Restrictions or Other Recommendations:  Follow up as below        CODE STATUS:    Code Status and Medical Interventions: CPR (Attempt to Resuscitate); Full Support   Ordered at: 10/10/24 1602     Level Of Support Discussed With:    Patient    Next of Kin (If No Surrogate)     Code Status (Patient has no pulse and is not breathing):    CPR (Attempt to Resuscitate)     Medical Interventions (Patient has pulse or is breathing):    Full Support       Future Appointments   Date Time Provider Department Center   10/15/2024  2:00 PM [facility] US 2 IR [facility]   10/22/2024  3:00 PM [facility]8 [facility]   10/24/2024  1:15 PM Pietro Quintana MD [facility]   11/22/2024  1:00 PM Gerald Wei MD [facility]   2/27/2025  2:30 PM Pietro Quintana MD [facility]   7/2/2025  2:30 PM Yonny Prado MD [facility]       Additional Instructions for the Follow-ups that You Need to Schedule       Discharge Follow-up with PCP   As directed       Currently Documented PCP:    Arun Soliman MD    PCP Phone Number:    [phone]     Follow Up Details: Follow up with PCP in 1 week        Discharge Follow-up with Specified Provider: Follow up with Dr. Quintana in next 1  month   As directed      To: Follow up with Dr. Quintana in next 1 month        Discharge Follow-up with Specified Provider: Follow up with heart and valve clinic in 1 week   As directed      To: Follow up with heart and valve clinic in 1 week                      RADHA Jimenez DO  10/15/24      Time Spent on Discharge:  I spent  40  minutes on this discharge activity which included: face-to-face encounter with the patient, reviewing the data in the system, coordination of the care with the nursing staff as well as consultants, documentation, and entering orders.

## 2024-10-16 NOTE — PAYOR COMM NOTE
"Ref# [id number]   Still Pending  10/15/24 Discharge Date    OWEN Mendiola, RN  Utilization Review  Phone [phone]  Fax [phone]    [facility]  [address] [place] [id number]       Monica Albrecht (79 y.o. Male)       Date of Birth   1945    Social Security Number       Address   [address] [place] [address]    Home Phone   [phone]    MRN   [id number]       [facility]    Marital Status                               Admission Date   10/10/24    Admission Type   Emergency    Admitting Provider   TOMMY Jimenez DO    Attending Provider       Department, Room/Bed   [address], S519/1       Discharge Date   10/15/2024    Discharge Disposition   Home or Self Care    Discharge Destination                                 Attending Provider: (none)   Allergies: No Known Allergies    Isolation: None   Infection: None   Code Status: Prior    Ht: 190.5 cm (75\")   Wt: 76.8 kg (169 lb 5 oz)    Admission Cmt: None   Principal Problem: Acute exacerbation of CHF (congestive heart failure) [I50.9]                   Active Insurance as of 10/10/2024       Primary Coverage       Payor Plan Insurance Group Employer/Plan Group    ANTHEM MEDICARE REPLACEMENT ANTHEM MEDICARE ADVANTAGE [id number]       Payor Plan Address Payor Plan Phone Number Payor Plan Fax Number Effective Dates    [address] [phone]  1/1/2024 - None Entered    [facility] [address]         Subscriber Name Subscriber Birth Date Member ID       MONICA ALBRECHT 1945 [id number]                     Emergency Contacts        (Rel.) Home Phone Work Phone Mobile Phone    DESMOND NG (Friend) [phone] -- [phone]    KERONTUAN (Son) [phone] -- [phone]    Lenore Albrecht (Spouse) [phone] -- --              Oxygen Therapy (last 2 days) before discharge       Date/Time SpO2 Device (Oxygen Therapy) Flow (L/min) (Oxygen Therapy) " Oxygen Concentration (%) ETCO2 (mmHg)    10/15/24 1200 -- room air -- -- --    10/15/24 1100 -- room air -- -- --    10/15/24 10:14:42 100 nasal cannula 2 -- --    10/15/24 0800 -- room air -- -- --    10/15/24 0611 96 room air -- -- --    10/14/24 2300 99 room air -- -- --    10/14/24 2000 -- room air -- -- --    10/14/24 1800 -- room air -- -- --    10/14/24 1600 -- room air -- -- --    10/14/24 1500 -- room air -- -- --    10/14/24 1400 -- room air -- -- --    10/14/24 1301 -- room air -- -- --    10/14/24 1200 -- room air -- -- --    10/14/24 1110 -- room air -- -- --    10/14/24 1000 -- nasal cannula 2 -- --    10/14/24 0830 -- nasal cannula 2 -- --    10/14/24 0748 97 humidified;nasal cannula 2 -- --    10/14/24 0700 -- nasal cannula;humidified -- -- --    10/14/24 0400 -- nasal cannula 2 -- --    10/14/24 0200 -- nasal cannula 2 -- --    10/14/24 0000 -- nasal cannula 2 -- --    10/13/24 2200 -- nasal cannula 2 -- --    10/13/24 2055 90 room air -- -- --    10/13/24 2000 -- room air -- -- --    10/13/24 1918 95 room air -- -- --    10/13/24 1800 -- room air -- -- --    10/13/24 1702 95 room air -- -- --    10/13/24 1600 -- room air -- -- --    10/13/24 1526 95 room air -- -- --    10/13/24 1400 -- nasal cannula 2 -- --    10/13/24 1200 -- nasal cannula 2 -- --    10/13/24 1139 97 -- -- -- --    10/13/24 1044 -- nasal cannula -- -- --    10/13/24 1000 -- nasal cannula 2 -- --    10/13/24 0824 -- nasal cannula 2 -- --    10/13/24 0800 -- nasal cannula 2 -- --    10/13/24 0718 -- nasal cannula 2 -- --    10/13/24 0500 96 -- -- -- --    10/13/24 0400 -- nasal cannula 2 -- --    10/13/24 0200 -- nasal cannula 2 -- --    10/13/24 0000 -- nasal cannula 2 -- --          No current facility-administered medications for this encounter.     Current Outpatient Medications   Medication Sig Dispense Refill    albuterol sulfate  (90 Base) MCG/ACT inhaler Inhale 2 puffs Every 4 (Four) Hours As Needed for Wheezing. 54  g 3    aspirin 81 MG EC tablet Take 1 tablet by mouth Daily.      atorvastatin (LIPITOR) 40 MG tablet TAKE 1 TABLET BY MOUTH EVERY NIGHT. 90 tablet 3    Coenzyme Q10 (CoQ10) 100 MG capsule Take 1 capsule by mouth Daily. OTC      empagliflozin (JARDIANCE) 10 MG tablet tablet Take 1 tablet by mouth Daily. 90 tablet 2    furosemide (LASIX) 40 MG tablet Take 1 tablet by mouth 2 (Two) Times a Day. 60 tablet 0    ipratropium-albuterol (DUO-NEB) 0.5-2.5 mg/3 ml nebulizer Take 3 mL by nebulization 4 (Four) Times a Day As Needed for Wheezing or Shortness of Air. 120 mL 11    levothyroxine (SYNTHROID, LEVOTHROID) 75 MCG tablet Take 1 tablet by mouth Daily.      metoprolol succinate XL (TOPROL-XL) 25 MG 24 hr tablet Take 1 tablet by mouth twice daily 60 tablet 5    multivitamin with minerals tablet tablet Take 1 tablet by mouth Daily.      O2 (OXYGEN) Inhale 3 L/min Every Night.      polyethylene glycol (MIRALAX) 17 GM/SCOOP powder DISSOLVE 1 CAPFUL IN 8 OUNCES OF LIQUID AND DRINK ONCE DAILY      rOPINIRole (REQUIP) 0.5 MG tablet Take 1 tablet by mouth At Night As Needed.      sacubitril-valsartan (Entresto) 24-26 MG tablet Take 0.5 tablets by mouth 2 (Two) Times a Day. 90 tablet 2    temazepam (RESTORIL) 15 MG capsule TAKE 1 CAPSULE BY MOUTH EVERY DAY AT BEDTIME AS NEEDED FOR SLEEP      tiotropium bromide-olodaterol (STIOLTO RESPIMAT) 2.5-2.5 MCG/ACT aerosol solution inhaler Inhale 2 puffs Daily. 3 each 3     Lab Results (last 72 hours)       Procedure Component Value Units Date/Time    Protime-INR [[id number]]  (Abnormal) Collected: 10/15/24 0754    Specimen: Blood Updated: 10/15/24 0816     Protime 15.3 Seconds      INR 1.20    Comprehensive Metabolic Panel [[id number]]  (Abnormal) Collected: 10/15/24 0458    Specimen: Blood Updated: 10/15/24 0657     Glucose 102 mg/dL      BUN 38 mg/dL      Creatinine 1.55 mg/dL      Sodium 142 mmol/L      Potassium 3.9 mmol/L      Chloride 105 mmol/L      CO2 31.0 mmol/L      Calcium 8.9  mg/dL      Total Protein 6.2 g/dL      Albumin 3.3 g/dL      ALT (SGPT) 15 U/L      AST (SGOT) 25 U/L      Alkaline Phosphatase 102 U/L      Total Bilirubin 0.9 mg/dL      Globulin 2.9 gm/dL      Comment: Calculated Result        A/G Ratio 1.1 g/dL      BUN/Creatinine Ratio 24.5     Anion Gap 6.0 mmol/L      eGFR 45.5 mL/min/1.73     Narrative:      GFR Normal >60  Chronic Kidney Disease <60  Kidney Failure <15    The GFR formula is only valid for adults with stable renal function between ages 18 and 70.    CBC & Differential [[id number]]  (Abnormal) Collected: 10/15/24 0458    Specimen: Blood Updated: 10/15/24 0527    Narrative:      The following orders were created for panel order CBC & Differential.  Procedure                               Abnormality         Status                     ---------                               -----------         ------                     CBC Auto Differential[[id number]]        Abnormal            Final result                 Please view results for these tests on the individual orders.    CBC Auto Differential [[id number]]  (Abnormal) Collected: 10/15/24 0458    Specimen: Blood Updated: 10/15/24 0527     WBC 4.97 10*3/mm3      RBC 3.95 10*6/mm3      Hemoglobin 12.0 g/dL      Hematocrit 38.8 %      MCV 98.2 fL      MCH 30.4 pg      MCHC 30.9 g/dL      RDW 15.9 %      RDW-SD 57.5 fl      MPV 10.2 fL      Platelets 171 10*3/mm3      Neutrophil % 61.8 %      Lymphocyte % 19.7 %      Monocyte % 14.5 %      Eosinophil % 2.8 %      Basophil % 1.0 %      Immature Grans % 0.2 %      Neutrophils, Absolute 3.07 10*3/mm3      Lymphocytes, Absolute 0.98 10*3/mm3      Monocytes, Absolute 0.72 10*3/mm3      Eosinophils, Absolute 0.14 10*3/mm3      Basophils, Absolute 0.05 10*3/mm3      Immature Grans, Absolute 0.01 10*3/mm3      nRBC 0.0 /100 WBC     proBNP [[id number]]  (Abnormal) Collected: 10/14/24 0523    Specimen: Blood Updated: 10/14/24 1325     proBNP 6,760.0 pg/mL     Narrative:      This  assay is used as an aid in the diagnosis of individuals suspected of having heart failure. It can be used as an aid in the diagnosis of acute decompensated heart failure (ADHF) in patients presenting with signs and symptoms of ADHF to the emergency department (ED). In addition, NT-proBNP of <300 pg/mL indicates ADHF is not likely.    Age Range Result Interpretation  NT-proBNP Concentration (pg/mL:      <50             Positive            >450                   Gray                 300-450                    Negative             <300    50-75           Positive            >900                  Gray                300-900                  Negative            <300      >75             Positive            >1800                  Gray                300-1800                  Negative            <300    Magnesium [[id number]]  (Abnormal) Collected: 10/14/24 0523    Specimen: Blood Updated: 10/14/24 0614     Magnesium 2.5 mg/dL     Comprehensive Metabolic Panel [[id number]]  (Abnormal) Collected: 10/14/24 0523    Specimen: Blood Updated: 10/14/24 0614     Glucose 89 mg/dL      BUN 35 mg/dL      Creatinine 1.43 mg/dL      Sodium 140 mmol/L      Potassium 4.2 mmol/L      Chloride 103 mmol/L      CO2 31.0 mmol/L      Calcium 8.5 mg/dL      Total Protein 5.7 g/dL      Albumin 3.3 g/dL      ALT (SGPT) 13 U/L      AST (SGOT) 23 U/L      Alkaline Phosphatase 99 U/L      Total Bilirubin 1.0 mg/dL      Globulin 2.4 gm/dL      Comment: Calculated Result        A/G Ratio 1.4 g/dL      BUN/Creatinine Ratio 24.5     Anion Gap 6.0 mmol/L      eGFR 50.2 mL/min/1.73     Narrative:      GFR Normal >60  Chronic Kidney Disease <60  Kidney Failure <15    The GFR formula is only valid for adults with stable renal function between ages 18 and 70.    CBC & Differential [[id number]]  (Abnormal) Collected: 10/14/24 0523    Specimen: Blood Updated: 10/14/24 0545    Narrative:      The following orders were created for panel order CBC &  Differential.  Procedure                               Abnormality         Status                     ---------                               -----------         ------                     CBC Auto Differential[[id number]]        Abnormal            Final result                 Please view results for these tests on the individual orders.    CBC Auto Differential [[id number]]  (Abnormal) Collected: 10/14/24 0523    Specimen: Blood Updated: 10/14/24 0545     WBC 5.20 10*3/mm3      RBC 3.89 10*6/mm3      Hemoglobin 12.0 g/dL      Hematocrit 37.8 %      MCV 97.2 fL      MCH 30.8 pg      MCHC 31.7 g/dL      RDW 15.9 %      RDW-SD 57.1 fl      MPV 10.0 fL      Platelets 161 10*3/mm3      Neutrophil % 62.7 %      Lymphocyte % 17.7 %      Monocyte % 14.4 %      Eosinophil % 4.0 %      Basophil % 1.0 %      Immature Grans % 0.2 %      Neutrophils, Absolute 3.26 10*3/mm3      Lymphocytes, Absolute 0.92 10*3/mm3      Monocytes, Absolute 0.75 10*3/mm3      Eosinophils, Absolute 0.21 10*3/mm3      Basophils, Absolute 0.05 10*3/mm3      Immature Grans, Absolute 0.01 10*3/mm3      nRBC 0.0 /100 WBC     BNP [[id number]]  (Abnormal) Collected: 10/13/24 0510    Specimen: Blood Updated: 10/13/24 0727     proBNP 7,901.0 pg/mL     Narrative:      This assay is used as an aid in the diagnosis of individuals suspected of having heart failure. It can be used as an aid in the diagnosis of acute decompensated heart failure (ADHF) in patients presenting with signs and symptoms of ADHF to the emergency department (ED). In addition, NT-proBNP of <300 pg/mL indicates ADHF is not likely.    Age Range Result Interpretation  NT-proBNP Concentration (pg/mL:      <50             Positive            >450                   Gray                 300-450                    Negative             <300    50-75           Positive            >900                  Gray                300-900                  Negative            <300      >75             Positive             >1800                  Gray                300-1800                  Negative            <300    Magnesium [[id number]]  (Normal) Collected: 10/13/24 0510    Specimen: Blood Updated: 10/13/24 0716     Magnesium 2.4 mg/dL     Comprehensive Metabolic Panel [[id number]]  (Abnormal) Collected: 10/13/24 0510    Specimen: Blood Updated: 10/13/24 0716     Glucose 138 mg/dL      BUN 35 mg/dL      Creatinine 1.47 mg/dL      Sodium 142 mmol/L      Potassium 4.4 mmol/L      Chloride 101 mmol/L      CO2 29.0 mmol/L      Calcium 8.9 mg/dL      Total Protein 6.1 g/dL      Albumin 3.6 g/dL      ALT (SGPT) 12 U/L      AST (SGOT) 25 U/L      Alkaline Phosphatase 115 U/L      Total Bilirubin 1.2 mg/dL      Globulin 2.5 gm/dL      Comment: Calculated Result        A/G Ratio 1.4 g/dL      BUN/Creatinine Ratio 23.8     Anion Gap 12.0 mmol/L      eGFR 48.5 mL/min/1.73     Narrative:      GFR Normal >60  Chronic Kidney Disease <60  Kidney Failure <15    The GFR formula is only valid for adults with stable renal function between ages 18 and 70.    CBC & Differential [[id number]]  (Abnormal) Collected: 10/13/24 0510    Specimen: Blood Updated: 10/13/24 0620    Narrative:      The following orders were created for panel order CBC & Differential.  Procedure                               Abnormality         Status                     ---------                               -----------         ------                     CBC Auto Differential[[id number]]        Abnormal            Final result                 Please view results for these tests on the individual orders.    CBC Auto Differential [[id number]]  (Abnormal) Collected: 10/13/24 0510    Specimen: Blood Updated: 10/13/24 0620     WBC 5.14 10*3/mm3      RBC 4.06 10*6/mm3      Hemoglobin 12.5 g/dL      Hematocrit 39.6 %      MCV 97.5 fL      MCH 30.8 pg      MCHC 31.6 g/dL      RDW 15.7 %      RDW-SD 55.8 fl      MPV 10.4 fL      Platelets 165 10*3/mm3      Neutrophil % 71.7 %       Lymphocyte % 12.1 %      Monocyte % 11.5 %      Eosinophil % 3.1 %      Basophil % 0.8 %      Immature Grans % 0.8 %      Neutrophils, Absolute 3.69 10*3/mm3      Lymphocytes, Absolute 0.62 10*3/mm3      Monocytes, Absolute 0.59 10*3/mm3      Eosinophils, Absolute 0.16 10*3/mm3      Basophils, Absolute 0.04 10*3/mm3      Immature Grans, Absolute 0.04 10*3/mm3      nRBC 0.0 /100 WBC     Magnesium [[id number]]  (Normal) Collected: 10/12/24 1346    Specimen: Blood Updated: 10/12/24 1423     Magnesium 2.4 mg/dL     Comprehensive Metabolic Panel [[id number]]  (Abnormal) Collected: 10/12/24 1346    Specimen: Blood Updated: 10/12/24 1423     Glucose 127 mg/dL      BUN 35 mg/dL      Creatinine 1.34 mg/dL      Sodium 139 mmol/L      Potassium 4.6 mmol/L      Chloride 101 mmol/L      CO2 29.0 mmol/L      Calcium 9.0 mg/dL      Total Protein 6.2 g/dL      Albumin 3.5 g/dL      ALT (SGPT) 10 U/L      AST (SGOT) 27 U/L      Alkaline Phosphatase 102 U/L      Total Bilirubin 1.4 mg/dL      Globulin 2.7 gm/dL      Comment: Calculated Result        A/G Ratio 1.3 g/dL      BUN/Creatinine Ratio 26.1     Anion Gap 9.0 mmol/L      eGFR 54.2 mL/min/1.73     Narrative:      GFR Normal >60  Chronic Kidney Disease <60  Kidney Failure <15    The GFR formula is only valid for adults with stable renal function between ages 18 and 70.          Imaging Results (Last 72 Hours)       Procedure Component Value Units Date/Time    CT Chest Without Contrast Diagnostic [[id number]] Collected: 10/14/24 1221     Updated: 10/14/24 1229    Narrative:      CT CHEST WO CONTRAST DIAGNOSTIC    Date of Exam: 10/14/2024 11:45 AM EDT    Indication: Follow up R Pleural effusion following diuresis.    Comparison: AP chest radiograph 10/12/2024, CT chest 10/9/2024    Technique: Axial CT images were obtained of the chest without contrast administration.  Reconstructed coronal and sagittal images were also obtained. Automated exposure control and iterative construction  methods were used.      Findings:  Moderate emphysematous changes are present. Mild smooth interstitial thickening is seen within the lung bases suggesting mild edema. Small to moderate right basilar pleural effusion layers to a depth of 2.9 cm with passive right lower lobe atelectasis,   similar to 10/9/2024. No new airspace disease is identified. Calcified pleural plaque is seen in the left mid thorax.    Stable cardiomegaly with atrial appendage occlusion device and signs of prior CABG. Pacemaker device in place. No pericardial effusion is seen. Mediastinal adenopathy is unchanged, including prevascular node 9 mm short axis and right paratracheal node 10   mm short axis and AP window node 9 mm short axis (series 2 image 50). No new or progressive adenopathy.    Hepatic cysts are present, largest in the left lobe measuring about 4 cm. Stable low-density right adrenal nodule most in keeping with a benign adenoma. Uncomplicated cholelithiasis. Remainder of the imaged upper abdominal organs demonstrate a normal   noncontrast appearance.        No acute or suspicious osseous abnormality.      Impression:      Impression:    1. Mild basilar interstitial edema with small to moderate right pleural effusion and right basilar atelectasis, unchanged from 10/9/2024. No new airspace disease.  2. Stable cardiomegaly.  3. Stable enlarged mediastinal nodes, favored to represent benign reactive findings.  3. Additional chronic findings include uncomplicated cholelithiasis, moderate emphysema, stable right adrenal adenoma, hepatic cysts.        Electronically Signed: Alberta Lawler MD    10/14/2024 12:26 PM EDT    Workstation ID: [id number]          ECG/EMG Results (last 72 hours)       Procedure Component Value Units Date/Time    Telemetry Scan [[id number]] Resulted: 10/10/24     Updated: 10/13/24 0137    Telemetry Scan [[id number]] Resulted: 10/10/24     Updated: 10/14/24 0109    Telemetry Scan [[id number]] Resulted: 10/10/24      "Updated: 10/15/24 1407             Physician Progress Notes (last 72 hours)        Pietro Quintana MD at 10/15/24 1420            [facility]  PROGRESS NOTE    Date of Admission: 10/10/2024  Date of Service: 10/15/24    Primary Care Physician: Arun Soliman MD    Chief Complaint: f/u A/C HFrEF  Problem List:   Permanent atrial fibrillation    SSS     Dilated CM     S/P Watchman device    Coronary artery disease involving coronary bypass graft of native heart without angina pectoris    Essential hypertension    COPD     Hypothyroidism (acquired)    ICD (implantable cardioverter-defibrillator) in place    CKD (chronic kidney disease)      Subjective      Still gets short of breath from time to time randomly, at rest or with walking, although oxygen saturation is normal.    Objective   Vitals: /76   Pulse 83   Temp 97.4 °F (36.3 °C) (Oral)   Resp 18   Ht 190.5 cm (75\")   Wt 76.8 kg (169 lb 5 oz)   SpO2 100%   BMI 21.16 kg/m²     Physical Exam:  General Appearance:   · well developed  · well nourished  Neck:  · thyroid not enlarged  · supple  Respiratory:  · no respiratory distress  · normal breath sounds  · no rales  Cardiovascular:  · no jugular venous distention  · regular rhythm  · apical impulse normal  · S1 normal, S2 normal  · no S3, no S4   · no murmur  · no rub, no thrill  · carotid pulses normal; no bruit  · pedal pulses normal  · lower extremity edema: none    Skin:   warm, dry      Results:  Results from last 7 days   Lab Units 10/15/24  0458 10/14/24  0523 10/13/24  0510   WBC 10*3/mm3 4.97 5.20 5.14   HEMOGLOBIN g/dL 12.0* 12.0* 12.5*   HEMATOCRIT % 38.8 37.8 39.6   PLATELETS 10*3/mm3 171 161 165     Results from last 7 days   Lab Units 10/15/24  0458 10/14/24  0523 10/13/24  0510   SODIUM mmol/L 142 140 142   POTASSIUM mmol/L 3.9 4.2 4.4   CHLORIDE mmol/L 105 103 101   CO2 mmol/L 31.0* 31.0* 29.0   BUN mg/dL 38* 35* 35*   CREATININE mg/dL 1.55* 1.43* " 1.47*   GLUCOSE mg/dL 102* 89 138*      Lab Results   Component Value Date    CHOL 97 10/11/2024    TRIG 42 10/11/2024    HDL 44 10/11/2024    LDL 42 10/11/2024    AST 25 10/15/2024    ALT 15 10/15/2024     Results from last 7 days   Lab Units 10/11/24  0606   HEMOGLOBIN A1C % 6.30*     Results from last 7 days   Lab Units 10/11/24  0606   CHOLESTEROL mg/dL 97   TRIGLYCERIDES mg/dL 42   HDL CHOL mg/dL 44   LDL CHOL mg/dL 42     Results from last 7 days   Lab Units 10/10/24  1243   TSH uIU/mL 9.290*   FREE T4 ng/dL 1.71*     Results from last 7 days   Lab Units 10/15/24  0754   PROTIME Seconds 15.3*   INR  1.20*     Results from last 7 days   Lab Units 10/10/24  1243   HSTROP T ng/L 21     Results from last 7 days   Lab Units 10/14/24  0523   PROBNP pg/mL 6,760.0*       Intake/Output Summary (Last 24 hours) at 10/15/2024 1538  Last data filed at 10/15/2024 0611  Gross per 24 hour   Intake --   Output 775 ml   Net -775 ml     I personally reviewed the patient's EKG/Telemetry data    Radiology Data:   CT Chest Without Contrast Diagnostic    Result Date: 10/14/2024  Impression: 1. Mild basilar interstitial edema with small to moderate right pleural effusion and right basilar atelectasis, unchanged from 10/9/2024. No new airspace disease. 2. Stable cardiomegaly. 3. Stable enlarged mediastinal nodes, favored to represent benign reactive findings. 3. Additional chronic findings include uncomplicated cholelithiasis, moderate emphysema, stable right adrenal adenoma, hepatic cysts. Electronically Signed: Alberta Lawler MD  10/14/2024 12:26 PM EDT  Workstation ID: [id number]     Results for orders placed during the hospital encounter of 10/10/24    Adult Transthoracic Echo Complete W/ Cont if Necessary Per Protocol    Interpretation Summary    Left ventricular systolic function is severely decreased. Calculated left ventricular EF = 20.5% Left ventricular ejection fraction appears to be less than 20%.    The left ventricular  cavity is moderately dilated.    Left ventricular wall thickness is consistent with mild concentric hypertrophy.    Severely reduced right ventricular systolic function noted.    The right ventricular cavity is moderately dilated.    The left atrial cavity is moderate to severely dilated.    The right atrial cavity is severely  dilated.    There is mild calcification of the aortic valve.    Moderate mitral valve regurgitation is present.    Moderate tricuspid valve regurgitation is present.    Estimated right ventricular systolic pressure from tricuspid regurgitation is moderately elevated (45-55 mmHg).    Moderate pulmonic valve regurgitation is present.    No significant change compared to the 2023 echo.        Current Medications:  arformoterol, 15 mcg, Nebulization, BID - RT  aspirin, 81 mg, Oral, Daily  atorvastatin, 40 mg, Oral, Nightly  bumetanide, 1 mg, Intravenous, Daily  empagliflozin, 10 mg, Oral, Daily  guaiFENesin, 1,200 mg, Oral, Q12H  ipratropium, 0.5 mg, Nebulization, 4x Daily - RT  levothyroxine, 75 mcg, Oral, Q AM  melatonin, 5 mg, Oral, Nightly  metoprolol succinate XL, 25 mg, Oral, BID  multivitamin with minerals, 1 tablet, Oral, Daily  pharmacy consult - MTM, , Does not apply, Daily  rOPINIRole, 0.5 mg, Oral, Nightly  sacubitril-valsartan, 0.5 tablet, Oral, BID  sodium chloride, 10 mL, Intravenous, Q12H  temazepam, 15 mg, Oral, Nightly           Initial cardiac assessment: 79-year-old gentleman with a history of ischemic cardiomyopathy, HFrEF EF 20% with paroxysmal A-fib, CAD, watchman, AV node ablation with BiV ICD, presenting with acute on chronic HFrEF.        Recommendations:  1.  Acute on chronic HFrEF:  Echo with LVEF 20%, biventricular failure   Continue IV Bumex 2 mg daily, goal net -1.5 to 2 L daily  Continue GDMT with Toprol, Entresto, and Jardiance  Will not add spironolactone at this time due to limitation of hypotension and CKD     Strict I's and O's and daily weights discussed with  patient and nursing staff   I feel he is reached the maximum benefit of inpatient stay with IV diuresis, I recommend discharge home with furosemide 40 mg twice daily which is double his home dose prior to admission.    We will also refer him to  for advanced heart failure options.    Consider doubling Entresto dose as outpatient and possibly adding spironolactone if renal function and blood pressure stable.     2.  PAF:  Status post watchman and AV node ablation  No need for full anticoagulation  Paced rhythm.     3.  CAD:  Continue aspirin and statin  No signs of acute coronary syndrome, negative troponin, no chest pain.     4.  Chronic COPD on oxygen:  Followed by pulmonary as an outpatient     5.  CKD stage IIIa:  Complicates all aspects of care  Monitor closely with diuresis, Cr stable so far   Continue SGLT2 inhibitor        Okay for discharge home, discussed with Dr. Barbara Quintana MD, [facility], [facility]  Interventional Cardiology    Electronically signed by Pietro Quintana MD at 10/15/24 1539       Roxie Quiñones PA-C at 10/14/24 1116       Attestation signed by Pietro Quintana MD at 10/14/24 7940    I have reviewed this documentation and agree.                    [facility]  PROGRESS NOTE    Date of Admission: 10/10/2024  Date of Service: 10/14/24    Primary Care Physician: Arun Soliman MD    Chief Complaint: f/u A/C HFrEF  Problem List:   Acute exacerbation of CHF (congestive heart failure)    Permanent atrial fibrillation    SSS     Dilated CM     S/P Watchman device    Coronary artery disease involving coronary bypass graft of native heart without angina pectoris    Essential hypertension    COPD     Dependence on supplemental oxygen    Hypothyroidism (acquired)    ICD (implantable cardioverter-defibrillator) in place    CKD (chronic kidney disease)      Subjective      Breathing and LE edema is better, however he still has spells of dyspnea at  "rest. Planning to undergo CT chest today to relook effusions      Objective   Vitals: /76   Pulse 77   Temp 97.7 °F (36.5 °C) (Oral)   Resp 18   Ht 190.5 cm (75\")   Wt 77 kg (169 lb 12.1 oz)   SpO2 97%   BMI 21.22 kg/m²     Physical Exam:  GENERAL: Alert, cooperative, in no acute distress.   HEART: Regular rhythm, normal rate, and no murmurs, gallops, or rubs.   LUNGS: Diminished bases with crackles bilaterally. No wheezing or rhonchi. On 2L NC  NEUROLOGIC: No focal abnormalities involving strength or sensation are noted.   EXTREMITIES: No clubbing, cyanosis, or edema noted.     Results:  Results from last 7 days   Lab Units 10/14/24  0523 10/13/24  0510 10/12/24  1346   WBC 10*3/mm3 5.20 5.14 5.72   HEMOGLOBIN g/dL 12.0* 12.5* 12.6*   HEMATOCRIT % 37.8 39.6 39.6   PLATELETS 10*3/mm3 161 165 149     Results from last 7 days   Lab Units 10/14/24  0523 10/13/24  0510 10/12/24  1346   SODIUM mmol/L 140 142 139   POTASSIUM mmol/L 4.2 4.4 4.6   CHLORIDE mmol/L 103 101 101   CO2 mmol/L 31.0* 29.0 29.0   BUN mg/dL 35* 35* 35*   CREATININE mg/dL 1.43* 1.47* 1.34*   GLUCOSE mg/dL 89 138* 127*      Lab Results   Component Value Date    CHOL 97 10/11/2024    TRIG 42 10/11/2024    HDL 44 10/11/2024    LDL 42 10/11/2024    AST 23 10/14/2024    ALT 13 10/14/2024     Results from last 7 days   Lab Units 10/11/24  0606   HEMOGLOBIN A1C % 6.30*     Results from last 7 days   Lab Units 10/11/24  0606   CHOLESTEROL mg/dL 97   TRIGLYCERIDES mg/dL 42   HDL CHOL mg/dL 44   LDL CHOL mg/dL 42     Results from last 7 days   Lab Units 10/10/24  1243   TSH uIU/mL 9.290*   FREE T4 ng/dL 1.71*         Results from last 7 days   Lab Units 10/10/24  1243   HSTROP T ng/L 21     Results from last 7 days   Lab Units 10/13/24  0510   PROBNP pg/mL 7,901.0*       Intake/Output Summary (Last 24 hours) at 10/14/2024 1116  Last data filed at 10/14/2024 0300  Gross per 24 hour   Intake 480 ml   Output --   Net 480 ml     I personally reviewed " the patient's EKG/Telemetry data    Radiology Data:   No radiology results for the last day    Results for orders placed during the hospital encounter of 10/10/24    Adult Transthoracic Echo Complete W/ Cont if Necessary Per Protocol    Interpretation Summary    Left ventricular systolic function is severely decreased. Calculated left ventricular EF = 20.5% Left ventricular ejection fraction appears to be less than 20%.    The left ventricular cavity is moderately dilated.    Left ventricular wall thickness is consistent with mild concentric hypertrophy.    Severely reduced right ventricular systolic function noted.    The right ventricular cavity is moderately dilated.    The left atrial cavity is moderate to severely dilated.    The right atrial cavity is severely  dilated.    There is mild calcification of the aortic valve.    Moderate mitral valve regurgitation is present.    Moderate tricuspid valve regurgitation is present.    Estimated right ventricular systolic pressure from tricuspid regurgitation is moderately elevated (45-55 mmHg).    Moderate pulmonic valve regurgitation is present.    No significant change compared to the 2023 echo.        Current Medications:  arformoterol, 15 mcg, Nebulization, BID - RT  aspirin, 81 mg, Oral, Daily  atorvastatin, 40 mg, Oral, Nightly  bumetanide, 1 mg, Intravenous, Daily  empagliflozin, 10 mg, Oral, Daily  guaiFENesin, 1,200 mg, Oral, Q12H  ipratropium, 0.5 mg, Nebulization, 4x Daily - RT  levothyroxine, 75 mcg, Oral, Q AM  melatonin, 5 mg, Oral, Nightly  metoprolol succinate XL, 25 mg, Oral, BID  multivitamin with minerals, 1 tablet, Oral, Daily  pharmacy consult - MTM, , Does not apply, Daily  rOPINIRole, 0.5 mg, Oral, Nightly  sacubitril-valsartan, 0.5 tablet, Oral, BID  sodium chloride, 10 mL, Intravenous, Q12H  temazepam, 15 mg, Oral, Nightly           Initial cardiac assessment: 79-year-old gentleman with a history of ischemic cardiomyopathy, HFrEF EF 20% with  paroxysmal A-fib, CAD, watchman, AV node ablation with BiV ICD, presenting with acute on chronic HFrEF.        Recommendations:  1.  Acute on chronic HFrEF:  Echo with LVEF 20%, biventricular failure   Continue IV Bumex 2 mg daily, goal net -1.5 to 2 L daily  Continue GDMT with Toprol, Entresto, and Jardiance  Will not add spironolactone at this time due to limitation of hypotension and CKD     Strict I's and O's and daily weights discussed with patient and nursing staff   Plan for repeat CT Chest to relook effusion today per Dr. Astudillo and consider possible thoracentesis if sizeable      At time of discharge we will provide patient with instructions on when to double his home diuretic dose.     2.  PAF:  Status post watchman and AV node ablation  No need for full anticoagulation  Paced rhythm.     3.  CAD:  Continue aspirin and statin  No signs of acute coronary syndrome, negative troponin, no chest pain.     4.  Chronic COPD on oxygen:  Followed by pulmonary as an outpatient     5.  CKD stage IIIa:  Complicates all aspects of care  Monitor closely with diuresis, Cr stable so far   Continue SGLT2 inhibitor        Electronically signed by Roxie Quiñones PA-C, 10/14/24, 11:23 AM EDT.        Electronically signed by Pietro Quintana MD at 10/14/24 1424       TOMMY Jimenez DO at 10/14/24 1000              [facility] Medicine Services  PROGRESS NOTE    Patient Name: Colin Albrecht  : 1945  MRN: [id number]    Date of Admission: 10/10/2024  Primary Care Physician: Arun Soliman MD    Subjective   Subjective     CC:  Progressive soa    HPI:  Patient is a 79-year-old male seen and examined by me this a.m.,still with continued issues with SOA, plans for follow up CT chest today, no other new acute complaints or problems at this time.       Objective   Objective     Vital Signs:   Temp:  [97.7 °F (36.5 °C)] 97.7 °F (36.5 °C)  Heart Rate:  [73-93] 73  Resp:  [16-18] 18  BP:  ()/(56-76) 98/66  Flow (L/min) (Oxygen Therapy):  [2] 2     Physical Exam:  Constitutional: No acute distress, awake, alert, frail appearing, on RA resting in bed. Currently on 2L NC  HENT: NCAT, mucous membranes moist  Respiratory: Decreased BS bilaterally, crackles more evident on the right, respiratory effort normal, no rhonchi or wheezing  Cardiovascular: RRR, no murmurs, rubs, or gallops  Gastrointestinal: Positive bowel sounds, soft, nontender, nondistended  Musculoskeletal: trace edema bilateral LE's  Psychiatric: Appropriate affect, cooperative  Neurologic: Oriented x 3, FLOREZ, speech clear  Skin: No rashes      Results Reviewed:  LAB RESULTS:      Lab 10/14/24  0523 10/13/24  0510 10/12/24  1346 10/11/24  0606 10/11/24  0024 10/10/24  1945 10/10/24  1605 10/10/24  1243   WBC 5.20 5.14 5.72 6.53  --   --   --  6.75   HEMOGLOBIN 12.0* 12.5* 12.6* 12.5*  --   --   --  12.7*   HEMATOCRIT 37.8 39.6 39.6 39.5  --   --   --  41.2   PLATELETS 161 165 149 156  --   --   --  156   NEUTROS ABS 3.26 3.69 4.32  --   --   --   --  5.24   IMMATURE GRANS (ABS) 0.01 0.04 0.01  --   --   --   --  0.02   LYMPHS ABS 0.92 0.62* 0.57*  --   --   --   --  0.54*   MONOS ABS 0.75 0.59 0.62  --   --   --   --  0.79   EOS ABS 0.21 0.16 0.18  --   --   --   --  0.12   MCV 97.2* 97.5* 97.5* 96.3  --   --   --  99.3*   PROCALCITONIN  --   --   --   --   --   --   --  0.05   LACTATE  --   --   --   --  1.1 2.6* 2.5* 2.1*   HSTROP T  --   --   --   --   --   --   --  21         Lab 10/14/24  0523 10/13/24  0510 10/12/24  1346 10/11/24  0606 10/10/24  1243   SODIUM 140 142 139 141 140   POTASSIUM 4.2 4.4 4.6 4.5 4.5   CHLORIDE 103 101 101 103 102   CO2 31.0* 29.0 29.0 31.0* 27.0   ANION GAP 6.0 12.0 9.0 7.0 11.0   BUN 35* 35* 35* 32* 33*   CREATININE 1.43* 1.47* 1.34* 1.53* 1.31*   EGFR 50.2* 48.5* 54.2* 46.2* 55.7*   GLUCOSE 89 138* 127* 92 113*   CALCIUM 8.5* 8.9 9.0 9.2 9.4   MAGNESIUM 2.5* 2.4 2.4 2.3  --    HEMOGLOBIN A1C  --   --    --  6.30*  --    TSH  --   --   --   --  9.290*         Lab 10/14/24  0523 10/13/24  0510 10/12/24  1346 10/11/24  0606 10/10/24  1243   TOTAL PROTEIN 5.7* 6.1 6.2 6.7 7.0   ALBUMIN 3.3* 3.6 3.5 3.6 3.9   GLOBULIN 2.4 2.5 2.7 3.1 3.1   ALT (SGPT) 13 12 10 16 17   AST (SGOT) 23 25 27 30 36   BILIRUBIN 1.0 1.2 1.4* 1.4* 1.5*   ALK PHOS 99 115 102 108 114         Lab 10/14/24  0523 10/13/24  0510 10/10/24  1243   PROBNP 6,760.0* 7,901.0* 10,682.0*   HSTROP T  --   --  21         Lab 10/11/24  0606   CHOLESTEROL 97   LDL CHOL 42   HDL CHOL 44   TRIGLYCERIDES 42             Brief Urine Lab Results       None            Microbiology Results Abnormal       Procedure Component Value - Date/Time    COVID PRE-OP / PRE-PROCEDURE SCREENING ORDER (NO ISOLATION) - Swab, Nasal Cavity [[id number]]  (Normal) Collected: 10/10/24 1302    Lab Status: Final result Specimen: Swab from Nasal Cavity Updated: 10/10/24 1338    Narrative:      The following orders were created for panel order COVID PRE-OP / PRE-PROCEDURE SCREENING ORDER (NO ISOLATION) - Swab, Nasal Cavity.  Procedure                               Abnormality         Status                     ---------                               -----------         ------                     COVID-19 and FLU A/B PCR...[[id number]]  Normal              Final result                 Please view results for these tests on the individual orders.    COVID-19 and FLU A/B PCR, 1 HR TAT - Swab, Nasopharynx [[id number]]  (Normal) Collected: 10/10/24 1302    Lab Status: Final result Specimen: Swab from Nasopharynx Updated: 10/10/24 1338     COVID19 Not Detected     Influenza A PCR Not Detected     Influenza B PCR Not Detected    Narrative:      Fact sheet for providers: https://www.fda.gov/media/912300/download    Fact sheet for patients: https://www.fda.gov/media/647947/download    Test performed by PCR.            CT Chest Without Contrast Diagnostic    Result Date: 10/14/2024  CT CHEST WO CONTRAST  DIAGNOSTIC Date of Exam: 10/14/2024 11:45 AM EDT Indication: Follow up R Pleural effusion following diuresis. Comparison: AP chest radiograph 10/12/2024, CT chest 10/9/2024 Technique: Axial CT images were obtained of the chest without contrast administration.  Reconstructed coronal and sagittal images were also obtained. Automated exposure control and iterative construction methods were used. Findings: Moderate emphysematous changes are present. Mild smooth interstitial thickening is seen within the lung bases suggesting mild edema. Small to moderate right basilar pleural effusion layers to a depth of 2.9 cm with passive right lower lobe atelectasis, similar to 10/9/2024. No new airspace disease is identified. Calcified pleural plaque is seen in the left mid thorax. Stable cardiomegaly with atrial appendage occlusion device and signs of prior CABG. Pacemaker device in place. No pericardial effusion is seen. Mediastinal adenopathy is unchanged, including prevascular node 9 mm short axis and right paratracheal node 10  mm short axis and AP window node 9 mm short axis (series 2 image 50). No new or progressive adenopathy. Hepatic cysts are present, largest in the left lobe measuring about 4 cm. Stable low-density right adrenal nodule most in keeping with a benign adenoma. Uncomplicated cholelithiasis. Remainder of the imaged upper abdominal organs demonstrate a normal noncontrast appearance. No acute or suspicious osseous abnormality.     Impression: Impression: 1. Mild basilar interstitial edema with small to moderate right pleural effusion and right basilar atelectasis, unchanged from 10/9/2024. No new airspace disease. 2. Stable cardiomegaly. 3. Stable enlarged mediastinal nodes, favored to represent benign reactive findings. 3. Additional chronic findings include uncomplicated cholelithiasis, moderate emphysema, stable right adrenal adenoma, hepatic cysts. Electronically Signed: Alberta Lawler MD  10/14/2024 12:26  PM EDT  Workstation ID: [id number]     Results for orders placed during the hospital encounter of 10/10/24    Adult Transthoracic Echo Complete W/ Cont if Necessary Per Protocol    Interpretation Summary    Left ventricular systolic function is severely decreased. Calculated left ventricular EF = 20.5% Left ventricular ejection fraction appears to be less than 20%.    The left ventricular cavity is moderately dilated.    Left ventricular wall thickness is consistent with mild concentric hypertrophy.    Severely reduced right ventricular systolic function noted.    The right ventricular cavity is moderately dilated.    The left atrial cavity is moderate to severely dilated.    The right atrial cavity is severely  dilated.    There is mild calcification of the aortic valve.    Moderate mitral valve regurgitation is present.    Moderate tricuspid valve regurgitation is present.    Estimated right ventricular systolic pressure from tricuspid regurgitation is moderately elevated (45-55 mmHg).    Moderate pulmonic valve regurgitation is present.    No significant change compared to the 2023 echo.      Current medications:  Scheduled Meds:arformoterol, 15 mcg, Nebulization, BID - RT  aspirin, 81 mg, Oral, Daily  atorvastatin, 40 mg, Oral, Nightly  bumetanide, 1 mg, Intravenous, Daily  empagliflozin, 10 mg, Oral, Daily  guaiFENesin, 1,200 mg, Oral, Q12H  ipratropium, 0.5 mg, Nebulization, 4x Daily - RT  levothyroxine, 75 mcg, Oral, Q AM  melatonin, 5 mg, Oral, Nightly  metoprolol succinate XL, 25 mg, Oral, BID  multivitamin with minerals, 1 tablet, Oral, Daily  pharmacy consult - MTM, , Does not apply, Daily  rOPINIRole, 0.5 mg, Oral, Nightly  sacubitril-valsartan, 0.5 tablet, Oral, BID  sodium chloride, 10 mL, Intravenous, Q12H  temazepam, 15 mg, Oral, Nightly      Continuous Infusions:   PRN Meds:.  acetaminophen **OR** acetaminophen **OR** acetaminophen    senna-docusate sodium **AND** polyethylene glycol **AND** bisacodyl  **AND** bisacodyl    ipratropium-albuterol    rOPINIRole    sodium chloride    sodium chloride    Assessment & Plan   Assessment & Plan     Active Hospital Problems    Diagnosis  POA    **Acute exacerbation of CHF (congestive heart failure) [I50.9]  Yes    Hypothyroidism (acquired) [E03.9]  Yes    ICD (implantable cardioverter-defibrillator) in place [Z95.810]  Yes    CKD (chronic kidney disease) [N18.9]  Yes    Dependence on supplemental oxygen [Z99.81]  Not Applicable    COPD  [J44.9]  Yes    Essential hypertension [I10]  Yes    Coronary artery disease involving coronary bypass graft of native heart without angina pectoris [I25.810]  Yes    Dilated CM  [I42.0]  Yes    S/P Watchman device [Z95.818]  Yes    SSS  [I49.5]  Yes    Permanent atrial fibrillation [I48.21]  Yes      Resolved Hospital Problems   No resolved problems to display.        Brief Hospital Course to date:  Colin Albrecht is a 79 y.o. male with past medical history significant for hypothyroidism, SSS s/p ICD,CAD, dilated CM, CHF w an EF 20.4 % (at last check 5/2023), A-fib s/p Watchman, ex-smoker, chronic oxygen use, and probable chronic CKD.  Patient follows with Dr. Wei with pulmonary, Dr. Quintana with cardiology, and Dr. Prado with cards EP.  Patient was last seen by pulmonary approximately 1 month ago with complaints of progressive shortness of air.  CT of the chest was completed outpatient PTA.  CT showed moderate to marked cardiomegaly new compared to prior CT from 2021, pulmonary edema with sm to mod right pl effusion and trace left pl effusion.  Pt was to have outpatient echo in approx 2 weeks and follow-up with Dr. Quintana, however continues to have progressive shortness of air. Patient seen again on the M of 10/13, following with Dr. Astudillo this weekend, patient overall reports breathing improving but still SOA with exertion, IV diuresis again on 10/13, if symptoms persist possible CT chest and consideration for possible  thoracentesis if right pleural effusion persists. Seen again on 10/14, patient with more SOA this AM, more significant crackles as well, CT chest reviewed and will plan for thoracentesis at this time, likely in the AM of 10/15, continue to follow with cardiology at this time.     Progressive dyspnea  Acute/chronic systolic CHF  CAD/dilated CM/EF 20.4 at last check (5/2023)  SSS/ICD/A-fib s/p Watchman  Heart murmur  R sided pleural effusion   --Follows with Dr. Quintana with cards, Dr. Prado with EP.  -- Echo with reduced EF of 20%  -- BNP 10K, CXR with pulmonary venous congestion and small effusions  -- cards/Dr. Quintana following.    -- Diuresis again reordered IV Bumex on 10/13 with 1 mg BID  -- Repeat CT chest today with continued moderate pleural effusion, no significant change, plans now for thoracentesis, consult to IR for hopeful procedure possibly on 10/15   -- Continue toprol, entresto, and jardiance. Entresto dosage has been increased S/p watchman so no need for AC     COPD  Chronic oxygen use  --Follows with Dr. Wei  -- Recently added home nebulizers, initially helped  -- wears 2LNC O2 at home     Hypothyroidism  -- Continue home Synthroid.    -- TSH elevated but free T4 upper limit of normal, outpatient follow up with his regular PCP for repeat labs         Expected Discharge Location and Transportation:   Expected Discharge   Expected Discharge Date: 10/15/2024; Expected Discharge Time:      VTE Prophylaxis:  Mechanical VTE prophylaxis orders are present.         AM-PAC 6 Clicks Score (PT): 21 (10/14/24 0830)    CODE STATUS:   Code Status and Medical Interventions: CPR (Attempt to Resuscitate); Full Support   Ordered at: 10/10/24 1602     Level Of Support Discussed With:    Patient    Next of Kin (If No Surrogate)     Code Status (Patient has no pulse and is not breathing):    CPR (Attempt to Resuscitate)     Medical Interventions (Patient has pulse or is breathing):    Full Support       RADHA  Talha Jimenez DO  10/14/24        Electronically signed by TOMMY Jimenez,  at 10/14/24 1806       TOMMY Jimenez DO at 10/13/24 1321              [facility] Medicine Services  PROGRESS NOTE    Patient Name: Colin Albrecht  : 1945  MRN: [id number]    Date of Admission: 10/10/2024  Primary Care Physician: Arun Soliman MD    Subjective   Subjective     CC:  Progressive soa    HPI:  Patient is a 79-year-old male seen and examined by me this a.m., he is resting comfortably on bedside couch and currently on room air.  He reports overall his breathing is doing better but he is still short of breath especially with exertion to the restroom, reviewed cardiology's recommendations and plans are for continued IV diuresis today, if his symptoms persist possible repeat CT of the chest without contrast for evaluation of his right pleural effusion and consideration for possible thoracentesis.  He denies any new acute complaints or problems otherwise at this time      Objective   Objective     Vital Signs:   Temp:  [97.6 °F (36.4 °C)-97.9 °F (36.6 °C)] 97.8 °F (36.6 °C)  Heart Rate:  [77-95] 77  Resp:  [16-20] 20  BP: (101-118)/(51-74) 114/74  Flow (L/min) (Oxygen Therapy):  [2] 2     Physical Exam:  Constitutional: No acute distress, awake, alert, frail appearing, on RA resting on bedside couch  HENT: NCAT, mucous membranes moist  Respiratory: Decreased BS bilaterally, respiratory effort normal, no rhonchi or wheezing  Cardiovascular: RRR, no murmurs, rubs, or gallops  Gastrointestinal: Positive bowel sounds, soft, nontender, nondistended  Musculoskeletal: trace edema bilateral LE's  Psychiatric: Appropriate affect, cooperative  Neurologic: Oriented x 3, FLOREZ, speech clear  Skin: No rashes      Results Reviewed:  LAB RESULTS:      Lab 10/13/24  0510 10/12/24  1346 10/11/24  0606 10/11/24  0024 10/10/24  1945 10/10/24  1605 10/10/24  1243   WBC 5.14 5.72 6.53  --   --   --  6.75    HEMOGLOBIN 12.5* 12.6* 12.5*  --   --   --  12.7*   HEMATOCRIT 39.6 39.6 39.5  --   --   --  41.2   PLATELETS 165 149 156  --   --   --  156   NEUTROS ABS 3.69 4.32  --   --   --   --  5.24   IMMATURE GRANS (ABS) 0.04 0.01  --   --   --   --  0.02   LYMPHS ABS 0.62* 0.57*  --   --   --   --  0.54*   MONOS ABS 0.59 0.62  --   --   --   --  0.79   EOS ABS 0.16 0.18  --   --   --   --  0.12   MCV 97.5* 97.5* 96.3  --   --   --  99.3*   PROCALCITONIN  --   --   --   --   --   --  0.05   LACTATE  --   --   --  1.1 2.6* 2.5* 2.1*   HSTROP T  --   --   --   --   --   --  21         Lab 10/13/24  0510 10/12/24  1346 10/11/24  0606 10/10/24  1243   SODIUM 142 139 141 140   POTASSIUM 4.4 4.6 4.5 4.5   CHLORIDE 101 101 103 102   CO2 29.0 29.0 31.0* 27.0   ANION GAP 12.0 9.0 7.0 11.0   BUN 35* 35* 32* 33*   CREATININE 1.47* 1.34* 1.53* 1.31*   EGFR 48.5* 54.2* 46.2* 55.7*   GLUCOSE 138* 127* 92 113*   CALCIUM 8.9 9.0 9.2 9.4   MAGNESIUM 2.4 2.4 2.3  --    HEMOGLOBIN A1C  --   --  6.30*  --    TSH  --   --   --  9.290*         Lab 10/13/24  0510 10/12/24  1346 10/11/24  0606 10/10/24  1243   TOTAL PROTEIN 6.1 6.2 6.7 7.0   ALBUMIN 3.6 3.5 3.6 3.9   GLOBULIN 2.5 2.7 3.1 3.1   ALT (SGPT) 12 10 16 17   AST (SGOT) 25 27 30 36   BILIRUBIN 1.2 1.4* 1.4* 1.5*   ALK PHOS 115 102 108 114         Lab 10/13/24  0510 10/10/24  1243   PROBNP 7,901.0* 10,682.0*   HSTROP T  --  21         Lab 10/11/24  0606   CHOLESTEROL 97   LDL CHOL 42   HDL CHOL 44   TRIGLYCERIDES 42             Brief Urine Lab Results       None            Microbiology Results Abnormal       Procedure Component Value - Date/Time    COVID PRE-OP / PRE-PROCEDURE SCREENING ORDER (NO ISOLATION) - Swab, Nasal Cavity [[id number]]  (Normal) Collected: 10/10/24 1302    Lab Status: Final result Specimen: Swab from Nasal Cavity Updated: 10/10/24 1338    Narrative:      The following orders were created for panel order COVID PRE-OP / PRE-PROCEDURE SCREENING ORDER (NO ISOLATION) -  Swab, Nasal Cavity.  Procedure                               Abnormality         Status                     ---------                               -----------         ------                     COVID-19 and FLU A/B PCR...[[id number]]  Normal              Final result                 Please view results for these tests on the individual orders.    COVID-19 and FLU A/B PCR, 1 HR TAT - Swab, Nasopharynx [[id number]]  (Normal) Collected: 10/10/24 1302    Lab Status: Final result Specimen: Swab from Nasopharynx Updated: 10/10/24 1338     COVID19 Not Detected     Influenza A PCR Not Detected     Influenza B PCR Not Detected    Narrative:      Fact sheet for providers: https://www.fda.gov/media/433500/download    Fact sheet for patients: https://www.fda.gov/media/108103/download    Test performed by PCR.            XR Chest 1 View    Result Date: 10/12/2024  XR CHEST 1 VW Date of Exam: 10/12/2024 7:10 AM EDT Indication: Shortness of breath. Comparison: 10/11/2024. Findings: There are persistent bilateral basal pleural effusions which are small in size with atelectasis, right greater than left side. The heart is enlarged. The patient is had a previous median sternotomy. There is a left-sided transvenous pacemaker in place. The pulmonary vascular markings are increased felt to represent mild pulmonary edema. There is no pneumothorax. There are chronic age-related changes involving the bony thorax and thoracic aorta.     Impression: Impression: No interval change from yesterday with abnormalities as described above. Electronically Signed: Candido Palomino MD  10/12/2024 10:23 AM EDT  Workstation ID: [id number]     Results for orders placed during the hospital encounter of 10/10/24    Adult Transthoracic Echo Complete W/ Cont if Necessary Per Protocol    Interpretation Summary    Left ventricular systolic function is severely decreased. Calculated left ventricular EF = 20.5% Left ventricular ejection fraction appears to be less than  20%.    The left ventricular cavity is moderately dilated.    Left ventricular wall thickness is consistent with mild concentric hypertrophy.    Severely reduced right ventricular systolic function noted.    The right ventricular cavity is moderately dilated.    The left atrial cavity is moderate to severely dilated.    The right atrial cavity is severely  dilated.    There is mild calcification of the aortic valve.    Moderate mitral valve regurgitation is present.    Moderate tricuspid valve regurgitation is present.    Estimated right ventricular systolic pressure from tricuspid regurgitation is moderately elevated (45-55 mmHg).    Moderate pulmonic valve regurgitation is present.    No significant change compared to the 2023 echo.      Current medications:  Scheduled Meds:arformoterol, 15 mcg, Nebulization, BID - RT  aspirin, 81 mg, Oral, Daily  atorvastatin, 40 mg, Oral, Nightly  empagliflozin, 10 mg, Oral, Daily  guaiFENesin, 1,200 mg, Oral, Q12H  ipratropium, 0.5 mg, Nebulization, 4x Daily - RT  levothyroxine, 75 mcg, Oral, Q AM  melatonin, 5 mg, Oral, Nightly  metoprolol succinate XL, 25 mg, Oral, BID  multivitamin with minerals, 1 tablet, Oral, Daily  pharmacy consult - MTM, , Does not apply, Daily  rOPINIRole, 0.5 mg, Oral, Nightly  sacubitril-valsartan, 0.5 tablet, Oral, BID  sodium chloride, 10 mL, Intravenous, Q12H  temazepam, 15 mg, Oral, Nightly      Continuous Infusions:   PRN Meds:.  acetaminophen **OR** acetaminophen **OR** acetaminophen    senna-docusate sodium **AND** polyethylene glycol **AND** bisacodyl **AND** bisacodyl    ipratropium-albuterol    rOPINIRole    sodium chloride    sodium chloride    Assessment & Plan   Assessment & Plan     Active Hospital Problems    Diagnosis  POA    **Acute exacerbation of CHF (congestive heart failure) [I50.9]  Yes    Hypothyroidism (acquired) [E03.9]  Yes    ICD (implantable cardioverter-defibrillator) in place [Z95.810]  Yes    CKD (chronic kidney  disease) [N18.9]  Yes    Dependence on supplemental oxygen [Z99.81]  Not Applicable    COPD  [J44.9]  Yes    Essential hypertension [I10]  Yes    Coronary artery disease involving coronary bypass graft of native heart without angina pectoris [I25.810]  Yes    Dilated CM  [I42.0]  Yes    S/P Watchman device [Z95.818]  Yes    SSS  [I49.5]  Yes    Permanent atrial fibrillation [I48.21]  Yes      Resolved Hospital Problems   No resolved problems to display.        Brief Hospital Course to date:  Colin Albrecht is a 79 y.o. male with past medical history significant for hypothyroidism, SSS s/p ICD,CAD, dilated CM, CHF w an EF 20.4 % (at last check 5/2023), A-fib s/p Watchman, ex-smoker, chronic oxygen use, and probable chronic CKD.  Patient follows with Dr. Wei with pulmonary, Dr. Quintana with cardiology, and Dr. Prado with cards EP.  Patient was last seen by pulmonary approximately 1 month ago with complaints of progressive shortness of air.  CT of the chest was completed outpatient PTA.  CT showed moderate to marked cardiomegaly new compared to prior CT from 2021, pulmonary edema with sm to mod right pl effusion and trace left pl effusion.  Pt was to have outpatient echo in approx 2 weeks and follow-up with Dr. Quintana, however continues to have progressive shortness of air. Patient seen again on the M of 10/13, following with Dr. Astudillo this weekend, patient overall reports breathing improving but still SOA with exertion, IV diuresis again on 10/13, if symptoms persist possible CT chest and consideration for possible thoracentesis if right pleural effusion persists.     Progressive dyspnea  Acute/chronic systolic CHF  CAD/dilated CM/EF 20.4 at last check (5/2023)  SSS/ICD/A-fib s/p Watchman  Heart murmur  R sided pleural effusion   --Follows with Dr. Quintana with cards, Dr. Prado with EP.  -- Echo with reduced EF of 20%  -- BNP 10K, CXR with pulmonary venous congestion and small effusions  -- rajendra/  Mariano following.    -- Diuresis again reordered IV Bumex on 10/13 with 1 mg BID  -- If symptoms continue possible repeat CT chest for evaluation and possible consideration for right sided thoracentesis, continue to follow and have ordered for overnight   -- Continue toprol, entresto, and jardiance. Entresto dosage has been increased S/p watchman so no need for AC     COPD  Chronic oxygen use  --Follows with Dr. Wei  -- Recently added home nebulizers, initially helped  -- wears 2LNC O2 at home     Hypothyroidism  -- Continue home Synthroid.    -- TSH elevated but free T4 upper limit of normal, outpatient follow up with his regular PCP for repeat labs         Expected Discharge Location and Transportation:   Expected Discharge   Expected Discharge Date: 10/15/2024; Expected Discharge Time:      VTE Prophylaxis:  Mechanical VTE prophylaxis orders are present.         AM-PAC 6 Clicks Score (PT): 24 (10/12/24 2000)    CODE STATUS:   Code Status and Medical Interventions: CPR (Attempt to Resuscitate); Full Support   Ordered at: 10/10/24 1602     Level Of Support Discussed With:    Patient    Next of Kin (If No Surrogate)     Code Status (Patient has no pulse and is not breathing):    CPR (Attempt to Resuscitate)     Medical Interventions (Patient has pulse or is breathing):    Full Support       RADHA Jimenez DO  10/13/24        Electronically signed by TOMMY Jimenez DO at 10/13/24 1326       Consult Notes (last 72 hours)  Notes from 10/13/24 1142 through 10/16/24 1142   No notes of this type exist for this encounter.          Discharge Summary        TOMMY Jimenez DO at 10/15/24 1144              [facility] Medicine Services  DISCHARGE SUMMARY    Patient Name: Colin Albrecht  : 1945  MRN: [id number]    Date of Admission: 10/10/2024 12:18 PM  Date of Discharge:  10/15/2024  Primary Care Physician: Arun Soliman MD    Consults       Date and Time Order Name Status  Description    10/10/2024  5:07 PM Inpatient Cardiology Consult Completed             Hospital Course     Presenting Problem: SOA     Active Hospital Problems    Diagnosis  POA    Hypothyroidism (acquired) [E03.9]  Yes    ICD (implantable cardioverter-defibrillator) in place [Z95.810]  Yes    CKD (chronic kidney disease) [N18.9]  Yes    COPD  [J44.9]  Yes    Essential hypertension [I10]  Yes    Coronary artery disease involving coronary bypass graft of native heart without angina pectoris [I25.810]  Yes    Dilated CM  [I42.0]  Yes    S/P Watchman device [Z95.818]  Yes    SSS  [I49.5]  Yes    Permanent atrial fibrillation [I48.21]  Yes      Resolved Hospital Problems    Diagnosis Date Resolved POA    **Acute exacerbation of CHF (congestive heart failure) [I50.9] 10/15/2024 Yes    Dependence on supplemental oxygen [Z99.81] 10/15/2024 Not Applicable          Hospital Course:  Colin Albrecht is a 79 y.o. male with past medical history significant for hypothyroidism, SSS s/p ICD,CAD, dilated CM, CHF w an EF 20.4 % (at last check 5/2023), A-fib s/p Watchman, ex-smoker, chronic oxygen use, and probable chronic CKD.  Patient follows with Dr. Wei with pulmonary, Dr. Quintana with cardiology, and Dr. Prado with cards EP.  Patient was last seen by pulmonary approximately 1 month ago with complaints of progressive shortness of air.  CT of the chest was completed outpatient PTA.  CT showed moderate to marked cardiomegaly new compared to prior CT from 2021, pulmonary edema with sm to mod right pl effusion and trace left pl effusion.  Pt was to have outpatient echo in approx 2 weeks and follow-up with Dr. Quintana, however continues to have progressive shortness of air. Patient seen again on the M of 10/13, following with Dr. Astudillo this weekend, patient overall reports breathing improving but still SOA with exertion, IV diuresis again on 10/13, if symptoms persist possible CT chest and consideration for possible  thoracentesis if right pleural effusion persists. Seen again on 10/14, patient with more SOA this AM, more significant crackles as well, CT chest reviewed and will plan for thoracentesis at this time, likely in the AM of 10/15, continue to follow with cardiology at this time. Patient seen on the AM of 10/15, patient taken for thoracentesis but not enough fluid for removal. Discussed with Dr. Quintana who also saw patient today, plans for discharge to home now with BID Lasix for diuresis, he will follow up with heart and valve clinic in 1 week and Dr. Quintana in 1 month. Patient agreeable to plan, plans for discharge to home.      Progressive dyspnea  Acute/chronic systolic CHF  CAD/dilated CM/EF 20.4 at last check (5/2023)  SSS/ICD/A-fib s/p Watchman  Heart murmur  R sided pleural effusion   --Follows with Dr. Quintana with cards, Dr. Prado with EP.  -- Echo with reduced EF of 20%  -- BNP 10K, CXR with pulmonary venous congestion and small effusions  -- cards/Dr. Quintana following.    -- Plans for discharge to home with Lasix BID and follow up with heart and valve in 1 week   -- seen by IR on 10/15, not enough fluid for thoracentesis, discussed with Dr. Quintana and as above   -- Continue toprol, entresto, and jardiance. Entresto dosage has been increased S/p watchman so no need for AC     COPD  Chronic oxygen use  --Follows with Dr. Wei  -- Recently added home nebulizers, initially helped  -- wears 2LNC O2 at home     Hypothyroidism  -- Continue home Synthroid.    -- TSH elevated but free T4 upper limit of normal, outpatient follow up with his regular PCP for repeat labs        Discharge Follow Up Recommendations for outpatient labs/diagnostics:   Follow up with PCP in 1 week   Follow up with heart and valve clinic in 1 week   Follow up with Dr. Quintana in 1 month     Day of Discharge     HPI:   Patient is a 80 yo M seen and examined by me this AM, seen again after return from planned thoracentesis but not enough  fluid for removal. Plans for discharge to home with increased Lasix, follow up appointments as above       Vital Signs:   Temp:  [97.4 °F (36.3 °C)-98.2 °F (36.8 °C)] 97.4 °F (36.3 °C)  Heart Rate:  [73-94] 76  Resp:  [18-20] 18  BP: ()/(54-87) 114/76  Flow (L/min) (Oxygen Therapy):  [2] 2      Physical Exam:  Constitutional: No acute distress, awake, alert, frail appearing, on RA sitting in bed. Currently on 2L NC  HENT: NCAT, mucous membranes moist  Respiratory: Decreased BS bilaterally, crackles more evident on the right, respiratory effort normal, no rhonchi or wheezing  Cardiovascular: RRR, no murmurs, rubs, or gallops  Gastrointestinal: Positive bowel sounds, soft, nontender, nondistended  Musculoskeletal: trace edema bilateral LE's  Psychiatric: Appropriate affect, cooperative  Neurologic: Oriented x 3, FOLREZ, speech clear  Skin: No rashes    Pertinent  and/or Most Recent Results     LAB RESULTS:      Lab 10/15/24  0754 10/15/24  0458 10/14/24  0523 10/13/24  0510 10/12/24  1346 10/11/24  0606 10/11/24  0024 10/10/24  1945 10/10/24  1605 10/10/24  1243   WBC  --  4.97 5.20 5.14 5.72 6.53  --   --   --  6.75   HEMOGLOBIN  --  12.0* 12.0* 12.5* 12.6* 12.5*  --   --   --  12.7*   HEMATOCRIT  --  38.8 37.8 39.6 39.6 39.5  --   --   --  41.2   PLATELETS  --  171 161 165 149 156  --   --   --  156   NEUTROS ABS  --  3.07 3.26 3.69 4.32  --   --   --   --  5.24   IMMATURE GRANS (ABS)  --  0.01 0.01 0.04 0.01  --   --   --   --  0.02   LYMPHS ABS  --  0.98 0.92 0.62* 0.57*  --   --   --   --  0.54*   MONOS ABS  --  0.72 0.75 0.59 0.62  --   --   --   --  0.79   EOS ABS  --  0.14 0.21 0.16 0.18  --   --   --   --  0.12   MCV  --  98.2* 97.2* 97.5* 97.5* 96.3  --   --   --  99.3*   PROCALCITONIN  --   --   --   --   --   --   --   --   --  0.05   LACTATE  --   --   --   --   --   --  1.1 2.6* 2.5* 2.1*   PROTIME 15.3*  --   --   --   --   --   --   --   --   --          Lab 10/15/24  0458 10/14/24  0523  10/13/24  0510 10/12/24  1346 10/11/24  0606 10/10/24  1243   SODIUM 142 140 142 139 141 140   POTASSIUM 3.9 4.2 4.4 4.6 4.5 4.5   CHLORIDE 105 103 101 101 103 102   CO2 31.0* 31.0* 29.0 29.0 31.0* 27.0   ANION GAP 6.0 6.0 12.0 9.0 7.0 11.0   BUN 38* 35* 35* 35* 32* 33*   CREATININE 1.55* 1.43* 1.47* 1.34* 1.53* 1.31*   EGFR 45.5* 50.2* 48.5* 54.2* 46.2* 55.7*   GLUCOSE 102* 89 138* 127* 92 113*   CALCIUM 8.9 8.5* 8.9 9.0 9.2 9.4   MAGNESIUM  --  2.5* 2.4 2.4 2.3  --    HEMOGLOBIN A1C  --   --   --   --  6.30*  --    TSH  --   --   --   --   --  9.290*         Lab 10/15/24  0458 10/14/24  0523 10/13/24  0510 10/12/24  1346 10/11/24  0606   TOTAL PROTEIN 6.2 5.7* 6.1 6.2 6.7   ALBUMIN 3.3* 3.3* 3.6 3.5 3.6   GLOBULIN 2.9 2.4 2.5 2.7 3.1   ALT (SGPT) 15 13 12 10 16   AST (SGOT) 25 23 25 27 30   BILIRUBIN 0.9 1.0 1.2 1.4* 1.4*   ALK PHOS 102 99 115 102 108         Lab 10/15/24  0754 10/14/24  0523 10/13/24  0510 10/10/24  1243   PROBNP  --  6,760.0* 7,901.0* 10,682.0*   HSTROP T  --   --   --  21   PROTIME 15.3*  --   --   --    INR 1.20*  --   --   --          Lab 10/11/24  0606   CHOLESTEROL 97   LDL CHOL 42   HDL CHOL 44   TRIGLYCERIDES 42             Brief Urine Lab Results       None          Microbiology Results (last 10 days)       Procedure Component Value - Date/Time    COVID PRE-OP / PRE-PROCEDURE SCREENING ORDER (NO ISOLATION) - Swab, Nasal Cavity [[id number]]  (Normal) Collected: 10/10/24 1302    Lab Status: Final result Specimen: Swab from Nasal Cavity Updated: 10/10/24 6162    Narrative:      The following orders were created for panel order COVID PRE-OP / PRE-PROCEDURE SCREENING ORDER (NO ISOLATION) - Swab, Nasal Cavity.  Procedure                               Abnormality         Status                     ---------                               -----------         ------                     COVID-19 and FLU A/B PCR...[[id number]]  Normal              Final result                 Please view results for  these tests on the individual orders.    COVID-19 and FLU A/B PCR, 1 HR TAT - Swab, Nasopharynx [[id number]]  (Normal) Collected: 10/10/24 1302    Lab Status: Final result Specimen: Swab from Nasopharynx Updated: 10/10/24 1319     COVID19 Not Detected     Influenza A PCR Not Detected     Influenza B PCR Not Detected    Narrative:      Fact sheet for providers: https://www.fda.gov/media/718339/download    Fact sheet for patients: https://www.fda.gov/media/457769/download    Test performed by PCR.            CT Chest Without Contrast Diagnostic    Result Date: 10/14/2024  CT CHEST WO CONTRAST DIAGNOSTIC Date of Exam: 10/14/2024 11:45 AM EDT Indication: Follow up R Pleural effusion following diuresis. Comparison: AP chest radiograph 10/12/2024, CT chest 10/9/2024 Technique: Axial CT images were obtained of the chest without contrast administration.  Reconstructed coronal and sagittal images were also obtained. Automated exposure control and iterative construction methods were used. Findings: Moderate emphysematous changes are present. Mild smooth interstitial thickening is seen within the lung bases suggesting mild edema. Small to moderate right basilar pleural effusion layers to a depth of 2.9 cm with passive right lower lobe atelectasis, similar to 10/9/2024. No new airspace disease is identified. Calcified pleural plaque is seen in the left mid thorax. Stable cardiomegaly with atrial appendage occlusion device and signs of prior CABG. Pacemaker device in place. No pericardial effusion is seen. Mediastinal adenopathy is unchanged, including prevascular node 9 mm short axis and right paratracheal node 10  mm short axis and AP window node 9 mm short axis (series 2 image 50). No new or progressive adenopathy. Hepatic cysts are present, largest in the left lobe measuring about 4 cm. Stable low-density right adrenal nodule most in keeping with a benign adenoma. Uncomplicated cholelithiasis. Remainder of the imaged upper  abdominal organs demonstrate a normal noncontrast appearance. No acute or suspicious osseous abnormality.     Impression: 1. Mild basilar interstitial edema with small to moderate right pleural effusion and right basilar atelectasis, unchanged from 10/9/2024. No new airspace disease. 2. Stable cardiomegaly. 3. Stable enlarged mediastinal nodes, favored to represent benign reactive findings. 3. Additional chronic findings include uncomplicated cholelithiasis, moderate emphysema, stable right adrenal adenoma, hepatic cysts. Electronically Signed: Alberta Lawler MD  10/14/2024 12:26 PM EDT  Workstation ID: [id number]    XR Chest 1 View    Result Date: 10/12/2024  XR CHEST 1 VW Date of Exam: 10/12/2024 7:10 AM EDT Indication: Shortness of breath. Comparison: 10/11/2024. Findings: There are persistent bilateral basal pleural effusions which are small in size with atelectasis, right greater than left side. The heart is enlarged. The patient is had a previous median sternotomy. There is a left-sided transvenous pacemaker in place. The pulmonary vascular markings are increased felt to represent mild pulmonary edema. There is no pneumothorax. There are chronic age-related changes involving the bony thorax and thoracic aorta.     Impression: No interval change from yesterday with abnormalities as described above. Electronically Signed: Candido Palomino MD  10/12/2024 10:23 AM EDT  Workstation ID: [id number]    Adult Transthoracic Echo Complete W/ Cont if Necessary Per Protocol    Result Date: 10/11/2024    Left ventricular systolic function is severely decreased. Calculated left ventricular EF = 20.5% Left ventricular ejection fraction appears to be less than 20%.   The left ventricular cavity is moderately dilated.   Left ventricular wall thickness is consistent with mild concentric hypertrophy.   Severely reduced right ventricular systolic function noted.   The right ventricular cavity is moderately dilated.   The left atrial cavity  is moderate to severely dilated.   The right atrial cavity is severely  dilated.   There is mild calcification of the aortic valve.   Moderate mitral valve regurgitation is present.   Moderate tricuspid valve regurgitation is present.   Estimated right ventricular systolic pressure from tricuspid regurgitation is moderately elevated (45-55 mmHg).   Moderate pulmonic valve regurgitation is present. No significant change compared to the 2023 echo.     XR Chest 1 View    Result Date: 10/11/2024  XR CHEST 1 VW Date of Exam: 10/11/2024 2:59 AM EDT Indication: f/u dyspnea, a/c chf, pl effusions Comparison: October 10, 2024 Findings: A cardiac ICD device is present. Sternotomy wires noted. The heart is enlarged. There are bibasilar densities which could reflect effusions. Pulmonary vascular markings are prominent. There are some interstitial changes. Some vascular congestion and edema could account for this.     Impression: 1.Cardiomegaly with what looks like some vascular congestion and probable interstitial edema. 2.Bibasilar effusions Electronically Signed: Percy Mcguire MD  10/11/2024 7:12 AM EDT  Workstation ID: [id number]    XR Chest 1 View    Result Date: 10/10/2024  XR CHEST 1 VW Date of Exam: 10/10/2024 12:45 PM EDT Indication: SOA triage protocol Comparison: CT chest without contrast 10/9/2024 Findings: Left-sided AICD noted. Status post sternotomy and CABG. Central pulmonary vascular congestion with interstitial thickening suggesting pulmonary edema. Underlying emphysema. Persistent small bilateral pleural effusions right greater than left with bibasilar atelectasis. Negative for pneumothorax.     Impression: 1. Central pulmonary vascular congestion with interstitial thickening suggesting pulmonary edema. 2. Persistent small bilateral pleural effusions right greater than left with bibasilar atelectasis. 3. Emphysema. Electronically Signed: Luis Richards MD  10/10/2024 1:28 PM EDT  Workstation ID: [id number]    CT  Chest Without Contrast Diagnostic    Result Date: 10/9/2024  CT CHEST WO CONTRAST DIAGNOSTIC Date of Exam: 10/9/2024 11:27 AM EDT Indication: shortness of breath. Comparison: PA and lateral chest 7/17/2024. CT chest 6/14/2021. Technique: Axial CT images were obtained of the chest without contrast administration.  Reconstructed coronal and sagittal images were also obtained. Automated exposure control and iterative construction methods were used. Findings:  Moderate centrilobular and paraseptal emphysematous changes are present. Small to moderate right basilar pleural effusion layers to a depth of 5 cm, and there is passive atelectasis in the bilateral lower lobes. There is bronchial wall thickening in both lungs without overt mucous plugging. Smooth interstitial thickening and hazy groundglass densities throughout both lungs may reflect changes of mild pulmonary edema, as well. Trace left basilar pleural fluid is present. There is moderate to marked cardiac enlargement which is new since 6/14/2021. Pacemaker leads are in place. Left atrial appendage occlusion device is present. There is mild thoracic aortic calcific atherosclerosis. There are mildly enlarged lymph nodes which are new since 2021. Index lymph nodes include: Right lower paratracheal 11 mm short axis (2/161), pretracheal 12 mm short axis (2/168), prevascular 11 mm (2/164). A cyst in the left hepatic lobe measures 3.9 cm, stable. Smaller hepatic cysts are also noted. Small quantity ascites is seen adjacent to the liver. Low-density right adrenal nodule measuring 14 mm is unchanged from 2021, most in keeping with a benign adenoma. Remainder of the imaged upper abdominal organs demonstrate a normal noncontrast appearance. No acute or suspicious osseous abnormalities are identified.       Impression: 1. Moderate to marked cardiomegaly, which appears new when compared to the CT chest of 6/14/2021. Signs of prior CABG. Correlate with cardiac history. 2.  Features suggesting mild interstitial and alveolar pulmonary edema with small to moderate right pleural effusion and trace left basilar pleural effusion. 3. Mild right lower lobe atelectasis. 4. Moderate emphysema. 5. Trace perihepatic ascites. 6. Stable right adrenal adenoma. Electronically Signed: Alberta Lawler MD  10/9/2024 12:03 PM EDT  Workstation ID: [id number]     Results for orders placed during the hospital encounter of 06/14/21    Duplex Venous Upper Extremity - Left CAR    Interpretation Summary  · Acute left upper extremity deep vein thrombosis noted in the subclavian and axillary.  · Acute left upper extremity superficial thrombophlebitis noted in the cephalic (upper arm).  · All other left sided vessels appear normal.      Results for orders placed during the hospital encounter of 06/14/21    Duplex Venous Upper Extremity - Left CAR    Interpretation Summary  · Acute left upper extremity deep vein thrombosis noted in the subclavian and axillary.  · Acute left upper extremity superficial thrombophlebitis noted in the cephalic (upper arm).  · All other left sided vessels appear normal.      Results for orders placed during the hospital encounter of 10/10/24    Adult Transthoracic Echo Complete W/ Cont if Necessary Per Protocol    Interpretation Summary    Left ventricular systolic function is severely decreased. Calculated left ventricular EF = 20.5% Left ventricular ejection fraction appears to be less than 20%.    The left ventricular cavity is moderately dilated.    Left ventricular wall thickness is consistent with mild concentric hypertrophy.    Severely reduced right ventricular systolic function noted.    The right ventricular cavity is moderately dilated.    The left atrial cavity is moderate to severely dilated.    The right atrial cavity is severely  dilated.    There is mild calcification of the aortic valve.    Moderate mitral valve regurgitation is present.    Moderate tricuspid valve  regurgitation is present.    Estimated right ventricular systolic pressure from tricuspid regurgitation is moderately elevated (45-55 mmHg).    Moderate pulmonic valve regurgitation is present.    No significant change compared to the 2023 echo.      Plan for Follow-up of Pending Labs/Results: N/A     Discharge Details        Discharge Medications        Changes to Medications        Instructions Start Date   furosemide 40 MG tablet  Commonly known as: LASIX  What changed: when to take this   40 mg, Oral, 2 Times Daily             Continue These Medications        Instructions Start Date   albuterol sulfate  (90 Base) MCG/ACT inhaler  Commonly known as: PROVENTIL HFA;VENTOLIN HFA;PROAIR HFA   2 puffs, Inhalation, Every 4 Hours PRN      aspirin 81 MG EC tablet   81 mg, Oral, Daily      atorvastatin 40 MG tablet  Commonly known as: LIPITOR   40 mg, Oral, Nightly      CoQ10 100 MG capsule   1 capsule, Oral, Daily, OTC      empagliflozin 10 MG tablet tablet  Commonly known as: JARDIANCE   10 mg, Oral, Daily      Entresto 24-26 MG tablet  Generic drug: sacubitril-valsartan   0.5 tablets, Oral, 2 Times Daily      ipratropium-albuterol 0.5-2.5 mg/3 ml nebulizer  Commonly known as: DUO-NEB   3 mL, Nebulization, 4 Times Daily PRN      levothyroxine 75 MCG tablet  Commonly known as: SYNTHROID, LEVOTHROID   1 tablet, Oral, Daily      metoprolol succinate XL 25 MG 24 hr tablet  Commonly known as: TOPROL-XL   25 mg, Oral, 2 Times Daily      multivitamin with minerals tablet tablet   1 tablet, Oral, Daily      O2  Commonly known as: OXYGEN   2.5 L/min, Inhalation, Nightly      polyethylene glycol 17 GM/SCOOP powder  Commonly known as: MIRALAX   DISSOLVE 1 CAPFUL IN 8 OUNCES OF LIQUID AND DRINK ONCE DAILY      rOPINIRole 0.5 MG tablet  Commonly known as: REQUIP   0.5 mg, Oral, Nightly PRN      temazepam 15 MG capsule  Commonly known as: RESTORIL   TAKE 1 CAPSULE BY MOUTH EVERY DAY AT BEDTIME AS NEEDED FOR SLEEP       tiotropium bromide-olodaterol 2.5-2.5 MCG/ACT aerosol solution inhaler  Commonly known as: STIOLTO RESPIMAT   2 puffs, Inhalation, Daily               No Known Allergies      Discharge Disposition:  Home or Self Care    Diet:  Hospital:  Diet Order   Procedures    Diet: Cardiac; Healthy Heart (2-3 Na+); Fluid Consistency: Thin (IDDSI 0)            Activity:  Resume previous as tolerated     Restrictions or Other Recommendations:  Follow up as below        CODE STATUS:    Code Status and Medical Interventions: CPR (Attempt to Resuscitate); Full Support   Ordered at: 10/10/24 1602     Level Of Support Discussed With:    Patient    Next of Kin (If No Surrogate)     Code Status (Patient has no pulse and is not breathing):    CPR (Attempt to Resuscitate)     Medical Interventions (Patient has pulse or is breathing):    Full Support       Future Appointments   Date Time Provider Department Center   10/15/2024  2:00 PM [facility] US 2 IR [facility]   10[facility]22/2024  [facility]   10/24/2024  1:15 PM Pietro Quintana MD [facility]   11/22/2024  1:00 PM Gerald Wei MD [facility]   2/27/2025  2:30 PM Pietro Quintana MD [facility]   7/2/2025  2:30 PM Yonny Prado MD [facility]       Additional Instructions for the Follow-ups that You Need to Schedule       Discharge Follow-up with PCP   As directed       Currently Documented PCP:    Arun Soliman MD    PCP Phone Number:    [phone]     Follow Up Details: Follow up with PCP in 1 week        Discharge Follow-up with Specified Provider: Follow up with Dr. Quintana in next 1 month   As directed      To: Follow up with Dr. Quintana in next 1 month        Discharge Follow-up with Specified Provider: Follow up with heart and valve clinic in 1 week   As directed      To: Follow up with heart and valve clinic in 1 week                      RADHA Jimenez DO  10/15/24      Time Spent on Discharge:  I spent   40  minutes on this discharge activity which included: face-to-face encounter with the patient, reviewing the data in the system, coordination of the care with the nursing staff as well as consultants, documentation, and entering orders.            Electronically signed by TOMMY Jimenez,  at 10/15/24 1520       Discharge Order (From admission, onward)       Start     Ordered    10/15/24 1138  Discharge patient  Once        Expected Discharge Date: 10/15/24   Expected Discharge Time: Afternoon   Discharge Disposition: Home or Self Care   Physician of Record for Attribution - Please select from Treatment Team: TOMMY JIMENEZ [[id number]]   Review needed by CMO to determine Physician of Record: No      Question Answer Comment   Physician of Record for Attribution - Please select from Treatment Team TOMMY JIMENEZ    Review needed by CMO to determine Physician of Record No        10/15/24 1141

## 2024-10-16 NOTE — OUTREACH NOTE
Prep Survey      Flowsheet Row Responses   [facility] facility patient discharged from? [place]   Is LACE score < 7 ? No   Eligibility Readm Mgmt   Discharge diagnosis Acute exacerbation of CHF   Does the patient have one of the following disease processes/diagnoses(primary or secondary)? CHF   Does the patient have Home health ordered? No   Is there a DME ordered? No   Prep survey completed? Yes            MATTHEW BHATT - Registered Nurse

## 2024-10-18 ENCOUNTER — READMISSION MANAGEMENT (OUTPATIENT)
Dept: CALL CENTER | Facility: HOSPITAL | Age: 79
End: 2024-10-18
Payer: MEDICARE

## 2024-10-18 NOTE — OUTREACH NOTE
CHF Week 1 Survey      Flowsheet Row Responses   [facility] [place] patient discharged from? [place]   Does the patient have one of the following disease processes/diagnoses(primary or secondary)? CHF   CHF Week 1 attempt successful? Yes   Call start time 1352   Call end time 1408   Discharge diagnosis Acute exacerbation of CHF   Meds reviewed with patient/caregiver? Yes   Is the patient having any side effects they believe may be caused by any medication additions or changes? No   Does the patient have all medications ordered at discharge? Yes   Is the patient taking all medications as directed (includes completed medication regime)? Yes   Comments regarding appointments RN encouraged pt to attend appt with CHF Clinic 10-21-24, pt v/u   Does the patient have a primary care provider?  Yes   Does the patient have an appointment with their PCP within 7 days of discharge? Yes   What is the Home health agency?  Hospice has been consulted, pt reports.   DME comments 3LO2 at home, continuously at home. Home nebulizer in place, pt reports   Pulse Ox monitoring Intermittent   Pulse Ox device source Patient   O2 Sat comments 94% with O2 in place pt reports.   O2 Sat: education provided Sat levels, Monitoring frequency, When to seek care   Comments Pt reports that he has not gained wt since DC, he monitors wt each day. Patient voices frustration related to ongoing SOA despite no increased fluid retention and normal O2 sats, he reports.Pt reports that he is unsure how much water/fluid he is allowed or should drink daily considering his diuretics, RN advised that his daily fluid intake amt would need to be discuss with MD at appt 10-21-24, encouraged pt to attend the appt at HF Clinic, pt v/u. Pt reports that he usually drinks 3-4 bottles of water daily but yesterday he only drank 1/2bottle and had minimal output, he plans to drink more today he reports. Pt reports difficulty with talking, exertion or activity r/t SOA/PETE. RN  notes that there is no distress or gasping of breath during our call, some SOA has been heard.   Did the patient receive a copy of their discharge instructions? Yes   Nursing interventions Reviewed instructions with patient   What is the patient's perception of their health status since discharge? Same   Nursing interventions Nurse provided patient education   Is the patient able to teach back signs and symptoms of worsening condition? (i.e. weight gain, shortness of air, etc.) Yes   Is the patient/caregiver able to teach back the hierarchy of who to call/visit for symptoms/problems? PCP, Specialist, Home health nurse, Urgent Care, ED, 911 Yes   CHF Zone this Call Yellow Zone   Yellow Zone Not feeling as good as usual, Worsening shortness of breath with activity   Yellow Zone Interventions Consider contacting your doctor or health care team    CHF Week 1 call completed? Yes   Call end time 0417            Sheba ROMO - Registered Nurse

## 2024-10-21 ENCOUNTER — OFFICE VISIT (OUTPATIENT)
Dept: CARDIOLOGY | Facility: HOSPITAL | Age: 79
End: 2024-10-21
Payer: MEDICARE

## 2024-10-21 VITALS
TEMPERATURE: 97.8 F | DIASTOLIC BLOOD PRESSURE: 74 MMHG | BODY MASS INDEX: 21.66 KG/M2 | RESPIRATION RATE: 20 BRPM | HEART RATE: 86 BPM | WEIGHT: 174.19 LBS | OXYGEN SATURATION: 98 % | SYSTOLIC BLOOD PRESSURE: 119 MMHG | HEIGHT: 75 IN

## 2024-10-21 DIAGNOSIS — I48.21 PERMANENT ATRIAL FIBRILLATION: ICD-10-CM

## 2024-10-21 DIAGNOSIS — I10 ESSENTIAL HYPERTENSION: ICD-10-CM

## 2024-10-21 DIAGNOSIS — I50.22 CHRONIC HFREF (HEART FAILURE WITH REDUCED EJECTION FRACTION): Primary | ICD-10-CM

## 2024-10-21 PROCEDURE — 3074F SYST BP LT 130 MM HG: CPT | Performed by: NURSE PRACTITIONER

## 2024-10-21 PROCEDURE — 3078F DIAST BP <80 MM HG: CPT | Performed by: NURSE PRACTITIONER

## 2024-10-21 PROCEDURE — 99214 OFFICE O/P EST MOD 30 MIN: CPT | Performed by: NURSE PRACTITIONER

## 2024-10-21 PROCEDURE — 1160F RVW MEDS BY RX/DR IN RCRD: CPT | Performed by: NURSE PRACTITIONER

## 2024-10-21 PROCEDURE — G2211 COMPLEX E/M VISIT ADD ON: HCPCS | Performed by: NURSE PRACTITIONER

## 2024-10-21 PROCEDURE — 1159F MED LIST DOCD IN RCRD: CPT | Performed by: NURSE PRACTITIONER

## 2024-10-28 NOTE — PROGRESS NOTES
"Chief Complaint  Atrial Fibrillation and Follow-up    Subjective    History of Present Illness {  Problem List  Visit  Diagnosis   Encounters  Notes  Medications  Labs  Result Review Imaging  Media :23}       History of Present Illness 79-year-old male presents the office today for ongoing evaluation of his acute on chronic hfref. He notes that he has not been taking entresto or jardiance due to cost. Recent echo 10/11/2024 showed EF of 20.5%, moderately dilated LV, mild concentric hypertrophy, severely reduced RV systolic function, right ventricular cavity moderately dilated, left atrial cavity moderate to severely dilated, right atrial cavity severely dilated, moderate MR, moderate TR, elevated RVSP 45 to 55 mmHg.  Notes that he is considering enrolling in hospice.  History of sick sinus syndrome, dilated cardiomyopathy, permanent atrial fibrillation, ICD in place, status post ICD,  watchman, chronic HFrEF, CAD(s/p cabg 2021), nonsustained VT, hypothyroidism, chronic kidney disease, symptomatic anemia, COPD hypertension  Patient has been referred to  advanced heart failure program for further evaluation.  Notes ongoing fatigue but notes that he is feeling somewhat better overall.       Objective     Vital Signs:   Vitals:    10/21/24 1125   BP: 119/74   BP Location: Left arm   Patient Position: Sitting   Cuff Size: Adult   Pulse: 86   Resp: 20   Temp: 97.8 °F (36.6 °C)   TempSrc: Temporal   SpO2: 98%   Weight: 79 kg (174 lb 3 oz)   Height: 190.5 cm (75\")     Body mass index is 21.77 kg/m².  Physical Exam  Vitals and nursing note reviewed.   Constitutional:       Appearance: Normal appearance.   HENT:      Head: Normocephalic.   Eyes:      Pupils: Pupils are equal, round, and reactive to light.   Cardiovascular:      Rate and Rhythm: Normal rate and regular rhythm.      Pulses: Normal pulses.      Heart sounds: Normal heart sounds. No murmur heard.  Pulmonary:      Effort: Pulmonary effort is normal. "      Breath sounds: Normal breath sounds.   Abdominal:      General: Bowel sounds are normal.      Palpations: Abdomen is soft.   Musculoskeletal:         General: Normal range of motion.      Cervical back: Normal range of motion.      Right lower leg: No edema.      Left lower leg: No edema.   Skin:     General: Skin is warm and dry.      Capillary Refill: Capillary refill takes less than 2 seconds.   Neurological:      Mental Status: He is alert and oriented to person, place, and time.   Psychiatric:         Mood and Affect: Mood normal.         Thought Content: Thought content normal.              Result Review  Data Reviewed:{ Labs  Result Review  Imaging  Med Tab  Media :23}   ECG 12 Lead ED Triage Standing Order; SOA (10/10/2024 12:25)  Adult Transthoracic Echo Complete W/ Cont if Necessary Per Protocol (10/11/2024 10:22)  Comprehensive Metabolic Panel (10/14/2024 05:23)  proBNP (10/14/2024 05:23)  CBC & Differential (10/15/2024 04:58)  Comprehensive Metabolic Panel (10/15/2024 04:58)         Assessment and Plan {CC Problem List  Visit Diagnosis  ROS  Review (Popup)  Health Maintenance  Quality  BestPractice  Medications  SmartSets  SnapShot Encounters  Media :23}   1. Chronic HFrEF (heart failure with reduced ejection fraction)  Resume Jardiance 10 mg daily 4 bottles provided Lot number [id number] expiration 1 of 2026  Resume Entresto 24/26 mg twice daily #2 bottles lot number [id number] expiration 11/20/2026  Continue lasix, toprol  Heart failure education today including signs and symptoms, the role of the heart failure center, daily weights, low sodium diet (less than 1500 mg per day), and daily physical activity. Reviewed HF Zones with patient and family.  Patient to continue current medications as previously ordered.   Pending referral to  advanced heart failure center  2. Permanent atrial fibrillation  CHADS-VASc Risk Assessment               4 Total Score    1 CHF    1 Hypertension    2  Age >/= 75    Criteria that do not apply:    DM    PRIOR STROKE/TIA/THROMBO    Vascular Disease    Age 65-74    Sex: Female        S/p watchman  Stable on toprol        3. Essential hypertension  Resume entresto  Stable on toprol         Follow Up {Instructions Charge Capture  Follow-up Communications :23}   Return if symptoms worsen or fail to improve.    Patient was given instructions and counseling regarding his condition or for health maintenance advice. Please see specific information pulled into the AVS if appropriate.  Patient was instructed to call the [facility] Valve Center with any questions, concerns, or worsening symptoms.

## 2024-10-30 ENCOUNTER — TELEPHONE (OUTPATIENT)
Dept: CARDIOLOGY | Facility: HOSPITAL | Age: 79
End: 2024-10-30
Payer: MEDICARE

## 2024-10-30 NOTE — TELEPHONE ENCOUNTER
----- Message from Ann Hutchison sent at 10/30/2024 12:51 PM EDT -----  Have him call Mariano's office about the referral. If he is symptomatic, I am happy to see him  ----- Message -----  From: Heidy Teixeira MA  Sent: 10/30/2024  12:50 PM EDT  To: JOHN Mosley    Pt called and inquiry about the referral at . Said he has not heard anything and states he is getting worse. I asked if he had reached out to Dr Quintana and he said no not yet wanted to reach out to you first.

## 2024-10-30 NOTE — TELEPHONE ENCOUNTER
Spoke with patient agreeable to plan. He is going to call me back in the next few days and let me know how he is doing.

## 2024-10-30 NOTE — TELEPHONE ENCOUNTER
Spoke with patient regarding [facility] referral, it was put in on 10/15. Mari spoke with [facility] and they tried to call patient on 10/22 and 10/25 and he didn't answer.     Gave patient number to call [facility] for appointment.    Patient complains for worsening shortness of breath and fatigue. No weight changes. He is still taking his lasix 40 mg BID.    Patient states that his hospice nurse gave him prednisone 10 mg but he didn't want to take it. Advised patient to follow their instructions.     Asked patient if he wanted to see Ann and he said he wanted to wait.    /60  HR 85

## 2024-10-30 NOTE — TELEPHONE ENCOUNTER
He said that he already talked to [facility] about the referral and I told him if he was having any problems that we would see him and he said he would wait.

## 2024-11-01 ENCOUNTER — READMISSION MANAGEMENT (OUTPATIENT)
Dept: CALL CENTER | Facility: HOSPITAL | Age: 79
End: 2024-11-01
Payer: MEDICARE

## 2024-11-01 NOTE — OUTREACH NOTE
CHF Week 3 Survey      Flowsheet Row Responses   [facility] patient discharged from? [place]   Does the patient have one of the following disease processes/diagnoses(primary or secondary)? CHF   Week 3 attempt successful? Yes   Call start time 1134   Unsuccessful attempts Attempt 1   Call end time 1137   Discharge diagnosis Acute exacerbation of CHF   Meds reviewed with patient/caregiver? Yes   Is the patient taking all medications as directed (includes completed medication regime)? Yes   Does the patient have a primary care provider?  Yes   Does the patient have an appointment with their PCP within 7 days of discharge? Yes   Has the patient kept scheduled appointments due by today? Yes   Has home health visited the patient within 72 hours of discharge? N/A   Psychosocial issues? No   Did the patient receive a copy of their discharge instructions? Yes   Nursing interventions Reviewed instructions with patient   What is the patient's perception of their health status since discharge? Improving   Nursing interventions Nurse provided patient education   Is the patient able to teach back signs and symptoms of worsening condition? (i.e. weight gain, shortness of air, etc.) Yes   Is the patient/caregiver able to teach back the hierarchy of who to call/visit for symptoms/problems? PCP, Specialist, Home health nurse, Urgent Care, ED, 911 Yes   CHF Nursing Interventions Education provided on various zones   CHF Week 3 call completed? Yes   Graduated Yes   Graduated/Revoked comments Pt reports his wt is stable. No needs.   Call end time 1137            WINSTON JUNE - Registered Nurse

## 2024-11-01 NOTE — OUTREACH NOTE
CHF Week 3 Survey      Flowsheet Row Responses   [facility] facility patient discharged from? [place]   Does the patient have one of the following disease processes/diagnoses(primary or secondary)? CHF   Week 3 attempt successful? No   Unsuccessful attempts Attempt 1            WINSTON JUNE - Registered Nurse

## 2024-11-04 ENCOUNTER — TELEPHONE (OUTPATIENT)
Dept: CARDIOLOGY | Facility: HOSPITAL | Age: 79
End: 2024-11-04
Payer: MEDICARE

## 2024-11-04 NOTE — TELEPHONE ENCOUNTER
Called patient to review signs and symptoms of heart failure.  Patient reports he is feeling decent and has an appointment with  advanced heart failure center next Friday, 11/15/2024.  Reports that his weight is stable and he is taking his medications as prescribed.  Patient instructed to call the office if any change in symptoms prior to his appointment next week.  Patient verbalized understanding.

## 2024-11-15 ENCOUNTER — TELEPHONE (OUTPATIENT)
Dept: CARDIOLOGY | Facility: CLINIC | Age: 79
End: 2024-11-15
Payer: MEDICARE

## 2024-11-15 ENCOUNTER — TELEPHONE (OUTPATIENT)
Dept: PULMONOLOGY | Facility: CLINIC | Age: 79
End: 2024-11-15
Payer: MEDICARE

## 2024-11-15 NOTE — TELEPHONE ENCOUNTER
Pt called states the Trelegy samples are working better then the Stito, so he would like the script sent in to [facility], this has been done.

## 2024-11-15 NOTE — TELEPHONE ENCOUNTER
Name: Colin Albrecht    Relationship: Self    Best Callback Number: [phone]     Incoming call to the Hub, requesting to  Cancel their HFU appointment on 11/21/24.     Per Hub workflow, further review is needed before the task can be completed.    Result of Call: Please cancel this appointment     PT CALLED TO CANCEL THIS APPOINTMEN

## 2025-01-10 ENCOUNTER — OFFICE VISIT (OUTPATIENT)
Dept: PULMONOLOGY | Facility: CLINIC | Age: 80
End: 2025-01-10
Payer: MEDICARE

## 2025-01-10 VITALS
TEMPERATURE: 97.8 F | DIASTOLIC BLOOD PRESSURE: 70 MMHG | HEART RATE: 93 BPM | OXYGEN SATURATION: 97 % | SYSTOLIC BLOOD PRESSURE: 118 MMHG | HEIGHT: 75 IN | WEIGHT: 176 LBS | BODY MASS INDEX: 21.88 KG/M2

## 2025-01-10 DIAGNOSIS — J44.9 CHRONIC OBSTRUCTIVE PULMONARY DISEASE, UNSPECIFIED COPD TYPE: Primary | ICD-10-CM

## 2025-01-10 NOTE — PROGRESS NOTES
Subjective:     Chief Complaint:   Chief Complaint   Patient presents with    COPD    Follow-up       HPI:    Colin Albrecht is a 79 y.o. male here for follow-up of shortness of breath    I saw him in initial consultation in June 2023 for dyspnea and abnormal PFTs.  He had a mixed obstructive and restrictive defect on his PFTs.  Chest x-ray revealed loculated pleural fluid on the left.    I placed him on Stiolto and albuterol as well as oxygen with exertion at night.      He has a history of significant heart failure with reduced ejection fraction.  His most recent echocardiogram in October revealed an EF of less than 20%.  There was severe reduction in his right ventricular systolic function as well with moderate mitral valve regurgitation.  He has a history of permanent atrial fibrillation.  He has an ICD as well as Watchman.  He has prior CABG in 2021 and history of nonsustained VT, hypothyroidism, CKD, anemia, and hypertension.    He was referred to the advanced heart failure clinic at  he has been treated with metoprolol, Bumex, Entresto, and Jardiance.    He has noted significant symptomatic improvement with the above treatment.  He remains on Trelegy once daily as well as as needed albuterol MDI and also DuoNebs.    He still uses oxygen 2-2 and half liters at night and as needed during the day.    Current medications are:   Current Outpatient Medications:     albuterol sulfate  (90 Base) MCG/ACT inhaler, Inhale 2 puffs Every 4 (Four) Hours As Needed for Wheezing., Disp: 54 g, Rfl: 3    aspirin 81 MG EC tablet, Take 1 tablet by mouth Daily., Disp: , Rfl:     atorvastatin (LIPITOR) 40 MG tablet, TAKE 1 TABLET BY MOUTH EVERY NIGHT., Disp: 90 tablet, Rfl: 3    Coenzyme Q10 (CoQ10) 100 MG capsule, Take 1 capsule by mouth Daily. OTC, Disp: , Rfl:     empagliflozin (JARDIANCE) 10 MG tablet tablet, Take 1 tablet by mouth Daily., Disp: 90 tablet, Rfl: 2    Fluticasone-Umeclidin-Vilant (TRELEGY ELLIPTA)  200-62.5-25 MCG/ACT inhaler, Inhale 1 puff Daily., Disp: 60 each, Rfl: 3    furosemide (LASIX) 40 MG tablet, Take 1 tablet by mouth 2 (Two) Times a Day., Disp: 60 tablet, Rfl: 0    ipratropium-albuterol (DUO-NEB) 0.5-2.5 mg/3 ml nebulizer, Take 3 mL by nebulization 4 (Four) Times a Day As Needed for Wheezing or Shortness of Air., Disp: 120 mL, Rfl: 11    levothyroxine (SYNTHROID, LEVOTHROID) 75 MCG tablet, Take 1 tablet by mouth Daily., Disp: , Rfl:     metoprolol succinate XL (TOPROL-XL) 25 MG 24 hr tablet, Take 1 tablet by mouth twice daily, Disp: 60 tablet, Rfl: 5    multivitamin with minerals tablet tablet, Take 1 tablet by mouth Daily., Disp: , Rfl:     O2 (OXYGEN), Inhale 3 L/min Every Night., Disp: , Rfl:     polyethylene glycol (MIRALAX) 17 GM/SCOOP powder, DISSOLVE 1 CAPFUL IN 8 OUNCES OF LIQUID AND DRINK ONCE DAILY, Disp: , Rfl:     rOPINIRole (REQUIP) 0.5 MG tablet, Take 1 tablet by mouth At Night As Needed., Disp: , Rfl:     sacubitril-valsartan (Entresto) 24-26 MG tablet, Take 0.5 tablets by mouth 2 (Two) Times a Day., Disp: 90 tablet, Rfl: 2    temazepam (RESTORIL) 15 MG capsule, TAKE 1 CAPSULE BY MOUTH EVERY DAY AT BEDTIME AS NEEDED FOR SLEEP, Disp: , Rfl: .      The patient's relevant past medical, surgical, family and social history were reviewed and updated in Epic as appropriate.     ROS:    Review of Systems  ROS as documented in patient questionnaire unless as noted otherwise    Objective:    Physical Exam  Vitals reviewed.   Constitutional:       Appearance: He is well-developed.   HENT:      Head: Normocephalic and atraumatic.      Mouth/Throat:      Mouth: Mucous membranes are moist.      Pharynx: Oropharynx is clear.   Neck:      Thyroid: No thyromegaly.   Cardiovascular:      Rate and Rhythm: Normal rate. Rhythm irregular.      Heart sounds: No murmur heard.     No friction rub. No gallop.   Pulmonary:      Effort: Pulmonary effort is normal. No respiratory distress.      Breath sounds:  Rales present. No wheezing.      Comments: Mild basilar scattered crackles  Musculoskeletal:      Cervical back: Neck supple.   Skin:     General: Skin is warm and dry.   Neurological:      Mental Status: He is alert and oriented to person, place, and time.   Psychiatric:         Behavior: Behavior normal.         Thought Content: Thought content normal.         Data:    CT chest 10/14/2024 revealed mild basilar interstitial edema with small right pleural effusion and some right basilar atelectasis and stable cardiomegaly.  There was mild enlargement of his mediastinal nodes which were stable and these were felt to be reactive.  Emphysematous changes were noted.    PFT: 8/9/2024: Mixed obstructive and restrictive defect with decreased diffusion.  This was actually improved slightly compared to June 2023    Assessment:    Problem List Items Addressed This Visit          Pulmonary Problems    COPD  - Primary (Chronic)    Relevant Orders    Overnight Sleep Oximetry Study         COPD: I suspect a combination of his heart failure and his COPD are the main drivers of his dyspnea.  I do not see any evidence of interstitial lung disease or reduction in volumes on his PFTs that would go along with amiodarone pulmonary toxicity though this has been discontinued nonetheless by cardiology out of caution which seems reasonable.  He remains on Trelegy as well as as needed DuoNebs or albuterol.  Hypoxemia: Continues to benefit from oxygen 2-2 and half liters at night and with exertion.  Difficult to get accurate oxygen saturations due to his peripheral cyanosis from his circulatory issues.  Congestive heart failure: Low EF of less than 20%.  Followed at the advanced heart failure clinic at .  Symptomatically improved with their therapy.    Plan:    Continue Trelegy 200 daily  Albuterol or DuoNebs as needed  Oxygen as needed during the day and nightly.  The dose is 2 L.  He continues to benefit from this and should still use it.   Difficult to get an accurate oxygen saturations due to his low circulatory state presumably due to his HFrEF.  We tried multiple sites today.  He is concerned that his oxygen will not be covered at night so we will do overnight oximetry as possible for 2 nights.  Continued follow-up    Level of Risk Moderate due to: prescription drug management    Discussed in detail with the patient.  He will call prior to his follow up visit for any new problems.    Signed by  Gerald Wei MD

## 2025-02-24 DIAGNOSIS — R06.02 SOB (SHORTNESS OF BREATH): Primary | ICD-10-CM

## 2025-04-16 DIAGNOSIS — R06.02 SOB (SHORTNESS OF BREATH): ICD-10-CM

## 2025-07-02 ENCOUNTER — OFFICE VISIT (OUTPATIENT)
Dept: CARDIOLOGY | Facility: CLINIC | Age: 80
End: 2025-07-02
Payer: MEDICARE

## 2025-07-02 VITALS
HEIGHT: 75 IN | WEIGHT: 157.6 LBS | HEART RATE: 70 BPM | BODY MASS INDEX: 19.6 KG/M2 | DIASTOLIC BLOOD PRESSURE: 78 MMHG | SYSTOLIC BLOOD PRESSURE: 114 MMHG | OXYGEN SATURATION: 96 %

## 2025-07-02 DIAGNOSIS — I42.0 CARDIOMYOPATHY, DILATED: Chronic | ICD-10-CM

## 2025-07-02 DIAGNOSIS — I50.22 CHRONIC SYSTOLIC CONGESTIVE HEART FAILURE: Primary | ICD-10-CM

## 2025-07-02 DIAGNOSIS — I49.3 PVCS (PREMATURE VENTRICULAR CONTRACTIONS): ICD-10-CM

## 2025-07-02 DIAGNOSIS — I25.810 CORONARY ARTERY DISEASE INVOLVING CORONARY BYPASS GRAFT OF NATIVE HEART WITHOUT ANGINA PECTORIS: ICD-10-CM

## 2025-07-02 RX ORDER — MAGNESIUM 30 MG
250 TABLET ORAL DAILY
COMMUNITY

## 2025-07-02 NOTE — PROGRESS NOTES
Colin Albrecht  1945  [phone]    07/02/2025    [facility] CARDIOLOGY     Referring Provider: No ref. provider found     Arun Soliman MD  [address] [place] [address]    Chief Complaint   Patient presents with    Permanent atrial fibrillation       Problem List:     Permanent Atrial Fibrillation   CHADsvasc = 4 off Xarelto x 1 year due to bleeding, never been on Eliquis  Echocardiogram 1/27/2016: EF 45 to 55%, unchanged from previous echo 2012  Echocardiogram 8/27/2019: EF 35 to 40%, mild to moderate MR, mild to moderate TR, RVSP 40 mmHg  Nuclear stress test 8/26/2019: Normal LV perfusion EF 33%  Implantation of a left atrial appendage occlusive device (Watchman device) 09/25/20 by Dr. Yonny Prado  PORSHA on 11/13/20 with well seated device, EF 35%, mod MR, mild to mod TR, post-procedural ASD with left-to-right flow  Upgrade to BIV ICD and AV Node ablation 3/2021, Dr. Prado  Echo 5/24/2023 LVEF 21-25%  Frequent PVC's  Dilated cardiomyopathy/CHF  Patient reports normal LHC 10-12 years ago  Echocardiogram 1/27/2016: EF 45 to 55%, unchanged from previous echo 2012  Echocardiogram 8/27/2019: EF 35%, mild to moderate MR, mild to moderate TR, RVSP 40 mmHg  Nuclear stress test 8/26/2019: Normal LV perfusion EF 33%  Upgrade to BIV ICD at time of AV node ablation 3/2021   EF 40 % 6/14/2021  Echo 8/2022: EF 35%, mod TR, mild MR, mod W[place]  Echocardiogram 5/24/2023: EF 21-25%, LA cavity moderately to severely dilated. RA cavity mild to moderately dilated. Moderate MR, moderate to severe TR  Admitted 10/2024 for acute CHF - referred to [facility]  Echocardiogram 10/10/2024: EF 20.5% LV moderately dilated, severely reduced RV systolic function noted, RV cavity moderately dilated, left atrial cavity moderately to severely dilated, moderate MR, moderate TR, RVSP 45 to 55 mmHg  Sick sinus syndrome  Dual-chamber permanent pacemaker -[employer]tr[employer] device  Upgrade to BIV ICD at time of AV node  ablation 3/2021   Hypertension  Coronary artery Disease:   6/3/21  CABG x 2 per Dr Cornelius HARRIS-LAD, SVG-circumflex.   LHC 6/6/2023: widely patent grafts with competitive flow in the LAD from native vessel, patent SVG-OM, RCA with minimal disease. LVEDP 20 mmHg  Gastritis/GIB/Peptic Ulcer Disease  2013: GIB requiring PRBCs, EGD showed bleeding ulcer in the antrum 9/2013, repeat EGD 12/2013 showed healed ulcer  Upper GI Bleeding 3/2019, EGD showed gastric antral ulcer with stigmata or recent bleeding - treated with IV/PO Protonix - Xarelto discontinued  Epistaxis - occurred on Xarelto  Stage III CKD   Surgical History:  none    Allergies  No Known Allergies    Current Medications    Current Outpatient Medications:     albuterol sulfate  (90 Base) MCG/ACT inhaler, Inhale 2 puffs Every 4 (Four) Hours As Needed for Wheezing., Disp: 54 g, Rfl: 3    aspirin 81 MG EC tablet, Take 1 tablet by mouth Daily., Disp: , Rfl:     atorvastatin (LIPITOR) 40 MG tablet, TAKE 1 TABLET BY MOUTH EVERY NIGHT., Disp: 90 tablet, Rfl: 3    Coenzyme Q10 (CoQ10) 100 MG capsule, Take 1 capsule by mouth Daily. OTC, Disp: , Rfl:     empagliflozin (JARDIANCE) 10 MG tablet tablet, Take 1 tablet by mouth Daily., Disp: 90 tablet, Rfl: 2    Fluticasone-Umeclidin-Vilant (TRELEGY ELLIPTA) 200-62.5-25 MCG/ACT inhaler, Inhale 1 puff Daily., Disp: 60 each, Rfl: 3    furosemide (LASIX) 40 MG tablet, Take 1 tablet by mouth 2 (Two) Times a Day., Disp: 60 tablet, Rfl: 0    ipratropium-albuterol (DUO-NEB) 0.5-2.5 mg/3 ml nebulizer, Take 3 mL by nebulization 4 (Four) Times a Day As Needed for Wheezing or Shortness of Air., Disp: 120 mL, Rfl: 11    levothyroxine (SYNTHROID, LEVOTHROID) 75 MCG tablet, Take 1 tablet by mouth Daily., Disp: , Rfl:     metoprolol succinate XL (TOPROL-XL) 25 MG 24 hr tablet, Take 1 tablet by mouth twice daily, Disp: 60 tablet, Rfl: 5    multivitamin with minerals tablet tablet, Take 1 tablet by mouth Daily., Disp: , Rfl:     O2  "(OXYGEN), Inhale 3 L/min Every Night., Disp: , Rfl:     polyethylene glycol (MIRALAX) 17 GM/SCOOP powder, DISSOLVE 1 CAPFUL IN 8 OUNCES OF LIQUID AND DRINK ONCE DAILY, Disp: , Rfl:     rOPINIRole (REQUIP) 0.5 MG tablet, Take 1 tablet by mouth At Night As Needed., Disp: , Rfl:     sacubitril-valsartan (Entresto) 24-26 MG tablet, Take 0.5 tablets by mouth 2 (Two) Times a Day., Disp: 90 tablet, Rfl: 2    temazepam (RESTORIL) 15 MG capsule, TAKE 1 CAPSULE BY MOUTH EVERY DAY AT BEDTIME AS NEEDED FOR SLEEP, Disp: , Rfl:     magnesium 30 MG tablet, Take 250 mg by mouth Daily., Disp: , Rfl:     History of Present Illness:      Pt presents for follow up of CHF/AF/PVC/DCM/HTN. Since we last saw the pt, he was admitted to [facility] in October 2024, followed by Dr. Quintana during that admission for acute on chronic heart failure.  Echo at that time demonstrated EF of 20%.  He was discharged home on hospice and also referral was made to  advanced heart failure clinic.  Since that time he has had a marked improvement after some medication adjustments and his guideline directed medical therapy.  He has been feeling much better and is now off hospice.  He works on his garage most days and feels well without significant  SOB. He denies CP, LH, and dizziness. Denies any hospitalizations, ER visits, bleeding, or TIA/CVA symptoms.  He does get tired at the end of the day.        Vitals:    07/02/25 1350   BP: 114/78   BP Location: Right arm   Patient Position: Sitting   Cuff Size: Adult   Pulse: 70   SpO2: 96%   Weight: 71.5 kg (157 lb 9.6 oz)   Height: 190.5 cm (75\")     Body mass index is 19.7 kg/m².  PE:  General: NAD. A & O x 3   Neck: no JVD, no carotid bruits, no TM  Heart RRR, NL S1, S2, S4 present, no rubs, murmurs  Lungs: CTA, no wheezes, rhonchi, or rales  Abd: soft, non-tender, NL BS  Ext: No musculoskeletal deformities, no edema, cyanosis, or clubbing  Psych: normal mood and affect    Diagnostic Data:    BiV " ICD manual interrogation: Less than 1% a paced, biventricular paced 60% normal thresholds and impedances, permanent atrial fibrillation, 40% PVC burden.  13 months on battery    Procedures           1. Chronic systolic congestive heart failure    2. Dilated CM     3. Coronary artery disease involving coronary bypass graft of native heart without angina pectoris    4. PVCs (premature ventricular contractions)          Plan:       1.  Permanent atrial fibrillation:  - s/p AVN RFA and upgrade to BiV ICD 3/2021  - s/p Watchman Device on ASA        2. Dilated Cardiomyopathy/CHF class II symptoms - overall improved despite only BiV pacing 60%  -Echocardiogram 5/24/2023: EF 21-25%, LA cavity moderately to severely dilated. RA cavity mild to moderately dilated. Moderate MR, moderate to severe TR  -Echocardiogram 10/10/2024: EF 20.5% LV moderately dilated, severely reduced RV systolic function noted, RV cavity moderately dilated, left atrial cavity moderately to severely dilated, moderate MR, moderate TR, RVSP 45 to 55 mmHg  - Currently patient is on Toprol and Entresto, Jardiance.   He is unable to titrate Entresto due to creatinine level and hyperkalemia.  - following with  Heart Failure   -Discussed with Dr. Prado, would get another echocardiogram and if EF 20% or higher could pursue PVC ablation     3. CAD:  - s/p CABG x 2 6/3/2021, currently on aspirin and statin therapy.   - follows with Dr. Quintana as well. Recent [facility] 6/2023 with patent grafts.        4. Frequent PVC's:   - started on Amiodarone 4/2023, improved symptoms.  See #1.  Off Amiodarone since 7/2024 due to worsening SOB.  With PVC burden increased and only biventricular pacing 60% of the time.  As per #2, will obtain echocardiogram and if echocardiogram shows EF still 20 to 25% would pursue PVC ablation.  If EF is less than 20%, he would be deemed too high of risk for general anesthesia for PVC ablation.     F/up in 4 months    Electronically signed by  ETIENNE Maxwell, 07/02/25, 2:25 PM EDT.

## 2025-07-18 LAB
MC_CV_MDC_IDC_RATE_1: 162
MC_CV_MDC_IDC_RATE_1: 182
MC_CV_MDC_IDC_RATE_1: 222
MC_CV_MDC_IDC_RATE_2: 194
MC_CV_MDC_IDC_RV_HV_IMPEDANCE: 69
MC_CV_MDC_IDC_THERAPIES: NORMAL
MC_CV_MDC_IDC_ZONE_ID: 2
MC_CV_MDC_IDC_ZONE_ID: 3
MC_CV_MDC_IDC_ZONE_ID: 4
MC_CV_MDC_IDC_ZONE_ID: 5
MC_CV_MDC_IDC_ZONE_ID: 6
MDC_IDC_MSMT_BATTERY_REMAINING_LONGEVITY: 13 MO
MDC_IDC_MSMT_BATTERY_RRT_TRIGGER: 2.73
MDC_IDC_MSMT_BATTERY_STATUS: NORMAL
MDC_IDC_MSMT_BATTERY_VOLTAGE: 2.89
MDC_IDC_MSMT_CAP_CHARGE_TIME: 4.43
MDC_IDC_MSMT_LEADCHNL_LV_DTM: NORMAL
MDC_IDC_MSMT_LEADCHNL_LV_IMPEDANCE_VALUE: 456
MDC_IDC_MSMT_LEADCHNL_LV_PACING_THRESHOLD_AMPLITUDE: 0.5
MDC_IDC_MSMT_LEADCHNL_LV_PACING_THRESHOLD_POLARITY: NORMAL
MDC_IDC_MSMT_LEADCHNL_LV_PACING_THRESHOLD_PULSEWIDTH: 1
MDC_IDC_MSMT_LEADCHNL_RA_DTM: NORMAL
MDC_IDC_MSMT_LEADCHNL_RA_IMPEDANCE_VALUE: 399
MDC_IDC_MSMT_LEADCHNL_RA_PACING_THRESHOLD_AMPLITUDE: 0.38
MDC_IDC_MSMT_LEADCHNL_RA_PACING_THRESHOLD_POLARITY: NORMAL
MDC_IDC_MSMT_LEADCHNL_RA_PACING_THRESHOLD_PULSEWIDTH: 0.4
MDC_IDC_MSMT_LEADCHNL_RA_SENSING_INTR_AMPL: 1.62
MDC_IDC_MSMT_LEADCHNL_RV_DTM: NORMAL
MDC_IDC_MSMT_LEADCHNL_RV_IMPEDANCE_VALUE: 361
MDC_IDC_MSMT_LEADCHNL_RV_PACING_THRESHOLD_AMPLITUDE: 0.75
MDC_IDC_MSMT_LEADCHNL_RV_PACING_THRESHOLD_POLARITY: NORMAL
MDC_IDC_MSMT_LEADCHNL_RV_PACING_THRESHOLD_PULSEWIDTH: 0.4
MDC_IDC_MSMT_LEADCHNL_RV_SENSING_INTR_AMPL: 15.75
MDC_IDC_PG_IMPLANT_DTM: NORMAL
MDC_IDC_PG_MFG: NORMAL
MDC_IDC_PG_MODEL: NORMAL
MDC_IDC_PG_SERIAL: NORMAL
MDC_IDC_PG_TYPE: NORMAL
MDC_IDC_SESS_DTM: NORMAL
MDC_IDC_SESS_TYPE: NORMAL
MDC_IDC_SET_BRADY_AT_MODE_SWITCH_RATE: 162
MDC_IDC_SET_BRADY_LOWRATE: 75
MDC_IDC_SET_BRADY_MAX_SENSOR_RATE: 120
MDC_IDC_SET_BRADY_MAX_TRACKING_RATE: 120
MDC_IDC_SET_BRADY_MODE: NORMAL
MDC_IDC_SET_BRADY_PAV_DELAY: 160
MDC_IDC_SET_BRADY_SAV_DELAY: 120
MDC_IDC_SET_CRT_LVRV_DELAY: 0
MDC_IDC_SET_CRT_PACED_CHAMBERS: NORMAL
MDC_IDC_SET_LEADCHNL_LV_PACING_AMPLITUDE: 1
MDC_IDC_SET_LEADCHNL_LV_PACING_POLARITY: NORMAL
MDC_IDC_SET_LEADCHNL_LV_PACING_PULSEWIDTH: 1
MDC_IDC_SET_LEADCHNL_RA_PACING_AMPLITUDE: 3.5
MDC_IDC_SET_LEADCHNL_RA_PACING_POLARITY: NORMAL
MDC_IDC_SET_LEADCHNL_RA_PACING_PULSEWIDTH: 0.4
MDC_IDC_SET_LEADCHNL_RA_SENSING_POLARITY: NORMAL
MDC_IDC_SET_LEADCHNL_RA_SENSING_SENSITIVITY: 0.3
MDC_IDC_SET_LEADCHNL_RV_PACING_AMPLITUDE: 2
MDC_IDC_SET_LEADCHNL_RV_PACING_POLARITY: NORMAL
MDC_IDC_SET_LEADCHNL_RV_PACING_PULSEWIDTH: 0.4
MDC_IDC_SET_LEADCHNL_RV_SENSING_POLARITY: NORMAL
MDC_IDC_SET_LEADCHNL_RV_SENSING_SENSITIVITY: 0.3
MDC_IDC_SET_ZONE_STATUS: NORMAL
MDC_IDC_SET_ZONE_TYPE: NORMAL
MDC_IDC_STAT_AT_BURDEN_PERCENT: 100
MDC_IDC_STAT_BRADY_RA_PERCENT_PACED: 0
MDC_IDC_STAT_TACHYTHERAPY_ATP_DELIVERED_RECENT: 0
MDC_IDC_STAT_TACHYTHERAPY_SHOCKS_ABORTED_RECENT: 0
MDC_IDC_STAT_TACHYTHERAPY_SHOCKS_DELIVERED_RECENT: 0

## 2025-07-22 RX ORDER — SACUBITRIL AND VALSARTAN 24; 26 MG/1; MG/1
0.5 TABLET, FILM COATED ORAL 2 TIMES DAILY
Qty: 90 TABLET | Refills: 2 | Status: SHIPPED | OUTPATIENT
Start: 2025-07-22

## 2025-07-22 NOTE — TELEPHONE ENCOUNTER
Patient called and left a message requesting a refill on Entresto. I tried to call him back but he did not answer. I did not get the option to leave a message.   Abdomen soft, nontender, nondistended, bowel sounds present in all 4 quadrants.

## 2025-07-28 DIAGNOSIS — J44.9 CHRONIC OBSTRUCTIVE PULMONARY DISEASE, UNSPECIFIED COPD TYPE: Chronic | ICD-10-CM

## 2025-07-28 RX ORDER — IPRATROPIUM BROMIDE AND ALBUTEROL SULFATE 2.5; .5 MG/3ML; MG/3ML
3 SOLUTION RESPIRATORY (INHALATION) 4 TIMES DAILY PRN
Qty: 120 ML | Refills: 1 | Status: SHIPPED | OUTPATIENT
Start: 2025-07-28

## 2025-07-28 NOTE — TELEPHONE ENCOUNTER
Pt called needing refill for Ipratropium-albuterol. Medication has been approved and sent to pharmacy.

## 2025-08-05 RX ORDER — EMPAGLIFLOZIN 10 MG/1
10 TABLET, FILM COATED ORAL DAILY
Qty: 90 TABLET | Refills: 0 | Status: SHIPPED | OUTPATIENT
Start: 2025-08-05

## 2025-08-06 ENCOUNTER — OFFICE VISIT (OUTPATIENT)
Dept: CARDIOLOGY | Facility: CLINIC | Age: 80
End: 2025-08-06
Payer: MEDICARE

## 2025-08-06 VITALS
DIASTOLIC BLOOD PRESSURE: 60 MMHG | HEART RATE: 46 BPM | HEIGHT: 75 IN | WEIGHT: 158.6 LBS | SYSTOLIC BLOOD PRESSURE: 102 MMHG | BODY MASS INDEX: 19.72 KG/M2

## 2025-08-06 DIAGNOSIS — I42.0 CARDIOMYOPATHY, DILATED: Chronic | ICD-10-CM

## 2025-08-06 DIAGNOSIS — I48.21 PERMANENT ATRIAL FIBRILLATION: Primary | ICD-10-CM

## 2025-08-06 DIAGNOSIS — I49.3 PVC (PREMATURE VENTRICULAR CONTRACTION): ICD-10-CM

## 2025-08-06 DIAGNOSIS — I50.22 CHRONIC SYSTOLIC CONGESTIVE HEART FAILURE: ICD-10-CM

## 2025-08-06 RX ORDER — SPIRONOLACTONE 25 MG/1
0.5 TABLET ORAL DAILY
COMMUNITY
Start: 2025-07-18

## 2025-08-06 RX ORDER — TEMAZEPAM 30 MG/1
30 CAPSULE ORAL NIGHTLY PRN
COMMUNITY
Start: 2025-08-06

## 2025-08-06 RX ORDER — BUMETANIDE 2 MG/1
1 TABLET ORAL DAILY
COMMUNITY
Start: 2025-08-04

## 2025-08-06 RX ORDER — LEVOTHYROXINE SODIUM 100 UG/1
1 TABLET ORAL DAILY
COMMUNITY
Start: 2025-07-07

## 2025-08-07 DIAGNOSIS — I49.3 PVC (PREMATURE VENTRICULAR CONTRACTION): Primary | ICD-10-CM

## (undated) DEVICE — INTRO TEAR AWAY/LVD W/SD PRT 9F 13CM

## (undated) DEVICE — CLTH CLENS READYCLEANSE PERI CARE PK/5

## (undated) DEVICE — SET PRIMARY GRVTY 10DP MALE LL 104IN

## (undated) DEVICE — MEDI-VAC YANKAUER SUCTION HANDLE W/BULBOUS TIP: Brand: CARDINAL HEALTH

## (undated) DEVICE — SUT SILK 4/0 TIES 18IN A183H

## (undated) DEVICE — CATHETER,URETHRAL,REDRUBBER,STERILE,22FR: Brand: MEDLINE

## (undated) DEVICE — SUT ETHIB 2/0 SH SH 36IN X523H

## (undated) DEVICE — CVR PROB ULTRASND/TRANSD W/GEL 7X11IN STRL

## (undated) DEVICE — SUT PROLN 6/0 C1 D/A 30IN 8706H

## (undated) DEVICE — MODEL BT2000 P/N 700287-012KIT CONTENTS: MANIFOLD WITH SALINE AND CONTRAST PORTS, SALINE TUBING WITH SPIKE AND HAND SYRINGE, TRANSDUCER: Brand: BT2000 AUTOMATED MANIFOLD KIT

## (undated) DEVICE — SUT ETHIB 1 CTX CR8 18IN CX30D

## (undated) DEVICE — MSK ENDO PORT O2 POM ELITE CURAPLEX A/

## (undated) DEVICE — LEX ELECTRO PHYSIOLOGY: Brand: MEDLINE INDUSTRIES, INC.

## (undated) DEVICE — GLIDESHEATH SLENDER STAINLESS STEEL KIT: Brand: GLIDESHEATH SLENDER

## (undated) DEVICE — TBG SXN INTRACARD RIDGID FLUT 24F .25X13IN A/

## (undated) DEVICE — INTRO SHEATH 8F60CM

## (undated) DEVICE — ANTIBACTERIAL UNDYED BRAIDED (POLYGLACTIN 910), SYNTHETIC ABSORBABLE SUTURE: Brand: COATED VICRYL

## (undated) DEVICE — DOME MONITORING W BONDED STPCK BIOTRANS2

## (undated) DEVICE — GLIDESHEATH BASIC HYDROPHILIC COATED INTRODUCER SHEATH: Brand: GLIDESHEATH

## (undated) DEVICE — KT BIOGUARD SXN VLV AIR/H20 4PC DISP

## (undated) DEVICE — FLTR RESERV PERFUS INTERSEPT 02 STRL

## (undated) DEVICE — LEVEL SENSORS PADS ARE USED TO ATTACH THE LEVEL SENSORS TO A HARD SHELL RESERVOIR. INCLUDES COUPLING GEL.: Brand: TERUMO® ADVANCED PERFUSION SYSTEM 1

## (undated) DEVICE — ADULT, W/LG. BACK PAD, RADIOTRANSPARENT ELEMENT AND LEAD WIRE: Brand: DEFIBRILLATION ELECTRODES

## (undated) DEVICE — PENCL ES MEGADINE EZ/CLEAN BUTN W/HOLSTR 10FT

## (undated) DEVICE — CANN VESL DLP 1WY BLNT/TP 3MM

## (undated) DEVICE — "MH-443 SUCTION VALVE F/EVIS140 EVIS160": Brand: SUCTION VALVE

## (undated) DEVICE — SUT SILK 2/0 TIES 18IN A185H

## (undated) DEVICE — EZ GLIDE AORTIC CANNULA: Brand: EDWARDS LIFESCIENCES EZ GLIDE AORTIC CANNULA

## (undated) DEVICE — INTRO ACCSR BLNT TP

## (undated) DEVICE — PAD ARMBRD SURG CONVOL 7.5X20X2IN

## (undated) DEVICE — ST INF PRI SMRTSTE 20DRP 2VLV 24ML 117

## (undated) DEVICE — CONNECT Y INTERSEPT W/LL 3/8 X 3/8 X 3/8IN

## (undated) DEVICE — MODEL AT P65, P/N 701554-001KIT CONTENTS: HAND CONTROLLER, 3-WAY HIGH-PRESSURE STOPCOCK WITH ROTATING END AND PREMIUM HIGH-PRESSURE TUBING: Brand: ANGIOTOUCH® KIT

## (undated) DEVICE — IRRIGATOR BULB ASEPTO 60CC STRL

## (undated) DEVICE — CATH COURNARD DECCA CSL 6F120CM

## (undated) DEVICE — STERILE (15.2 TAPERED TO 7.6 X 183CM) POLYETHYLENE ACCORDION-FOLDED COVER FOR USE WITH SIEMENS ACUNAV ULTRASOUND CATHETER FAMILY CONNECTOR: Brand: SWIFTLINK TRANSDUCER COVER

## (undated) DEVICE — SUCTION CANISTER, 2500CC, RIGID: Brand: DEROYAL

## (undated) DEVICE — INTRO SHEATH ENGAGE W/50 GW .038 7F12

## (undated) DEVICE — SUT SILK 0/0 CT2 18IN C027D

## (undated) DEVICE — Device

## (undated) DEVICE — Device: Brand: AIR/WATER CHANNEL CLEANING ADAPTER

## (undated) DEVICE — PENCL ROCKRSWCH MEGADYNE W/HOLSTR 10FT SS

## (undated) DEVICE — CATH DIAG EXPO .045 FL3.5 5F 100CM

## (undated) DEVICE — DECANT BG O JET

## (undated) DEVICE — KT MANIFLD EP

## (undated) DEVICE — ACCESS SHEATH WITH DILATOR: Brand: WATCHMAN™ TRUSEAL™ ACCESS SYSTEM

## (undated) DEVICE — SWAN-GANZ CCOMBO V THERMODILUTION CATHETER: Brand: SWAN-GANZ CCOMBO V

## (undated) DEVICE — TBG SXN RIGD MINI/SUCKER 9F 4.75IN

## (undated) DEVICE — ENDOGATOR HYBRID TUBING KIT FOR USE WITH ENDOGATOR IRRIGATION PUMP, OLYMPUS PUMP, GI4000 ESU, AND TORRENT IRRIGATION PUMP.: Brand: ENDOGATOR KIT

## (undated) DEVICE — SUT SILK 2 SUTUPAK TIE 60IN SA8H 2STRAND

## (undated) DEVICE — GW PERIPH GUIDERIGHT STD/EXCHNG/J/TIP SS 0.035IN 5X260CM

## (undated) DEVICE — LIMB HOLDER, WRIST/ANKLE: Brand: DEROYAL

## (undated) DEVICE — DRSNG SURESITE123 4X4.8IN

## (undated) DEVICE — INTRAOPERATIVE COVER KIT, 10 PACK: Brand: SITE-RITE

## (undated) DEVICE — ADAPT/Y PERFUS DLP FML/LUER COLR/CODE/CLMP 8.9AND25.4CM

## (undated) DEVICE — ST EXT IV SMRTSTE 2VLV FIX M LL 6ML 41

## (undated) DEVICE — Device: Brand: VIZIGO

## (undated) DEVICE — CANN AORT/ROOT DLP W/VNT/LN 14G 7F 14.6CM

## (undated) DEVICE — TUBING, SUCTION, 1/4" X 10', STRAIGHT: Brand: MEDLINE

## (undated) DEVICE — SUT PROLN 7/0 8747H

## (undated) DEVICE — DRP SLUSH MACH

## (undated) DEVICE — SUT PROLN 7/0 BV1 D/A 24IN 8702H

## (undated) DEVICE — 3M™ STERI-STRIP™ REINFORCED ADHESIVE SKIN CLOSURES, R1547, 1/2 IN X 4 IN (12 MM X 100 MM), 6 STRIPS/ENVELOPE: Brand: 3M™ STERI-STRIP™

## (undated) DEVICE — INTRO SHEATH ENGAGE W/50 GW .038 9F12

## (undated) DEVICE — SUT SILK 2/0 CT1 CR8 18IN C022D

## (undated) DEVICE — TUBING,OXYGEN,CRUSH RES,7',CLEAR,UC: Brand: MEDLINE INDUSTRIES, INC.

## (undated) DEVICE — GUIDE WIRE WITH HYDROPHILIC COATING: Brand: ACUITY WHISPER VIEW™

## (undated) DEVICE — CAUTERY TIP POLISHER: Brand: DEVON

## (undated) DEVICE — 3M™ MEDIPORE™ H SOFT CLOTH SURGICAL TAPE, 2863, 3 IN X 10 YD, 12/CASE: Brand: 3M™ MEDIPORE™

## (undated) DEVICE — PLASMABLADE PS210-030S 3.0S LOCK: Brand: PLASMABLADE™

## (undated) DEVICE — ANGIOGRAPHIC CATHETER: Brand: EXPO™

## (undated) DEVICE — Device: Brand: EZ STEER

## (undated) DEVICE — KT INTRO SHEATH PERC W/FULL DRP 9F

## (undated) DEVICE — THE BITE BLOCK MAXI, LATEX FREE STRAP IS USED TO PROTECT THE ENDOSCOPE INSERTION TUBE FROM BEING BITTEN BY THE PATIENT.

## (undated) DEVICE — DR ROGERS OH: Brand: MEDLINE INDUSTRIES, INC.

## (undated) DEVICE — SYS TRNSEP ACC BRK ACROSS A/ 71CM

## (undated) DEVICE — DEV COMPR RADL PRELUDESYNCEZ 30ML 32CM

## (undated) DEVICE — SOL NACL 0.9PCT 1000ML

## (undated) DEVICE — CATH INTRAVAS ULTRASND EAGLE EYE 2.9FR

## (undated) DEVICE — CONTRL CONTRST CHMBRD W/TBG72IN REUS

## (undated) DEVICE — CATH DIAG EXPO M/ PK 5F FL4/FR4 PIG

## (undated) DEVICE — CANN NASL CO2 DIVIDED A/

## (undated) DEVICE — SKIN PREP TRAY W/CHG: Brand: MEDLINE INDUSTRIES, INC.

## (undated) DEVICE — Device: Brand: OMNIWIRE PRESSURE GUIDE WIRE

## (undated) DEVICE — BALN PRESS WEDGE 6F 110CM

## (undated) DEVICE — CATH DIAG EXPO .045 FL3  5F 100CM

## (undated) DEVICE — SUT SILK B CARDIO BB 5/0 30IN K880H BX/36

## (undated) DEVICE — PK CATH CARD 10

## (undated) DEVICE — ST EXT IV SMARTSITE 2VLV SP M LL 5ML IV1

## (undated) DEVICE — GUIDE CATHETER: Brand: MACH1™

## (undated) DEVICE — SYR LUERLOK 50ML

## (undated) DEVICE — SINGLE-USE BIOPSY FORCEPS: Brand: RADIAL JAW 4

## (undated) DEVICE — PK HEART OPN 10

## (undated) DEVICE — RADIFOCUS GLIDEWIRE: Brand: GLIDEWIRE

## (undated) DEVICE — SYS TRNSEP ACC BRK ACROSS A/ 18G 98CM

## (undated) DEVICE — SENSR CERBRL O2 PK/2

## (undated) DEVICE — KT VLV HEMO MAP ACC PLS LG/BORE MTL/INTRO W/TORQ/DEV

## (undated) DEVICE — "MH-438 A/W VLVE F/140 EVIS-140": Brand: AIR/WATER VALVE

## (undated) DEVICE — ST EXT IV LG BORE NDLESS FLTR LL 17IN

## (undated) DEVICE — PATIENT RETURN ELECTRODE, SINGLE-USE, CONTACT QUALITY MONITORING, ADULT, WITH 9FT CORD, FOR PATIENTS WEIGING OVER 33LBS. (15KG): Brand: MEGADYNE

## (undated) DEVICE — PK PERFUS CUST W/CARDIOPLEGIA

## (undated) DEVICE — SPNG LAP PREWSH SFTPK 18X18IN STRL PK/5

## (undated) DEVICE — GW LUGE .014 182 CM

## (undated) DEVICE — GLV SURG PREMIERPRO MIC LTX PF SZ7 BRN

## (undated) DEVICE — CONTN GRAD MEAS TRIANG 32OZ BLK

## (undated) DEVICE — GW DIAG .032

## (undated) DEVICE — ELECTRD BLD EZ CLN STD 2.5IN

## (undated) DEVICE — AVID DUAL STAGE VENOUS DRAINAGE CANNULA: Brand: AVID DUAL STAGE VENOUS DRAINAGE CANNULA

## (undated) DEVICE — SYS VASOVIEW HEMOPRO ENDOSCOPIC HARVST VESL

## (undated) DEVICE — Device: Brand: ASAHI SION BLUE

## (undated) DEVICE — DEV COMP RAD PRELUDESYNC 24CM

## (undated) DEVICE — CATH ULTRASND ECHO ACUNAV FOR ACUSON 8F 90CM

## (undated) DEVICE — Device: Brand: MEDEX